# Patient Record
Sex: MALE | Employment: FULL TIME | URBAN - METROPOLITAN AREA
[De-identification: names, ages, dates, MRNs, and addresses within clinical notes are randomized per-mention and may not be internally consistent; named-entity substitution may affect disease eponyms.]

---

## 2017-01-01 ENCOUNTER — OFFICE VISIT (OUTPATIENT)
Dept: INTERPRETER SERVICES | Facility: CLINIC | Age: 47
End: 2017-01-01

## 2017-01-02 ENCOUNTER — RECORDS - HEALTHEAST (OUTPATIENT)
Dept: ADMINISTRATIVE | Facility: OTHER | Age: 47
End: 2017-01-02

## 2017-01-02 ENCOUNTER — OFFICE VISIT (OUTPATIENT)
Dept: INTERPRETER SERVICES | Facility: CLINIC | Age: 47
End: 2017-01-02

## 2017-01-02 ENCOUNTER — INTERPRETER (OUTPATIENT)
Dept: INTERPRETER SERVICES | Facility: CLINIC | Age: 47
End: 2017-01-02

## 2017-01-03 ENCOUNTER — RESULTS ONLY (OUTPATIENT)
Dept: OTHER | Facility: CLINIC | Age: 47
End: 2017-01-03

## 2017-01-03 ENCOUNTER — APPOINTMENT (OUTPATIENT)
Dept: CARDIOLOGY | Facility: CLINIC | Age: 47
DRG: 001 | End: 2017-01-03
Attending: INTERNAL MEDICINE
Payer: COMMERCIAL

## 2017-01-03 ENCOUNTER — APPOINTMENT (OUTPATIENT)
Dept: CARDIOLOGY | Facility: CLINIC | Age: 47
End: 2017-01-03
Attending: INTERNAL MEDICINE
Payer: COMMERCIAL

## 2017-01-03 ENCOUNTER — OFFICE VISIT (OUTPATIENT)
Dept: INTERPRETER SERVICES | Facility: CLINIC | Age: 47
End: 2017-01-03

## 2017-01-03 ENCOUNTER — APPOINTMENT (OUTPATIENT)
Dept: MEDSURG UNIT | Facility: CLINIC | Age: 47
End: 2017-01-03
Payer: COMMERCIAL

## 2017-01-04 ENCOUNTER — APPOINTMENT (OUTPATIENT)
Dept: OCCUPATIONAL THERAPY | Facility: CLINIC | Age: 47
DRG: 001 | End: 2017-01-04
Attending: THORACIC SURGERY (CARDIOTHORACIC VASCULAR SURGERY)
Payer: COMMERCIAL

## 2017-01-04 ENCOUNTER — OFFICE VISIT (OUTPATIENT)
Dept: INTERPRETER SERVICES | Facility: CLINIC | Age: 47
End: 2017-01-04

## 2017-01-04 PROBLEM — I47.29 VENTRICULAR TACHYCARDIA, NON-SUSTAINED (H): Status: ACTIVE | Noted: 2017-01-04

## 2017-01-05 ENCOUNTER — APPOINTMENT (OUTPATIENT)
Dept: OCCUPATIONAL THERAPY | Facility: CLINIC | Age: 47
DRG: 001 | End: 2017-01-05
Attending: THORACIC SURGERY (CARDIOTHORACIC VASCULAR SURGERY)
Payer: COMMERCIAL

## 2017-01-05 ENCOUNTER — APPOINTMENT (OUTPATIENT)
Dept: MRI IMAGING | Facility: CLINIC | Age: 47
DRG: 001 | End: 2017-01-05
Attending: NURSE PRACTITIONER
Payer: COMMERCIAL

## 2017-01-05 PROBLEM — T86.21: Status: ACTIVE | Noted: 2017-01-05

## 2017-01-05 LAB
DONOR IDENTIFICATION: NORMAL
DSA COMMENTS: NORMAL
DSA PRESENT: NO
DSA TEST METHOD: NORMAL
ORGAN: NORMAL
SA1 CELL: NORMAL
SA1 COMMENTS: NORMAL
SA1 HI RISK ABY: NORMAL
SA1 MOD RISK ABY: NORMAL
SA1 TEST METHOD: NORMAL
SA2 CELL: NORMAL
SA2 COMMENTS: NORMAL
SA2 HI RISK ABY UA: NORMAL
SA2 MOD RISK ABY: NORMAL
SA2 TEST METHOD: NORMAL

## 2017-01-05 PROCEDURE — 75561 CARDIAC MRI FOR MORPH W/DYE: CPT

## 2017-01-06 ENCOUNTER — OFFICE VISIT (OUTPATIENT)
Dept: INTERPRETER SERVICES | Facility: CLINIC | Age: 47
End: 2017-01-06

## 2017-01-07 ENCOUNTER — APPOINTMENT (OUTPATIENT)
Dept: OCCUPATIONAL THERAPY | Facility: CLINIC | Age: 47
DRG: 001 | End: 2017-01-07
Attending: THORACIC SURGERY (CARDIOTHORACIC VASCULAR SURGERY)
Payer: COMMERCIAL

## 2017-01-07 ENCOUNTER — OFFICE VISIT (OUTPATIENT)
Dept: INTERPRETER SERVICES | Facility: CLINIC | Age: 47
End: 2017-01-07

## 2017-01-08 ENCOUNTER — APPOINTMENT (OUTPATIENT)
Dept: OCCUPATIONAL THERAPY | Facility: CLINIC | Age: 47
DRG: 001 | End: 2017-01-08
Attending: THORACIC SURGERY (CARDIOTHORACIC VASCULAR SURGERY)
Payer: COMMERCIAL

## 2017-01-09 ENCOUNTER — OFFICE VISIT (OUTPATIENT)
Dept: INTERPRETER SERVICES | Facility: CLINIC | Age: 47
End: 2017-01-09

## 2017-01-09 ENCOUNTER — RECORDS - HEALTHEAST (OUTPATIENT)
Dept: ADMINISTRATIVE | Facility: OTHER | Age: 47
End: 2017-01-09

## 2017-01-09 ENCOUNTER — APPOINTMENT (OUTPATIENT)
Dept: OCCUPATIONAL THERAPY | Facility: CLINIC | Age: 47
DRG: 001 | End: 2017-01-09
Attending: THORACIC SURGERY (CARDIOTHORACIC VASCULAR SURGERY)
Payer: COMMERCIAL

## 2017-01-10 ENCOUNTER — APPOINTMENT (OUTPATIENT)
Dept: CARDIOLOGY | Facility: CLINIC | Age: 47
DRG: 001 | End: 2017-01-10
Attending: THORACIC SURGERY (CARDIOTHORACIC VASCULAR SURGERY)
Payer: COMMERCIAL

## 2017-01-11 ENCOUNTER — OFFICE VISIT (OUTPATIENT)
Dept: INTERPRETER SERVICES | Facility: CLINIC | Age: 47
End: 2017-01-11

## 2017-01-11 ENCOUNTER — PRE VISIT (OUTPATIENT)
Dept: TRANSPLANT | Facility: CLINIC | Age: 47
End: 2017-01-11

## 2017-01-11 ENCOUNTER — CARE COORDINATION (OUTPATIENT)
Dept: CARE COORDINATION | Facility: CLINIC | Age: 47
End: 2017-01-11

## 2017-01-11 ENCOUNTER — APPOINTMENT (OUTPATIENT)
Dept: OCCUPATIONAL THERAPY | Facility: CLINIC | Age: 47
DRG: 001 | End: 2017-01-11
Attending: THORACIC SURGERY (CARDIOTHORACIC VASCULAR SURGERY)
Payer: COMMERCIAL

## 2017-01-11 DIAGNOSIS — E78.5 HYPERLIPEMIA: ICD-10-CM

## 2017-01-11 DIAGNOSIS — Z94.1 HEART REPLACED BY TRANSPLANT (H): Primary | ICD-10-CM

## 2017-01-11 RX ORDER — TACROLIMUS 1 MG/1
CAPSULE ORAL
Qty: 210 CAPSULE | Refills: 11 | Status: SHIPPED | OUTPATIENT
Start: 2017-01-11 | End: 2017-01-18

## 2017-01-11 NOTE — PROGRESS NOTES
Patient is a transplant patient and will be followed by the transplant team for follow up      DBD Heart (Transplant 1)       Phase: Transplanted (12/12/2016)   Status: Active Follow-up    Next review:     POD: 30 days             Care team: Manoj Hannah RN (Heart Coord) Geena Mcclellan MD (Transplant Physician)   Ivan Burrell (Family Medicine Physician) Broderick Gao MD, MD (Referring Physician)        Transplant Center: [facility] ([place], [place])       Transplant admission: Admission Summary     Transplant surgery: Surgery (12/12/2016)                     Native organs dx:               Phase history: Effective Phase Status Reason Report   12/12/2016 Transplanted Active Follow-up  Visit Summary   9/30/2016 Waitlist Active     6/17/2016 Evaluation Active     5/25/2016 Referral Active  Visit Summary                 Transplant Note  Ny Gilmore RN 12/5/2016  2:27 PM     Services:  [phone]  - speaks [place].   Make arrangements for an  to be with pt in his pre-op phase of tx surgery.    Chaga's Disease:  Will need close follow-up for re-activation of disease after transplant, including frequent PCR testing of both blood and biopsy tissue.  See Dr Ivan Schuler's Infectious Disease consult note from 8-31-16 which outlines the screening protocol and treatment of rejection, the presentation of which is unable to be  from a reactivation of Chaga's.      XM:  None needed    # sternotomies:  None    On continuous infusion milrinone    Anticoagulation:    None    Transportation:  Local drive, 30 - 45 min.    ICD:  Medtronic                                         Events      Referred: 5/25/2016 Evaluated: 6/17/2016 Committee:  UNOS activated: 9/30/2016     Center waitlisted: 9/30/2016 Admitted: 12/11/2016 Transplanted: 12/12/2016

## 2017-01-11 NOTE — TELEPHONE ENCOUNTER
Called to change his upcoming appointments: confirmed that the patient is now scheduled to have labs at 730, xray at 745 and echo at 800 on Friday morning (1/13/17) before 0900 clinic with ROBERTO PORRAS.  Due to elevated FK level, instructed patient to reduce his FK to 4 mg/3 mg (from 5/4mg) and confirmed the need for a 12 hour FK level to be drawn Friday AM.  Confirmed current MMF dose (1500 mg BID).  Instructed patient to bring all of his medications and medication box to clinic visit.  Patient verbalizes understanding plan of care and agrees to follow.

## 2017-01-13 ENCOUNTER — HOSPITAL ENCOUNTER (OUTPATIENT)
Facility: CLINIC | Age: 47
Setting detail: SPECIMEN
Discharge: HOME | End: 2017-01-13
Admitting: INTERNAL MEDICINE
Payer: COMMERCIAL

## 2017-01-13 ENCOUNTER — RESULTS ONLY (OUTPATIENT)
Dept: OTHER | Facility: CLINIC | Age: 47
End: 2017-01-13

## 2017-01-13 ENCOUNTER — RADIANT APPOINTMENT (OUTPATIENT)
Dept: CARDIOLOGY | Facility: CLINIC | Age: 47
End: 2017-01-13
Attending: INTERNAL MEDICINE

## 2017-01-13 ENCOUNTER — OFFICE VISIT (OUTPATIENT)
Dept: CARDIOLOGY | Facility: CLINIC | Age: 47
End: 2017-01-13
Attending: INTERNAL MEDICINE
Payer: COMMERCIAL

## 2017-01-13 ENCOUNTER — RECORDS - HEALTHEAST (OUTPATIENT)
Dept: ADMINISTRATIVE | Facility: OTHER | Age: 47
End: 2017-01-13

## 2017-01-13 VITALS
WEIGHT: 149.1 LBS | OXYGEN SATURATION: 99 % | HEIGHT: 67 IN | BODY MASS INDEX: 23.4 KG/M2 | HEART RATE: 72 BPM | DIASTOLIC BLOOD PRESSURE: 82 MMHG | SYSTOLIC BLOOD PRESSURE: 123 MMHG

## 2017-01-13 DIAGNOSIS — Z94.1 HEART REPLACED BY TRANSPLANT (H): Primary | ICD-10-CM

## 2017-01-13 DIAGNOSIS — Z94.1 HEART REPLACED BY TRANSPLANT (H): ICD-10-CM

## 2017-01-13 DIAGNOSIS — E78.5 HYPERLIPEMIA: ICD-10-CM

## 2017-01-13 LAB
ALBUMIN SERPL-MCNC: 3.4 G/DL (ref 3.4–5)
ALP SERPL-CCNC: 163 U/L (ref 40–150)
ALT SERPL W P-5'-P-CCNC: 68 U/L (ref 0–70)
ANION GAP SERPL CALCULATED.3IONS-SCNC: 8 MMOL/L (ref 3–14)
AST SERPL W P-5'-P-CCNC: 23 U/L (ref 0–45)
BILIRUB SERPL-MCNC: 0.7 MG/DL (ref 0.2–1.3)
BUN SERPL-MCNC: 26 MG/DL (ref 7–30)
CALCIUM SERPL-MCNC: 8.9 MG/DL (ref 8.5–10.1)
CHLORIDE SERPL-SCNC: 103 MMOL/L (ref 94–109)
CHOLEST SERPL-MCNC: 198 MG/DL
CK SERPL-CCNC: 29 U/L (ref 30–300)
CO2 SERPL-SCNC: 26 MMOL/L (ref 20–32)
CREAT SERPL-MCNC: 0.72 MG/DL (ref 0.66–1.25)
ERYTHROCYTE [DISTWIDTH] IN BLOOD BY AUTOMATED COUNT: 18.6 % (ref 10–15)
GFR SERPL CREATININE-BSD FRML MDRD: ABNORMAL ML/MIN/1.7M2
GLUCOSE SERPL-MCNC: 155 MG/DL (ref 70–99)
HBV CORE AB SERPL QL IA: NONREACTIVE
HBV SURFACE AG SERPL QL IA: NONREACTIVE
HCT VFR BLD AUTO: 35.7 % (ref 40–53)
HCV AB SERPL QL IA: NORMAL
HDLC SERPL-MCNC: 88 MG/DL
HGB BLD-MCNC: 11.8 G/DL (ref 13.3–17.7)
HIV 1+2 AB+HIV1 P24 AG SERPL QL IA: NORMAL
LDLC SERPL CALC-MCNC: 93 MG/DL
MAGNESIUM SERPL-MCNC: 1.3 MG/DL (ref 1.6–2.3)
MCH RBC QN AUTO: 32.4 PG (ref 26.5–33)
MCHC RBC AUTO-ENTMCNC: 33.1 G/DL (ref 31.5–36.5)
MCV RBC AUTO: 98 FL (ref 78–100)
NONHDLC SERPL-MCNC: 110 MG/DL
PHOSPHATE SERPL-MCNC: 2.4 MG/DL (ref 2.5–4.5)
PLATELET # BLD AUTO: 209 10E9/L (ref 150–450)
POTASSIUM SERPL-SCNC: 4.5 MMOL/L (ref 3.4–5.3)
PRA DONOR SPECIFIC ABY: NORMAL
PROT SERPL-MCNC: 6.5 G/DL (ref 6.8–8.8)
RBC # BLD AUTO: 3.64 10E12/L (ref 4.4–5.9)
SODIUM SERPL-SCNC: 137 MMOL/L (ref 133–144)
TACROLIMUS BLD-MCNC: 16.2 UG/L (ref 5–15)
TME LAST DOSE: ABNORMAL H
TRIGL SERPL-MCNC: 82 MG/DL
WBC # BLD AUTO: 13 10E9/L (ref 4–11)

## 2017-01-13 PROCEDURE — 86704 HEP B CORE ANTIBODY TOTAL: CPT | Performed by: INTERNAL MEDICINE

## 2017-01-13 PROCEDURE — 82550 ASSAY OF CK (CPK): CPT | Performed by: INTERNAL MEDICINE

## 2017-01-13 PROCEDURE — 85027 COMPLETE CBC AUTOMATED: CPT | Performed by: INTERNAL MEDICINE

## 2017-01-13 PROCEDURE — 80197 ASSAY OF TACROLIMUS: CPT | Performed by: INTERNAL MEDICINE

## 2017-01-13 PROCEDURE — 86832 HLA CLASS I HIGH DEFIN QUAL: CPT | Performed by: INTERNAL MEDICINE

## 2017-01-13 PROCEDURE — 0298T ZZC EXT ECG > 48HR TO 21 DAY REVIEW AND INTERPRETATN: CPT | Performed by: INTERNAL MEDICINE

## 2017-01-13 PROCEDURE — 93010 ELECTROCARDIOGRAM REPORT: CPT | Mod: ZP | Performed by: INTERNAL MEDICINE

## 2017-01-13 PROCEDURE — 87799 DETECT AGENT NOS DNA QUANT: CPT | Performed by: INTERNAL MEDICINE

## 2017-01-13 PROCEDURE — 0296T ZIO PATCH HOLTER: CPT | Mod: ZF | Performed by: INTERNAL MEDICINE

## 2017-01-13 PROCEDURE — 86803 HEPATITIS C AB TEST: CPT | Performed by: INTERNAL MEDICINE

## 2017-01-13 PROCEDURE — 87340 HEPATITIS B SURFACE AG IA: CPT | Performed by: INTERNAL MEDICINE

## 2017-01-13 PROCEDURE — 80061 LIPID PANEL: CPT | Performed by: INTERNAL MEDICINE

## 2017-01-13 PROCEDURE — 36415 COLL VENOUS BLD VENIPUNCTURE: CPT | Performed by: INTERNAL MEDICINE

## 2017-01-13 PROCEDURE — 99215 OFFICE O/P EST HI 40 MIN: CPT | Performed by: INTERNAL MEDICINE

## 2017-01-13 PROCEDURE — 80053 COMPREHEN METABOLIC PANEL: CPT | Performed by: INTERNAL MEDICINE

## 2017-01-13 PROCEDURE — 83735 ASSAY OF MAGNESIUM: CPT | Performed by: INTERNAL MEDICINE

## 2017-01-13 PROCEDURE — 93005 ELECTROCARDIOGRAM TRACING: CPT | Mod: ZF

## 2017-01-13 PROCEDURE — 99212 OFFICE O/P EST SF 10 MIN: CPT

## 2017-01-13 PROCEDURE — 84100 ASSAY OF PHOSPHORUS: CPT | Performed by: INTERNAL MEDICINE

## 2017-01-13 PROCEDURE — 86833 HLA CLASS II HIGH DEFIN QUAL: CPT | Performed by: INTERNAL MEDICINE

## 2017-01-13 PROCEDURE — 87389 HIV-1 AG W/HIV-1&-2 AB AG IA: CPT | Performed by: INTERNAL MEDICINE

## 2017-01-13 RX ADMIN — Medication 3 ML: at 08:45

## 2017-01-13 ASSESSMENT — PAIN SCALES - GENERAL: PAINLEVEL: NO PAIN (0)

## 2017-01-13 NOTE — NURSING NOTE
Patient seen in clinic for routine 4 week post heart transplant evaluation.  Reviewed available results with patient, which appear to indicate good heart function and normal lab values.  Patient appears to be doing very well, wounds are healing and he denies SOB, chest pain, swelling or palpitations.  EKG completed in the clinic room. 14 day Zio patch placed in clinic. Reviewed medication list and when to call coordinator (eg. with fever, SOB, pain, weight gain/swelling, etc). Patient given a letter from Dr. Mcclellan to confirm the need for the patient's wife to remain at home to care for the patient.    No medication changes were made during this clinic visit.    Follow up:  Coordinator will update patient with pending test results and if any medication changes are needed.  Confirmed next scheduled appointments and when to call the coordinator.    Plan for patient to have weekly Chagas levels and to start outpatient cardiac rehabilitation in approximately 2 weeks  AVS given to patient.

## 2017-01-13 NOTE — NURSING NOTE
Chief Complaint   Patient presents with     Follow Up For     Return Heart Transplant- Patient in the hospital for 4 weeks   RETURN VISIT- TX

## 2017-01-13 NOTE — PROGRESS NOTES
ADULT HEART TRANSPLANT CLINIC      January 13, 2017    Dear Colleagues:      I had the pleasure of seeing Javi Shetty in the [facility]iac [facility] on 01/13/2017.  This is his first visit for cardiac transplantation following discharge.   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  His hospitalization was complicated by 2R rejection as well as ventricular tachycardia requiring treatment with steroids and eventually thromboglobulin.  He has had some evidence of Chagas reactivation and is now on benznidazole 150 mg PO BID which is provided by the investigational pharmacy. He will be on this for months.   He initially had high filling pressures postoperatively, but these normalized with further diuresis and treatment of rejection.      He has been home for 4 days now and overall is reporting that he is feeling quite well.  He reports no shortness of breath, PND or orthopnea.  No palpitations or syncope.  He denies any lower extremity edema.      He denies fevers, chills or cough.  Sternal wound is healing well.  He has no diarrhea or other symptoms of intolerance of transplant medications.     Transplant details:     Date: 12/12/2016   History of rejection: 2 R  12/27/2016  History of opportunistic infection: none   Transplant medication intolerances: none, although had rejection on azathioprine   CMV R+/D+, EBV R+/D+, HIV negative, HSV 1/2 R+/-, D unknown, donor negative for Heb B and  C         Patient Active Problem List    Diagnosis Date Noted     Ventricular tachycardia, non-sustained (H) 01/04/2017     Priority: Medium     Elevated liver enzymes 12/20/2016     Priority: Medium     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 12/19/2016     Priority: Medium     Need for prophylactic antibiotic 12/19/2016     Priority: Medium     Heart failure (H)  12/12/2016     Priority: Medium     Heart replaced by transplant (H) 12/12/2016     Priority: Medium     Heart transplant candidate 12/11/2016     Priority: Medium     Chest pain 07/11/2016     Priority: Medium     Chronic systolic heart failure (H)      Priority: Medium     CHF (congestive heart failure) (H) 06/17/2016     Priority: Medium     Cardiac defibrillator in situ-Medtronic, dual chamber - Not Dependent 06/16/2016     Priority: Medium     Chagas cardiomyopathy 05/26/2016     Priority: Medium     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 500 mg)  14 DPT: 2R, neg/neg treated w/IV pred x 3 (1000mg) + thymo x 3  21 DPT: 1R, neg/neg       ACP (advance care planning) 10/10/2016     Advance Care Planning 10/10/2016: Receipt of ACP document:  Received: invalid HCD document dated 8-4-16.  Document not previously scanned. Validation form completed indicating invalid document. Copy sent to client with information and facilitation resources. Validation form sent to be scanned as notation of invalid document received. Confirmed/documented designated decision maker(s).  Added by Namrata Neely RN Advance Care Planning Liaison with [employer]               PAST MEDICAL HISTORY:  Past Medical History   Diagnosis Date     Presbyopia      Essential hypertension      Gastroesophageal reflux disease      Dual ICD (implantable cardioverter-defibrillator) in place 10/20/2015     Premature ventricular contractions      Chagas cardiomyopathy      Pterygium      ?? suspected , but unclear dx     Hypertension      H/O gastric ulcer      Chronic systolic heart failure (H)    s/p OHT 12/12/2016      CURRENT MEDICATIONS:   benznidazole 150 mg PO BID     Prescription Medications as of 1/13/2017             Insulin Aspart (NOVOLOG SC)     Insulin Glargine (LANTUS SC)     oxyCODONE (ROXICODONE) 5 MG IR tablet Take 1 tablet (5 mg) by mouth every 6 hours as needed  for moderate to severe pain    aspirin EC 81 MG EC tablet Take 1 tablet (81 mg) by mouth daily    pravastatin (PRAVACHOL) 20 MG tablet Take 1 tablet (20 mg) by mouth every evening    valGANciclovir (VALCYTE) 450 MG tablet Take 2 tablets (900 mg) by mouth daily    mycophenolate (CELLCEPT - GENERIC EQUIVALENT) 250 MG capsule Take 6 capsules (1,500 mg) by mouth 2 times daily    senna-docusate (SENOKOT-S;PERICOLACE) 8.6-50 MG per tablet Take 2 tablets by mouth nightly as needed for constipation (If no stools during the day)    calcium carb 1250 mg, 500 mg Lumbee,/vitamin D 200 units (OSCAL WITH D) 500-200 MG-UNIT per tablet Take 1 tablet by mouth 2 times daily (with meals)    predniSONE (DELTASONE) 5 MG tablet Take 3 tablets (15 mg) by mouth every evening    sulfamethoxazole-trimethoprim (BACTRIM DS/SEPTRA DS) 800-160 MG per tablet Take 1 tablet by mouth twice a week On mondays and thursdays    nystatin (MYCOSTATIN) 272434 UNIT/ML suspension Take 10 mLs (1,000,000 Units) by mouth 4 times daily    multivitamin, therapeutic with minerals (THERA-VIT-M) TABS tablet Take 1 tablet by mouth daily    pantoprazole (PROTONIX) 40 MG EC tablet Take 1 tablet (40 mg) by mouth every morning    tacrolimus (PROGRAF - GENERIC EQUIVALENT) 1 MG capsule Take 4 mg in the morning; take 3 mg in the evening    ketoconazole (NIZORAL) 2 % shampoo Apply topically three times a week Alternate with Head and Shoulders.      Facility Administered Medications as of 1/13/2017             perflutren diluted 1mL to 2mL with saline (OPTISON) diluted injection 5 mL Inject 5 mLs into the vein once          ROS:   Constitutional: No fever, chills, or sweats. No weight gain/loss.   ENT: No visual disturbance, ear ache, epistaxis, sore throat.   Allergies/Immunologic: Negative.   Respiratory: No cough, hemoptysis.   Cardiovascular: As per HPI.   GI: No nausea, vomiting, hematemesis, melena, or hematochezia.   : No urinary frequency, dysuria, or hematuria.  "  Integument: Negative.   Psychiatric: Negative.   Neuro: Negative.   Endocrinology: Negative.   Musculoskeletal: Negative    Exam:  /82 mmHg  Pulse 72  Ht 1.702 m (5' 7\")  Wt 67.631 kg (149 lb 1.6 oz)  BMI 23.35 kg/m2  SpO2 99%  In general, the patient is a pleasant male in no apparent distress.    HEENT: NC/AT. PERRLA. EOMI.  Sclerae white, not injected.    Neck:  No adenopathy, No thyromegaly.    COR: No jugular venous distention.  RRR.  Normal S1 S2 splits physiologically.  No murmur, rub click, or gallop.    Lungs:  CTA. No rhonchi.    Abdomen: soft, nontender, nondistended.  No organomegaly.  Extremities:  No clubbing, cyanosis, or edema.    Neuro: Alert & Oriented x 3, grossly non focal.    Labs:  CBC RESULTS:   Lab Results   Component Value Date    WBC 13.0* 01/13/2017    RBC 3.64* 01/13/2017    HGB 11.8* 01/13/2017    HCT 35.7* 01/13/2017    MCV 98 01/13/2017    MCH 32.4 01/13/2017    MCHC 33.1 01/13/2017    RDW 18.6* 01/13/2017     01/13/2017       BMP RESULTS:  Lab Results   Component Value Date     01/13/2017    POTASSIUM 4.5 01/13/2017    CHLORIDE 103 01/13/2017    CO2 26 01/13/2017    ANIONGAP 8 01/13/2017    * 01/13/2017    BUN 26 01/13/2017    CR 0.72 01/13/2017    GFRESTIMATED >90  Non  GFR Calc   01/13/2017    GFRESTBLACK >90   GFR Calc   01/13/2017    DAISY 8.9 01/13/2017      LIPID RESULTS:  Lab Results   Component Value Date    CHOL 198 01/13/2017    HDL 88 01/13/2017    LDL 93 01/13/2017    TRIG 82 01/13/2017    NHDL 110 01/13/2017       IMMUNOSUPPRESSANT LEVELS  Lab Results   Component Value Date    TACROL 19.2* 01/11/2017    DOSTAC Not Provided 01/11/2017       No components found for: CK  Lab Results   Component Value Date    MAG 1.3* 01/13/2017     Lab Results   Component Value Date    A1C 5.4 12/11/2016     Lab Results   Component Value Date    PHOS 2.4* 01/13/2017     Lab Results   Component Value Date    NTBNPI 55321* " 12/20/2016     Lab Results   Component Value Date    SAITESTMET SA HI 01/03/2017    SAICELL Class I 01/03/2017    [id number] B:8 01/03/2017    [id number] A:29 B:54 01/03/2017    SAIREPCOM  01/03/2017      Test performed by modified procedure. Serum heat inactivated. High-risk,   mfi >3,000. Mod-risk, mfi 500-3,000.       Lab Results   Component Value Date    SAIITESTME SA HI 01/03/2017    SAIICELL Class II 01/03/2017    [id number] None 01/03/2017    [id number] None 01/03/2017    SAIIREPCOM  01/03/2017      Test performed by modified procedure. Serum heat inactivated. High-risk,   mfi >3,000. Mod-risk, mfi 500-3,000.       No results found for: CSPEC, CMQNT, CMLOG    Last echo and baseline coronary angiogram reviewed       12/27     Heart, allograft, right ventricle, endomyocardial biopsy:   - Acute cellular rejection: Moderate rejection, Grade 2R/3A (ISHLT 2004)        - Antibody-mediated rejection: No evidence of antibody-mediated   rejection        - C3d and C4d are negative (see comment)        - Additional findings:             - Ischemic/reperfusion injury         Echo 1/13/2017     Procedure  Echocardiogram with two-dimensional, color and spectral Doppler performed.  Contrast Optison. Optison (NDC #2571-6484-30) given intravenously. Patient  was given 3 ml mixture of 3 ml Optison and 6 ml saline. 6 ml wasted. IV start  location R Upper arm .  ______________________________________________________________________________     Interpretation Summary  Global and regional left ventricular function is normal with an EF of 60-65%.  Right ventricular function, chamber size, wall motion, and thickness are  normal.  Pulmonary artery systolic pressure is normal.  The inferior vena cava was normal in size with preserved respiratory  variability. Estimated mean right atrial pressure is <3 mmHg.  No pericardial effusion is present.  This study was compared with the study from 12/29/2016. There has been  no  change.  ______________________________________________________________________________      1/10/2016   A:     Heart, allograft, right ventricle; endomyocardial biopsy:   - ISHLT cellular grade:     1R        - Mild focal acute cellular rejection   - ISHLT antibody-mediated grade:     pAMR 0        - No histological features diagnostic for antibody-mediated   rejection        - No detectable capillary staining for C4d or C3d   - Extensive myocyte necrosis consistent with recent prior ischemic  injury     Assessment and Plan:   In summary, this is a very pleasant 46-year-old man who is now s/p cardiac transplant on 12/12/2016 for chagas cardiomyopathy.       Assessment and recommendations:   In summary this is a  very pleasant 46-year-old man with longstanding history of Chagas cardiomyopathy who underwent cardiac transplantation on 12/12/2016.     His postoperative course was complicated by 2R rejection, elevated filling pressures as well as reactivation of his Chagas disease.  He required Solu-Medrol as well as Thymoglobulin.  Overall, despite this he looks excellent today in clinic, he is tolerating all immunosuppression well as he is on benznidazole 150 mg PO BID for suppression of the Chagas cardiomyopathy, and we are sending weekly PCRs with the help  of Transplant ID to the [facility].  Presently he has no symptoms from this.  We are also holding off on resumption of INH given abnormal LFTs in the hospital.  Those continue to improve.      For his cardiac transplantation, he is euvolemic on exam today.  Last biopsy showed resolution of his rejection.  He is due for a biopsy in about 2 weeks.  His tacrolimus goal right now at present is 10-15.  I am going to keep his steroids at their present dose.  We can taper down at his next negative biopsy.  I am also keeping him on MMF 1500 p.o. b.i.d., and he is tolerating this also. Given that he did have some NSVT in house even after his rejection was treated I am  ordering a cardiac monitor to ensure this has gone away. His resting HR is slow at present and this may be due to the fact that he received amiodarone in house.     For infection ppx:   PCP prophylaxis: TMP-SMX.  - Viral serostatus & prophylaxis: CMV R+/D+, EBV R+/D+, HIV negative, HSV 1/2 R+/-, D unknown, donor negative for Heb B and C; on valcyte  - Immunization status:  VZV seropositive, hepatitis B immune.    With regard to his Chagas cardiomyopathy, again, he is on benznidazole 150 mg PO BID  for suppression and will see ID in several weeks.  He is tolerating this well and will be on this for months.      Latent TB.  We will have an eye towards resuming INH as soon as LFTs completely normalize.      For other transplant medications, he is on aspirin and statin as well as appropriate prophylaxis as listed below.       Change in immunosuppression: NO  Reason for Change: NA  Other Changes: will add back INH when LFTs completely normalize   Follow-Up: 2 weeks       Next Biopsy: 2 weeks   Next Angiogram: testing is just done   Next Angiogram with IVUS: yes, at one year   Next Stress Test: per protocol        Sincerely,      Geena Mcclellan MD   Cardiology Staff     CC  Patient Care Team:  Ivan Burrell MD as PCP - General (Student in organized health care education/training program)  Broderick Gao MD, MD as MD (Cardiology)  Shauna Cuellar MD as MD (Infectious Diseases)  Marino Woo MD as MD (Dermatology)         [place] [address]

## 2017-01-13 NOTE — MR AVS SNAPSHOT
After Visit Summary   1/13/2017    Javi Bansal    MRN: [id number]           Patient Information     Date Of Birth          1970        Visit Information        Provider Department      1/13/2017 8:45 AM Justyn Dolan Rebecca Jane, MD [facility]        Today's Diagnoses     Heart replaced by transplant (H)    -  1       Care Instructions    You were seen in the cardiology clinic for a routine 4 week post heart transplant evaluation.  The available test results indicate good heart function and relatively normal lab values. Please call your coordinator with any questions/conerns and with the following symptoms:    Blood pressure greater than 150/90 for 2 or more readings.    Temperature greater than 100.5 F     Weight changes: 2.5 lbs/day or 5 lbs/week    Pain, swelling, redness at wound sites    Any shortness of breath, dizziness, extra heart beats, increased swelling in your legs or hands or fatigue     Productive cough      No medication changes were made during this clinic visit.    Follow up:  Your coordinator, Manoj Hannah,  will update you with pending test results and if any medication changes are needed.  Please continue to monitor and record your blood pressure, weight and heart rate every day.  Coordinator will contact the pharmacy regarding needed insulin supplies; they should contact you very soon to confirm you have what you need.  You will need to have weekly blood tests to monitor your Chagas infection; your coordinator will confirm when/where these tests will be done.  Your next scheduled transplant appointments are in 2 weeks on Friday, January 27, 2017.    Please call me with any questions/concerns: [phone]  (after hours: [phone], pager [phone])            Follow-ups after your visit        Your next 10 appointments already scheduled     Jan 18, 2017  9:30 AM   (Arrive by 9:15 AM)   Return Visit with Ivan Schuler MD   [facility] ([facility])    [address] [place] [address]  [address]rd [facility] [address]   [phone]            Jan 27, 2017  9:00 AM   Lab with  LAB   [facility] ([facility])    [address] [place] [address]  1st [facility] [address]   [phone]            Jan 27, 2017  9:30 AM   (Arrive by 9:15 AM)   RETURN HEART TRANSPLANT with Geena Mcclellan MD   [facility] ([facility])    [address] [place] [address]  3rd [facility] [address]   [phone]            Jan 27, 2017 11:00 AM   Procedure - 2.5 hour with U2A ROOM 17   [address] [facility] ([facility])    [address] [facility] [address]               Jan 27, 2017 12:00 PM   Heart Cath Heart Biopsy with UUHCVR5   [facility] ([facility])    [address] [facility] [address]   [phone]            Feb 09, 2017  9:30 AM   LAB with  LAB   [facility] ([facility])    [address] [place] [address]   [phone]           Patient must bring picture ID.  Patient should be prepared to give a urine specimen  Please do not eat 10-12 hours before your appointment if you are coming in fasting for labs on lipids, cholesterol, or glucose (sugar).  Pregnant women should follow their Care Team instructions. Water with medications is okay. Do not drink coffee or other fluids.   If you have concerns about taking  your medications, please ask at office or if scheduling via [employer], send a message by clicking on Secure Messaging, Message Your Care Team.            Feb 09, 2017 10:15 AM   (Arrive by 10:00 AM)   Return Visit with Ivan Burrell MD   [facility] ([facility])    [address] [place] [address]  [address]   [phone]            Feb 10, 2017  8:30 AM   Procedure - 2.5 hour with U2A ROOM 17   [address] [facility] ([facility])    [address] [facility] [address]               Feb 10, 2017  9:30 AM   Heart Cath Heart Biopsy with UUHCVR3   [facility]  Heart Cath Lab ([facility])    [address] [facility] [address] [phone]            Feb 10, 2017 12:00 PM   (Arrive by 11:45 AM)   RETURN HEART TRANSPLANT with SAMM Ramos [facility] ([facility])    [address] [facility] [address]  3rd Floor  [facility] [id number] [phone]              Future tests that were ordered for you today     Open Standing Orders        Priority Remaining Interval Expires Ordered    Chagas PCR, to be sent to MD then [facility], purple top tube: Laboratory Miscellaneous Order Routine 4/4 weekly 1/13/2018 1/13/2017            Who to contact     If you have questions or need follow up information about today's clinic visit or your schedule please contact [facility] directly at [phone].  Normal or non-critical lab and imaging results will be communicated to you by MyChart, letter or phone within 4 business days after the clinic has received the results. If you do not hear from us within 7 days, please contact the clinic through MyChart or phone. If you have a critical or abnormal lab result, we will notify you by phone as soon as possible.  Submit refill requests through [employer] or call your pharmacy and they will forward the refill request to us. Please allow 3 business days for your refill to be completed.          Additional Information About Your Visit        Care EveryWhere ID     This is your Care EveryWhere ID. This could be used by other organizations to access your [facility] medical records  [id number]        Your Vitals Were     Pulse Height BMI (Body Mass Index)  "Pulse Oximetry          72 1.702 m (5' 7\") 23.35 kg/m2 99%         Blood Pressure from Last 3 Encounters:   01/13/17 123/82   01/11/17 115/81   11/30/16 112/74    Weight from Last 3 Encounters:   01/13/17 67.631 kg (149 lb 1.6 oz)   01/11/17 66.996 kg (147 lb 11.2 oz)   11/30/16 77.111 kg (170 lb)              We Performed the Following     EKG 12-lead complete w/read - Clinics     EKG 12-lead, tracing only        Primary Care Provider Office Phone # Fax #    Ivan Burrell [phone]       [address] [address]        Thank you!     Thank you for choosing [facility]  for your care. Our goal is always to provide you with excellent care. Hearing back from our patients is one way we can continue to improve our services. Please take a few minutes to complete the written survey that you may receive in the mail after your visit with us. Thank you!             Your Updated Medication List - Protect others around you: Learn how to safely use, store and throw away your medicines at www.disposemymeds.org.          This list is accurate as of: 1/13/17 10:20 AM.  Always use your most recent med list.                   Brand Name Dispense Instructions for use    aspirin 81 MG EC tablet     90 tablet    Take 1 tablet (81 mg) by mouth daily       calcium carb 1250 mg (500 mg [place])/vitamin D 200 units 500-200 MG-UNIT per tablet    OSCAL with D    60 tablet    Take 1 tablet by mouth 2 times daily (with meals)       ketoconazole 2 % shampoo    NIZORAL    120 mL    Apply topically three times a week Alternate with Head and Shoulders.       LANTUS SC          multivitamin, therapeutic with minerals Tabs tablet     30 each    Take 1 tablet by mouth daily       mycophenolate 250 MG capsule    CELLCEPT - GENERIC EQUIVALENT    360 capsule    Take 6 capsules (1,500 mg) by mouth 2 times daily       NOVOLOG SC          nystatin 101659 UNIT/ML suspension    MYCOSTATIN    280 mL "    Take 10 mLs (1,000,000 Units) by mouth 4 times daily       oxyCODONE 5 MG IR tablet    ROXICODONE    40 tablet    Take 1 tablet (5 mg) by mouth every 6 hours as needed for moderate to severe pain       pantoprazole 40 MG EC tablet    PROTONIX    30 tablet    Take 1 tablet (40 mg) by mouth every morning       pravastatin 20 MG tablet    PRAVACHOL    30 tablet    Take 1 tablet (20 mg) by mouth every evening       predniSONE 5 MG tablet    DELTASONE    30 tablet    Take 3 tablets (15 mg) by mouth every evening       senna-docusate 8.6-50 MG per tablet    SENOKOT-S;PERICOLACE    20 tablet    Take 2 tablets by mouth nightly as needed for constipation (If no stools during the day)       sulfamethoxazole-trimethoprim 800-160 MG per tablet    BACTRIM DS/SEPTRA DS    8 tablet    Take 1 tablet by mouth twice a week On mondays and thursdays       tacrolimus 1 MG capsule    PROGRAF - GENERIC EQUIVALENT    210 capsule    Take 4 mg in the morning; take 3 mg in the evening       valGANciclovir 450 MG tablet    VALCYTE    60 tablet    Take 2 tablets (900 mg) by mouth daily

## 2017-01-13 NOTE — Clinical Note
1/13/2017      RE: Javi Bansal  [address] [place] [address]       Dear Colleague,    Thank you for the opportunity to participate in the care of your patient, Javi Bansal, at the [facility] HEART [facility]. Please see a copy of my visit note below.    ADULT HEART TRANSPLANT CLINIC      January 13, 2017    Dear Colleagues:      I had the pleasure of seeing Javi Shetty in the [facility] Cardiac Transplant Clinic on 01/13/2017.  This is his first visit for cardiac transplantation following discharge.   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  His hospitalization was complicated by 2R rejection as well as ventricular tachycardia requiring treatment with steroids and eventually thromboglobulin.  He has had some evidence of Chagas reactivation and is now on benznidazole 150 mg PO BID which is provided by the investigational pharmacy. He will be on this for months.   He initially had high filling pressures postoperatively, but these normalized with further diuresis and treatment of rejection.      He has been home for 4 days now and overall is reporting that he is feeling quite well.  He reports no shortness of breath, PND or orthopnea.  No palpitations or syncope.  He denies any lower extremity edema.      He denies fevers, chills or cough.  Sternal wound is healing well.  He has no diarrhea or other symptoms of intolerance of transplant medications.     Transplant details:     Date: 12/12/2016   History of rejection: 2 R  12/27/2016  History of opportunistic infection: none   Transplant medication intolerances: none, although had rejection on azathioprine   CMV R+/D+, EBV R+/D+, HIV negative, HSV 1/2 R+/-, D unknown, donor negative for Heb B and  C         Patient Active Problem List    Diagnosis  Date Noted     Ventricular tachycardia, non-sustained (H) 01/04/2017     Priority: Medium     Elevated liver enzymes 12/20/2016     Priority: Medium     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 12/19/2016     Priority: Medium     Need for prophylactic antibiotic 12/19/2016     Priority: Medium     Heart failure (H) 12/12/2016     Priority: Medium     Heart replaced by transplant (H) 12/12/2016     Priority: Medium     Heart transplant candidate 12/11/2016     Priority: Medium     Chest pain 07/11/2016     Priority: Medium     Chronic systolic heart failure (H)      Priority: Medium     CHF (congestive heart failure) (H) 06/17/2016     Priority: Medium     Cardiac defibrillator in situ-[employer]tr[employer], dual chamber - Not Dependent 06/16/2016     Priority: Medium     Chagas cardiomyopathy 05/26/2016     Priority: Medium     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 500 mg)  14 DPT: 2R, neg/neg treated w/IV pred x 3 (1000mg) + thymo x 3  21 DPT: 1R, neg/neg       ACP (advance care planning) 10/10/2016     Advance Care Planning 10/10/2016: Receipt of ACP document:  Received: invalid HCD document dated 8-4-16.  Document not previously scanned. Validation form completed indicating invalid document. Copy sent to client with information and facilitation resources. Validation form sent to be scanned as notation of invalid document received. Confirmed/documented designated decision maker(s).  Added by Namrata Neely RN Advance Care Planning Liaison with [employer]               PAST MEDICAL HISTORY:  Past Medical History   Diagnosis Date     Presbyopia      Essential hypertension      Gastroesophageal reflux disease      Dual ICD (implantable cardioverter-defibrillator) in place 10/20/2015     Premature ventricular contractions      Chagas cardiomyopathy      Pterygium      ?? suspected , but unclear dx     Hypertension      H/O  gastric ulcer      Chronic systolic heart failure (H)    s/p OHT 12/12/2016      CURRENT MEDICATIONS:   benznidazole 150 mg PO BID     Prescription Medications as of 1/13/2017             Insulin Aspart (NOVOLOG SC)     Insulin Glargine (LANTUS SC)     oxyCODONE (ROXICODONE) 5 MG IR tablet Take 1 tablet (5 mg) by mouth every 6 hours as needed for moderate to severe pain    aspirin EC 81 MG EC tablet Take 1 tablet (81 mg) by mouth daily    pravastatin (PRAVACHOL) 20 MG tablet Take 1 tablet (20 mg) by mouth every evening    valGANciclovir (VALCYTE) 450 MG tablet Take 2 tablets (900 mg) by mouth daily    mycophenolate (CELLCEPT - GENERIC EQUIVALENT) 250 MG capsule Take 6 capsules (1,500 mg) by mouth 2 times daily    senna-docusate (SENOKOT-S;PERICOLACE) 8.6-50 MG per tablet Take 2 tablets by mouth nightly as needed for constipation (If no stools during the day)    calcium carb 1250 mg, 500 mg [place],/vitamin D 200 units (OSCAL WITH D) 500-200 MG-UNIT per tablet Take 1 tablet by mouth 2 times daily (with meals)    predniSONE (DELTASONE) 5 MG tablet Take 3 tablets (15 mg) by mouth every evening    sulfamethoxazole-trimethoprim (BACTRIM DS/SEPTRA DS) 800-160 MG per tablet Take 1 tablet by mouth twice a week On mondays and thursdays    nystatin (MYCOSTATIN) 629560 UNIT/ML suspension Take 10 mLs (1,000,000 Units) by mouth 4 times daily    multivitamin, therapeutic with minerals (THERA-VIT-M) TABS tablet Take 1 tablet by mouth daily    pantoprazole (PROTONIX) 40 MG EC tablet Take 1 tablet (40 mg) by mouth every morning    tacrolimus (PROGRAF - GENERIC EQUIVALENT) 1 MG capsule Take 4 mg in the morning; take 3 mg in the evening    ketoconazole (NIZORAL) 2 % shampoo Apply topically three times a week Alternate with Head and Shoulders.      Facility Administered Medications as of 1/13/2017             perflutren diluted 1mL to 2mL with saline (OPTISON) diluted injection 5 mL Inject 5 mLs into the vein once          ROS:  "  Constitutional: No fever, chills, or sweats. No weight gain/loss.   ENT: No visual disturbance, ear ache, epistaxis, sore throat.   Allergies/Immunologic: Negative.   Respiratory: No cough, hemoptysis.   Cardiovascular: As per HPI.   GI: No nausea, vomiting, hematemesis, melena, or hematochezia.   : No urinary frequency, dysuria, or hematuria.   Integument: Negative.   Psychiatric: Negative.   Neuro: Negative.   Endocrinology: Negative.   Musculoskeletal: Negative    Exam:  /82 mmHg  Pulse 72  Ht 1.702 m (5' 7\")  Wt 67.631 kg (149 lb 1.6 oz)  BMI 23.35 kg/m2  SpO2 99%  In general, the patient is a pleasant male in no apparent distress.    HEENT: NC/AT. PERRLA. EOMI.  Sclerae white, not injected.    Neck:  No adenopathy, No thyromegaly.    COR: No jugular venous distention.  RRR.  Normal S1 S2 splits physiologically.  No murmur, rub click, or gallop.    Lungs:  CTA. No rhonchi.    Abdomen: soft, nontender, nondistended.  No organomegaly.  Extremities:  No clubbing, cyanosis, or edema.    Neuro: Alert & Oriented x 3, grossly non focal.    Labs:  CBC RESULTS:   Lab Results   Component Value Date    WBC 13.0* 01/13/2017    RBC 3.64* 01/13/2017    HGB 11.8* 01/13/2017    HCT 35.7* 01/13/2017    MCV 98 01/13/2017    MCH 32.4 01/13/2017    MCHC 33.1 01/13/2017    RDW 18.6* 01/13/2017     01/13/2017       BMP RESULTS:  Lab Results   Component Value Date     01/13/2017    POTASSIUM 4.5 01/13/2017    CHLORIDE 103 01/13/2017    CO2 26 01/13/2017    ANIONGAP 8 01/13/2017    * 01/13/2017    BUN 26 01/13/2017    CR 0.72 01/13/2017    GFRESTIMATED >90  Non  GFR Calc   01/13/2017    GFRESTBLACK >90   GFR Calc   01/13/2017    DAISY 8.9 01/13/2017      LIPID RESULTS:  Lab Results   Component Value Date    CHOL 198 01/13/2017    HDL 88 01/13/2017    LDL 93 01/13/2017    TRIG 82 01/13/2017    NHDL 110 01/13/2017       IMMUNOSUPPRESSANT LEVELS  Lab Results   Component " Value Date    TACROL 19.2* 01/11/2017    DOSTAC Not Provided 01/11/2017       No components found for: CK  Lab Results   Component Value Date    MAG 1.3* 01/13/2017     Lab Results   Component Value Date    A1C 5.4 12/11/2016     Lab Results   Component Value Date    PHOS 2.4* 01/13/2017     Lab Results   Component Value Date    NTBNPI 37058* 12/20/2016     Lab Results   Component Value Date    SAITESTMET [employer] 01/03/2017    SAICELL Class I 01/03/2017    [id number] B:8 01/03/2017    [id number] A:29 B:54 01/03/2017    SAIREPCOM  01/03/2017      Test performed by modified procedure. Serum heat inactivated. High-risk,   mfi >3,000. Mod-risk, mfi 500-3,000.       Lab Results   Component Value Date    SAIITESTME [employer] 01/03/2017    SAIICELL Class II 01/03/2017    [id number] None 01/03/2017    [id number] None 01/03/2017    SAIIREPCOM  01/03/2017      Test performed by modified procedure. Serum heat inactivated. High-risk,   mfi >3,000. Mod-risk, mfi 500-3,000.       No results found for: CSPEC, CMQNT, CMLOG    Last echo and baseline coronary angiogram reviewed       12/27     Heart, allograft, right ventricle, endomyocardial biopsy:   - Acute cellular rejection: Moderate rejection, Grade 2R/3A (ISHLT 2004)        - Antibody-mediated rejection: No evidence of antibody-mediated   rejection        - C3d and C4d are negative (see comment)        - Additional findings:             - Ischemic/reperfusion injury         Echo 1/13/2017     Procedure  Echocardiogram with two-dimensional, color and spectral Doppler performed.  Contrast Optison. Optison (NDC #9573-9980-90) given intravenously. Patient  was given 3 ml mixture of 3 ml Optison and 6 ml saline. 6 ml wasted. IV start  location R Upper arm .  ______________________________________________________________________________     Interpretation Summary  Global and regional left ventricular function is normal with an EF of 60-65%.  Right ventricular function, chamber size, wall  motion, and thickness are  normal.  Pulmonary artery systolic pressure is normal.  The inferior vena cava was normal in size with preserved respiratory  variability. Estimated mean right atrial pressure is <3 mmHg.  No pericardial effusion is present.  This study was compared with the study from 12/29/2016. There has been no  change.  ______________________________________________________________________________      1/10/2016   A:     Heart, allograft, right ventricle; endomyocardial biopsy:   - ISHLT cellular grade:     1R        - Mild focal acute cellular rejection   - ISHLT antibody-mediated grade:     pAMR 0        - No histological features diagnostic for antibody-mediated   rejection        - No detectable capillary staining for C4d or C3d   - Extensive myocyte necrosis consistent with recent prior ischemic  injury     Assessment and Plan:   In summary, this is a very pleasant 46-year-old man who is now s/p cardiac transplant on 12/12/2016 for chagas cardiomyopathy.       Assessment and recommendations:   In summary this is a  very pleasant 46-year-old man with longstanding history of Chagas cardiomyopathy who underwent cardiac transplantation on 12/12/2016.     His postoperative course was complicated by 2R rejection, elevated filling pressures as well as reactivation of his Chagas disease.  He required Solu-Medrol as well as Thymoglobulin.  Overall, despite this he looks excellent today in clinic, he is tolerating all immunosuppression well as he is on benznidazole 150 mg PO BID for suppression of the Chagas cardiomyopathy, and we are sending weekly PCRs with the help  of Transplant ID to the CDC.  Presently he has no symptoms from this.  We are also holding off on resumption of INH given abnormal LFTs in the hospital.  Those continue to improve.      For his cardiac transplantation, he is euvolemic on exam today.  Last biopsy showed resolution of his rejection.  He is due for a biopsy in about 2 weeks.   His tacrolimus goal right now at present is 10-15.  I am going to keep his steroids at their present dose.  We can taper down at his next negative biopsy.  I am also keeping him on MMF 1500 p.o. b.i.d., and he is tolerating this also. Given that he did have some NSVT in house even after his rejection was treated I am ordering a cardiac monitor to ensure this has gone away. His resting HR is slow at present and this may be due to the fact that he received amiodarone in house.     For infection ppx:   PCP prophylaxis: TMP-SMX.  - Viral serostatus & prophylaxis: CMV R+/D+, EBV R+/D+, HIV negative, HSV 1/2 R+/-, D unknown, donor negative for Heb B and C; on valcyte  - Immunization status:  VZV seropositive, hepatitis B immune.    With regard to his Chagas cardiomyopathy, again, he is on benznidazole 150 mg PO BID  for suppression and will see ID in several weeks.  He is tolerating this well and will be on this for months.      Latent TB.  We will have an eye towards resuming INH as soon as LFTs completely normalize.      For other transplant medications, he is on aspirin and statin as well as appropriate prophylaxis as listed below.       Change in immunosuppression: NO  Reason for Change: NA  Other Changes: will add back INH when LFTs completely normalize   Follow-Up: 2 weeks       Next Biopsy: 2 weeks   Next Angiogram: testing is just done   Next Angiogram with IVUS: yes, at one year   Next Stress Test: per protocol        Sincerely,      Geena Mcclellan MD   Cardiology Staff     CC  Patient Care Team:  Ivan Burrell MD as PCP - General (Student in organized health care education/training program)  Broderick Gao MD, MD as MD (Cardiology)  Shauna Cuellar MD as MD (Infectious Diseases)  Marino Woo MD as MD (Dermatology)

## 2017-01-13 NOTE — NURSING NOTE
Chief Complaint   Patient presents with     Follow Up For     Return Heart Transplant- Patient in the hospital for 4 weeks

## 2017-01-13 NOTE — PATIENT INSTRUCTIONS
You were seen in the cardiology clinic for a routine 4 week post heart transplant evaluation.  The available test results indicate good heart function and relatively normal lab values. Please call your coordinator with any questions/conerns and with the following symptoms:    Blood pressure greater than 150/90 for 2 or more readings.    Temperature greater than 100.5 F     Weight changes: 2.5 lbs/day or 5 lbs/week    Pain, swelling, redness at wound sites    Any shortness of breath, dizziness, extra heart beats, increased swelling in your legs or hands or fatigue     Productive cough      No medication changes were made during this clinic visit.    Follow up:  Your coordinator, Manoj Hannah,  will update you with pending test results and if any medication changes are needed.  Please continue to monitor and record your blood pressure, weight and heart rate every day.  Coordinator will contact the pharmacy regarding needed insulin supplies; they should contact you very soon to confirm you have what you need.  You will need to have weekly blood tests to monitor your Chagas infection; your coordinator will confirm when/where these tests will be done.  Your next scheduled transplant appointments are in 2 weeks on Friday, January 27, 2017.    Please call me with any questions/concerns: [phone]  (after hours: [phone], pager [phone])

## 2017-01-13 NOTE — NURSING NOTE
[facility] HEART [address] [address]  [phone]  2017      Patient:  Javi Bansal  Chart: [id number]  :  1970  Age:  46 year old  Sex:  male       Procedure:  ZioPatch Monitor. 14 day zio placed. Patient to be mailed Vietnamese materials to his home.         Technician performing hook-up:  MADHU RAMIREZ

## 2017-01-14 ENCOUNTER — RECORDS - HEALTHEAST (OUTPATIENT)
Dept: ADMINISTRATIVE | Facility: OTHER | Age: 47
End: 2017-01-14

## 2017-01-14 LAB
CMV DNA SPEC NAA+PROBE-ACNC: NORMAL [IU]/ML
CMV DNA SPEC NAA+PROBE-LOG#: NORMAL {LOG_IU}/ML
SPECIMEN SOURCE: NORMAL

## 2017-01-17 LAB
EBV DNA # SPEC NAA+PROBE: NORMAL {COPIES}/ML
EBV DNA SPEC NAA+PROBE-LOG#: NORMAL {LOG_COPIES}/ML
INTERPRETATION ECG - MUSE: NORMAL

## 2017-01-18 ENCOUNTER — PRE VISIT (OUTPATIENT)
Dept: TRANSPLANT | Facility: CLINIC | Age: 47
End: 2017-01-18

## 2017-01-18 ENCOUNTER — OFFICE VISIT (OUTPATIENT)
Dept: INFECTIOUS DISEASES | Facility: CLINIC | Age: 47
End: 2017-01-18
Attending: INTERNAL MEDICINE
Payer: COMMERCIAL

## 2017-01-18 VITALS
HEART RATE: 96 BPM | HEIGHT: 63 IN | WEIGHT: 152.5 LBS | DIASTOLIC BLOOD PRESSURE: 74 MMHG | BODY MASS INDEX: 27.02 KG/M2 | SYSTOLIC BLOOD PRESSURE: 110 MMHG | TEMPERATURE: 97.7 F

## 2017-01-18 DIAGNOSIS — Z94.1 HEART REPLACED BY TRANSPLANT (H): Primary | ICD-10-CM

## 2017-01-18 DIAGNOSIS — B57.2 CHAGAS CARDIOMYOPATHY: Primary | ICD-10-CM

## 2017-01-18 DIAGNOSIS — D84.9 IMMUNOSUPPRESSED STATUS (H): ICD-10-CM

## 2017-01-18 DIAGNOSIS — B57.2 CHAGAS CARDIOMYOPATHY: ICD-10-CM

## 2017-01-18 DIAGNOSIS — Z94.1 HEART TRANSPLANT STATUS (H): ICD-10-CM

## 2017-01-18 DIAGNOSIS — Z22.7 LTBI (LATENT TUBERCULOSIS INFECTION): ICD-10-CM

## 2017-01-18 LAB — MISCELLANEOUS TEST: NORMAL

## 2017-01-18 PROCEDURE — 99212 OFFICE O/P EST SF 10 MIN: CPT | Mod: ZF

## 2017-01-18 PROCEDURE — 36415 COLL VENOUS BLD VENIPUNCTURE: CPT | Performed by: INTERNAL MEDICINE

## 2017-01-18 RX ORDER — LIDOCAINE 40 MG/G
CREAM TOPICAL
Status: CANCELLED | OUTPATIENT
Start: 2017-01-18

## 2017-01-18 RX ORDER — TACROLIMUS 1 MG/1
3 CAPSULE ORAL 2 TIMES DAILY
Qty: 180 CAPSULE | Refills: 11 | Status: SHIPPED | OUTPATIENT
Start: 2017-01-18 | End: 2017-01-31

## 2017-01-18 ASSESSMENT — PAIN SCALES - GENERAL: PAINLEVEL: NO PAIN (0)

## 2017-01-18 NOTE — Clinical Note
1/18/2017       RE: Javi Bansal  [address] [place] [address]     Dear Colleague,    Thank you for referring your patient, Javi Bansal, to the [facility] AND INFECTIOUS DISEASES at [facility]. Please see a copy of my visit note below.      [facility]  Transplant Infectious Disease Progress Note     Patient:  Javi Bansal, Date of birth 1970, Medical record number [id number]  Date of Visit:  01/18/2017  Consult requested by Dr. Mcclellan  for evaluation of Chaga's and LTBI         Assessment and Recommendations:   Recommendations:  - weekly monitoring of Chagas PCR, purple top tube send to MD then [facility], until Chagas PCR is negative.  Once negative will discontinue weekly monitoring, resuming once therapy is completed.   - ordered misc lab test: purple top for chagas PCR to be sent to [facility] then [facility].  Sent currently weekly.   - will consider restarting INH next visit    F/u in 2 months.     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      ID issues:  - Chronic Chagas heart disease now s/p OHT as above:  In coordination w/ the CDC we were monitoring Chagas PCR in a pre-emptive approach after transplant.  Likely in the setting of immunosuppression and treatment of rejection, not unexpectedly the Chagas PCR did elevate.  At this time we do not believe that it has yet re-infected the heart.  So while many subjects will be Chagas PCR positive after transplant, the approach of pre-emptive is to treat the Chagas before re-infection of the heart occurs.  With rising PCRs from 12/26, and very low level parasitemia from thick smear on 1/5/17, in the setting of treatment of rejection, Benznidazole was initiated  on 1/6/17, dosing at 150 mg BID, which is just below 5 mg/kg.  The [facility] contacted me with the following results:    Chagas PCRs:  12/19/16 negative    12/26/16 1st positive: 32    1/2/17 +   30    1/9/17    +   28  Thus, currently tolerating the Benznidazole well w/ trend down in the Chagas PCR.  Plans to treat for 2-3 months. Once the PCR turns negative on treatment will stop the weekly monitoring, and resume once treatment is completed.  The [facility] runs the IND, which I have signed the 1572.  PT was consented in Bulgarian.  I have notified our local IRB per the [facility] of  IRBs instructions as to the initiation of treatment.       Other Notable information:  Once infected pts are infected for life w/ about 30% developing end organ disease.  The adrenal glands have been suggested as a reservoir for infection, but this is not fully delineated.  Heart transplantation is the therapy of choice for Chagas heart disease in [place] and the 3rd leading cause for heart transplantation.  In extensive review of the literature, here's the issues in heart transplantation of Chagas disease.     1. Reactivation is common occurring in 30-50% and mimics rejection, and can present as a febrile illness.    2. In both [place] and [place] guidelines, screening rather than prophylaxis for infection is recommended.  Thus a screening protocol as outlined above will be needed.    3. Published data from [place] report that the incidence of rejection and survival in Chagas related heart transplant is as good and sometimes better than OHT for other     reasons.     4. Differences in immunosuppression needs to be considered and I will discuss this with Dr. Mcclellan.     5.  [facility] contact:  Division of Parasitic Diseases and Malaria.  [phone].  E-mail:  [email or web address].  Emergency  [phone].     References:  AJT 2011; 11. 672.    Curr Opin Infect Dis 2012 25, 4 pg 450.     J Heart Lung Transplant 2010; 29; 286        Journal of cardiac failure  2009; 15; 249  *Consented Javi for the [facility] testing of Chagas and use of therapy if indicated after transplant.  A  was present and very helpful with the Wallisian forms of the consent.  Javi is not able to read Wallisian or english, but we went through all the forms, he understands all the risks and benefits, and his wife was also present during the consent process (she is able to read Wallisian).    -Future Chagas testing:  [facility].  Will be send to [facility] then [facility].      -  LTBI:  Treatment is recommended given the 20x's increased risk of reactivation in the transplant population compared to the general population.  If we have time prior to transplant then rifampin might be the option, I'll have to check drug-drug interactions with milrinone. Or INH for 9 months will be the other option.  Either way, we can proceed with transplant.  Rifampin is not recommended if the transplant is to occur soon and we do not use after transplant due to drug-drug interactions. Started INH/ B6 on 9/17/16.  Tolerating medications well. No active peripheral neuropathy currently. Treatment w/ INH held until LFTs normalize.    Other ID issues:  - prophylaxis: none currently  - serostatus: EBV, CMV, VZV seropositive, Hep C negative  - immunizations:  Up to date.    - isolation:  Good hand hygiene    Attestation:  I have reviewed today's vital signs, medications, labs and imaging.      Ivan Schuler,   Pager [phone]           History of Infectious Disease Illness:   This is a very pleasant 46 year old male from [facility], well known to me, w/ Chagas cardiomyopathy, now s/p OHT 12/12/16.      Pre-Transplant, Javi was diagnosed with Chagas in 2011 in [place] when he developed CHF, at which time he received 3 months of treatment with nifurtimox ending 1/2012 (care everywhere).  Javi states he felt well after treatment for about 1.5 years, then developed progressive  cardiomyopathy..  Incidentally Diogenes's brother  from Chagas dz at the age of 20.    Pre-transplant testing was also + for quantiferon.  Diogenes does not recall having had a positive test in the past and has not had tz for active or latent TB.  [place] And has lived in [place], [place], [place] and [place].      Javi was hospitalized from 16-17 for the OHT, received on 16.  Course complicated by right pleural effusions (), BiV Dysfunction on  w/ cellular grade 2R rejection treated w/ increase in steroids.  By 16 SVT / A-tach w/ Bx on  notable for cellular grade 2R/3A rejection, treated w/ burst of steroids w/ taper, Thymo and change of immunosuppression to Tacro and MMF.  NSVT led to cardiac MRI w/ temp pacer wires, ? Of possible myocardial scar.  During this time we have been monitoring weekly Chagas PCRs via CDC.  By  the Chagas PCR was moderately elevated w/ the PCR from  pending (since returned at the same level).  On 17 given rise of Chagas PCR, results of rare parasites identified locally on thick smear from 17 and treatment of rejection, the decision was made to re-treat the Chagas, this time w/ Benznidazole, 5 mg/kg BID started on 17, 150 BID current dose is just slightly below 5 mg/kg BID.     Since hospital discharge Javi is recovering quite well.  Surgical sites are healing.  He denies any pain, n/v/d, cough/ sob.  No vision changes or headaches.  No new skin rashes.  He is taking his medications.  Good energy, eating well.  He does report the back of head pain, but this has been ongoing for some time.  He states it hurts more when lying down.           Transplants:  Pre heart transplant       Immunization History   Administered Date(s) Administered     Hepatitis B 2016, 09/15/2016     Influenza (IIV3) 10/05/2016     Influenza Vaccine IM 3yrs+ 4 Valent IIV4 2011, 2015     Pneumococcal (PCV 13) 2016      "Pneumococcal 23 valent 03/21/2015     TDAP (BOOSTRIX AGES 10-64) 08/04/2016       Current Outpatient Prescriptions   Medication     Insulin Aspart (NOVOLOG SC)     Insulin Glargine (LANTUS SC)     oxyCODONE (ROXICODONE) 5 MG IR tablet     pravastatin (PRAVACHOL) 20 MG tablet     valGANciclovir (VALCYTE) 450 MG tablet     mycophenolate (CELLCEPT - GENERIC EQUIVALENT) 250 MG capsule     senna-docusate (SENOKOT-S;PERICOLACE) 8.6-50 MG per tablet     calcium carb 1250 mg, 500 mg [place],/vitamin D 200 units (OSCAL WITH D) 500-200 MG-UNIT per tablet     predniSONE (DELTASONE) 5 MG tablet     sulfamethoxazole-trimethoprim (BACTRIM DS/SEPTRA DS) 800-160 MG per tablet     nystatin (MYCOSTATIN) 658315 UNIT/ML suspension     multivitamin, therapeutic with minerals (THERA-VIT-M) TABS tablet     pantoprazole (PROTONIX) 40 MG EC tablet     tacrolimus (PROGRAF - GENERIC EQUIVALENT) 1 MG capsule     ketoconazole (NIZORAL) 2 % shampoo     aspirin EC 81 MG EC tablet     No current facility-administered medications for this visit.            Allergies:   No Known Allergies       Physical Exam:   Vitals were reviewed.    /74 mmHg  Pulse 96  Temp(Src) 97.7  F (36.5  C) (Oral)  Ht 1.6 m (5' 2.99\")  Wt 69.174 kg (152 lb 8 oz)  BMI 27.02 kg/m2    Physical Examination:  GENERAL:  well-developed, well-nourished, ambulating w/out assistance, comfortable on room air.  HEENT:  Head is normocephalic, atraumatic   EYES:  Eyes have anicteric sclerae without conjunctival injection or stigmata of endocarditis.    ENT:  Oropharynx is moist without exudates or ulcers. Tongue is midline  NECK:  Supple. No cervical lymphadenopathy.  No JVD noted.   LUNGS:  Clear to auscultation bilateral.   CARDIOVASCULAR:  Regular rate and rhythm with no gallops or rubs.  ABDOMEN:  Normal bowel sounds, soft, nontender. No appreciable hepatosplenomegaly.  SKIN:  No acute rashes.  No stigmata of endocarditis.  NEUROLOGIC:  Grossly nonfocal. Active x4 " extremities  No CVA or spinal tenderness  Chest wall incision healing well.  External cardiac monitor in place.           Laboratory Data:     CD4 counts    ABSOLUTE CD4   Date Value Ref Range Status   01/03/2017 8* 441 - 2156 cells/uL Final   01/02/2017 5* 441 - 2156 cells/uL Final   01/01/2017  441 - 2156 cells/uL Final    Test not available at time of request  RESCHEDULED FOR 1/2/17 CE MALIK ON U6C 01/01/17 0916 NYD     12/31/2016 6* 441 - 2156 cells/uL Final       Inflammatory Markers    Recent Labs   Lab Test  06/18/16   0720   CRP  7.0       Immune Globulin Studies  No lab results found.  Metabolic Studies       Recent Labs   Lab Test  01/13/17   0803  01/11/17   0717  01/10/17   0542  01/09/17   0755  01/08/17   0550  01/07/17   0744  01/06/17   0638   NA  137  139  138  136  137  138  135   POTASSIUM  4.5  4.1  4.2  4.2  4.1  4.2  4.3   CHLORIDE  103  106  105  103  105  105  103   CO2  26  24  26  24  23  24  24   ANIONGAP  8  9  7  9  9  10  9   BUN  26  25  24  26  28  30  26   CR  0.72  0.71  0.75  0.72  0.83  0.71  0.71   GFRESTIMATED  >90  Non  GFR Calc    >90  Non  GFR Calc    >90  Non  GFR Calc    >90  Non  GFR Calc    >90  Non  GFR Calc    >90  Non  GFR Calc    >90  Non  GFR Calc     GLC  155*  134*  156*  163*  181*  187*  142*   DAISY  8.9  8.7  8.8  9.0  8.6  8.4*  8.7   PHOS  2.4*   --    --    --    --    --   3.0   MAG  1.3*   --    --    --    --    --   1.9       Hepatic Studies    Recent Labs   Lab Test  01/13/17   0803  01/11/17   0717  01/10/17   0542  01/09/17   0755  01/08/17   0550  01/07/17   0744   BILITOTAL  0.7  0.5  0.6  0.8  1.0  0.7   ALKPHOS  163*  151*  164*  179*  173*  179*   ALBUMIN  3.4  2.9*  2.9*  3.3*  2.9*  3.0*   AST  23  23  25  32  29  32   ALT  68  76*  93*  101*  97*  105*       Pancreatitis testing    Recent Labs   Lab Test  01/13/17   0803   12/11/16   2352  06/18/16   0720   AMYLASE   --   82   --    TRIG  82   --   73       Hematology Studies      Recent Labs   Lab Test  01/13/17   0803  01/11/17   0717  01/10/17   0542  01/09/17   0755  01/08/17   0550  01/07/17   0744  01/06/17   0638   WBC  13.0*  12.3*  12.0*  12.9*  13.0*  11.5*  14.3*   ANEU   --   10.6*  11.2*  11.5*  10.9*  10.0*  11.4*   ALYM   --   0.4*  0.3*  0.6*  0.9  0.4*  1.4   GAGE   --   0.8  0.2  0.1  0.8  0.6  0.7   AEOS   --   0.0  0.0  0.0  0.0  0.1  0.0   HGB  11.8*  10.5*  10.5*  11.6*  11.3*  10.9*  11.2*   HCT  35.7*  32.3*  32.5*  35.6*  34.2*  33.5*  34.8*   MCV  98  99  97  98  99  97  99   PLT  209  220  232  266  253  276  285       Clotting Studies    Recent Labs   Lab Test  12/19/16   0408  12/17/16   1430  12/14/16   0826  12/14/16   0400  12/14/16   0017  12/13/16   2034  12/13/16   1601   INR  1.52*  1.67*   --   1.48*   --    --   1.67*   PTT   --    --   29  31  31  31  33       Urine Studies    Recent Labs   Lab Test  12/20/16   1043  12/18/16   1451  12/11/16   2238  06/18/16   0405   URINEPH  5.0  5.5  7.0  6.0   NITRITE  Negative  Negative  Negative  Negative   LEUKEST  Negative  Negative  Negative  Negative   WBCU  1  0  0  1       Microbiology:  quantiferon + 6/18/16  Parasite stain 1/5/17 w/ one identified 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     Virology:  Hepatitis B Testing     Recent Labs   Lab Test  01/13/17   0803  06/18/16   0720   HBCAB  Nonreactive  Nonreactive   HEPBANG  Nonreactive  Nonreactive       Hepatitis C Testing     HEPATITIS C ANTIBODY   Date Value Ref Range Status   01/13/2017  NR Final    Nonreactive   Assay performance characteristics have not been established for newborns,   infants, and children     08/04/2016  NR Final    Nonreactive   Assay performance characteristics have not been established for newborns,   infants, and children         Imaging:  Cardiac MRI: 1/5/17:   1.  Normal left ventricular size and systolic function with a  visually estimated ejection fraction of  65 %.  2.  Normal right ventricular size and systolic function.    3.  On late gadolinium enhancement imaging, there is a focal area of transmural hyperenhancement in the mid inferolateral segment suggestive of myocardial scar. These findings are likely related to his previous episodes of cardiac rejection.    CTA chest: 7/16/16:   1. No pulmonary embolism.  2. Cardiomegaly with central bronchial wall thickening, perhaps representing mild axial interstitial pulmonary edema.  3. A few perivascular nodules in the lateral aspect of the right upper lobe and superior aspect of the right lower lobe. These may represent sequela of infection/inflammation. In a low-risk patient no followup is necessary. In a high-risk patient followup chest CT in 12 months is recommended.  4. Scattered peripheral atelectasis versus fibrotic changes.    CT a/p: 6/20/16:   1. Cardiomegaly with mild interstitial edema and small right pleural effusion.  2. Small volume free fluid in the pelvis with dependent hyperdensity, indicating hemoperitoneum. No etiology for hemorrhage in the abdomen and pelvis identified.     CT head 6/20/16: 2.7 cm area of slightly expansile sclerosis involving the outer table of the left parietal bone. This is likely benign, most likely an osteoma. Otherwise normal head CT.      Again, thank you for allowing me to participate in the care of your patient.      Sincerely,    Ivan Schuler MD

## 2017-01-18 NOTE — PROGRESS NOTES
[facility]  Transplant Infectious Disease Progress Note     Patient:  Javi Bansal, Date of birth 1970, Medical record number [id number]  Date of Visit:  01/18/2017  Consult requested by Dr. Mcclellan  for evaluation of Chaga's and LTBI         Assessment and Recommendations:   Recommendations:  - weekly monitoring of Chagas PCR, purple top tube send to [facility] then [facility], until Chagas PCR is negative.  Once negative will discontinue weekly monitoring, resuming once therapy is completed.   - ordered misc lab test: purple top for chagas PCR to be sent to [facility] then [facility].  Sent currently weekly.   - will consider restarting INH next visit    F/u in 2 months.     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      ID issues:  - Chronic Chagas heart disease now s/p OHT as above:  In coordination w/ the CDC we were monitoring Chagas PCR in a pre-emptive approach after transplant.  Likely in the setting of immunosuppression and treatment of rejection, not unexpectedly the Chagas PCR did elevate.  At this time we do not believe that it has yet re-infected the heart.  So while many subjects will be Chagas PCR positive after transplant, the approach of pre-emptive is to treat the Chagas before re-infection of the heart occurs.  With rising PCRs from 12/26, and very low level parasitemia from thick smear on 1/5/17, in the setting of treatment of rejection, Benznidazole was initiated on 1/6/17, dosing at 150 mg BID, which is just below 5 mg/kg.  The [facility] contacted me with the following results:    Chagas PCRs:  12/19/16 negative    12/26/16 1st positive: 32    1/2/17 +   30    1/9/17    +   28  Thus, currently tolerating the Benznidazole well w/ trend down in the Chagas PCR.  Plans to treat for 2-3 months. Once the PCR  turns negative on treatment will stop the weekly monitoring, and resume once treatment is completed.  The [facility] runs the IND, which I have signed the 1572.  PT was consented in Greek.  I have notified our local IRB per the U of  IRBs instructions as to the initiation of treatment.       Other Notable information:  Once infected pts are infected for life w/ about 30% developing end organ disease.  The adrenal glands have been suggested as a reservoir for infection, but this is not fully delineated.  Heart transplantation is the therapy of choice for Chagas heart disease in [place] and the 3rd leading cause for heart transplantation.  In extensive review of the literature, here's the issues in heart transplantation of Chagas disease.     1. Reactivation is common occurring in 30-50% and mimics rejection, and can present as a febrile illness.    2. In both [place] and [place] guidelines, screening rather than prophylaxis for infection is recommended.  Thus a screening protocol as outlined above will be needed.    3. Published data from [place] report that the incidence of rejection and survival in Chagas related heart transplant is as good and sometimes better than OHT for other     reasons.     4. Differences in immunosuppression needs to be considered and I will discuss this with Dr. Mcclellan.     5.  [facility] contact:  Division of Parasitic Diseases and Malaria.  [phone].  E-mail:  Parasites@cdc.gov.  Emergency  [phone].     References:  AJT 2011; 11. 672.    Curr Opin Infect Dis 2012 25, 4 pg 450.     J Heart Lung Transplant 2010; 29; 286        Journal of cardiac failure 2009; 15; 249  *Consented Javi for the [facility] testing of Chagas and use of therapy if indicated after transplant.  A  was present and very helpful with the [place] forms of the consent.  Javi is not able to read [place] or english, but we went through all the forms, he understands all the risks and benefits, and his wife was  also present during the consent process (she is able to read Kiswahili).    -Future Chagas testing:  [facility].  Will be [facility].      -  LTBI:  Treatment is recommended given the 20x's increased risk of reactivation in the transplant population compared to the general population.  If we have time prior to transplant then rifampin might be the option, I'll have to check drug-drug interactions with milrinone. Or INH for 9 months will be the other option.  Either way, we can proceed with transplant.  Rifampin is not recommended if the transplant is to occur soon and we do not use after transplant due to drug-drug interactions. Started INH/ B6 on 16.  Tolerating medications well. No active peripheral neuropathy currently. Treatment w/ INH held until LFTs normalize.    Other ID issues:  - prophylaxis: none currently  - serostatus: EBV, CMV, VZV seropositive, Hep C negative  - immunizations:  Up to date.    - isolation:  Good hand hygiene    Attestation:  I have reviewed today's vital signs, medications, labs and imaging.      Ivan Schuler,   Pager [phone]           History of Infectious Disease Illness:   This is a very pleasant 46 year old male from [facility], well known to me, w/ Chagas cardiomyopathy, now s/p OHT 16.      Pre-Transplant, Javi was diagnosed with Chagas in  in [place] when he developed CHF, at which time he received 3 months of treatment with nifurtimox ending 2012 (care everywhere).  Javi states he felt well after treatment for about 1.5 years, then developed progressive cardiomyopathy..  Incidentally Diogenes's brother  from Chagas dz at the age of 20.    Pre-transplant testing was also + for quantiferon.  Diogenes does not recall having had a positive test in the past and has not had tz for active or latent TB.  He [place] And has lived in [place], [place], [place] and [place].      Javi was  hospitalized from 12/11/16-1/11/17 for the OHT, received on 12/12/16.  Course complicated by right pleural effusions (12/17), BiV Dysfunction on 12/19 w/ cellular grade 2R rejection treated w/ increase in steroids.  By 12/28/16 SVT / A-tach w/ Bx on 12/27 notable for cellular grade 2R/3A rejection, treated w/ burst of steroids w/ taper, Thymo and change of immunosuppression to Tacro and MMF.  NSVT led to cardiac MRI w/ temp pacer wires, ? Of possible myocardial scar.  During this time we have been monitoring weekly Chagas PCRs via [facility].  By 12/26 the Chagas PCR was moderately elevated w/ the PCR from 1/2 pending (since returned at the same level).  On 1/5/17 given rise of Chagas PCR, results of rare parasites identified locally on thick smear from 1/5/17 and treatment of rejection, the decision was made to re-treat the Chagas, this time w/ Benznidazole, 5 mg/kg BID started on 1/6/17, 150 BID current dose is just slightly below 5 mg/kg BID.     Since hospital discharge Javi is recovering quite well.  Surgical sites are healing.  He denies any pain, n/v/d, cough/ sob.  No vision changes or headaches.  No new skin rashes.  He is taking his medications.  Good energy, eating well.  He does report the back of head pain, but this has been ongoing for some time.  He states it hurts more when lying down.           Transplants:  Pre heart transplant       Immunization History   Administered Date(s) Administered     Hepatitis B 08/04/2016, 09/15/2016     Influenza (IIV3) 10/05/2016     Influenza Vaccine IM 3yrs+ 4 Valent IIV4 11/01/2011, 03/21/2015     Pneumococcal (PCV 13) 08/04/2016     Pneumococcal 23 valent 03/21/2015     TDAP (BOOSTRIX AGES 10-64) 08/04/2016       Current Outpatient Prescriptions   Medication     Insulin Aspart (NOVOLOG SC)     Insulin Glargine (LANTUS SC)     oxyCODONE (ROXICODONE) 5 MG IR tablet     pravastatin (PRAVACHOL) 20 MG tablet     valGANciclovir (VALCYTE) 450 MG tablet     mycophenolate  "(CELLCEPT - GENERIC EQUIVALENT) 250 MG capsule     senna-docusate (SENOKOT-S;PERICOLACE) 8.6-50 MG per tablet     calcium carb 1250 mg, 500 mg [place],/vitamin D 200 units (OSCAL WITH D) 500-200 MG-UNIT per tablet     predniSONE (DELTASONE) 5 MG tablet     sulfamethoxazole-trimethoprim (BACTRIM DS/SEPTRA DS) 800-160 MG per tablet     nystatin (MYCOSTATIN) 755901 UNIT/ML suspension     multivitamin, therapeutic with minerals (THERA-VIT-M) TABS tablet     pantoprazole (PROTONIX) 40 MG EC tablet     tacrolimus (PROGRAF - GENERIC EQUIVALENT) 1 MG capsule     ketoconazole (NIZORAL) 2 % shampoo     aspirin EC 81 MG EC tablet     No current facility-administered medications for this visit.            Allergies:   No Known Allergies       Physical Exam:   Vitals were reviewed.    /74 mmHg  Pulse 96  Temp(Src) 97.7  F (36.5  C) (Oral)  Ht 1.6 m (5' 2.99\")  Wt 69.174 kg (152 lb 8 oz)  BMI 27.02 kg/m2    Physical Examination:  GENERAL:  well-developed, well-nourished, ambulating w/out assistance, comfortable on room air.  HEENT:  Head is normocephalic, atraumatic   EYES:  Eyes have anicteric sclerae without conjunctival injection or stigmata of endocarditis.    ENT:  Oropharynx is moist without exudates or ulcers. Tongue is midline  NECK:  Supple. No cervical lymphadenopathy.  No JVD noted.   LUNGS:  Clear to auscultation bilateral.   CARDIOVASCULAR:  Regular rate and rhythm with no gallops or rubs.  ABDOMEN:  Normal bowel sounds, soft, nontender. No appreciable hepatosplenomegaly.  SKIN:  No acute rashes.  No stigmata of endocarditis.  NEUROLOGIC:  Grossly nonfocal. Active x4 extremities  No CVA or spinal tenderness  Chest wall incision healing well.  External cardiac monitor in place.           Laboratory Data:     CD4 counts    ABSOLUTE CD4   Date Value Ref Range Status   01/03/2017 8* 441 - 2156 cells/uL Final   01/02/2017 5* 441 - 2156 cells/uL Final   01/01/2017  441 - 2156 cells/uL Final    Test not available at " time of request  RESCHEDULED FOR 1/2/17 CE [place] U6C 01/01/17 0916 NYD     12/31/2016 6* 441 - 2156 cells/uL Final       Inflammatory Markers    Recent Labs   Lab Test  06/18/16   0720   CRP  7.0       Immune Globulin Studies  No lab results found.  Metabolic Studies       Recent Labs   Lab Test  01/13/17   0803  01/11/17   0717  01/10/17   0542  01/09/17   0755  01/08/17   0550  01/07/17   0744  01/06/17   0638   NA  137  139  138  136  137  138  135   POTASSIUM  4.5  4.1  4.2  4.2  4.1  4.2  4.3   CHLORIDE  103  106  105  103  105  105  103   CO2  26  24  26  24  23  24  24   ANIONGAP  8  9  7  9  9  10  9   BUN  26  25  24  26  28  30  26   CR  0.72  0.71  0.75  0.72  0.83  0.71  0.71   GFRESTIMATED  >90  Non  GFR Calc    >90  Non  GFR Calc    >90  Non  GFR Calc    >90  Non  GFR Calc    >90  Non  GFR Calc    >90  Non  GFR Calc    >90  Non  GFR Calc     GLC  155*  134*  156*  163*  181*  187*  142*   DAISY  8.9  8.7  8.8  9.0  8.6  8.4*  8.7   PHOS  2.4*   --    --    --    --    --   3.0   MAG  1.3*   --    --    --    --    --   1.9       Hepatic Studies    Recent Labs   Lab Test  01/13/17   0803  01/11/17   0717  01/10/17   0542  01/09/17   0755  01/08/17   0550  01/07/17   0744   BILITOTAL  0.7  0.5  0.6  0.8  1.0  0.7   ALKPHOS  163*  151*  164*  179*  173*  179*   ALBUMIN  3.4  2.9*  2.9*  3.3*  2.9*  3.0*   AST  23  23  25  32  29  32   ALT  68  76*  93*  101*  97*  105*       Pancreatitis testing    Recent Labs   Lab Test  01/13/17   0803  12/11/16   2352  06/18/16   0720   AMYLASE   --   82   --    TRIG  82   --   73       Hematology Studies      Recent Labs   Lab Test  01/13/17   0803  01/11/17   0717  01/10/17   0542  01/09/17   0755  01/08/17   0550  01/07/17   0744  01/06/17   0638   WBC  13.0*  12.3*  12.0*  12.9*  13.0*  11.5*  14.3*   ANEU   --   10.6*  11.2*  11.5*  10.9*  10.0*   11.4*   ALYM   --   0.4*  0.3*  0.6*  0.9  0.4*  1.4   GAGE   --   0.8  0.2  0.1  0.8  0.6  0.7   AEOS   --   0.0  0.0  0.0  0.0  0.1  0.0   HGB  11.8*  10.5*  10.5*  11.6*  11.3*  10.9*  11.2*   HCT  35.7*  32.3*  32.5*  35.6*  34.2*  33.5*  34.8*   MCV  98  99  97  98  99  97  99   PLT  209  220  232  266  253  276  285       Clotting Studies    Recent Labs   Lab Test  12/19/16   0408  12/17/16   1430  12/14/16   0826  12/14/16   0400  12/14/16   0017  12/13/16   2034  12/13/16   1601   INR  1.52*  1.67*   --   1.48*   --    --   1.67*   PTT   --    --   29  31  31  31  33       Urine Studies    Recent Labs   Lab Test  12/20/16   1043  12/18/16   1451  12/11/16   2238  06/18/16   0405   URINEPH  5.0  5.5  7.0  6.0   NITRITE  Negative  Negative  Negative  Negative   LEUKEST  Negative  Negative  Negative  Negative   WBCU  1  0  0  1       Microbiology:  quantiferon + 6/18/16  Parasite stain 1/5/17 w/ one identified 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     Virology:  Hepatitis B Testing     Recent Labs   Lab Test  01/13/17   0803  06/18/16   0720   HBCAB  Nonreactive  Nonreactive   HEPBANG  Nonreactive  Nonreactive       Hepatitis C Testing     HEPATITIS C ANTIBODY   Date Value Ref Range Status   01/13/2017  NR Final    Nonreactive   Assay performance characteristics have not been established for newborns,   infants, and children     08/04/2016  NR Final    Nonreactive   Assay performance characteristics have not been established for newborns,   infants, and children         Imaging:  Cardiac MRI: 1/5/17:   1.  Normal left ventricular size and systolic function with a visually estimated ejection fraction of  65 %.  2.  Normal right ventricular size and systolic function.    3.  On late gadolinium enhancement imaging, there is a focal area of transmural hyperenhancement in the mid inferolateral segment suggestive of myocardial scar. These findings are likely related to his previous episodes of cardiac rejection.    CTA  chest: 7/16/16:   1. No pulmonary embolism.  2. Cardiomegaly with central bronchial wall thickening, perhaps representing mild axial interstitial pulmonary edema.  3. A few perivascular nodules in the lateral aspect of the right upper lobe and superior aspect of the right lower lobe. These may represent sequela of infection/inflammation. In a low-risk patient no followup is necessary. In a high-risk patient followup chest CT in 12 months is recommended.  4. Scattered peripheral atelectasis versus fibrotic changes.    CT a/p: 6/20/16:   1. Cardiomegaly with mild interstitial edema and small right pleural effusion.  2. Small volume free fluid in the pelvis with dependent hyperdensity, indicating hemoperitoneum. No etiology for hemorrhage in the abdomen and pelvis identified.     CT head 6/20/16: 2.7 cm area of slightly expansile sclerosis involving the outer table of the left parietal bone. This is likely benign, most likely an osteoma. Otherwise normal head CT.

## 2017-01-18 NOTE — TELEPHONE ENCOUNTER
Called patient to confirm recent results: FK appears somewhat above goal level. Instructed patient to reduce FK to 3 mg BID.  Plan to recheck 12 hour FK at next appointment in the [facility]: labs at 0900,  0930 clinic visit with ROBERTO Mcclellan followed by 1100 check in to the Cath lab.  Patient verbalizes understanding plan of care and agrees to follow.

## 2017-01-18 NOTE — MR AVS SNAPSHOT
After Visit Summary   1/18/2017    Javi Bansal    MRN: [id number]           Patient Information     Date Of Birth          1970        Visit Information        Provider Department      1/18/2017 9:15 AM Ivan Schuler MD; [place] [employer] [facility] and Infectious Diseases        Today's Diagnoses     Chagas cardiomyopathy    -  1       Care Instructions    You are doing well.  I will touch base with the transplant teams.          Follow-ups after your visit        Follow-up notes from your care team     Return in about 2 months (around 3/18/2017).      Your next 10 appointments already scheduled     Jan 18, 2017 10:45 AM   LAB with  [facility]   [facility] ([facility])    [address] [place] [address] [id number] [phone]           Patient must bring picture ID.  Patient should be prepared to give a urine specimen  Please do not eat 10-12 hours before your appointment if you are coming in fasting for labs on lipids, cholesterol, or glucose (sugar).  Pregnant women should follow their Care Team instructions. Water with medications is okay. Do not drink coffee or other fluids.   If you have concerns about taking  your medications, please ask at office or if scheduling via Module[employer], send a message by clicking on Secure Messaging, Message Your Care Team.            Jan 27, 2017  9:00 AM   Lab with  [facility]   [facility] ([facility])    [address] [place] [id number] [phone]            Jan 27, 2017  9:30 AM   (Arrive by 9:15 AM)   RETURN HEART TRANSPLANT with Geena Mcclellan MD   [facility] ([facility])    [address] [place] [id number] [phone]            Jan 27, 2017 11:00 AM   Procedure - 2.5 hour with U2A ROOM 17   [address] [facility] ([facility])    [address] [facility] [id number]               Jan 27, 2017 12:00 PM   Heart Cath Heart Biopsy with UUHCVR5   [facility] ([facility])    [address] [facility] [address]   [phone]            Feb 09, 2017  9:30 AM   LAB with  LAB    [facility] ([facility])    [address] [place] [address]  [address] [facility] [id number] [phone]           Patient must bring picture ID.  Patient should be prepared to give a urine specimen  Please do not eat 10-12 hours before your appointment if you are coming in fasting for labs on lipids, cholesterol, or glucose (sugar).  Pregnant women should follow their Care Team instructions. Water with medications is okay. Do not drink coffee or other fluids.   If you have concerns about taking  your medications, please ask at office or if scheduling via [employer], send a message by clicking on Secure Messaging, Message Your Care Team.            Feb 09, 2017 10:15 AM   (Arrive by 10:00 AM)   Return Visit with Ivan Burrell MD   [facility] ([facility])    [address] [place] [address]  [address] [facility] [id number] [phone]            Feb 10, 2017  8:30 AM   Procedure - 2.5 hour with U2A ROOM 17   [address] [facility] ([facility])    [address] [facility] [address]               Feb 10, 2017  9:30 AM   Heart Cath Heart Biopsy with UUHCVR3   [facility] ([facility])    [address] [facility] [address]   [phone]            Feb 10, 2017 12:00 PM   (Arrive by 11:45 AM)   RETURN HEART TRANSPLANT with SAMM Ramos [facility] ([facility])    [address] [place] [address]  [address] [facility] [id number] [phone]              Future tests  "that were ordered for you today     Open Standing Orders        Priority Remaining Interval Expires Ordered    Chagas PCR, to be sent to [facility] then [facility], purple top tube: Laboratory Miscellaneous Order Routine 4/4 weekly 1/13/2018 1/13/2017            Who to contact     If you have questions or need follow up information about today's clinic visit or your schedule please contact [facility]FECTI[facility] DISEAS[facility] directly at [phone].  Normal or non-critical lab and imaging results will be communicated to you by MyChart, letter or phone within 4 business days after the clinic has received the results. If you do not hear from us within 7 days, please contact the clinic through MyChart or phone. If you have a critical or abnormal lab result, we will notify you by phone as soon as possible.  Submit refill requests through wildcraft or call your pharmacy and they will forward the refill request to us. Please allow 3 business days for your refill to be completed.          Additional Information About Your Visit        Care EveryWhere ID     This is your Care EveryWhere ID. This could be used by other organizations to access your [place] medical records  [id number]        Your Vitals Were     Pulse Temperature Height BMI (Body Mass Index)          96 97.7  F (36.5  C) (Oral) 1.6 m (5' 2.99\") 27.02 kg/m2         Blood Pressure from Last 3 Encounters:   01/18/17 110/74   01/13/17 123/82   01/11/17 115/81    Weight from Last 3 Encounters:   01/18/17 69.174 kg (152 lb 8 oz)   01/13/17 67.631 kg (149 lb 1.6 oz)   01/11/17 66.996 kg (147 lb 11.2 oz)               Primary Care Provider Office Phone # Fax #    Ivan Burrell [phone]       [address] [address]        Thank you!     Thank you for choosing [facility]  for your care. Our goal is always to provide you with excellent care. Hearing back from our patients is " one way we can continue to improve our services. Please take a few minutes to complete the written survey that you may receive in the mail after your visit with us. Thank you!             Your Updated Medication List - Protect others around you: Learn how to safely use, store and throw away your medicines at www.disposemymeds.org.          This list is accurate as of: 1/18/17 10:07 AM.  Always use your most recent med list.                   Brand Name Dispense Instructions for use    aspirin 81 MG EC tablet     90 tablet    Take 1 tablet (81 mg) by mouth daily       calcium carb 1250 mg (500 mg [place])/vitamin D 200 units 500-200 MG-UNIT per tablet    OSCAL with D    60 tablet    Take 1 tablet by mouth 2 times daily (with meals)       ketoconazole 2 % shampoo    NIZORAL    120 mL    Apply topically three times a week Alternate with Head and Shoulders.       LANTUS SC          multivitamin, therapeutic with minerals Tabs tablet     30 each    Take 1 tablet by mouth daily       mycophenolate 250 MG capsule    CELLCEPT - GENERIC EQUIVALENT    360 capsule    Take 6 capsules (1,500 mg) by mouth 2 times daily       NOVOLOG SC          nystatin 498008 UNIT/ML suspension    MYCOSTATIN    280 mL    Take 10 mLs (1,000,000 Units) by mouth 4 times daily       oxyCODONE 5 MG IR tablet    ROXICODONE    40 tablet    Take 1 tablet (5 mg) by mouth every 6 hours as needed for moderate to severe pain       pantoprazole 40 MG EC tablet    PROTONIX    30 tablet    Take 1 tablet (40 mg) by mouth every morning       pravastatin 20 MG tablet    PRAVACHOL    30 tablet    Take 1 tablet (20 mg) by mouth every evening       predniSONE 5 MG tablet    DELTASONE    30 tablet    Take 3 tablets (15 mg) by mouth every evening       senna-docusate 8.6-50 MG per tablet    SENOKOT-S;PERICOLACE    20 tablet    Take 2 tablets by mouth nightly as needed for constipation (If no stools during the day)       sulfamethoxazole-trimethoprim 800-160 MG per tablet     BACTRIM DS/SEPTRA DS    8 tablet    Take 1 tablet by mouth twice a week On mondays and thursdays       tacrolimus 1 MG capsule    PROGRAF - GENERIC EQUIVALENT    210 capsule    Take 4 mg in the morning; take 3 mg in the evening       valGANciclovir 450 MG tablet    VALCYTE    60 tablet    Take 2 tablets (900 mg) by mouth daily

## 2017-01-18 NOTE — NURSING NOTE
"Chief Complaint   Patient presents with     RECHECK     6 week F/U Chaga's disease and positive quantiferon       Initial /74 mmHg  Pulse 96  Temp(Src) 97.7  F (36.5  C) (Oral)  Ht 1.6 m (5' 2.99\")  Wt 69.174 kg (152 lb 8 oz)  BMI 27.02 kg/m2 Estimated body mass index is 27.02 kg/(m^2) as calculated from the following:    Height as of this encounter: 1.6 m (5' 2.99\").    Weight as of this encounter: 69.174 kg (152 lb 8 oz).  BP completed using cuff size: regular  "

## 2017-01-26 LAB — LAB SCANNED RESULT: NORMAL

## 2017-01-27 ENCOUNTER — RESULTS ONLY (OUTPATIENT)
Dept: OTHER | Facility: CLINIC | Age: 47
End: 2017-01-27

## 2017-01-27 ENCOUNTER — APPOINTMENT (OUTPATIENT)
Dept: CARDIOLOGY | Facility: CLINIC | Age: 47
End: 2017-01-27
Attending: INTERNAL MEDICINE
Payer: COMMERCIAL

## 2017-01-27 ENCOUNTER — APPOINTMENT (OUTPATIENT)
Dept: MEDSURG UNIT | Facility: CLINIC | Age: 47
End: 2017-01-27
Attending: INTERNAL MEDICINE
Payer: COMMERCIAL

## 2017-01-27 VITALS
HEART RATE: 82 BPM | WEIGHT: 159 LBS | DIASTOLIC BLOOD PRESSURE: 89 MMHG | SYSTOLIC BLOOD PRESSURE: 129 MMHG | HEIGHT: 67 IN | BODY MASS INDEX: 24.96 KG/M2 | OXYGEN SATURATION: 98 %

## 2017-01-27 DIAGNOSIS — Z94.1 HEART REPLACED BY TRANSPLANT (H): Primary | ICD-10-CM

## 2017-01-27 DIAGNOSIS — Z94.1 HEART REPLACED BY TRANSPLANT (H): ICD-10-CM

## 2017-01-27 DIAGNOSIS — B57.2 CHAGAS CARDIOMYOPATHY: ICD-10-CM

## 2017-01-27 DIAGNOSIS — Z22.7 LATENT TUBERCULOSIS INFECTION: ICD-10-CM

## 2017-01-27 DIAGNOSIS — Z22.7 LATENT TUBERCULOSIS: ICD-10-CM

## 2017-01-27 LAB
ALBUMIN SERPL-MCNC: 3.8 G/DL (ref 3.4–5)
ALP SERPL-CCNC: 137 U/L (ref 40–150)
ALT SERPL W P-5'-P-CCNC: 50 U/L (ref 0–70)
ANION GAP SERPL CALCULATED.3IONS-SCNC: 10 MMOL/L (ref 3–14)
AST SERPL W P-5'-P-CCNC: 16 U/L (ref 0–45)
BILIRUB DIRECT SERPL-MCNC: 0.2 MG/DL (ref 0–0.2)
BILIRUB SERPL-MCNC: 0.4 MG/DL (ref 0.2–1.3)
BUN SERPL-MCNC: 25 MG/DL (ref 7–30)
CALCIUM SERPL-MCNC: 9.2 MG/DL (ref 8.5–10.1)
CHLORIDE SERPL-SCNC: 106 MMOL/L (ref 94–109)
CO2 SERPL-SCNC: 24 MMOL/L (ref 20–32)
CREAT SERPL-MCNC: 0.94 MG/DL (ref 0.66–1.25)
ERYTHROCYTE [DISTWIDTH] IN BLOOD BY AUTOMATED COUNT: 17.4 % (ref 10–15)
GFR SERPL CREATININE-BSD FRML MDRD: 86 ML/MIN/1.7M2
GLUCOSE SERPL-MCNC: 128 MG/DL (ref 70–99)
HCT VFR BLD AUTO: 39 % (ref 40–53)
HGB BLD-MCNC: 13.2 G/DL (ref 13.3–17.7)
MCH RBC QN AUTO: 33.4 PG (ref 26.5–33)
MCHC RBC AUTO-ENTMCNC: 33.8 G/DL (ref 31.5–36.5)
MCV RBC AUTO: 99 FL (ref 78–100)
MISCELLANEOUS TEST: NORMAL
PLATELET # BLD AUTO: 209 10E9/L (ref 150–450)
POTASSIUM SERPL-SCNC: 3.8 MMOL/L (ref 3.4–5.3)
PROT SERPL-MCNC: 6.9 G/DL (ref 6.8–8.8)
RBC # BLD AUTO: 3.95 10E12/L (ref 4.4–5.9)
SODIUM SERPL-SCNC: 140 MMOL/L (ref 133–144)
TACROLIMUS BLD-MCNC: 24.2 UG/L (ref 5–15)
TME LAST DOSE: ABNORMAL H
WBC # BLD AUTO: 13.6 10E9/L (ref 4–11)

## 2017-01-27 PROCEDURE — 93505 ENDOMYOCARDIAL BIOPSY: CPT | Mod: 26 | Performed by: INTERNAL MEDICINE

## 2017-01-27 PROCEDURE — 80197 ASSAY OF TACROLIMUS: CPT | Performed by: INTERNAL MEDICINE

## 2017-01-27 PROCEDURE — 36415 COLL VENOUS BLD VENIPUNCTURE: CPT | Performed by: INTERNAL MEDICINE

## 2017-01-27 PROCEDURE — 82248 BILIRUBIN DIRECT: CPT | Performed by: INTERNAL MEDICINE

## 2017-01-27 PROCEDURE — T1013 SIGN LANG/ORAL INTERPRETER: HCPCS | Mod: U3

## 2017-01-27 PROCEDURE — 99215 OFFICE O/P EST HI 40 MIN: CPT | Mod: 25 | Performed by: INTERNAL MEDICINE

## 2017-01-27 PROCEDURE — 86833 HLA CLASS II HIGH DEFIN QUAL: CPT | Performed by: INTERNAL MEDICINE

## 2017-01-27 PROCEDURE — 86832 HLA CLASS I HIGH DEFIN QUAL: CPT | Performed by: INTERNAL MEDICINE

## 2017-01-27 PROCEDURE — 85027 COMPLETE CBC AUTOMATED: CPT | Performed by: INTERNAL MEDICINE

## 2017-01-27 RX ORDER — NYSTATIN 100000/ML
1000000 SUSPENSION, ORAL (FINAL DOSE FORM) ORAL 4 TIMES DAILY
Qty: 280 ML | Refills: 3 | Status: SHIPPED | OUTPATIENT
Start: 2017-01-27 | End: 2017-03-17

## 2017-01-27 RX ORDER — PREDNISONE 5 MG/1
15 TABLET ORAL 2 TIMES DAILY
Qty: 180 TABLET | Refills: 11 | Status: SHIPPED | OUTPATIENT
Start: 2017-01-27 | End: 2017-01-31

## 2017-01-27 RX ORDER — PRAVASTATIN SODIUM 20 MG
20 TABLET ORAL EVERY EVENING
Qty: 30 TABLET | Refills: 11 | Status: SHIPPED | OUTPATIENT
Start: 2017-01-27 | End: 2018-06-13

## 2017-01-27 RX ORDER — MYCOPHENOLATE MOFETIL 250 MG/1
1500 CAPSULE ORAL 2 TIMES DAILY
Qty: 360 CAPSULE | Refills: 11 | Status: SHIPPED | OUTPATIENT
Start: 2017-01-27 | End: 2018-06-13

## 2017-01-27 RX ORDER — SULFAMETHOXAZOLE/TRIMETHOPRIM 800-160 MG
1 TABLET ORAL
Qty: 8 TABLET | Refills: 11 | Status: SHIPPED | OUTPATIENT
Start: 2017-01-30 | End: 2017-04-24

## 2017-01-27 RX ORDER — ISONIAZID 300 MG/1
300 TABLET ORAL DAILY
Qty: 90 TABLET | Refills: 3 | Status: SHIPPED | OUTPATIENT
Start: 2017-01-27 | End: 2018-06-13

## 2017-01-27 ASSESSMENT — PAIN SCALES - GENERAL: PAINLEVEL: MILD PAIN (3)

## 2017-01-27 NOTE — NURSING NOTE
Pt seen in clinic with Dr Mcclellan for 6 weeks post heart transplant follow up. MD reviewed meds and labs. MD contacted Dr Schuler, requested pt to restart INH therapy. Pt reports feeling well, little incision pain. Dr Mcclellan had Ziopatch removed and mailed; based on results, may start cardiac rehab. CT sites scabbed over. Five refills sent to [facility] for pt to  after his heart biopsy. AVS reviewed and provided to pt.   ~Please call your coordinator at [phone] with any questions or concerns.    ~Coordinator will call with biopsy results, and any medication changes  ~Labs stable today.   ~Restart Isoniazid daily (prevent TB).  ~Put lotion on your legs.   ~ refills at [facility] 3rd floor in hospital.     ~ assisted throughout visit.

## 2017-01-27 NOTE — PATIENT INSTRUCTIONS
Please call your coordinator at [phone] with any questions or concerns.      Coordinator will call with biopsy results, and any medication changes    Labs stable today.     Restart Isoniazid daily (prevent TB).    Put lotion on your legs.      refills at [facility] 3rd floor in hospital.

## 2017-01-27 NOTE — Clinical Note
1/27/2017      RE: Javi Bansal  [address] [place] [address]       Dear Colleague,    Thank you for the opportunity to participate in the care of your patient, Javi Bansal, at the [facility] HEART [facility]. Please see a copy of my visit note below.    ADULT HEART TRANSPLANT CLINIC    January 27, 2017    Dear Colleagues:      I had the pleasure of seeing Javi Shetty in the [facility] Cardiac Transplant Clinic on January 27, 2017.     This is his 6 week follow up visit post cardiac transplantation.    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  His hospitalization was complicated by 2R rejection as well as ventricular tachycardia requiring treatment with steroids and eventually thromboglobulin. He has had some evidence of Chagas reactivation and is now on benznidazole 150 mg PO BID which is provided by the investigational pharmacy. He will be on this for months.   He initially had high filling pressures postoperatively, but these normalized with further diuresis and treatment of rejection.      He has been home for several weeks now and overall is reporting that he is feeling quite well.  He reports no shortness of breath, PND or orthopnea.  No palpitations or syncope.  He denies any lower extremity edema. He is wearing his ziopatch and it turning it in today.       He denies fevers, chills or cough.  Sternal wound is healing well.  He has no diarrhea or other symptoms of intolerance of transplant medications.     Transplant details:   Date: 12/12/2016   History of rejection: 2 R  12/27/2016  History of opportunistic infection: none   Transplant medication intolerances: none, although had rejection on azathioprine   CMV R+/D+, EBV R+/D+, HIV negative, HSV 1/2 R+/-, D unknown, donor negative for Heb  B and  C       Patient Active Problem List    Diagnosis Date Noted     Ventricular tachycardia, non-sustained (H) 01/04/2017     Priority: Medium     Elevated liver enzymes 12/20/2016     Priority: Medium     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 12/19/2016     Priority: Medium     Need for prophylactic antibiotic 12/19/2016     Priority: Medium     Heart failure (H) 12/12/2016     Priority: Medium     Heart replaced by transplant (H) 12/12/2016     Priority: Medium     Heart transplant candidate 12/11/2016     Priority: Medium     Chest pain 07/11/2016     Priority: Medium     Chronic systolic heart failure (H)      Priority: Medium     CHF (congestive heart failure) (H) 06/17/2016     Priority: Medium     Cardiac defibrillator in situ-Medtronic, dual chamber - Not Dependent 06/16/2016     Priority: Medium     Chagas cardiomyopathy 05/26/2016     Priority: Medium     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 500 mg)  14 DPT: 2R, neg/neg treated w/IV pred x 3 (1000mg) + thymo x 3  21 DPT: 1R, neg/neg       ACP (advance care planning) 10/10/2016     Advance Care Planning 10/10/2016: Receipt of ACP document:  Received: invalid HCD document dated 8-4-16.  Document not previously scanned. Validation form completed indicating invalid document. Copy sent to client with information and facilitation resources. Validation form sent to be scanned as notation of invalid document received. Confirmed/documented designated decision maker(s).  Added by Namrata Neely RN Advance Care Planning Liaison with [employer]               PAST MEDICAL HISTORY:  Past Medical History   Diagnosis Date     Presbyopia      Essential hypertension      Gastroesophageal reflux disease      Dual ICD (implantable cardioverter-defibrillator) in place 10/20/2015     Premature ventricular contractions      Chagas cardiomyopathy      Pterygium      ??  suspected , but unclear dx     Hypertension      H/O gastric ulcer      Chronic systolic heart failure (H)    s/p OHT 12/12/2016      CURRENT MEDICATIONS:   benznidazole 150 mg PO BID     Prescription Medications as of 1/27/2017             tacrolimus (PROGRAF - GENERIC EQUIVALENT) 1 MG capsule Take 3 capsules (3 mg) by mouth 2 times daily    Insulin Aspart (NOVOLOG SC)     Insulin Glargine (LANTUS SC)     oxyCODONE (ROXICODONE) 5 MG IR tablet Take 1 tablet (5 mg) by mouth every 6 hours as needed for moderate to severe pain    aspirin EC 81 MG EC tablet Take 1 tablet (81 mg) by mouth daily    pravastatin (PRAVACHOL) 20 MG tablet Take 1 tablet (20 mg) by mouth every evening    valGANciclovir (VALCYTE) 450 MG tablet Take 2 tablets (900 mg) by mouth daily    mycophenolate (CELLCEPT - GENERIC EQUIVALENT) 250 MG capsule Take 6 capsules (1,500 mg) by mouth 2 times daily    senna-docusate (SENOKOT-S;PERICOLACE) 8.6-50 MG per tablet Take 2 tablets by mouth nightly as needed for constipation (If no stools during the day)    calcium carb 1250 mg, 500 mg [place],/vitamin D 200 units (OSCAL WITH D) 500-200 MG-UNIT per tablet Take 1 tablet by mouth 2 times daily (with meals)    predniSONE (DELTASONE) 5 MG tablet Take 3 tablets (15 mg) by mouth every evening    sulfamethoxazole-trimethoprim (BACTRIM DS/SEPTRA DS) 800-160 MG per tablet Take 1 tablet by mouth twice a week On mondays and thursdays    nystatin (MYCOSTATIN) 634291 UNIT/ML suspension Take 10 mLs (1,000,000 Units) by mouth 4 times daily    multivitamin, therapeutic with minerals (THERA-VIT-M) TABS tablet Take 1 tablet by mouth daily    pantoprazole (PROTONIX) 40 MG EC tablet Take 1 tablet (40 mg) by mouth every morning    ketoconazole (NIZORAL) 2 % shampoo Apply topically three times a week Alternate with Head and Shoulders.          ROS:   Constitutional: No fever, chills, or sweats. Weight is going down   ENT: No visual disturbance, ear ache, epistaxis, sore throat.  "  Allergies/Immunologic: Negative.   Respiratory: No cough, hemoptysis.   Cardiovascular: As per HPI.   GI: No nausea, vomiting, hematemesis, melena, or hematochezia.   : No urinary frequency, dysuria, or hematuria.   Integument: Negative.   Psychiatric: Negative.   Neuro: Negative.   Endocrinology: Negative.   Musculoskeletal: Negative    Exam:  /89 mmHg  Pulse 82  Ht 1.689 m (5' 6.5\")  Wt 72.122 kg (159 lb)  BMI 25.28 kg/m2  SpO2 98%  In general, the patient is a pleasant male in no apparent distress.    HEENT: NC/AT. PERRLA. EOMI.  Sclerae white, not injected.    Neck:  No adenopathy, No thyromegaly.    COR: No jugular venous distention.  RRR.  Normal S1 S2 splits physiologically.  No murmur, rub click, or gallop.    Lungs:  CTA. No rhonchi.    Abdomen: soft, nontender, nondistended.  No organomegaly.  Extremities:  No clubbing, cyanosis, or edema.    Neuro: Alert & Oriented x 3, grossly non focal.  Sternal wound is well healed     Labs:  CBC RESULTS:   Lab Results   Component Value Date    WBC 13.6* 01/27/2017    RBC 3.95* 01/27/2017    HGB 13.2* 01/27/2017    HCT 39.0* 01/27/2017    MCV 99 01/27/2017    MCH 33.4* 01/27/2017    MCHC 33.8 01/27/2017    RDW 17.4* 01/27/2017     01/27/2017       BMP RESULTS:  Lab Results   Component Value Date     01/13/2017    POTASSIUM 4.5 01/13/2017    CHLORIDE 103 01/13/2017    CO2 26 01/13/2017    ANIONGAP 8 01/13/2017    * 01/13/2017    BUN 26 01/13/2017    CR 0.72 01/13/2017    GFRESTIMATED >90  Non  GFR Calc   01/13/2017    GFRESTBLACK >90   GFR Calc   01/13/2017    DAISY 8.9 01/13/2017      LIPID RESULTS:  Lab Results   Component Value Date    CHOL 198 01/13/2017    HDL 88 01/13/2017    LDL 93 01/13/2017    TRIG 82 01/13/2017    NHDL 110 01/13/2017       IMMUNOSUPPRESSANT LEVELS  Lab Results   Component Value Date    TACROL 16.2* 01/13/2017    DOSTAC 1930 1/12/17 01/13/2017       No components found for: " CK  Lab Results   Component Value Date    MAG 1.3* 01/13/2017     Lab Results   Component Value Date    A1C 5.4 12/11/2016     Lab Results   Component Value Date    PHOS 2.4* 01/13/2017     Lab Results   Component Value Date    NTBNPI 83940* 12/20/2016     Lab Results   Component Value Date    SAITESTMET [facility] 01/13/2017    SAICELL Class I 01/13/2017    [id number] None 01/13/2017    JM9SBQPJLZ A:29 B:8 01/13/2017    SAIREPCOM  01/13/2017      Test performed by modified procedure. Serum heat inactivated. High-risk,   mfi >3,000. Mod-risk, mfi 500-3,000.       Lab Results   Component Value Date    SAIITESTME [facility] 01/13/2017    SAIICELL Class II 01/13/2017    [id number] None 01/13/2017    [id number] None 01/13/2017    SAIIREPCOM  01/13/2017      Test performed by modified procedure. Serum heat inactivated. High-risk,   mfi >3,000. Mod-risk, mfi 500-3,000.       Lab Results   Component Value Date    CSPEC Plasma 01/13/2017     Last echo and baseline coronary angiogram reviewed   Last bx reviewed     Echo 1/13/2017     Procedure  Echocardiogram with two-dimensional, color and spectral Doppler performed.  Contrast Optison. Optison (NDC #6363-2035-43) given intravenously. Patient  was given 3 ml mixture of 3 ml Optison and 6 ml saline. 6 ml wasted. IV start  location R Upper arm .  ______________________________________________________________________________     Interpretation Summary  Global and regional left ventricular function is normal with an EF of 60-65%.  Right ventricular function, chamber size, wall motion, and thickness are  normal.  Pulmonary artery systolic pressure is normal.  The inferior vena cava was normal in size with preserved respiratory  variability. Estimated mean right atrial pressure is <3 mmHg.  No pericardial effusion is present.  This study was compared with the study from 12/29/2016. There has been no  change.      Biopsy scheduled for later today       Assessment and Plan:   In summary this is  a  very pleasant 46-year-old man with longstanding history of Chagas cardiomyopathy who underwent cardiac transplantation on 12/12/2016.     His postoperative course was complicated by 2R rejection, elevated filling pressures as well as reactivation of his Chagas disease.  He required Solu-Medrol as well as Thymoglobulin.  Overall, despite this he looks excellent today in clinic, he is tolerating all immunosuppression well as he is on benznidazole 150 mg PO BID for suppression of the Chagas cardiomyopathy, and we are sending weekly PCRs with the help  of Transplant ID to the [facility].  Presently he has no symptoms from this.  I am starting  INH again today given that his LFTs are now normal.       For his cardiac transplantation, he is euvolemic on exam today.  Last biopsy showed resolution of his rejection.  He is due for a biopsy today.  His tacrolimus goal right now at present is 10-15.   We can taper down his steroids if this biopsy is negative per protocol.  I am also keeping him on MMF 1500 p.o. b.i.d., and he is tolerating this also. Given that he did have some NSVT in house even after his rejection was treated and his cardiac monitor is being mailed in today.     For infection ppx:   PCP prophylaxis: TMP-SMX.  - Viral serostatus & prophylaxis: CMV R+/D+, EBV R+/D+, HIV negative, HSV 1/2 R+/-, D unknown, donor negative for Heb B and C; on valcyte  - Immunization status:  VZV seropositive, hepatitis B immune.    With regard to his Chagas cardiomyopathy, again, he is on benznidazole 150 mg PO BID  for suppression and will see ID in several weeks.  He is tolerating this well and will be on this for months.      Latent TB.  resuming INH today     For other transplant medications, he is on aspirin and statin as well as appropriate prophylaxis as listed below.       Change in immunosuppression: NO  Reason for Change: NA  Other Changes: none   Follow-Up: 2 weeks       Next Biopsy: 2 weeks   Next Angiogram: testing is  just done   Next Angiogram with IVUS: yes, at one year   Next Stress Test: per protocol        Sincerely,      Geena Mcclellan MD   Cardiology Staff     CC  Patient Care Team:  Ivan Burrell MD as PCP - General (Student in organized health care education/training program)  Broderick Gao MD MD as MD (Cardiology)  Shauna Cuellar MD as MD (Infectious Diseases)  Marino Woo MD as MD (Dermatology)      Broderick Gao MD   [address] [address]

## 2017-01-27 NOTE — PROGRESS NOTES
ADULT HEART TRANSPLANT CLINIC    January 27, 2017    Dear Colleagues:      I had the pleasure of seeing Javi Shetty in the [facility]iac [facility]plant [facility] on January 27, 2017.     This is his 6 week follow up visit post cardiac transplantation.    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  His hospitalization was complicated by 2R rejection as well as ventricular tachycardia requiring treatment with steroids and eventually thromboglobulin. He has had some evidence of Chagas reactivation and is now on benznidazole 150 mg PO BID which is provided by the investigational pharmacy. He will be on this for months.   He initially had high filling pressures postoperatively, but these normalized with further diuresis and treatment of rejection.      He has been home for several weeks now and overall is reporting that he is feeling quite well.  He reports no shortness of breath, PND or orthopnea.  No palpitations or syncope.  He denies any lower extremity edema. He is wearing his ziopatch and it turning it in today.       He denies fevers, chills or cough.  Sternal wound is healing well.  He has no diarrhea or other symptoms of intolerance of transplant medications.     Transplant details:   Date: 12/12/2016   History of rejection: 2 R  12/27/2016  History of opportunistic infection: none   Transplant medication intolerances: none, although had rejection on azathioprine   CMV R+/D+, EBV R+/D+, HIV negative, HSV 1/2 R+/-, D unknown, donor negative for Heb B and  C       Patient Active Problem List    Diagnosis Date Noted     Ventricular tachycardia, non-sustained (H) 01/04/2017     Priority: Medium     Elevated liver enzymes 12/20/2016     Priority: Medium     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 12/19/2016     Priority: Medium     Need for prophylactic antibiotic  12/19/2016     Priority: Medium     Heart failure (H) 12/12/2016     Priority: Medium     Heart replaced by transplant (H) 12/12/2016     Priority: Medium     Heart transplant candidate 12/11/2016     Priority: Medium     Chest pain 07/11/2016     Priority: Medium     Chronic systolic heart failure (H)      Priority: Medium     CHF (congestive heart failure) (H) 06/17/2016     Priority: Medium     Cardiac defibrillator in situ-Medtronic, dual chamber - Not Dependent 06/16/2016     Priority: Medium     Chagas cardiomyopathy 05/26/2016     Priority: Medium     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 500 mg)  14 DPT: 2R, neg/neg treated w/IV pred x 3 (1000mg) + thymo x 3  21 DPT: 1R, neg/neg       ACP (advance care planning) 10/10/2016     Advance Care Planning 10/10/2016: Receipt of ACP document:  Received: invalid HCD document dated 8-4-16.  Document not previously scanned. Validation form completed indicating invalid document. Copy sent to client with information and facilitation resources. Validation form sent to be scanned as notation of invalid document received. Confirmed/documented designated decision maker(s).  Added by Namrata Neely RN Advance Care Planning Liaison with [employer]               PAST MEDICAL HISTORY:  Past Medical History   Diagnosis Date     Presbyopia      Essential hypertension      Gastroesophageal reflux disease      Dual ICD (implantable cardioverter-defibrillator) in place 10/20/2015     Premature ventricular contractions      Chagas cardiomyopathy      Pterygium      ?? suspected , but unclear dx     Hypertension      H/O gastric ulcer      Chronic systolic heart failure (H)    s/p OHT 12/12/2016      CURRENT MEDICATIONS:   benznidazole 150 mg PO BID     Prescription Medications as of 1/27/2017             tacrolimus (PROGRAF - GENERIC EQUIVALENT) 1 MG capsule Take 3 capsules (3 mg) by mouth 2 times daily     Insulin Aspart (NOVOLOG SC)     Insulin Glargine (LANTUS SC)     oxyCODONE (ROXICODONE) 5 MG IR tablet Take 1 tablet (5 mg) by mouth every 6 hours as needed for moderate to severe pain    aspirin EC 81 MG EC tablet Take 1 tablet (81 mg) by mouth daily    pravastatin (PRAVACHOL) 20 MG tablet Take 1 tablet (20 mg) by mouth every evening    valGANciclovir (VALCYTE) 450 MG tablet Take 2 tablets (900 mg) by mouth daily    mycophenolate (CELLCEPT - GENERIC EQUIVALENT) 250 MG capsule Take 6 capsules (1,500 mg) by mouth 2 times daily    senna-docusate (SENOKOT-S;PERICOLACE) 8.6-50 MG per tablet Take 2 tablets by mouth nightly as needed for constipation (If no stools during the day)    calcium carb 1250 mg, 500 mg Ramona,/vitamin D 200 units (OSCAL WITH D) 500-200 MG-UNIT per tablet Take 1 tablet by mouth 2 times daily (with meals)    predniSONE (DELTASONE) 5 MG tablet Take 3 tablets (15 mg) by mouth every evening    sulfamethoxazole-trimethoprim (BACTRIM DS/SEPTRA DS) 800-160 MG per tablet Take 1 tablet by mouth twice a week On mondays and thursdays    nystatin (MYCOSTATIN) 036122 UNIT/ML suspension Take 10 mLs (1,000,000 Units) by mouth 4 times daily    multivitamin, therapeutic with minerals (THERA-VIT-M) TABS tablet Take 1 tablet by mouth daily    pantoprazole (PROTONIX) 40 MG EC tablet Take 1 tablet (40 mg) by mouth every morning    ketoconazole (NIZORAL) 2 % shampoo Apply topically three times a week Alternate with Head and Shoulders.          ROS:   Constitutional: No fever, chills, or sweats. Weight is going down   ENT: No visual disturbance, ear ache, epistaxis, sore throat.   Allergies/Immunologic: Negative.   Respiratory: No cough, hemoptysis.   Cardiovascular: As per HPI.   GI: No nausea, vomiting, hematemesis, melena, or hematochezia.   : No urinary frequency, dysuria, or hematuria.   Integument: Negative.   Psychiatric: Negative.   Neuro: Negative.   Endocrinology: Negative.   Musculoskeletal:  "Negative    Exam:  /89 mmHg  Pulse 82  Ht 1.689 m (5' 6.5\")  Wt 72.122 kg (159 lb)  BMI 25.28 kg/m2  SpO2 98%  In general, the patient is a pleasant male in no apparent distress.    HEENT: NC/AT. PERRLA. EOMI.  Sclerae white, not injected.    Neck:  No adenopathy, No thyromegaly.    COR: No jugular venous distention.  RRR.  Normal S1 S2 splits physiologically.  No murmur, rub click, or gallop.    Lungs:  CTA. No rhonchi.    Abdomen: soft, nontender, nondistended.  No organomegaly.  Extremities:  No clubbing, cyanosis, or edema.    Neuro: Alert & Oriented x 3, grossly non focal.  Sternal wound is well healed     Labs:  CBC RESULTS:   Lab Results   Component Value Date    WBC 13.6* 01/27/2017    RBC 3.95* 01/27/2017    HGB 13.2* 01/27/2017    HCT 39.0* 01/27/2017    MCV 99 01/27/2017    MCH 33.4* 01/27/2017    MCHC 33.8 01/27/2017    RDW 17.4* 01/27/2017     01/27/2017       BMP RESULTS:  Lab Results   Component Value Date     01/13/2017    POTASSIUM 4.5 01/13/2017    CHLORIDE 103 01/13/2017    CO2 26 01/13/2017    ANIONGAP 8 01/13/2017    * 01/13/2017    BUN 26 01/13/2017    CR 0.72 01/13/2017    GFRESTIMATED >90  Non  GFR Calc   01/13/2017    GFRESTBLACK >90   GFR Calc   01/13/2017    DAISY 8.9 01/13/2017      LIPID RESULTS:  Lab Results   Component Value Date    CHOL 198 01/13/2017    HDL 88 01/13/2017    LDL 93 01/13/2017    TRIG 82 01/13/2017    NHDL 110 01/13/2017       IMMUNOSUPPRESSANT LEVELS  Lab Results   Component Value Date    TACROL 16.2* 01/13/2017    DOSTAC 1930 1/12/17 01/13/2017       No components found for: CK  Lab Results   Component Value Date    MAG 1.3* 01/13/2017     Lab Results   Component Value Date    A1C 5.4 12/11/2016     Lab Results   Component Value Date    PHOS 2.4* 01/13/2017     Lab Results   Component Value Date    NTBNPI 20713* 12/20/2016     Lab Results   Component Value Date    LUCA SPEARS 01/13/2017    MEKA" Class I 01/13/2017    LM5RFGJQM None 01/13/2017    NN5VXXFRUZ A:29 B:8 01/13/2017    SAIREPCOM  01/13/2017      Test performed by modified procedure. Serum heat inactivated. High-risk,   mfi >3,000. Mod-risk, mfi 500-3,000.       Lab Results   Component Value Date    SAIITESTME SA HI 01/13/2017    SAIICELL Class II 01/13/2017    [id number] None 01/13/2017    SB3SPCCDUS None 01/13/2017    SAIIREPCOM  01/13/2017      Test performed by modified procedure. Serum heat inactivated. High-risk,   mfi >3,000. Mod-risk, mfi 500-3,000.       Lab Results   Component Value Date    CSPEC Plasma 01/13/2017     Last echo and baseline coronary angiogram reviewed   Last bx reviewed     Echo 1/13/2017     Procedure  Echocardiogram with two-dimensional, color and spectral Doppler performed.  Contrast Optison. Optison (NDC #2240-6066-25) given intravenously. Patient  was given 3 ml mixture of 3 ml Optison and 6 ml saline. 6 ml wasted. IV start  location R Upper arm .  ______________________________________________________________________________     Interpretation Summary  Global and regional left ventricular function is normal with an EF of 60-65%.  Right ventricular function, chamber size, wall motion, and thickness are  normal.  Pulmonary artery systolic pressure is normal.  The inferior vena cava was normal in size with preserved respiratory  variability. Estimated mean right atrial pressure is <3 mmHg.  No pericardial effusion is present.  This study was compared with the study from 12/29/2016. There has been no  change.      Biopsy scheduled for later today       Assessment and Plan:   In summary this is a  very pleasant 46-year-old man with longstanding history of Chagas cardiomyopathy who underwent cardiac transplantation on 12/12/2016.     His postoperative course was complicated by 2R rejection, elevated filling pressures as well as reactivation of his Chagas disease.  He required Solu-Medrol as well as Thymoglobulin.  Overall,  despite this he looks excellent today in clinic, he is tolerating all immunosuppression well as he is on benznidazole 150 mg PO BID for suppression of the Chagas cardiomyopathy, and we are sending weekly PCRs with the help  of Transplant ID to the [facility].  Presently he has no symptoms from this.  I am starting  INH again today given that his LFTs are now normal.       For his cardiac transplantation, he is euvolemic on exam today.  Last biopsy showed resolution of his rejection.  He is due for a biopsy today.  His tacrolimus goal right now at present is 10-15.   We can taper down his steroids if this biopsy is negative per protocol.  I am also keeping him on MMF 1500 p.o. b.i.d., and he is tolerating this also. Given that he did have some NSVT in house even after his rejection was treated and his cardiac monitor is being mailed in today.     For infection ppx:   PCP prophylaxis: TMP-SMX.  - Viral serostatus & prophylaxis: CMV R+/D+, EBV R+/D+, HIV negative, HSV 1/2 R+/-, D unknown, donor negative for Heb B and C; on valcyte  - Immunization status:  VZV seropositive, hepatitis B immune.    With regard to his Chagas cardiomyopathy, again, he is on benznidazole 150 mg PO BID  for suppression and will see ID in several weeks.  He is tolerating this well and will be on this for months.      Latent TB.  resuming INH today     For other transplant medications, he is on aspirin and statin as well as appropriate prophylaxis as listed below.       Change in immunosuppression: NO  Reason for Change: NA  Other Changes: none   Follow-Up: 2 weeks       Next Biopsy: 2 weeks   Next Angiogram: testing is just done   Next Angiogram with IVUS: yes, at one year   Next Stress Test: per protocol        Sincerely,      Geena Mcclellan MD   Cardiology Staff     CC  Patient Care Team:  Ivan Burrell MD as PCP - General (Student in organized health care education/training program)  Broderick Gao MD, MD as MD (Cardiology)  Baldemar  Shauna Ribeiro MD as MD (Infectious Diseases)  Marino Woo MD as MD (Dermatology)      Broderick Gao MD   [address] [address]

## 2017-01-27 NOTE — MR AVS SNAPSHOT
After Visit Summary   1/27/2017    Javi Bansal    MRN: [id number]           Patient Information     Date Of Birth          1970        Visit Information        Provider Department      1/27/2017 9:30 AM Loreta Red; Geena Mcclellan MD [facility]        Today's Diagnoses     Heart replaced by transplant (H)    -  1       Care Instructions    Please call your coordinator at [phone] with any questions or concerns.      Coordinator will call with biopsy results, and any medication changes    Labs stable today.     Restart Isoniazid daily (prevent TB).    Put lotion on your legs.      refills [facility] 3rd floor in hospital.           Follow-ups after your visit        Your next 10 appointments already scheduled     Jan 27, 2017 11:00 AM   Procedure - 2.5 hour with SHAHANA Johnson ROOM 17   [address] [facility] ([facility])    [address] [address]               Jan 27, 2017 12:00 PM   Heart Cath Heart Biopsy with [id number]   [facility]    [address] [address]   [phone]            Feb 09, 2017  9:30 AM   LAB with  LAB    Health Lab ([facility])    [address]  [address] [facility] [id number] [phone]           Patient must bring picture ID.  Patient should be prepared to give a urine specimen  Please do not eat 10-12 hours before your appointment if you are coming in fasting for labs on lipids, cholesterol, or glucose (sugar).  Pregnant women should follow their Care Team instructions. Water with medications is okay. Do not drink coffee or other fluids.   If you have concerns about taking  your medications, please ask at office or if scheduling via [employer], send a message by clicking on Secure Messaging, Message Your Care Team.            Feb  09, 2017 10:15 AM   (Arrive by 10:00 AM)   Return Visit with Ivan Burrell MD   [facility] ([facility])    [address]  4th Floor  [place] [address] [phone]            Feb 10, 2017  8:00 AM   LAB with ACUTE CARE LAB [facility] [place] ([facility])    [address] [address]              Patient must bring picture ID.  Patient should be prepared to give a urine specimen  Please do not eat 10-12 hours before your appointment if you are coming in fasting for labs on lipids, cholesterol, or glucose (sugar).  Pregnant women should follow their Care Team instructions. Water with medications is okay. Do not drink coffee or other fluids.   If you have concerns about taking  your medications, please ask at office or if scheduling via [employer], send a message by clicking on Secure Messaging, Message Your Care Team.            Feb 10, 2017  8:30 AM   Procedure - 2.5 hour with U2A ROOM 17   [address] [facility] ([facility])    [address] [facility] [address]               Feb 10, 2017  9:30 AM   Heart Cath Heart Biopsy with UARUNA   [facility]    [address] [facility] [address]   [phone]            Feb 10, 2017 12:00 PM   (Arrive by 11:45 AM)   RETURN HEART TRANSPLANT with SAMM Ramos CNP   [facility] ([facility])    [address]  3rd Floor  [place] [address]   [phone]            Feb 21, 2017 12:30 PM   [facility]   [facility]    [address] [facility] [address]   [phone]              Who to contact     If you have questions or need follow up information about today's  "clinic visit or your schedule please contact [facility] directly at [phone].  Normal or non-critical lab and imaging results will be communicated to you by MyChart, letter or phone within 4 business days after the clinic has received the results. If you do not hear from us within 7 days, please contact the clinic through MyChart or phone. If you have a critical or abnormal lab result, we will notify you by phone as soon as possible.  Submit refill requests through [employer] or call your pharmacy and they will forward the refill request to us. Please allow 3 business days for your refill to be completed.          Additional Information About Your Visit        Care EveryWhere ID     This is your Care EveryWhere ID. This could be used by other organizations to access your [facility] medical records  [id number]        Your Vitals Were     Pulse Height BMI (Body Mass Index) Pulse Oximetry          82 1.689 m (5' 6.5\") 25.28 kg/m2 98%         Blood Pressure from Last 3 Encounters:   01/27/17 129/89   01/18/17 110/74   01/13/17 123/82    Weight from Last 3 Encounters:   01/27/17 72.122 kg (159 lb)   01/18/17 69.174 kg (152 lb 8 oz)   01/13/17 67.631 kg (149 lb 1.6 oz)              Today, you had the following     No orders found for display         Today's Medication Changes          These changes are accurate as of: 1/27/17 10:25 AM.  If you have any questions, ask your nurse or doctor.               Start taking these medicines.        Dose/Directions    isoniazid 300 MG tablet   Commonly known as:  NYDRAZID   Used for:  Heart replaced by transplant (H)   Started by:  Geena Mcclellan MD        Dose:  300 mg   Take 1 tablet (300 mg) by mouth daily   Quantity:  90 tablet   Refills:  3            Where to get your medicines      These medications were sent to [facility] [place], [place] - [address]  [address] [address]     Phone:  [phone]    - " isoniazid 300 MG tablet             Primary Care Provider Office Phone # Fax #    Ivan Burrell [phone]       [address] [address]        Thank you!     Thank you for choosing [facility]  for your care. Our goal is always to provide you with excellent care. Hearing back from our patients is one way we can continue to improve our services. Please take a few minutes to complete the written survey that you may receive in the mail after your visit with us. Thank you!             Your Updated Medication List - Protect others around you: Learn how to safely use, store and throw away your medicines at www.disposemymeds.org.          This list is accurate as of: 1/27/17 10:25 AM.  Always use your most recent med list.                   Brand Name Dispense Instructions for use    aspirin 81 MG EC tablet     90 tablet    Take 1 tablet (81 mg) by mouth daily       calcium carb 1250 mg (500 mg [place])/vitamin D 200 units 500-200 MG-UNIT per tablet    OSCAL with D    60 tablet    Take 1 tablet by mouth 2 times daily (with meals)       isoniazid 300 MG tablet    NYDRAZID    90 tablet    Take 1 tablet (300 mg) by mouth daily       ketoconazole 2 % shampoo    NIZORAL    120 mL    Apply topically three times a week Alternate with Head and Shoulders.       LANTUS SC          multivitamin, therapeutic with minerals Tabs tablet     30 each    Take 1 tablet by mouth daily       mycophenolate 250 MG capsule    CELLCEPT - GENERIC EQUIVALENT    360 capsule    Take 6 capsules (1,500 mg) by mouth 2 times daily       NOVOLOG SC          nystatin 298531 UNIT/ML suspension    MYCOSTATIN    280 mL    Take 10 mLs (1,000,000 Units) by mouth 4 times daily       oxyCODONE 5 MG IR tablet    ROXICODONE    40 tablet    Take 1 tablet (5 mg) by mouth every 6 hours as needed for moderate to severe pain       pantoprazole 40 MG EC tablet    PROTONIX    30 tablet    Take 1 tablet (40 mg)  by mouth every morning       pravastatin 20 MG tablet    PRAVACHOL    30 tablet    Take 1 tablet (20 mg) by mouth every evening       predniSONE 5 MG tablet    DELTASONE    30 tablet    Take 3 tablets (15 mg) by mouth every evening       senna-docusate 8.6-50 MG per tablet    SENOKOT-S;PERICOLACE    20 tablet    Take 2 tablets by mouth nightly as needed for constipation (If no stools during the day)       sulfamethoxazole-trimethoprim 800-160 MG per tablet    BACTRIM DS/SEPTRA DS    8 tablet    Take 1 tablet by mouth twice a week On mondays and thursdays       tacrolimus 1 MG capsule    PROGRAF - GENERIC EQUIVALENT    180 capsule    Take 3 capsules (3 mg) by mouth 2 times daily       valGANciclovir 450 MG tablet    VALCYTE    60 tablet    Take 2 tablets (900 mg) by mouth daily

## 2017-01-28 ENCOUNTER — TELEPHONE (OUTPATIENT)
Dept: TRANSPLANT | Facility: CLINIC | Age: 47
End: 2017-01-28

## 2017-01-28 DIAGNOSIS — Z94.1 HEART REPLACED BY TRANSPLANT (H): Primary | ICD-10-CM

## 2017-01-28 NOTE — TELEPHONE ENCOUNTER
Called patient at request of Dr Gibbons. Elevated prograf level. Verified patient did not take his transplant medications prior to lab draw. Patient reported he held his AM dose, last dose taken was around 8PM. Patient reported the only medication changes was the addition of INH but this occurred after lab draw. Dr Gibbons notified. Verbal order given to have patient hold PM dose of tacrolimus and then reduce daily dosage to 2mg in AM and 2mg PM. Patient will hold AM dose on Monday 1/30 and have labs redrawn at 0900.  had patient verbalized plan back to transplant coordinator to verify understanding of plan. Patient requesting a call back from Manoj REID transplant coordinator on Tuesday to answer further questions.

## 2017-01-28 NOTE — Clinical Note
Patient had elevated prograf. Dr Gibbons requesting decrease in dose from 3/3 to 2/2. Redraw on 1/30/2017. Patient had additional questions please call back Tuesday.

## 2017-01-30 DIAGNOSIS — Z94.1 HEART REPLACED BY TRANSPLANT (H): ICD-10-CM

## 2017-01-30 LAB
COPATH REPORT: NORMAL
PRA SINGLE ANTIGEN IGG ANTIBODY: NORMAL
TACROLIMUS BLD-MCNC: 14.8 UG/L (ref 5–15)
TME LAST DOSE: 2000 H

## 2017-01-31 ENCOUNTER — TELEPHONE (OUTPATIENT)
Dept: TRANSPLANT | Facility: CLINIC | Age: 47
End: 2017-01-31

## 2017-01-31 DIAGNOSIS — Z94.1 HEART REPLACED BY TRANSPLANT (H): Primary | ICD-10-CM

## 2017-01-31 RX ORDER — PREDNISONE 5 MG/1
TABLET ORAL
Qty: 150 TABLET | Refills: 11 | Status: SHIPPED | OUTPATIENT
Start: 2017-01-31 | End: 2017-02-15 | Stop reason: DRUGHIGH

## 2017-01-31 RX ORDER — TACROLIMUS 1 MG/1
CAPSULE ORAL
Qty: 90 CAPSULE | Refills: 11 | Status: SHIPPED | OUTPATIENT
Start: 2017-01-31 | End: 2017-02-14

## 2017-01-31 RX ORDER — TACROLIMUS 0.5 MG/1
CAPSULE ORAL
Qty: 30 CAPSULE | Refills: 11 | Status: SHIPPED | OUTPATIENT
Start: 2017-01-31 | End: 2017-02-14

## 2017-01-31 RX ORDER — TACROLIMUS 1 MG/1
2 CAPSULE ORAL 2 TIMES DAILY
Qty: 120 CAPSULE | Refills: 11 | COMMUNITY
Start: 2017-01-28 | End: 2017-01-31

## 2017-02-01 ENCOUNTER — TELEPHONE (OUTPATIENT)
Dept: TRANSPLANT | Facility: CLINIC | Age: 47
End: 2017-02-01

## 2017-02-01 NOTE — TELEPHONE ENCOUNTER
Called patient (w/ assistance) to review recent results.  Bx was 1R, weak focal + C4d/neg C3d and FK continues to appear slightly elevated (14.8 at 14 hours).  INstructed patient to decrease prednisone to 15 mg/10 mg and FK to 2mg/1.5 mg.  Confirmed with patient that he should expect to receive 0.5 mg FK capsules from pharmacy for this new dose. No change to mycophenolate dose.  Patient repeats instructions and confirms NO MMF dose change.  Patient reports he is feeling well, expect for some continued mild sternal surgical pain. Patient denies fever, swelling, SOB or other pain.  Patient also denies having any home care visits which may be needed.  Patient also asks if he may begin having sexual intercourse post tx; coordinator will consult with RC and update the patient.  Patient confirms next scheduled bx and clinic visit on 2/10/17. Patient verbalizes understanding plan of care and agrees to follow.

## 2017-02-01 NOTE — TELEPHONE ENCOUNTER
After consulting RC, called patient to inform him that his heart is healthy enough to have sexual intercourse.  Patient thanks coordinator for this information and states he continues to feel very well.

## 2017-02-02 ENCOUNTER — TELEPHONE (OUTPATIENT)
Dept: PHARMACY | Facility: CLINIC | Age: 47
End: 2017-02-02

## 2017-02-06 LAB — LAB SCANNED RESULT: NORMAL

## 2017-02-07 DIAGNOSIS — Z94.1 HEART REPLACED BY TRANSPLANT (H): ICD-10-CM

## 2017-02-07 PROCEDURE — 0298T ZZC EXT ECG > 48HR TO 21 DAY REVIEW AND INTERPRETATN: CPT | Performed by: INTERNAL MEDICINE

## 2017-02-09 ENCOUNTER — OFFICE VISIT (OUTPATIENT)
Dept: INTERNAL MEDICINE | Facility: CLINIC | Age: 47
End: 2017-02-09

## 2017-02-09 VITALS
WEIGHT: 156.7 LBS | HEART RATE: 97 BPM | DIASTOLIC BLOOD PRESSURE: 81 MMHG | BODY MASS INDEX: 24.92 KG/M2 | SYSTOLIC BLOOD PRESSURE: 117 MMHG

## 2017-02-09 DIAGNOSIS — Z94.1 HEART REPLACED BY TRANSPLANT (H): Primary | ICD-10-CM

## 2017-02-09 DIAGNOSIS — Z78.9: Primary | ICD-10-CM

## 2017-02-09 DIAGNOSIS — B57.2 CHAGAS CARDIOMYOPATHY: ICD-10-CM

## 2017-02-09 DIAGNOSIS — G56.21 LESION OF RIGHT ULNAR NERVE: ICD-10-CM

## 2017-02-09 LAB — MISCELLANEOUS TEST: NORMAL

## 2017-02-09 RX ORDER — LIDOCAINE 40 MG/G
CREAM TOPICAL
Status: CANCELLED | OUTPATIENT
Start: 2017-02-09

## 2017-02-09 ASSESSMENT — PAIN SCALES - GENERAL: PAINLEVEL: NO PAIN (0)

## 2017-02-09 NOTE — PROGRESS NOTES
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 He is overall doing well. He has been having numbness in the ulnar distribution of his R arm since surgery originating at the elbow and occuring at night. This has been relatively stable since surgery. No weakness in that hand. No masses or swelling. He has been taking all of his medications as prescribed, and needs refills on some of them. He otherwise has no complaints. He has been following closely with cardiology and has an appointment with them tomorrow after biopsy. No dyspnea, palpitations, chest pain, LE edema. He needs to  refills on some of his medications. His surgical scars have been healing up well.     Physical Exam  O: /81 mmHg  Pulse 97  Wt 71.079 kg (156 lb 11.2 oz)  Gen: Alert, oriented, pleasant, NAD  HEENT: moist mucous membranes, oropharynx w/o erythema, exudate, or lesions.  Skin: No visible rashes  CV: RRR, no m/r/g  Lungs: CTAB, good air movement bilaterally  Abdomen: Soft, NTND. BS present  Extremities: No edema  Neuro: CN II-XII grossly intact, moves all extremities. 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     Hepatitis vaccination  -     HEPATITIS B VACCINE,ADULT,IM - 3/3 will be completed after this vaccine given     Lesion of right ulnar nerve - suspect ulnar neuropathy at elbow, likely related to compression potentially with recent surgery with worsened symptoms at night. Advised starting with conservative therapy with wearing splint at night.   -     order for DME; Equipment being ordered: Splint -  Cubital tunnel splint for ulnar neuropathy  -     SPORTS MEDICINE REFERRAL - referred in event that he needs a more specialized splint or therapy    H/o heart transplant for Chagas - has biopsy scheduled for tomorrow. I called [facility] to ensure they can have refills of medications ready tomorrow that he can  when he is there for his procedure.     H/o latent TB - back on INH now with normal LFTs at last check    F/u in 3 months    I have reviewed and discussed the plan with my attending physician, Dr. Mckeon.     Ivan Burrell MD  P: [phone]    Pt was seen and examined with Dr. Burrell; I confirmed normal cardiac exam;  I agree with the A/P as documented above.    Liliana Moreira MD

## 2017-02-09 NOTE — PATIENT INSTRUCTIONS
Primary Care Center Phone Number [phone]  Primary Care Center Medication Refill Request Information:  * Please contact your pharmacy regarding ANY request for medication refills.  ** [facility] Prescription Fax = [phone]  * Please allow 3 business days for routine medication refills.  * Please allow 5 business days for controlled substance medication refills.     Primary Care Center Test Result notification information:  *You will be notified with in 7-10 days of your appointment day regarding the results of your test.  If you are on MyChart you will be notified as soon as the provider has reviewed the results and signed off on them.

## 2017-02-09 NOTE — NURSING NOTE
Chief Complaint   Patient presents with     Recheck Medication     pt is here for a medication follow up        Fatimah Doss CMA at 10:25 AM on 2/9/2017

## 2017-02-09 NOTE — MR AVS SNAPSHOT
After Visit Summary   2/9/2017    Javi Bansal    MRN: [id number]           Patient Information     Date Of Birth          1970        Visit Information        Provider Department      2/9/2017 10:00 AM Saima Elmore; Ivan Burrell MD [facility]        Today's Diagnoses     Hepatitis vaccination administered within 1 year    -  1     Lesion of right ulnar nerve           Care Instructions    Primary Care Center Phone Number [phone]  Primary Care Center Medication Refill Request Information:  * Please contact your pharmacy regarding ANY request for medication refills.  ** [facility] Prescription Fax = [phone]  * Please allow 3 business days for routine medication refills.  * Please allow 5 business days for controlled substance medication refills.     Primary Care Center Test Result notification information:  *You will be notified with in 7-10 days of your appointment day regarding the results of your test.  If you are on MyChart you will be notified as soon as the provider has reviewed the results and signed off on them.                Follow-ups after your visit        Additional Services     SPORTS MEDICINE REFERRAL       Your provider has referred you to:  [facility]: Sports Medicine Clinic [facility] [phone]   [email or web address]    Please be aware that coverage of these services is subject to the terms and limitations of your health insurance plan.  Call member services at your health plan with any benefit or coverage questions.      Please bring the following to your appointment:    >>   Any x-rays, CTs or MRIs which have been performed.  Contact the facility where they were done to arrange for  prior to your scheduled appointment.    >>   List of current medications   >>   This referral request   >>   Any documents/labs given to you for this referral                  Follow-up notes  from your care team     Return in about 3 months (around 5/9/2017).      Your next 10 appointments already scheduled     Feb 10, 2017  8:00 AM   LAB [facility] ([facility])    [address] [facility] [address]              Patient must bring picture ID.  Patient should be prepared to give a urine specimen  Please do not eat 10-12 hours before your appointment if you are coming in fasting for labs on lipids, cholesterol, or glucose (sugar).  Pregnant women should follow their Care Team instructions. Water with medications is okay. Do not drink coffee or other fluids.   If you have concerns about taking  your medications, please ask at office or if scheduling via VHX, send a message by clicking on Secure Messaging, Message Your Care Team.            Feb 10, 2017  8:30 AM   Procedure - 2.5 hour with Emely Desai, U2SAI ROOM 17   [address] [facility] [place] ([facility])    [address] [facility] [address]               Feb 10, 2017  9:30 AM   Heart Cath Heart Biopsy with UUHCVR3   [facility] ([facility])    [address] [facility] [address]   [phone]            Feb 10, 2017 11:45 AM   RETURN HEART TRANSPLANT with Fatimah Herr, SAMM BLOCK, Emely eDsai   [facility] Heart [facility] ([facility])    [address]  3rd Floor  [facility] [address] [phone]            Feb 21, 2017 11:00 AM   LAB with [facility] ([facility])    [address] [facility] [address]              Patient must bring picture ID.  Patient should be prepared to give a urine specimen  Please do not eat 10-12 hours before your appointment if you are coming in fasting for labs on lipids,  cholesterol, or glucose (sugar).  Pregnant women should follow their Care Team instructions. Water with medications is okay. Do not drink coffee or other fluids.   If you have concerns about taking  your medications, please ask at office or if scheduling via bluebottlebiz, send a message by clicking on Secure Messaging, Message Your Care Team.            Feb 21, 2017 11:30 AM   Procedure - 2.5 hour with U2A ROOM 7   [address] [facility] ([facility])    [address] [facility] [address]               Feb 21, 2017 12:30 PM   Heart Cath Heart Biopsy with UUHCVR3   [facility]  [facility] ([facility])    [address] [facility] [id number] [phone]              Who to contact     Please call your clinic at [phone] to:    Ask questions about your health    Make or cancel appointments    Discuss your medicines    Learn about your test results    Speak to your doctor   If you have compliments or concerns about an experience at your clinic, or if you wish to file a complaint, please contact [facility] Patient Relations at [phone] or email us at [email or web address]         Additional Information About Your Visit        Care EveryWhere ID     This is your Care EveryWhere ID. This could be used by other organizations to access your [place] medical records  [id number]        Your Vitals Were     Pulse                   97            Blood Pressure from Last 3 Encounters:   02/09/17 117/81   01/27/17 136/85   01/27/17 129/89    Weight from Last 3 Encounters:   02/09/17 71.079 kg (156 lb 11.2 oz)   01/27/17 72.122 kg (159 lb)   01/18/17 69.174 kg (152 lb 8 oz)              We Performed the Following     HEPATITIS B VACCINE,ADULT,IM     SPORTS MEDICINE REFERRAL          Today's Medication Changes          These changes are accurate as of: 2/9/17 11:18 AM.  If you have any  questions, ask your nurse or doctor.               Start taking these medicines.        Dose/Directions    order for DME   Used for:  Lesion of right ulnar nerve   Started by:  Ivan Burrell MD        Equipment being ordered: Splint - Cubital tunnel splint for ulnar neuropathy   Quantity:  1 Device   Refills:  0            Where to get your medicines      Some of these will need a paper prescription and others can be bought over the counter.  Ask your nurse if you have questions.     Bring a paper prescription for each of these medications    - order for DME             Primary Care Provider Office Phone # Fax #    Ivan Burrell [phone]       [address] [address]        Thank you!     Thank you for choosing [facility]  for your care. Our goal is always to provide you with excellent care. Hearing back from our patients is one way we can continue to improve our services. Please take a few minutes to complete the written survey that you may receive in the mail after your visit with us. Thank you!             Your Updated Medication List - Protect others around you: Learn how to safely use, store and throw away your medicines at www.disposemymeds.org.          This list is accurate as of: 2/9/17 11:18 AM.  Always use your most recent med list.                   Brand Name Dispense Instructions for use    aspirin 81 MG EC tablet     90 tablet    Take 1 tablet (81 mg) by mouth daily       calcium carb 1250 mg (500 mg [place])/vitamin D 200 units 500-200 MG-UNIT per tablet    OSCAL with D    60 tablet    Take 1 tablet by mouth 2 times daily (with meals)       isoniazid 300 MG tablet    NYDRAZID    90 tablet    Take 1 tablet (300 mg) by mouth daily       ketoconazole 2 % shampoo    NIZORAL    120 mL    Apply topically three times a week Alternate with Head and Shoulders.       LANTUS SC          multivitamin, therapeutic with minerals Tabs  tablet     30 each    Take 1 tablet by mouth daily       mycophenolate 250 MG capsule    CELLCEPT - GENERIC EQUIVALENT    360 capsule    Take 6 capsules (1,500 mg) by mouth 2 times daily       NOVOLOG SC          nystatin 419801 UNIT/ML suspension    MYCOSTATIN    280 mL    Take 10 mLs (1,000,000 Units) by mouth 4 times daily       order for DME     1 Device    Equipment being ordered: Splint - Cubital tunnel splint for ulnar neuropathy       oxyCODONE 5 MG IR tablet    ROXICODONE    40 tablet    Take 1 tablet (5 mg) by mouth every 6 hours as needed for moderate to severe pain       pantoprazole 40 MG EC tablet    PROTONIX    30 tablet    Take 1 tablet (40 mg) by mouth every morning       pravastatin 20 MG tablet    PRAVACHOL    30 tablet    Take 1 tablet (20 mg) by mouth every evening       predniSONE 5 MG tablet    DELTASONE    150 tablet    Take 15 mg every morning; take 10 mg every evening.       senna-docusate 8.6-50 MG per tablet    SENOKOT-S;PERICOLACE    20 tablet    Take 2 tablets by mouth nightly as needed for constipation (If no stools during the day)       sulfamethoxazole-trimethoprim 800-160 MG per tablet    BACTRIM DS/SEPTRA DS    8 tablet    Take 1 tablet by mouth twice a week On mondays and thursdays       * tacrolimus 1 MG capsule    PROGRAF - GENERIC EQUIVALENT    90 capsule    Take 2 mg in the morning; take 1.5 mg in the evening       * tacrolimus 0.5 MG capsule    PROGRAF - GENERIC EQUIVALENT    30 capsule    Take 2 mg in the morning; take 1.5 mg in the evening.       valGANciclovir 450 MG tablet    VALCYTE    60 tablet    Take 2 tablets (900 mg) by mouth daily       * Notice:  This list has 2 medication(s) that are the same as other medications prescribed for you. Read the directions carefully, and ask your doctor or other care provider to review them with you.

## 2017-02-10 ENCOUNTER — APPOINTMENT (OUTPATIENT)
Dept: CARDIOLOGY | Facility: CLINIC | Age: 47
End: 2017-02-10
Attending: INTERNAL MEDICINE
Payer: COMMERCIAL

## 2017-02-10 ENCOUNTER — APPOINTMENT (OUTPATIENT)
Dept: MEDSURG UNIT | Facility: CLINIC | Age: 47
End: 2017-02-10
Attending: INTERNAL MEDICINE
Payer: COMMERCIAL

## 2017-02-10 VITALS
SYSTOLIC BLOOD PRESSURE: 123 MMHG | HEART RATE: 96 BPM | DIASTOLIC BLOOD PRESSURE: 86 MMHG | WEIGHT: 156.8 LBS | HEIGHT: 67 IN | BODY MASS INDEX: 24.61 KG/M2 | OXYGEN SATURATION: 98 %

## 2017-02-10 DIAGNOSIS — Z94.1 HEART REPLACED BY TRANSPLANT (H): Primary | ICD-10-CM

## 2017-02-10 PROCEDURE — 93505 ENDOMYOCARDIAL BIOPSY: CPT | Mod: 26 | Performed by: INTERNAL MEDICINE

## 2017-02-10 PROCEDURE — 99213 OFFICE O/P EST LOW 20 MIN: CPT | Mod: ZP | Performed by: NURSE PRACTITIONER

## 2017-02-10 ASSESSMENT — PAIN SCALES - GENERAL
PAINLEVEL: NO PAIN (0)

## 2017-02-10 NOTE — MR AVS SNAPSHOT
After Visit Summary   2/10/2017    Javi Bansal    MRN: [id number]           Patient Information     Date Of Birth          1970        Visit Information        Provider Department      2/10/2017 11:45 AM Emely Willingham; Fatimah Herr, SAMM [facility]        Today's Diagnoses     Heart replaced by transplant (H)    -  1       Care Instructions    Manoj will call you with your results when they are available.    Manoj will follow up on arranging cardiac rehab closer to [place] for you.    **Ultrasound for your right arm on Monday, February 13th at 7am at the [facility].  **Return in 2 weeks as schedule for your 10 week post transplant visit.    Call Manoj at [phone] with any questions or concerns.         Follow-ups after your visit        Your next 10 appointments already scheduled     Feb 13, 2017  7:00 AM   US VENOUS with UCUSV1   [facility] US ([facility] [place])    [address] [address] [phone]           Please bring a list of your medicines (including vitamins, minerals and over-the-counter drugs). Also, tell your doctor about any allergies you may have. Wear comfortable clothes and leave your valuables at home.  You do not need to do anything special to prepare for your exam.  Please call the Imaging Department at your exam site with any questions.            Feb 21, 2017 11:00 AM   LAB with ACUTE CARE LAB [facility] [place], Lab ([facility] [place])    [address] [address]              Patient must bring picture ID.  Patient should be prepared to give a urine specimen  Please do not eat 10-12 hours before your appointment if you are coming in fasting for labs on lipids, cholesterol, or glucose (sugar).  Pregnant women should follow their Care Team instructions. Water with medications is  okay. Do not drink coffee or other fluids.   If you have concerns about taking  your medications, please ask at office or if scheduling via Quack, send a message by clicking on Secure Messaging, Message Your Care Team.            Feb 21, 2017 11:30 AM   Procedure - 2.5 hour with U2A ROOM 7   [address] [facility] ([facility])    [address] [facility] [address]               Feb 21, 2017 12:30 PM   Heart Cath Heart Biopsy with UUHCVR3   [facility]  [facility] ([facility])    [address] [address]   [phone]            Feb 21, 2017  4:00 PM   (Arrive by 3:45 PM)   RETURN HEART TRANSPLANT with Mary Huffman NP   [facility] ([facility] [place])    [address] [facility]  [address] [facility] [address]   [phone]            Mar 10, 2017  7:15 AM   LAB [facility] ([facility])    [address] [facility] [address]              Patient must bring picture ID.  Patient should be prepared to give a urine specimen  Please do not eat 10-12 hours before your appointment if you are coming in fasting for labs on lipids, cholesterol, or glucose (sugar).  Pregnant women should follow their Care Team instructions. Water with medications is okay. Do not drink coffee or other fluids.   If you have concerns about taking  your medications, please ask at office or if scheduling via Quack, send a message by clicking on Secure Messaging, Message Your Care Team.            Mar 10, 2017  8:30 AM   Ech Complete with UUECHR2   [facility]  Echocardiography ([facility])    [address] [facility] [address]   [phone]           1.  Please bring or wear a comfortable two-piece outfit. 2.  You may eat, drink and take your normal medicines. 3.   For any questions that cannot be answered, please contact the ordering physician            Mar 10, 2017  9:30 AM   Procedure - 2.5 hour with U2A ROOM 16   [address] [facility] ([facility])    [address] [facility] [address]               Mar 10, 2017 10:30 AM   Cath 90 Minute with UUHCVR3   [facility] [place],  Heart Cath Lab ([facility])    [address] [facility] [id number] [phone]            Mar 10, 2017  1:30 PM   (Arrive by 1:15 PM)   RETURN HEART TRANSPLANT with Mary Huffman NP   [facility] ([facility] [place])    [address] [facility]  3rd Floor  [facility] [address] [phone]              Future tests that were ordered for you today     Open Future Orders        Priority Expected Expires Ordered    US Upper Extremity Venous Duplex Right Routine  2/10/2018 2/10/2017            Who to contact     If you have questions or need follow up information about today's clinic visit or your schedule please contact [facility] directly at [phone].  Normal or non-critical lab and imaging results will be communicated to you by MyChart, letter or phone within 4 business days after the clinic has received the results. If you do not hear from us within 7 days, please contact the clinic through MyChart or phone. If you have a critical or abnormal lab result, we will notify you by phone as soon as possible.  Submit refill requests through [employer] or call your pharmacy and they will forward the refill request to us. Please allow 3 business days for your refill to be completed.          Additional Information About Your Visit        Care EveryWhere ID     This is your Care EveryWhere ID. This could be used by other organizations to access your [place] medical records  [id number]        Your Vitals Were     Pulse Height BMI (Body Mass Index) Pulse Oximetry          96 1.689  "dru (5' 6.5\") 24.93 kg/m2 98%         Blood Pressure from Last 3 Encounters:   02/10/17 123/86   02/10/17 133/85   02/09/17 117/81    Weight from Last 3 Encounters:   02/10/17 71.124 kg (156 lb 12.8 oz)   02/09/17 71.079 kg (156 lb 11.2 oz)   01/27/17 72.122 kg (159 lb)               Primary Care Provider Office Phone # Fax #    Ivan Burrell [phone]       [address] [address]        Thank you!     Thank you for choosing [facility]  for your care. Our goal is always to provide you with excellent care. Hearing back from our patients is one way we can continue to improve our services. Please take a few minutes to complete the written survey that you may receive in the mail after your visit with us. Thank you!             Your Updated Medication List - Protect others around you: Learn how to safely use, store and throw away your medicines at www.disposemymeds.org.          This list is accurate as of: 2/10/17 12:25 PM.  Always use your most recent med list.                   Brand Name Dispense Instructions for use    aspirin 81 MG EC tablet     90 tablet    Take 1 tablet (81 mg) by mouth daily       calcium carb 1250 mg (500 mg [place])/vitamin D 200 units 500-200 MG-UNIT per tablet    OSCAL with D    60 tablet    Take 1 tablet by mouth 2 times daily (with meals)       isoniazid 300 MG tablet    NYDRAZID    90 tablet    Take 1 tablet (300 mg) by mouth daily       ketoconazole 2 % shampoo    NIZORAL    120 mL    Apply topically three times a week Alternate with Head and Shoulders.       LANTUS SC          multivitamin, therapeutic with minerals Tabs tablet     30 each    Take 1 tablet by mouth daily       mycophenolate 250 MG capsule    CELLCEPT - GENERIC EQUIVALENT    360 capsule    Take 6 capsules (1,500 mg) by mouth 2 times daily       NOVOLOG SC          nystatin 936332 UNIT/ML suspension    MYCOSTATIN    280 mL    Take 10 mLs (1,000,000 Units) by " mouth 4 times daily       order for DME     1 Device    Equipment being ordered: Splint - Cubital tunnel splint for ulnar neuropathy       oxyCODONE 5 MG IR tablet    ROXICODONE    40 tablet    Take 1 tablet (5 mg) by mouth every 6 hours as needed for moderate to severe pain       pantoprazole 40 MG EC tablet    PROTONIX    30 tablet    Take 1 tablet (40 mg) by mouth every morning       pravastatin 20 MG tablet    PRAVACHOL    30 tablet    Take 1 tablet (20 mg) by mouth every evening       predniSONE 5 MG tablet    DELTASONE    150 tablet    Take 15 mg every morning; take 10 mg every evening.       senna-docusate 8.6-50 MG per tablet    SENOKOT-S;PERICOLACE    20 tablet    Take 2 tablets by mouth nightly as needed for constipation (If no stools during the day)       sulfamethoxazole-trimethoprim 800-160 MG per tablet    BACTRIM DS/SEPTRA DS    8 tablet    Take 1 tablet by mouth twice a week On mondays and thursdays       * tacrolimus 1 MG capsule    PROGRAF - GENERIC EQUIVALENT    90 capsule    Take 2 mg in the morning; take 1.5 mg in the evening       * tacrolimus 0.5 MG capsule    PROGRAF - GENERIC EQUIVALENT    30 capsule    Take 2 mg in the morning; take 1.5 mg in the evening.       valGANciclovir 450 MG tablet    VALCYTE    60 tablet    Take 2 tablets (900 mg) by mouth daily       * Notice:  This list has 2 medication(s) that are the same as other medications prescribed for you. Read the directions carefully, and ask your doctor or other care provider to review them with you.

## 2017-02-10 NOTE — PATIENT INSTRUCTIONS
Manoj will call you with your results when they are available.    Manoj will follow up on arranging cardiac rehab closer to [place] for you.    **Ultrasound for your right arm on Monday, February 13th at 7am at [facility].  **Return in 2 weeks as schedule for your 10 week post transplant visit.    Call Manoj at [phone] with any questions or concerns.

## 2017-02-10 NOTE — LETTER
2/10/2017      RE: Javi Bansal  [address] [place] [address]       Dear Colleague,    Thank you for the opportunity to participate in the care of your patient, Javi Bansal, at the [facility] at [facility]. Please see a copy of my visit note below.    ADULT HEART TRANSPLANT CLINIC    HPI:   Javi Shetty is a 46 year-old male with a history of Chagas cardiomyopathy (diagnosed in 2010) and biventricular systolic heart failure (LVEF 15-20% since 2015, on home milrinone since 8/2016) who subsequently underwent orthotopic heart transplantation on 12/12/16. His postoperative course was complicated by rejection, ventricular tachycardia, and reactivation of his Chagas.    His first surveillance biopsy on 12/19 showed grade 2R ACR, which was treated with IV steroids. Repeat biopsy showed persistent rejection, which was treated with IV steroids and thymoglobulin. Biopsies have been negative for rejection since then. Graft function has remained normal with an EF of 60-65% on 1/13/17.  Baseline angiogram showed no evidence of CAV. Patient presents today as part of his routine 2 month post-transplant surveillance.    Javi reports that he is feeling well overall, and is tolerating more and activity every day. Has not yet started cardiac rehab as his coordinator is looking into programs that his insurance will cover. Javi's only complaint is that of ongoing numbness and tingling in his right pinky finger up into his arm. His right arm and hand feels weak, and he is worried that the skin on his right arm is red compared to his left. Weights have been stable and he has a good appetite. Stable blood pressures, and he denies palpitations, edema, chest pain, SOB, or dizziness.    TRANSPLANT MEDICATIONS:  Tacrolimus 2mg/1.5mg daily  MMF 1.5gm BID  Prednisone 15mg/10mg daily  Valcyte 900mg daily  Bactrim  Nystatin    LAST BIOPSY:  1/27/17  LAST ANGIOGRAM: 1/10/17  CMV: D+/R+  EBV: D+/R+  Intolerance to medications: none      PAST MEDICAL HISTORY:  Past Medical History   Diagnosis Date     Presbyopia      Essential hypertension      Gastroesophageal reflux disease      Dual ICD (implantable cardioverter-defibrillator) in place 10/20/2015     Premature ventricular contractions      Chagas cardiomyopathy      Pterygium      ?? suspected , but unclear dx     Hypertension      H/O gastric ulcer      Chronic systolic heart failure (H)        CURRENT MEDICATIONS:  Prescription Medications as of 2/10/2017             order for DME Equipment being ordered: Splint - Cubital tunnel splint for ulnar neuropathy    predniSONE (DELTASONE) 5 MG tablet Take 15 mg every morning; take 10 mg every evening.    tacrolimus (PROGRAF - GENERIC EQUIVALENT) 1 MG capsule Take 2 mg in the morning; take 1.5 mg in the evening    tacrolimus (PROGRAF - GENERIC EQUIVALENT) 0.5 MG capsule Take 2 mg in the morning; take 1.5 mg in the evening.    isoniazid (NYDRAZID) 300 MG tablet Take 1 tablet (300 mg) by mouth daily    nystatin (MYCOSTATIN) 045054 UNIT/ML suspension Take 10 mLs (1,000,000 Units) by mouth 4 times daily    pravastatin (PRAVACHOL) 20 MG tablet Take 1 tablet (20 mg) by mouth every evening    sulfamethoxazole-trimethoprim (BACTRIM DS/SEPTRA DS) 800-160 MG per tablet Take 1 tablet by mouth twice a week On mondays and thursdays    mycophenolate (CELLCEPT - GENERIC EQUIVALENT) 250 MG capsule Take 6 capsules (1,500 mg) by mouth 2 times daily    Insulin Aspart (NOVOLOG SC)     Insulin Glargine (LANTUS SC)     oxyCODONE (ROXICODONE) 5 MG IR tablet Take 1 tablet (5 mg) by mouth every 6 hours as needed for moderate to severe pain    aspirin EC 81 MG EC tablet Take 1 tablet (81 mg) by mouth daily    valGANciclovir (VALCYTE) 450 MG tablet Take 2 tablets (900 mg) by mouth daily    senna-docusate (SENOKOT-S;PERICOLACE) 8.6-50 MG per tablet Take 2 tablets by mouth nightly as  "needed for constipation (If no stools during the day)    calcium carb 1250 mg, 500 mg [place],/vitamin D 200 units (OSCAL WITH D) 500-200 MG-UNIT per tablet Take 1 tablet by mouth 2 times daily (with meals)    multivitamin, therapeutic with minerals (THERA-VIT-M) TABS tablet Take 1 tablet by mouth daily    pantoprazole (PROTONIX) 40 MG EC tablet Take 1 tablet (40 mg) by mouth every morning    ketoconazole (NIZORAL) 2 % shampoo Apply topically three times a week Alternate with Head and Shoulders.      Facility Administered Medications as of 2/10/2017             lidocaine (LMX4) kit Apply topically once as needed for mild pain          ROS:   Constitutional: No fever, chills, or sweats. No weight gain/loss.   ENT: No visual disturbance, ear ache, epistaxis, sore throat.   Allergies/Immunologic: Negative.   Respiratory: No cough, hemoptysis.   Cardiovascular: As per HPI.   GI: No nausea, vomiting, hematemesis, melena, or hematochezia.   : No urinary frequency, dysuria, or hematuria.   Integument: Negative.   Psychiatric: Negative.   Neuro: Negative.   Endocrinology: Negative.   Musculoskeletal: Negative    Exam:  /86  Pulse 96  Ht 1.689 m (5' 6.5\")  Wt 71.1 kg (156 lb 12.8 oz)  SpO2 98%  BMI 24.93 kg/m2  In general, the patient is a pleasant male in no apparent distress.    HEENT: NC/AT. PERRLA. EOMI.  Sclerae white, not injected.    Neck:  No adenopathy, No thyromegaly.    COR: No JVP.  RRR.  Normal S1 S2.  No S3/S4. No murmur, rub click, or gallop.    Lungs:  CTA. No crackles/wheeze.    Abdomen: soft, nontender, nondistended.  No organomegaly.  Extremities:  No clubbing, cyanosis, or edema. Area of swelling and firmness around right elbow, warm and well-perfused.  Neuro: Alert & Oriented x 3, grossly non focal.    Labs:  CBC RESULTS:   Lab Results   Component Value Date    WBC 7.7 02/10/2017    RBC 4.08* 02/10/2017    HGB 13.3 02/10/2017    HCT 40.3 02/10/2017    MCV 99 02/10/2017    MCH 32.6 02/10/2017    MCHC " 33.0 02/10/2017    RDW 16.0* 02/10/2017     02/10/2017       BMP RESULTS:  Lab Results   Component Value Date     02/10/2017    POTASSIUM 3.7 02/10/2017    CHLORIDE 106 02/10/2017    CO2 25 02/10/2017    ANIONGAP 11 02/10/2017    * 02/10/2017    BUN 17 02/10/2017    CR 0.98 02/10/2017    GFRESTIMATED 82 02/10/2017    GFRESTBLACK >90   GFR Calc   02/10/2017    DAISY 9.5 02/10/2017      LIPID RESULTS:  Lab Results   Component Value Date    CHOL 198 01/13/2017    HDL 88 01/13/2017    LDL 93 01/13/2017    TRIG 82 01/13/2017       IMMUNOSUPPRESSANT LEVELS  Lab Results   Component Value Date    TACROL 14.8 01/30/2017    DOSTAC 2000 01/30/2017       No components found for: CK  Lab Results   Component Value Date    MAG 1.3* 01/13/2017     Lab Results   Component Value Date    A1C 5.4 12/11/2016     Lab Results   Component Value Date    PHOS 2.4* 01/13/2017     Lab Results   Component Value Date    NTBNP 1944* 06/29/2016     Lab Results   Component Value Date    SAITESTMET [facility] 01/27/2017    SAICELL Class I 01/27/2017    [id number] B:8 01/27/2017    [id number] A:29 B:54 01/27/2017    SAIREPCOM  01/27/2017      Test performed by modified procedure. Serum heat inactivated. High-risk,   mfi >3,000. Mod-risk, mfi 500-3,000.       Lab Results   Component Value Date    SAIITESTME [facility] 01/27/2017    SAIICELL Class II 01/27/2017    [id number] None 01/27/2017    [id number] None 01/27/2017    SAIIREPCOM  01/27/2017      Test performed by modified procedure. Serum heat inactivated. High-risk,   mfi >3,000. Mod-risk, mfi 500-3,000.       Lab Results   Component Value Date    CSPEC Plasma 01/13/2017       Echocardiogram 1/13/17:  Interpretation Summary  Global and regional left ventricular function is normal with an EF of 60-65%.  Right ventricular function, chamber size, wall motion, and thickness are normal.  Pulmonary artery systolic pressure is normal.  The inferior vena cava was normal in size with  preserved respiratory variability. Estimated mean right atrial pressure is <3 mmHg.  No pericardial effusion is present.  This study was compared with the study from 12/29/2016. There has been no change.    Assessment and Plan:  Javi Shetty is a 46 year-old male with a history of Chagas cardiomyopathy (diagnosed in 2010) and biventricular systolic heart failure (LVEF 15-20% since 2015, on home milrinone since 8/2016) who subsequently underwent orthotopic heart transplantation on 12/12/16. He is doing well other than ongoing right arm numbness. Will order a right arm US to evaluate for DVT. Blood pressures and weights are stable, and renal function remains normal. He is euvolemic on exam. Tacrolimus level and biopsy results are pending. Cardiac rehab referral sent today to help facilitate enrollment in an appropriate program. No other changes today.    Change in immunosuppression: no  Reason for Change: Tacrolimus level pending (goal range 10-12)  Other Changes: none  Follow-Up: 2 weeks    Next Biopsy: 2/21  Next Angiogram: first annual  Next Angiogram with IVUS: yes  Next Stress Test: per protocol       SAMM Hutchinson [facility]  Heart Transplant  Pager [phone]

## 2017-02-10 NOTE — PROGRESS NOTES
ADULT HEART TRANSPLANT CLINIC    HPI:   Javi Shetty is a 46 year-old male with a history of Chagas cardiomyopathy (diagnosed in 2010) and biventricular systolic heart failure (LVEF 15-20% since 2015, on home milrinone since 8/2016) who subsequently underwent orthotopic heart transplantation on 12/12/16. His postoperative course was complicated by rejection, ventricular tachycardia, and reactivation of his Chagas.    His first surveillance biopsy on 12/19 showed grade 2R ACR, which was treated with IV steroids. Repeat biopsy showed persistent rejection, which was treated with IV steroids and thymoglobulin. Biopsies have been negative for rejection since then. Graft function has remained normal with an EF of 60-65% on 1/13/17.  Baseline angiogram showed no evidence of CAV. Patient presents today as part of his routine 2 month post-transplant surveillance.    Javi reports that he is feeling well overall, and is tolerating more and activity every day. Has not yet started cardiac rehab as his coordinator is looking into programs that his insurance will cover. Javi's only complaint is that of ongoing numbness and tingling in his right pinky finger up into his arm. His right arm and hand feels weak, and he is worried that the skin on his right arm is red compared to his left. Weights have been stable and he has a good appetite. Stable blood pressures, and he denies palpitations, edema, chest pain, SOB, or dizziness.    TRANSPLANT MEDICATIONS:  Tacrolimus 2mg/1.5mg daily  MMF 1.5gm BID  Prednisone 15mg/10mg daily  Valcyte 900mg daily  Bactrim  Nystatin    LAST BIOPSY: 1/27/17  LAST ANGIOGRAM: 1/10/17  CMV: D+/R+  EBV: D+/R+  Intolerance to medications: none      PAST MEDICAL HISTORY:  Past Medical History   Diagnosis Date     Presbyopia      Essential hypertension      Gastroesophageal reflux disease      Dual ICD (implantable cardioverter-defibrillator) in place 10/20/2015     Premature ventricular contractions       Chagas cardiomyopathy      Pterygium      ?? suspected , but unclear dx     Hypertension      H/O gastric ulcer      Chronic systolic heart failure (H)        CURRENT MEDICATIONS:  Prescription Medications as of 2/10/2017             order for DME Equipment being ordered: Splint - Cubital tunnel splint for ulnar neuropathy    predniSONE (DELTASONE) 5 MG tablet Take 15 mg every morning; take 10 mg every evening.    tacrolimus (PROGRAF - GENERIC EQUIVALENT) 1 MG capsule Take 2 mg in the morning; take 1.5 mg in the evening    tacrolimus (PROGRAF - GENERIC EQUIVALENT) 0.5 MG capsule Take 2 mg in the morning; take 1.5 mg in the evening.    isoniazid (NYDRAZID) 300 MG tablet Take 1 tablet (300 mg) by mouth daily    nystatin (MYCOSTATIN) 691902 UNIT/ML suspension Take 10 mLs (1,000,000 Units) by mouth 4 times daily    pravastatin (PRAVACHOL) 20 MG tablet Take 1 tablet (20 mg) by mouth every evening    sulfamethoxazole-trimethoprim (BACTRIM DS/SEPTRA DS) 800-160 MG per tablet Take 1 tablet by mouth twice a week On mondays and thursdays    mycophenolate (CELLCEPT - GENERIC EQUIVALENT) 250 MG capsule Take 6 capsules (1,500 mg) by mouth 2 times daily    Insulin Aspart (NOVOLOG SC)     Insulin Glargine (LANTUS SC)     oxyCODONE (ROXICODONE) 5 MG IR tablet Take 1 tablet (5 mg) by mouth every 6 hours as needed for moderate to severe pain    aspirin EC 81 MG EC tablet Take 1 tablet (81 mg) by mouth daily    valGANciclovir (VALCYTE) 450 MG tablet Take 2 tablets (900 mg) by mouth daily    senna-docusate (SENOKOT-S;PERICOLACE) 8.6-50 MG per tablet Take 2 tablets by mouth nightly as needed for constipation (If no stools during the day)    calcium carb 1250 mg, 500 mg [place],/vitamin D 200 units (OSCAL WITH D) 500-200 MG-UNIT per tablet Take 1 tablet by mouth 2 times daily (with meals)    multivitamin, therapeutic with minerals (THERA-VIT-M) TABS tablet Take 1 tablet by mouth daily    pantoprazole (PROTONIX) 40 MG EC tablet Take 1  "tablet (40 mg) by mouth every morning    ketoconazole (NIZORAL) 2 % shampoo Apply topically three times a week Alternate with Head and Shoulders.      Facility Administered Medications as of 2/10/2017             lidocaine (LMX4) kit Apply topically once as needed for mild pain          ROS:   Constitutional: No fever, chills, or sweats. No weight gain/loss.   ENT: No visual disturbance, ear ache, epistaxis, sore throat.   Allergies/Immunologic: Negative.   Respiratory: No cough, hemoptysis.   Cardiovascular: As per HPI.   GI: No nausea, vomiting, hematemesis, melena, or hematochezia.   : No urinary frequency, dysuria, or hematuria.   Integument: Negative.   Psychiatric: Negative.   Neuro: Negative.   Endocrinology: Negative.   Musculoskeletal: Negative    Exam:  /86  Pulse 96  Ht 1.689 m (5' 6.5\")  Wt 71.1 kg (156 lb 12.8 oz)  SpO2 98%  BMI 24.93 kg/m2  In general, the patient is a pleasant male in no apparent distress.    HEENT: NC/AT. PERRLA. EOMI.  Sclerae white, not injected.    Neck:  No adenopathy, No thyromegaly.    COR: No JVP.  RRR.  Normal S1 S2.  No S3/S4. No murmur, rub click, or gallop.    Lungs:  CTA. No crackles/wheeze.    Abdomen: soft, nontender, nondistended.  No organomegaly.  Extremities:  No clubbing, cyanosis, or edema. Area of swelling and firmness around right elbow, warm and well-perfused.  Neuro: Alert & Oriented x 3, grossly non focal.    Labs:  CBC RESULTS:   Lab Results   Component Value Date    WBC 7.7 02/10/2017    RBC 4.08* 02/10/2017    HGB 13.3 02/10/2017    HCT 40.3 02/10/2017    MCV 99 02/10/2017    MCH 32.6 02/10/2017    MCHC 33.0 02/10/2017    RDW 16.0* 02/10/2017     02/10/2017       BMP RESULTS:  Lab Results   Component Value Date     02/10/2017    POTASSIUM 3.7 02/10/2017    CHLORIDE 106 02/10/2017    CO2 25 02/10/2017    ANIONGAP 11 02/10/2017    * 02/10/2017    BUN 17 02/10/2017    CR 0.98 02/10/2017    GFRESTIMATED 82 02/10/2017    GFRESTBLACK " >90   GFR Calc   02/10/2017    DAISY 9.5 02/10/2017      LIPID RESULTS:  Lab Results   Component Value Date    CHOL 198 01/13/2017    HDL 88 01/13/2017    LDL 93 01/13/2017    TRIG 82 01/13/2017       IMMUNOSUPPRESSANT LEVELS  Lab Results   Component Value Date    TACROL 14.8 01/30/2017    DOSTAC 2000 01/30/2017       No components found for: CK  Lab Results   Component Value Date    MAG 1.3* 01/13/2017     Lab Results   Component Value Date    A1C 5.4 12/11/2016     Lab Results   Component Value Date    PHOS 2.4* 01/13/2017     Lab Results   Component Value Date    NTBNP 1944* 06/29/2016     Lab Results   Component Value Date    SAITESTMET [facility] 01/27/2017    SAICELL Class I 01/27/2017    [id number] B:8 01/27/2017    [id number] A:29 B:54 01/27/2017    SAIREPCOM  01/27/2017      Test performed by modified procedure. Serum heat inactivated. High-risk,   mfi >3,000. Mod-risk, mfi 500-3,000.       Lab Results   Component Value Date    SAIITESTME SA HI 01/27/2017    SAIICELL Class II 01/27/2017    [id number] None 01/27/2017    [id number] None 01/27/2017    SAIIREPCOM  01/27/2017      Test performed by modified procedure. Serum heat inactivated. High-risk,   mfi >3,000. Mod-risk, mfi 500-3,000.       Lab Results   Component Value Date    CSPEC Plasma 01/13/2017       Echocardiogram 1/13/17:  Interpretation Summary  Global and regional left ventricular function is normal with an EF of 60-65%.  Right ventricular function, chamber size, wall motion, and thickness are normal.  Pulmonary artery systolic pressure is normal.  The inferior vena cava was normal in size with preserved respiratory variability. Estimated mean right atrial pressure is <3 mmHg.  No pericardial effusion is present.  This study was compared with the study from 12/29/2016. There has been no change.    Assessment and Plan:  Javi Shetty is a 46 year-old male with a history of Chagas cardiomyopathy (diagnosed in 2010) and biventricular  systolic heart failure (LVEF 15-20% since 2015, on home milrinone since 8/2016) who subsequently underwent orthotopic heart transplantation on 12/12/16. He is doing well other than ongoing right arm numbness. Will order a right arm US to evaluate for DVT. Blood pressures and weights are stable, and renal function remains normal. He is euvolemic on exam. Tacrolimus level and biopsy results are pending. Cardiac rehab referral sent today to help facilitate enrollment in an appropriate program. No other changes today.    Change in immunosuppression: no  Reason for Change: Tacrolimus level pending (goal range 10-12)  Other Changes: none  Follow-Up: 2 weeks    Next Biopsy: 2/21  Next Angiogram: first annual  Next Angiogram with IVUS: yes  Next Stress Test: per protocol       SAMM Hutchinson [facility]  Heart Transplant  Pager [phone]

## 2017-02-13 ENCOUNTER — TELEPHONE (OUTPATIENT)
Dept: TRANSPLANT | Facility: CLINIC | Age: 47
End: 2017-02-13

## 2017-02-13 DIAGNOSIS — Z94.1 HEART REPLACED BY TRANSPLANT (H): Primary | ICD-10-CM

## 2017-02-14 ENCOUNTER — HOSPITAL ENCOUNTER (OUTPATIENT)
Dept: CARDIOLOGY | Facility: CLINIC | Age: 47
Discharge: HOME | End: 2017-02-14
Attending: INTERNAL MEDICINE | Admitting: INTERNAL MEDICINE
Payer: COMMERCIAL

## 2017-02-14 DIAGNOSIS — Z94.1 HEART REPLACED BY TRANSPLANT (H): ICD-10-CM

## 2017-02-14 PROCEDURE — 93308 TTE F-UP OR LMTD: CPT | Mod: 26 | Performed by: INTERNAL MEDICINE

## 2017-02-14 PROCEDURE — 25500064 ZZH RX 255 OP 636: Performed by: INTERNAL MEDICINE

## 2017-02-14 PROCEDURE — T1013 SIGN LANG/ORAL INTERPRETER: HCPCS | Mod: U3

## 2017-02-14 PROCEDURE — 93321 DOPPLER ECHO F-UP/LMTD STD: CPT | Mod: 26 | Performed by: INTERNAL MEDICINE

## 2017-02-14 PROCEDURE — 40000264 ECHO LIMITED WITH OPTISON

## 2017-02-14 PROCEDURE — 93325 DOPPLER ECHO COLOR FLOW MAPG: CPT | Mod: 26 | Performed by: INTERNAL MEDICINE

## 2017-02-14 PROCEDURE — 93308 TTE F-UP OR LMTD: CPT

## 2017-02-14 RX ORDER — PREDNISONE 50 MG/1
100 TABLET ORAL ONCE
Qty: 2 TABLET | Refills: 0
Start: 2017-02-14 | End: 2017-02-15 | Stop reason: DRUGHIGH

## 2017-02-14 RX ORDER — TACROLIMUS 0.5 MG/1
CAPSULE ORAL
Qty: 60 CAPSULE | Refills: 11 | Status: SHIPPED | OUTPATIENT
Start: 2017-02-14 | End: 2017-03-07

## 2017-02-14 RX ORDER — TACROLIMUS 1 MG/1
CAPSULE ORAL
Qty: 60 CAPSULE | Refills: 11 | Status: SHIPPED | OUTPATIENT
Start: 2017-02-14 | End: 2017-03-07

## 2017-02-14 RX ADMIN — HUMAN ALBUMIN MICROSPHERES AND PERFLUTREN 4 ML: 10; .22 INJECTION, SOLUTION INTRAVENOUS at 11:00

## 2017-02-14 NOTE — TELEPHONE ENCOUNTER
Patient contacted (with  services) with results and plan of care. Bx was 2R (IF pending). Instructed patient to take 100 mg prednisone tonight, decrease FK to 1.5 mg  BID and have echo tomorrow, 2/14 at 1030 in the [facility] of [facility]. Instructed patient to wait in the [facility] Room after  The echo until he has been contacted by coordinator.  Patient reports he is feeling very well and denies any swelling, palpitations or SOB. Patient verbalizes understanding plan of care and agrees to follow.    Contacted [facility] to request  services assistance for scheduled echo tomorrow AM.

## 2017-02-15 ENCOUNTER — TELEPHONE (OUTPATIENT)
Dept: TRANSPLANT | Facility: CLINIC | Age: 47
End: 2017-02-15

## 2017-02-15 ENCOUNTER — HOSPITAL (OUTPATIENT)
Dept: TRANSPLANT | Facility: CLINIC | Age: 47
End: 2017-02-15

## 2017-02-15 DIAGNOSIS — Z94.1 HEART REPLACED BY TRANSPLANT (H): Primary | ICD-10-CM

## 2017-02-15 RX ORDER — LIDOCAINE 40 MG/G
CREAM TOPICAL
Status: CANCELLED | OUTPATIENT
Start: 2017-02-15

## 2017-02-15 RX ORDER — PREDNISONE 20 MG/1
TABLET ORAL
Qty: 28 TABLET | Refills: 3 | Status: SHIPPED | OUTPATIENT
Start: 2017-02-15 | End: 2017-03-03 | Stop reason: DRUGHIGH

## 2017-02-15 NOTE — NURSING NOTE
Patient seen on Tuesday, 2/14, 2017 in the  [facility] Waiting Room after echo to review results and discuss plan of care.  Services used during this discussion.  Confirmed that the patient's recent echo indicates good heart function, but due to recent 2R bx, will make the following changes to his plan of care.  Instructed patient with plan to continue taking 100 mg prednisone daily for two additional days, then begin taper: 60 mg/day for 3 days, 40 mg/day for 3 days, then 20 mg/day x 3 days, then remain on 10 mg/day. Recommended the patient take daily prednisone in the morning so as not to disturb his sleep.  Also confirmed FK dose reduction to 1.5 mg BID and upcoming labs, clinic and RHC/bx on 2/21/17.  Coordinator reviewed these medication changes on a printed copy (in English) with the patient w/ assist.  Patient verbalizes understanding plan of care and agrees to follow.

## 2017-02-15 NOTE — TELEPHONE ENCOUNTER
When contacting the patient to confirm plan of care, discovered that the patient had NOT been taking the correct prednisone pulse dose: 50 mg/day x 2 rather than 100 mg/day.  The patient had initially said he had 10 mg prednisone tablets, but instead has only 5 mg tablets.  Consulted with  who recommends the patient restart prednisone 100 mg daily, then to begin prednisone taper: 60 mg/day x 3 days, 40 mg/day x 3 days, 20 mg/day x 3 days.    Consulted  [facility] regarding this medication error and requested the rx (20 mg tablets)  be sent out today with Latvian instructions if possible.      Coordinator contacted the patient back to confirm instructions: today, the patient should take an additional 50 mg prednisone (5 mg x 10 tablets) now to equal 100 mg total today.   Specialty Pharmacy will ship out 20 mg tablets with instructions to take 100 mg tomorrow and Friday, then decrease to 60 mg daily (3 x 20 mg) x 3 days, 40 mg daily x 3 days then 20 mg daily x 3 days.  Plan to reevaluate taper during upcoming clinic visit on 2/21/17

## 2017-02-16 NOTE — NURSING NOTE
Pt seen in clinic for his routine 2 month post heart transplant visit. Pt reports doing well but does report some numbness medially on his right arm and swelling by his elbow. U/s scheduled for patient to follow up. Pt reports that he has not started cardiac rehab as he'd like to get this arranged closer to home ([place]). Labs are WNL, FK level, biopsy, and chagas PCR still pending. No other changes at this time. Instructions reviewed with patient through  and he verbalized understanding. AVS printed.    Manoj will call you with your results when they are available.    Manoj will follow up on arranging cardiac rehab closer to [place] for you.    **Ultrasound for your right arm on Monday, February [facility]th [facility]  **Return in 2 weeks as schedule for your 10 week post transplant visit.    Call Manoj at [phone] with any questions or concerns.

## 2017-02-17 LAB — LAB SCANNED RESULT: NORMAL

## 2017-02-20 ENCOUNTER — TELEPHONE (OUTPATIENT)
Dept: TRANSPLANT | Facility: CLINIC | Age: 47
End: 2017-02-20

## 2017-02-20 NOTE — TELEPHONE ENCOUNTER
Called patient with the help of an  to remind him to bring his medication list, medication pill box, and all medication bottles. Pt verbalized understanding.

## 2017-02-21 ENCOUNTER — APPOINTMENT (OUTPATIENT)
Dept: CARDIOLOGY | Facility: CLINIC | Age: 47
End: 2017-02-21
Attending: INTERNAL MEDICINE
Payer: COMMERCIAL

## 2017-02-21 ENCOUNTER — APPOINTMENT (OUTPATIENT)
Dept: MEDSURG UNIT | Facility: CLINIC | Age: 47
End: 2017-02-21
Payer: COMMERCIAL

## 2017-02-21 VITALS
HEART RATE: 92 BPM | HEIGHT: 67 IN | WEIGHT: 151 LBS | OXYGEN SATURATION: 98 % | BODY MASS INDEX: 23.7 KG/M2 | SYSTOLIC BLOOD PRESSURE: 124 MMHG | RESPIRATION RATE: 20 BRPM | DIASTOLIC BLOOD PRESSURE: 90 MMHG

## 2017-02-21 DIAGNOSIS — Z94.1 HEART REPLACED BY TRANSPLANT (H): ICD-10-CM

## 2017-02-21 DIAGNOSIS — Z94.1 HEART REPLACED BY TRANSPLANT (H): Primary | ICD-10-CM

## 2017-02-21 LAB
ANION GAP SERPL CALCULATED.3IONS-SCNC: 10 MMOL/L (ref 3–14)
BASOPHILS # BLD AUTO: 0 10E9/L (ref 0–0.2)
BASOPHILS NFR BLD AUTO: 0.1 %
BUN SERPL-MCNC: 28 MG/DL (ref 7–30)
CALCIUM SERPL-MCNC: 9 MG/DL (ref 8.5–10.1)
CHLORIDE SERPL-SCNC: 105 MMOL/L (ref 94–109)
CO2 SERPL-SCNC: 25 MMOL/L (ref 20–32)
CREAT SERPL-MCNC: 0.84 MG/DL (ref 0.66–1.25)
CRP SERPL-MCNC: <2.9 MG/L (ref 0–8)
DIFFERENTIAL METHOD BLD: ABNORMAL
EOSINOPHIL # BLD AUTO: 0 10E9/L (ref 0–0.7)
EOSINOPHIL NFR BLD AUTO: 0 %
ERYTHROCYTE [DISTWIDTH] IN BLOOD BY AUTOMATED COUNT: 15 % (ref 10–15)
GFR SERPL CREATININE-BSD FRML MDRD: ABNORMAL ML/MIN/1.7M2
GLUCOSE SERPL-MCNC: 149 MG/DL (ref 70–99)
HCT VFR BLD AUTO: 39.8 % (ref 40–53)
HGB BLD-MCNC: 13.5 G/DL (ref 13.3–17.7)
IMM GRANULOCYTES # BLD: 0.7 10E9/L (ref 0–0.4)
IMM GRANULOCYTES NFR BLD: 4.4 %
LYMPHOCYTES # BLD AUTO: 1.4 10E9/L (ref 0.8–5.3)
LYMPHOCYTES NFR BLD AUTO: 9.2 %
MAGNESIUM SERPL-MCNC: 1.9 MG/DL (ref 1.6–2.3)
MCH RBC QN AUTO: 32.4 PG (ref 26.5–33)
MCHC RBC AUTO-ENTMCNC: 33.9 G/DL (ref 31.5–36.5)
MCV RBC AUTO: 95 FL (ref 78–100)
MONOCYTES # BLD AUTO: 1.3 10E9/L (ref 0–1.3)
MONOCYTES NFR BLD AUTO: 8.8 %
NEUTROPHILS # BLD AUTO: 11.5 10E9/L (ref 1.6–8.3)
NEUTROPHILS NFR BLD AUTO: 77.5 %
PLATELET # BLD AUTO: 239 10E9/L (ref 150–450)
POTASSIUM SERPL-SCNC: 3.5 MMOL/L (ref 3.4–5.3)
RBC # BLD AUTO: 4.17 10E12/L (ref 4.4–5.9)
SODIUM SERPL-SCNC: 140 MMOL/L (ref 133–144)
TACROLIMUS BLD-MCNC: 10.3 UG/L (ref 5–15)
TME LAST DOSE: NORMAL H
WBC # BLD AUTO: 15.1 10E9/L (ref 4–11)

## 2017-02-21 PROCEDURE — 86140 C-REACTIVE PROTEIN: CPT | Performed by: INTERNAL MEDICINE

## 2017-02-21 PROCEDURE — 85004 AUTOMATED DIFF WBC COUNT: CPT | Performed by: INTERNAL MEDICINE

## 2017-02-21 PROCEDURE — 80048 BASIC METABOLIC PNL TOTAL CA: CPT | Performed by: INTERNAL MEDICINE

## 2017-02-21 PROCEDURE — 36415 COLL VENOUS BLD VENIPUNCTURE: CPT | Performed by: INTERNAL MEDICINE

## 2017-02-21 PROCEDURE — 85027 COMPLETE CBC AUTOMATED: CPT | Performed by: INTERNAL MEDICINE

## 2017-02-21 PROCEDURE — 80197 ASSAY OF TACROLIMUS: CPT | Performed by: INTERNAL MEDICINE

## 2017-02-21 PROCEDURE — 93505 ENDOMYOCARDIAL BIOPSY: CPT | Mod: 26 | Performed by: INTERNAL MEDICINE

## 2017-02-21 PROCEDURE — 99214 OFFICE O/P EST MOD 30 MIN: CPT | Performed by: NURSE PRACTITIONER

## 2017-02-21 PROCEDURE — 83735 ASSAY OF MAGNESIUM: CPT | Performed by: INTERNAL MEDICINE

## 2017-02-21 NOTE — NURSING NOTE
Patient seen in clinic after repeat heart biopsy performed earlier this morning. Interpretive services present during this visit.  Patient reports he is feeling/doing very well; he denies sob, pain or swelling.  Reviewed available results with patient, most of which appear wnl.  WBC is elevated which may be d/t prednisone taper.  Plan to review pending results (bx, FK ) and evaluate WBC.      Reviewed patient's medications.  Patient currently taking FK 2 mg/1 mg rather than 1.5 mg BID d/t lack of 0.5 mg capsules and trying to reduce risk of medication errors.  It appears the patient is taking 450 mg Valcyte BID; coordinator will review patient's chart to determine if this dose was reduced recently.  SMZ also being taken on Thursday and Friday; coordinator will review this medication with ROCIO ADAME RN  Confirmed current prednisone taper: currently day #6 on 20 mg BID  No medication changes made during this clinic visit.    Follow up:  Magnesium CRP and differential added to blood draw.  Coordinator will update patient with pending test results.  Confirmed next routine clinic visit/bx on 3/10/17  AVS given to and reviewed with patient.

## 2017-02-21 NOTE — LETTER
2/21/2017      RE: Javi Bansal  [address] [place] [address]       Dear Colleague,    Thank you for the opportunity to participate in the care of your patient, Javi Bansal, at the [facility] HEART [facility]. Please see a copy of my visit note below.    ADULT HEART TRANSPLANT CLINIC  February 21, 2016    HPI:   Mr. Javi Bansal is a 46yr old male with a history of Chagas cardiomyopathy (diagnosed in 2010, treated with nifurtimox for 3 months) and biventricular systolic heart failure (LVEF 15-20% since 2015, on home milrinone since 8/2016), now s/p OHT 12/12/16, who presents to clinic for routine surveillance.    His postoperative course was complicated by respiratory failure and JOSE LUIS, which resolved with diuresis; surgical blood loss anemia, s/p 1u PRBCs 12/17/16; right pleural effusion, requiring pigtail placement 12/17/16; Chagas reactivation 12/26, started on benznidasole.    He was started on azathioprine postoperatively due to reports of better outcomes.  Initial RHC 12/19 showed elevated biventricular filling pressures, with biopsy showing moderately severe 2R ACR.  He was treated with IV steroids (Methylprednisolone 1g 12/20, 500mg 12/21, and 500mg 12/22), and switched to cellcept.  Second RHC 12/27 continued to show elevated biventricular filling pressures, with biopsy showing 2R/3A ACR.  He developed frequent episodes of atrial tachyarrhythmias and VT.  He was treated with IV steroids (Methylprednisolone 1g IV 12/27, 12/28, and 12/29) and thymoglobulin.  His biopsies thereafter showed mild 1R ACR, until biopsy 2/10 again showed 2R ACR.  He was started on an oral prednisone burst and taper.    Graft function has remained stable, with LVEF 55-60% per echo 2/14/17.  Last RHC 2/10 showed normal biventricular filling pressures with Donnell CI 2.9.  Baseline coronary angio 1/10 showed normal coronary  arteries with no significant atherosclerotic disease per IVUS.    Since his last visit, he reports that he continues to feel well.  He has not yet started cardiac rehab, but reports that he continues to exercise and walk without concerns.  He reports that his weight and blood pressures have remained stable.  He continues to tolerate his diet, with good appetite and no nausea, vomiting, or diarrhea.  He continues to note numbness/tingling in his right arm.  He otherwise denies chest pain, palpitations, shortness of breath, dizziness, headaches, fluid retention, fevers, and chills.  He has brought his medications with him today for review.    Serostatus:              - CMV D+/R+              - EBV D+/R+    Immunosuppression:   - Cellcept    - Tacrolimus   - Prednisone taper   - Valcyte   - Bactrim   - Nystatin    Patient Active Problem List    Diagnosis Date Noted     Ventricular tachycardia, non-sustained (H) 01/04/2017     Priority: Medium     Elevated liver enzymes 12/20/2016     Priority: Medium     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 12/19/2016     Priority: Medium     Need for prophylactic antibiotic 12/19/2016     Priority: Medium     Heart failure (H) 12/12/2016     Priority: Medium     Heart replaced by transplant (H) 12/12/2016     Priority: Medium     Heart transplant candidate 12/11/2016     Priority: Medium     Chest pain 07/11/2016     Priority: Medium     Chronic systolic heart failure (H)      Priority: Medium     CHF (congestive heart failure) (H) 06/17/2016     Priority: Medium     Cardiac defibrillator in situ-Medtronic, dual chamber - Not Dependent 06/16/2016     Priority: Medium     Chagas cardiomyopathy 05/26/2016     Priority: Medium     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 500 mg)  14 DPT: 2R, neg/neg treated w/IV pred x 3 (1000mg) + thymo x 3  21 DPT: 1R, neg/neg       ACP (advance care  planning) 10/10/2016     Advance Care Planning 10/10/2016: Receipt of ACP document:  Received: invalid HCD document dated 8-4-16.  Document not previously scanned. Validation form completed indicating invalid document. Copy sent to client with information and facilitation resources. Validation form sent to be scanned as notation of invalid document received. Confirmed/documented designated decision maker(s).  Added by Namrata Neely RN Advance Care Planning Liaison with [employer]               PAST MEDICAL HISTORY:  Past Medical History   Diagnosis Date     Chagas cardiomyopathy      Chronic systolic heart failure (H)      Dual ICD (implantable cardioverter-defibrillator) in place 10/20/2015     Essential hypertension      Gastroesophageal reflux disease      H/O gastric ulcer      Hypertension      Premature ventricular contractions      Presbyopia      Pterygium      ?? suspected , but unclear dx       CURRENT MEDICATIONS:  Prescription Medications as of 2/24/2017             predniSONE (DELTASONE) 20 MG tablet Take 100 mg daily x 2 days, then taper: 60 mg daily x 3 days, 40 mg daily x 3 days and 20 mg daily x 3 days.    tacrolimus (PROGRAF - GENERIC EQUIVALENT) 0.5 MG capsule Take 1.5 mg TWICE daily    tacrolimus (PROGRAF - GENERIC EQUIVALENT) 1 MG capsule Take 1.5 mg TWICE daily    order for DME Equipment being ordered: Splint - Cubital tunnel splint for ulnar neuropathy    isoniazid (NYDRAZID) 300 MG tablet Take 1 tablet (300 mg) by mouth daily    nystatin (MYCOSTATIN) 319351 UNIT/ML suspension Take 10 mLs (1,000,000 Units) by mouth 4 times daily    pravastatin (PRAVACHOL) 20 MG tablet Take 1 tablet (20 mg) by mouth every evening    sulfamethoxazole-trimethoprim (BACTRIM DS/SEPTRA DS) 800-160 MG per tablet Take 1 tablet by mouth twice a week On mondays and thursdays    mycophenolate (CELLCEPT - GENERIC EQUIVALENT) 250 MG capsule Take 6 capsules (1,500 mg) by mouth 2 times daily    aspirin EC 81 MG EC  "tablet Take 1 tablet (81 mg) by mouth daily    valGANciclovir (VALCYTE) 450 MG tablet Take 2 tablets (900 mg) by mouth daily    senna-docusate (SENOKOT-S;PERICOLACE) 8.6-50 MG per tablet Take 2 tablets by mouth nightly as needed for constipation (If no stools during the day)    calcium carb 1250 mg, 500 mg Council,/vitamin D 200 units (OSCAL WITH D) 500-200 MG-UNIT per tablet Take 1 tablet by mouth 2 times daily (with meals)    multivitamin, therapeutic with minerals (THERA-VIT-M) TABS tablet Take 1 tablet by mouth daily    pantoprazole (PROTONIX) 40 MG EC tablet Take 1 tablet (40 mg) by mouth every morning    ketoconazole (NIZORAL) 2 % shampoo Apply topically three times a week Alternate with Head and Shoulders.          ROS:   Constitutional: No fever, chills, or sweats. No weight gain/loss.   ENT: No visual disturbance, ear ache, epistaxis, sore throat.   Allergies/Immunologic: Negative.   Respiratory: No cough, hemoptysis.   Cardiovascular: As per HPI.   GI: No nausea, vomiting, hematemesis, melena, or hematochezia.   : No urinary frequency, dysuria, or hematuria.   Integument: Negative.   Psychiatric: Negative.   Neuro: Negative.   Endocrinology: Negative.   Musculoskeletal: Negative    Exam:  /90  Pulse 92  Resp 20  Ht 1.702 m (5' 7\")  Wt 68.5 kg (151 lb)  SpO2 98%  BMI 23.65 kg/m2  In general, the patient is a pleasant male in no apparent distress.    HEENT: NC/AT. PERRLA. EOMI.  Sclerae white, not injected.    Neck:  No adenopathy, No thyromegaly.    COR: No jugular venous distention.  RRR.  Normal S1 S2 splits physiologically.  No murmur, rub click, or gallop.    Lungs:  CTA. No rhonchi.    Abdomen: soft, nontender, nondistended.  No organomegaly.  Extremities:  No clubbing, cyanosis, or edema.    Neuro: Alert & Oriented x 3, grossly non focal.    Labs:  CBC RESULTS:   Lab Results   Component Value Date    WBC 15.1 (H) 02/21/2017    RBC 4.17 (L) 02/21/2017    HGB 13.5 02/21/2017    HCT 39.8 (L) " 02/21/2017    MCV 95 02/21/2017    MCH 32.4 02/21/2017    MCHC 33.9 02/21/2017    RDW 15.0 02/21/2017     02/21/2017       BMP RESULTS:  Lab Results   Component Value Date     02/21/2017    POTASSIUM 3.5 02/21/2017    CHLORIDE 105 02/21/2017    CO2 25 02/21/2017    ANIONGAP 10 02/21/2017     (H) 02/21/2017    BUN 28 02/21/2017    CR 0.84 02/21/2017    GFRESTIMATED >90  Non  GFR Calc   02/21/2017    GFRESTBLACK >90   GFR Calc   02/21/2017    DAISY 9.0 02/21/2017      LIPID RESULTS:  Lab Results   Component Value Date    CHOL 198 01/13/2017    HDL 88 01/13/2017    LDL 93 01/13/2017    TRIG 82 01/13/2017    NHDL 110 01/13/2017       IMMUNOSUPPRESSANT LEVELS  Lab Results   Component Value Date    TACROL 10.3 02/21/2017    DOSTAC Not Provided 02/21/2017       No components found for: CK  Lab Results   Component Value Date    MAG 1.9 02/21/2017     Lab Results   Component Value Date    A1C 5.4 12/11/2016     Lab Results   Component Value Date    PHOS 2.4 (L) 01/13/2017     Lab Results   Component Value Date    NTBNPI 08395 (H) 12/20/2016     Lab Results   Component Value Date    SAITESTMET [facility] 01/27/2017    SAICELL Class I 01/27/2017    [id number] B:8 01/27/2017    [id number] A:29 B:54 01/27/2017    SAIREPCOM  01/27/2017      Test performed by modified procedure. Serum heat inactivated. High-risk,   mfi >3,000. Mod-risk, mfi 500-3,000.       Lab Results   Component Value Date    SAIITESTME [facility] 01/27/2017    SAIICELL Class II 01/27/2017    [id number] None 01/27/2017    [id number] None 01/27/2017    SAIIREPCOM  01/27/2017      Test performed by modified procedure. Serum heat inactivated. High-risk,   mfi >3,000. Mod-risk, mfi 500-3,000.       Lab Results   Component Value Date    CSPEC Plasma 01/13/2017       Diagnostic Studies:  Recent Results (from the past 4320 hour(s))   ECHO LIMITED WITH OPTISON    Narrative     [id number]  [facility]  [id number]  [id number]^JOB^LOTTIE^NAMAN           [facility]  Echocardiography Laboratory  [address] [place] [address]     Name: AFUA BROOKS  MRN: [id number]  : 1970  Study Date: 2017 10:34 AM  Age: 46 yrs  Gender: Male  Patient Location: [facility]  Reason For Study: , Heart transplant status  Ordering Physician: LOTTIE KAISER  Referring Physician: LOTTIE KAISER  Performed By: Vikas Lebron RDCS     BSA: 1.8 m2  Height: 67 in  Weight: 149 lb  HR: 106  BP: 123/86 mmHg  _____________________________________________________________________________  __        Procedure  Limited Echocardiogram with portions of two-dimensional, color and spectral  Doppler performed. Contrast Optison. Optison (NDC #0618-4311-79) given  intravenously. Patient was given 4 ml mixture of 3 ml Optison and 6 ml saline.  5 ml wasted. IV start location LAC . IV start time 10:50 .  _____________________________________________________________________________  __        Interpretation Summary  Global and regional left ventricular function is normal with an EF of 55-60%.  Global right ventricular function is normal.  The inferior vena cava was normal in size with preserved respiratory  variability.  No pericardial effusion is present.  _____________________________________________________________________________  __        Left Ventricle  Left ventricular size is normal. Global and regional left ventricular function  is normal with an EF of 55-60%.     Right Ventricle  The right ventricle is normal size. Global right ventricular function is  normal.     Tricuspid Valve  The tricuspid valve is normal. The peak velocity of the tricuspid regurgitant  jet is not obtainable.     Vessels  The inferior vena cava was normal in size with preserved respiratory  variability.        Pericardium  No pericardial effusion is present.     Compared to Previous  Study  This study was compared with the study from 17, no significant changes  noted .  _____________________________________________________________________________  __     MMode/2D Measurements & Calculations  IVSd: 0.92 cm  LVIDd: 3.9 cm  LVIDs: 2.3 cm  LVPWd: 0.88 cm  FS: 42.3 %  EDV(Teich): 66.7 ml  ESV(Teich): 17.4 ml  LV mass(C)d: 106.6 grams                 _____________________________________________________________________________  __           Report approved by: Wander Acharya 2017 11:46 AM      ECHO COMPLETE WITH OPTISON    Narrative    Interpretation Summary                    [facility]  [facility] Floor  [address] [place] [address]     Name: AFUA BROOKS  MRN: [id number]  : 1970  Study Date: 2017 08:10 AM  Age: 46 yrs  Gender: Male  Patient Location: [facility]  Reason For Study: , Heart transplant status  Ordering Physician: LOTTIE KAISER  Referring Physician: LOTTIE KAISER  Performed By: Keila Parr RDCS     BSA: 1.8 m2  Height: 67 in  Weight: 147 lb  BP: 115/81 mmHg  ______________________________________________________________________________        Procedure  Echocardiogram with two-dimensional, color and spectral Doppler performed.  Contrast Optison. Optison (NDC #3215-4000-32) given intravenously. Patient  was given 3 ml mixture of 3 ml Optison and 6 ml saline. 6 ml wasted. IV start  location R Upper arm .  ______________________________________________________________________________     Interpretation Summary  Global and regional left ventricular function is normal with an EF of 60-65%.  Right ventricular function, chamber size, wall motion, and thickness are  normal.  Pulmonary artery systolic pressure is normal.  The inferior vena cava was normal in size with preserved respiratory  variability. Estimated mean right atrial pressure is <3 mmHg.  No  pericardial effusion is present.  This study was compared with the study from 2016. There has been no  change.  ______________________________________________________________________________           Left Ventricle  Global and regional left ventricular function is normal with an EF of 60-65%.  Left ventricular wall thickness is normal. Left ventricular size is normal.  Diastolic function not assessed due to heart transplant. No regional wall  motion abnormalities are seen.     Right Ventricle  Right ventricular function, chamber size, wall motion, and thickness are  normal.  Atria  The atria cannot be assessed.     Mitral Valve  The mitral valve is normal. Trace mitral insufficiency is present.     Aortic Valve  Aortic valve is normal in structure and function. The aortic valve is  tricuspid.     Tricuspid Valve  The tricuspid valve is normal. Trace tricuspid insufficiency is present.  Right ventricular systolic pressure is 22mmHg above the right atrial  pressure. Pulmonary artery systolic pressure is normal.     Pulmonic Valve  The pulmonic valve is normal.     Vessels  The inferior vena cava was normal in size with preserved respiratory  variability. The aorta root is normal. The thoracic aorta is normal. The  pulmonary artery is normal. Estimated mean right atrial pressure is <3 mmHg.  Pericardium  No pericardial effusion is present.     Compared to Previous Study  This study was compared with the study from 2016 . There has been no  change.     ______________________________________________________________________________  MMode/2D Measurements & Calculations  RVDd: 4.1 cm           Doppler Measurements & Calculations  MV E max slava: 79.7 cm/sec  MV A max slava: 20.6 cm/sec  MV E/A: 3.9  MV dec time: 0.15 sec  TR max slava: 234.6 cm/sec  TR max P.0 mmHg  Lateral E/e': 12.8  Medial E/e': 17.7           ______________________________________________________________________________        Report  approved by: Wander Jones 2017 09:06 AM      ECHO LIMITED WITH OPTISON    Narrative    Interpretation Summary                       [facility],[place]  Echocardiography Laboratory  [address] [place] [address]     Name: AFUA BROOKS  MRN: [id number]  : 1970  Study Date: 2016 12:56 PM  Age: 46 yrs  Gender: Male  Patient Location: [facility]  Reason For Study: Heart Transplant- eval FX  History: Heart Transplant- eval FX  Ordering Physician: ASHLEY BUCK  Referring Physician: JEAN AGUILLON  Performed By: Nieves Aguilar RDCS     BSA: 1.8 m2  Height: 66 in  Weight: 162 lb  BP: 105/74 mmHg  ______________________________________________________________________________        Procedure  Limited Portable Echo Adult. Contrast Optison. Optison (NDC #4644-2679-64)  given intravenously. Patient was given 6 ml mixture of 3 ml Optison and 6 ml  saline. 3 ml wasted.  ______________________________________________________________________________     Interpretation Summary  Limited study. tachycardic.  Global and regional left ventricular function is hyperkinetic with an EF  >70%.  Global right ventricular function is normal.  No pericardial effusion is present.     No change from prior.  ______________________________________________________________________________           Left Ventricle  Global and regional left ventricular function is hyperkinetic with an EF  >70%.     Right Ventricle  Global right ventricular function is normal.  Pericardium  No pericardial effusion is present.     ______________________________________________________________________________                    ______________________________________________________________________________        Report approved by: Arik Meyers 2016 01:59 PM      Echo limited (Adults Only)    Narrative    Interpretation Summary                       [facility]  [facility]  Echocardiography Laboratory  [address] [place] [address]     Name: AFUA BROOKS  MRN: [id number]  : 1970  Study Date: 2016 04:17 PM  Age: 46 yrs  Gender: Male  Patient Location: [facility]  Reason For Study: Cardiovascular Incident  Ordering Physician: ASHLEY BUCK  Referring Physician: JEAN AGUILLON  Performed By: Vandana Adkins RDCS     BSA: 1.9 m2  Height: 67 in  Weight: 169 lb  BP: 98/59 mmHg  ______________________________________________________________________________        Procedure  Limited Echocardiogram with portions of two-dimensional, color and spectral  Doppler performed.  ______________________________________________________________________________     Interpretation Summary  Global and regional left ventricular function is hyperkinetic with an EF  >70%.  ______________________________________________________________________________           Left Ventricle  Global and regional left ventricular function is hyperkinetic with an EF  >70%.     Right Ventricle  The RV is poorly seen.  Vessels  The inferior vena cava is normal.     Pericardium  No pericardial effusion is present.     ______________________________________________________________________________                    ______________________________________________________________________________        Report approved by: Wander Espino 2016 05:42 PM      Echocardiogram Limited    Narrative    Interpretation Summary                       [facility],[place]  Echocardiography Laboratory  [address] [place] [address]     Name: AFUA BROOKS  MRN: [id number]  : 1970  Study Date: 2016 08:46 AM  Age: 46 yrs  Gender: Male  Patient Location: [facility]  Reason For Study: Heart Transplant  Ordering Physician: ASHLEY BUCK  Referring Physician: JEAN AGUILLON  Performed By: Familia Kowalski RDCS      BSA: 1.9 m2  Height: 66 in  Weight: 175 lb  BP: 116/66 mmHg  ______________________________________________________________________________        Procedure  Limited Portable Echo Adult.  ______________________________________________________________________________     Interpretation Summary  Global and regional left ventricular function is hyperkinetic with an EF of  65-70%.  Right ventricular function, chamber size, wall motion, and thickness are  normal.  No pericardial effusion is present.  ______________________________________________________________________________           Left Ventricle  Global and regional left ventricular function is hyperkinetic with an EF of  65-70%. Left ventricular size is normal. Mild concentric wall thickening  consistent with left ventricular hypertrophy is present. Diastolic function  not assessed due to heart transplant. No regional wall motion abnormalities  are seen.     Right Ventricle  Right ventricular function, chamber size, wall motion, and thickness are  normal.  Pericardium  No pericardial effusion is present.     ______________________________________________________________________________           Doppler Measurements & Calculations  TR max slava: 222.6 cm/sec  TR max P.8 mmHg              ______________________________________________________________________________        Report approved by: Wander Jones 2016 09:55 AM      ECHO LIMITED WITH OPTISON    Narrative    Interpretation Summary                       [facility]  Echocardiography Laboratory  [address] [place] [address]     Name: ISAIAH LEGERAFUA  MRN: [id number]  : 1970  Study Date: 2016 11:01 AM  Age: 45 yrs  Gender: Male  Patient Location: [facility]  Reason For Study: Transplant - Heart  Ordering Physician: RASHEEDA SINGH  Referring Physician: JEAN AGUILLON  Performed By: ANNIE Gregg     BSA: 1.9  m2  Height: 66 in  Weight: 179 lb  BP: 114/79 mmHg  ______________________________________________________________________________        Procedure  Limited Portable Echo Adult. Contrast Optison. Technically difficult study.  Optison (NDC #9223-4759-23) given intravenously. Patient was given 6 ml  mixture of 3 ml Optison and 6 ml saline. 3 ml wasted. IV start location L  Forearm .  ______________________________________________________________________________     Interpretation Summary  Global and regional left ventricular function is normal with an EF of 55-60%.  Global right ventricular function is moderately reduced.  ______________________________________________________________________________           Left Ventricle  Global and regional left ventricular function is normal with an EF of 55-60%.  Left ventricular wall thickness is normal. Left ventricular size is normal.  Left ventricular diastolic function is indeterminate. Flattened septum is  consistent with right ventricular pressure and volume overload.     Right Ventricle  Mild right ventricular dilation is present. Global right ventricular function  is moderately reduced.  Atria  The left atrium is enlarged due to cardiac transplantation. The right atrium  is not well visualized.     Mitral Valve  The mitral valve is normal. Trace mitral insufficiency is present.     Aortic Valve  Aortic valve is normal in structure and function. The aortic valve is  tricuspid.     Tricuspid Valve  The tricuspid valve is normal. Trace tricuspid insufficiency is present.  Pulmonary artery systolic pressure cannot be assessed.     Pulmonic Valve  The pulmonic valve is normal.     Vessels  The aorta root is normal. The inferior vena cava cannot be assessed.  Pericardium  No pericardial effusion is present.     Compared to Previous Study  This study was compared with the study from 12/16/2016 . There has been no  change.      ______________________________________________________________________________  MMode/2D Measurements & Calculations  IVSd: 0.86 cm  LVIDd: 4.6 cm  LVIDs: 3.2 cm  LVPWd: 0.83 cm  FS: 31.6 %  EDV(Teich): 99.1 ml  ESV(Teich): 40.0 ml  LV mass(C)d: 127.7 grams     EF(MOD-bp): 56.4 %           Doppler Measurements & Calculations  PA acc time: 0.07 sec  TR max slava: 151.4 cm/sec  TR max P.2 mmHg           ______________________________________________________________________________        Report approved by: Wander Edmondson 2016 12:22 PM      Echo complete with definity    Narrative    Interpretation Summary                       [facility]  Echocardiography Laboratory  [address] [place] [address]     Name: AFUA BROOKS  MRN: [id number]  : 1970  Study Date: 2016 07:53 AM  Age: 45 yrs  Gender: Male  Patient Location: [facility]  Reason For Study: Transplant - Heart  History: Transplant - Heart  Ordering Physician: TRISH ORNELAS  Referring Physician: JEAN AGUILLON  Performed By: Nieves Aguilar RDCS     BSA: 1.9 m2  Height: 66 in  Weight: 181 lb  BP: 90/61 mmHg  ______________________________________________________________________________        Procedure  Complete Portable Echo Adult. Contrast Definity. Definity (NDC #88444-508-46)  given intravenously. Patient was given 8ml mixture of 1.5ml Definity and  8.5ml saline. 2 ml wasted.  ______________________________________________________________________________     Interpretation Summary  S/p heart transplantation  Left ventricular function is normal.The EF is 55-60%. Flattened septum is  consistent with right ventricular volume overload.  Global right ventricular function is mildly reduced.  RV/RA gradient is 15mmHg.  The inferior vena cava cannot be assessed.  No pericardial effusion is  present.  ______________________________________________________________________________           Left Ventricle  Left ventricular size is normal. Left ventricular function is normal.The EF  is 55-60%. Diastolic function not assessed due to heart transplant. Flattened  septum is consistent with right ventricular pressure and volume overload.     Right Ventricle  Mild right ventricular dilation is present. Global right ventricular function  is mildly reduced.  Atria  Atria are poorly visualized.     Mitral Valve  The mitral valve is normal.     Aortic Valve  Aortic valve is normal in structure and function.     Tricuspid Valve  Mild tricuspid insufficiency is present. There is a right ventricular right  atrial pressure difference of 15mmHg.     Pulmonic Valve  The pulmonic valve cannot be assessed.     Vessels  The aorta root is normal. The inferior vena cava cannot be assessed.  Pericardium  No pericardial effusion is present.     Compared to Previous Study  This study was compared with the study from 6.17.16, patient now has a  transplanted heart .     ______________________________________________________________________________  MMode/2D Measurements & Calculations  IVSd: 0.93 cm  LVIDd: 4.1 cm  LVIDs: 3.9 cm  LVPWd: 0.96 cm  FS: 4.7 %  EDV(Teich): 73.4 ml  ESV(Teich): 65.5 ml  LV mass(C)d: 121.2 grams  Ao root diam: 2.8 cm  LA dimension: 5.1 cm  asc Aorta Diam: 2.6 cm  LA/Ao: 1.8  LVOT diam: 2.1 cm  LVOT area: 3.5 cm2           Doppler Measurements & Calculations  MV E max slava: 65.4 cm/sec  TR max slava: 196.0 cm/sec  TR max PG: 15.4 mmHg           ______________________________________________________________________________        Report approved by: Wander Acharya 12/16/2016 11:14 AM          Assessment and Plan:  Mr. Javi Burr Jesse Shetty is a 46yr old male with a history of Chagas cardiomyopathy (diagnosed in 2010, treated with nifurtimox for 3 months) and biventricular systolic heart failure  (LVEF 15-20% since 2015, on home milrinone since 8/2016), now s/p OHT 12/12/16, who presents to clinic for routine surveillance.    His postoperative course was complicated by respiratory failure and JOSE LUIS, which resolved with diuresis; surgical blood loss anemia, s/p 1u PRBCs 12/17/16; right pleural effusion, requiring pigtail placement 12/17/16; Chagas reactivation 12/26, started on benznidasole.    He was started on azathioprine postoperatively due to reports of better outcomes.  Initial RHC 12/19 showed elevated biventricular filling pressures, with biopsy showing moderately severe 2R ACR.  He was treated with IV steroids (Methylprednisolone 1g 12/20, 500mg 12/21, and 500mg 12/22), and switched to cellcept.  Second RHC 12/27 continued to show elevated biventricular filling pressures, with biopsy showing 2R/3A ACR.  He developed frequent episodes of atrial tachyarrhythmias and VT.  He was treated with IV steroids (Methylprednisolone 1g IV 12/27, 12/28, and 12/29) and thymoglobulin.  His biopsies thereafter showed mild 1R ACR, until biopsy 2/10 again showed 2R ACR.  He was started on an oral prednisone burst and taper.    Graft function has remained stable, with LVEF 55-60% per echo 2/14/17.  Last RHC 2/10 showed normal biventricular filling pressures with Donnell CI 2.9.  Baseline coronary angio 1/10 showed normal coronary arteries with no significant atherosclerotic disease per IVUS.    Mr. Iglesia Shetty continues to do well following his transplant.  His blood pressure and weight have remained stable.  Zio patch 1/2017 showed some NSVT, which has decreased.    Labs from today show stable electrolytes and kidney function.  Last magnesium was 1.3, so will add a repeat magnesium to today's labs.  WBC elevated, which could be due to recent steroid burst.  Will add differential to today's labs, as well as CRP to evaluate for possible infection.  Tacro level and biopsy results pending, so will call him with results once  available.    He was encouraged to continue current medications, with no changes at this time.  Also encouraged him to enroll in cardiac rehab, for which he is agreeable.      Change in immunosuppression: yes  Reason for Change: adjust tacro dosing to keep level within range  Other Changes:    - recommend cardiac rehab   - transplant coordinator to review medications and pill box to ensure accuracy   - add magnesium, CBC differential, and CRP to labs from today   - sports medicine referral for ongoing right arm numbness/tingling  Follow-Up: per protocol, 3/10/17    Next Biopsy: 3/10/17  Next Angiogram: at annual  Next Angiogram with IVUS: yes  Next Stress Test: per protocol     Other:  He remains on benznidazole 150mg BID for suppression of his Chagas, and was restarted on INH for latent TB.  He will follow up with ID.    The above was reviewed with Mr. Iglesia Shetty via a , and he verbalized understanding and states that he will call with further questions/concerns.    Mary Huffman, ESTEFANÍA, APRN, FNP-BC  Advanced Heart Failure Nurse Practitioner  [facility]  [phone]        Patient Care Team:  Dylon Valles MD as PCP - General (Student in organized health care education/training program)  Broderick Gao MD, MD as MD (Cardiology)  Shauna Cuellar MD as MD (Infectious Diseases)  Marino Woo MD as MD (Dermatology)  Bryant Bartholomew MD as MD (Family Medicine - Sports Medicine)  DYLON VALLES    Chief Complaint   Patient presents with     Follow Up For     Return Heart Tx- 10 weeks       Please do not hesitate to contact me if you have any questions/concerns.     Sincerely,     Mary Huffman NP

## 2017-02-21 NOTE — MR AVS SNAPSHOT
After Visit Summary   2/21/2017    Javi Bansal    MRN: [id number]           Patient Information     Date Of Birth          1970        Visit Information        Provider Department      2/21/2017 2:00 PM Kami Pickens; Mary Huffman NP [facility]        Today's Diagnoses     Heart replaced by transplant (H)    -  1      Care Instructions    You were seen in clinic for routine follow up after your recent graft rejection.  Available results appear normal, except for your white blood cells which appears elevated; this may be due to your prednisone taper.    No medication changes made during this visit.    Follow up:  Coordinator will update you with pending test results: biopsy, Prograf and Chagas.  Your next biopsy and clinic visit is scheduled for March 10  Please contact coordinator with any questions or concerns.  [phone]  Coordinator will contact local cardiac rehabilitation center and request they contact you to schedule evaluation.        Follow-ups after your visit        Your next 10 appointments already scheduled     Mar 10, 2017  7:15 AM CST   [facility]    [address] [place] [address]              Patient must bring picture ID.  Patient should be prepared to give a urine specimen  Please do not eat 10-12 hours before your appointment if you are coming in fasting for labs on lipids, cholesterol, or glucose (sugar).  Pregnant women should follow their Care Team instructions. Water with medications is okay. Do not drink coffee or other fluids.   If you have concerns about taking  your medications, please ask at office or if scheduling via Neurolinkhart, send a message by clicking on Secure Messaging, Message Your Care Team.            Mar 10, 2017  8:00 AM CST   XR CHEST 2 VIEWS with UUXR1   [facility] ([facility]    [address] [address]   [phone]           Please bring a list of your current medicines to your exam. (Include vitamins, minerals and over-thecounter medicines.) Leave your valuables at home.  Tell your doctor if there is a chance you may be pregnant.  You do not need to do anything special for this exam.            Mar 10, 2017  8:30 AM CST   Ech Complete with UUECHR2   [facility]  [facility] ([facility])    [address] [address]   [phone]           1.  Please bring or wear a comfortable two-piece outfit. 2.  You may eat, drink and take your normal medicines. 3.  For any questions that cannot be answered, please contact the ordering physician            Mar 10, 2017  9:30 AM CST   Procedure - 2.5 hour with U2A ROOM 16   [address] [facility] ([facility])    [address] [address]               Mar 10, 2017 10:30 AM CST   Cath 90 Minute with UUHCVR3   [facility]  [facility] ([facility])    [address] [address]   [phone]            Mar 10, 2017  1:30 PM CST   (Arrive by 1:15 PM)   RETURN HEART TRANSPLANT with Mary Huffman NP   [facility] ([facility])    [address] [place] [address]   [phone]            Apr 03, 2017 10:00 AM CDT   (Arrive by 9:45 AM)   Return Visit with Ivan Schuler MD   [facility] and Infectious Diseases ([facility])    [address] [place] [address]   [phone]            Apr 04, 2017  2:00 PM CDT   (Arrive by 1:45 PM)   RETURN HEART TRANSPLANT with SAMM Ramos CNP   [facility] ([facility])    [address] [place] [address]  "[place] [address]   [phone]            May 05, 2017  1:00 PM CDT   (Arrive by 12:45 PM)   RETURN HEART TRANSPLANT with SAMM Ramos [facility] ([facility])    [address]  3rd Floor  [facility] [facility] [address] [phone]            2017  9:35 AM CDT   (Arrive by 9:20 AM)   Return Visit with Ivan Burrell MD   [facility] ([facility])    [address]  4th Floor  [facility] [address] [phone]              Who to contact     If you have questions or need follow up information about today's clinic visit or your schedule please contact [facility] directly at [phone].  Normal or non-critical lab and imaging results will be communicated to you by Becualhart, letter or phone within 4 business days after the clinic has received the results. If you do not hear from us within 7 days, please contact the clinic through Becualhart or phone. If you have a critical or abnormal lab result, we will notify you by phone as soon as possible.  Submit refill requests through LOOKSIMA or call your pharmacy and they will forward the refill request to us. Please allow 3 business days for your refill to be completed.          Additional Information About Your Visit        Becualhart Information     LOOKSIMA lets you send messages to your doctor, view your test results, renew your prescriptions, schedule appointments and more. To sign up, go to [email or web address] . Click on \"Log in\" on the left side of the screen, which will take you to the Welcome page. Then click on \"Sign up Now\" on the right side of the page.     You will be asked to enter the access code listed below, as well as some personal information. Please follow the directions to create your username and password.     Your access code is: [email or web address]  Expires: 3/29/2017  6:30 AM     Your access code will  in 90 days. If you need " "help or a new code, please call your [place] clinic or [phone].        Care EveryWhere ID     This is your Care EveryWhere ID. This could be used by other organizations to access your [place] medical records  [id number]        Your Vitals Were     Pulse Respirations Height Pulse Oximetry BMI (Body Mass Index)       92 20 1.702 m (5' 7\") 98% 23.65 kg/m2        Blood Pressure from Last 3 Encounters:   02/21/17 124/90   02/21/17 124/90   02/10/17 123/86    Weight from Last 3 Encounters:   02/21/17 68.5 kg (151 lb)   02/21/17 68.5 kg (151 lb)   02/10/17 71.1 kg (156 lb 12.8 oz)              We Performed the Following     Magnesium        Primary Care Provider Office Phone # Fax #    Ivan Burrell [phone]       [address] [address]        Thank you!     Thank you for choosing [facility]  for your care. Our goal is always to provide you with excellent care. Hearing back from our patients is one way we can continue to improve our services. Please take a few minutes to complete the written survey that you may receive in the mail after your visit with us. Thank you!             Your Updated Medication List - Protect others around you: Learn how to safely use, store and throw away your medicines at www.disposemymeds.org.          This list is accurate as of: 2/21/17  4:59 PM.  Always use your most recent med list.                   Brand Name Dispense Instructions for use    aspirin 81 MG EC tablet     90 tablet    Take 1 tablet (81 mg) by mouth daily       calcium carb 1250 mg (500 mg [place])/vitamin D 200 units 500-200 MG-UNIT per tablet    OSCAL with D    60 tablet    Take 1 tablet by mouth 2 times daily (with meals)       isoniazid 300 MG tablet    NYDRAZID    90 tablet    Take 1 tablet (300 mg) by mouth daily       ketoconazole 2 % shampoo    NIZORAL    120 mL    Apply topically three times a week Alternate with Head and Shoulders.       " multivitamin, therapeutic with minerals Tabs tablet     30 each    Take 1 tablet by mouth daily       mycophenolate 250 MG capsule    CELLCEPT - GENERIC EQUIVALENT    360 capsule    Take 6 capsules (1,500 mg) by mouth 2 times daily       nystatin 875131 UNIT/ML suspension    MYCOSTATIN    280 mL    Take 10 mLs (1,000,000 Units) by mouth 4 times daily       order for DME     1 Device    Equipment being ordered: Splint - Cubital tunnel splint for ulnar neuropathy       pantoprazole 40 MG EC tablet    PROTONIX    30 tablet    Take 1 tablet (40 mg) by mouth every morning       pravastatin 20 MG tablet    PRAVACHOL    30 tablet    Take 1 tablet (20 mg) by mouth every evening       predniSONE 20 MG tablet    DELTASONE    28 tablet    Take 100 mg daily x 2 days, then taper: 60 mg daily x 3 days, 40 mg daily x 3 days and 20 mg daily x 3 days.       senna-docusate 8.6-50 MG per tablet    SENOKOT-S;PERICOLACE    20 tablet    Take 2 tablets by mouth nightly as needed for constipation (If no stools during the day)       sulfamethoxazole-trimethoprim 800-160 MG per tablet    BACTRIM DS/SEPTRA DS    8 tablet    Take 1 tablet by mouth twice a week On mondays and thursdays       * tacrolimus 0.5 MG capsule    PROGRAF - GENERIC EQUIVALENT    60 capsule    Take 1.5 mg TWICE daily       * tacrolimus 1 MG capsule    PROGRAF - GENERIC EQUIVALENT    60 capsule    Take 1.5 mg TWICE daily       valGANciclovir 450 MG tablet    VALCYTE    60 tablet    Take 2 tablets (900 mg) by mouth daily       * Notice:  This list has 2 medication(s) that are the same as other medications prescribed for you. Read the directions carefully, and ask your doctor or other care provider to review them with you.

## 2017-02-21 NOTE — PATIENT INSTRUCTIONS
You were seen in clinic for routine follow up after your recent graft rejection.  Available results appear normal, except for your white blood cells which appears elevated; this may be due to your prednisone taper.    No medication changes made during this visit.    Follow up:  Coordinator will update you with pending test results: biopsy, Prograf and Chagas.  Your next biopsy and clinic visit is scheduled for March 10  Please contact coordinator with any questions or concerns.  [phone]  Coordinator will contact local cardiac rehabilitation center and request they contact you to schedule evaluation.

## 2017-02-24 NOTE — PROGRESS NOTES
[facility]ULT [facility]  February 21, 2016    HPI:   Mr. Javi Bansal is a 46yr old male with a history of Chagas cardiomyopathy (diagnosed in 2010, treated with nifurtimox for 3 months) and biventricular systolic heart failure (LVEF 15-20% since 2015, on home milrinone since 8/2016), now s/p OHT 12/12/16, who presents to clinic for routine surveillance.    His postoperative course was complicated by respiratory failure and JOSE LUIS, which resolved with diuresis; surgical blood loss anemia, s/p 1u PRBCs 12/17/16; right pleural effusion, requiring pigtail placement 12/17/16; Chagas reactivation 12/26, started on benznidasole.    He was started on azathioprine postoperatively due to reports of better outcomes.  Initial RHC 12/19 showed elevated biventricular filling pressures, with biopsy showing moderately severe 2R ACR.  He was treated with IV steroids (Methylprednisolone 1g 12/20, 500mg 12/21, and 500mg 12/22), and switched to cellcept.  Second RHC 12/27 continued to show elevated biventricular filling pressures, with biopsy showing 2R/3A ACR.  He developed frequent episodes of atrial tachyarrhythmias and VT.  He was treated with IV steroids (Methylprednisolone 1g IV 12/27, 12/28, and 12/29) and thymoglobulin.  His biopsies thereafter showed mild 1R ACR, until biopsy 2/10 again showed 2R ACR.  He was started on an oral prednisone burst and taper.    Graft function has remained stable, with LVEF 55-60% per echo 2/14/17.  Last RHC 2/10 showed normal biventricular filling pressures with Donnell CI 2.9.  Baseline coronary angio 1/10 showed normal coronary arteries with no significant atherosclerotic disease per IVUS.    Since his last visit, he reports that he continues to feel well.  He has not yet started cardiac rehab, but reports that he continues to exercise and walk without concerns.  He reports that his weight and blood pressures have remained stable.  He continues to tolerate his diet, with  good appetite and no nausea, vomiting, or diarrhea.  He continues to note numbness/tingling in his right arm.  He otherwise denies chest pain, palpitations, shortness of breath, dizziness, headaches, fluid retention, fevers, and chills.  He has brought his medications with him today for review.    Serostatus:              - CMV D+/R+              - EBV D+/R+    Immunosuppression:   - Cellcept    - Tacrolimus   - Prednisone taper   - Valcyte   - Bactrim   - Nystatin    Patient Active Problem List    Diagnosis Date Noted     Ventricular tachycardia, non-sustained (H) 01/04/2017     Priority: Medium     Elevated liver enzymes 12/20/2016     Priority: Medium     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 12/19/2016     Priority: Medium     Need for prophylactic antibiotic 12/19/2016     Priority: Medium     Heart failure (H) 12/12/2016     Priority: Medium     Heart replaced by transplant (H) 12/12/2016     Priority: Medium     Heart transplant candidate 12/11/2016     Priority: Medium     Chest pain 07/11/2016     Priority: Medium     Chronic systolic heart failure (H)      Priority: Medium     CHF (congestive heart failure) (H) 06/17/2016     Priority: Medium     Cardiac defibrillator in situ-Medtronic, dual chamber - Not Dependent 06/16/2016     Priority: Medium     Chagas cardiomyopathy 05/26/2016     Priority: Medium     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 500 mg)  14 DPT: 2R, neg/neg treated w/IV pred x 3 (1000mg) + thymo x 3  21 DPT: 1R, neg/neg       ACP (advance care planning) 10/10/2016     Advance Care Planning 10/10/2016: Receipt of ACP document:  Received: invalid HCD document dated 8-4-16.  Document not previously scanned. Validation form completed indicating invalid document. Copy sent to client with information and facilitation resources. Validation form sent to be scanned as notation of invalid document  received. Confirmed/documented designated decision maker(s).  Added by Namrata Neely RN Advance Care Planning Liaison with [employer]               PAST MEDICAL HISTORY:  Past Medical History   Diagnosis Date     Chagas cardiomyopathy      Chronic systolic heart failure (H)      Dual ICD (implantable cardioverter-defibrillator) in place 10/20/2015     Essential hypertension      Gastroesophageal reflux disease      H/O gastric ulcer      Hypertension      Premature ventricular contractions      Presbyopia      Pterygium      ?? suspected , but unclear dx       CURRENT MEDICATIONS:  Prescription Medications as of 2/24/2017             predniSONE (DELTASONE) 20 MG tablet Take 100 mg daily x 2 days, then taper: 60 mg daily x 3 days, 40 mg daily x 3 days and 20 mg daily x 3 days.    tacrolimus (PROGRAF - GENERIC EQUIVALENT) 0.5 MG capsule Take 1.5 mg TWICE daily    tacrolimus (PROGRAF - GENERIC EQUIVALENT) 1 MG capsule Take 1.5 mg TWICE daily    order for DME Equipment being ordered: Splint - Cubital tunnel splint for ulnar neuropathy    isoniazid (NYDRAZID) 300 MG tablet Take 1 tablet (300 mg) by mouth daily    nystatin (MYCOSTATIN) 005605 UNIT/ML suspension Take 10 mLs (1,000,000 Units) by mouth 4 times daily    pravastatin (PRAVACHOL) 20 MG tablet Take 1 tablet (20 mg) by mouth every evening    sulfamethoxazole-trimethoprim (BACTRIM DS/SEPTRA DS) 800-160 MG per tablet Take 1 tablet by mouth twice a week On mondays and thursdays    mycophenolate (CELLCEPT - GENERIC EQUIVALENT) 250 MG capsule Take 6 capsules (1,500 mg) by mouth 2 times daily    aspirin EC 81 MG EC tablet Take 1 tablet (81 mg) by mouth daily    valGANciclovir (VALCYTE) 450 MG tablet Take 2 tablets (900 mg) by mouth daily    senna-docusate (SENOKOT-S;PERICOLACE) 8.6-50 MG per tablet Take 2 tablets by mouth nightly as needed for constipation (If no stools during the day)    calcium carb 1250 mg, 500 mg [place],/vitamin D 200 units (OSCAL WITH D)  "500-200 MG-UNIT per tablet Take 1 tablet by mouth 2 times daily (with meals)    multivitamin, therapeutic with minerals (THERA-VIT-M) TABS tablet Take 1 tablet by mouth daily    pantoprazole (PROTONIX) 40 MG EC tablet Take 1 tablet (40 mg) by mouth every morning    ketoconazole (NIZORAL) 2 % shampoo Apply topically three times a week Alternate with Head and Shoulders.          ROS:   Constitutional: No fever, chills, or sweats. No weight gain/loss.   ENT: No visual disturbance, ear ache, epistaxis, sore throat.   Allergies/Immunologic: Negative.   Respiratory: No cough, hemoptysis.   Cardiovascular: As per HPI.   GI: No nausea, vomiting, hematemesis, melena, or hematochezia.   : No urinary frequency, dysuria, or hematuria.   Integument: Negative.   Psychiatric: Negative.   Neuro: Negative.   Endocrinology: Negative.   Musculoskeletal: Negative    Exam:  /90  Pulse 92  Resp 20  Ht 1.702 m (5' 7\")  Wt 68.5 kg (151 lb)  SpO2 98%  BMI 23.65 kg/m2  In general, the patient is a pleasant male in no apparent distress.    HEENT: NC/AT. PERRLA. EOMI.  Sclerae white, not injected.    Neck:  No adenopathy, No thyromegaly.    COR: No jugular venous distention.  RRR.  Normal S1 S2 splits physiologically.  No murmur, rub click, or gallop.    Lungs:  CTA. No rhonchi.    Abdomen: soft, nontender, nondistended.  No organomegaly.  Extremities:  No clubbing, cyanosis, or edema.    Neuro: Alert & Oriented x 3, grossly non focal.    Labs:  CBC RESULTS:   Lab Results   Component Value Date    WBC 15.1 (H) 02/21/2017    RBC 4.17 (L) 02/21/2017    HGB 13.5 02/21/2017    HCT 39.8 (L) 02/21/2017    MCV 95 02/21/2017    MCH 32.4 02/21/2017    MCHC 33.9 02/21/2017    RDW 15.0 02/21/2017     02/21/2017       BMP RESULTS:  Lab Results   Component Value Date     02/21/2017    POTASSIUM 3.5 02/21/2017    CHLORIDE 105 02/21/2017    CO2 25 02/21/2017    ANIONGAP 10 02/21/2017     (H) 02/21/2017    BUN 28 02/21/2017    CR " 0.84 2017    GFRESTIMATED >90  Non  GFR Calc   2017    GFRESTBLACK >90   GFR Calc   2017    DAISY 9.0 2017      LIPID RESULTS:  Lab Results   Component Value Date    CHOL 198 2017    HDL 88 2017    LDL 93 2017    TRIG 82 2017    NHDL 110 2017       IMMUNOSUPPRESSANT LEVELS  Lab Results   Component Value Date    TACROL 10.3 2017    DOSTAC Not Provided 2017       No components found for: CK  Lab Results   Component Value Date    MAG 1.9 2017     Lab Results   Component Value Date    A1C 5.4 2016     Lab Results   Component Value Date    PHOS 2.4 (L) 2017     Lab Results   Component Value Date    NTBNPI 56340 (H) 2016     Lab Results   Component Value Date    SAITESTMET [facility] 2017    SAICELL Class I 2017    XC9UXJVRT B:8 2017    ZZ9OFJOODC A:29 B:54 2017    SAIREPCOM  2017      Test performed by modified procedure. Serum heat inactivated. High-risk,   mfi >3,000. Mod-risk, mfi 500-3,000.       Lab Results   Component Value Date    SAIITESTME  HI 2017    SAIICELL Class II 2017    [id number] None 2017    SH2GRSPYON None 2017    SAIIREPCOM  2017      Test performed by modified procedure. Serum heat inactivated. High-risk,   mfi >3,000. Mod-risk, mfi 500-3,000.       Lab Results   Component Value Date    CSPEC Plasma 2017       Diagnostic Studies:  Recent Results (from the past 4320 hour(s))   ECHO LIMITED WITH OPTISON    Narrative    [id number]  [id number]  [id number]  [id number]^JOB^LOTTIE^NAMAN           [facility],[place]  Echocardiography Laboratory  [address] [place] [address]     Name: AFUA BROOKS BA  MRN: [id number]  : 1970  Study Date: 2017 10:34 AM  Age: 46 yrs  Gender: Male  Patient Location: Pending sale to [facility]  Reason For Study: , Heart transplant status  Ordering Physician:  LOTTIE KAISER  Referring Physician: LOTTIE KAISER  Performed By: Vikas Lebron RDCS     BSA: 1.8 m2  Height: 67 in  Weight: 149 lb  HR: 106  BP: 123/86 mmHg  _____________________________________________________________________________  __        Procedure  Limited Echocardiogram with portions of two-dimensional, color and spectral  Doppler performed. Contrast Optison. Optison (NDC #8617-3779-49) given  intravenously. Patient was given 4 ml mixture of 3 ml Optison and 6 ml saline.  5 ml wasted. IV start location LAC . IV start time 10:50 .  _____________________________________________________________________________  __        Interpretation Summary  Global and regional left ventricular function is normal with an EF of 55-60%.  Global right ventricular function is normal.  The inferior vena cava was normal in size with preserved respiratory  variability.  No pericardial effusion is present.  _____________________________________________________________________________  __        Left Ventricle  Left ventricular size is normal. Global and regional left ventricular function  is normal with an EF of 55-60%.     Right Ventricle  The right ventricle is normal size. Global right ventricular function is  normal.     Tricuspid Valve  The tricuspid valve is normal. The peak velocity of the tricuspid regurgitant  jet is not obtainable.     Vessels  The inferior vena cava was normal in size with preserved respiratory  variability.        Pericardium  No pericardial effusion is present.     Compared to Previous Study  This study was compared with the study from 1.13.17, no significant changes  noted .  _____________________________________________________________________________  __     MMode/2D Measurements & Calculations  IVSd: 0.92 cm  LVIDd: 3.9 cm  LVIDs: 2.3 cm  LVPWd: 0.88 cm  FS: 42.3 %  EDV(Teich): 66.7 ml  ESV(Teich): 17.4 ml  LV mass(C)d: 106.6 grams                  _____________________________________________________________________________  __           Report approved by: Wander Acharya 2017 11:46 AM      ECHO COMPLETE WITH OPTISON    Narrative    Interpretation Summary                    [facility]  [facility]  [address] [place] [address]     Name: AFUA BROOKS  MRN: [id number]  : 1970  Study Date: 2017 08:10 AM  Age: 46 yrs  Gender: Male  Patient Location: [facility]  Reason For Study: , Heart transplant status  Ordering Physician: LOTTIE KAISER  Referring Physician: LOTTIE KAISER  Performed By: Keila Parr RDCS     BSA: 1.8 m2  Height: 67 in  Weight: 147 lb  BP: 115/81 mmHg  ______________________________________________________________________________        Procedure  Echocardiogram with two-dimensional, color and spectral Doppler performed.  Contrast Optison. Optison (NDC #5778-4097-82) given intravenously. Patient  was given 3 ml mixture of 3 ml Optison and 6 ml saline. 6 ml wasted. IV start  location R Upper arm .  ______________________________________________________________________________     Interpretation Summary  Global and regional left ventricular function is normal with an EF of 60-65%.  Right ventricular function, chamber size, wall motion, and thickness are  normal.  Pulmonary artery systolic pressure is normal.  The inferior vena cava was normal in size with preserved respiratory  variability. Estimated mean right atrial pressure is <3 mmHg.  No pericardial effusion is present.  This study was compared with the study from 2016. There has been no  change.  ______________________________________________________________________________           Left Ventricle  Global and regional left ventricular function is normal with an EF of 60-65%.  Left ventricular wall thickness is normal. Left ventricular size is  normal.  Diastolic function not assessed due to heart transplant. No regional wall  motion abnormalities are seen.     Right Ventricle  Right ventricular function, chamber size, wall motion, and thickness are  normal.  Atria  The atria cannot be assessed.     Mitral Valve  The mitral valve is normal. Trace mitral insufficiency is present.     Aortic Valve  Aortic valve is normal in structure and function. The aortic valve is  tricuspid.     Tricuspid Valve  The tricuspid valve is normal. Trace tricuspid insufficiency is present.  Right ventricular systolic pressure is 22mmHg above the right atrial  pressure. Pulmonary artery systolic pressure is normal.     Pulmonic Valve  The pulmonic valve is normal.     Vessels  The inferior vena cava was normal in size with preserved respiratory  variability. The aorta root is normal. The thoracic aorta is normal. The  pulmonary artery is normal. Estimated mean right atrial pressure is <3 mmHg.  Pericardium  No pericardial effusion is present.     Compared to Previous Study  This study was compared with the study from 2016 . There has been no  change.     ______________________________________________________________________________  MMode/2D Measurements & Calculations  RVDd: 4.1 cm           Doppler Measurements & Calculations  MV E max slava: 79.7 cm/sec  MV A max slava: 20.6 cm/sec  MV E/A: 3.9  MV dec time: 0.15 sec  TR max slava: 234.6 cm/sec  TR max P.0 mmHg  Lateral E/e': 12.8  Medial E/e': 17.7           ______________________________________________________________________________        Report approved by: Wander Jones 2017 09:06 AM      ECHO LIMITED WITH OPTISON    Narrative    Interpretation Summary                       [facility]  Echocardiography Laboratory  [address] [place] [address]     Name: AFUA BROOKS  MRN: [id number]  : 1970  Study Date: 2016 12:56 PM  Age:  46 yrs  Gender: Male  Patient Location: [facility]  Reason For Study: Heart Transplant- eval FX  History: Heart Transplant- eval FX  Ordering Physician: ASHLEY BUCK  Referring Physician: JEAN AGUILLON  Performed By: Nieves Aguilar RDCS     BSA: 1.8 m2  Height: 66 in  Weight: 162 lb  BP: 105/74 mmHg  ______________________________________________________________________________        Procedure  Limited Portable Echo Adult. Contrast Optison. Optison (NDC #4488-4157-07)  given intravenously. Patient was given 6 ml mixture of 3 ml Optison and 6 ml  saline. 3 ml wasted.  ______________________________________________________________________________     Interpretation Summary  Limited study. tachycardic.  Global and regional left ventricular function is hyperkinetic with an EF  >70%.  Global right ventricular function is normal.  No pericardial effusion is present.     No change from prior.  ______________________________________________________________________________           Left Ventricle  Global and regional left ventricular function is hyperkinetic with an EF  >70%.     Right Ventricle  Global right ventricular function is normal.  Pericardium  No pericardial effusion is present.     ______________________________________________________________________________                    ______________________________________________________________________________        Report approved by: Arik Meyers 2016 01:59 PM      Echo limited (Adults Only)    Narrative    Interpretation Summary                       [facility],[place]  Echocardiography Laboratory  [address] [place] [address]     Name: AFUA BROOKS WALT COSME  MRN: [id number]  : 1970  Study Date: 2016 04:17 PM  Age: 46 yrs  Gender: Male  Patient Location: [facility]  Reason For Study: Cardiovascular Incident  Ordering Physician: ASHLEY BUCK  Referring Physician: JEAN AGUILLON  NAMAN  Performed By: Vandana Adkins RDCS     BSA: 1.9 m2  Height: 67 in  Weight: 169 lb  BP: 98/59 mmHg  ______________________________________________________________________________        Procedure  Limited Echocardiogram with portions of two-dimensional, color and spectral  Doppler performed.  ______________________________________________________________________________     Interpretation Summary  Global and regional left ventricular function is hyperkinetic with an EF  >70%.  ______________________________________________________________________________           Left Ventricle  Global and regional left ventricular function is hyperkinetic with an EF  >70%.     Right Ventricle  The RV is poorly seen.  Vessels  The inferior vena cava is normal.     Pericardium  No pericardial effusion is present.     ______________________________________________________________________________                    ______________________________________________________________________________        Report approved by: Wander Espino 2016 05:42 PM      Echocardiogram Limited    Narrative    Interpretation Summary                       [facility],[place]  Echocardiography Laboratory  [address] [place] [address]     Name: AFUA BROOKS  MRN: [id number]  : 1970  Study Date: 2016 08:46 AM  Age: 46 yrs  Gender: Male  [place] [facility]  Reason For Study: Heart Transplant  Ordering Physician: ASHLEY BUCK  Referring Physician: JEAN AGUILLON  Performed By: Familia Kowalski RDCS     BSA: 1.9 m2  Height: 66 in  Weight: 175 lb  BP: 116/66 mmHg  ______________________________________________________________________________        Procedure  Limited Portable Echo Adult.  ______________________________________________________________________________     Interpretation Summary  Global and regional left ventricular function is hyperkinetic with an EF  of  65-70%.  Right ventricular function, chamber size, wall motion, and thickness are  normal.  No pericardial effusion is present.  ______________________________________________________________________________           Left Ventricle  Global and regional left ventricular function is hyperkinetic with an EF of  65-70%. Left ventricular size is normal. Mild concentric wall thickening  consistent with left ventricular hypertrophy is present. Diastolic function  not assessed due to heart transplant. No regional wall motion abnormalities  are seen.     Right Ventricle  Right ventricular function, chamber size, wall motion, and thickness are  normal.  Pericardium  No pericardial effusion is present.     ______________________________________________________________________________           Doppler Measurements & Calculations  TR max slava: 222.6 cm/sec  TR max P.8 mmHg              ______________________________________________________________________________        Report approved by: Wander Jones 2016 09:55 AM      ECHO LIMITED WITH OPTISON    Narrative    Interpretation Summary                       [facility]  Echocardiography Laboratory  [address] [place] [address]     Name: AFUA BROOKS BA  MRN: [id number]  : 1970  Study Date: 2016 11:01 AM  Age: 45 yrs  Gender: Male  Patient Location: [facility]  Reason For Study: Transplant - Heart  Ordering Physician: RASHEEDA SINGH  Referring Physician: JEAN AGUILLON  Performed By: JESUS Gregg     BSA: 1.9 m2  Height: 66 in  Weight: 179 lb  BP: 114/79 mmHg  ______________________________________________________________________________        Procedure  Limited Portable Echo Adult. Contrast Optison. Technically difficult study.  Optison (NDC #6575-2170-21) given intravenously. Patient was given 6 ml  mixture of 3 ml Optison and 6 ml saline. 3 ml wasted. IV start location  L  Forearm .  ______________________________________________________________________________     Interpretation Summary  Global and regional left ventricular function is normal with an EF of 55-60%.  Global right ventricular function is moderately reduced.  ______________________________________________________________________________           Left Ventricle  Global and regional left ventricular function is normal with an EF of 55-60%.  Left ventricular wall thickness is normal. Left ventricular size is normal.  Left ventricular diastolic function is indeterminate. Flattened septum is  consistent with right ventricular pressure and volume overload.     Right Ventricle  Mild right ventricular dilation is present. Global right ventricular function  is moderately reduced.  Atria  The left atrium is enlarged due to cardiac transplantation. The right atrium  is not well visualized.     Mitral Valve  The mitral valve is normal. Trace mitral insufficiency is present.     Aortic Valve  Aortic valve is normal in structure and function. The aortic valve is  tricuspid.     Tricuspid Valve  The tricuspid valve is normal. Trace tricuspid insufficiency is present.  Pulmonary artery systolic pressure cannot be assessed.     Pulmonic Valve  The pulmonic valve is normal.     Vessels  The aorta root is normal. The inferior vena cava cannot be assessed.  Pericardium  No pericardial effusion is present.     Compared to Previous Study  This study was compared with the study from 2016 . There has been no  change.     ______________________________________________________________________________  MMode/2D Measurements & Calculations  IVSd: 0.86 cm  LVIDd: 4.6 cm  LVIDs: 3.2 cm  LVPWd: 0.83 cm  FS: 31.6 %  EDV(Teich): 99.1 ml  ESV(Teich): 40.0 ml  LV mass(C)d: 127.7 grams     EF(MOD-bp): 56.4 %           Doppler Measurements & Calculations  PA acc time: 0.07 sec  TR max slava: 151.4 cm/sec  TR max P.2 mmHg            ______________________________________________________________________________        Report approved by: Wander Edmondson 2016 12:22 PM      Echo complete with definity    Narrative    Interpretation Summary                       [facility],[place]  Echocardiography Laboratory  [address] [place] [address]     Name: AFUA BROOKS  MRN: [id number]  : 1970  Study Date: 2016 07:53 AM  Age: 45 yrs  Gender: Male  Patient Location: [facility]  Reason For Study: Transplant - Heart  History: Transplant - Heart  Ordering Physician: TRISH ORNELAS  Referring Physician: JEAN AGUILLON  Performed By: Nieves Aguilar RDCS     BSA: 1.9 m2  Height: 66 in  Weight: 181 lb  BP: 90/61 mmHg  ______________________________________________________________________________        Procedure  Complete Portable Echo Adult. Contrast Definity. Definity (NDC #39869-527-84)  given intravenously. Patient was given 8ml mixture of 1.5ml Definity and  8.5ml saline. 2 ml wasted.  ______________________________________________________________________________     Interpretation Summary  S/p heart transplantation  Left ventricular function is normal.The EF is 55-60%. Flattened septum is  consistent with right ventricular volume overload.  Global right ventricular function is mildly reduced.  RV/RA gradient is 15mmHg.  The inferior vena cava cannot be assessed.  No pericardial effusion is present.  ______________________________________________________________________________           Left Ventricle  Left ventricular size is normal. Left ventricular function is normal.The EF  is 55-60%. Diastolic function not assessed due to heart transplant. Flattened  septum is consistent with right ventricular pressure and volume overload.     Right Ventricle  Mild right ventricular dilation is present. Global right ventricular function  is mildly reduced.  Atria  Atria are poorly  visualized.     Mitral Valve  The mitral valve is normal.     Aortic Valve  Aortic valve is normal in structure and function.     Tricuspid Valve  Mild tricuspid insufficiency is present. There is a right ventricular right  atrial pressure difference of 15mmHg.     Pulmonic Valve  The pulmonic valve cannot be assessed.     Vessels  The aorta root is normal. The inferior vena cava cannot be assessed.  Pericardium  No pericardial effusion is present.     Compared to Previous Study  This study was compared with the study from 6.17.16, patient now has a  transplanted heart .     ______________________________________________________________________________  MMode/2D Measurements & Calculations  IVSd: 0.93 cm  LVIDd: 4.1 cm  LVIDs: 3.9 cm  LVPWd: 0.96 cm  FS: 4.7 %  EDV(Teich): 73.4 ml  ESV(Teich): 65.5 ml  LV mass(C)d: 121.2 grams  Ao root diam: 2.8 cm  LA dimension: 5.1 cm  asc Aorta Diam: 2.6 cm  LA/Ao: 1.8  LVOT diam: 2.1 cm  LVOT area: 3.5 cm2           Doppler Measurements & Calculations  MV E max slava: 65.4 cm/sec  TR max slava: 196.0 cm/sec  TR max PG: 15.4 mmHg           ______________________________________________________________________________        Report approved by: Wander Acharya 12/16/2016 11:14 AM          Assessment and Plan:  Mr. Javi Bansal is a 46yr old male with a history of Chagas cardiomyopathy (diagnosed in 2010, treated with nifurtimox for 3 months) and biventricular systolic heart failure (LVEF 15-20% since 2015, on home milrinone since 8/2016), now s/p OHT 12/12/16, who presents to clinic for routine surveillance.    His postoperative course was complicated by respiratory failure and JOSE LUIS, which resolved with diuresis; surgical blood loss anemia, s/p 1u PRBCs 12/17/16; right pleural effusion, requiring pigtail placement 12/17/16; Chagas reactivation 12/26, started on benznidasole.    He was started on azathioprine postoperatively due to reports of better outcomes.   Initial RHC 12/19 showed elevated biventricular filling pressures, with biopsy showing moderately severe 2R ACR.  He was treated with IV steroids (Methylprednisolone 1g 12/20, 500mg 12/21, and 500mg 12/22), and switched to cellcept.  Second RHC 12/27 continued to show elevated biventricular filling pressures, with biopsy showing 2R/3A ACR.  He developed frequent episodes of atrial tachyarrhythmias and VT.  He was treated with IV steroids (Methylprednisolone 1g IV 12/27, 12/28, and 12/29) and thymoglobulin.  His biopsies thereafter showed mild 1R ACR, until biopsy 2/10 again showed 2R ACR.  He was started on an oral prednisone burst and taper.    Graft function has remained stable, with LVEF 55-60% per echo 2/14/17.  Last RHC 2/10 showed normal biventricular filling pressures with Donnell CI 2.9.  Baseline coronary angio 1/10 showed normal coronary arteries with no significant atherosclerotic disease per IVUS.    Mr. Iglesia Shetty continues to do well following his transplant.  His blood pressure and weight have remained stable.  Zio patch 1/2017 showed some NSVT, which has decreased.    Labs from today show stable electrolytes and kidney function.  Last magnesium was 1.3, so will add a repeat magnesium to today's labs.  WBC elevated, which could be due to recent steroid burst.  Will add differential to today's labs, as well as CRP to evaluate for possible infection.  Tacro level and biopsy results pending, so will call him with results once available.    He was encouraged to continue current medications, with no changes at this time.  Also encouraged him to enroll in cardiac rehab, for which he is agreeable.      Change in immunosuppression: yes  Reason for Change: adjust tacro dosing to keep level within range  Other Changes:    - recommend cardiac rehab   - transplant coordinator to review medications and pill box to ensure accuracy   - add magnesium, CBC differential, and CRP to labs from today   - sports medicine  referral for ongoing right arm numbness/tingling  Follow-Up: per protocol, 3/10/17    Next Biopsy: 3/10/17  Next Angiogram: at annual  Next Angiogram with IVUS: yes  Next Stress Test: per protocol     Other:  He remains on benznidazole 150mg BID for suppression of his Chagas, and was restarted on INH for latent TB.  He will follow up with ID.    The above was reviewed with Mr. Iglesia Shetty via a , and he verbalized understanding and states that he will call with further questions/concerns.    Mary Huffman, DNP, APRN, FNP-BC  Advanced Heart Failure Nurse Practitioner  [facility]  [phone]      CC  Patient Care Team:  Dylon Valles MD as PCP - General (Student in organized health care education/training program)  Broderick Gao MD, MD as MD (Cardiology)  Shauna Cuellar MD as MD (Infectious Diseases)  Marino Woo MD as MD (Dermatology)  Bryant Bartholomew MD as MD (Family Medicine - Sports Medicine)  DYLON VALLES

## 2017-02-27 ENCOUNTER — OFFICE VISIT (OUTPATIENT)
Dept: ORTHOPEDICS | Facility: CLINIC | Age: 47
End: 2017-02-27
Attending: INTERNAL MEDICINE

## 2017-02-27 VITALS — HEIGHT: 67 IN | WEIGHT: 151 LBS | BODY MASS INDEX: 23.7 KG/M2

## 2017-02-27 DIAGNOSIS — Z94.1 HEART REPLACED BY TRANSPLANT (H): ICD-10-CM

## 2017-02-27 DIAGNOSIS — G56.21 NEURITIS OF RIGHT ULNAR NERVE: Primary | ICD-10-CM

## 2017-02-27 NOTE — LETTER
2/27/2017      RE: Javi Bansal  [address] [place] [address]        Subjective:   Javi Bansal is a 46 year old male who complains of numbness in the right arm, from the elbow down to the 4th and 5th fingers. He states that it started after a heart transplant surgery on 12/12/16. He states that prior to his transplant his INH was stopped and then was restarted post transplant.  He takes his INH for latent tuberculosis.  He is unaware of any trauma to the right elbow or wrist but equates onset of symptoms with after awakening and becoming more clear post transplant.  He states that he was consciously sedated for 4 days post transplant.  He is a  and is right hand dominant and so is interested in being able to return to work.  He states that he feels dense paresthesias primarily on the palmar aspect of the right 5th and ulnar aspect of the 4th digit.  He states that the paresthesias and pain radiate up to the medial aspect of the right elbow.  He is unclear as to whether the pain seems to start at the right elbow and radiate to the ulnar aspect of the right hand or vice versa.  He denies any prior history of symptoms similar to this.  He denies any history of carpal tunnel syndrome in the past.  He has not had any further evaluation of this.  I have reviewed all of his medications which are current and up to date and noted below.  He is on prednisone, tacrolimus, isoniazid, Bactrim, mycophenolate and valganciclovir as well as a daily multivitamin which he states he does take.  He denies any weakness.  He is unsure if he is developing some atrophy in both the thenar and hypothenar masses.      A  was present through all of today's history, physical exam and assessment and plan.       Background:   Date of injury: None  Duration of symptoms: 2.5 months  Mechanism of Injury: Insidious Onset; Unknown   Aggravating factors: Worse at night  Relieving  "Factors: Nothing  Prior Evaluation: Prior Physician Evalutation: Dr. Mike Moreira    PAST MEDICAL, SOCIAL, SURGICAL AND FAMILY HISTORY: He  has a past medical history of Chagas cardiomyopathy; Chronic systolic heart failure (H); Dual ICD (implantable cardioverter-defibrillator) in place (10/20/2015); Essential hypertension; Gastroesophageal reflux disease; H/O gastric ulcer; Hypertension; Premature ventricular contractions; Presbyopia; and Pterygium. He also has no past medical history of Malignant melanoma (H) or Skin disease.  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  His family history includes Brain Cancer in his mother. There is no history of Melanoma or Skin Cancer.  He reports that he has never smoked. He does not have any smokeless tobacco history on file. He reports that he does not drink alcohol or use illicit drugs.    ALLERGIES: He has No Known Allergies.    CURRENT MEDICATIONS: He has a current medication list which includes the following prescription(s): prednisone, tacrolimus, tacrolimus, order for dme, isoniazid, nystatin, pravastatin, sulfamethoxazole-trimethoprim, mycophenolate, aspirin, valganciclovir, senna-docusate, calcium carb 1250 mg (500 mg [place])/vitamin d 200 units, multivitamin, therapeutic with minerals, pantoprazole, and ketoconazole.     REVIEW OF SYSTEMS: 12 point review of systems is negative except as noted above.     Exam:   Ht 5' 7\" (1.702 m)  Wt 151 lb (68.5 kg)  BMI 23.65 kg/m2      CONSTITUTIONIAL: healthy, alert and no distress  HEAD: Normocephalic. No masses, lesions, tenderness or abnormalities  SKIN: no suspicious lesions or rashes  GAIT: normal  PSYCHIATRIC: affect normal/bright and mentation appears normal.  RIGHT ELBOW:  He has full range of motion of the right elbow including flexion, extension, supination and pronation.  He has some very mild tenderness over the cubital tunnel but a negative Tinel.  " He is nontender over the medial epicondyle.  There is no elbow effusion.  He does have a proximal radial-based scar on the forearm which he states is from childhood.   RIGHT WRIST:  He has no effusion, no swelling and no deformity.  He has full flexion, extension, pronation and supination, ulnar and radial deviation.  He has no discrete tenderness along the proximal carpal row, distal radius or distal ulna.  He has a negative Tinel over both the carpal tunnel as well as Guyon canal.  He has some mild tenderness with palpation over Guyon canal but no clear exacerbation of his paresthesias.  He does have worsening of his paresthesias with a positive Phalen test.  He does have intact Pincer grasp, wrist extension and 5th digit adduction and abduction.      ASSESSMENT AND PLAN:  Javi is a 46-year-old male who is status post heart transplant, subsequently developed an ulnar neuritis.  This certainly may have been related to positioning in the surgical or postsurgical period or may be related to medications or other considerations.  At this juncture, I have recommended we proceed with formal electrodiagnostics to determine the site of entrapment, whether that is at Guyon canal which is what I suspect or in the cubital tunnel.  We are also going to check some labs and so he will have a TSH, free T4, B12 and B6 as well as folate with his next lab draw.  We will also obtain radiographs of the right wrist including a carpal tunnel view, and these are reviewed with Javi today and demonstrate no significant degenerative changes or other bony lesions in the carpal tunnel or Guyon canal.  Once the testing has been completed, Javi will return and follow up with me.  He is aware we will have a 30-minute followup at that point in time to discuss both the etiology as well as any other treatments.  No other changes have been made to his regimen today.  Rather than placing him in a wrist splint at this juncture, I would like to have a  more definitive diagnosis and he understands this.      Thank you for allowing me to participate in his care and please feel free to contact me if I can provide any further information.         Bryant Bartholomew MD

## 2017-02-27 NOTE — MR AVS SNAPSHOT
After Visit Summary   2/27/2017    Javi Bansal    MRN: [id number]           Patient Information     Date Of Birth          1970        Visit Information        Provider Department      2/27/2017 10:00 AM Bryant Bartholomew MD; [facility] SERVICES [facility] Sports Medicine        Today's Diagnoses     Neuritis of right ulnar nerve    -  1       Follow-ups after your visit        Your next 10 appointments already scheduled     Mar 10, 2017  7:15 AM CST   [facility] [place] ([facility])    [address] [facility] [place] [address]              Patient must bring picture ID.  Patient should be prepared to give a urine specimen  Please do not eat 10-12 hours before your appointment if you are coming in fasting for labs on lipids, cholesterol, or glucose (sugar).  Pregnant women should follow their Care Team instructions. Water with medications is okay. Do not drink coffee or other fluids.   If you have concerns about taking  your medications, please ask at office or if scheduling via [employer], send a message by clicking on Secure Messaging, Message Your Care Team.            Mar 10, 2017  8:00 AM CST   XR CHEST 2 VIEWS with UUXR1   [facility] [place],  Radiology ([facility])    [address] [facility] [id number] [phone]           Please bring a list of your current medicines to your exam. (Include vitamins, minerals and over-thecounter medicines.) Leave your valuables at home.  Tell your doctor if there is a chance you may be pregnant.  You do not need to do anything special for this exam.            Mar 10, 2017  8:30 AM CST   Ech Complete with UUECHR2   [facility]  [facility] ([facility])    [address] [address] [phone]           1.  Please bring  or wear a comfortable two-piece outfit. 2.  You may eat, drink and take your normal medicines. 3.  For any questions that cannot be answered, please contact the ordering physician            Mar 10, 2017  9:30 AM CST   Procedure - 2.5 hour with U2A ROOM 16   [address] [facility] [place] ([facility])    [address] [address] [facility] [address]               Mar 10, 2017 10:30 AM CST   Cath 90 Minute with UUHCVR3   [facility] [place],  Heart Cath Lab ([facility])    [address] [address] [phone]            Mar 10, 2017  1:30 PM CST   (Arrive by 1:15 PM)   RETURN HEART TRANSPLANT with Mary Huffman NP   [facility] ([facility])    [address] [place] [address]  3rd [place] [id number] [phone]            Mar 28, 2017  8:45 AM CDT   (Arrive by 8:30 AM)   EMG with Jitendra Kahn MD   [facility])    [address] [place] [id number] [phone]           Do not use lotions or creams on the area to be tested. If you are on blood thinners (Warfarin, Coumadin, Lovenox, etc), please contact your primary care physician to check if it is safe to stop them 3 days prior to testing. If you have anxiety, please check with your referring physician to obtain anti-anxiety medication for the procedure.            Apr 03, 2017  9:00 AM CDT   (Arrive by 8:45 AM)   Return Visit with Bryant Bartholomew MD   [facility] ([facility])    [address] [place] [address]  [address] [facility] [id number] [phone]            Apr 03, 2017 10:00 AM CDT   (Arrive by 9:45 AM)   Return Visit with Ivan Schuler MD   [facility] and Infectious Diseases ([facility])    [address] [place] [id number] [phone]             2017  2:00 PM CDT   (Arrive by 1:45 PM)   RETURN HEART TRANSPLANT with SAMM Ramos [facility] ([facility] [place])    [address] [place] [address] [phone]              Future tests that were ordered for you today     Open Future Orders        Priority Expected Expires Ordered    EMG ([facility]) Routine  2017    TSH Routine 2017    T4 free Routine  2018    Vitamin B6 Routine  2018    Vitamin B12 Routine  2018    Folate Routine  2018            Who to contact     Please call your clinic at [phone] to:    Ask questions about your health    Make or cancel appointments    Discuss your medicines    Learn about your test results    Speak to your doctor   If you have compliments or concerns about an experience at your clinic, or if you wish to file a complaint, please contact [facility] Patient Relations at [phone] or email us at [email or web address]         Additional Information About Your Visit        [employer] Medical Information     Sc[employer]ert is an electronic gateway that provides easy, online access to your medical records. With [employer], you can request a clinic appointment, read your test results, renew a prescription or communicate with your care team.     To sign up for [employer]ert visit the website at [email or web address]   You will be asked to enter the access code listed below, as well as some personal information. Please follow the directions to create your username and password.     Your access code is: [email or web address]  Expires: 3/29/2017  6:30 AM     Your access code will  in 90 days. If you need help or a new code, please contact your [facility] or call [phone] for assistance.        Care EveryWhere ID     This is your Care EveryWhere ID.  "This could be used by other organizations to access your [facility] medical records  [id number]        Your Vitals Were     Height BMI (Body Mass Index)                1.702 m (5' 7\") 23.65 kg/m2           Blood Pressure from Last 3 Encounters:   02/21/17 124/90   02/21/17 124/90   02/10/17 123/86    Weight from Last 3 Encounters:   02/27/17 68.5 kg (151 lb)   02/21/17 68.5 kg (151 lb)   02/21/17 68.5 kg (151 lb)               Primary Care Provider Office Phone # Fax #    Ivan Burrell [phone]       [address] [address]        Thank you!     Thank you for choosing [facility]  for your care. Our goal is always to provide you with excellent care. Hearing back from our patients is one way we can continue to improve our services. Please take a few minutes to complete the written survey that you may receive in the mail after your visit with us. Thank you!             Your Updated Medication List - Protect others around you: Learn how to safely use, store and throw away your medicines at www.disposemymeds.org.          This list is accurate as of: 2/27/17 11:21 AM.  Always use your most recent med list.                   Brand Name Dispense Instructions for use    aspirin 81 MG EC tablet     90 tablet    Take 1 tablet (81 mg) by mouth daily       calcium carb 1250 mg (500 mg [place])/vitamin D 200 units 500-200 MG-UNIT per tablet    OSCAL with D    60 tablet    Take 1 tablet by mouth 2 times daily (with meals)       isoniazid 300 MG tablet    NYDRAZID    90 tablet    Take 1 tablet (300 mg) by mouth daily       ketoconazole 2 % shampoo    NIZORAL    120 mL    Apply topically three times a week Alternate with Head and Shoulders.       multivitamin, therapeutic with minerals Tabs tablet     30 each    Take 1 tablet by mouth daily       mycophenolate 250 MG capsule    CELLCEPT - GENERIC EQUIVALENT    360 capsule    Take 6 capsules (1,500 mg) by mouth 2 " times daily       nystatin 722410 UNIT/ML suspension    MYCOSTATIN    280 mL    Take 10 mLs (1,000,000 Units) by mouth 4 times daily       order for DME     1 Device    Equipment being ordered: Splint - Cubital tunnel splint for ulnar neuropathy       pantoprazole 40 MG EC tablet    PROTONIX    30 tablet    Take 1 tablet (40 mg) by mouth every morning       pravastatin 20 MG tablet    PRAVACHOL    30 tablet    Take 1 tablet (20 mg) by mouth every evening       predniSONE 20 MG tablet    DELTASONE    28 tablet    Take 100 mg daily x 2 days, then taper: 60 mg daily x 3 days, 40 mg daily x 3 days and 20 mg daily x 3 days.       senna-docusate 8.6-50 MG per tablet    SENOKOT-S;PERICOLACE    20 tablet    Take 2 tablets by mouth nightly as needed for constipation (If no stools during the day)       sulfamethoxazole-trimethoprim 800-160 MG per tablet    BACTRIM DS/SEPTRA DS    8 tablet    Take 1 tablet by mouth twice a week On mondays and thursdays       * tacrolimus 0.5 MG capsule    PROGRAF - GENERIC EQUIVALENT    60 capsule    Take 1.5 mg TWICE daily       * tacrolimus 1 MG capsule    PROGRAF - GENERIC EQUIVALENT    60 capsule    Take 1.5 mg TWICE daily       valGANciclovir 450 MG tablet    VALCYTE    60 tablet    Take 2 tablets (900 mg) by mouth daily       * Notice:  This list has 2 medication(s) that are the same as other medications prescribed for you. Read the directions carefully, and ask your doctor or other care provider to review them with you.

## 2017-02-27 NOTE — PROGRESS NOTES
Subjective:   Javi Bansal is a 46 year old male who complains of numbness in the right arm, from the elbow down to the 4th and 5th fingers. He states that it started after a heart transplant surgery on 12/12/16. He states that prior to his transplant his INH was stopped and then was restarted post transplant.  He takes his INH for latent tuberculosis.  He is unaware of any trauma to the right elbow or wrist but equates onset of symptoms with after awakening and becoming more clear post transplant.  He states that he was consciously sedated for 4 days post transplant.  He is a  and is right hand dominant and so is interested in being able to return to work.  He states that he feels dense paresthesias primarily on the palmar aspect of the right 5th and ulnar aspect of the 4th digit.  He states that the paresthesias and pain radiate up to the medial aspect of the right elbow.  He is unclear as to whether the pain seems to start at the right elbow and radiate to the ulnar aspect of the right hand or vice versa.  He denies any prior history of symptoms similar to this.  He denies any history of carpal tunnel syndrome in the past.  He has not had any further evaluation of this.  I have reviewed all of his medications which are current and up to date and noted below.  He is on prednisone, tacrolimus, isoniazid, Bactrim, mycophenolate and valganciclovir as well as a daily multivitamin which he states he does take.  He denies any weakness.  He is unsure if he is developing some atrophy in both the thenar and hypothenar masses.      A  was present through all of today's history, physical exam and assessment and plan.       Background:   Date of injury: None  Duration of symptoms: 2.5 months  Mechanism of Injury: Insidious Onset; Unknown   Aggravating factors: Worse at night  Relieving Factors: Nothing  Prior Evaluation: Prior Physician Evalutation: Dr. Mike Moreira    PAST MEDICAL,  "SOCIAL, SURGICAL AND FAMILY HISTORY: He  has a past medical history of Chagas cardiomyopathy; Chronic systolic heart failure (H); Dual ICD (implantable cardioverter-defibrillator) in place (10/20/2015); Essential hypertension; Gastroesophageal reflux disease; H/O gastric ulcer; Hypertension; Premature ventricular contractions; Presbyopia; and Pterygium. He also has no past medical history of Malignant melanoma (H) or Skin disease.  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  His family history includes Brain Cancer in his mother. There is no history of Melanoma or Skin Cancer.  He reports that he has never smoked. He does not have any smokeless tobacco history on file. He reports that he does not drink alcohol or use illicit drugs.    ALLERGIES: He has No Known Allergies.    CURRENT MEDICATIONS: He has a current medication list which includes the following prescription(s): prednisone, tacrolimus, tacrolimus, order for dme, isoniazid, nystatin, pravastatin, sulfamethoxazole-trimethoprim, mycophenolate, aspirin, valganciclovir, senna-docusate, calcium carb 1250 mg (500 mg [place])/vitamin d 200 units, multivitamin, therapeutic with minerals, pantoprazole, and ketoconazole.     REVIEW OF SYSTEMS: 12 point review of systems is negative except as noted above.     Exam:   Ht 5' 7\" (1.702 m)  Wt 151 lb (68.5 kg)  BMI 23.65 kg/m2      CONSTITUTIONIAL: healthy, alert and no distress  HEAD: Normocephalic. No masses, lesions, tenderness or abnormalities  SKIN: no suspicious lesions or rashes  GAIT: normal  PSYCHIATRIC: affect normal/bright and mentation appears normal.  RIGHT ELBOW:  He has full range of motion of the right elbow including flexion, extension, supination and pronation.  He has some very mild tenderness over the cubital tunnel but a negative Tinel.  He is nontender over the medial epicondyle.  There is no elbow effusion.  He does have a proximal " radial-based scar on the forearm which he states is from childhood.   RIGHT WRIST:  He has no effusion, no swelling and no deformity.  He has full flexion, extension, pronation and supination, ulnar and radial deviation.  He has no discrete tenderness along the proximal carpal row, distal radius or distal ulna.  He has a negative Tinel over both the carpal tunnel as well as Guyon canal.  He has some mild tenderness with palpation over Guyon canal but no clear exacerbation of his paresthesias.  He does have worsening of his paresthesias with a positive Phalen test.  He does have intact Pincer grasp, wrist extension and 5th digit adduction and abduction.      ASSESSMENT AND PLAN:  Javi is a 46-year-old male who is status post heart transplant, subsequently developed an ulnar neuritis.  This certainly may have been related to positioning in the surgical or postsurgical period or may be related to medications or other considerations.  At this juncture, I have recommended we proceed with formal electrodiagnostics to determine the site of entrapment, whether that is at Guyon canal which is what I suspect or in the cubital tunnel.  We are also going to check some labs and so he will have a TSH, free T4, B12 and B6 as well as folate with his next lab draw.  We will also obtain radiographs of the right wrist including a carpal tunnel view, and these are reviewed with Javi today and demonstrate no significant degenerative changes or other bony lesions in the carpal tunnel or Guyon canal.  Once the testing has been completed, Javi will return and follow up with me.  He is aware we will have a 30-minute followup at that point in time to discuss both the etiology as well as any other treatments.  No other changes have been made to his regimen today.  Rather than placing him in a wrist splint at this juncture, I would like to have a more definitive diagnosis and he understands this.      Thank you for allowing me to participate  in his care and please feel free to contact me if I can provide any further information.

## 2017-03-03 ENCOUNTER — DOCUMENTATION ONLY (OUTPATIENT)
Dept: TRANSPLANT | Facility: CLINIC | Age: 47
End: 2017-03-03

## 2017-03-03 DIAGNOSIS — Z94.1 HEART REPLACED BY TRANSPLANT (H): Primary | ICD-10-CM

## 2017-03-03 LAB
ANION GAP SERPL CALCULATED.3IONS-SCNC: 9 MMOL/L (ref 3–14)
BASOPHILS # BLD AUTO: 0 10E9/L (ref 0–0.2)
BASOPHILS NFR BLD AUTO: 0.2 %
BUN SERPL-MCNC: 15 MG/DL (ref 7–30)
CALCIUM SERPL-MCNC: 8.7 MG/DL (ref 8.5–10.1)
CHLORIDE SERPL-SCNC: 107 MMOL/L (ref 94–109)
CO2 SERPL-SCNC: 26 MMOL/L (ref 20–32)
CREAT SERPL-MCNC: 0.88 MG/DL (ref 0.66–1.25)
DIFFERENTIAL METHOD BLD: ABNORMAL
EOSINOPHIL # BLD AUTO: 0 10E9/L (ref 0–0.7)
EOSINOPHIL NFR BLD AUTO: 0.7 %
ERYTHROCYTE [DISTWIDTH] IN BLOOD BY AUTOMATED COUNT: 15.2 % (ref 10–15)
GFR SERPL CREATININE-BSD FRML MDRD: ABNORMAL ML/MIN/1.7M2
GLUCOSE SERPL-MCNC: 120 MG/DL (ref 70–99)
HCT VFR BLD AUTO: 40 % (ref 40–53)
HGB BLD-MCNC: 13.1 G/DL (ref 13.3–17.7)
IMM GRANULOCYTES # BLD: 0 10E9/L (ref 0–0.4)
IMM GRANULOCYTES NFR BLD: 0.9 %
LYMPHOCYTES # BLD AUTO: 1.1 10E9/L (ref 0.8–5.3)
LYMPHOCYTES NFR BLD AUTO: 25.9 %
MAGNESIUM SERPL-MCNC: 1.6 MG/DL (ref 1.6–2.3)
MCH RBC QN AUTO: 32.6 PG (ref 26.5–33)
MCHC RBC AUTO-ENTMCNC: 32.8 G/DL (ref 31.5–36.5)
MCV RBC AUTO: 100 FL (ref 78–100)
MONOCYTES # BLD AUTO: 0.7 10E9/L (ref 0–1.3)
MONOCYTES NFR BLD AUTO: 15.7 %
NEUTROPHILS # BLD AUTO: 2.4 10E9/L (ref 1.6–8.3)
NEUTROPHILS NFR BLD AUTO: 56.6 %
NRBC # BLD AUTO: 0 10*3/UL
NRBC BLD AUTO-RTO: 1 /100
PHOSPHATE SERPL-MCNC: 2.5 MG/DL (ref 2.5–4.5)
PLATELET # BLD AUTO: 135 10E9/L (ref 150–450)
POTASSIUM SERPL-SCNC: 3.7 MMOL/L (ref 3.4–5.3)
RBC # BLD AUTO: 4.02 10E12/L (ref 4.4–5.9)
SODIUM SERPL-SCNC: 142 MMOL/L (ref 133–144)
TACROLIMUS BLD-MCNC: 18.9 UG/L (ref 5–15)
TME LAST DOSE: ABNORMAL H
WBC # BLD AUTO: 4.3 10E9/L (ref 4–11)

## 2017-03-03 PROCEDURE — 80197 ASSAY OF TACROLIMUS: CPT | Performed by: INTERNAL MEDICINE

## 2017-03-03 PROCEDURE — 83735 ASSAY OF MAGNESIUM: CPT | Performed by: INTERNAL MEDICINE

## 2017-03-03 PROCEDURE — 80048 BASIC METABOLIC PNL TOTAL CA: CPT | Performed by: INTERNAL MEDICINE

## 2017-03-03 PROCEDURE — 84100 ASSAY OF PHOSPHORUS: CPT | Performed by: INTERNAL MEDICINE

## 2017-03-03 PROCEDURE — 85025 COMPLETE CBC W/AUTO DIFF WBC: CPT | Performed by: INTERNAL MEDICINE

## 2017-03-03 RX ORDER — PREDNISONE 5 MG/1
10 TABLET ORAL DAILY
Qty: 120 TABLET | Refills: 11 | Status: SHIPPED | OUTPATIENT
Start: 2017-03-03 | End: 2017-03-03

## 2017-03-03 RX ORDER — PREDNISONE 5 MG/1
10 TABLET ORAL 2 TIMES DAILY
Qty: 120 TABLET | Refills: 11 | Status: SHIPPED | OUTPATIENT
Start: 2017-03-03 | End: 2017-03-16

## 2017-03-03 RX ORDER — LIDOCAINE 40 MG/G
CREAM TOPICAL
Status: CANCELLED | OUTPATIENT
Start: 2017-03-03

## 2017-03-03 NOTE — PROGRESS NOTES
Returned call to [facility] RN Delores ( [phone]) to confirm patient's prednisone dose.  Patient had been on 15mg/10mg prednisone and began prednisone taper after  2R bx on 2/21.  Instructed RN for patient to begin prednisone 10 mg BID and confirmed next bx on 2/10/17.  [facility] RN reports drawing his blood this AM including a 12 hour FK level.  Patient verbalizes understanding plan of care and agrees to follow.

## 2017-03-07 ENCOUNTER — PRE VISIT (OUTPATIENT)
Dept: TRANSPLANT | Facility: CLINIC | Age: 47
End: 2017-03-07

## 2017-03-07 DIAGNOSIS — Z94.1 HEART REPLACED BY TRANSPLANT (H): ICD-10-CM

## 2017-03-07 RX ORDER — TACROLIMUS 0.5 MG/1
CAPSULE ORAL
Qty: 30 CAPSULE | Refills: 11 | Status: SHIPPED | OUTPATIENT
Start: 2017-03-07 | End: 2017-03-16

## 2017-03-07 RX ORDER — TACROLIMUS 1 MG/1
CAPSULE ORAL
Qty: 60 CAPSULE | Refills: 11 | Status: SHIPPED | OUTPATIENT
Start: 2017-03-07 | End: 2017-03-16

## 2017-03-08 ENCOUNTER — TELEPHONE (OUTPATIENT)
Dept: TRANSPLANT | Facility: CLINIC | Age: 47
End: 2017-03-08

## 2017-03-08 NOTE — TELEPHONE ENCOUNTER
REcieved call from  RN Delores () who reports the patient had a fever this AM (100.5) and sore throat during her visit with him this morning.  She gave him tylenol this AM.  RN also confirms recent FK dose change to 0.5 mg/ 1 mg.   Coordinator LM on  RN cell phone requesting call back to confirm information.    With  assistance, called patient for health update.  Patient confirms taking 1000 mg tylenol this AM and feels a little better now.  He denies SOB, chills or body aches, but does c/o sore throat. He states he has no difficulty swallowing and ate breakfast this AM.  Patient confirms current (correct) FK dose: 1.5 mg in the AM and 1 mg in the PM.  Encouraged patient to remain well hydrated. Coordinator will consult tx staff to determine if any changes are needed before his upcoming clinic visit.  Patient verbalizes understanding plan of care and agrees to follow.

## 2017-03-09 ENCOUNTER — APPOINTMENT (OUTPATIENT)
Dept: GENERAL RADIOLOGY | Facility: CLINIC | Age: 47
DRG: 193 | End: 2017-03-09
Attending: EMERGENCY MEDICINE
Payer: COMMERCIAL

## 2017-03-09 ENCOUNTER — HOSPITAL ENCOUNTER (INPATIENT)
Facility: CLINIC | Age: 47
LOS: 3 days | Discharge: HOME HEALTH | DRG: 193 | End: 2017-03-12
Attending: EMERGENCY MEDICINE | Admitting: INTERNAL MEDICINE
Payer: COMMERCIAL

## 2017-03-09 ENCOUNTER — APPOINTMENT (OUTPATIENT)
Dept: CT IMAGING | Facility: CLINIC | Age: 47
DRG: 193 | End: 2017-03-09
Attending: EMERGENCY MEDICINE
Payer: COMMERCIAL

## 2017-03-09 DIAGNOSIS — J18.9 PNEUMONIA OF RIGHT LUNG DUE TO INFECTIOUS ORGANISM, UNSPECIFIED PART OF LUNG: Primary | ICD-10-CM

## 2017-03-09 DIAGNOSIS — J18.8 OTHER PNEUMONIA, UNSPECIFIED ORGANISM: ICD-10-CM

## 2017-03-09 DIAGNOSIS — J18.9 PNEUMONIA OF LEFT LOWER LOBE DUE TO INFECTIOUS ORGANISM: ICD-10-CM

## 2017-03-09 DIAGNOSIS — Z94.1 HEART REPLACED BY TRANSPLANT (H): ICD-10-CM

## 2017-03-09 LAB
ALBUMIN SERPL-MCNC: 3.3 G/DL (ref 3.4–5)
ALBUMIN UR-MCNC: 30 MG/DL
ALP SERPL-CCNC: 109 U/L (ref 40–150)
ALT SERPL W P-5'-P-CCNC: 29 U/L (ref 0–70)
ANION GAP SERPL CALCULATED.3IONS-SCNC: 10 MMOL/L (ref 3–14)
APPEARANCE UR: CLEAR
AST SERPL W P-5'-P-CCNC: 13 U/L (ref 0–45)
BASOPHILS # BLD AUTO: 0 10E9/L (ref 0–0.2)
BASOPHILS NFR BLD AUTO: 0 %
BILIRUB SERPL-MCNC: 1 MG/DL (ref 0.2–1.3)
BILIRUB UR QL STRIP: NEGATIVE
BUN SERPL-MCNC: 24 MG/DL (ref 7–30)
CA-I BLD-SCNC: 4.7 MG/DL (ref 4.4–5.2)
CALCIUM SERPL-MCNC: 9.1 MG/DL (ref 8.5–10.1)
CHLORIDE SERPL-SCNC: 104 MMOL/L (ref 94–109)
CO2 BLDCOV-SCNC: 28 MMOL/L (ref 21–28)
CO2 BLDCOV-SCNC: 28 MMOL/L (ref 21–28)
CO2 SERPL-SCNC: 26 MMOL/L (ref 20–32)
COLOR UR AUTO: YELLOW
CREAT SERPL-MCNC: 1.1 MG/DL (ref 0.66–1.25)
DEPRECATED S PYO AG THROAT QL EIA: NORMAL
DIFFERENTIAL METHOD BLD: ABNORMAL
EOSINOPHIL # BLD AUTO: 0.1 10E9/L (ref 0–0.7)
EOSINOPHIL NFR BLD AUTO: 0.9 %
ERYTHROCYTE [DISTWIDTH] IN BLOOD BY AUTOMATED COUNT: 14.1 % (ref 10–15)
FLUAV+FLUBV RNA SPEC QL NAA+PROBE: NORMAL
FLUAV+FLUBV RNA SPEC QL NAA+PROBE: NORMAL
GFR SERPL CREATININE-BSD FRML MDRD: 72 ML/MIN/1.7M2
GLUCOSE BLD-MCNC: 165 MG/DL (ref 70–99)
GLUCOSE SERPL-MCNC: 162 MG/DL (ref 70–99)
GLUCOSE UR STRIP-MCNC: NEGATIVE MG/DL
GRAM STN SPEC: NORMAL
HCT VFR BLD AUTO: 40 % (ref 40–53)
HCT VFR BLD CALC: 40 %PCV (ref 40–53)
HGB BLD CALC-MCNC: 13.6 G/DL (ref 13.3–17.7)
HGB BLD-MCNC: 13.3 G/DL (ref 13.3–17.7)
HGB UR QL STRIP: NEGATIVE
INR PPP: 1.12 (ref 0.86–1.14)
INTERPRETATION ECG - MUSE: NORMAL
KETONES UR STRIP-MCNC: NEGATIVE MG/DL
LACTATE BLD-SCNC: 1.6 MMOL/L (ref 0.7–2.1)
LACTATE BLD-SCNC: 1.7 MMOL/L (ref 0.7–2.1)
LEUKOCYTE ESTERASE UR QL STRIP: NEGATIVE
LYMPHOCYTES # BLD AUTO: 1.8 10E9/L (ref 0.8–5.3)
LYMPHOCYTES NFR BLD AUTO: 15.6 %
Lab: NORMAL
MCH RBC QN AUTO: 32.8 PG (ref 26.5–33)
MCHC RBC AUTO-ENTMCNC: 33.3 G/DL (ref 31.5–36.5)
MCV RBC AUTO: 99 FL (ref 78–100)
METAMYELOCYTES # BLD: 0.1 10E9/L
METAMYELOCYTES NFR BLD MANUAL: 0.9 %
MICRO REPORT STATUS: NORMAL
MICRO REPORT STATUS: NORMAL
MONOCYTES # BLD AUTO: 0.5 10E9/L (ref 0–1.3)
MONOCYTES NFR BLD AUTO: 4.3 %
MUCOUS THREADS #/AREA URNS LPF: PRESENT /LPF
NEUTROPHILS # BLD AUTO: 8.9 10E9/L (ref 1.6–8.3)
NEUTROPHILS NFR BLD AUTO: 77.4 %
NITRATE UR QL: NEGATIVE
NT-PROBNP SERPL-MCNC: 1316 PG/ML (ref 0–450)
PCO2 BLDV: 40 MM HG (ref 40–50)
PCO2 BLDV: 42 MM HG (ref 40–50)
PH BLDV: 7.43 PH (ref 7.32–7.43)
PH BLDV: 7.44 PH (ref 7.32–7.43)
PH UR STRIP: 6 PH (ref 5–7)
PLATELET # BLD AUTO: 172 10E9/L (ref 150–450)
PLATELET # BLD EST: ABNORMAL 10*3/UL
PO2 BLDV: 19 MM HG (ref 25–47)
PO2 BLDV: 20 MM HG (ref 25–47)
POTASSIUM BLD-SCNC: 3.4 MMOL/L (ref 3.4–5.3)
POTASSIUM SERPL-SCNC: 3.6 MMOL/L (ref 3.4–5.3)
PROCALCITONIN SERPL-MCNC: 1.02 NG/ML
PROMYELOCYTES # BLD MANUAL: 0.1 10E9/L
PROMYELOCYTES NFR BLD MANUAL: 0.9 %
PROT SERPL-MCNC: 7.1 G/DL (ref 6.8–8.8)
RBC # BLD AUTO: 4.06 10E12/L (ref 4.4–5.9)
RBC #/AREA URNS AUTO: 1 /HPF (ref 0–2)
RBC MORPH BLD: NORMAL
RSV RNA SPEC NAA+PROBE: NORMAL
SAO2 % BLDV FROM PO2: 29 %
SAO2 % BLDV FROM PO2: 35 %
SODIUM BLD-SCNC: 139 MMOL/L (ref 133–144)
SODIUM SERPL-SCNC: 140 MMOL/L (ref 133–144)
SP GR UR STRIP: 1.02 (ref 1–1.03)
SPECIMEN SOURCE: NORMAL
SQUAMOUS #/AREA URNS AUTO: <1 /HPF (ref 0–1)
TACROLIMUS BLD-MCNC: 7.4 UG/L (ref 5–15)
TME LAST DOSE: NORMAL H
TROPONIN I BLD-MCNC: 0.01 UG/L (ref 0–0.1)
TROPONIN I SERPL-MCNC: NORMAL UG/L (ref 0–0.04)
URN SPEC COLLECT METH UR: ABNORMAL
UROBILINOGEN UR STRIP-MCNC: NORMAL MG/DL (ref 0–2)
WBC # BLD AUTO: 11.5 10E9/L (ref 4–11)
WBC #/AREA URNS AUTO: <1 /HPF (ref 0–2)

## 2017-03-09 PROCEDURE — 87631 RESP VIRUS 3-5 TARGETS: CPT | Performed by: EMERGENCY MEDICINE

## 2017-03-09 PROCEDURE — 25000128 H RX IP 250 OP 636: Performed by: EMERGENCY MEDICINE

## 2017-03-09 PROCEDURE — 71010 XR CHEST PORT 1 VW: CPT

## 2017-03-09 PROCEDURE — 93308 TTE F-UP OR LMTD: CPT | Mod: 26 | Performed by: EMERGENCY MEDICINE

## 2017-03-09 PROCEDURE — 99285 EMERGENCY DEPT VISIT HI MDM: CPT | Mod: 25 | Performed by: EMERGENCY MEDICINE

## 2017-03-09 PROCEDURE — 21400006 ZZH R&B CCU INTERMEDIATE UMMC

## 2017-03-09 PROCEDURE — 82330 ASSAY OF CALCIUM: CPT

## 2017-03-09 PROCEDURE — 93005 ELECTROCARDIOGRAM TRACING: CPT | Performed by: EMERGENCY MEDICINE

## 2017-03-09 PROCEDURE — 87880 STREP A ASSAY W/OPTIC: CPT | Performed by: EMERGENCY MEDICINE

## 2017-03-09 PROCEDURE — 25000132 ZZH RX MED GY IP 250 OP 250 PS 637: Performed by: INTERNAL MEDICINE

## 2017-03-09 PROCEDURE — 25000128 H RX IP 250 OP 636: Performed by: INTERNAL MEDICINE

## 2017-03-09 PROCEDURE — 87070 CULTURE OTHR SPECIMN AEROBIC: CPT | Performed by: EMERGENCY MEDICINE

## 2017-03-09 PROCEDURE — 87081 CULTURE SCREEN ONLY: CPT | Performed by: EMERGENCY MEDICINE

## 2017-03-09 PROCEDURE — 84145 PROCALCITONIN (PCT): CPT | Performed by: EMERGENCY MEDICINE

## 2017-03-09 PROCEDURE — 25000125 ZZHC RX 250: Performed by: EMERGENCY MEDICINE

## 2017-03-09 PROCEDURE — 87633 RESP VIRUS 12-25 TARGETS: CPT | Performed by: STUDENT IN AN ORGANIZED HEALTH CARE EDUCATION/TRAINING PROGRAM

## 2017-03-09 PROCEDURE — 82803 BLOOD GASES ANY COMBINATION: CPT

## 2017-03-09 PROCEDURE — 25500064 ZZH RX 255 OP 636: Performed by: RADIOLOGY

## 2017-03-09 PROCEDURE — 80053 COMPREHEN METABOLIC PANEL: CPT | Performed by: EMERGENCY MEDICINE

## 2017-03-09 PROCEDURE — 85025 COMPLETE CBC W/AUTO DIFF WBC: CPT | Performed by: EMERGENCY MEDICINE

## 2017-03-09 PROCEDURE — 25000125 ZZHC RX 250: Performed by: INTERNAL MEDICINE

## 2017-03-09 PROCEDURE — 84484 ASSAY OF TROPONIN QUANT: CPT | Performed by: EMERGENCY MEDICINE

## 2017-03-09 PROCEDURE — 81001 URINALYSIS AUTO W/SCOPE: CPT | Performed by: EMERGENCY MEDICINE

## 2017-03-09 PROCEDURE — 83605 ASSAY OF LACTIC ACID: CPT | Performed by: EMERGENCY MEDICINE

## 2017-03-09 PROCEDURE — 85610 PROTHROMBIN TIME: CPT | Performed by: EMERGENCY MEDICINE

## 2017-03-09 PROCEDURE — 87040 BLOOD CULTURE FOR BACTERIA: CPT | Performed by: EMERGENCY MEDICINE

## 2017-03-09 PROCEDURE — 96366 THER/PROPH/DIAG IV INF ADDON: CPT | Performed by: EMERGENCY MEDICINE

## 2017-03-09 PROCEDURE — 87205 SMEAR GRAM STAIN: CPT | Performed by: EMERGENCY MEDICINE

## 2017-03-09 PROCEDURE — 96367 TX/PROPH/DG ADDL SEQ IV INF: CPT | Performed by: EMERGENCY MEDICINE

## 2017-03-09 PROCEDURE — 25000132 ZZH RX MED GY IP 250 OP 250 PS 637: Performed by: EMERGENCY MEDICINE

## 2017-03-09 PROCEDURE — 93010 ELECTROCARDIOGRAM REPORT: CPT | Mod: 59 | Performed by: EMERGENCY MEDICINE

## 2017-03-09 PROCEDURE — 40000497 ZZHCL STATISTIC SODIUM ED POCT

## 2017-03-09 PROCEDURE — 96375 TX/PRO/DX INJ NEW DRUG ADDON: CPT | Performed by: EMERGENCY MEDICINE

## 2017-03-09 PROCEDURE — 25000131 ZZH RX MED GY IP 250 OP 636 PS 637: Performed by: INTERNAL MEDICINE

## 2017-03-09 PROCEDURE — 96365 THER/PROPH/DIAG IV INF INIT: CPT | Performed by: EMERGENCY MEDICINE

## 2017-03-09 PROCEDURE — 84484 ASSAY OF TROPONIN QUANT: CPT

## 2017-03-09 PROCEDURE — 25000125 ZZHC RX 250: Performed by: RADIOLOGY

## 2017-03-09 PROCEDURE — 40000502 ZZHCL STATISTIC GLUCOSE ED POCT

## 2017-03-09 PROCEDURE — 40000498 ZZHCL STATISTIC POTASSIUM ED POCT

## 2017-03-09 PROCEDURE — 40000501 ZZHCL STATISTIC HEMATOCRIT ED POCT

## 2017-03-09 PROCEDURE — 83520 IMMUNOASSAY QUANT NOS NONAB: CPT | Performed by: EMERGENCY MEDICINE

## 2017-03-09 PROCEDURE — 71260 CT THORAX DX C+: CPT

## 2017-03-09 PROCEDURE — 83880 ASSAY OF NATRIURETIC PEPTIDE: CPT | Performed by: EMERGENCY MEDICINE

## 2017-03-09 PROCEDURE — 96361 HYDRATE IV INFUSION ADD-ON: CPT | Performed by: EMERGENCY MEDICINE

## 2017-03-09 PROCEDURE — 83605 ASSAY OF LACTIC ACID: CPT

## 2017-03-09 PROCEDURE — 25000131 ZZH RX MED GY IP 250 OP 636 PS 637: Performed by: STUDENT IN AN ORGANIZED HEALTH CARE EDUCATION/TRAINING PROGRAM

## 2017-03-09 PROCEDURE — 80197 ASSAY OF TACROLIMUS: CPT | Performed by: EMERGENCY MEDICINE

## 2017-03-09 PROCEDURE — 99223 1ST HOSP IP/OBS HIGH 75: CPT | Mod: AI | Performed by: INTERNAL MEDICINE

## 2017-03-09 PROCEDURE — 93308 TTE F-UP OR LMTD: CPT | Performed by: EMERGENCY MEDICINE

## 2017-03-09 RX ORDER — ASPIRIN 81 MG/1
81 TABLET ORAL DAILY
Status: DISCONTINUED | OUTPATIENT
Start: 2017-03-10 | End: 2017-03-12 | Stop reason: HOSPADM

## 2017-03-09 RX ORDER — SULFAMETHOXAZOLE/TRIMETHOPRIM 800-160 MG
1 TABLET ORAL
Status: DISCONTINUED | OUTPATIENT
Start: 2017-03-09 | End: 2017-03-10

## 2017-03-09 RX ORDER — LEVOFLOXACIN 5 MG/ML
750 INJECTION, SOLUTION INTRAVENOUS EVERY 24 HOURS
Status: CANCELLED | OUTPATIENT
Start: 2017-03-09

## 2017-03-09 RX ORDER — ALBUTEROL SULFATE 0.83 MG/ML
3 SOLUTION RESPIRATORY (INHALATION)
Status: DISCONTINUED | OUTPATIENT
Start: 2017-03-09 | End: 2017-03-12 | Stop reason: HOSPADM

## 2017-03-09 RX ORDER — MULTIPLE VITAMINS W/ MINERALS TAB 9MG-400MCG
1 TAB ORAL DAILY
Status: DISCONTINUED | OUTPATIENT
Start: 2017-03-10 | End: 2017-03-12 | Stop reason: HOSPADM

## 2017-03-09 RX ORDER — MYCOPHENOLATE MOFETIL 250 MG/1
1500 CAPSULE ORAL 2 TIMES DAILY
Status: DISCONTINUED | OUTPATIENT
Start: 2017-03-09 | End: 2017-03-12 | Stop reason: HOSPADM

## 2017-03-09 RX ORDER — NALOXONE HYDROCHLORIDE 0.4 MG/ML
.1-.4 INJECTION, SOLUTION INTRAMUSCULAR; INTRAVENOUS; SUBCUTANEOUS
Status: DISCONTINUED | OUTPATIENT
Start: 2017-03-09 | End: 2017-03-12 | Stop reason: HOSPADM

## 2017-03-09 RX ORDER — TACROLIMUS 1 MG/1
1 CAPSULE ORAL EVERY EVENING
Status: DISCONTINUED | OUTPATIENT
Start: 2017-03-09 | End: 2017-03-12 | Stop reason: HOSPADM

## 2017-03-09 RX ORDER — ISONIAZID 300 MG/1
300 TABLET ORAL DAILY
Status: DISCONTINUED | OUTPATIENT
Start: 2017-03-10 | End: 2017-03-12 | Stop reason: HOSPADM

## 2017-03-09 RX ORDER — PIPERACILLIN SODIUM, TAZOBACTAM SODIUM 4; .5 G/20ML; G/20ML
4.5 INJECTION, POWDER, LYOPHILIZED, FOR SOLUTION INTRAVENOUS EVERY 6 HOURS
Status: DISCONTINUED | OUTPATIENT
Start: 2017-03-09 | End: 2017-03-12 | Stop reason: HOSPADM

## 2017-03-09 RX ORDER — ALBUTEROL SULFATE 0.83 MG/ML
2.5 SOLUTION RESPIRATORY (INHALATION)
Status: DISCONTINUED | OUTPATIENT
Start: 2017-03-09 | End: 2017-03-12 | Stop reason: HOSPADM

## 2017-03-09 RX ORDER — PIPERACILLIN SODIUM, TAZOBACTAM SODIUM 3; .375 G/15ML; G/15ML
3.38 INJECTION, POWDER, LYOPHILIZED, FOR SOLUTION INTRAVENOUS ONCE
Status: COMPLETED | OUTPATIENT
Start: 2017-03-09 | End: 2017-03-09

## 2017-03-09 RX ORDER — HYDROMORPHONE HYDROCHLORIDE 1 MG/ML
0.5 INJECTION, SOLUTION INTRAMUSCULAR; INTRAVENOUS; SUBCUTANEOUS
Status: COMPLETED | OUTPATIENT
Start: 2017-03-09 | End: 2017-03-09

## 2017-03-09 RX ORDER — PREDNISONE 10 MG/1
10 TABLET ORAL 2 TIMES DAILY
Status: DISCONTINUED | OUTPATIENT
Start: 2017-03-09 | End: 2017-03-12 | Stop reason: HOSPADM

## 2017-03-09 RX ORDER — AMOXICILLIN 250 MG
2 CAPSULE ORAL
Status: DISCONTINUED | OUTPATIENT
Start: 2017-03-09 | End: 2017-03-12 | Stop reason: HOSPADM

## 2017-03-09 RX ORDER — VALGANCICLOVIR 450 MG/1
900 TABLET, FILM COATED ORAL DAILY
Status: DISCONTINUED | OUTPATIENT
Start: 2017-03-10 | End: 2017-03-12 | Stop reason: HOSPADM

## 2017-03-09 RX ORDER — IOPAMIDOL 755 MG/ML
55 INJECTION, SOLUTION INTRAVASCULAR ONCE
Status: COMPLETED | OUTPATIENT
Start: 2017-03-09 | End: 2017-03-09

## 2017-03-09 RX ORDER — ACETAMINOPHEN 500 MG
1000 TABLET ORAL ONCE
Status: COMPLETED | OUTPATIENT
Start: 2017-03-09 | End: 2017-03-09

## 2017-03-09 RX ORDER — PANTOPRAZOLE SODIUM 40 MG/1
40 TABLET, DELAYED RELEASE ORAL EVERY MORNING
Status: DISCONTINUED | OUTPATIENT
Start: 2017-03-10 | End: 2017-03-12 | Stop reason: HOSPADM

## 2017-03-09 RX ADMIN — PIPERACILLIN SODIUM,TAZOBACTAM SODIUM 4.5 G: 4; .5 INJECTION, POWDER, FOR SOLUTION INTRAVENOUS at 21:11

## 2017-03-09 RX ADMIN — SULFAMETHOXAZOLE AND TRIMETHOPRIM 1 TABLET: 800; 160 TABLET ORAL at 21:10

## 2017-03-09 RX ADMIN — VANCOMYCIN HYDROCHLORIDE 1500 MG: 10 INJECTION, POWDER, LYOPHILIZED, FOR SOLUTION INTRAVENOUS at 12:05

## 2017-03-09 RX ADMIN — VANCOMYCIN HYDROCHLORIDE 1500 MG: 10 INJECTION, POWDER, LYOPHILIZED, FOR SOLUTION INTRAVENOUS at 23:00

## 2017-03-09 RX ADMIN — SODIUM CHLORIDE 1000 ML: 9 INJECTION, SOLUTION INTRAVENOUS at 09:48

## 2017-03-09 RX ADMIN — PREDNISONE 10 MG: 10 TABLET ORAL at 21:10

## 2017-03-09 RX ADMIN — TACROLIMUS 1 MG: 1 CAPSULE ORAL at 21:10

## 2017-03-09 RX ADMIN — OYSTER SHELL CALCIUM WITH VITAMIN D 1 TABLET: 500; 200 TABLET, FILM COATED ORAL at 21:10

## 2017-03-09 RX ADMIN — SODIUM CHLORIDE, PRESERVATIVE FREE 86 ML: 5 INJECTION INTRAVENOUS at 13:25

## 2017-03-09 RX ADMIN — MYCOPHENOLATE MOFETIL 1500 MG: 250 CAPSULE ORAL at 21:10

## 2017-03-09 RX ADMIN — PIPERACILLIN AND TAZOBACTAM 3.38 G: 3; .375 INJECTION, POWDER, LYOPHILIZED, FOR SOLUTION INTRAVENOUS; PARENTERAL at 10:58

## 2017-03-09 RX ADMIN — ACETAMINOPHEN 1000 MG: 500 TABLET, FILM COATED ORAL at 12:44

## 2017-03-09 RX ADMIN — IOPAMIDOL 55 ML: 755 INJECTION, SOLUTION INTRAVENOUS at 13:25

## 2017-03-09 RX ADMIN — HYDROMORPHONE HYDROCHLORIDE 0.5 MG: 10 INJECTION, SOLUTION INTRAMUSCULAR; INTRAVENOUS; SUBCUTANEOUS at 09:49

## 2017-03-09 ASSESSMENT — ENCOUNTER SYMPTOMS
SHORTNESS OF BREATH: 1
FREQUENCY: 0
VOICE CHANGE: 1
COUGH: 1
DIARRHEA: 0
WEAKNESS: 1
VOMITING: 0
ABDOMINAL PAIN: 0
NAUSEA: 1
HEADACHES: 1
FEVER: 1
DYSURIA: 0

## 2017-03-09 NOTE — IP AVS SNAPSHOT
MRN:[id number]                      After Visit Summary   3/9/2017    Javi Bansal    MRN: [id number]           Thank you!     Thank you for choosing [facility] for your care. Our goal is always to provide you with excellent care. Hearing back from our patients is one way we can continue to improve our services. Please take a few minutes to complete the written survey that you may receive in the mail after you visit with us. Thank you!        Patient Information     Date Of Birth          1970        About your hospital stay     You were admitted on:  March 9, 2017 You last received care in the:  [address] [facility]    You were discharged on:  March 12, 2017        Reason for your hospital stay       You were in the hospital for pneumonia needing antibiotics                  Who to Call     For medical emergencies, please call 911.  For non-urgent questions about your medical care, please call your primary care provider or clinic, [phone]          Attending Provider     Provider Specialty    Britany Mcclain MD Emergency Medicine    Ora Hale MD Emergency Medicine    Ezequiel, MD [facility]       Primary Care Provider Office Phone # Fax #    Ivan Burrell [phone]       [address] [address]        After Care Instructions     Activity       Your activity upon discharge: activity as tolerated            Diet       Follow this diet upon discharge: Advance to a regular diet as tolerated                  Follow-up Appointments     Follow Up and recommended labs and tests       Follow up with primary care provider, Ivan Burrell, within 7 days for hospital follow- up.      Your transplant coordinator will contact you to notify you regarding your date about your next heart biopsy and follow-up labs for immunosuppression                  Your next 10 appointments already scheduled     Mar  13, 2017  9:00 AM CDT   [place] [facility] with Kamidana Pickens    Services Department ([facility])    [address] [place] [id number] [phone]            Mar 14, 2017  9:00 AM CDT   [place] [facility] with Christina Willingham    Services Department ([facility])    [address] [place] [id number] [phone]            Mar 28, 2017  8:45 AM CDT   (Arrive by 8:30 AM)   EMG with Jitendra Kahn MD   [facility] EMG ([facility])    [address] [place] [address]  3rd [facility] [id number] [phone]           Do not use lotions or creams on the area to be tested. If you are on blood thinners (Warfarin, Coumadin, Lovenox, etc), please contact your primary care physician to check if it is safe to stop them 3 days prior to testing. If you have anxiety, please check with your referring physician to obtain anti-anxiety medication for the procedure.            Apr 03, 2017  9:00 AM CDT   (Arrive by 8:45 AM)   Return Visit with Bryant Bartholomew MD   [facility] Sports Medicine ([facility])    [address] [place] [address]  5th [facility] [id number] [phone]            Apr 03, 2017 10:00 AM CDT   (Arrive by 9:45 AM)   Return Visit with Ivan Schuler MD   [facility] and Infectious Diseases ([facility])    [address] [place] [address]  3rd [facility] [id number] [phone]            Apr 04, 2017  9:30 AM CDT   LAB with ACUTE CARE LAB [facility] Lab ([facility])    [address] [facility] [address]              Patient must bring picture ID.  Patient should be prepared to give a urine specimen  Please do not eat 10-12 hours before your appointment if you are coming in fasting for labs on  lipids, cholesterol, or glucose (sugar).  Pregnant women should follow their Care Team instructions. Water with medications is okay. Do not drink coffee or other fluids.   If you have concerns about taking  your medications, please ask at office or if scheduling via [employer], send a message by clicking on Secure Messaging, Message Your Care Team.            Apr 04, 2017 10:00 AM CDT   Procedure - 2.5 hour with U2A ROOM 9   [address] [facility] ([facility])    [address] [facility] [address]               Apr 04, 2017 11:00 AM CDT   Heart Cath Heart Biopsy with [id number]   [facility])    [address] [facility] [address]   [phone]            Apr 04, 2017  2:00 PM CDT   (Arrive by 1:45 PM)   RETURN HEART TRANSPLANT with SAMM Ramos CNP [facility] ([facility])    [address] [place] [id number] [phone]            May 05, 2017  1:00 PM CDT   (Arrive by 12:45 PM)   RETURN HEART TRANSPLANT with SAMM Ramos CNP [facility] ([facility])    [address] [place] [id number] [phone]              Additional Services     Home care nursing referral       Resumption of home care services:  _______________________  [facility]  Phone  [phone]  Fax  [phone]  ______________________    Resume previous home RN orders.    Your provider has ordered home care nursing services. If you have not been contacted within 2 days of your discharge please call the inpatient department phone number at [phone] .                  Pending Results     Date and Time Order Name Status Description    3/11/2017 0000 Aspergillus Galactomannan Antigen In process     3/11/2017 0000 1,3 Beta D glucan fungitell In process     3/10/2017 1810 Fungus  "Culture, non-blood In process     3/10/2017 1652 Histoplasma Capsulatum Agn Non Blood In process     3/9/2017 0911 Blood culture Preliminary     3/9/2017 0911 Blood culture Preliminary     3/9/2017 0850 Interferon Gamma Blood In process             Statement of Approval     Ordered          17 0951  I have reviewed and agree with all the recommendations and orders detailed in this document.  EFFECTIVE NOW     Approved and electronically signed by:  Tristan Nieves MD             Admission Information     Date & Time Provider Department Dept. Phone    3/9/2017 Tristan Nieves MD [address] [facility] [phone]      Your Vitals Were     Blood Pressure Pulse Temperature Respirations Height Weight    141/80 (BP Location: Left arm) 83 98  F (36.7  C) (Oral) 16 1.702 m (5' 7\") 70.1 kg (154 lb 9.6 oz)    Pulse Oximetry BMI (Body Mass Index)                96% 24.21 kg/m2          MyChart Information     [employer] lets you send messages to your doctor, view your test results, renew your prescriptions, schedule appointments and more. To sign up, go to [email or web address] . Click on \"Log in\" on the left side of the screen, which will take you to the Welcome page. Then click on \"Sign up Now\" on the right side of the page.     You will be asked to enter the access code listed below, as well as some personal information. Please follow the directions to create your username and password.     Your access code is: [email or web address]  Expires: 3/29/2017  7:30 AM     Your access code will  in 90 days. If you need help or a new code, please call your [place] clinic or [phone].        Care EveryWhere ID     This is your Care EveryWhere ID. This could be used by other organizations to access your [place] medical records  [id number]           Review of your medicines      START taking        Dose / Directions    guaiFENesin 20 mg/mL Soln solution   Commonly known as:  ROBITUSSIN   Used for:  Other pneumonia, unspecified " organism        Dose:  10 mL   Take 10 mLs by mouth every 4 hours as needed for cough   Quantity:  1200 mL   Refills:  0       levofloxacin 500 MG tablet   Commonly known as:  LEVAQUIN   Indication:  Healthcare-Associated Pneumonia        Dose:  500 mg   Take 1 tablet (500 mg) by mouth daily for 6 days   Quantity:  6 tablet   Refills:  0         CONTINUE these medicines which may have CHANGED, or have new prescriptions. If we are uncertain of the size of tablets/capsules you have at home, strength may be listed as something that might have changed.        Dose / Directions    valGANciclovir 450 MG tablet   Commonly known as:  VALCYTE   Indication:  post transplant prophylaxis   This may have changed:    - how much to take  - when to take this   Used for:  Heart replaced by transplant (H)        Dose:  900 mg   Take 2 tablets (900 mg) by mouth daily   Quantity:  60 tablet   Refills:  11         CONTINUE these medicines which have NOT CHANGED        Dose / Directions    aspirin 81 MG EC tablet   Used for:  Heart replaced by transplant (H)        Dose:  81 mg   Take 1 tablet (81 mg) by mouth daily   Quantity:  90 tablet   Refills:  3       calcium carb 1250 mg (500 mg [place])/vitamin D 200 units 500-200 MG-UNIT per tablet   Commonly known as:  OSCAL with D   Used for:  Heart replaced by transplant (H)        Dose:  1 tablet   Take 1 tablet by mouth 2 times daily (with meals)   Quantity:  60 tablet   Refills:  11       isoniazid 300 MG tablet   Commonly known as:  NYDRAZID   Used for:  Heart replaced by transplant (H)        Dose:  300 mg   Take 1 tablet (300 mg) by mouth daily   Quantity:  90 tablet   Refills:  3       multivitamin, therapeutic with minerals Tabs tablet   Used for:  Heart replaced by transplant (H)        Dose:  1 tablet   Take 1 tablet by mouth daily   Quantity:  30 each   Refills:  11       mycophenolate 250 MG capsule   Commonly known as:  CELLCEPT - GENERIC EQUIVALENT   Used for:  Heart replaced by  transplant (H)        Dose:  1500 mg   Take 6 capsules (1,500 mg) by mouth 2 times daily   Quantity:  360 capsule   Refills:  11       nystatin 183327 UNIT/ML suspension   Commonly known as:  MYCOSTATIN   Used for:  Heart replaced by transplant (H)        Dose:  [id number] Units   Take 10 mLs (1,000,000 Units) by mouth 4 times daily   Quantity:  280 mL   Refills:  3       order for DME   Used for:  Lesion of right ulnar nerve        Equipment being ordered: Splint - Cubital tunnel splint for ulnar neuropathy   Quantity:  1 Device   Refills:  0       pantoprazole 40 MG EC tablet   Commonly known as:  PROTONIX   Used for:  Heart replaced by transplant (H)        Dose:  40 mg   Take 1 tablet (40 mg) by mouth every morning   Quantity:  30 tablet   Refills:  11       pravastatin 20 MG tablet   Commonly known as:  PRAVACHOL   Used for:  Heart replaced by transplant (H)        Dose:  20 mg   Take 1 tablet (20 mg) by mouth every evening   Quantity:  30 tablet   Refills:  11       predniSONE 5 MG tablet   Commonly known as:  DELTASONE   Used for:  Heart replaced by transplant (H)        Dose:  10 mg   Take 2 tablets (10 mg) by mouth 2 times daily   Quantity:  120 tablet   Refills:  11       senna-docusate 8.6-50 MG per tablet   Commonly known as:  SENOKOT-S;PERICOLACE   Used for:  Heart replaced by transplant (H)        Dose:  2 tablet   Take 2 tablets by mouth nightly as needed for constipation (If no stools during the day)   Quantity:  20 tablet   Refills:  1       sulfamethoxazole-trimethoprim 800-160 MG per tablet   Commonly known as:  BACTRIM DS/SEPTRA DS   Indication:  PCP prophylaxis   Used for:  Heart replaced by transplant (H)        Dose:  1 tablet   Take 1 tablet by mouth twice a week On mondays and thursdays   Quantity:  8 tablet   Refills:  11       * tacrolimus 0.5 MG capsule   Commonly known as:  PROGRAF - GENERIC EQUIVALENT   Used for:  Heart replaced by transplant (H)        TAke 1.5 mg in the AM; take 1 mg in  the PM   Quantity:  30 capsule   Refills:  11       * tacrolimus 1 MG capsule   Commonly known as:  PROGRAF - GENERIC EQUIVALENT   Used for:  Heart replaced by transplant (H)        Take 1.5 mg in the AM; take 1 mg in the PM   Quantity:  60 capsule   Refills:  11       * Notice:  This list has 2 medication(s) that are the same as other medications prescribed for you. Read the directions carefully, and ask your doctor or other care provider to review them with you.      STOP taking     ketoconazole 2 % shampoo   Commonly known as:  NIZORAL                Where to get your medicines      These medications were sent to [facility] [place], [place] - [address] [address]     Phone:  [phone]     guaiFENesin 20 mg/mL Soln solution    levofloxacin 500 MG tablet                Protect others around you: Learn how to safely use, store and throw away your medicines at www.disposemymeds.org.             Medication List: This is a list of all your medications and when to take them. Check marks below indicate your daily home schedule. Keep this list as a reference.      Medications           Morning Afternoon Evening Bedtime As Needed    aspirin 81 MG EC tablet   Take 1 tablet (81 mg) by mouth daily   Last time this was given:  81 mg on 3/12/2017  8:03 AM                                   calcium carb 1250 mg (500 mg [place])/vitamin D 200 units 500-200 MG-UNIT per tablet   Commonly known as:  OSCAL with D   Take 1 tablet by mouth 2 times daily (with meals)   Last time this was given:  1 tablet on 3/12/2017  8:03 AM                                      guaiFENesin 20 mg/mL Soln solution   Commonly known as:  ROBITUSSIN   Take 10 mLs by mouth every 4 hours as needed for cough   Last time this was given:  10 mLs on 3/11/2017  8:27 PM                                   isoniazid 300 MG tablet   Commonly known as:  NYDRAZID   Take 1 tablet (300 mg) by mouth daily   Last  time this was given:  300 mg on 3/12/2017  8:03 AM                                   levofloxacin 500 MG tablet   Commonly known as:  LEVAQUIN   Take 1 tablet (500 mg) by mouth daily for 6 days   Last time this was given:  500 mg on 3/12/2017  8:03 AM                                   multivitamin, therapeutic with minerals Tabs tablet   Take 1 tablet by mouth daily   Last time this was given:  1 tablet on 3/12/2017  8:03 AM                                   mycophenolate 250 MG capsule   Commonly known as:  CELLCEPT - GENERIC EQUIVALENT   Take 6 capsules (1,500 mg) by mouth 2 times daily   Last time this was given:  1,500 mg on 3/12/2017  8:03 AM                                      nystatin [id number] UNIT/ML suspension   Commonly known as:  MYCOSTATIN   Take 10 mLs (1,000,000 Units) by mouth 4 times daily                                            order for DME   Equipment being ordered: Splint - Cubital tunnel splint for ulnar neuropathy                                pantoprazole 40 MG EC tablet   Commonly known as:  PROTONIX   Take 1 tablet (40 mg) by mouth every morning   Last time this was given:  40 mg on 3/12/2017  8:03 AM                                   pravastatin 20 MG tablet   Commonly known as:  PRAVACHOL   Take 1 tablet (20 mg) by mouth every evening                                   predniSONE 5 MG tablet   Commonly known as:  DELTASONE   Take 2 tablets (10 mg) by mouth 2 times daily   Last time this was given:  10 mg on 3/12/2017  8:03 AM                                      senna-docusate 8.6-50 MG per tablet   Commonly known as:  SENOKOT-S;PERICOLACE   Take 2 tablets by mouth nightly as needed for constipation (If no stools during the day)                                   sulfamethoxazole-trimethoprim 800-160 MG per tablet   Commonly known as:  BACTRIM DS/SEPTRA DS   Take 1 tablet by mouth twice a week On mondays and thursdays   Last time this was given:  1 tablet on 3/9/2017  9:10 PM             Monday and Thursday                       * tacrolimus 0.5 MG capsule   Commonly known as:  PROGRAF - GENERIC EQUIVALENT   TAke 1.5 mg in the AM; take 1 mg in the PM   Last time this was given:  1.5 mg on 3/12/2017  8:03 AM                                      * tacrolimus 1 MG capsule   Commonly known as:  PROGRAF - GENERIC EQUIVALENT   Take 1.5 mg in the AM; take 1 mg in the PM   Last time this was given:  1.5 mg on 3/12/2017  8:03 AM                                      valGANciclovir 450 MG tablet   Commonly known as:  VALCYTE   Take 2 tablets (900 mg) by mouth daily   Last time this was given:  900 mg on 3/12/2017  8:02 AM                                   * Notice:  This list has 2 medication(s) that are the same as other medications prescribed for you. Read the directions carefully, and ask your doctor or other care provider to review them with you.

## 2017-03-09 NOTE — PHARMACY-VANCOMYCIN DOSING SERVICE
Pharmacy Vancomycin Initial Note  Date of Service 2017  Patient's  1970  46 year old, male    Indication: [facility]-Associated Pneumonia    Current estimated CrCl = Estimated Creatinine Clearance: 80.4 mL/min (based on Cr of 1.1).  Current weight = 68.5 kg    Creatinine for last 3 days  3/9/2017:  8:50 AM Creatinine 1.10 mg/dL    Recent Vancomycin Level(s) for last 3 days  No results found for requested labs within last 72 hours.      Vancomycin IV Administrations (past 72 hours)      No vancomycin orders with administrations in past 72 hours.                Nephrotoxins and other renal medications (Future)    Start     Dose/Rate Route Frequency Ordered Stop    17 1028  piperacillin-tazobactam (ZOSYN) 3.375 g vial to attach to  mL bag     Comments:  DO NOT USE THIS FIELD FOR ADMIN INSTRUCTIONS; INFORMATION DOES NOT SHOW ON MAR. USE THE FIELD ABOVE MARKED ADMIN INSTRUCTIONS    3.375 g  100 mL/hr over 1 Hours Intravenous ONCE 17 1027            Contrast Orders - past 72 hours     None                Plan:  1.  Start vancomycin  1500 mg (21.9 kg) IV q12h.   2.  Goal Trough Level: 15-20 mg/L   3.  Pharmacy will check trough levels as appropriate in 1-3 Days.    4. Serum creatinine levels will be ordered daily for the first week of therapy and at least twice weekly for subsequent weeks.    5. [place] method utilized to dose vancomycin therapy: Method 2    Luann Haile, PharmD  PGY-1 Pharmacy Resident

## 2017-03-09 NOTE — LETTER
Transition Communication Hand-off for Care Transitions to Next Level of Care Provider    Name: Javi Bansal  MRN #: [id number]  Primary Care Provider: Ivan Burrell     Primary Clinic: [address] [address]     Reason for Hospitalization:  Heart replaced by transplant (H) [Z94.1]  Pneumonia of left lower lobe due to infectious organism [J18.9]  Admit Date/Time: 3/9/2017  8:36 AM  Discharge Date: 3/12/17  Payor Source: Payor: [employer] / Plan: [employer] TRANSPLANT / Product Type: Medicare /          Reason for Communication Hand-off Referral: Multiple providers/specialties  Other Continuity of care    Discharge Plan: home with resumption of home care       Concern for non-adherence with plan of care:   No  Discharge Needs Assessment:  Needs       Most Recent Value    Equipment Currently Used at Home none    Transportation Available family or friend will provide [sister provides transportation, pt does not have a car]          Already enrolled in Tele-monitoring program and name of program:  NA  Follow-up specialty is recommended: Yes    Follow-up plan:  Future Appointments  Date Time Provider Department Center   3/15/2017 12:15 PM U2A ROOM 8 [facility] O   3/17/2017 11:00 AM Geena Mcclellna MD [facility]   3/28/2017 8:45 AM Jitendra Kahn MD [facility]   4/3/2017 9:00 AM Bryant Bartholomew MD [facility]   4/3/2017 10:00 AM Ivan Schuler MD [facility]   4/4/2017 9:30 AM [facility]   4/4/2017 10:00 AM U2A ROOM 9 [facility] O   4/4/2017 11:00 AM UUHCVR5 [facility] O   4/4/2017 2:00 PM Fatimah Herr APRN CNP [facility]   5/5/2017 9:00 AM [facility]   5/5/2017 9:30 AM U2A ROOM 17 [facility] O   5/5/2017 10:30 AM [facility]CVR3 [facility] O   5/5/2017 1:00 PM Fatimah Herr APRN [facility]   6/1/2017 8:00 AM [facility]  [place] O   6/1/2017 10:15 AM Ivan Burrell MD [facility]   6/2/2017 9:15 AM ACUTE CARE LAB [facility] [place] [facility]   6/2/2017 10:00 AM U2A ROOM 15 [facility] O   6/2/2017 11:00 AM [facility]   6/2/2017 2:00 PM Geena Mcclellan MD [facility]   7/28/2017 9:00 AM UC LAB UCL[facility]   7/28/2017 9:30 AM Fatimah Herr APRN CNP [facility]   7/28/2017 11:00 AM U2A ROOM 17 [facility] O   7/28/2017 12:00 PM [address] [place] [address] O   9/19/2017 2:00 PM Fatimah Herr APRN CNP [facility]       Any outstanding tests or procedures:        Referrals     Future Labs/Procedures    Home care nursing referral     Comments:    Resumption of home care services:  _______________________  [facility]  Phone  [phone]  Fax  [phone]  ______________________    Resume previous home RN orders.    Your provider has ordered home care nursing services. If you have not been contacted within 2 days of your discharge please call the inpatient department phone number at [phone] .            María Prasad  RN Care Coordinator  [phone]    AVS/Discharge Summary is the source of truth; this is a helpful guide for improved communication of patient story

## 2017-03-09 NOTE — ED NOTES
Pt arrives to ED for complaint of fevers, headache, shortness of breath, dizziness, and chest pain. PT had a heart transplant 87 days ago.

## 2017-03-09 NOTE — ED PROVIDER NOTES
History     Chief Complaint   Patient presents with     Shortness of Breath     The history is limited by a language barrier. A  was used (Moldovan).     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 Patient reports a fever up to 101F yesterday at home. He also reports a nonproductive cough associated with some shortness of breath, chest discomfort when coughing and headache. Patient reports that his voice is more hoarse than usual and states he has been using lozenges for his throat. Additionally, he reports nausea but denies any vomiting. No diarrhea. No dysuria or urinary frequency. He denies any recent sick contacts. No recent travel. Patient is currently immunosuppressed on Cellcept, Prograf and prednisone. He denies any recent changes in his medications. He has not had any swelling in the lower extremities.  Denies orthopnea.      I have reviewed the Medications, Allergies, Past Medical and Surgical History, and Social History in the [facility] system.    Past Medical History   Diagnosis Date     Chagas cardiomyopathy      Chronic systolic heart failure (H)      Dual ICD (implantable cardioverter-defibrillator) in place 10/20/2015     Essential hypertension      Gastroesophageal reflux disease      H/O gastric ulcer      Hypertension      Premature ventricular contractions      Presbyopia      Pterygium      ?? suspected , but unclear dx       Past Surgical History   Procedure Laterality Date     Cardiac surgery       AICD     Transplant heart recipient N/A 12/12/2016     Procedure: TRANSPLANT HEART RECIPIENT;  Surgeon: Elo Madsen  MD Ale;  Location: [facility] OR       Family History   Problem Relation Age of Onset     Brain Cancer Mother      Melanoma No family hx of      Skin Cancer No family hx of        Social History   Substance Use Topics     Smoking status: Never Smoker     Smokeless tobacco: Not on file     Alcohol use No     No current facility-administered medications for this encounter.      Current Outpatient Prescriptions   Medication     tacrolimus (PROGRAF - GENERIC EQUIVALENT) 0.5 MG capsule     tacrolimus (PROGRAF - GENERIC EQUIVALENT) 1 MG capsule     predniSONE (DELTASONE) 5 MG tablet     order for DME     isoniazid (NYDRAZID) 300 MG tablet     nystatin (MYCOSTATIN) 089660 UNIT/ML suspension     pravastatin (PRAVACHOL) 20 MG tablet     sulfamethoxazole-trimethoprim (BACTRIM DS/SEPTRA DS) 800-160 MG per tablet     mycophenolate (CELLCEPT - GENERIC EQUIVALENT) 250 MG capsule     aspirin EC 81 MG EC tablet     valGANciclovir (VALCYTE) 450 MG tablet     senna-docusate (SENOKOT-S;PERICOLACE) 8.6-50 MG per tablet     calcium carb 1250 mg, 500 mg [place],/vitamin D 200 units (OSCAL WITH D) 500-200 MG-UNIT per tablet     multivitamin, therapeutic with minerals (THERA-VIT-M) TABS tablet     pantoprazole (PROTONIX) 40 MG EC tablet     ketoconazole (NIZORAL) 2 % shampoo      No Known Allergies    Review of Systems   Constitutional: Positive for fever.   HENT: Positive for voice change.    Respiratory: Positive for cough and shortness of breath.    Cardiovascular: Positive for chest pain.   Gastrointestinal: Positive for nausea. Negative for abdominal pain, diarrhea and vomiting.   Genitourinary: Negative for dysuria and frequency.   Neurological: Positive for weakness (generalized) and headaches.   All other systems reviewed and are negative.      Physical Exam   Heart Rate: 112  Temp: 100.5  F (38.1  C)  Resp: 25  SpO2: 91 %  Physical Exam   General: patient is alert, ill appearing, diaphoretic   Head: atraumatic and normocephalic   EENT: dry  mucus membranes without tonsillar erythema or exudates, pupils round and reactive, sclerae anicteric  Neck: supple without meningismus  Cardiovascular: Tachycardic, extremities warm and well perfused, no lower extremity edema  Pulmonary: Diminished breath sounds at bases with intermittent end expiratory wheezing  Abdomen: soft, non-tender, nondistended  Musculoskeletal: normal range of motion   Neurological: alert and oriented, moving all extremities symmetrically, gait normal   Skin: warm, dry, sternal incision site is well healing without erythema or fluctuance      ED Course     ED Course     Procedures   8:54 AM  The patient was seen and examined by Dr. Mcclain in Room ED15.              EKG Interpretation:      Interpreted by Britany Mcclain  Time reviewed: 0850  Symptoms at time of EKG: shortness of breath   Rhythm: sinus tachycardia  Rate: Tachycardia  Axis: Normal  Ectopy: none  Conduction: normal  ST Segments/ T Waves: Non-specific ST-T wave changes  Q Waves: none  Comparison to prior: sinus tachycardia    Clinical Impression: sinus tachycardia                Critical Care time:  none               Labs Ordered and Resulted from Time of ED Arrival Up to the Time of Departure from the ED - No data to display    Assessments & Plan (with Medical Decision Making)   Mr. 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  His history and exam are most consistent with infectious etiology as opposed to acute rejection or heart failure causing his pleuritic CP and SOB.  He is febrile in the emergency department to 102.  Influenza and RSV PCR were sent and are negative.   His chest x-ray suggestive of pneumonia without evidence of pulmonary edema or pleural effusion.  He does have a leukocytosis to 11.5 and an elevated pro-calcitonin to 1.02.  On physical exam he has no lower extremity edema and his BNP is 1316 which is significantly improved from previous.  Again he has no pulmonary edema on chest x-ray.  Rapid strep was obtained and negative.  Blood cultures were sent and are pending.  UA is also negative.  He has no elevation in LFTs and his abdominal exam is quite benign without suggestion of intra-abdominal infectious etiology.  He was given vancomycin and Zosyn in the ED.  His ECG does not show any acute ischemic changes and troponin is negative.  He does not have any diffuse ST elevation or MT depression to suggest pericarditis and on bedside cardiac ultrasound does not have evidence of an obvious pericardial effusion.  He was sent for CT of the chest given his ongoing respiratory distress to further evaluate for evidence of infectious etiology, evidence of substernal abscess given his recent surgery or PE. He has extensive groundglass opacities suggestive of infectious etiology.  No PE or signs of post-operative sternal wound/abscess.  He does have a history of latent TB without chest x-ray findings to suggest pathognomonic findings.  Given recent recurrence of multidrug resistant TB in the area, and interferon gamma test was sent and is pending.   Discussed with both cardiology and medicine and given that his symptoms have likely due to infectious etiology, will admit to medicine with cardiology consultation.       I have reviewed the nursing notes.    I have reviewed the findings, diagnosis, plan and need for follow up with the patient.    New Prescriptions    No medications on file       Final diagnoses:   Pneumonia of left lower lobe due to infectious organism   Heart replaced by transplant (H)   Fatimah HEADLEY, am serving as a trained medical scribe to document  services personally performed by Britany Mcclain MD, based on the provider's statements to me.      I, Britany Mcclain MD, was physically present and have reviewed and verified the accuracy of this note documented by Fatimah Rojas.       3/9/2017   [facility], [place], EMERGENCY DEPARTMENT     Britany Mcclain MD  03/09/17 0200

## 2017-03-09 NOTE — IP AVS SNAPSHOT
[address] [address]    Phone:  [phone]                                       After Visit Summary   3/9/2017    Javi Bansal    MRN: [id number]           After Visit Summary Signature Page     I have received my discharge instructions, and my questions have been answered. I have discussed any challenges I see with this plan with the nurse or doctor.    ..........................................................................................................................................  Patient/Patient Representative Signature      ..........................................................................................................................................  Patient Representative Print Name and Relationship to Patient    ..................................................               ................................................  Date                                            Time    ..........................................................................................................................................  Reviewed by Signature/Title    ...................................................              ..............................................  Date                                                            Time

## 2017-03-10 ENCOUNTER — APPOINTMENT (OUTPATIENT)
Dept: MEDSURG UNIT | Facility: CLINIC | Age: 47
End: 2017-03-10
Payer: COMMERCIAL

## 2017-03-10 ENCOUNTER — OFFICE VISIT (OUTPATIENT)
Dept: INTERPRETER SERVICES | Facility: CLINIC | Age: 47
End: 2017-03-10

## 2017-03-10 LAB
ANION GAP SERPL CALCULATED.3IONS-SCNC: 12 MMOL/L (ref 3–14)
BASOPHILS # BLD AUTO: 0 10E9/L (ref 0–0.2)
BASOPHILS NFR BLD AUTO: 0 %
BUN SERPL-MCNC: 21 MG/DL (ref 7–30)
CALCIUM SERPL-MCNC: 9.1 MG/DL (ref 8.5–10.1)
CHLORIDE SERPL-SCNC: 106 MMOL/L (ref 94–109)
CO2 SERPL-SCNC: 23 MMOL/L (ref 20–32)
CREAT SERPL-MCNC: 0.83 MG/DL (ref 0.66–1.25)
DIFFERENTIAL METHOD BLD: ABNORMAL
EOSINOPHIL # BLD AUTO: 0 10E9/L (ref 0–0.7)
EOSINOPHIL NFR BLD AUTO: 0 %
ERYTHROCYTE [DISTWIDTH] IN BLOOD BY AUTOMATED COUNT: 14 % (ref 10–15)
FLUAV H1 2009 PAND RNA SPEC QL NAA+PROBE: NEGATIVE
FLUAV H1 RNA SPEC QL NAA+PROBE: NEGATIVE
FLUAV H3 RNA SPEC QL NAA+PROBE: NEGATIVE
FLUAV RNA SPEC QL NAA+PROBE: NEGATIVE
FLUBV RNA SPEC QL NAA+PROBE: NEGATIVE
GFR SERPL CREATININE-BSD FRML MDRD: ABNORMAL ML/MIN/1.7M2
GLUCOSE SERPL-MCNC: 133 MG/DL (ref 70–99)
HADV DNA SPEC QL NAA+PROBE: NEGATIVE
HADV DNA SPEC QL NAA+PROBE: NEGATIVE
HCT VFR BLD AUTO: 35.9 % (ref 40–53)
HGB BLD-MCNC: 12.2 G/DL (ref 13.3–17.7)
HMPV RNA SPEC QL NAA+PROBE: NEGATIVE
HPIV1 RNA SPEC QL NAA+PROBE: NEGATIVE
HPIV2 RNA SPEC QL NAA+PROBE: NEGATIVE
HPIV3 RNA SPEC QL NAA+PROBE: NEGATIVE
L PNEUMO1 AG UR QL IA: NORMAL
LYMPHOCYTES # BLD AUTO: 0.8 10E9/L (ref 0.8–5.3)
LYMPHOCYTES NFR BLD AUTO: 6.1 %
Lab: NORMAL
MCH RBC QN AUTO: 33.2 PG (ref 26.5–33)
MCHC RBC AUTO-ENTMCNC: 34 G/DL (ref 31.5–36.5)
MCV RBC AUTO: 98 FL (ref 78–100)
METAMYELOCYTES # BLD: 0.2 10E9/L
METAMYELOCYTES NFR BLD MANUAL: 1.7 %
MICRO REPORT STATUS: NORMAL
MICROBIOLOGIST REVIEW: NORMAL
MONOCYTES # BLD AUTO: 0.8 10E9/L (ref 0–1.3)
MONOCYTES NFR BLD AUTO: 6.1 %
MYELOCYTES # BLD: 0.4 10E9/L
MYELOCYTES NFR BLD MANUAL: 2.6 %
NEUTROPHILS # BLD AUTO: 11.6 10E9/L (ref 1.6–8.3)
NEUTROPHILS NFR BLD AUTO: 83.5 %
PLATELET # BLD AUTO: 159 10E9/L (ref 150–450)
PLATELET # BLD EST: ABNORMAL 10*3/UL
POTASSIUM SERPL-SCNC: 3.7 MMOL/L (ref 3.4–5.3)
RBC # BLD AUTO: 3.67 10E12/L (ref 4.4–5.9)
RBC MORPH BLD: NORMAL
RHINOVIRUS RNA SPEC QL NAA+PROBE: NEGATIVE
RSV RNA SPEC QL NAA+PROBE: NEGATIVE
RSV RNA SPEC QL NAA+PROBE: NEGATIVE
S PNEUM AG SPEC QL: NORMAL
SODIUM SERPL-SCNC: 142 MMOL/L (ref 133–144)
SPECIMEN SOURCE: NORMAL
VANCOMYCIN SERPL-MCNC: 14.5 MG/L
WBC # BLD AUTO: 13.9 10E9/L (ref 4–11)
YEAST SPEC QL CULT: NORMAL

## 2017-03-10 PROCEDURE — 21400006 ZZH R&B CCU INTERMEDIATE UMMC

## 2017-03-10 PROCEDURE — 25000125 ZZHC RX 250: Performed by: EMERGENCY MEDICINE

## 2017-03-10 PROCEDURE — 85025 COMPLETE CBC W/AUTO DIFF WBC: CPT | Performed by: INTERNAL MEDICINE

## 2017-03-10 PROCEDURE — 99233 SBSQ HOSP IP/OBS HIGH 50: CPT | Mod: GC | Performed by: INTERNAL MEDICINE

## 2017-03-10 PROCEDURE — 25000128 H RX IP 250 OP 636: Performed by: EMERGENCY MEDICINE

## 2017-03-10 PROCEDURE — 25000131 ZZH RX MED GY IP 250 OP 636 PS 637: Performed by: STUDENT IN AN ORGANIZED HEALTH CARE EDUCATION/TRAINING PROGRAM

## 2017-03-10 PROCEDURE — 80048 BASIC METABOLIC PNL TOTAL CA: CPT | Performed by: INTERNAL MEDICINE

## 2017-03-10 PROCEDURE — 25000131 ZZH RX MED GY IP 250 OP 636 PS 637: Performed by: INTERNAL MEDICINE

## 2017-03-10 PROCEDURE — 87106 FUNGI IDENTIFICATION YEAST: CPT | Performed by: STUDENT IN AN ORGANIZED HEALTH CARE EDUCATION/TRAINING PROGRAM

## 2017-03-10 PROCEDURE — 25000128 H RX IP 250 OP 636: Performed by: STUDENT IN AN ORGANIZED HEALTH CARE EDUCATION/TRAINING PROGRAM

## 2017-03-10 PROCEDURE — 87899 AGENT NOS ASSAY W/OPTIC: CPT | Performed by: STUDENT IN AN ORGANIZED HEALTH CARE EDUCATION/TRAINING PROGRAM

## 2017-03-10 PROCEDURE — 87102 FUNGUS ISOLATION CULTURE: CPT | Performed by: STUDENT IN AN ORGANIZED HEALTH CARE EDUCATION/TRAINING PROGRAM

## 2017-03-10 PROCEDURE — 36415 COLL VENOUS BLD VENIPUNCTURE: CPT | Performed by: INTERNAL MEDICINE

## 2017-03-10 PROCEDURE — 25000132 ZZH RX MED GY IP 250 OP 250 PS 637: Performed by: INTERNAL MEDICINE

## 2017-03-10 PROCEDURE — 25000128 H RX IP 250 OP 636: Performed by: INTERNAL MEDICINE

## 2017-03-10 PROCEDURE — 25000132 ZZH RX MED GY IP 250 OP 250 PS 637: Performed by: STUDENT IN AN ORGANIZED HEALTH CARE EDUCATION/TRAINING PROGRAM

## 2017-03-10 PROCEDURE — 80202 ASSAY OF VANCOMYCIN: CPT | Performed by: INTERNAL MEDICINE

## 2017-03-10 PROCEDURE — 87385 HISTOPLASMA CAPSUL AG IA: CPT | Performed by: STUDENT IN AN ORGANIZED HEALTH CARE EDUCATION/TRAINING PROGRAM

## 2017-03-10 PROCEDURE — T1013 SIGN LANG/ORAL INTERPRETER: HCPCS | Mod: U3

## 2017-03-10 PROCEDURE — 25000125 ZZHC RX 250: Performed by: INTERNAL MEDICINE

## 2017-03-10 RX ORDER — SULFAMETHOXAZOLE/TRIMETHOPRIM 800-160 MG
1 TABLET ORAL
Status: DISCONTINUED | OUTPATIENT
Start: 2017-03-10 | End: 2017-03-12 | Stop reason: HOSPADM

## 2017-03-10 RX ORDER — LEVOFLOXACIN 5 MG/ML
500 INJECTION, SOLUTION INTRAVENOUS ONCE
Status: COMPLETED | OUTPATIENT
Start: 2017-03-10 | End: 2017-03-10

## 2017-03-10 RX ADMIN — OYSTER SHELL CALCIUM WITH VITAMIN D 1 TABLET: 500; 200 TABLET, FILM COATED ORAL at 17:10

## 2017-03-10 RX ADMIN — PIPERACILLIN SODIUM,TAZOBACTAM SODIUM 4.5 G: 4; .5 INJECTION, POWDER, FOR SOLUTION INTRAVENOUS at 02:25

## 2017-03-10 RX ADMIN — VANCOMYCIN HYDROCHLORIDE 1500 MG: 10 INJECTION, POWDER, LYOPHILIZED, FOR SOLUTION INTRAVENOUS at 22:29

## 2017-03-10 RX ADMIN — OYSTER SHELL CALCIUM WITH VITAMIN D 1 TABLET: 500; 200 TABLET, FILM COATED ORAL at 08:48

## 2017-03-10 RX ADMIN — PANTOPRAZOLE SODIUM 40 MG: 40 TABLET, DELAYED RELEASE ORAL at 08:49

## 2017-03-10 RX ADMIN — VALGANCICLOVIR HYDROCHLORIDE 900 MG: 450 TABLET ORAL at 08:49

## 2017-03-10 RX ADMIN — ISONIAZID 300 MG: 300 TABLET ORAL at 08:48

## 2017-03-10 RX ADMIN — TACROLIMUS 1 MG: 1 CAPSULE ORAL at 17:10

## 2017-03-10 RX ADMIN — PIPERACILLIN SODIUM,TAZOBACTAM SODIUM 4.5 G: 4; .5 INJECTION, POWDER, FOR SOLUTION INTRAVENOUS at 08:49

## 2017-03-10 RX ADMIN — VANCOMYCIN HYDROCHLORIDE 1500 MG: 10 INJECTION, POWDER, LYOPHILIZED, FOR SOLUTION INTRAVENOUS at 11:35

## 2017-03-10 RX ADMIN — PREDNISONE 10 MG: 10 TABLET ORAL at 08:48

## 2017-03-10 RX ADMIN — MULTIPLE VITAMINS W/ MINERALS TAB 1 TABLET: TAB at 08:48

## 2017-03-10 RX ADMIN — LEVOFLOXACIN 500 MG: 5 INJECTION, SOLUTION INTRAVENOUS at 13:10

## 2017-03-10 RX ADMIN — PREDNISONE 10 MG: 10 TABLET ORAL at 19:19

## 2017-03-10 RX ADMIN — ASPIRIN 81 MG: 81 TABLET, COATED ORAL at 08:48

## 2017-03-10 RX ADMIN — SODIUM CHLORIDE 500 ML: 9 INJECTION, SOLUTION INTRAVENOUS at 10:25

## 2017-03-10 RX ADMIN — PIPERACILLIN SODIUM,TAZOBACTAM SODIUM 4.5 G: 4; .5 INJECTION, POWDER, FOR SOLUTION INTRAVENOUS at 19:19

## 2017-03-10 RX ADMIN — MYCOPHENOLATE MOFETIL 1500 MG: 250 CAPSULE ORAL at 08:49

## 2017-03-10 RX ADMIN — PIPERACILLIN SODIUM,TAZOBACTAM SODIUM 4.5 G: 4; .5 INJECTION, POWDER, FOR SOLUTION INTRAVENOUS at 14:34

## 2017-03-10 RX ADMIN — TACROLIMUS 1.5 MG: 0.5 CAPSULE ORAL at 08:49

## 2017-03-10 RX ADMIN — MYCOPHENOLATE MOFETIL 1500 MG: 250 CAPSULE ORAL at 19:19

## 2017-03-10 NOTE — PROGRESS NOTES
[facility], [place]    Internal Medicine Progress Note - [facility] Service    Changes Today:  - Legionella and Strep pneumo antigen  - Tacrolimus level  - Hold bactrim while on Vanc/zosyn  - Add levaquin to cover atypicals  - 500 ml bolus x1 for BPs    Assessment & Plan   46 year old male with PMHx most significant for non-ischemic cardiomyopathy secondary to Chagas s/p heart transplant 12/12/16, latent TB on isoniazid, and HTN presenting with fever, productive cough, and sore throat for 2 days most consistent with hospital acquired pneumonia (typical or atypical pathogen). Patient is improving on antibiotics.     # Fever, productive cough, and sore throat  The most likely dx continues to be hospital acquired pneumonia. Patient is improving clinically on vanc/zosyn. Sputum shows >25 pms with mixed gram positive and gram negative bacteria present. WBC mildly elevated today.  - Continue vancomycin and zosyn until sputum grows  - Blood cultures NGTD, sputum cultures mixed gram pos/gram neg, respiratory viral panel pending, iinfluenza negative  - ID consult given immunosuppression, latent TB, and h/o Chagas  - Start levaquin  - CBC tomorrow     # Non-ischemic cardiomyopathy secondary to Chagas s/p heart transplant 12/12/16  - Telemetry reviewed  - Continue prednisone 10mg  - Continue tacrolimis 1.5mg bid  - Continue mycophenolate 1500 mg bid  - Can hold bactrim ppx while on vancomycin and zosyn  - Continue valganciclovir 90mg   - Cards aware of admission, appreciate any recs     # Latent Tuberculosis  - Continue isoniazid 300mg qday  - Per ID, does not need TB precuations     # Chronic conditions  - Hyperlipidemia: Hold pravastatin  - Constipation: Continue home senna-docusate  - Continue home calcium/vit D/multivitamin  - GERD: Continue home pantoprazole      FEN:  -- IVF: s/p 1 NS bolus  -- Labs: Strep pneumo and legionella, tacrolimus level  -- Diet: Regular diet     PPX:   -- VTE:  Mechanical   -- Droplet precautions      Code: Full     Dispo: Pending clinical improvement and transitioned to oral abx     Jennifer Medina MD  Internal Medicine/Pediatrics Resident  [facility]  Pager: [phone]  Please see sticky note for cross cover information.    Patient discussed and plan reviewed with Dr. Tristan GUADARRAMA    ______________________________________________________________________    Interval History   Patient reports feeling better today. He denies headache or chest pain. He continues to cough. His sore throat is improved. He is eating well.     -Data reviewed today: I reviewed all medications, new labs and imaging results over the last 24 hours (see below for reference). I personally reviewed telemetry and labs.   WBC 13.9  BMP wnl  Sputum shows >25 pms with mixed gram positive and gram negative bacteria present  Blood cx NGTD    Physical Exam   Vital Signs: Temp: 97.5  F (36.4  C) Temp src: Oral BP: 95/68 Pulse: 94 Heart Rate: 77 Resp: 16 SpO2: 98 % O2 Device: Nasal cannula Oxygen Delivery: 2 LPM  Weight: 0 lbs 0 oz    General Appearance: Alert, oriented, eating breakfast. No acute distress.  Respiratory: Bibasilar crackles that are worse on the right side, but are improved from yesterday. No wheezing.  Cardiovascular: Regular rate and rhythm. Normal S1 and S2. No murmur.  GI: Soft, non-tender, non-distended.   Skin: No rash. Warm, dry, and well perfused.  Neuro: CN II-XII grossly intact. No focal deficits.    Medications        vancomycin (VANCOCIN) IV  1,500 mg Intravenous Q12H     piperacillin-tazobactam  4.5 g Intravenous Q6H     predniSONE  10 mg Oral BID     pantoprazole  40 mg Oral QAM     valGANciclovir  900 mg Oral Daily     calcium carb 1250 mg (500 mg [place])/vitamin D 200 units  1 tablet Oral BID w/meals     mycophenolate  1,500 mg Oral BID     sulfamethoxazole-trimethoprim  1 tablet Oral Q Mon Thurs BID     aspirin  81 mg Oral Daily     multivitamin, therapeutic  with minerals  1 tablet Oral Daily     tacrolimus  1.5 mg Oral QAM     tacrolimus  1 mg Oral QPM     isoniazid  300 mg Oral Daily     Data     Recent Labs  Lab 03/10/17  0717 03/09/17  0855 03/09/17  0853 03/09/17  0850 03/03/17  0815   WBC 13.9*  --   --  11.5* 4.3   HGB 12.2* 13.6  --  13.3 13.1*   MCV 98  --   --  99 100     --   --  172 135*   INR  --   --   --  1.12  --    NA  --  139  --  140 142   POTASSIUM  --  3.4  --  3.6 3.7   CHLORIDE  --   --   --  104 107   CO2  --   --   --  26 26   BUN  --   --   --  24 15   CR  --   --   --  1.10 0.88   ANIONGAP  --   --   --  10 9   DAISY  --   --   --  9.1 8.7   GLC  --  165*  --  162* 120*   ALBUMIN  --   --   --  3.3*  --    PROTTOTAL  --   --   --  7.1  --    BILITOTAL  --   --   --  1.0  --    ALKPHOS  --   --   --  109  --    ALT  --   --   --  29  --    AST  --   --   --  13  --    TROPI  --   --   --  <0.015The 99th percentile for upper reference range is 0.045 ug/L.  Troponin values in the range of 0.045 - 0.120 ug/L may be associated with risks of adverse clinical events.  --    TROPONIN  --   --  0.01  --   --      Recent Results (from the past 24 hour(s))   POC US ECHO LIMITED    Impression    Limited Bedside Cardiac Ultrasound, performed and interpreted by me.   Indication: Shortness of Breath.  Parasternal long axis, parasternal short axis, apical 4 chamber and subcostal views were acquired.   Image quality was satisfactory.    Findings:    Global left ventricular function appears intact.  Chambers do not appear dilated.  There is no evidence of free fluid within the pericardium.    IMPRESSION: Grossly normal left ventricular function and chamber size.  No pericardial effusion..     XR Chest Port 1 View    Narrative    XR CHEST PORT 1 VW 3/9/2017 9:33 AM    CLINICAL HISTORY: cough, fever. Patient with a history of Chagas'  cardiomyopathy and latent tuberculosis.    COMPARISON: 12/29/2016    FINDINGS: Stable cardiomediastinal silhouette and  pulmonary  vasculature. Increased retrocardiac opacities. No pneumothorax or  pleural effusion.       Impression    IMPRESSION:   Retrocardiac pulmonary opacities could represent atelectasis or  infection. Upright PA and lateral radiographs of the chest could be  obtained if clinically indicated.    I have personally reviewed the examination and initial interpretation  and I agree with the findings.    PALLAVI PATIÑO MD   CT Chest Pulmonary Embolism w Contrast    Narrative    Examination:  CT CHEST PULMONARY EMBOLISM W CONTRAST 3/9/2017 1:54 PM     Comparison: None.    History: chest pain, shortness of breath, s/p heart transplant    TECHNIQUE: Volumetric helical acquisition of CT images of the chest  from the lung apices to the kidneys were acquired in arterial phase  after the administration of IV contrast. Contrast dose: iopamidol  (ISOVUE-370) solution 55 mL    FINDINGS:    Chest:    There is adequate opacification of the main and lobar pulmonary  arteries. No filling defect in the lobar and main segmental pulmonary  arteries to suggest pulmonary embolism.  There is no right-sided heart  strain. No reflux of contrast into the hepatic veins.    Irregular contour along the left lateral aspect of the main pulmonary  artery series 7 image 118 presumably is postsurgical. Heart is not  enlarged. Trace pericardial effusion. No mediastinal or axillary  lymphadenopathy. There is bilateral parahilar soft tissue thickening.    Central tracheobronchial tree is clear. There is bibasilar bronchial  wall thickening. There is extensive bilateral solid and groundglass  nodules most predominant in the perihilar region but also notable in  the right middle lobe in the bilateral bases. There is bibasilar  septal thickening and atelectasis. No pleural effusion or  pneumothorax.    Upper Abdomen: Limited evaluation given the particular phase of  contrast. The visualized portions of the liver, gallbladder and spleen  are within normal  limits given this limitation.    Bones and soft tissues: No aggressive osseous lesions. Soft tissues  are within normal limits. Sternotomy wires in place.      Impression    IMPRESSION:   1. Extensive bilateral nodular groundglass opacities suspicious for  atypical or typical infection.  2. No evidence for pulmonary embolism.  3. Postsurgical changes of heart transplant. No evidence for sternal  dehiscence or periincisional infection.    I have personally reviewed the examination and initial interpretation  and I agree with the findings.    PALLAVI PATIÑO MD

## 2017-03-10 NOTE — PROGRESS NOTES
[facility]  Patient is currently open to home care services with [facility].  The patient is currently receiving RN services.  [facility]  and team have been notified of patient admission.  [facility] liaison will continue to follow patient during stay.  If appropriate provide orders to resume home care at time of discharge.    Noemi Lynne RN  [facility] Liaison

## 2017-03-10 NOTE — PLAN OF CARE
Problem: Goal Outcome Summary  Goal: Goal Outcome Summary  Pt reports feeling better today since IV abxs started. Continues to have ongoing cough w/productive sputum but pt states via  that he feels good and has his appetite back; primary complaint is hoarse voice. Afebrile today. Continues on zosyn & vanco; was also given one-time levaquin IV dose. Urine sample sent for further testing. Awaiting results of various cultures. Scheduled heart biopsy for today cancelled d/t pt's hospital admission. Will re-schedule in future after DC. Per MD plan for eventual change to oral antibiotics for discharge. Reviewed pt's status and plan of care w/pt and  present this morning. Per pt request-orders for cardiac rehab placed so pt can maintain strength while here.    Addendum: Respiratory Virus Panel all NEG so droplet iso dc'd.

## 2017-03-10 NOTE — PLAN OF CARE
Problem: Infection, Risk/Actual (Adult)  Goal: Identify Related Risk Factors and Signs and Symptoms  Related risk factors and signs and symptoms are identified upon initiation of Human Response Clinical Practice Guideline (CPG)   Outcome: No Change  D: Admitted 3/9 with fever,cough,SOB + weakness for 2-3 days. 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     I: Monitored vitals and assessed pt status.      A: A0x4. VSS, on 2L NC. NSR. Afebrile.Denies any pain. Adequate UO. Patient slept between cares.      Temp:  [97.5  F (36.4  C)-102.3  F (39.1  C)] 97.5  F (36.4  C)  Pulse:  [94] 94  Heart Rate:  [] 77  Resp:  [16-41] 16  BP: ()/(54-75) 95/68  SpO2:  [91 %-98 %] 98 %       P: Continue to monitor Pt status and report changes to treatment team.

## 2017-03-10 NOTE — CONSULTS
"  [facility]  Transplant Infectious Disease Consult Note - New Patient     Patient:  Javi Bansal, Date of birth 1970, Medical record number [id number]  Date of Visit:  03/10/2017  Consult requested by Dr. Hudson for evaluation of pneumonia         Assessment and Recommendations:   Recommendations:  -Continue weekly Chagas disease PCR monitoring   *No MD pick-ups from our send-out lab on weekends, so please order Monday   *Order as \"Laboratory Miscellaneous Order\", with comments/test name as \"Purple top tube for Chagas PCR to be sent [facility]\".  -Recommend d/c levofloxacin  -Agree with continuing INH + B6. No need for isolation.  -Can d/c droplet isoaltion, as respiratory panel negative.  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 He has had one reactivation of Chagas in December 2016, and has since taken two months of benznidazole (completed first week of March). He has been monitored with blood PCR for Trypanosoma, with last on 2/9/17 being negative.    1. HAP vs CAP. CT findings noted, concerning for possible fungal etiology, though could also be typical organisms in CAP/HAP. Agree with covering for the latter with vancomycin and zosyn (no need for levofloxacin at present). Would also recommend full fungal work-up as described above. PT is symptomatically improving (not dyspneic, afebrile, no leukocytosis), though still with slightly productive cough. Will continue to monitor symptoms and labs.    2. Chagas disease in post-transplant " patient. No signs outwardly concerning for reactivation, though recommend he go back to weekly PCR screening for the time being (next opportunity will be Monday due to send-out schedule with [facility]). Finished benznidazole x 2 months last week.    Older ID Issues  Chagas PCRs   12/19/16 - negative   12/26/16 - positive, 32   1/2/17 - positive, 30   1/9/17 - positive, 28   1/18/17 - positive   1/27/17 - negative   2/9/17 - negative    Latent TB: being treated w/ course of INH/ B6    Other ID issues:  - prophylaxis: bactrim and vGCV  - serostatus: EBV, CMV, VZV seropositive, Hep C negative  - immunizations: up to date  - isolation: good hand hygiene    Thank you for consulting us in the care of your patient. Feel free to call with any questions.    Sebastien Beal, ID Fellow, [phone]    Attestation:  I have reviewed today's vital signs, medications, labs and imaging.      Floor time: 45 minutes, Face-to-face time: 15 minutes, Total time: 60 minutes  Ivan Schuler,   Pager [phone]  Javi Bansal was seen in the hospital by Ivan Schuler on March 10th with Dr. Beal.  I reviewed the history & exam. Assessment and plan were jointly made.  I agree with and have edited the fellow's note and plan of care.  Pt well known to me w/ Chagas cardiomyopathy s/p OHT w/ Chagas reactivation since re-treated for 2 months.  Now admitted w/ acute pulmonary process, few days of fevers, cough, sore throat.  Extensive patchy GGO / maybe early nodular infiltrates on CT chest.  + procalcitonin w/ mild leukocytosis do trend towards a bacterial pneumonia.  GIven the CT scan findings could also be early fungal pneumonia early after transplant.  Clinically is improving on abx's w/ sputum gram stain inflammatory.  Unfortunately the sputum grew normal jamie.  Urine strep pneumo and legionella negative.  Respiratory virus panel negative.  Clinically he is feeling better.   If does not do well clinically would consider  "Bronch/ BAL on Monday.  Recommendations above. Ivan Schuler MD.          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 He states that it began with a fever \"a few days ago\", progressing to a cough that now causes mild chest pain and produces a small amount of yellow-white sputum. He denies hemoptysis.. His throat became progressively sore over the pas two days, now described as \"itchy\". He also had some mild back pain, now resolved. He denies any sick contacts (lives with sister, who has not been sick), and does not travel outside of his home often (not currently working). He has not traveled recently, has not eaten food from outside the country, and has not been around anyone recently from other countries. No pets or children at home. He denies recent nausea/vomiting, weight gain or loss, night sweats, chills, diarrhea, constipation, abdominal pain.    He has been followed by infectious disease for some time due to his Chagas disease, and has completed a two month course of benznidazole as of last wee for a post-transplant (last week of December) reactivation of hsi Chagas. He had negative Chagas PCRs on 1/27 and 2/9, with another planned for Monday to monitor disease activity. He was previously treated with 3 months of nifurtimox in 2012.    Mr. Bibiana Shetty was also diagnosed with LTBI in the past, currently on INH + B6 which he started 9/17/16. He denies any typical active TB symptoms (weight loss, night sweats, hemoptysis).     With regard to his transplant, he has had no infectious complication apart from the Chagas reactivation mentioned above. He is maintained on tacrolimus, prednisone 10mg daily, and MMF for immunosuppression therapy. Heart biopsy on 12/29/17 showed acute cellular rejection, treated with thymoglobulin and " steroid burst and taper. A follow-up biopsy on 2/10 showed a lower grade acute cellular rejection.      Transplants:  12/12/2016 (Heart), Postoperative day:  88    Past Medical History   Diagnosis Date     Chagas cardiomyopathy      Chronic systolic heart failure (H)      Dual ICD (implantable cardioverter-defibrillator) in place 10/20/2015     Essential hypertension      Gastroesophageal reflux disease      H/O gastric ulcer      Hypertension      Premature ventricular contractions      Presbyopia      Pterygium      ?? suspected , but unclear dx     Past Surgical History   Procedure Laterality Date     Cardiac surgery       AICD     Transplant heart recipient N/A 12/12/2016     Procedure: TRANSPLANT HEART RECIPIENT;  Surgeon: Elo Madsen MD;  Location:  OR     Family History   Problem Relation Age of Onset     Brain Cancer Mother      Melanoma No family hx of      Skin Cancer No family hx of      Social History     Social History     Marital status:      Spouse name: N/A     Number of children: N/A     Years of education: N/A     Occupational History     Not on file.     Social History Main Topics     Smoking status: Never Smoker     Smokeless tobacco: Not on file     Alcohol use No     Drug use: No     Sexual activity: Not on file     Other Topics Concern     Not on file     Social History Narrative     Immunization History   Administered Date(s) Administered     Hepatitis B 08/04/2016, 09/15/2016, 02/09/2017     Influenza (IIV3) 10/05/2016     Influenza Vaccine IM 3yrs+ 4 Valent IIV4 11/01/2011, 03/21/2015     Pneumococcal (PCV 13) 08/04/2016     Pneumococcal 23 valent 03/21/2015     TDAP (BOOSTRIX AGES 10-64) 08/04/2016     Patient Active Problem List   Diagnosis     Chagas cardiomyopathy     Cardiac defibrillator in situ-[employer], dual chamber - Not Dependent     CHF (congestive heart failure) (H)     Chronic systolic heart failure (H)     Chest pain     ACP (advance care planning)     Heart  transplant candidate     Heart failure (H)     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     Heart replaced by transplant (H)     Need for prophylactic antibiotic     Elevated liver enzymes     Ventricular tachycardia, non-sustained (H)     Heart transplant graft rejection (H)     Pneumonia            Review of Systems:   Full 12-point ROS performed and described above. All pertinent points negative unless described in HPI.         Current Medications (antimicrobials listed in bold):       vancomycin (VANCOCIN) IV  1,500 mg Intravenous Q12H     piperacillin-tazobactam  4.5 g Intravenous Q6H     predniSONE  10 mg Oral BID     pantoprazole  40 mg Oral QAM     valGANciclovir  900 mg Oral Daily     calcium carb 1250 mg (500 mg [place])/vitamin D 200 units  1 tablet Oral BID w/meals     mycophenolate  1,500 mg Oral BID     aspirin  81 mg Oral Daily     multivitamin, therapeutic with minerals  1 tablet Oral Daily     tacrolimus  1.5 mg Oral QAM     tacrolimus  1 mg Oral QPM     isoniazid  300 mg Oral Daily     Infusions:          Allergies:   No Known Allergies       Physical Exam:   Vitals were reviewed.   Ranges for his vital signs:  Temp:  [97.5  F (36.4  C)-99.4  F (37.4  C)] 97.8  F (36.6  C)  Pulse:  [90-94] 90  Heart Rate:  [77-94] 92  Resp:  [16-20] 17  BP: ()/(62-81) 99/74  SpO2:  [93 %-98 %] 97 %     Physical Examination:  GENERAL:  well-developed, well-nourished, sitting on edge bed in no acute distress.  HEENT:  Head is normocephalic, atraumatic   EYES:  Eyes have anicteric sclerae without conjunctival injection or stigmata of endocarditis.    ENT:  Oropharynx is moist without exudates or ulcers.  NECK:  Supple. No cervical lymphadenopathy  LUNGS:  Very mild left basilar crackle, clear right lung. No wheeze, no increased work of breathing.   CARDIOVASCULAR:  Regular rate and rhythm with no murmurs, gallops or rubs.  ABDOMEN:  Normal bowel sounds, soft, nontender. No appreciable  hepatosplenomegaly.  SKIN:  No acute rashes.  Line(s) are in place without any surrounding erythema or exudate.   NEUROLOGIC:  Grossly nonfocal. Active x4 extremities         Laboratory Data:     Metabolic Studies     Recent Labs   Lab Test  03/10/17   0717  03/09/17   0855  03/09/17   0850  03/03/17   0815  02/21/17   1038  02/10/17   0904  01/27/17   0931  01/13/17   0803   NA  142  139  140  142  140  142  140  137   POTASSIUM  3.7  3.4  3.6  3.7  3.5  3.7  3.8  4.5   CHLORIDE  106   --   104  107  105  106  106  103   CO2  23   --   26  26  25  25  24  26   ANIONGAP  12   --   10  9  10  11  10  8   BUN  21   --   24  15  28  17  25  26   CR  0.83   --   1.10  0.88  0.84  0.98  0.94  0.72   GFRESTIMATED  >90  Non  GFR Calc     --   72  >90  Non  GFR Calc    >90  Non  GFR Calc    82  86  >90  Non  GFR Calc     GLC  133*  165*  162*  120*  149*  103*  128*  155*   DAISY  9.1   --   9.1  8.7  9.0  9.5  9.2  8.9   PHOS   --    --    --   2.5   --    --    --   2.4*   MAG   --    --    --   1.6  1.9   --    --   1.3*       Hepatic Studies  Recent Labs   Lab Test  03/09/17   0850  01/27/17   0931  01/13/17   0803  01/11/17   0717  01/10/17   0542  01/09/17   0755   BILITOTAL  1.0  0.4  0.7  0.5  0.6  0.8   ALKPHOS  109  137  163*  151*  164*  179*   ALBUMIN  3.3*  3.8  3.4  2.9*  2.9*  3.3*   AST  13  16  23  23  25  32   ALT  29  50  68  76*  93*  101*       Hematology Studies    Recent Labs   Lab Test  03/10/17   0717  03/09/17   0855  03/09/17   0850  03/03/17   0815  02/21/17   1038  02/10/17   0904  01/27/17   0931   01/11/17   0717  01/10/17   0542   WBC  13.9*   --   11.5*  4.3  15.1*  7.7  13.6*   < >  12.3*  12.0*   ANEU  11.6*   --   8.9*  2.4  11.5*   --    --    --   10.6*  11.2*   ALYM  0.8   --   1.8  1.1  1.4   --    --    --   0.4*  0.3*   GAGE  0.8   --   0.5  0.7  1.3   --    --    --   0.8  0.2   AEOS  0.0   --   0.1  0.0  0.0   --    --     --   0.0  0.0   HGB  12.2*  13.6  13.3  13.1*  13.5  13.3  13.2*   < >  10.5*  10.5*   HCT  35.9*   --   40.0  40.0  39.8*  40.3  39.0*   < >  32.3*  32.5*   MCV  98   --   99  100  95  99  99   < >  99  97   PLT  159   --   172  135*  239  207  209   < >  220  232    < > = values in this interval not displayed.       Clotting Studies    Recent Labs   Lab Test  03/09/17   0850  12/19/16   0408  12/17/16   1430  12/14/16   0826  12/14/16   0400  12/14/16   0017  12/13/16   2034   INR  1.12  1.52*  1.67*   --   1.48*   --    --    PTT   --    --    --   29  31  31  31       Urine Studies  Recent Labs   Lab Test  03/09/17   1033  12/20/16   1043  12/18/16   1451  12/11/16   2238  06/18/16   0405   URINEPH  6.0  5.0  5.5  7.0  6.0   NITRITE  Negative  Negative  Negative  Negative  Negative   LEUKEST  Negative  Negative  Negative  Negative  Negative   WBCU  <1  1  0  0  1       Microbiology:  Blood culture (3/9) - NGTD  Sputum cx/stain (3/9) - moderate growth normal jamie  Urine culture (3/10) - negative  Throat culture (3/9) - NG    Rapid strep - negative  Influenza A/B PCR - negative  Respiratory virus panel - negative  Pneumococcal antigen - negative  Legionella antigen - negative    Respiratory virus panel 3/9/17 negative    Virology:  EBV viral loads     Recent Labs   Lab Test  01/13/17   0803   EBRES  EBV DNA Not Detected     EBV DNA Copies/mL   Date Value Ref Range Status   01/13/2017 EBV DNA Not Detected EBVNEG [Copies]/mL Final       Hepatitis B Testing   Recent Labs   Lab Test  01/13/17   0803  06/18/16   0720   HBCAB  Nonreactive  Nonreactive   HEPBANG  Nonreactive  Nonreactive       Hepatitis C Testing   Hepatitis C Antibody   Date Value Ref Range Status   01/13/2017  NR Final    Nonreactive   Assay performance characteristics have not been established for newborns,   infants, and children     08/04/2016  NR Final    Nonreactive   Assay performance characteristics have not been established for newborns,    infants, and children         Imaging:  CT Chest 3/9/17  1. Extensive bilateral nodular groundglass opacities suspicious for atypical or typical infection.  2. No evidence for pulmonary embolism.  3. Postsurgical changes of heart transplant. No evidence for sternal dehiscence or periincisional infection.

## 2017-03-10 NOTE — PROGRESS NOTES
Care Coordinator Progress Note     Admission Date/Time:  3/9/2017  Attending MD:  Tristan Nieves MD     Data  Chart reviewed, discussed with interdisciplinary team.   Patient was admitted for:    Pneumonia of left lower lobe due to infectious organism  Heart replaced by transplant (H)  Other pneumonia, unspecified organism.    Concerns with insurance coverage for discharge needs: None.  Current Living Situation: Patient lives with family.  Support System: Supportive  Services Involved: Home Care  Transportation: Family or Friend will provide      Assessment:  Updated that pt may discharge over the weekend.  Pt is currently open to [facility] for RN services.  I met with pt to review plan of discharge.  We communicated via blue  phone without difficulty.  Pt stated that he lives with his sister and also has a home care RN visit him once weekly on Fridays to help with his medications, take blood draws and review his vital signs. Pt would like this to continue at his discharge.  Pt had OP cardiac rehab ordered but it has not been set up yet.  He is waiting for his transplant coordinator to help with this.  MD to order OP CR at discharge if appropriate. If pt discharges over the weekend, one of his sisters will be giving him a ride home.      Coordination of Care:  Orders placed for resumption of home care RN.         Plan  Anticipated Discharge Date:  03/11 or 03/12  Anticipated Discharge Plan:  Home with family and resumption of home care.        Jeffry Negro, RN, BSN for [facility]  Care Coordinator  Pager: [phone]  Phone:  [phone]

## 2017-03-10 NOTE — PHARMACY-ADMISSION MEDICATION HISTORY
Admission medication history interview status for the 3/9/2017 admission is complete. See Epic admission navigator for allergy information, pharmacy, prior to admission medications and immunization status.     Medication history interview sources:  Patient with the help of an . He had a medication card that he stated an RN keeps up to date and comes every Friday. Patient also had his pill bottles with him that are filled through [facility].    Changes made to PTA medication list (reason)  Added: none  Deleted: none  Changed: Valcyte 900mg po daily to 450mg po bid    Additional medication history information (including reliability of information, actions taken by pharmacist):  -Patient states that RN assists him every Friday with setting up medications.  -Senokot-S is not on medication card.  -Benznidazole was on medication card, but crossed out. He was not familiar with the medication when I asked him about it. There was also NOT a bottle of this medication with the rest of his medication bottles.      Prior to Admission medications    Medication Sig Last Dose Taking? Auth Provider   tacrolimus (PROGRAF - GENERIC EQUIVALENT) 0.5 MG capsule TAke 1.5 mg in the AM; take 1 mg in the PM 3/9/2017 at Unknown time Yes Geena Mcclellan MD   predniSONE (DELTASONE) 5 MG tablet Take 2 tablets (10 mg) by mouth 2 times daily  Yes Geena Mcclellan MD   isoniazid (NYDRAZID) 300 MG tablet Take 1 tablet (300 mg) by mouth daily  Yes Geena Mcclellan MD   nystatin (MYCOSTATIN) 614826 UNIT/ML suspension Take 10 mLs (1,000,000 Units) by mouth 4 times daily  Yes Geena Mcclellan MD   pravastatin (PRAVACHOL) 20 MG tablet Take 1 tablet (20 mg) by mouth every evening  Yes Geena Mcclellan MD   sulfamethoxazole-trimethoprim (BACTRIM DS/SEPTRA DS) 800-160 MG per tablet Take 1 tablet by mouth twice a week On mondays and thursdays  Yes Geena Mcclellan MD   mycophenolate  (CELLCEPT - GENERIC EQUIVALENT) 250 MG capsule Take 6 capsules (1,500 mg) by mouth 2 times daily  Yes Geena Mcclellan MD   aspirin EC 81 MG EC tablet Take 1 tablet (81 mg) by mouth daily  Yes Madalyn Auguste APRN CNP   calcium carb 1250 mg, 500 mg [place],/vitamin D 200 units (OSCAL WITH D) 500-200 MG-UNIT per tablet Take 1 tablet by mouth 2 times daily (with meals)  Yes Madalyn Auguste APRN CNP   multivitamin, therapeutic with minerals (THERA-VIT-M) TABS tablet Take 1 tablet by mouth daily  Yes Madalyn Auguste APRN CNP   pantoprazole (PROTONIX) 40 MG EC tablet Take 1 tablet (40 mg) by mouth every morning  Yes Madalyn Auguste APRN CNP   tacrolimus (PROGRAF - GENERIC EQUIVALENT) 1 MG capsule Take 1.5 mg in the AM; take 1 mg in the PM   Geena Mcclellan MD   order for DME Equipment being ordered: Splint - Cubital tunnel splint for ulnar neuropathy   Liliana Alcocer MD   valGANciclovir (VALCYTE) 450 MG tablet Take 2 tablets (900 mg) by mouth daily  Patient taking differently: Take 450 mg by mouth 2 times daily    Madalyn Auguste APRN CNP   senna-docusate (SENOKOT-S;PERICOLACE) 8.6-50 MG per tablet Take 2 tablets by mouth nightly as needed for constipation (If no stools during the day)   Madalyn Auguste APRN CNP   ketoconazole (NIZORAL) 2 % shampoo Apply topically three times a week Alternate with Head and Shoulders.   aMrino Woo MD       Medication history completed by: Nathaly Greene, Pharm.D., BCPS

## 2017-03-10 NOTE — PLAN OF CARE
Problem: Goal Outcome Summary  Goal: Goal Outcome Summary  Admission to 6C  From ED      3/9/2017   -----------------------------------------------------------  Diagnosis: Pneumonia  Teaching: orientation to unit, call don't fall, use of console, meal times, visiting hours, when to call for the RN (angina/sob/dizzyness, etc.), and enforced importance of safety   Access: PIV  Telemetry:Placed on pt  Ht./Wt./Vitals: complete     Oncoming RN informed of admission requirements yet to be completed. Continue with plan of care; for questions and/or concerns, contact Marva.     Jennifer Clayton, RN  Cardiology

## 2017-03-11 ENCOUNTER — APPOINTMENT (OUTPATIENT)
Dept: PHYSICAL THERAPY | Facility: CLINIC | Age: 47
DRG: 193 | End: 2017-03-11
Attending: INTERNAL MEDICINE
Payer: COMMERCIAL

## 2017-03-11 LAB
BACTERIA SPEC CULT: NORMAL
BACTERIA SPEC CULT: NORMAL
C DIFF TOX B STL QL: NORMAL
CAMPYLOBACTER GROUP BY NAT: NOT DETECTED
CRYPTOC AG SPEC QL: NORMAL
ENTERIC PATHOGEN COMMENT: NORMAL
ERYTHROCYTE [DISTWIDTH] IN BLOOD BY AUTOMATED COUNT: 14 % (ref 10–15)
HCT VFR BLD AUTO: 33.1 % (ref 40–53)
HGB BLD-MCNC: 11.2 G/DL (ref 13.3–17.7)
Lab: NORMAL
MCH RBC QN AUTO: 32.7 PG (ref 26.5–33)
MCHC RBC AUTO-ENTMCNC: 33.8 G/DL (ref 31.5–36.5)
MCV RBC AUTO: 97 FL (ref 78–100)
MICRO REPORT STATUS: NORMAL
NOROVIRUS I AND II BY NAT: NOT DETECTED
PLATELET # BLD AUTO: 184 10E9/L (ref 150–450)
RBC # BLD AUTO: 3.42 10E12/L (ref 4.4–5.9)
ROTAVIRUS A BY NAT: NOT DETECTED
SALMONELLA SPECIES BY NAT: NOT DETECTED
SHIGA TOXIN 1 GENE BY NAT: NOT DETECTED
SHIGA TOXIN 2 GENE BY NAT: NOT DETECTED
SHIGELLA SP+EIEC IPAH STL QL NAA+PROBE: NOT DETECTED
SPECIMEN SOURCE: NORMAL
TACROLIMUS BLD-MCNC: 8.9 UG/L (ref 5–15)
TACROLIMUS BLD-MCNC: NORMAL UG/L (ref 5–15)
TME LAST DOSE: NORMAL H
TME LAST DOSE: NORMAL H
VIBRIO GROUP BY NAT: NOT DETECTED
WBC # BLD AUTO: 11.2 10E9/L (ref 4–11)
YERSINIA ENTEROCOLITICA BY NAT: NOT DETECTED

## 2017-03-11 PROCEDURE — 80197 ASSAY OF TACROLIMUS: CPT | Performed by: INTERNAL MEDICINE

## 2017-03-11 PROCEDURE — 25000131 ZZH RX MED GY IP 250 OP 636 PS 637: Performed by: STUDENT IN AN ORGANIZED HEALTH CARE EDUCATION/TRAINING PROGRAM

## 2017-03-11 PROCEDURE — 25000132 ZZH RX MED GY IP 250 OP 250 PS 637: Performed by: INTERNAL MEDICINE

## 2017-03-11 PROCEDURE — 25000125 ZZHC RX 250: Performed by: EMERGENCY MEDICINE

## 2017-03-11 PROCEDURE — 85027 COMPLETE CBC AUTOMATED: CPT | Performed by: STUDENT IN AN ORGANIZED HEALTH CARE EDUCATION/TRAINING PROGRAM

## 2017-03-11 PROCEDURE — 87506 IADNA-DNA/RNA PROBE TQ 6-11: CPT | Performed by: STUDENT IN AN ORGANIZED HEALTH CARE EDUCATION/TRAINING PROGRAM

## 2017-03-11 PROCEDURE — 25000125 ZZHC RX 250: Performed by: INTERNAL MEDICINE

## 2017-03-11 PROCEDURE — 40000193 ZZH STATISTIC PT WARD VISIT

## 2017-03-11 PROCEDURE — 97110 THERAPEUTIC EXERCISES: CPT | Mod: GP

## 2017-03-11 PROCEDURE — 25000131 ZZH RX MED GY IP 250 OP 636 PS 637: Performed by: INTERNAL MEDICINE

## 2017-03-11 PROCEDURE — 87305 ASPERGILLUS AG IA: CPT | Performed by: STUDENT IN AN ORGANIZED HEALTH CARE EDUCATION/TRAINING PROGRAM

## 2017-03-11 PROCEDURE — 25000132 ZZH RX MED GY IP 250 OP 250 PS 637: Performed by: STUDENT IN AN ORGANIZED HEALTH CARE EDUCATION/TRAINING PROGRAM

## 2017-03-11 PROCEDURE — 25000128 H RX IP 250 OP 636: Performed by: INTERNAL MEDICINE

## 2017-03-11 PROCEDURE — 99233 SBSQ HOSP IP/OBS HIGH 50: CPT | Mod: GC | Performed by: INTERNAL MEDICINE

## 2017-03-11 PROCEDURE — 97161 PT EVAL LOW COMPLEX 20 MIN: CPT | Mod: GP

## 2017-03-11 PROCEDURE — 25000128 H RX IP 250 OP 636: Performed by: EMERGENCY MEDICINE

## 2017-03-11 PROCEDURE — 87449 NOS EACH ORGANISM AG IA: CPT | Performed by: STUDENT IN AN ORGANIZED HEALTH CARE EDUCATION/TRAINING PROGRAM

## 2017-03-11 PROCEDURE — 80197 ASSAY OF TACROLIMUS: CPT | Performed by: STUDENT IN AN ORGANIZED HEALTH CARE EDUCATION/TRAINING PROGRAM

## 2017-03-11 PROCEDURE — 36415 COLL VENOUS BLD VENIPUNCTURE: CPT | Performed by: STUDENT IN AN ORGANIZED HEALTH CARE EDUCATION/TRAINING PROGRAM

## 2017-03-11 PROCEDURE — 21400006 ZZH R&B CCU INTERMEDIATE UMMC

## 2017-03-11 PROCEDURE — 97116 GAIT TRAINING THERAPY: CPT | Mod: GP

## 2017-03-11 PROCEDURE — 97530 THERAPEUTIC ACTIVITIES: CPT | Mod: GP

## 2017-03-11 PROCEDURE — 87899 AGENT NOS ASSAY W/OPTIC: CPT | Performed by: STUDENT IN AN ORGANIZED HEALTH CARE EDUCATION/TRAINING PROGRAM

## 2017-03-11 PROCEDURE — 87493 C DIFF AMPLIFIED PROBE: CPT | Performed by: STUDENT IN AN ORGANIZED HEALTH CARE EDUCATION/TRAINING PROGRAM

## 2017-03-11 RX ORDER — LEVOFLOXACIN 500 MG/1
500 TABLET, FILM COATED ORAL DAILY
Status: DISCONTINUED | OUTPATIENT
Start: 2017-03-11 | End: 2017-03-12 | Stop reason: HOSPADM

## 2017-03-11 RX ADMIN — PREDNISONE 10 MG: 10 TABLET ORAL at 08:07

## 2017-03-11 RX ADMIN — PIPERACILLIN SODIUM,TAZOBACTAM SODIUM 4.5 G: 4; .5 INJECTION, POWDER, FOR SOLUTION INTRAVENOUS at 20:28

## 2017-03-11 RX ADMIN — VANCOMYCIN HYDROCHLORIDE 1500 MG: 10 INJECTION, POWDER, LYOPHILIZED, FOR SOLUTION INTRAVENOUS at 10:58

## 2017-03-11 RX ADMIN — ISONIAZID 300 MG: 300 TABLET ORAL at 08:07

## 2017-03-11 RX ADMIN — TACROLIMUS 1.5 MG: 0.5 CAPSULE ORAL at 08:07

## 2017-03-11 RX ADMIN — PIPERACILLIN SODIUM,TAZOBACTAM SODIUM 4.5 G: 4; .5 INJECTION, POWDER, FOR SOLUTION INTRAVENOUS at 08:02

## 2017-03-11 RX ADMIN — PANTOPRAZOLE SODIUM 40 MG: 40 TABLET, DELAYED RELEASE ORAL at 08:07

## 2017-03-11 RX ADMIN — TACROLIMUS 1 MG: 1 CAPSULE ORAL at 17:13

## 2017-03-11 RX ADMIN — GUAIFENESIN 10 ML: 100 SOLUTION ORAL at 20:27

## 2017-03-11 RX ADMIN — PIPERACILLIN SODIUM,TAZOBACTAM SODIUM 4.5 G: 4; .5 INJECTION, POWDER, FOR SOLUTION INTRAVENOUS at 01:01

## 2017-03-11 RX ADMIN — PREDNISONE 10 MG: 10 TABLET ORAL at 20:28

## 2017-03-11 RX ADMIN — MYCOPHENOLATE MOFETIL 1500 MG: 250 CAPSULE ORAL at 08:08

## 2017-03-11 RX ADMIN — VALGANCICLOVIR HYDROCHLORIDE 900 MG: 450 TABLET ORAL at 08:07

## 2017-03-11 RX ADMIN — OYSTER SHELL CALCIUM WITH VITAMIN D 1 TABLET: 500; 200 TABLET, FILM COATED ORAL at 17:13

## 2017-03-11 RX ADMIN — PIPERACILLIN SODIUM,TAZOBACTAM SODIUM 4.5 G: 4; .5 INJECTION, POWDER, FOR SOLUTION INTRAVENOUS at 14:06

## 2017-03-11 RX ADMIN — ASPIRIN 81 MG: 81 TABLET, COATED ORAL at 08:07

## 2017-03-11 RX ADMIN — MYCOPHENOLATE MOFETIL 1500 MG: 250 CAPSULE ORAL at 20:27

## 2017-03-11 RX ADMIN — Medication 3 MG: at 01:59

## 2017-03-11 RX ADMIN — MULTIPLE VITAMINS W/ MINERALS TAB 1 TABLET: TAB at 08:07

## 2017-03-11 RX ADMIN — LEVOFLOXACIN 500 MG: 500 TABLET, FILM COATED ORAL at 14:20

## 2017-03-11 RX ADMIN — OYSTER SHELL CALCIUM WITH VITAMIN D 1 TABLET: 500; 200 TABLET, FILM COATED ORAL at 08:07

## 2017-03-11 RX ADMIN — Medication 3 MG: at 20:27

## 2017-03-11 NOTE — PROGRESS NOTES
[facility], [place]    Internal Medicine Progress Note - [facility] Service    Changes Today:  - Legionella and Strep pneumo negative  - C. Diff and enteric panel due to 4-5 episodes of diarrhea last night  - Histo, crypto, beta-d-glucan, galactomannan, and sputum fungal cx per ID  - DC vancomycin    Assessment & Plan   46 year old male with PMHx most significant for non-ischemic cardiomyopathy secondary to Chagas s/p heart transplant 12/12/16, latent TB on isoniazid, and HTN presenting with fever, productive cough, and sore throat for 2 days most consistent with hospital acquired pneumonia (typical or atypical pathogen). Patient is improving on antibiotics.     # Hospital Acquired Pneumonia  The most likely dx continues to be hospital acquired pneumonia, given recent hospitalization. Patient is improving clinically on vanc/zosyn. Imaging concerning for atypical pathogen, levaquin was added 3/10. Sputum shows >25 pms with mixed gram positive and gram negative bacteria present. WBC mildly elevated today.  - Continue zosyn, DC vancomycin   - Blood cultures NGTD, sputum cultures mixed gram pos/gram neg, respiratory viral panel pending, iinfluenza negative  - ID consult given immunosuppression, latent TB, and h/o Chagas  - Continue levaquin  - CBC tomorrow     # Non-ischemic cardiomyopathy secondary to Chagas s/p heart transplant 12/12/16  - Telemetry reviewed  - Continue prednisone 10mg  - Continue tacrolimis 1.5mg bid  - Continue mycophenolate 1500 mg bid  - Can hold bactrim ppx while on vancomycin and zosyn  - Continue valganciclovir 90mg   - Cards aware of admission, appreciate any recs     # Latent Tuberculosis  - Continue isoniazid 300mg qday  - Per ID, does not need TB precuations     # Chronic conditions  - Hyperlipidemia: Hold pravastatin  - Constipation: Continue home senna-docusate  - Continue home calcium/vit D/multivitamin  - GERD: Continue home pantoprazole      FEN:  -- IVF: s/p 1  NS bolus  -- Diet: Regular diet     PPX:   -- VTE: Mechanical   -- Droplet precautions      Code: Full     Dispo: Pending clinical improvement and transitioned to oral abx     Jennifer Medina MD  Internal Medicine/Pediatrics Resident  Pager: [phone]  Please see sticky note for cross cover information.    Patient discussed and plan reviewed with Dr. Tristan GUADARRAMA    ______________________________________________________________________    Interval History   Overall, patient is feeling better. His cough has improved and his voice has come back. He does note 4-5 episodes of dark colored diarrhea last night. He denies any blood in stool. He denies abdominal pain. He is eating and drinking well. His wife is present in the room.    -Data reviewed today: I reviewed all medications, new labs and imaging results over the last 24 hours (see below for reference). I personally reviewed telemetry and labs.     Physical Exam   Vital Signs: Temp: 97.7  F (36.5  C) Temp src: Oral BP: 111/83 (MAP 94) Pulse: 90 Heart Rate: 87 Resp: 16 SpO2: 98 % O2 Device: None (Room air)    Weight: 154 lbs 9.6 oz    General Appearance: Alert, oriented, eating breakfast. No acute distress.  Respiratory: Clear to auscultation bilaterally. No wheezing.  Cardiovascular: Regular rate and rhythm. Normal S1 and S2. No murmur.  GI: Soft, non-tender, non-distended.   Skin: No rash. Warm, dry, and well perfused.  Neuro: CN II-XII grossly intact. No focal deficits.    Medications        vancomycin (VANCOCIN) IV  1,500 mg Intravenous Q12H     piperacillin-tazobactam  4.5 g Intravenous Q6H     predniSONE  10 mg Oral BID     pantoprazole  40 mg Oral QAM     valGANciclovir  900 mg Oral Daily     calcium carb 1250 mg (500 mg [place])/vitamin D 200 units  1 tablet Oral BID w/meals     mycophenolate  1,500 mg Oral BID     aspirin  81 mg Oral Daily     multivitamin, therapeutic with minerals  1 tablet Oral Daily     tacrolimus  1.5 mg Oral QAM     tacrolimus  1 mg  Oral QPM     isoniazid  300 mg Oral Daily     Data     Recent Labs  Lab 03/11/17  0720 03/10/17  0717 03/09/17  0855 03/09/17  0853 03/09/17  0850   WBC 11.2* 13.9*  --   --  11.5*   HGB 11.2* 12.2* 13.6  --  13.3   MCV 97 98  --   --  99    159  --   --  172   INR  --   --   --   --  1.12   NA  --  142 139  --  140   POTASSIUM  --  3.7 3.4  --  3.6   CHLORIDE  --  106  --   --  104   CO2  --  23  --   --  26   BUN  --  21  --   --  24   CR  --  0.83  --   --  1.10   ANIONGAP  --  12  --   --  10   DAISY  --  9.1  --   --  9.1   GLC  --  133* 165*  --  162*   ALBUMIN  --   --   --   --  3.3*   PROTTOTAL  --   --   --   --  7.1   BILITOTAL  --   --   --   --  1.0   ALKPHOS  --   --   --   --  109   ALT  --   --   --   --  29   AST  --   --   --   --  13   TROPI  --   --   --   --  <0.015The 99th percentile for upper reference range is 0.045 ug/L.  Troponin values in the range of 0.045 - 0.120 ug/L may be associated with risks of adverse clinical events.   TROPONIN  --   --   --  0.01  --      No results found for this or any previous visit (from the past 24 hour(s)).

## 2017-03-11 NOTE — PLAN OF CARE
Problem: Goal Outcome Summary  Goal: Goal Outcome Summary  Pt stable overnight. Tele SR. Denies pain. Speaks minimal english.  phone available prn. Has frequent cough, occasionally productive. Has few fine crackles in bases. Sats 96% RA. Requested med for sleep and Melatonin ordered and given. Pt appeared to sleep after med given. Gets up independently in room. Voiding adequate urine. Will continue to monitor pt.

## 2017-03-11 NOTE — PLAN OF CARE
D:pt produced a small yellow sputum  I:sent down stairs to culture for fungus  P:pending laboratory

## 2017-03-11 NOTE — PROGRESS NOTES
Cards2 brief note    Chart reviewed    Briefly, 46M Hx Chagas CM s/p OHT 12/12/16 (on prednisone 10 bid, MMF 1500 bid, FK 1.5/1), last Bx 2/21/17 showed mild rejection (1R), now admitted to medicine for pneumonia, being treated with levofloxacin and zosyn.  -- Tacrolimus trough (14 hrs) 8.9 today (goal 10-12)  -- Since pt on systemic abx at this time, cont tacrolimus at current dosing 1.5/1  -- If pt remains in house, please check another Tacrolimus trough mid week (Tuesday or Wednesday). Can be arranged outpatient if pt discharges before then  -- Was due for biopsy 3/10/17 but not performed due to acute illness, if no longer septic can be arranged inpatient or outpatient early next week  -- Cont MMF 1500 bid  -- Cont Prednisone 10 BID    Please do not hesitate to call with questions. Plan d/w Dr Zuñiga.    Phil Gagnon  Cardiology Fellow  [phone]

## 2017-03-11 NOTE — PROGRESS NOTES
03/11/17 0830   Quick Adds   Type of Visit Initial PT Evaluation       Present yes   Language [place]   Living Environment   Lives With spouse;sibling(s)   Living Arrangements house   Home Accessibility stairs within home;tub/shower is not walk in   Number of Stairs to Enter Home 4   Number of Stairs Within Home 7  (to basement, 1 rail)   Transportation Available family or friend will provide  (sister provides transportation, pt does not have a car)   Living Environment Comment Pt lives at home with wife and sister.    Self-Care   Dominant Hand right   Usual Activity Tolerance moderate   Current Activity Tolerance moderate   Regular Exercise no   Equipment Currently Used at Home none   Activity/Exercise/Self-Care Comment Pt states he completes HEP provided by PT/OT during last admission. Pt was not participating in cardiac rehab, pt states it was not set up for him near his home.   Functional Level Prior   Ambulation 0-->independent   Transferring 0-->independent   Toileting 0-->independent   Bathing 0-->independent   Dressing 0-->independent   Eating 0-->independent   Communication 0-->understands/communicates without difficulty   Swallowing 0-->swallows foods/liquids without difficulty   Cognition 0 - no cognition issues reported   Fall history within last six months no   Which of the above functional risks had a recent onset or change? none   Prior Functional Level Comment Pt reports independence with all functional mobility and ADLs.   General Information   Onset of Illness/Injury or Date of Surgery - Date 03/09/17   Referring Physician Sandee Meng MD   Patient/Family Goals Statement To participate in therapy while in the hospital.   Pertinent History of Current Problem (include personal factors and/or comorbidities that impact the POC) 46 year old male with PMHx most significant for non-ischemic cardiomyopathy secondary to Chagas s/p heart transplant 12/12/16, latent TB on isoniazid,  and HTN presenting with fever, productive cough, and sore throat for 2 days most consistent with hospital acquired pneumonia (typical or atypical pathogen). Patient is improving on antibiotics.   Precautions/Limitations immunosuppressed   Heart Disease Risk Factors High blood pressure;Medical history;Gender;Lack of physical activity   General Observations Pt received sitting EOB, agreeable to PT, VSS, RN ok'd session.   General Info Comments No activity orders listed, RN ok'd session, Paged MD team.   Cognitive Status Examination   Orientation orientation to person, place and time   Level of Consciousness alert   Follows Commands and Answers Questions 100% of the time   Personal Safety and Judgment intact   Memory intact   Pain Assessment   Patient Currently in Pain No   Integumentary/Edema   Integumentary/Edema no deficits were identifed   Posture    Posture Protracted shoulders   Range of Motion (ROM)   ROM Comment BLE AROM WFL   Strength   Strength Comments BLE observed to be at least 3+/5 with mobility.   Bed Mobility   Bed Mobility Comments Supine<>sit IND   Transfer Skills   Transfer Comments Sit<>stand IND   Gait   Gait Comments Ambulates with SBA, decreased step length and foot clearance, steady throughout   Balance   Balance Comments Sitting and standing balance: good. Dynamic balance not formally assessed.   Sensory Examination   Sensory Perception Comments Denies parasthesias/dysthesias.    Coordination   Coordination no deficits were identified   Muscle Tone   Muscle Tone no deficits were identified   General Therapy Interventions   Planned Therapy Interventions risk factor education;home program guidelines;progressive activity/exercise;gait training   Clinical Impression   Criteria for Skilled Therapeutic Intervention yes, treatment indicated   PT Diagnosis physical deconditioning   Influenced by the following impairments decreased activity tolerance and increased fatigue with activity   Functional  "limitations due to impairments functional endurance and strength   Clinical Presentation Stable/Uncomplicated   Clinical Presentation Rationale Pt with acute illness in presence of significant PMH and recent heart transplant impacting return to PLOF   Clinical Decision Making (Complexity) Low complexity   Therapy Frequency` 3 times/week   Predicted Duration of Therapy Intervention (days/wks) 2-3 days   Anticipated Discharge Disposition Home with Outpatient Therapy   Risk & Benefits of therapy have been explained Yes   Patient, Family & other staff in agreement with plan of care Yes   [facility] TM \"6 Clicks\"   2016, Trustees of [facility], under license to [employer].  All rights reserved.   6 Clicks Short Forms Basic Mobility Inpatient Short Form   [facility] AM-PAC  \"6 Clicks\" V.2 Basic Mobility Inpatient Short Form   1. Turning from your back to your side while in a flat bed without using bedrails? 4 - None   2. Moving from lying on your back to sitting on the side of a flat bed without using bedrails? 4 - None   3. Moving to and from a bed to a chair (including a wheelchair)? 4 - None   4. Standing up from a chair using your arms (e.g., wheelchair, or bedside chair)? 4 - None   5. To walk in hospital room? 4 - None   6. Climbing 3-5 steps with a railing? 4 - None   Basic Mobility Raw Score (Score out of 24.Lower scores equate to lower levels of function) 24   Total Evaluation Time   Total Evaluation Time (Minutes) 5     "

## 2017-03-11 NOTE — DISCHARGE SUMMARY
Medicine Discharge Summary  Javi Bansal MRN: [id number]  1970  Primary care provider: Ivan Burrell  ___________________________________          Date of Admission:  3/9/2017  Date of Discharge:  3/12/2017   Admitting Physician:  Kim Haas MD  Discharge Physician:  Tristan Nieves MD   Discharging Service:  Internal Medicine, [place] [id number]     Primary Provider: Ivan Burrell         Reason for Admission:   Sob, fever, sore throat          Discharge Diagnosis:   Healthcare Acquired Pneumonia  Chagas Disease s/p heart transplant in 2016  Latent TB  Hyperlipidemia  Gerd         Procedures & Significant Findings:   POC US Echo 3/9/17  IMPRESSION: Grossly normal left ventricular function and chamber size.  No pericardial effusion..    CXR 3/9/17  IMPRESSION:   Retrocardiac pulmonary opacities could represent atelectasis or  infection. Upright PA and lateral radiographs of the chest could be obtained if clinically indicated    CT Chest 3/9/17  IMPRESSION:   1. Extensive bilateral nodular groundglass opacities suspicious for atypical or typical infection.  2. No evidence for pulmonary embolism.  3. Postsurgical changes of heart transplant. No evidence for sternal dehiscence or periincisional infection.    Sputum Cx 3/9/17:  <10 Squamous epithelial cells/low power field   >25 PMNs/low power field   Mixed gram positive and gram negative bacteria present.         Consultations:   Transplant Infectious Disease  Transplant Cardiology      Brief HPI:  Adapted from H&P on 3/9/2017.  Please refer to it for further details.  Javi Bansal is a 46 year old male with PMHx of NICM 2/2 Chagas disease s/p cardiac transplantation on 12/12/2016, latent TB on isoniazid, and HTN  presenting with fever, cough, sore throat, headache and shortness of breath for 2 days. The symptoms came on suddenly at 2 am. He has been  coughing up yellow-clear sputum. He denies hemoptysis. He also complains of post-tussive emesis, but denies nausea. He also denies diarrhea, constipation, and difficulties with bowel/bladder function. He endorses myalgias that are worse in his legs. Javi reports that he has been well since his hospital admission for his transplant. This is his first illness since then.          Hospital Course by Problem:      # Acute Hypoxic Respiratory Failure  # Hospital Acquired Pneumonia: Patient initially presented with hypoxic respiratory failure requiring nasal canula oxygen for 24 hours but he was quickly transitioned to room air within 24 hours. Hospital acquired pneumonia most likely given recent hospitalization. Patient initially started on vanc/zosyn on admission and Levaquin added based on imaging concerning for atypical pathogen on 3/10. Blood Cx negative, sputum Cx showed normal oral jamie. Imaging concerning for atypical pathogen, levaquin was added 3/10. Vanc/zosyn was discontinued on day of discharge.   - Continue Levaquin for total of 10 days treatment for HCAP  - F/U with ID on 4/3 for Chaga's disease and latent TB.       # Non-ischemic cardiomyopathy secondary to Chagas s/p heart transplant 12/12/16: No evidence of rejection during this hospitalization. No abnormal activity on telemetry during admission.  - Continue prednisone 10mg BID  - Continue tacrolimis 1.5mg qAM and 1mg qPM  - Continue mycophenolate 1500 mg bid  - Continue bactrim ppx   - Continue valganciclovir 900mg qd  - Per patient, cardiology transplant coordinator will be contacting him this week for date of next cardiac biopsy.       # Latent Tuberculosis: no active issues this admission.   - Continue isoniazid 300mg qday  - f/u with ID 4/3/17    # Chronic conditions  - Hyperlipidemia: Continue pravastatin  - Constipation: Continue home senna-docusate  - Continue home calcium/vit D/multivitamin  - GERD: Continue home pantoprazole     Physical Exam  "on day of Discharge:  Blood pressure 141/80, pulse 83, temperature 98  F (36.7  C), temperature source Oral, resp. rate 16, height 1.702 m (5' 7\"), weight 70.1 kg (154 lb 9.6 oz), SpO2 96 %.  General Appearance: Alert, oriented, eating breakfast. No acute distress.  Respiratory: Clear to auscultation bilaterally. No wheezing.  Cardiovascular: Regular rate and rhythm. Normal S1 and S2. No murmur.  GI: Soft, non-tender, non-distended.   Skin: No rash. Warm, dry, and well perfused.  Neuro: CN II-XII grossly intact. No focal deficits.             Pending Results:   None         Discharge Medications:     Current Discharge Medication List      START taking these medications    Details   guaiFENesin (ROBITUSSIN) 20 mg/mL SOLN solution Take 10 mLs by mouth every 4 hours as needed for cough  Qty: 1200 mL, Refills: 0    Associated Diagnoses: Other pneumonia, unspecified organism      levofloxacin (LEVAQUIN) 500 MG tablet Take 1 tablet (500 mg) by mouth daily for 6 days  Qty: 6 tablet, Refills: 0    Associated Diagnoses: Pneumonia of right lung due to infectious organism, unspecified part of lung         CONTINUE these medications which have NOT CHANGED    Details   tacrolimus (PROGRAF - GENERIC EQUIVALENT) 0.5 MG capsule TAke 1.5 mg in the AM; take 1 mg in the PM  Qty: 30 capsule, Refills: 11    Comments: Dose reduction (PM dose only): 1.5mg/ 1 mg  Associated Diagnoses: Heart replaced by transplant (H)      predniSONE (DELTASONE) 5 MG tablet Take 2 tablets (10 mg) by mouth 2 times daily  Qty: 120 tablet, Refills: 11    Associated Diagnoses: Heart replaced by transplant (H)      isoniazid (NYDRAZID) 300 MG tablet Take 1 tablet (300 mg) by mouth daily  Qty: 90 tablet, Refills: 3    Associated Diagnoses: Heart replaced by transplant (H)      nystatin (MYCOSTATIN) 730409 UNIT/ML suspension Take 10 mLs (1,000,000 Units) by mouth 4 times daily  Qty: 280 mL, Refills: 3    Associated Diagnoses: Heart replaced by transplant (H)    "   pravastatin (PRAVACHOL) 20 MG tablet Take 1 tablet (20 mg) by mouth every evening  Qty: 30 tablet, Refills: 11    Associated Diagnoses: Heart replaced by transplant (H)      sulfamethoxazole-trimethoprim (BACTRIM DS/SEPTRA DS) 800-160 MG per tablet Take 1 tablet by mouth twice a week On mondays and thursdays  Qty: 8 tablet, Refills: 11    Associated Diagnoses: Heart replaced by transplant (H)      mycophenolate (CELLCEPT - GENERIC EQUIVALENT) 250 MG capsule Take 6 capsules (1,500 mg) by mouth 2 times daily  Qty: 360 capsule, Refills: 11    Associated Diagnoses: Heart replaced by transplant (H)      aspirin EC 81 MG EC tablet Take 1 tablet (81 mg) by mouth daily  Qty: 90 tablet, Refills: 3    Associated Diagnoses: Heart replaced by transplant (H)      calcium carb 1250 mg, 500 mg [place],/vitamin D 200 units (OSCAL WITH D) 500-200 MG-UNIT per tablet Take 1 tablet by mouth 2 times daily (with meals)  Qty: 60 tablet, Refills: 11    Associated Diagnoses: Heart replaced by transplant (H)      multivitamin, therapeutic with minerals (THERA-VIT-M) TABS tablet Take 1 tablet by mouth daily  Qty: 30 each, Refills: 11    Associated Diagnoses: Heart replaced by transplant (H)      pantoprazole (PROTONIX) 40 MG EC tablet Take 1 tablet (40 mg) by mouth every morning  Qty: 30 tablet, Refills: 11    Associated Diagnoses: Heart replaced by transplant (H)      tacrolimus (PROGRAF - GENERIC EQUIVALENT) 1 MG capsule Take 1.5 mg in the AM; take 1 mg in the PM  Qty: 60 capsule, Refills: 11    Comments: Dose reduction (PM dose only): 1.5 mg  / 1 mg  Associated Diagnoses: Heart replaced by transplant (H)      order for DME Equipment being ordered: Splint - Cubital tunnel splint for ulnar neuropathy  Qty: 1 Device, Refills: 0    Associated Diagnoses: Lesion of right ulnar nerve      valGANciclovir (VALCYTE) 450 MG tablet Take 2 tablets (900 mg) by mouth daily  Qty: 60 tablet, Refills: 11    Associated Diagnoses: Heart replaced by transplant  (H)      senna-docusate (SENOKOT-S;PERICOLACE) 8.6-50 MG per tablet Take 2 tablets by mouth nightly as needed for constipation (If no stools during the day)  Qty: 20 tablet, Refills: 1    Associated Diagnoses: Heart replaced by transplant (H)         STOP taking these medications       ketoconazole (NIZORAL) 2 % shampoo Comments:   Reason for Stopping:                    Discharge Instructions and Follow-Up:     Discharge Procedure Orders  Home care nursing referral   Referral Type: Home Health Therapies & Aides     Reason for your hospital stay   Order Comments: You were in the hospital for pneumonia needing antibiotics     Follow Up and recommended labs and tests   Order Comments: Follow up with primary care provider, Ivan Burrell, within 7 days for hospital follow- up.      Your transplant coordinator will contact you to notify you regarding your date about your next heart biopsy and follow-up labs for immunosuppression     Activity   Order Comments: Your activity upon discharge: activity as tolerated   Order Specific Question Answer Comments   Is discharge order? Yes      Full Code     Diet   Order Comments: Follow this diet upon discharge: Advance to a regular diet as tolerated   Order Specific Question Answer Comments   Is discharge order? Yes                Discharge Disposition:   Home         Condition on Discharge:   Discharge condition: Stable   Code status on discharge: Full Code        Date of service: 3/10/2017    The patient was discussed with Tristan Shaw.    Sandee Meng MD  pager # [phone]      Physician Attestation  I, Tristan GUADARRAMA, saw this patient with the resident and agree with the resident s findings and plan of care as documented in the resident s note.      I personally reviewed vital signs, medications, labs and imaging.    I personally spent 35 minutes on discharge activities.      Tristan GUADARARMA MD  Date of Service (when I saw the patient): 3/12/2017

## 2017-03-11 NOTE — PLAN OF CARE
Problem: Goal Outcome Summary  Goal: Goal Outcome Summary  D/I/A: Pt here w/ HAP. Pt complained of diarrhea overnight. C-diff and other stool samples sent; enteric precautions initiated. SR. VSS.   P: Continue to monitor.

## 2017-03-11 NOTE — PLAN OF CARE
Problem: Goal Outcome Summary  Goal: Goal Outcome Summary  PT 6C: PT Eval completed and treatment initiated. Pt demonstrates IND in bed mobility and transfers. Ambulated ~150' x2 with SBA and use of IV pole, steady throughout. Completes 10 min on NuStep for aerobic endurance, averaging 2.5 METS and 61 steps/min, OMNI up to 5 with activity. HR 83-89 bpm, SpO2 98% on RA, BP stable. PT will continue to follow 3x/week for aerobic endurance.      Discharge to home with OP CR rehab when medically appropriate. Prior to admission pt was not attending OP cardiac rehab, pt reports it was not set up.

## 2017-03-12 VITALS
HEART RATE: 85 BPM | TEMPERATURE: 98 F | SYSTOLIC BLOOD PRESSURE: 110 MMHG | BODY MASS INDEX: 24.27 KG/M2 | RESPIRATION RATE: 16 BRPM | DIASTOLIC BLOOD PRESSURE: 81 MMHG | OXYGEN SATURATION: 98 % | WEIGHT: 154.6 LBS | HEIGHT: 67 IN

## 2017-03-12 LAB
1,3 BETA GLUCAN SER-MCNC: NORMAL NG/ML
B-D GLUCAN INTERPRETATION (1,3): NORMAL
ERYTHROCYTE [DISTWIDTH] IN BLOOD BY AUTOMATED COUNT: 14.1 % (ref 10–15)
GALACTOMANNAN AG SERPL QL IA: NORMAL
GALACTOMANNAN AG SERPL-ACNC: 0.06
HCT VFR BLD AUTO: 34.1 % (ref 40–53)
HGB BLD-MCNC: 11.3 G/DL (ref 13.3–17.7)
MCH RBC QN AUTO: 32.5 PG (ref 26.5–33)
MCHC RBC AUTO-ENTMCNC: 33.1 G/DL (ref 31.5–36.5)
MCV RBC AUTO: 98 FL (ref 78–100)
PLATELET # BLD AUTO: 212 10E9/L (ref 150–450)
RBC # BLD AUTO: 3.48 10E12/L (ref 4.4–5.9)
WBC # BLD AUTO: 8.9 10E9/L (ref 4–11)

## 2017-03-12 PROCEDURE — 25000125 ZZHC RX 250: Performed by: INTERNAL MEDICINE

## 2017-03-12 PROCEDURE — 25000131 ZZH RX MED GY IP 250 OP 636 PS 637: Performed by: INTERNAL MEDICINE

## 2017-03-12 PROCEDURE — 36415 COLL VENOUS BLD VENIPUNCTURE: CPT | Performed by: STUDENT IN AN ORGANIZED HEALTH CARE EDUCATION/TRAINING PROGRAM

## 2017-03-12 PROCEDURE — 85027 COMPLETE CBC AUTOMATED: CPT | Performed by: STUDENT IN AN ORGANIZED HEALTH CARE EDUCATION/TRAINING PROGRAM

## 2017-03-12 PROCEDURE — 99239 HOSP IP/OBS DSCHRG MGMT >30: CPT | Mod: GC | Performed by: INTERNAL MEDICINE

## 2017-03-12 PROCEDURE — 25000132 ZZH RX MED GY IP 250 OP 250 PS 637: Performed by: INTERNAL MEDICINE

## 2017-03-12 PROCEDURE — 25000131 ZZH RX MED GY IP 250 OP 636 PS 637: Performed by: STUDENT IN AN ORGANIZED HEALTH CARE EDUCATION/TRAINING PROGRAM

## 2017-03-12 PROCEDURE — 25000128 H RX IP 250 OP 636: Performed by: INTERNAL MEDICINE

## 2017-03-12 PROCEDURE — 25000132 ZZH RX MED GY IP 250 OP 250 PS 637: Performed by: STUDENT IN AN ORGANIZED HEALTH CARE EDUCATION/TRAINING PROGRAM

## 2017-03-12 RX ORDER — LEVOFLOXACIN 500 MG/1
500 TABLET, FILM COATED ORAL DAILY
Qty: 6 TABLET | Refills: 0 | Status: SHIPPED
Start: 2017-03-12 | End: 2017-03-18

## 2017-03-12 RX ADMIN — VALGANCICLOVIR HYDROCHLORIDE 900 MG: 450 TABLET ORAL at 08:02

## 2017-03-12 RX ADMIN — ASPIRIN 81 MG: 81 TABLET, COATED ORAL at 08:03

## 2017-03-12 RX ADMIN — MULTIPLE VITAMINS W/ MINERALS TAB 1 TABLET: TAB at 08:03

## 2017-03-12 RX ADMIN — TACROLIMUS 1.5 MG: 0.5 CAPSULE ORAL at 08:03

## 2017-03-12 RX ADMIN — PANTOPRAZOLE SODIUM 40 MG: 40 TABLET, DELAYED RELEASE ORAL at 08:03

## 2017-03-12 RX ADMIN — OYSTER SHELL CALCIUM WITH VITAMIN D 1 TABLET: 500; 200 TABLET, FILM COATED ORAL at 08:03

## 2017-03-12 RX ADMIN — PREDNISONE 10 MG: 10 TABLET ORAL at 08:03

## 2017-03-12 RX ADMIN — MYCOPHENOLATE MOFETIL 1500 MG: 250 CAPSULE ORAL at 08:03

## 2017-03-12 RX ADMIN — PIPERACILLIN SODIUM,TAZOBACTAM SODIUM 4.5 G: 4; .5 INJECTION, POWDER, FOR SOLUTION INTRAVENOUS at 04:31

## 2017-03-12 RX ADMIN — LEVOFLOXACIN 500 MG: 500 TABLET, FILM COATED ORAL at 08:03

## 2017-03-12 RX ADMIN — ISONIAZID 300 MG: 300 TABLET ORAL at 08:03

## 2017-03-12 NOTE — PROGRESS NOTES
DISCHARGE   Discharged to: Home  Via: Automobile  Accompanied by: Family  Discharge Instructions: diet, activity, medications, follow up appointments, when to call the MD, and what to watchout for (i.e. s/s of infection, increasing SOB, palpitations, chest pain,) reviewed w/  w/ patient.  Prescriptions: To be filled by d/c pharmacy per pt's request; medication list reviewed & sent with pt  Follow Up Appointments: arranged; information given  Belongings: All sent with pt  IV: out  Telemetry: off  Pt exhibits understanding of above discharge instructions; all questions answered.  Discharge Paperwork: faxed

## 2017-03-12 NOTE — PLAN OF CARE
Problem: Goal Outcome Summary  Goal: Goal Outcome Summary  Pt stable overnight. Tele SR. Sats 97% RA. LS diminished in bases. Appeared to sleep soundly between cares. Denies pain. Voiding adequate amounts. Makes needs known. Will continue to monitor pt.

## 2017-03-12 NOTE — PLAN OF CARE
D:c-diff cultures came back negative  I:MD notified  A:Isolation chart discontinued  P:per Primary

## 2017-03-13 ENCOUNTER — CARE COORDINATION (OUTPATIENT)
Dept: CARDIOLOGY | Facility: CLINIC | Age: 47
End: 2017-03-13

## 2017-03-13 ENCOUNTER — TELEPHONE (OUTPATIENT)
Dept: PHARMACY | Facility: CLINIC | Age: 47
End: 2017-03-13

## 2017-03-13 DIAGNOSIS — Z94.1 HEART REPLACED BY TRANSPLANT (H): Primary | ICD-10-CM

## 2017-03-13 NOTE — PLAN OF CARE
Problem: Goal Outcome Summary  Goal: Goal Outcome Summary  Physical Therapy Discharge Summary     Reason for therapy discharge:    Discharged to home with outpatient therapy.     Progress towards therapy goal(s). See goals on Care Plan in [facility] electronic health record for goal details.  Goals partially met.  Barriers to achieving goals:   discharge from facility.     Therapy recommendation(s):    Continued therapy is recommended.  Rationale/Recommendations:  OP Phase II Cardiac Rehab for increased aerobic endurance.

## 2017-03-13 NOTE — PROGRESS NOTES
Patient is a transplant patient and will be followed by the transplant team for follow up        Follow Up and recommended labs and tests   Order Comments: Your transplant coordinator will contact you to notify you regarding your date about your next heart biopsy and follow-up labs for immunosuppression       Consultations:   Transplant Infectious Disease  Transplant Cardiology

## 2017-03-14 ENCOUNTER — TELEPHONE (OUTPATIENT)
Dept: TRANSPLANT | Facility: CLINIC | Age: 47
End: 2017-03-14

## 2017-03-14 DIAGNOSIS — Z94.1 HEART REPLACED BY TRANSPLANT (H): Primary | ICD-10-CM

## 2017-03-14 LAB — IGNF SERPL-MCNC: NORMAL PG/ML

## 2017-03-14 NOTE — TELEPHONE ENCOUNTER
"Called patient to confirm rescheduling appointments that were missed d/t hospital admission.  Patient states he feels great, \" better then ever\" since being discharged from the hospital. Patient denies any pain, sob or cough.  Confirmed upcoming appointments tomorrow (labs, echo and RHC/bx) and clinic visit with RC on FRiday at 1100.  Patient confirms current FK and prednisone dose and instructions to allow for a 12 hour FK level to be drawn tomorrow at 1000.  Also informed the patient that I will be out of town on Friday; one of my colleagues would be seeing the patient along with Dr. Mcclellan.  Patient verbalizes understanding plan of care and agrees to follow.    "

## 2017-03-15 ENCOUNTER — HOSPITAL ENCOUNTER (OUTPATIENT)
Dept: CARDIOLOGY | Facility: CLINIC | Age: 47
End: 2017-03-15
Attending: INTERNAL MEDICINE
Payer: COMMERCIAL

## 2017-03-15 ENCOUNTER — RESULTS ONLY (OUTPATIENT)
Dept: OTHER | Facility: CLINIC | Age: 47
End: 2017-03-15

## 2017-03-15 ENCOUNTER — HOSPITAL ENCOUNTER (OUTPATIENT)
Facility: CLINIC | Age: 47
Discharge: HOME | End: 2017-03-15
Attending: INTERNAL MEDICINE | Admitting: INTERNAL MEDICINE
Payer: COMMERCIAL

## 2017-03-15 ENCOUNTER — APPOINTMENT (OUTPATIENT)
Dept: MEDSURG UNIT | Facility: CLINIC | Age: 47
End: 2017-03-15
Payer: COMMERCIAL

## 2017-03-15 VITALS
TEMPERATURE: 98.1 F | OXYGEN SATURATION: 97 % | SYSTOLIC BLOOD PRESSURE: 126 MMHG | HEART RATE: 97 BPM | RESPIRATION RATE: 18 BRPM | DIASTOLIC BLOOD PRESSURE: 86 MMHG

## 2017-03-15 DIAGNOSIS — Z22.7 LATENT TUBERCULOSIS INFECTION: ICD-10-CM

## 2017-03-15 DIAGNOSIS — Z94.1 HEART REPLACED BY TRANSPLANT (H): ICD-10-CM

## 2017-03-15 DIAGNOSIS — B57.2 CHAGAS CARDIOMYOPATHY: ICD-10-CM

## 2017-03-15 DIAGNOSIS — G56.21 NEURITIS OF RIGHT ULNAR NERVE: ICD-10-CM

## 2017-03-15 LAB
ALBUMIN SERPL-MCNC: 3.6 G/DL (ref 3.4–5)
ALP SERPL-CCNC: 92 U/L (ref 40–150)
ALT SERPL W P-5'-P-CCNC: 28 U/L (ref 0–70)
ANION GAP SERPL CALCULATED.3IONS-SCNC: 12 MMOL/L (ref 3–14)
AST SERPL W P-5'-P-CCNC: 12 U/L (ref 0–45)
BACTERIA SPEC CULT: NO GROWTH
BACTERIA SPEC CULT: NO GROWTH
BILIRUB DIRECT SERPL-MCNC: <0.1 MG/DL (ref 0–0.2)
BILIRUB SERPL-MCNC: 0.3 MG/DL (ref 0.2–1.3)
BUN SERPL-MCNC: 26 MG/DL (ref 7–30)
CALCIUM SERPL-MCNC: 9.8 MG/DL (ref 8.5–10.1)
CHLORIDE SERPL-SCNC: 104 MMOL/L (ref 94–109)
CO2 SERPL-SCNC: 24 MMOL/L (ref 20–32)
CREAT SERPL-MCNC: 0.84 MG/DL (ref 0.66–1.25)
ERYTHROCYTE [DISTWIDTH] IN BLOOD BY AUTOMATED COUNT: 13.9 % (ref 10–15)
FOLATE SERPL-MCNC: 17.5 NG/ML
GFR SERPL CREATININE-BSD FRML MDRD: ABNORMAL ML/MIN/1.7M2
GLUCOSE SERPL-MCNC: 232 MG/DL (ref 70–99)
HBV CORE AB SERPL QL IA: NONREACTIVE
HBV SURFACE AG SERPL QL IA: NONREACTIVE
HCT VFR BLD AUTO: 38.8 % (ref 40–53)
HCV AB SERPL QL IA: NORMAL
HGB BLD-MCNC: 13 G/DL (ref 13.3–17.7)
HIV 1+2 AB+HIV1 P24 AG SERPL QL IA: NORMAL
LAB SCANNED RESULT: NORMAL
MCH RBC QN AUTO: 32.7 PG (ref 26.5–33)
MCHC RBC AUTO-ENTMCNC: 33.5 G/DL (ref 31.5–36.5)
MCV RBC AUTO: 98 FL (ref 78–100)
MICRO REPORT STATUS: NORMAL
MICRO REPORT STATUS: NORMAL
MISCELLANEOUS TEST: NORMAL
PLATELET # BLD AUTO: 345 10E9/L (ref 150–450)
POTASSIUM SERPL-SCNC: 3.2 MMOL/L (ref 3.4–5.3)
PROT SERPL-MCNC: 7.3 G/DL (ref 6.8–8.8)
RBC # BLD AUTO: 3.97 10E12/L (ref 4.4–5.9)
SODIUM SERPL-SCNC: 140 MMOL/L (ref 133–144)
SPECIMEN SOURCE: NORMAL
SPECIMEN SOURCE: NORMAL
T4 FREE SERPL-MCNC: 1.13 NG/DL (ref 0.76–1.46)
TACROLIMUS BLD-MCNC: 5.7 UG/L (ref 5–15)
TME LAST DOSE: NORMAL H
TSH SERPL DL<=0.05 MIU/L-ACNC: 0.43 MU/L (ref 0.4–4)
VIT B12 SERPL-MCNC: 3604 PG/ML (ref 193–986)
WBC # BLD AUTO: 17.1 10E9/L (ref 4–11)

## 2017-03-15 PROCEDURE — 80048 BASIC METABOLIC PNL TOTAL CA: CPT | Performed by: INTERNAL MEDICINE

## 2017-03-15 PROCEDURE — 86832 HLA CLASS I HIGH DEFIN QUAL: CPT | Performed by: INTERNAL MEDICINE

## 2017-03-15 PROCEDURE — 86803 HEPATITIS C AB TEST: CPT | Performed by: INTERNAL MEDICINE

## 2017-03-15 PROCEDURE — 27210787 ZZH MANIFOLD CR2

## 2017-03-15 PROCEDURE — 88307 TISSUE EXAM BY PATHOLOGIST: CPT | Performed by: INTERNAL MEDICINE

## 2017-03-15 PROCEDURE — 86352 CELL FUNCTION ASSAY W/STIM: CPT | Performed by: INTERNAL MEDICINE

## 2017-03-15 PROCEDURE — T1013 SIGN LANG/ORAL INTERPRETER: HCPCS | Mod: U3

## 2017-03-15 PROCEDURE — 84207 ASSAY OF VITAMIN B-6: CPT | Performed by: INTERNAL MEDICINE

## 2017-03-15 PROCEDURE — 85027 COMPLETE CBC AUTOMATED: CPT | Performed by: INTERNAL MEDICINE

## 2017-03-15 PROCEDURE — 87340 HEPATITIS B SURFACE AG IA: CPT | Performed by: INTERNAL MEDICINE

## 2017-03-15 PROCEDURE — 88350 IMFLUOR EA ADDL 1ANTB STN PX: CPT | Performed by: INTERNAL MEDICINE

## 2017-03-15 PROCEDURE — 93308 TTE F-UP OR LMTD: CPT | Mod: 26 | Performed by: INTERNAL MEDICINE

## 2017-03-15 PROCEDURE — 82746 ASSAY OF FOLIC ACID SERUM: CPT | Performed by: FAMILY MEDICINE

## 2017-03-15 PROCEDURE — 27210982 ZZH KIT RT HC TOTES DISP CR7

## 2017-03-15 PROCEDURE — 93321 DOPPLER ECHO F-UP/LMTD STD: CPT | Mod: 26 | Performed by: INTERNAL MEDICINE

## 2017-03-15 PROCEDURE — 93308 TTE F-UP OR LMTD: CPT

## 2017-03-15 PROCEDURE — 27211047 ZZH FORCEP BIOPSY CR10

## 2017-03-15 PROCEDURE — C1893 INTRO/SHEATH, FIXED,NON-PEEL: HCPCS

## 2017-03-15 PROCEDURE — 87799 DETECT AGENT NOS DNA QUANT: CPT | Performed by: INTERNAL MEDICINE

## 2017-03-15 PROCEDURE — 93505 ENDOMYOCARDIAL BIOPSY: CPT

## 2017-03-15 PROCEDURE — 80076 HEPATIC FUNCTION PANEL: CPT | Performed by: INTERNAL MEDICINE

## 2017-03-15 PROCEDURE — 80197 ASSAY OF TACROLIMUS: CPT | Performed by: INTERNAL MEDICINE

## 2017-03-15 PROCEDURE — 40000166 ZZH STATISTIC PP CARE STAGE 1

## 2017-03-15 PROCEDURE — 86833 HLA CLASS II HIGH DEFIN QUAL: CPT | Performed by: INTERNAL MEDICINE

## 2017-03-15 PROCEDURE — 93325 DOPPLER ECHO COLOR FLOW MAPG: CPT | Mod: 26 | Performed by: INTERNAL MEDICINE

## 2017-03-15 PROCEDURE — 82607 VITAMIN B-12: CPT | Performed by: INTERNAL MEDICINE

## 2017-03-15 PROCEDURE — 93505 ENDOMYOCARDIAL BIOPSY: CPT | Mod: 26 | Performed by: INTERNAL MEDICINE

## 2017-03-15 PROCEDURE — 27211181 ZZH BALLOON TIP PRESSURE CR5

## 2017-03-15 PROCEDURE — 84439 ASSAY OF FREE THYROXINE: CPT | Performed by: INTERNAL MEDICINE

## 2017-03-15 PROCEDURE — 02BK3ZX EXCISION OF RIGHT VENTRICLE, PERCUTANEOUS APPROACH, DIAGNOSTIC: ICD-10-PCS | Performed by: INTERNAL MEDICINE

## 2017-03-15 PROCEDURE — 36415 COLL VENOUS BLD VENIPUNCTURE: CPT | Performed by: INTERNAL MEDICINE

## 2017-03-15 PROCEDURE — 88346 IMFLUOR 1ST 1ANTB STAIN PX: CPT | Performed by: INTERNAL MEDICINE

## 2017-03-15 PROCEDURE — 93451 RIGHT HEART CATH: CPT

## 2017-03-15 PROCEDURE — 87389 HIV-1 AG W/HIV-1&-2 AB AG IA: CPT | Performed by: INTERNAL MEDICINE

## 2017-03-15 PROCEDURE — 84443 ASSAY THYROID STIM HORMONE: CPT | Performed by: INTERNAL MEDICINE

## 2017-03-15 PROCEDURE — 86704 HEP B CORE ANTIBODY TOTAL: CPT | Performed by: INTERNAL MEDICINE

## 2017-03-15 RX ORDER — LIDOCAINE 40 MG/G
CREAM TOPICAL
Status: COMPLETED | OUTPATIENT
Start: 2017-03-15 | End: 2017-03-15

## 2017-03-15 RX ORDER — IOPAMIDOL 755 MG/ML
150 INJECTION, SOLUTION INTRAVASCULAR ONCE
Status: COMPLETED | OUTPATIENT
Start: 2017-03-15 | End: 2017-03-15

## 2017-03-15 RX ADMIN — IOPAMIDOL 5 ML: 755 INJECTION, SOLUTION INTRAVASCULAR at 15:45

## 2017-03-15 RX ADMIN — LIDOCAINE: 40 CREAM TOPICAL at 12:55

## 2017-03-15 NOTE — PROGRESS NOTES
1540 Pt arrived on 2a post RHC ang biopsy. VSS Ra. Dressing c/d/i. No pain. Dc instructions reviewed with pt, copy given to pt -  present. 1550 Pt dc'd home acc by sister.

## 2017-03-15 NOTE — DISCHARGE INSTRUCTIONS
[facility]                        Interventional Cardiology  Discharge Instructions   Post Right Heart Cath and Biopsy      AFTER YOU GO HOME:    DO drink plenty of fluids    DO resume your regular diet and medications unless otherwise instructed by your Primary Physician    Do Not scrub the procedure site vigorously    No lotion or powder to the puncture site for 3 days    CALL YOUR PRIMARY PHYSICIAN IF: You may resume all normal activity.  Monitor neck site for bleeding, swelling, or voice changes. If you notice bleeding or swelling immediately apply pressure to the site and call number below to speak with Cardiology Fellow.  If you experience any changes in your breathing you should call your doctor immediately or come to the closest Emergency Department.  Do not drive yourself.    ADDITIONAL INSTRUCTIONS: Medications: You are to resume all home medications including anticoagulation therapy unless otherwise advised by your primary cardiologist or nurse coordinator.    Follow Up: Per your primary cardiology team    If you have any questions or concerns regarding your procedure site please call [phone] at anytime and ask for Cardiology Fellow on call.  They are available 24 hours a day.  You may also contact the Cardiology Clinic after hours number at [phone].                                                       Telephone Numbers [phone] Monday-Friday 8:00 am to 4:30 pm    [phone] After 4:30 pm Monday-Friday, Weekends & Holidays  Ask for Interventional Cardiologist on call. Someone is on call 24 hours/day   [facility] toll free number [phone] Monday-Friday 8:00 am to 4:30 pm   [facility] Emergency Dept [phone]

## 2017-03-15 NOTE — PROCEDURES
PRELIMINARY CARDIAC CATH REPORT:     PROCEDURES PERFORMED:   1. Right heart catheterization.  2. Right ventricular endomyocardial biopsy.    PHYSICIANS:  1. Attending Interventional Cardiology Staff: Justina Greco MD  2. Interventional Cardiology Fellow: Chris Viera DO        INDICATION:  Javi Bansal is a 46 year old male with with history of OHT on 12/12/16, here for routine surveillance.     DESCRIPTION:  1. Consent obtained with discussion of risks.  All questions were answered.  2. Sterile prep and procedure.  3. Venous Location: right internal jugular vein  4. Access: Local anesthetic with lidocaine.  A standard (18 g) needle with ultrasound guidance was used to establish access using a modified Seldinger technique.  5. Venous Sheath: 7Fr standard sheath, 7Fr angled Daig sheath  6. Catheters: 7Fr PA catheter, 5.5Fr Argon Jaws bioptome  7. Fluoroscopy time of 2.4 min.  8. Estimated blood loss of <5 mL.  9. See below for procedure details.    MEDICATIONS:  1. None    RIGHT HEART CATHETERIZATION:  BSA 1.82  1. HR 86 bpm  2. /88/103 mmHg  3. RA 4/4/2   4. RV 22/4  5. PA 20/6/11   6. PCW 8/10/6   7. PA sat 68.7%   8. PCW Oximeter sat, not obtained.   9. Hgb 12.5 g/dL   10. Donnell CO 4.6   11. Donnell CI 2.6   12. PVR 1       ENDOMYOCARDIAL BIOPSY:  1. Successful collection of 4 right ventricular endomyocardial biopsy samples.    COMPLICATIONS:  1. None    SUMMARY:   1. Heart transplantation on 12/12/16.  2. Normal right-sided and normal left-sided filling pressures.  3. Normal pulmonary artery pressures with a mean PAP of 11 mmHg.  4. Normal cardiac output of 4.6 L/min and cardiac index of 2.6 based on Donnell estimation.  5. Successful collection of 4 endomyocardial biopsy samples.    PLAN:   1. Bedrest per protocol.  2. Primary cardiology team to follow up endomyocardial biopsy results.  3. Discharge today per protocol.    The attending cardiologist was present for the entire  procedure.      See CVIS report for final draft.    Chris Viera DO, formerly [facility]  Interventional Cardiology Fellow  [phone]

## 2017-03-15 NOTE — PROGRESS NOTES
1300 Pt on 2a prepped and ready for RHC and biopsy. Lidocaine applied to right neck. H&P current. Pt's sister will pick pt up after procedure.  at BS. Pt ready for consent.

## 2017-03-15 NOTE — IP AVS SNAPSHOT
MRN:[id number]                      After Visit Summary   3/15/2017    Javi Bansal    MRN: [id number]           Visit Information        Department      3/15/2017 12:07 PM [address] [facility] [place]          Review of your medicines      UNREVIEWED medicines. 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)        Dose:  81 mg   Take 1 tablet (81 mg) by mouth daily   Quantity:  90 tablet   Refills:  3       calcium carb 1250 mg (500 mg [place])/vitamin D 200 units 500-200 MG-UNIT per tablet   Commonly known as:  OSCAL with D   Used for:  Heart replaced by transplant (H)        Dose:  1 tablet   Take 1 tablet by mouth 2 times daily (with meals)   Quantity:  60 tablet   Refills:  11       guaiFENesin 20 mg/mL Soln solution   Commonly known as:  ROBITUSSIN   Used for:  Other pneumonia, unspecified organism        Dose:  10 mL   Take 10 mLs by mouth every 4 hours as needed for cough   Quantity:  1200 mL   Refills:  0       isoniazid 300 MG tablet   Commonly known as:  NYDRAZID   Used for:  Heart replaced by transplant (H)        Dose:  300 mg   Take 1 tablet (300 mg) by mouth daily   Quantity:  90 tablet   Refills:  3       levofloxacin 500 MG tablet   Commonly known as:  LEVAQUIN   Indication:  Healthcare-Associated Pneumonia   Used for:  Pneumonia of right lung due to infectious organism, unspecified part of lung        Dose:  500 mg   Take 1 tablet (500 mg) by mouth daily for 6 days   Quantity:  6 tablet   Refills:  0       multivitamin, therapeutic with minerals Tabs tablet   Used for:  Heart replaced by transplant (H)        Dose:  1 tablet   Take 1 tablet by mouth daily   Quantity:  30 each   Refills:  11       mycophenolate 250 MG capsule   Commonly known as:  CELLCEPT - GENERIC EQUIVALENT   Used for:  Heart replaced by transplant (H)        Dose:  1500 mg   Take 6 capsules (1,500 mg) by mouth 2  times daily   Quantity:  360 capsule   Refills:  11       nystatin 751808 UNIT/ML suspension   Commonly known as:  MYCOSTATIN   Used for:  Heart replaced by transplant (H)        Dose:  9278394 Units   Take 10 mLs (1,000,000 Units) by mouth 4 times daily   Quantity:  280 mL   Refills:  3       pantoprazole 40 MG EC tablet   Commonly known as:  PROTONIX   Used for:  Heart replaced by transplant (H)        Dose:  40 mg   Take 1 tablet (40 mg) by mouth every morning   Quantity:  30 tablet   Refills:  11       pravastatin 20 MG tablet   Commonly known as:  PRAVACHOL   Used for:  Heart replaced by transplant (H)        Dose:  20 mg   Take 1 tablet (20 mg) by mouth every evening   Quantity:  30 tablet   Refills:  11       predniSONE 5 MG tablet   Commonly known as:  DELTASONE   Used for:  Heart replaced by transplant (H)        Dose:  10 mg   Take 2 tablets (10 mg) by mouth 2 times daily   Quantity:  120 tablet   Refills:  11       senna-docusate 8.6-50 MG per tablet   Commonly known as:  SENOKOT-S;PERICOLACE   Used for:  Heart replaced by transplant (H)        Dose:  2 tablet   Take 2 tablets by mouth nightly as needed for constipation (If no stools during the day)   Quantity:  20 tablet   Refills:  1       sulfamethoxazole-trimethoprim 800-160 MG per tablet   Commonly known as:  BACTRIM DS/SEPTRA DS   Indication:  PCP prophylaxis   Used for:  Heart replaced by transplant (H)        Dose:  1 tablet   Take 1 tablet by mouth twice a week On mondays and thursdays   Quantity:  8 tablet   Refills:  11       * tacrolimus 0.5 MG capsule   Commonly known as:  PROGRAF - GENERIC EQUIVALENT   Used for:  Heart replaced by transplant (H)        TAke 1.5 mg in the AM; take 1 mg in the PM   Quantity:  30 capsule   Refills:  11       * tacrolimus 1 MG capsule   Commonly known as:  PROGRAF - GENERIC EQUIVALENT   Used for:  Heart replaced by transplant (H)        Take 1.5 mg in the AM; take 1 mg in the PM   Quantity:  60 capsule    Refills:  11       valGANciclovir 450 MG tablet   Commonly known as:  VALCYTE   Indication:  post transplant prophylaxis   Used for:  Heart replaced by transplant (H)        Dose:  900 mg   Take 2 tablets (900 mg) by mouth daily   Quantity:  60 tablet   Refills:  11       * Notice:  This list has 2 medication(s) that are the same as other medications prescribed for you. Read the directions carefully, and ask your doctor or other care provider to review them with you.      CONTINUE these medicines which have NOT CHANGED        Dose / Directions    order for DME   Used for:  Lesion of right ulnar nerve        Equipment being ordered: Splint - Cubital tunnel splint for ulnar neuropathy   Quantity:  1 Device   Refills:  0                Protect others around you: Learn how to safely use, store and throw away your medicines at www.disposemymeds.org.         Follow-ups after your visit        Your next 10 appointments already scheduled     Mar 15, 2017  1:30 PM CDT   Cath 90 Minute with UUHCVR5   [facility],  Heart Cath Lab ([facility], [facility])    [address] [facility] [address]   [phone]            Mar 17, 2017 10:45 AM CDT   RETURN HEART TRANSPLANT with Geena Mcclellan MD, Fat[address]ah [address]   [facility] Heart [facility] ([facility])    [address] [place] [id number] [phone]            Mar 28, 2017  8:45 AM CDT   (Arrive by 8:30 AM)   EMG with Jitendra Kahn MD   [facility] ([facility])    [address] [place] [address] [phone]           Do not use lotions or creams on the area to be tested. If you are on blood thinners (Warfarin, Coumadin, Lovenox, etc), please contact your primary care physician to check if it is safe to stop them 3 days prior to testing. If you have anxiety, please check with your referring physician to obtain anti-anxiety  medication for the procedure.            Apr 03, 2017  9:00 AM CDT   (Arrive by 8:45 AM)   Return Visit with Bryant Bartholomew MD   [facility] ([facility])    [address] [place] [address]  [address] [facility] [id number] [phone]            Apr 03, 2017 10:00 AM CDT   (Arrive by 9:45 AM)   Return Visit with Ivan Schuler MD   [facility] ([facility])    [address] [place] [address]  [address]rd [facility] [id number] [phone]            Apr 04, 2017  9:30 AM CDT   LAB [facility] ([facility])    [address] [facility] [address]              Patient must bring picture ID.  Patient should be prepared to give a urine specimen  Please do not eat 10-12 hours before your appointment if you are coming in fasting for labs on lipids, cholesterol, or glucose (sugar).  Pregnant women should follow their Care Team instructions. Water with medications is okay. Do not drink coffee or other fluids.   If you have concerns about taking  your medications, please ask at office or if scheduling via [employer], send a message by clicking on Secure Messaging, Message Your Care Team.            Apr 04, 2017 10:00 AM CDT   Procedure - 2.5 hour with U2A ROOM 9   [address] [facility] ([facility])    [address] [facility] [address]               Apr 04, 2017 11:00 AM CDT   Heart Cath Heart Biopsy with UUHCVR5   [facility] ([facility])    [address] [facility] [address]   [phone]            Apr 04, 2017  2:00 PM CDT   (Arrive by 1:45 PM)   RETURN HEART TRANSPLANT with SAMM Ramos [facility] ([facility])    [address] [place] [address]  [address] [facility] [address]    [phone]               Care Instructions        Further instructions from your care team       [facility]                        Interventional Cardiology  Discharge Instructions   Post Right Heart Cath and Biopsy      AFTER YOU GO HOME:    DO drink plenty of fluids    DO resume your regular diet and medications unless otherwise instructed by your Primary Physician    Do Not scrub the procedure site vigorously    No lotion or powder to the puncture site for 3 days    CALL YOUR PRIMARY PHYSICIAN IF: You may resume all normal activity.  Monitor neck site for bleeding, swelling, or voice changes. If you notice bleeding or swelling immediately apply pressure to the site and call number below to speak with Cardiology Fellow.  If you experience any changes in your breathing you should call your doctor immediately or come to the closest Emergency Department.  Do not drive yourself.    ADDITIONAL INSTRUCTIONS: Medications: You are to resume all home medications including anticoagulation therapy unless otherwise advised by your primary cardiologist or nurse coordinator.    Follow Up: Per your primary cardiology team    If you have any questions or concerns regarding your procedure site please call [phone] at anytime and ask for Cardiology Fellow on call.  They are available 24 hours a day.  You may also contact the Cardiology Clinic after hours number at [phone].                                                       Telephone Numbers [phone] Monday-Friday 8:00 am to 4:30 pm    [phone] After 4:30 pm Monday-Friday, Weekends & Holidays  Ask for Interventional Cardiologist on call. Someone is on call 24 hours/day   [facility] toll free number [phone] Monday-Friday 8:00 am to 4:30 pm   [facility] Emergency Dept [phone]                    Additional Information About Your Visit        [employer] [employer] Information     [employer] [employer] lets you send messages to your doctor, view your test  "results, renew your prescriptions, schedule appointments and more. To sign up, go to [email or web address] . Click on \"Log in\" on the left side of the screen, which will take you to the Welcome page. Then click on \"Sign up Now\" on the right side of the page.     You will be asked to enter the access code listed below, as well as some personal information. Please follow the directions to create your username and password.     Your access code is: [email or web address]  Expires: 3/29/2017  7:30 AM     Your access code will  in 90 days. If you need help or a new code, please call your [facility] clinic or [phone].        Care EveryWhere ID     This is your Care EveryWhere ID. This could be used by other organizations to access your [facility] medical records  [id number]        Your Vitals Were     Blood Pressure Pulse Temperature Respirations Pulse Oximetry       123/88 88 98.1  F (36.7  C) (Oral) 16 98%        Primary Care Provider Office Phone # Fax #    Ivan Burrell [phone]      Thank you!     Thank you for choosing [facility] for your care. Our goal is always to provide you with excellent care. Hearing back from our patients is one way we can continue to improve our services. Please take a few minutes to complete the written survey that you may receive in the mail after you visit with us. Thank you!             Medication List: This is a list of all your medications and when to take them. Check marks below indicate your daily home schedule. Keep this list as a reference.      Medications           Morning Afternoon Evening Bedtime As Needed    aspirin 81 MG EC tablet   Take 1 tablet (81 mg) by mouth daily                                calcium carb 1250 mg (500 mg [place])/vitamin D 200 units 500-200 MG-UNIT per tablet   Commonly known as:  OSCAL with D   Take 1 tablet by mouth 2 times daily (with meals)                                guaiFENesin 20 mg/mL Soln solution   Commonly known as: "  ROBITUSSIN   Take 10 mLs by mouth every 4 hours as needed for cough                                isoniazid 300 MG tablet   Commonly known as:  NYDRAZID   Take 1 tablet (300 mg) by mouth daily                                levofloxacin 500 MG tablet   Commonly known as:  LEVAQUIN   Take 1 tablet (500 mg) by mouth daily for 6 days                                multivitamin, therapeutic with minerals Tabs tablet   Take 1 tablet by mouth daily                                mycophenolate 250 MG capsule   Commonly known as:  CELLCEPT - GENERIC EQUIVALENT   Take 6 capsules (1,500 mg) by mouth 2 times daily                                nystatin 328826 UNIT/ML suspension   Commonly known as:  MYCOSTATIN   Take 10 mLs (1,000,000 Units) by mouth 4 times daily                                order for DME   Equipment being ordered: Splint - Cubital tunnel splint for ulnar neuropathy                                pantoprazole 40 MG EC tablet   Commonly known as:  PROTONIX   Take 1 tablet (40 mg) by mouth every morning                                pravastatin 20 MG tablet   Commonly known as:  PRAVACHOL   Take 1 tablet (20 mg) by mouth every evening                                predniSONE 5 MG tablet   Commonly known as:  DELTASONE   Take 2 tablets (10 mg) by mouth 2 times daily                                senna-docusate 8.6-50 MG per tablet   Commonly known as:  SENOKOT-S;PERICOLACE   Take 2 tablets by mouth nightly as needed for constipation (If no stools during the day)                                sulfamethoxazole-trimethoprim 800-160 MG per tablet   Commonly known as:  BACTRIM DS/SEPTRA DS   Take 1 tablet by mouth twice a week On mondays and thursdays                                * tacrolimus 0.5 MG capsule   Commonly known as:  PROGRAF - GENERIC EQUIVALENT   TAke 1.5 mg in the AM; take 1 mg in the PM                                * tacrolimus 1 MG capsule   Commonly known as:  PROGRAF - GENERIC  EQUIVALENT   Take 1.5 mg in the AM; take 1 mg in the PM                                valGANciclovir 450 MG tablet   Commonly known as:  VALCYTE   Take 2 tablets (900 mg) by mouth daily                                * Notice:  This list has 2 medication(s) that are the same as other medications prescribed for you. Read the directions carefully, and ask your doctor or other care provider to review them with you.

## 2017-03-15 NOTE — IP AVS SNAPSHOT
[address] [address]                                       After Visit Summary   3/15/2017    Javi Bansal    MRN: [id number]           After Visit Summary Signature Page     I have received my discharge instructions, and my questions have been answered. I have discussed any challenges I see with this plan with the nurse or doctor.    ..........................................................................................................................................  Patient/Patient Representative Signature      ..........................................................................................................................................  Patient Representative Print Name and Relationship to Patient    ..................................................               ................................................  Date                                            Time    ..........................................................................................................................................  Reviewed by Signature/Title    ...................................................              ..............................................  Date                                                            Time

## 2017-03-16 ENCOUNTER — TELEPHONE (OUTPATIENT)
Dept: TRANSPLANT | Facility: CLINIC | Age: 47
End: 2017-03-16

## 2017-03-16 DIAGNOSIS — Z94.1 HEART REPLACED BY TRANSPLANT (H): Primary | ICD-10-CM

## 2017-03-16 LAB
CMV DNA SPEC NAA+PROBE-ACNC: NORMAL [IU]/ML
CMV DNA SPEC NAA+PROBE-LOG#: NORMAL {LOG_IU}/ML
COPATH REPORT: NORMAL
PRA DONOR SPECIFIC ABY: NORMAL
SPECIMEN SOURCE: NORMAL

## 2017-03-16 RX ORDER — TACROLIMUS 0.5 MG/1
CAPSULE ORAL
Qty: 30 CAPSULE | Refills: 11 | Status: SHIPPED | OUTPATIENT
Start: 2017-03-16 | End: 2017-03-22

## 2017-03-16 RX ORDER — TACROLIMUS 1 MG/1
CAPSULE ORAL
Qty: 90 CAPSULE | Refills: 11 | Status: SHIPPED | OUTPATIENT
Start: 2017-03-16 | End: 2017-03-22

## 2017-03-16 RX ORDER — POTASSIUM CHLORIDE 1500 MG/1
40 TABLET, EXTENDED RELEASE ORAL ONCE
Qty: 2 TABLET | Refills: 0 | Status: SHIPPED | OUTPATIENT
Start: 2017-03-16 | End: 2017-03-16

## 2017-03-16 RX ORDER — PREDNISONE 5 MG/1
TABLET ORAL
Qty: 90 TABLET | Refills: 11 | Status: SHIPPED | OUTPATIENT
Start: 2017-03-16 | End: 2017-04-06

## 2017-03-16 NOTE — TELEPHONE ENCOUNTER
Called patient to confirm recent results.  Patient reports he is feeling well. He states he has a cough, but no sob, swelling or dizziness. He does request additional 4 days of antibiotic; requested he discuss the need for additional abx (and cough syrup?) tomorrow during clinic visit.   Bx was 1R, neg/neg and FK was 5.7.  Instructed patient to reduce  Prednisone to 10 mg /5 mg and to increase FK to 1.5 mg /2 mg.  Patient's K was also low (3.2); he denies any palpitations or changes in tremors.  One time order for 40 mEq potassium sent to [facility] pharmacy for  after clinic visit.

## 2017-03-17 ENCOUNTER — OFFICE VISIT (OUTPATIENT)
Dept: CARDIOLOGY | Facility: CLINIC | Age: 47
End: 2017-03-17
Attending: INTERNAL MEDICINE
Payer: COMMERCIAL

## 2017-03-17 ENCOUNTER — TELEPHONE (OUTPATIENT)
Dept: TRANSPLANT | Facility: CLINIC | Age: 47
End: 2017-03-17

## 2017-03-17 ENCOUNTER — RECORDS - HEALTHEAST (OUTPATIENT)
Dept: ADMINISTRATIVE | Facility: OTHER | Age: 47
End: 2017-03-17

## 2017-03-17 VITALS
BODY MASS INDEX: 24.5 KG/M2 | DIASTOLIC BLOOD PRESSURE: 82 MMHG | HEART RATE: 94 BPM | WEIGHT: 156.1 LBS | OXYGEN SATURATION: 96 % | HEIGHT: 67 IN | SYSTOLIC BLOOD PRESSURE: 123 MMHG

## 2017-03-17 DIAGNOSIS — Z94.1 HEART REPLACED BY TRANSPLANT (H): Primary | ICD-10-CM

## 2017-03-17 DIAGNOSIS — Z94.1 HEART REPLACED BY TRANSPLANT (H): ICD-10-CM

## 2017-03-17 LAB
ANION GAP SERPL CALCULATED.3IONS-SCNC: 9 MMOL/L (ref 3–14)
BASOPHILS # BLD AUTO: 0.2 10E9/L (ref 0–0.2)
BASOPHILS NFR BLD AUTO: 0.9 %
BUN SERPL-MCNC: 29 MG/DL (ref 7–30)
CALCIUM SERPL-MCNC: 9.4 MG/DL (ref 8.5–10.1)
CHLORIDE SERPL-SCNC: 103 MMOL/L (ref 94–109)
CO2 SERPL-SCNC: 29 MMOL/L (ref 20–32)
CREAT SERPL-MCNC: 1.1 MG/DL (ref 0.66–1.25)
DIFFERENTIAL METHOD BLD: ABNORMAL
EBV DNA # SPEC NAA+PROBE: ABNORMAL {COPIES}/ML
EBV DNA SPEC NAA+PROBE-LOG#: ABNORMAL {LOG_COPIES}/ML
EOSINOPHIL # BLD AUTO: 0 10E9/L (ref 0–0.7)
EOSINOPHIL NFR BLD AUTO: 0 %
ERYTHROCYTE [DISTWIDTH] IN BLOOD BY AUTOMATED COUNT: 13.6 % (ref 10–15)
GFR SERPL CREATININE-BSD FRML MDRD: 72 ML/MIN/1.7M2
GLUCOSE SERPL-MCNC: 184 MG/DL (ref 70–99)
HCT VFR BLD AUTO: 38.9 % (ref 40–53)
HGB BLD-MCNC: 13.1 G/DL (ref 13.3–17.7)
LAB SCANNED RESULT: NORMAL
LYMPHOCYTES # BLD AUTO: 0.5 10E9/L (ref 0.8–5.3)
LYMPHOCYTES NFR BLD AUTO: 2.6 %
MAGNESIUM SERPL-MCNC: 2 MG/DL (ref 1.6–2.3)
MCH RBC QN AUTO: 33 PG (ref 26.5–33)
MCHC RBC AUTO-ENTMCNC: 33.7 G/DL (ref 31.5–36.5)
MCV RBC AUTO: 98 FL (ref 78–100)
METAMYELOCYTES # BLD: 0.5 10E9/L
METAMYELOCYTES NFR BLD MANUAL: 2.6 %
MONOCYTES # BLD AUTO: 0.3 10E9/L (ref 0–1.3)
MONOCYTES NFR BLD AUTO: 1.7 %
MYELOCYTES # BLD: 0.5 10E9/L
MYELOCYTES NFR BLD MANUAL: 2.6 %
NEUTROPHILS # BLD AUTO: 15.7 10E9/L (ref 1.6–8.3)
NEUTROPHILS NFR BLD AUTO: 88.7 %
NRBC # BLD AUTO: 0.2 10*3/UL
NRBC BLD AUTO-RTO: 1 /100
PHOSPHATE SERPL-MCNC: 3.1 MG/DL (ref 2.5–4.5)
PLATELET # BLD AUTO: 372 10E9/L (ref 150–450)
POTASSIUM SERPL-SCNC: 3.6 MMOL/L (ref 3.4–5.3)
PROMYELOCYTES # BLD MANUAL: 0.2 10E9/L
PROMYELOCYTES NFR BLD MANUAL: 0.9 %
RBC # BLD AUTO: 3.97 10E12/L (ref 4.4–5.9)
RBC MORPH BLD: NORMAL
SODIUM SERPL-SCNC: 141 MMOL/L (ref 133–144)
WBC # BLD AUTO: 17.7 10E9/L (ref 4–11)

## 2017-03-17 PROCEDURE — 84100 ASSAY OF PHOSPHORUS: CPT | Performed by: INTERNAL MEDICINE

## 2017-03-17 PROCEDURE — 99214 OFFICE O/P EST MOD 30 MIN: CPT | Mod: ZP | Performed by: INTERNAL MEDICINE

## 2017-03-17 PROCEDURE — 85025 COMPLETE CBC W/AUTO DIFF WBC: CPT | Performed by: INTERNAL MEDICINE

## 2017-03-17 PROCEDURE — T1013 SIGN LANG/ORAL INTERPRETER: HCPCS | Mod: U3,ZF

## 2017-03-17 PROCEDURE — 80048 BASIC METABOLIC PNL TOTAL CA: CPT | Performed by: INTERNAL MEDICINE

## 2017-03-17 PROCEDURE — 99212 OFFICE O/P EST SF 10 MIN: CPT

## 2017-03-17 PROCEDURE — 83735 ASSAY OF MAGNESIUM: CPT | Performed by: INTERNAL MEDICINE

## 2017-03-17 PROCEDURE — 36415 COLL VENOUS BLD VENIPUNCTURE: CPT | Performed by: INTERNAL MEDICINE

## 2017-03-17 RX ORDER — NYSTATIN 100000/ML
500000 SUSPENSION, ORAL (FINAL DOSE FORM) ORAL 4 TIMES DAILY
Qty: 473 ML | Refills: 3 | Status: SHIPPED | OUTPATIENT
Start: 2017-03-17 | End: 2017-06-06

## 2017-03-17 ASSESSMENT — PAIN SCALES - GENERAL: PAINLEVEL: NO PAIN (0)

## 2017-03-17 NOTE — MR AVS SNAPSHOT
After Visit Summary   3/17/2017    Javi Bansal    MRN: [id number]           Patient Information     Date Of Birth          1970        Visit Information        Provider Department      3/17/2017 10:45 AM Carlos Daniels Rebecca Jane, MD [facility]        Today's Diagnoses     Heart replaced by transplant (H)    -  1      Care Instructions    Stop taking valcyte.    Prograf (tacrolimus) dose is 1.5mg in the morning and 2mg in the evening.    Prednisone dose is 10mg (2 pills) in the morning and 5mg (1 pill) in the evening.     Continue taking nystatin 5ml 4x's a day to help the infection in your mouth.    Get labs at 12:15pm. Manoj will call you with the results.    **Return in 1 month for your 4 month transplant visit. Manoj will schedule.    Call Manoj at [phone] with any questions or concerns.            Follow-ups after your visit        Your next 10 appointments already scheduled     Mar 17, 2017 12:15 PM CDT   Lab with  LAB   [facility] Lab ([facility])    [address] [place] [address] [phone]            Mar 28, 2017  8:45 AM CDT   (Arrive by 8:30 AM)   EMG with Jitendra Kahn MD   [facility] EMG ([facility])    [address] [place] [address] [phone]           Do not use lotions or creams on the area to be tested. If you are on blood thinners (Warfarin, Coumadin, Lovenox, etc), please contact your primary care physician to check if it is safe to stop them 3 days prior to testing. If you have anxiety, please check with your referring physician to obtain anti-anxiety medication for the procedure.            Apr 03, 2017  9:00 AM CDT   (Arrive by 8:45 AM)   Return Visit with Bryant Bartholomew MD   [facility] Sports Medicine ([facility])    [address] [place] [address]  [address]   [phone]            Apr 03, 2017 10:00 AM CDT   (Arrive by 9:45 AM)   Return Visit with Ivan Schuler MD   [facility] ([facility]    [address] [facility]  [address] [facility] [address]   [phone]            Apr 04, 2017  9:30 AM CDT   [facility]    [address] [facility] [address]              Patient must bring picture ID.  Patient should be prepared to give a urine specimen  Please do not eat 10-12 hours before your appointment if you are coming in fasting for labs on lipids, cholesterol, or glucose (sugar).  Pregnant women should follow their Care Team instructions. Water with medications is okay. Do not drink coffee or other fluids.   If you have concerns about taking  your medications, please ask at office or if scheduling via [employer], send a message by clicking on Secure Messaging, Message Your Care Team.            Apr 04, 2017 10:00 AM CDT   Procedure - 2.5 hour with U2A ROOM 9   Unit [address]A [facility] ([facility])    [address] [facility] [address]               Apr 04, 2017 11:00 AM CDT   Heart Cath Heart Biopsy with UUHCVR5   [facility] ([facility])    [address] [facility] [address]   [phone]            Apr 04, 2017  2:00 PM CDT   (Arrive by 1:45 PM)   RETURN HEART TRANSPLANT with [facility] ([facility]    [address] [facility] [address]  [address] [facility] [address]   [phone]            May 05, 2017 10:30 AM CDT   Heart Cath Heart Biopsy with UUHCVR3   [facility] ([facility])    [address] [facility] [address]   [phone]            May 05,  "  1:00 PM CDT   (Arrive by 12:45 PM)   RETURN HEART TRANSPLANT with SAMM Ramos CNP   [facility] ([facility])    [address] [address] [phone]              Future tests that were ordered for you today     Open Future Orders        Priority Expected Expires Ordered    CBC with platelets Routine 3/17/2017 2017 3/17/2017            Who to contact     If you have questions or need follow up information about today's clinic visit or your schedule please contact [facility] directly at [phone].  Normal or non-critical lab and imaging results will be communicated to you by Phone.comhart, letter or phone within 4 business days after the clinic has received the results. If you do not hear from us within 7 days, please contact the clinic through Phone.comhart or phone. If you have a critical or abnormal lab result, we will notify you by phone as soon as possible.  Submit refill requests through [employer] or call your pharmacy and they will forward the refill request to us. Please allow 3 business days for your refill to be completed.          Additional Information About Your Visit        [employer] Information     [employer] lets you send messages to your doctor, view your test results, renew your prescriptions, schedule appointments and more. To sign up, go to [email or web address] . Click on \"Log in\" on the left side of the screen, which will take you to the Welcome page. Then click on \"Sign up Now\" on the right side of the page.     You will be asked to enter the access code listed below, as well as some personal information. Please follow the directions to create your username and password.     Your access code is: [email or web address]  Expires: 3/29/2017  7:30 AM     Your access code will  in 90 days. If you need help or a new code, please call your [facility] clinic or [phone].        Care EveryWhere ID     This is your Care " "EveryWhere ID. This could be used by other organizations to access your [facility] medical records  [id number]        Your Vitals Were     Pulse Height Pulse Oximetry BMI (Body Mass Index)          94 1.69 m (5' 6.53\") 96% 24.79 kg/m2         Blood Pressure from Last 3 Encounters:   03/17/17 123/82   03/15/17 126/86   03/12/17 110/81    Weight from Last 3 Encounters:   03/17/17 70.8 kg (156 lb 1.6 oz)   03/11/17 70.1 kg (154 lb 9.6 oz)   02/27/17 68.5 kg (151 lb)                 Today's Medication Changes          These changes are accurate as of: 3/17/17 11:33 AM.  If you have any questions, ask your nurse or doctor.               These medicines have changed or have updated prescriptions.        Dose/Directions    nystatin 285598 UNIT/ML suspension   Commonly known as:  MYCOSTATIN   This may have changed:  how much to take   Used for:  Heart replaced by transplant (H)   Changed by:  Geena Mcclellan MD        Dose:  393302 Units   Take 5 mLs (500,000 Units) by mouth 4 times daily   Quantity:  473 mL   Refills:  3            Where to get your medicines      These medications were sent to [facility] [place] [id number] [facility] [address] [address]    Hours:  TRANSPLANT PHONE NUMBER [phone] Phone:  [phone]     nystatin 298856 UNIT/ML suspension                Primary Care Provider Office Phone # Fax #    Ivan Burrell [phone]       [address] [address]        Thank you!     Thank you for choosing [facility]  for your care. Our goal is always to provide you with excellent care. Hearing back from our patients is one way we can continue to improve our services. Please take a few minutes to complete the written survey that you may receive in the mail after your visit with us. Thank you!             Your Updated Medication List - Protect others around you: Learn " how to safely use, store and throw away your medicines at www.disposemymeds.org.          This list is accurate as of: 3/17/17 11:33 AM.  Always use your most recent med list.                   Brand Name Dispense Instructions for use    aspirin 81 MG EC tablet     90 tablet    Take 1 tablet (81 mg) by mouth daily       calcium carb 1250 mg (500 mg [place])/vitamin D 200 units 500-200 MG-UNIT per tablet    OSCAL with D    60 tablet    Take 1 tablet by mouth 2 times daily (with meals)       guaiFENesin 20 mg/mL Soln solution    ROBITUSSIN    1200 mL    Take 10 mLs by mouth every 4 hours as needed for cough       isoniazid 300 MG tablet    NYDRAZID    90 tablet    Take 1 tablet (300 mg) by mouth daily       levofloxacin 500 MG tablet    LEVAQUIN    6 tablet    Take 1 tablet (500 mg) by mouth daily for 6 days       multivitamin, therapeutic with minerals Tabs tablet     30 each    Take 1 tablet by mouth daily       mycophenolate 250 MG capsule    CELLCEPT - GENERIC EQUIVALENT    360 capsule    Take 6 capsules (1,500 mg) by mouth 2 times daily       nystatin 733351 UNIT/ML suspension    MYCOSTATIN    473 mL    Take 5 mLs (500,000 Units) by mouth 4 times daily       order for DME     1 Device    Equipment being ordered: Splint - Cubital tunnel splint for ulnar neuropathy       pantoprazole 40 MG EC tablet    PROTONIX    30 tablet    Take 1 tablet (40 mg) by mouth every morning       pravastatin 20 MG tablet    PRAVACHOL    30 tablet    Take 1 tablet (20 mg) by mouth every evening       predniSONE 5 MG tablet    DELTASONE    90 tablet    Take 10 mg in the morning (2 tablets); take 5 mg in the evening (1 tablet)       senna-docusate 8.6-50 MG per tablet    SENOKOT-S;PERICOLACE    20 tablet    Take 2 tablets by mouth nightly as needed for constipation (If no stools during the day)       sulfamethoxazole-trimethoprim 800-160 MG per tablet    BACTRIM DS/SEPTRA DS    8 tablet    Take 1 tablet by mouth twice a week On mondays and  thursdays       * tacrolimus 1 MG capsule    PROGRAF - GENERIC EQUIVALENT    90 capsule    Take 1.5 mg in the AM; take 2 mg in the PM       * tacrolimus 0.5 MG capsule    PROGRAF - GENERIC EQUIVALENT    30 capsule    TAke 1.5 mg in the AM; take 2 mg in the PM       * Notice:  This list has 2 medication(s) that are the same as other medications prescribed for you. Read the directions carefully, and ask your doctor or other care provider to review them with you.

## 2017-03-17 NOTE — TELEPHONE ENCOUNTER
Vicky Schuler and Vy would like additional labs pt should come in on 3/20 to do. Called patient with help of provider. Pt agreeable to come in on Monday at 9:30am for labs. FK level will be drawn also, instructed to time his level. Pt verbalized understanding.

## 2017-03-17 NOTE — PATIENT INSTRUCTIONS
Stop taking valcyte.    Prograf (tacrolimus) dose is 1.5mg in the morning and 2mg in the evening.    Prednisone dose is 10mg (2 pills) in the morning and 5mg (1 pill) in the evening.     Continue taking nystatin 5ml 4x's a day to help the infection in your mouth.    Get labs at 12:15pm. Manoj will call you with the results.    **Return in 1 month for your 4 month transplant visit. Manoj will schedule.    Call Manoj at [phone] with any questions or concerns.

## 2017-03-17 NOTE — LETTER
3/17/2017      RE: Javi Bansal  [address] [place] [address]       Dear Colleague,    Thank you for the opportunity to participate in the care of your patient, Javi Bansal, at the [facility] at [facility]. Please see a copy of my visit note below.    ADULT HEART TRANSPLANT CLINIC        Dear Colleagues:      I had the pleasure of seeing Javi Shetty in the [facility] today.      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  His hospitalization was complicated by 2R rejection as well as ventricular tachycardia requiring treatment with steroids and eventually thromboglobulin. He has had some evidence of Chagas reactivation and completed a 60 day course of benznidazole 150 mg PO BID.     He initially had high filling pressures postoperatively, but these normalized with further diuresis and treatment of rejection.     Since the time of his initial discharge, he has been able to resume INH therapy for latent TB, without any side effects.  He also had 1 episode of 2R rejection again which was in the setting of relatively high immunosuppression doses and received steroid treatment for this with resolution.  His graft function remained normal throughout that.      More recently, he was admitted with what was thought to be a community-acquired pneumonia.  He had CT imaging of this and ID consulted.  Many fungal serologies were sent, which were all negative and it did respond to antibacterial treatment.  He presents today for routine followup of his heart transplant and also followup of this hospitalization.  He says overall that he is feeling much better since the time of discharge and after antibiotics were started.  His shortness of breath has resolved.  His cough is nearly  complete.  He denies any fevers or chills.  Of note, he has a white blood cell count of 17,000 today and denies any diarrhea, again, night sweats or chills.  He denies any dysuria, and again his cough has improved.  He denies headache.      He denies any PND or orthopnea.  He denies palpitations or syncope.  He denies lower extremity edema.     Transplant details:   Date: 12/12/2016   History of rejection: 2 R  12/27/2016 treated with thymo, again 2/10/17 treated with steroids   History of opportunistic infection: none   Transplant medication intolerances: none, although had rejection on azathioprine   CMV R+/D+, EBV R+/D+, HIV negative, HSV 1/2 R+/-, D unknown, donor negative for Heb B and  C       Patient Active Problem List    Diagnosis Date Noted     Pneumonia 03/09/2017     Priority: Medium     Ventricular tachycardia, non-sustained (H) 01/04/2017     Priority: Medium     Elevated liver enzymes 12/20/2016     Priority: Medium     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 12/19/2016     Priority: Medium     Need for prophylactic antibiotic 12/19/2016     Priority: Medium     Heart failure (H) 12/12/2016     Priority: Medium     Heart replaced by transplant (H) 12/12/2016     Priority: Medium     Heart transplant candidate 12/11/2016     Priority: Medium     Chest pain 07/11/2016     Priority: Medium     Chronic systolic heart failure (H)      Priority: Medium     CHF (congestive heart failure) (H) 06/17/2016     Priority: Medium     Cardiac defibrillator in situ-[employer]tronic, dual chamber - Not Dependent 06/16/2016     Priority: Medium     Chagas cardiomyopathy 05/26/2016     Priority: Medium     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 500 mg)  14 DPT: 2R, neg/neg treated w/IV pred x 3 (1000mg) + thymo x 3  21 DPT: 1R, neg/neg       ACP (advance care planning) 10/10/2016     Advance Care Planning 10/10/2016: Receipt of  ACP document:  Received: invalid HCD document dated 8-4-16.  Document not previously scanned. Validation form completed indicating invalid document. Copy sent to client with information and facilitation resources. Validation form sent to be scanned as notation of invalid document received. Confirmed/documented designated decision maker(s).  Added by Namrata Neely RN Advance Care Planning Liaison with [employer]               PAST MEDICAL HISTORY:  Past Medical History   Diagnosis Date     Chagas cardiomyopathy      Chronic systolic heart failure (H)      Dual ICD (implantable cardioverter-defibrillator) in place 10/20/2015     Essential hypertension      Gastroesophageal reflux disease      H/O gastric ulcer      Hypertension      Premature ventricular contractions      Presbyopia      Pterygium      ?? suspected , but unclear dx   s/p OHT 12/12/2016      CURRENT MEDICATIONS:    Prescription Medications as of 3/17/2017             predniSONE (DELTASONE) 5 MG tablet Take 10 mg in the morning (2 tablets); take 5 mg in the evening (1 tablet)    tacrolimus (PROGRAF - GENERIC EQUIVALENT) 1 MG capsule Take 1.5 mg in the AM; take 2 mg in the PM    tacrolimus (PROGRAF - GENERIC EQUIVALENT) 0.5 MG capsule TAke 1.5 mg in the AM; take 2 mg in the PM    guaiFENesin (ROBITUSSIN) 20 mg/mL SOLN solution Take 10 mLs by mouth every 4 hours as needed for cough    levofloxacin (LEVAQUIN) 500 MG tablet Take 1 tablet (500 mg) by mouth daily for 6 days    order for DME Equipment being ordered: Splint - Cubital tunnel splint for ulnar neuropathy    isoniazid (NYDRAZID) 300 MG tablet Take 1 tablet (300 mg) by mouth daily    nystatin (MYCOSTATIN) 646058 UNIT/ML suspension Take 10 mLs (1,000,000 Units) by mouth 4 times daily    pravastatin (PRAVACHOL) 20 MG tablet Take 1 tablet (20 mg) by mouth every evening    sulfamethoxazole-trimethoprim (BACTRIM DS/SEPTRA DS) 800-160 MG per tablet Take 1 tablet by mouth twice a week On mondays and  "thursdays    mycophenolate (CELLCEPT - GENERIC EQUIVALENT) 250 MG capsule Take 6 capsules (1,500 mg) by mouth 2 times daily    aspirin EC 81 MG EC tablet Take 1 tablet (81 mg) by mouth daily    valGANciclovir (VALCYTE) 450 MG tablet Take 2 tablets (900 mg) by mouth daily    senna-docusate (SENOKOT-S;PERICOLACE) 8.6-50 MG per tablet Take 2 tablets by mouth nightly as needed for constipation (If no stools during the day)    calcium carb 1250 mg, 500 mg [place],/vitamin D 200 units (OSCAL WITH D) 500-200 MG-UNIT per tablet Take 1 tablet by mouth 2 times daily (with meals)    multivitamin, therapeutic with minerals (THERA-VIT-M) TABS tablet Take 1 tablet by mouth daily    pantoprazole (PROTONIX) 40 MG EC tablet Take 1 tablet (40 mg) by mouth every morning    Potassium Chloride ER 20 MEQ TBCR (Ended) Take 2 tablets (40 mEq) by mouth once for 1 dose          ROS:   Constitutional: No fever, chills, or sweats. Weight has stabilized    ENT: No visual disturbance, ear ache, epistaxis, sore throat.   Allergies/Immunologic: Negative.   Respiratory:  Cough improved, hemoptysis.   Cardiovascular: As per HPI.   GI: No nausea, vomiting, hematemesis, melena, or hematochezia.   : No urinary frequency, dysuria, or hematuria.   Integument: Negative.   Psychiatric: Negative.   Neuro: Negative.   Endocrinology: Negative.   Musculoskeletal: Negative    Exam:  /82 (BP Location: Left arm, Patient Position: Chair, Cuff Size: Adult Regular)  Pulse 94  Ht 1.69 m (5' 6.53\")  Wt 70.8 kg (156 lb 1.6 oz)  SpO2 96%  BMI 24.79 kg/m2  In general, the patient is a pleasant male in no apparent distress.    HEENT: NC/AT. PERRLA. EOMI.  Sclerae white, not injected.    Neck:  No adenopathy, No thyromegaly.    COR: No jugular venous distention.  RRR.  Normal S1 S2 splits physiologically.  No murmur, rub click, or gallop.    Lungs:  CTA. No rhonchi.    Abdomen: soft, nontender, nondistended.  No organomegaly.  Extremities:  No clubbing, cyanosis, or " edema.    Neuro: Alert & Oriented x 3, grossly non focal.  Sternal wound is well healed     Labs:  CBC RESULTS:   Lab Results   Component Value Date    WBC 17.1 (H) 03/15/2017    RBC 3.97 (L) 03/15/2017    HGB 13.0 (L) 03/15/2017    HCT 38.8 (L) 03/15/2017    MCV 98 03/15/2017    MCH 32.7 03/15/2017    MCHC 33.5 03/15/2017    RDW 13.9 03/15/2017     03/15/2017       BMP RESULTS:  Lab Results   Component Value Date     03/15/2017    POTASSIUM 3.2 (L) 03/15/2017    CHLORIDE 104 03/15/2017    CO2 24 03/15/2017    ANIONGAP 12 03/15/2017     (H) 03/15/2017    BUN 26 03/15/2017    CR 0.84 03/15/2017    GFRESTIMATED >90  Non  GFR Calc   03/15/2017    GFRESTBLACK >90   GFR Calc   03/15/2017    DAISY 9.8 03/15/2017      LIPID RESULTS:  Lab Results   Component Value Date    CHOL 198 01/13/2017    HDL 88 01/13/2017    LDL 93 01/13/2017    TRIG 82 01/13/2017    NHDL 110 01/13/2017       IMMUNOSUPPRESSANT LEVELS  Lab Results   Component Value Date    TACROL 5.7 03/15/2017    DOSTAC Not Provided 03/15/2017       No components found for: CK  Lab Results   Component Value Date    MAG 1.6 03/03/2017     Lab Results   Component Value Date    A1C 5.4 12/11/2016     Lab Results   Component Value Date    PHOS 2.5 03/03/2017     Lab Results   Component Value Date    NTBNPI 1316 (H) 03/09/2017     Lab Results   Component Value Date    SAITESTMET [facility] 01/27/2017    SAICELL Class I 01/27/2017    [id number] B:8 01/27/2017    [id number] A:29 B:54 01/27/2017    SAIREPCOM  01/27/2017      Test performed by modified procedure. Serum heat inactivated. High-risk,   mfi >3,000. Mod-risk, mfi 500-3,000.       Lab Results   Component Value Date    SAIITESTME SA HI 01/27/2017    SAIICELL Class II 01/27/2017    [id number] None 01/27/2017    [id number] None 01/27/2017    SAIIREPCOM  01/27/2017      Test performed by modified procedure. Serum heat inactivated. High-risk,   mfi >3,000. Mod-risk, mfi  500-3,000.       Lab Results   Component Value Date    CSPEC Plasma, EDTA anticoagulant 03/15/2017     Last echo and baseline coronary angiogram reviewed   Last bx reviewed     Echo 1/13/2017     Procedure  Echocardiogram with two-dimensional, color and spectral Doppler performed.  Contrast Optison. Optison (NDC #8051-2980-74) given intravenously. Patient  was given 3 ml mixture of 3 ml Optison and 6 ml saline. 6 ml wasted. IV start  location R Upper arm.      Interpretation Summary  Global and regional left ventricular function is normal with an EF of 60-65%.  Right ventricular function, chamber size, wall motion, and thickness are  normal.  Pulmonary artery systolic pressure is normal.  The inferior vena cava was normal in size with preserved respiratory  variability. Estimated mean right atrial pressure is <3 mmHg.  No pericardial effusion is present.  This study was compared with the study from 12/29/2016. There has been no  change.    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     His course has been complicated by 2R rejection, which has now had twice, and reactivation of his Chagas disease which has been treated     He recently had what was thought to be community-acquired pneumonia, Transplant ID did consult in-house and this responded to antibacterial treatment.  He has no crackles on exam today, his oxygen saturation is normal and his cough has nearly resolved.  The 1 concerning thing today is his ongoing elevated white count, which is brand new and did not exist in the hospital.  He also has a left shift as well as some early lymphoid cells which are detectable in the peripheral blood, which suggests that his marrow is working hard or there is a process pushing these cells out.  We are going to obtain blood  cultures on Monday as well as repeat liver panel. We are also adding a parasite smear to Monday's labs.  We have sent a repeat Chagas test to the [facility] and are awaiting this result.  Per my discussion with Transplant ID today, this is not thought to be reactivation of his Chagas.  He absolutely has no fevers and no other symptoms, although it does raise the possibility of untreated infection.      With regard to his Chagas, this has been treated for 60 days, and therefore, we have stopped.  Again, we have weekly PCR sent and we are awaiting this result.      With regard to his latent TB, he is on INH therapy and tolerating this.     With regard to his cardiac transplantation, he has normal graft function.  He has had 2 episodes of 2R rejection, which have not caused too much problem.  He is euvolemic on exam today.  Filling pressures were reassuring as was his last echo.  We are continuing his tac goal at 10-15 and MF 1500 b.i.d., and we have been tapering down prednisone.       For infection ppx:   PCP prophylaxis: TMP-SMX.  - Viral serostatus & prophylaxis: CMV R+/D+, EBV R+/D+, HIV negative, HSV 1/2 R+/-, D unknown, donor negative for Heb B and C; stopping valcyte today per protocol   - Immunization status:  VZV seropositive, hepatitis B immune.    Latent TB.  On INH again and tolerating this      For other transplant medications, he is on aspirin and statin as well as appropriate prophylaxis as listed below.     Change in immunosuppression: No   Reason for Change: NA  Other Changes: see above    Follow-Up: 1 months, will need repeat labs next week      Next Biopsy: 2 weeks   Next Angiogram: testing is just done   Next Angiogram with IVUS: yes, at one year   Next Stress Test: per protocol        Sincerely,      Geena Mcclellan MD   Cardiology Staff     CC  Patient Care Team:  Ivan Burrell MD as PCP - General (Student in organized health care education/training program)  Broderick Gao MD, MD as MD  (Cardiology)  Shauna Cuellar MD as MD (Infectious Diseases)  Marino Woo MD as MD (Dermatology)  Bryant Bartholomew MD as MD (Family Medicine - Sports Medicine)      Broderick Gao MD   [address] [address]

## 2017-03-17 NOTE — PROGRESS NOTES
ADULT HEART TRANSPLANT CLINIC        Dear Colleagues:      I had the pleasure of seeing Javi Shetty in the [facility] Cardiac [facility]plant [facility] today.      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  His hospitalization was complicated by 2R rejection as well as ventricular tachycardia requiring treatment with steroids and eventually thromboglobulin. He has had some evidence of Chagas reactivation and completed a 60 day course of benznidazole 150 mg PO BID.     He initially had high filling pressures postoperatively, but these normalized with further diuresis and treatment of rejection.     Since the time of his initial discharge, he has been able to resume INH therapy for latent TB, without any side effects.  He also had 1 episode of 2R rejection again which was in the setting of relatively high immunosuppression doses and received steroid treatment for this with resolution.  His graft function remained normal throughout that.      More recently, he was admitted with what was thought to be a community-acquired pneumonia.  He had CT imaging of this and ID consulted.  Many fungal serologies were sent, which were all negative and it did respond to antibacterial treatment.  He presents today for routine followup of his heart transplant and also followup of this hospitalization.  He says overall that he is feeling much better since the time of discharge and after antibiotics were started.  His shortness of breath has resolved.  His cough is nearly complete.  He denies any fevers or chills.  Of note, he has a white blood cell count of 17,000 today and denies any diarrhea, again, night sweats or chills.  He denies any dysuria, and again his cough has improved.  He denies headache.      He denies any PND or orthopnea.  He denies palpitations or syncope.  He denies lower extremity edema.      Transplant details:   Date: 12/12/2016   History of rejection: 2 R  12/27/2016 treated with thymo, again 2/10/17 treated with steroids   History of opportunistic infection: none   Transplant medication intolerances: none, although had rejection on azathioprine   CMV R+/D+, EBV R+/D+, HIV negative, HSV 1/2 R+/-, D unknown, donor negative for Heb B and  C       Patient Active Problem List    Diagnosis Date Noted     Pneumonia 03/09/2017     Priority: Medium     Ventricular tachycardia, non-sustained (H) 01/04/2017     Priority: Medium     Elevated liver enzymes 12/20/2016     Priority: Medium     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 12/19/2016     Priority: Medium     Need for prophylactic antibiotic 12/19/2016     Priority: Medium     Heart failure (H) 12/12/2016     Priority: Medium     Heart replaced by transplant (H) 12/12/2016     Priority: Medium     Heart transplant candidate 12/11/2016     Priority: Medium     Chest pain 07/11/2016     Priority: Medium     Chronic systolic heart failure (H)      Priority: Medium     CHF (congestive heart failure) (H) 06/17/2016     Priority: Medium     Cardiac defibrillator in situ-[employer]tronic, dual chamber - Not Dependent 06/16/2016     Priority: Medium     Chagas cardiomyopathy 05/26/2016     Priority: Medium     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 500 mg)  14 DPT: 2R, neg/neg treated w/IV pred x 3 (1000mg) + thymo x 3  21 DPT: 1R, neg/neg       ACP (advance care planning) 10/10/2016     Advance Care Planning 10/10/2016: Receipt of ACP document:  Received: invalid HCD document dated 8-4-16.  Document not previously scanned. Validation form completed indicating invalid document. Copy sent to client with information and facilitation resources. Validation form sent to be scanned as notation of invalid document received. Confirmed/documented designated decision maker(s).   Added by Namrata Neely RN Advance Care Planning Liaison with [employer]               PAST MEDICAL HISTORY:  Past Medical History   Diagnosis Date     Chagas cardiomyopathy      Chronic systolic heart failure (H)      Dual ICD (implantable cardioverter-defibrillator) in place 10/20/2015     Essential hypertension      Gastroesophageal reflux disease      H/O gastric ulcer      Hypertension      Premature ventricular contractions      Presbyopia      Pterygium      ?? suspected , but unclear dx   s/p OHT 12/12/2016      CURRENT MEDICATIONS:    Prescription Medications as of 3/17/2017             predniSONE (DELTASONE) 5 MG tablet Take 10 mg in the morning (2 tablets); take 5 mg in the evening (1 tablet)    tacrolimus (PROGRAF - GENERIC EQUIVALENT) 1 MG capsule Take 1.5 mg in the AM; take 2 mg in the PM    tacrolimus (PROGRAF - GENERIC EQUIVALENT) 0.5 MG capsule TAke 1.5 mg in the AM; take 2 mg in the PM    guaiFENesin (ROBITUSSIN) 20 mg/mL SOLN solution Take 10 mLs by mouth every 4 hours as needed for cough    levofloxacin (LEVAQUIN) 500 MG tablet Take 1 tablet (500 mg) by mouth daily for 6 days    order for DME Equipment being ordered: Splint - Cubital tunnel splint for ulnar neuropathy    isoniazid (NYDRAZID) 300 MG tablet Take 1 tablet (300 mg) by mouth daily    nystatin (MYCOSTATIN) 432208 UNIT/ML suspension Take 10 mLs (1,000,000 Units) by mouth 4 times daily    pravastatin (PRAVACHOL) 20 MG tablet Take 1 tablet (20 mg) by mouth every evening    sulfamethoxazole-trimethoprim (BACTRIM DS/SEPTRA DS) 800-160 MG per tablet Take 1 tablet by mouth twice a week On mondays and thursdays    mycophenolate (CELLCEPT - GENERIC EQUIVALENT) 250 MG capsule Take 6 capsules (1,500 mg) by mouth 2 times daily    aspirin EC 81 MG EC tablet Take 1 tablet (81 mg) by mouth daily    valGANciclovir (VALCYTE) 450 MG tablet Take 2 tablets (900 mg) by mouth daily    senna-docusate (SENOKOT-S;PERICOLACE) 8.6-50 MG per tablet Take 2  "tablets by mouth nightly as needed for constipation (If no stools during the day)    calcium carb 1250 mg, 500 mg [place],/vitamin D 200 units (OSCAL WITH D) 500-200 MG-UNIT per tablet Take 1 tablet by mouth 2 times daily (with meals)    multivitamin, therapeutic with minerals (THERA-VIT-M) TABS tablet Take 1 tablet by mouth daily    pantoprazole (PROTONIX) 40 MG EC tablet Take 1 tablet (40 mg) by mouth every morning    Potassium Chloride ER 20 MEQ TBCR (Ended) Take 2 tablets (40 mEq) by mouth once for 1 dose          ROS:   Constitutional: No fever, chills, or sweats. Weight has stabilized    ENT: No visual disturbance, ear ache, epistaxis, sore throat.   Allergies/Immunologic: Negative.   Respiratory:  Cough improved, hemoptysis.   Cardiovascular: As per HPI.   GI: No nausea, vomiting, hematemesis, melena, or hematochezia.   : No urinary frequency, dysuria, or hematuria.   Integument: Negative.   Psychiatric: Negative.   Neuro: Negative.   Endocrinology: Negative.   Musculoskeletal: Negative    Exam:  /82 (BP Location: Left arm, Patient Position: Chair, Cuff Size: Adult Regular)  Pulse 94  Ht 1.69 m (5' 6.53\")  Wt 70.8 kg (156 lb 1.6 oz)  SpO2 96%  BMI 24.79 kg/m2  In general, the patient is a pleasant male in no apparent distress.    HEENT: NC/AT. PERRLA. EOMI.  Sclerae white, not injected.    Neck:  No adenopathy, No thyromegaly.    COR: No jugular venous distention.  RRR.  Normal S1 S2 splits physiologically.  No murmur, rub click, or gallop.    Lungs:  CTA. No rhonchi.    Abdomen: soft, nontender, nondistended.  No organomegaly.  Extremities:  No clubbing, cyanosis, or edema.    Neuro: Alert & Oriented x 3, grossly non focal.  Sternal wound is well healed     Labs:  CBC RESULTS:   Lab Results   Component Value Date    WBC 17.1 (H) 03/15/2017    RBC 3.97 (L) 03/15/2017    HGB 13.0 (L) 03/15/2017    HCT 38.8 (L) 03/15/2017    MCV 98 03/15/2017    MCH 32.7 03/15/2017    MCHC 33.5 03/15/2017    RDW 13.9 " 03/15/2017     03/15/2017       BMP RESULTS:  Lab Results   Component Value Date     03/15/2017    POTASSIUM 3.2 (L) 03/15/2017    CHLORIDE 104 03/15/2017    CO2 24 03/15/2017    ANIONGAP 12 03/15/2017     (H) 03/15/2017    BUN 26 03/15/2017    CR 0.84 03/15/2017    GFRESTIMATED >90  Non  GFR Calc   03/15/2017    GFRESTBLACK >90   GFR Calc   03/15/2017    DAISY 9.8 03/15/2017      LIPID RESULTS:  Lab Results   Component Value Date    CHOL 198 01/13/2017    HDL 88 01/13/2017    LDL 93 01/13/2017    TRIG 82 01/13/2017    NHDL 110 01/13/2017       IMMUNOSUPPRESSANT LEVELS  Lab Results   Component Value Date    TACROL 5.7 03/15/2017    DOSTAC Not Provided 03/15/2017       No components found for: CK  Lab Results   Component Value Date    MAG 1.6 03/03/2017     Lab Results   Component Value Date    A1C 5.4 12/11/2016     Lab Results   Component Value Date    PHOS 2.5 03/03/2017     Lab Results   Component Value Date    NTBNPI 1316 (H) 03/09/2017     Lab Results   Component Value Date    SAITESTMET [facility] 01/27/2017    SAICELL Class I 01/27/2017    ZO4WVIXXM B:8 01/27/2017    CK8PIFBCDZ A:29 B:54 01/27/2017    SAIREPCOM  01/27/2017      Test performed by modified procedure. Serum heat inactivated. High-risk,   mfi >3,000. Mod-risk, mfi 500-3,000.       Lab Results   Component Value Date    SAIITESTME [facility] 01/27/2017    SAIICELL Class II 01/27/2017    [id number] None 01/27/2017    [id number] None 01/27/2017    SAIIREPCOM  01/27/2017      Test performed by modified procedure. Serum heat inactivated. High-risk,   mfi >3,000. Mod-risk, mfi 500-3,000.       Lab Results   Component Value Date    CSPEC Plasma, EDTA anticoagulant 03/15/2017     Last echo and baseline coronary angiogram reviewed   Last bx reviewed     Echo 1/13/2017     Procedure  Echocardiogram with two-dimensional, color and spectral Doppler performed.  Contrast Optison. Optison (NDC #3666-4693-07) given  intravenously. Patient  was given 3 ml mixture of 3 ml Optison and 6 ml saline. 6 ml wasted. IV start  location R Upper arm.      Interpretation Summary  Global and regional left ventricular function is normal with an EF of 60-65%.  Right ventricular function, chamber size, wall motion, and thickness are  normal.  Pulmonary artery systolic pressure is normal.  The inferior vena cava was normal in size with preserved respiratory  variability. Estimated mean right atrial pressure is <3 mmHg.  No pericardial effusion is present.  This study was compared with the study from 12/29/2016. There has been no  change.    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     His course has been complicated by 2R rejection, which has now had twice, and reactivation of his Chagas disease which has been treated     He recently had what was thought to be community-acquired pneumonia, Transplant ID did consult in-house and this responded to antibacterial treatment.  He has no crackles on exam today, his oxygen saturation is normal and his cough has nearly resolved.  The 1 concerning thing today is his ongoing elevated white count, which is brand new and did not exist in the hospital.  He also has a left shift as well as some early lymphoid cells which are detectable in the peripheral blood, which suggests that his marrow is working hard or there is a process pushing these cells out.  We are going to obtain blood cultures on Monday as well as repeat liver panel. We are also adding a parasite smear to Monday's labs.  We have sent a repeat Chagas test to the [facility] and are awaiting this result.  Per my discussion with Transplant ID today, this is not thought to be reactivation of his Chagas.  He absolutely has no fevers and no other symptoms, although it  does raise the possibility of untreated infection.      With regard to his Chagas, this has been treated for 60 days, and therefore, we have stopped.  Again, we have weekly PCR sent and we are awaiting this result.      With regard to his latent TB, he is on INH therapy and tolerating this.     With regard to his cardiac transplantation, he has normal graft function.  He has had 2 episodes of 2R rejection, which have not caused too much problem.  He is euvolemic on exam today.  Filling pressures were reassuring as was his last echo.  We are continuing his tac goal at 10-15 and MF 1500 b.i.d., and we have been tapering down prednisone.       For infection ppx:   PCP prophylaxis: TMP-SMX.  - Viral serostatus & prophylaxis: CMV R+/D+, EBV R+/D+, HIV negative, HSV 1/2 R+/-, D unknown, donor negative for Heb B and C; stopping valcyte today per protocol   - Immunization status:  VZV seropositive, hepatitis B immune.    Latent TB.  On INH again and tolerating this      For other transplant medications, he is on aspirin and statin as well as appropriate prophylaxis as listed below.     Change in immunosuppression: No   Reason for Change: NA  Other Changes: see above    Follow-Up: 1 months, will need repeat labs next week      Next Biopsy: 2 weeks   Next Angiogram: testing is just done   Next Angiogram with IVUS: yes, at one year   Next Stress Test: per protocol        Sincerely,      Geena Mcclellan MD   Cardiology Staff     CC  Patient Care Team:  Ivan Burrell MD as PCP - General (Student in organized health care education/training program)  Broderick Gao MD, MD as MD (Cardiology)  Shauna Cuellar MD as MD (Infectious Diseases)  Marino Woo MD as MD (Dermatology)  Bryant Bartholomew MD as MD (Family Medicine - Sports Medicine)      [place] [address]

## 2017-03-17 NOTE — NURSING NOTE
Pt seen in clinic today for his routine 3 month post transplant visit, accompanied by an . Pt recently hospitalized for pneumonia and prescribed levaquin x10 days; pt confirms that last dose is tomorrow. Labs are WNL except for WBC of 17.1; will repeat CBC today to assure that this is trending down; pt may need a CXR if this persists. Pt reports that his voice is still hoarse and that he is still coughing up phlegm. Immuknow, DSAs and chaga's lab still pending. Pt's CMV status is +/+, CMV check negative so okay to dc valcyte today. RHC/bx and FK level already reviewed by primary coordinator with pt yesterday; confirmed dose changes to FK and prednisone and need for 40mEq of potassium today for potassium of 3.2 on 3/15. Pt does have a little thrush but ran out of nystatin yesterday; refill sent to clinic pharmacy.  Instructions below reviewed with pt via  and pt verbalized understanding. AVS printed for patient. Pt given his follow up schedule.      Stop taking valcyte.    Prograf (tacrolimus) dose is 1.5mg in the morning and 2mg in the evening.    Prednisone dose is 10mg (2 pills) in the morning and 5mg (1 pill) in the evening.     Continue taking nystatin 5ml 4x's a day to help the infection in your mouth.    Get labs at 12:15pm. Manoj will call you with the results.    **Return in 1 month for your 4 month transplant visit. Manoj will schedule.    Call Manoj at [phone] with any questions or concerns.

## 2017-03-18 RX ORDER — METHYLPREDNISOLONE 4 MG
TABLET, DOSE PACK ORAL
Qty: 21 TABLET | Refills: 0 | Status: ON HOLD | OUTPATIENT
Start: 2017-03-18 | End: 2017-04-04

## 2017-03-20 DIAGNOSIS — Z94.1 HEART REPLACED BY TRANSPLANT (H): ICD-10-CM

## 2017-03-20 DIAGNOSIS — B57.2 CHAGAS CARDIOMYOPATHY: ICD-10-CM

## 2017-03-20 LAB
ALBUMIN SERPL-MCNC: 3.9 G/DL (ref 3.4–5)
ALP SERPL-CCNC: 84 U/L (ref 40–150)
ALT SERPL W P-5'-P-CCNC: 44 U/L (ref 0–70)
ANION GAP SERPL CALCULATED.3IONS-SCNC: 10 MMOL/L (ref 3–14)
AST SERPL W P-5'-P-CCNC: 24 U/L (ref 0–45)
BASOPHILS # BLD AUTO: 0.1 10E9/L (ref 0–0.2)
BASOPHILS NFR BLD AUTO: 0.6 %
BILIRUB SERPL-MCNC: 0.5 MG/DL (ref 0.2–1.3)
BUN SERPL-MCNC: 29 MG/DL (ref 7–30)
CALCIUM SERPL-MCNC: 9.4 MG/DL (ref 8.5–10.1)
CHLORIDE SERPL-SCNC: 105 MMOL/L (ref 94–109)
CO2 SERPL-SCNC: 28 MMOL/L (ref 20–32)
COPATH REPORT: NORMAL
CREAT SERPL-MCNC: 0.69 MG/DL (ref 0.66–1.25)
DIFFERENTIAL METHOD BLD: ABNORMAL
EOSINOPHIL # BLD AUTO: 0.1 10E9/L (ref 0–0.7)
EOSINOPHIL NFR BLD AUTO: 0.5 %
ERYTHROCYTE [DISTWIDTH] IN BLOOD BY AUTOMATED COUNT: 13.7 % (ref 10–15)
GFR SERPL CREATININE-BSD FRML MDRD: ABNORMAL ML/MIN/1.7M2
GLUCOSE SERPL-MCNC: 138 MG/DL (ref 70–99)
HCT VFR BLD AUTO: 38.5 % (ref 40–53)
HGB BLD-MCNC: 13.2 G/DL (ref 13.3–17.7)
IMM GRANULOCYTES # BLD: 0.5 10E9/L (ref 0–0.4)
IMM GRANULOCYTES NFR BLD: 4.1 %
LYMPHOCYTES # BLD AUTO: 2 10E9/L (ref 0.8–5.3)
LYMPHOCYTES NFR BLD AUTO: 15.6 %
MCH RBC QN AUTO: 33.1 PG (ref 26.5–33)
MCHC RBC AUTO-ENTMCNC: 34.3 G/DL (ref 31.5–36.5)
MCV RBC AUTO: 97 FL (ref 78–100)
MONOCYTES # BLD AUTO: 1 10E9/L (ref 0–1.3)
MONOCYTES NFR BLD AUTO: 7.6 %
NEUTROPHILS # BLD AUTO: 9.1 10E9/L (ref 1.6–8.3)
NEUTROPHILS NFR BLD AUTO: 71.6 %
NRBC # BLD AUTO: 0 10*3/UL
NRBC BLD AUTO-RTO: 0 /100
PLATELET # BLD AUTO: 318 10E9/L (ref 150–450)
POTASSIUM SERPL-SCNC: 3.5 MMOL/L (ref 3.4–5.3)
PROT SERPL-MCNC: 7 G/DL (ref 6.8–8.8)
RBC # BLD AUTO: 3.99 10E12/L (ref 4.4–5.9)
RETICS # AUTO: 146 10E9/L (ref 25–95)
RETICS/RBC NFR AUTO: 3.6 % (ref 0.5–2)
SODIUM SERPL-SCNC: 143 MMOL/L (ref 133–144)
TACROLIMUS BLD-MCNC: 6.3 UG/L (ref 5–15)
TME LAST DOSE: NORMAL H
VIT B6 SERPL-MCNC: 74.4 NG/ML
WBC # BLD AUTO: 12.6 10E9/L (ref 4–11)

## 2017-03-21 ENCOUNTER — TELEPHONE (OUTPATIENT)
Dept: TRANSPLANT | Facility: CLINIC | Age: 47
End: 2017-03-21

## 2017-03-21 LAB
MICRO REPORT STATUS: NORMAL
PARASITE SPEC INSPECT: NORMAL
SPECIMEN SOURCE: NORMAL

## 2017-03-21 NOTE — TELEPHONE ENCOUNTER
Unable to contact patient to review recent results.  LM on patient's cell and home phone requesting call back; emergency contact number for patient's sister appears incorrect.  Plan to increase FK to 2 mg/3 mg after recent sub therapeutic level.  Coordinator will attempt to contact patient again tomorrow.  LM on  RN, Delores, with dose change and request call back.

## 2017-03-22 ENCOUNTER — TELEPHONE (OUTPATIENT)
Dept: TRANSPLANT | Facility: CLINIC | Age: 47
End: 2017-03-22

## 2017-03-22 DIAGNOSIS — L21.9 SEBORRHEA: Primary | ICD-10-CM

## 2017-03-22 DIAGNOSIS — Z94.1 HEART REPLACED BY TRANSPLANT (H): ICD-10-CM

## 2017-03-22 RX ORDER — TACROLIMUS 1 MG/1
CAPSULE ORAL
Qty: 120 CAPSULE | Refills: 11 | Status: SHIPPED | OUTPATIENT
Start: 2017-03-22 | End: 2017-03-30

## 2017-03-22 RX ORDER — KETOCONAZOLE 20 MG/ML
SHAMPOO TOPICAL
Qty: 120 ML | Refills: 3 | Status: SHIPPED | OUTPATIENT
Start: 2017-03-22 | End: 2020-04-10

## 2017-03-22 RX ORDER — TACROLIMUS 0.5 MG/1
CAPSULE ORAL
Qty: 30 CAPSULE | Refills: 11 | Status: SHIPPED | OUTPATIENT
Start: 2017-03-22 | End: 2017-03-30 | Stop reason: DRUGHIGH

## 2017-03-22 NOTE — TELEPHONE ENCOUNTER
Called patient to review recent results.  FK continues to appear below goal level. Instructed patient to increase his FK dose to 2 mg in the AM and 2.5 mg in the PM (from 1.5mg/2 mg).  It is unclear if the patient had been taking 1.5 mg or 1 mg in the AM, but he confirms this current dose and the need for a 12 hour FK level to be drawn next Tuesday, 3/28/17 at 0800 by [facility] RN.  Coordinator will confirm this dose change and need for labs with [facility] Estrella ().  Patient confirms picking up additional cough syrup and request rx for shampoo (ketokonazole) to be sent to [facility].  Patient verbalizes understanding plan of care and agrees to follow.

## 2017-03-26 LAB
BACTERIA SPEC CULT: NO GROWTH
BACTERIA SPEC CULT: NO GROWTH
MICRO REPORT STATUS: NORMAL
MICRO REPORT STATUS: NORMAL
SPECIMEN SOURCE: NORMAL
SPECIMEN SOURCE: NORMAL

## 2017-03-28 ENCOUNTER — OFFICE VISIT (OUTPATIENT)
Dept: NEUROLOGY | Facility: CLINIC | Age: 47
End: 2017-03-28
Attending: FAMILY MEDICINE

## 2017-03-28 DIAGNOSIS — G56.21 NEURITIS OF RIGHT ULNAR NERVE: ICD-10-CM

## 2017-03-28 DIAGNOSIS — G54.0 BRACHIAL PLEXOPATHY: Primary | ICD-10-CM

## 2017-03-28 LAB
ANION GAP SERPL CALCULATED.3IONS-SCNC: 8 MMOL/L (ref 3–14)
BASOPHILS # BLD AUTO: 0 10E9/L (ref 0–0.2)
BASOPHILS NFR BLD AUTO: 0 %
BUN SERPL-MCNC: 24 MG/DL (ref 7–30)
CALCIUM SERPL-MCNC: 9.6 MG/DL (ref 8.5–10.1)
CHLORIDE SERPL-SCNC: 106 MMOL/L (ref 94–109)
CO2 SERPL-SCNC: 27 MMOL/L (ref 20–32)
CREAT SERPL-MCNC: 0.92 MG/DL (ref 0.66–1.25)
DIFFERENTIAL METHOD BLD: ABNORMAL
EOSINOPHIL # BLD AUTO: 0 10E9/L (ref 0–0.7)
EOSINOPHIL NFR BLD AUTO: 0 %
ERYTHROCYTE [DISTWIDTH] IN BLOOD BY AUTOMATED COUNT: 14.2 % (ref 10–15)
GFR SERPL CREATININE-BSD FRML MDRD: 88 ML/MIN/1.7M2
GLUCOSE SERPL-MCNC: 161 MG/DL (ref 70–99)
HCT VFR BLD AUTO: 41 % (ref 40–53)
HGB BLD-MCNC: 13.5 G/DL (ref 13.3–17.7)
LYMPHOCYTES # BLD AUTO: 1.8 10E9/L (ref 0.8–5.3)
LYMPHOCYTES NFR BLD AUTO: 17 %
MACROCYTES BLD QL SMEAR: PRESENT
MAGNESIUM SERPL-MCNC: 1.5 MG/DL (ref 1.6–2.3)
MCH RBC QN AUTO: 32.9 PG (ref 26.5–33)
MCHC RBC AUTO-ENTMCNC: 32.9 G/DL (ref 31.5–36.5)
MCV RBC AUTO: 100 FL (ref 78–100)
MONOCYTES # BLD AUTO: 1.3 10E9/L (ref 0–1.3)
MONOCYTES NFR BLD AUTO: 12.5 %
MYELOCYTES # BLD: 0.2 10E9/L
MYELOCYTES NFR BLD MANUAL: 1.8 %
NEUTROPHILS # BLD AUTO: 7.1 10E9/L (ref 1.6–8.3)
NEUTROPHILS NFR BLD AUTO: 68.7 %
NEUTS HYPERSEG BLD QL SMEAR: PRESENT
PHOSPHATE SERPL-MCNC: 3 MG/DL (ref 2.5–4.5)
PLATELET # BLD AUTO: 199 10E9/L (ref 150–450)
PLATELET # BLD EST: ABNORMAL 10*3/UL
POTASSIUM SERPL-SCNC: 3.6 MMOL/L (ref 3.4–5.3)
RBC # BLD AUTO: 4.1 10E12/L (ref 4.4–5.9)
SODIUM SERPL-SCNC: 141 MMOL/L (ref 133–144)
TACROLIMUS BLD-MCNC: 15.1 UG/L (ref 5–15)
TME LAST DOSE: ABNORMAL H
WBC # BLD AUTO: 10.3 10E9/L (ref 4–11)

## 2017-03-28 PROCEDURE — 84100 ASSAY OF PHOSPHORUS: CPT | Performed by: INTERNAL MEDICINE

## 2017-03-28 PROCEDURE — 85025 COMPLETE CBC W/AUTO DIFF WBC: CPT | Performed by: INTERNAL MEDICINE

## 2017-03-28 PROCEDURE — 83735 ASSAY OF MAGNESIUM: CPT | Performed by: INTERNAL MEDICINE

## 2017-03-28 PROCEDURE — 80197 ASSAY OF TACROLIMUS: CPT | Performed by: INTERNAL MEDICINE

## 2017-03-28 PROCEDURE — 80048 BASIC METABOLIC PNL TOTAL CA: CPT | Performed by: INTERNAL MEDICINE

## 2017-03-28 NOTE — MR AVS SNAPSHOT
After Visit Summary   3/28/2017    Javi Bansal    MRN: [id number]           Patient Information     Date Of Birth          1970        Visit Information        Provider Department      3/28/2017 8:30 AM Jitendra Kahn MD; [employer] [facility] EMG        Today's Diagnoses     Brachial plexopathy    -  1    Neuritis of right ulnar nerve           Follow-ups after your visit        Your next 10 appointments already scheduled     Apr 03, 2017  9:00 AM CDT   (Arrive by 8:45 AM)   Return Visit with Bryant Bartholomew MD   [facility] ([facility])    [address] [place] [address]  5th Floor  [facility] [address]   [phone]            Apr 03, 2017 10:00 AM CDT   (Arrive by 9:45 AM)   Return Visit with Ivan Schuler MD   [facility] and Infectious Diseases ([facility])    [address] [place] [address]  3rd Floor  [facility] [address]   [phone]            Apr 04, 2017  9:30 AM CDT   LAB with ACUTE CARE LAB [facility], Lab ([facility])    [address] [facility] [address]              Patient must bring picture ID.  Patient should be prepared to give a urine specimen  Please do not eat 10-12 hours before your appointment if you are coming in fasting for labs on lipids, cholesterol, or glucose (sugar).  Pregnant women should follow their Care Team instructions. Water with medications is okay. Do not drink coffee or other fluids.   If you have concerns about taking  your medications, please ask at office or if scheduling via [facility], send a message by clicking on Secure Messaging, Message Your Care Team.            Apr 04, 2017 10:00 AM CDT   Procedure - 2.5 hour with U2A ROOM 9   [address] [facility] ([facility], [facility])    [address] [facility] [address]                Apr 04, 2017 11:00 AM CDT   Heart Cath Heart Biopsy with UUHCVR5   [facility] ([facility])    [address] [facility] [address]   [phone]            Apr 04, 2017  2:00 PM CDT   (Arrive by 1:45 PM)   RETURN HEART TRANSPLANT with SAMM Ramos CNP [facility] ([facility])    [address] [place] [address]  3rd [facility] [address] [phone]            May 05, 2017  9:00 AM CDT   [facility]    [address] [facility] [address]              Patient must bring picture ID.  Patient should be prepared to give a urine specimen  Please do not eat 10-12 hours before your appointment if you are coming in fasting for labs on lipids, cholesterol, or glucose (sugar).  Pregnant women should follow their Care Team instructions. Water with medications is okay. Do not drink coffee or other fluids.   If you have concerns about taking  your medications, please ask at office or if scheduling via [employer], send a message by clicking on Secure Messaging, Message Your Care Team.            May 05, 2017  9:30 AM CDT   Procedure - 2.5 hour with U2A ROOM 17   [address] [facility] ([facility])    [address] [facility] [address]               May 05, 2017 10:30 AM CDT   Heart Cath Heart Biopsy with UUHCVR3   [facility] ([facility])    [address] [facility] [address]   [phone]            May 05, 2017  1:00 PM CDT   (Arrive by 12:45 PM)   RETURN HEART TRANSPLANT with SAMM Ramos CNP [facility] ([facility])    [address] [place] [address] [phone]              Who to contact     Please call your clinic at  [phone] to:    Ask questions about your health    Make or cancel appointments    Discuss your medicines    Learn about your test results    Speak to your doctor   If you have compliments or concerns about an experience at your clinic, or if you wish to file a complaint, please contact [facility] Patient Relations at [phone] or email us at [email or web address]         Additional Information About Your Visit        [employer] Information     Diveboard is an electronic gateway that provides easy, online access to your medical records. With Diveboard, you can request a clinic appointment, read your test results, renew a prescription or communicate with your care team.     To sign up for Diveboard visit the website at [email or web address]   You will be asked to enter the access code listed below, as well as some personal information. Please follow the directions to create your username and password.     Your access code is: [email or web address]  Expires: 3/29/2017  7:30 AM     Your access code will  in 90 days. If you need help or a new code, please contact your [facility] Clinic or call [phone] for assistance.        Care EveryWhere ID     This is your Care EveryWhere ID. This could be used by other organizations to access your [place] medical records  [id number]         Blood Pressure from Last 3 Encounters:   17 123/82   03/15/17 126/86   17 110/81    Weight from Last 3 Encounters:   17 70.8 kg (156 lb 1.6 oz)   17 70.1 kg (154 lb 9.6 oz)   17 68.5 kg (151 lb)              We Performed the Following     EMG (PMR-[facility])     HC NCS MOTOR W OR W/O F-WAVE, 5 OR 6     Needle EMG Each Extremity w/Paraspinal Area Complete ([address])        Primary Care Provider Office Phone # Fax #    Ivan Burrell [phone]       [address] [address]        Thank you!      Thank you for choosing [facility]  for your care. Our goal is always to provide you with excellent care. Hearing back from our patients is one way we can continue to improve our services. Please take a few minutes to complete the written survey that you may receive in the mail after your visit with us. Thank you!             Your Updated Medication List - Protect others around you: Learn how to safely use, store and throw away your medicines at www.disposemymeds.org.          This list is accurate as of: 3/28/17  9:36 AM.  Always use your most recent med list.                   Brand Name Dispense Instructions for use    aspirin 81 MG EC tablet     90 tablet    Take 1 tablet (81 mg) by mouth daily       calcium carb 1250 mg (500 mg [place])/vitamin D 200 units 500-200 MG-UNIT per tablet    OSCAL with D    60 tablet    Take 1 tablet by mouth 2 times daily (with meals)       isoniazid 300 MG tablet    NYDRAZID    90 tablet    Take 1 tablet (300 mg) by mouth daily       ketoconazole 2 % shampoo    NIZORAL    120 mL    Apply topically three times a week Alternate with Head and Shoulders.       methylPREDNISolone 4 MG tablet    MEDROL DOSEPAK    21 tablet    Follow package instructions       multivitamin, therapeutic with minerals Tabs tablet     30 each    Take 1 tablet by mouth daily       mycophenolate 250 MG capsule    CELLCEPT - GENERIC EQUIVALENT    360 capsule    Take 6 capsules (1,500 mg) by mouth 2 times daily       nystatin 583130 UNIT/ML suspension    MYCOSTATIN    473 mL    Take 5 mLs (500,000 Units) by mouth 4 times daily       order for DME     1 Device    Equipment being ordered: Splint - Cubital tunnel splint for ulnar neuropathy       pantoprazole 40 MG EC tablet    PROTONIX    30 tablet    Take 1 tablet (40 mg) by mouth every morning       pravastatin 20 MG tablet    PRAVACHOL    30 tablet    Take 1 tablet (20 mg) by mouth every evening       predniSONE 5 MG tablet    DELTASONE    90 tablet    Take 10 mg  in the morning (2 tablets); take 5 mg in the evening (1 tablet)       senna-docusate 8.6-50 MG per tablet    SENOKOT-S;PERICOLACE    20 tablet    Take 2 tablets by mouth nightly as needed for constipation (If no stools during the day)       sulfamethoxazole-trimethoprim 800-160 MG per tablet    BACTRIM DS/SEPTRA DS    8 tablet    Take 1 tablet by mouth twice a week On mondays and thursdays       * tacrolimus 1 MG capsule    PROGRAF - GENERIC EQUIVALENT    120 capsule    Take 2 mg in the AM; take 2.5 mg in the PM       * tacrolimus 0.5 MG capsule    PROGRAF - GENERIC EQUIVALENT    30 capsule    Take 2 mg in the AM; Take 2.5 mg in the PM       * Notice:  This list has 2 medication(s) that are the same as other medications prescribed for you. Read the directions carefully, and ask your doctor or other care provider to review them with you.

## 2017-03-28 NOTE — PROGRESS NOTES
[facility]  Electrodiagnostic Laboratory    Nerve Conduction & EMG Report          Patient:       Javi Shetty  Patient ID:    [id number]  Gender:        Male  YOB: 1970  Age:           46 Years 3 Months      Referring Physician: Bryant Bartholomew MD    History & Examination:    46 year old man with numbness at the right medial forearm and digit V of the hand, and weakness of right dorsal interossei muscles, as well as right long finger extensors. Symptoms began shortly after heart transplant surgery and were realized upon awakening. Query right ulnar neuropathy vs brachial plexopathy/C8 radiculopathy.     Techniques: Motor and sensory conduction studies were done with surface recording electrodes. Temperature was monitored and recorded throughout the study. Upper extremities were maintained at a temperature of 32 degrees Centigrade or higher. EMG was done with a concentric needle electrode.     Results:    Right median, ulnar, radial, and medial antebrachial cutaneous antidromic sensory NCSs were normal. Right median and ulnar (recorded from ADM) motor NCSs were normal. Right ulnar motor NCS recorded from FDI showed very mild slowing of conduction velocity across the elbow (45.7 m/s), normal conduction velocities at the forearm and axilla, and normal distal latency and CMAP amplitudes. Right median F-wave latencies were normal.  EMG showed 2+ fibs/PSWs at the right FDI, 1+ at the right APB, and 1+ at right pronator teres. No other muscle showed abnormal spontaneous activity. There was mildly reduced recruitment of MUPs at right FDI and flexor pollicis longus (FPL), moderately reduced at right extensor indicis (EIP) and extensor digitorum communis (EDC), and normal recruitment in all other muscles sampled. MUP morphology was polyphasic at right EIP and EDC, large amplitude at right FPL, and normal in all other muscles. EMG of right biceps, triceps, and deltoid was normal  (increased insertional activity observed in biceps and triceps was not sustained, therefore not diagnostic).    Interpretation:    Abnormal study. There is electrodiagnostic evidence of either a right lower trunk brachial plexopathy affecting the C7 and C8>T1 primary rami, or cervical radiculopathies at the same levels (C7 and C8>T1). 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 Clinical correlation is advised.  There is no convincing electrodiagnostic evidence of right ulnar neuropathy at the elbow.  The minor amount of slowing of ulnar nerve conduction across the elbow, recorded from the right FDI, is of questionable significance, and certainly not sufficient to explain all the electrodiagnostic abnormalities found.    EMG Physician:     Jitendra Kahn MD      Sensory NCS      Nerve / Sites Rec. Site Onset Peak Ref. NP Amp Ref. PP Amp Dist Cristopher Ref. Temp     ms ms ms  V  V  V cm m/s m/s  C   R MEDIAN - Dig II Anti      Wrist Dig II 2.50 3.49  18.7 10.0 31.2 14 56.0 48.0 34.5   R ULNAR - Dig V Anti      Wrist Dig V 2.24 2.97  15.5 8.0 55.6 12.5 55.8 48.0 33.6   R RADIAL - Snuff      Forearm Snuff 1.93 2.50  34.5 15.0 53.9 10 51.9 48.0 34.3   R MED CUT N FORE      Elbow Forearm 1.77 2.40 3.30 12.1  10.5 10 56.5  33.3      Elbow Forearm 1.77 2.40 3.30 12.4  9.1 12 67.8  33.4       Motor NCS      Nerve / Sites Rec. Site Lat Ref. Amp Ref. Rel Amp Dist Cristopher Ref. Dur. Area Temp.     ms ms mV mV % cm m/s m/s ms %  C   R MEDIAN - APB      Wrist APB 4.11 4.40 9.3 5.0 100 8   6.61 100 32.7      Elbow APB 8.28  9.2  98.6 22 52.8 48.0 7.03 95.2 33   R ULNAR - ADM      Wrist ADM 3.28 3.50 8.3 5.0 100 8   5.47 100 32.6      B.Elbow ADM 6.93  7.8  94.5 20 54.9 48.0 5.63 154 32.5      A.Elbow ADM 8.80  7.4  88.7 9 48.0 48.0 6.04 97.8 32.4      Axilla ADM 10.21  6.9  83.5 8 56.9 48.0 6.20 123 32   R  ULNAR - FDI      Wrist FDI 4.06  10.6  100    7.86 100 32.4      B.Elbow FDI 7.60  10.5  98.7 20 56.5  8.80 99.5 32      A.Elbow FDI 9.79  10.2  95.6 10 45.7  8.70 97.4 32.2      Axilla FDI 11.30  10.1  94.4 8 53.0  9.27 90.6 32       F  Wave      Nerve Min F Lat Max F Lat Mean FLat Temp.    ms ms ms  C   R MEDIAN 28.18 30.21 29.05 34       EMG Summary Table     Normal    IA Fib Fasc H.F. Amp Dur. PPP Pattern   R. FIRST D INTEROSS Normal 2+ None None N N None Mild reduced   R. EXT INDICIS Normal None None None N N 2+ Moderate reduced   R. FLEX POLL LONG Normal None None None 2+ N None Mild reduced   R. PRON TERES Normal 1+ None None N N None Normal   R. TRICEPS Increased None None None N N None Normal   R. DELTOID Normal None None None N N None Normal   R. BICEPS Increased None None None N N None Normal   R. ABD POLL BREVIS Normal 1+ None None N N None Normal   R. EXT DIG COMM Normal None None None N N 2+ Moderate reduced

## 2017-03-28 NOTE — LETTER
3/28/2017       RE: Javi Bansal  [address] [place] [address]     Dear Colleague,    Thank you for referring your patient, Javi Bansal, to the [facility] EMG at [facility]. Please see a copy of my visit note below.        [facility]  Electrodiagnostic Laboratory    Nerve Conduction & EMG Report          Patient:       Javi Shetty  Patient ID:    [id number]  Gender:        Male  YOB: 1970  Age:           46 Years 3 Months      Referring Physician: Bryant Bartholomew MD    History & Examination:    46 year old man with numbness at the right medial forearm and digit V of the hand, and weakness of right dorsal interossei muscles, as well as right long finger extensors. Symptoms began shortly after heart transplant surgery and were realized upon awakening. Query right ulnar neuropathy vs brachial plexopathy/C8 radiculopathy.     Techniques: Motor and sensory conduction studies were done with surface recording electrodes. Temperature was monitored and recorded throughout the study. Upper extremities were maintained at a temperature of 32 degrees Centigrade or higher. EMG was done with a concentric needle electrode.     Results:    Right median, ulnar, radial, and medial antebrachial cutaneous antidromic sensory NCSs were normal. Right median and ulnar (recorded from ADM) motor NCSs were normal. Right ulnar motor NCS recorded from FDI showed very mild slowing of conduction velocity across the elbow (45.7 m/s), normal conduction velocities at the forearm and axilla, and normal distal latency and CMAP amplitudes. Right median F-wave latencies were normal.  EMG showed 2+ fibs/PSWs at the right FDI, 1+ at the right APB, and 1+ at right pronator teres. No other muscle showed abnormal spontaneous activity. There was mildly reduced recruitment of MUPs at right FDI and flexor pollicis longus (FPL),  moderately reduced at right extensor indicis (EIP) and extensor digitorum communis (EDC), and normal recruitment in all other muscles sampled. MUP morphology was polyphasic at right EIP and EDC, large amplitude at right FPL, and normal in all other muscles. EMG of right biceps, triceps, and deltoid was normal (increased insertional activity observed in biceps and triceps was not sustained, therefore not diagnostic).    Interpretation:    Abnormal study. There is electrodiagnostic evidence of either a right lower trunk brachial plexopathy affecting the C7 and C8>T1 primary rami, or cervical radiculopathies at the same levels (C7 and C8>T1). 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 Clinical correlation is advised.  There is no convincing electrodiagnostic evidence of right ulnar neuropathy at the elbow.  The minor amount of slowing of ulnar nerve conduction across the elbow, recorded from the right FDI, is of questionable significance, and certainly not sufficient to explain all the electrodiagnostic abnormalities found.    EMG Physician:     Jitendra Kahn MD      Sensory NCS      Nerve / Sites Rec. Site Onset Peak Ref. NP Amp Ref. PP Amp Dist Cristopher Ref. Temp     ms ms ms  V  V  V cm m/s m/s  C   R MEDIAN - Dig II Anti      Wrist Dig II 2.50 3.49  18.7 10.0 31.2 14 56.0 48.0 34.5   R ULNAR - Dig V Anti      Wrist Dig V 2.24 2.97  15.5 8.0 55.6 12.5 55.8 48.0 33.6   R RADIAL - Snuff      Forearm Snuff 1.93 2.50  34.5 15.0 53.9 10 51.9 48.0 34.3   R MED CUT N FORE      Elbow Forearm 1.77 2.40 3.30 12.1  10.5 10 56.5  33.3      Elbow Forearm 1.77 2.40 3.30 12.4  9.1 12 67.8  33.4       Motor NCS      Nerve / Sites Rec. Site Lat Ref. Amp Ref. Rel Amp Dist Cristopher Ref. Dur. Area Temp.     ms ms mV mV % cm m/s m/s ms %  C   R MEDIAN - APB      Wrist APB 4.11 4.40 9.3 5.0 100 8   6.61 100  32.7      Elbow APB 8.28  9.2  98.6 22 52.8 48.0 7.03 95.2 33   R ULNAR - ADM      Wrist ADM 3.28 3.50 8.3 5.0 100 8   5.47 100 32.6      B.Elbow ADM 6.93  7.8  94.5 20 54.9 48.0 5.63 154 32.5      A.Elbow ADM 8.80  7.4  88.7 9 48.0 48.0 6.04 97.8 32.4      Axilla ADM 10.21  6.9  83.5 8 56.9 48.0 6.20 123 32   R ULNAR - FDI      Wrist FDI 4.06  10.6  100    7.86 100 32.4      B.Elbow FDI 7.60  10.5  98.7 20 56.5  8.80 99.5 32      A.Elbow FDI 9.79  10.2  95.6 10 45.7  8.70 97.4 32.2      Axilla FDI 11.30  10.1  94.4 8 53.0  9.27 90.6 32       F  Wave      Nerve Min F Lat Max F Lat Mean FLat Temp.    ms ms ms  C   R MEDIAN 28.18 30.21 29.05 34       EMG Summary Table     Normal    IA Fib Fasc H.F. Amp Dur. PPP Pattern   R. FIRST D INTEROSS Normal 2+ None None N N None Mild reduced   R. EXT INDICIS Normal None None None N N 2+ Moderate reduced   R. FLEX POLL LONG Normal None None None 2+ N None Mild reduced   R. PRON TERES Normal 1+ None None N N None Normal   R. TRICEPS Increased None None None N N None Normal   R. DELTOID Normal None None None N N None Normal   R. BICEPS Increased None None None N N None Normal   R. ABD POLL BREVIS Normal 1+ None None N N None Normal   R. EXT DIG COMM Normal None None None N N 2+ Moderate reduced                            Again, thank you for allowing me to participate in the care of your patient.      Sincerely,    Jitendra Kahn MD

## 2017-03-29 LAB — LAB SCANNED RESULT: NORMAL

## 2017-03-30 ENCOUNTER — PRE VISIT (OUTPATIENT)
Dept: TRANSPLANT | Facility: CLINIC | Age: 47
End: 2017-03-30

## 2017-03-30 ENCOUNTER — TELEPHONE (OUTPATIENT)
Dept: ORTHOPEDICS | Facility: CLINIC | Age: 47
End: 2017-03-30

## 2017-03-30 DIAGNOSIS — M54.2 CERVICALGIA: Primary | ICD-10-CM

## 2017-03-30 DIAGNOSIS — Z94.1 HEART REPLACED BY TRANSPLANT (H): ICD-10-CM

## 2017-03-30 DIAGNOSIS — Z94.1 HEART REPLACED BY TRANSPLANT (H): Primary | ICD-10-CM

## 2017-03-30 RX ORDER — TACROLIMUS 1 MG/1
2 CAPSULE ORAL 2 TIMES DAILY
Qty: 120 CAPSULE | Refills: 11 | Status: SHIPPED | OUTPATIENT
Start: 2017-03-30 | End: 2017-04-06

## 2017-03-30 RX ORDER — LIDOCAINE 40 MG/G
CREAM TOPICAL
Status: CANCELLED | OUTPATIENT
Start: 2017-03-30

## 2017-03-30 NOTE — TELEPHONE ENCOUNTER
Called to review recent results and confirm upcoming appointments on Tuesday, April 4.  FK appears slightly elevated. Instructed patient to reduce FK to 2 mg BID and plan to recheck at lab draw on Tuesday at 0930. Bx check in at 1000 for the bx and clinic visit at 2:00.  Also confirmed other appointments on Monday, April 3: Ortho and ID.  Patient verbalizes understanding plan of care and agrees to follow.

## 2017-03-30 NOTE — TELEPHONE ENCOUNTER
Attempted to call patient, to schedule an MRI. We were unable to communicate due to the language barrier. The clinic coordinator team will help him schedule the MRI through  services, prior to following up with Dr. Bartholomew.

## 2017-04-03 ENCOUNTER — OFFICE VISIT (OUTPATIENT)
Dept: INFECTIOUS DISEASES | Facility: CLINIC | Age: 47
End: 2017-04-03
Attending: INTERNAL MEDICINE
Payer: COMMERCIAL

## 2017-04-03 ENCOUNTER — OFFICE VISIT (OUTPATIENT)
Dept: ORTHOPEDICS | Facility: CLINIC | Age: 47
End: 2017-04-03

## 2017-04-03 VITALS
WEIGHT: 156 LBS | HEIGHT: 66 IN | SYSTOLIC BLOOD PRESSURE: 123 MMHG | DIASTOLIC BLOOD PRESSURE: 82 MMHG | BODY MASS INDEX: 25.07 KG/M2

## 2017-04-03 VITALS
WEIGHT: 157.8 LBS | RESPIRATION RATE: 18 BRPM | BODY MASS INDEX: 25.47 KG/M2 | DIASTOLIC BLOOD PRESSURE: 82 MMHG | HEART RATE: 51 BPM | TEMPERATURE: 97.6 F | SYSTOLIC BLOOD PRESSURE: 120 MMHG

## 2017-04-03 DIAGNOSIS — M79.2 NEURITIS: Primary | ICD-10-CM

## 2017-04-03 DIAGNOSIS — Z94.1 HEART REPLACED BY TRANSPLANT (H): ICD-10-CM

## 2017-04-03 DIAGNOSIS — B59 PNEUMOCYSTOSIS (H): ICD-10-CM

## 2017-04-03 DIAGNOSIS — B57.2 CHAGAS CARDIOMYOPATHY: Primary | ICD-10-CM

## 2017-04-03 PROCEDURE — 99213 OFFICE O/P EST LOW 20 MIN: CPT | Mod: ZF

## 2017-04-03 ASSESSMENT — PAIN SCALES - GENERAL: PAINLEVEL: NO PAIN (0)

## 2017-04-03 NOTE — LETTER
4/3/2017       RE: Javi Bansal  [address] [place] [address]     Dear Colleague,    Thank you for referring your patient, Javi Bansal, to the [facility] AND INFECTIOUS DISEASES at [facility]. Please see a copy of my visit note below.      [facility]  Transplant Infectious Disease Progress Note     Patient:  Javi Bansal, Date of birth 1970, Medical record number [id number]  Date of Visit:  04/03/2017  Consult requested by Dr. Mcclellan  for evaluation of Chaga's and LTBI         Assessment and Recommendations:   Recommendations:  - weekly monitoring of Chagas PCR, purple top tube send to [facility] then [facility] through April.  In May we can to go every other week testing.  And June-January we can test monthly.  - ordered misc lab test: purple top for chagas PCR to be sent to [facility] then [facility].  Sent currently weekly.   - continues on INH for a planned 9 month course to end around our next visit.  - ordered repeat CT chest for the last week in April    F/u in 3 months.     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  Dr. Mcclellan discussed w/ me, I ordered a parasite smear and other blood work which has returned negative.  Leukocytosis trended down.  Continue to monitor.     - pneumonia:  Was hospitalized 3/9-3/12.  Clinically very acute course that resolved quickly is most likely consistent with a bacterial process, procal was positive.  However the CT chest was quite impressive with scattered ggo/ nodules.  IFD w/up negative from blood work. Clinically he has improved w/ course of levofloxacin.  I would like to f/u the CT chest for  resolution.     - Chronic Chagas heart disease now s/p OHT as above:  In coordination w/ the CDC we were monitoring Chagas PCR in a pre-emptive approach after transplant.  Likely in the setting of immunosuppression and treatment of rejection, not unexpectedly the Chagas PCR did elevate.  At this time we do not believe that it has yet re-infected the heart.  So while many subjects will be Chagas PCR positive after transplant, the approach of pre-emptive is to treat the Chagas before re-infection of the heart occurs.  With rising PCRs from 12/26, and very low level parasitemia from thick smear on 1/5/17, in the setting of treatment of rejection, Benznidazole was initiated on 1/6/17, dosing at 150 mg BID, which is just below 5 mg/kg.  The CDC contacted me with the following results:    Chagas PCRs:  12/19/16 negative    12/26/16 1st positive: 32    1/2/17 +   30    1/9/17    +   28    The 60 day course of Benznidazole has since completed, which he tolerated well.  Chagas PCR testing has since remained negative.  Plans outlined above.       Other Notable information:  Once infected pts are infected for life w/ about 30% developing end organ disease.  The adrenal glands have been suggested as a reservoir for infection, but this is not fully delineated.  Heart transplantation is the therapy of choice for Chagas heart disease in [place] and the 3rd leading cause for heart transplantation.  In extensive review of the literature, here's the issues in heart transplantation of Chagas disease.     1. Reactivation is common occurring in 30-50% and mimics rejection, and can present as a febrile illness.    2. In both [place] and [place] guidelines, screening rather than prophylaxis for infection is recommended.  Thus a screening protocol as outlined above will be needed.    3. Published data from [place] report that the incidence of rejection and survival in Chagas related heart transplant is as good and sometimes better than OHT for  other     reasons.     4. Differences in immunosuppression needs to be considered and I will discuss this with Dr. Mcclellan.     5.  [facility] contact:  Division of Parasitic Diseases and Malaria.  [phone].  E-mail:  [email or web address].  Emergency  [phone].     References:  AJT 2011; 11. 672.    Curr Opin Infect Dis 2012 25, 4 pg 450.     J Heart Lung Transplant 2010; 29; 286        Journal of cardiac failure 2009; 15; 249  *Consented Javi for the [facility] testing of Chagas and use of therapy if indicated after transplant.  A  was present and very helpful with the British forms of the consent.  Javi is not able to read British or english, but we went through all the forms, he understands all the risks and benefits, and his wife was also present during the consent process (she is able to read British).    -Future Chagas testing:  [facility]  Will be send to [facility] then [facility].      -  LTBI:  Treatment is recommended given the 20x's increased risk of reactivation in the transplant population compared to the general population.  If we have time prior to transplant then rifampin might be the option, I'll have to check drug-drug interactions with milrinone. Or INH for 9 months will be the other option.  Either way, we can proceed with transplant.  Rifampin is not recommended if the transplant is to occur soon and we do not use after transplant due to drug-drug interactions. Started INH/ B6 on 9/17/16.  Tolerating medications well. No active peripheral neuropathy currently.     Other ID issues:  - prophylaxis: none currently  - serostatus: EBV, CMV, VZV seropositive, Hep C negative  - immunizations:  Up to date.    - isolation:  Good hand hygiene    Attestation:  I have reviewed today's vital signs, medications, labs and imaging.      Ivan Schuler,   Pager [phone]           History of Infectious Disease Illness:   This is a very pleasant 46 year  old male from [facility], well known to me, w/ Chagas cardiomyopathy, now s/p OHT 16.      Pre-Transplant, Javi was diagnosed with Chagas in  in [place] when he developed CHF, at which time he received 3 months of treatment with nifurtimox ending 2012 (care everywhere).  Javi states he felt well after treatment for about 1.5 years, then developed progressive cardiomyopathy..  Incidentally Diogenes's brother  from Chagas dz at the age of 20.    Pre-transplant testing was also + for quantiferon.  Diogenes does not recall having had a positive test in the past and has not had tz for active or latent TB.  [place] And has lived in [place], [place], [place] and [place].      Javi was hospitalized from 16-17 for the OHT, received on 16.  Course complicated by right pleural effusions (), BiV Dysfunction on  w/ cellular grade 2R rejection treated w/ increase in steroids.  By 16 SVT / A-tach w/ Bx on  notable for cellular grade 2R/3A rejection, treated w/ burst of steroids w/ taper, Thymo and change of immunosuppression to Tacro and MMF.  NSVT led to cardiac MRI w/ temp pacer wires, ? Of possible myocardial scar.  During this time we have been monitoring weekly Chagas PCRs via CDC.  By  the Chagas PCR was moderately elevated w/ the PCR from  pending (since returned at the same level).  On 17 given rise of Chagas PCR, results of rare parasites identified locally on thick smear from 17 and treatment of rejection, the decision was made to re-treat the Chagas, this time w/ Benznidazole, 5 mg/kg BID started on 17, 150 BID dosed just slightly below 5 mg/kg BID.  A 60 day treatment course was completed and the Chagas PCRs blood remain negative.     Javi was hospitalized 3/9-3/12/17 w/ pneumonia.  Although his symptoms were acute and responded very rapidly to treatment, the CT chest is quite impressive for a mixed diffuse nodular pattern.  He was  treated w/ a course of levovloxacin and all blood work (beta D glucan, fungal ab's, galactomannan, etc returned negative).  Clinically he only had a mild cough w/ mostly the feeling that he needs to clear his throat.  No fevers or chills.  Good energy and exercising, looking forward to starting cardiac rehab.  He has some numbness along the incision of the chest wall and reports numbness in his left hand.  Otherwise he is doing well.  No headaches, vision changes, mouth pain, rashes, n/v/d or abdominal pain.       Transplants:  OHT       Immunization History   Administered Date(s) Administered     Hepatitis B 08/04/2016, 09/15/2016, 02/09/2017     Influenza (IIV3) 10/05/2016     Influenza Vaccine IM 3yrs+ 4 Valent IIV4 11/01/2011, 03/21/2015     Pneumococcal (PCV 13) 08/04/2016     Pneumococcal 23 valent 03/21/2015     TDAP Vaccine (Boostrix) 08/04/2016       Current Outpatient Prescriptions   Medication     tacrolimus (PROGRAF - GENERIC EQUIVALENT) 1 MG capsule     ketoconazole (NIZORAL) 2 % shampoo     methylPREDNISolone (MEDROL DOSEPAK) 4 MG tablet     nystatin (MYCOSTATIN) 953978 UNIT/ML suspension     predniSONE (DELTASONE) 5 MG tablet     order for DME     isoniazid (NYDRAZID) 300 MG tablet     pravastatin (PRAVACHOL) 20 MG tablet     sulfamethoxazole-trimethoprim (BACTRIM DS/SEPTRA DS) 800-160 MG per tablet     mycophenolate (CELLCEPT - GENERIC EQUIVALENT) 250 MG capsule     aspirin EC 81 MG EC tablet     senna-docusate (SENOKOT-S;PERICOLACE) 8.6-50 MG per tablet     calcium carb 1250 mg, 500 mg [place],/vitamin D 200 units (OSCAL WITH D) 500-200 MG-UNIT per tablet     multivitamin, therapeutic with minerals (THERA-VIT-M) TABS tablet     pantoprazole (PROTONIX) 40 MG EC tablet     No current facility-administered medications for this visit.             Allergies:   No Known Allergies       Physical Exam:   Vitals were reviewed.    /82  Pulse 51  Temp 97.6  F (36.4  C) (Oral)  Resp 18  Wt 71.6 kg (157  lb 12.8 oz)  BMI 25.47 kg/m2    Physical Examination:  GENERAL:  well-developed, well-nourished, ambulating w/out assistance, comfortable on room air.  More moon faced today.   HEENT:  Head is normocephalic, atraumatic   EYES:  Eyes have anicteric sclerae without conjunctival injection or stigmata of endocarditis.    ENT:  Oropharynx is moist without exudates or ulcers. Tongue is midline  NECK:  Supple. No cervical lymphadenopathy.  No JVD noted.   LUNGS:  Clear to auscultation bilateral.   CARDIOVASCULAR:  Regular rate and rhythm with no gallops or rubs.  ABDOMEN:  Normal bowel sounds, soft, nontender. No appreciable hepatosplenomegaly.  SKIN:  No acute rashes.  No stigmata of endocarditis.  NEUROLOGIC:  Grossly nonfocal. Active x4 extremities  No CVA or spinal tenderness  Chest wall incision well healed.           Laboratory Data:     CD4 counts    Absolute CD4   Date Value Ref Range Status   01/03/2017 8 (L) 441 - 2156 cells/uL Final   01/02/2017 5 (L) 441 - 2156 cells/uL Final   01/01/2017  441 - 2156 cells/uL Final    Test not available at time of request  RESCHEDULED FOR 1/2/17 CEYVES MALIK ON U6C 01/01/17 0916 NYD     12/31/2016 6 (L) 441 - 2156 cells/uL Final       Inflammatory Markers    Recent Labs   Lab Test  02/21/17   1038  06/18/16   0720   CRP  <2.9  7.0       Immune Globulin Studies  No lab results found.  Metabolic Studies       Recent Labs   Lab Test  03/28/17   0730  03/20/17   1006  03/17/17   1216  03/15/17   1046  03/10/17   0717  03/09/17   0855  03/09/17   0850   NA  141  143  141  140  142  139  140   POTASSIUM  3.6  3.5  3.6  3.2*  3.7  3.4  3.6   CHLORIDE  106  105  103  104  106   --   104   CO2  27  28  29  24  23   --   26   ANIONGAP  8  10  9  12  12   --   10   BUN  24  29  29  26  21   --   24   CR  0.92  0.69  1.10  0.84  0.83   --   1.10   GFRESTIMATED  88  >90  Non  GFR Calc    72  >90  Non  GFR Calc    >90  Non  GFR Calc     --    72   GLC  161*  138*  184*  232*  133*  165*  162*   DAISY  9.6  9.4  9.4  9.8  9.1   --   9.1   PHOS  3.0   --   3.1   --    --    --    --    MAG  1.5*   --   2.0   --    --    --    --        Hepatic Studies    Recent Labs   Lab Test  03/20/17   1006  03/15/17   1046  03/09/17   0850  01/27/17   0931  01/13/17   0803  01/11/17   0717   BILITOTAL  0.5  0.3  1.0  0.4  0.7  0.5   ALKPHOS  84  92  109  137  163*  151*   ALBUMIN  3.9  3.6  3.3*  3.8  3.4  2.9*   AST  24  12  13  16  23  23   ALT  44  28  29  50  68  76*       Pancreatitis testing    Recent Labs   Lab Test  01/13/17   0803  12/11/16   2352  06/18/16   0720   AMYLASE   --   82   --    TRIG  82   --   73       Hematology Studies      Recent Labs   Lab Test  03/28/17   0730  03/20/17   1006  03/17/17   1216  03/15/17   1046  03/12/17   0649  03/11/17   0720  03/10/17   0717   03/09/17   0850  03/03/17   0815   WBC  10.3  12.6*  17.7*  17.1*  8.9  11.2*  13.9*   --   11.5*  4.3   ANEU  7.1  9.1*  15.7*   --    --    --   11.6*   --   8.9*  2.4   ALYM  1.8  2.0  0.5*   --    --    --   0.8   --   1.8  1.1   GAGE  1.3  1.0  0.3   --    --    --   0.8   --   0.5  0.7   AEOS  0.0  0.1  0.0   --    --    --   0.0   --   0.1  0.0   HGB  13.5  13.2*  13.1*  13.0*  11.3*  11.2*  12.2*   < >  13.3  13.1*   HCT  41.0  38.5*  38.9*  38.8*  34.1*  33.1*  35.9*   --   40.0  40.0   MCV  100  97  98  98  98  97  98   --   99  100   PLT  199  318  372  345  212  184  159   --   172  135*    < > = values in this interval not displayed.       Clotting Studies    Recent Labs   Lab Test  03/09/17   0850  12/19/16   0408  12/17/16   1430  12/14/16   0826  12/14/16   0400  12/14/16   0017  12/13/16   2034   INR  1.12  1.52*  1.67*   --   1.48*   --    --    PTT   --    --    --   29  31  31  31       Urine Studies    Recent Labs   Lab Test  03/09/17   1033  12/20/16   1043  12/18/16   1451  12/11/16   2238  06/18/16   0405   URINEPH  6.0  5.0  5.5  7.0  6.0   NITRITE  Negative   Negative  Negative  Negative  Negative   LEUKEST  Negative  Negative  Negative  Negative  Negative   WBCU  <1  1  0  0  1       Microbiology:  quantiferon + 6/18/16  Parasite stain 1/5/17 w/ one identified 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     Virology:  Hepatitis B Testing     Recent Labs   Lab Test  03/15/17   1046  01/13/17   0803   HBCAB  Nonreactive  Nonreactive   HEPBANG  Nonreactive  Nonreactive       Hepatitis C Testing     Hepatitis C Antibody   Date Value Ref Range Status   03/15/2017  NR Final    Nonreactive   Assay performance characteristics have not been established for newborns,   infants, and children     01/13/2017  NR Final    Nonreactive   Assay performance characteristics have not been established for newborns,   infants, and children         Imaging:  Cardiac MRI: 1/5/17:   1.  Normal left ventricular size and systolic function with a visually estimated ejection fraction of  65 %.  2.  Normal right ventricular size and systolic function.    3.  On late gadolinium enhancement imaging, there is a focal area of transmural hyperenhancement in the mid inferolateral segment suggestive of myocardial scar. These findings are likely related to his previous episodes of cardiac rejection.    CTA chest: 7/16/16:   1. No pulmonary embolism.  2. Cardiomegaly with central bronchial wall thickening, perhaps representing mild axial interstitial pulmonary edema.  3. A few perivascular nodules in the lateral aspect of the right upper lobe and superior aspect of the right lower lobe. These may represent sequela of infection/inflammation. In a low-risk patient no followup is necessary. In a high-risk patient followup chest CT in 12 months is recommended.  4. Scattered peripheral atelectasis versus fibrotic changes.    CT a/p: 6/20/16:   1. Cardiomegaly with mild interstitial edema and small right pleural effusion.  2. Small volume free fluid in the pelvis with dependent hyperdensity, indicating hemoperitoneum. No  etiology for hemorrhage in the abdomen and pelvis identified.     CT head 6/20/16: 2.7 cm area of slightly expansile sclerosis involving the outer table of the left parietal bone. This is likely benign, most likely an osteoma. Otherwise normal head CT.        [facility]  Transplant Infectious Disease Progress Note     Patient:  Javi Bansal, Date of birth 1970, Medical record number [id number]  Date of Visit:  04/03/2017  Consult requested by Dr. Mcclellan  for evaluation of Chaga's and LTBI         Assessment and Recommendations:   Recommendations:    - ordered misc lab test: purple top for chagas PCR to be sent to [facility] then [facility].  Sent currently weekly.          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      ID issues:  - Chronic Chagas heart disease now s/p OHT as above:  In coordination w/ the CDC we were monitoring Chagas PCR in a pre-emptive approach after transplant.  Likely in the setting of immunosuppression and treatment of rejection, not unexpectedly the Chagas PCR did elevate.  At this time we do not believe that it has yet re-infected the heart.  So while many subjects will be Chagas PCR positive after transplant, the approach of pre-emptive is to treat the Chagas before re-infection of the heart occurs.  With rising PCRs from 12/26, and very low level parasitemia from thick smear on 1/5/17, in the setting of treatment of rejection, Benznidazole was initiated on 1/6/17, dosing at 150 mg BID, which is just below 5 mg/kg.  The CDC contacted me with the following results:    Chagas PCRs:  12/19/16 negative    12/26/16 1st positive: 32    1/2/17 +   30    1/9/17    +   28  Thus, currently tolerating the Benznidazole well w/ trend down in the Chagas PCR.  Plans to treat for 2-3 months. Once  the PCR turns negative on treatment will stop the weekly monitoring, and resume once treatment is completed.  The [facility] runs the IND, which I have signed the 1572.  PT was consented in Wallisian.  I have notified our local IRB per the [facility]  IRBs instructions as to the initiation of treatment.       Other Notable information:  Once infected pts are infected for life w/ about 30% developing end organ disease.  The adrenal glands have been suggested as a reservoir for infection, but this is not fully delineated.  Heart transplantation is the therapy of choice for Chagas heart disease in [place] and the 3rd leading cause for heart transplantation.  In extensive review of the literature, here's the issues in heart transplantation of Chagas disease.     1. Reactivation is common occurring in 30-50% and mimics rejection, and can present as a febrile illness.    2. In both [place] and [place] guidelines, screening rather than prophylaxis for infection is recommended.  Thus a screening protocol as outlined above will be needed.    3. Published data from [place] report that the incidence of rejection and survival in Chagas related heart transplant is as good and sometimes better than OHT for other     reasons.     4. Differences in immunosuppression needs to be considered and I will discuss this with Dr. Mcclellan.     5.  [facility] contact:  Division of Parasitic Diseases and Malaria.  [phone].  E-mail:  [email or web address].  Emergency  [phone].     References:  AJT 2011; 11. 672.    Curr Opin Infect Dis 2012 25, 4 pg 450.     J Heart Lung Transplant 2010; 29; 286        Journal of cardiac failure 2009; 15; 249  *Consented Javi for the [facility] testing of Chagas and use of therapy if indicated after transplant.  A  was present and very helpful with the [place] forms of the consent.  Javi is not able to read Egyptian or english, but we went through all the forms, he understands all the risks and benefits, and his  wife was also present during the consent process (she is able to read Kiswahili).    -Future Chagas testing:  [facility].  Will be send to [facility] then [facility].      -  LTBI:  Treatment is recommended given the 20x's increased risk of reactivation in the transplant population compared to the general population.  If we have time prior to transplant then rifampin might be the option, I'll have to check drug-drug interactions with milrinone. Or INH for 9 months will be the other option.  Either way, we can proceed with transplant.  Rifampin is not recommended if the transplant is to occur soon and we do not use after transplant due to drug-drug interactions. Started INH/ B6 on 16.  Tolerating medications well. No active peripheral neuropathy currently. Treatment w/ INH held until LFTs normalize.    Other ID issues:  - prophylaxis: none currently  - serostatus: EBV, CMV, VZV seropositive, Hep C negative  - immunizations:  Up to date.    - isolation:  Good hand hygiene    Attestation:  I have reviewed today's vital signs, medications, labs and imaging.      Ivan Schuler,   Pager [phone]           History of Infectious Disease Illness:   This is a very pleasant 46 year old male from [facility], well known to me, w/ Chagas cardiomyopathy, now s/p OHT 16.      Pre-Transplant, Javi was diagnosed with Chagas in  in [place] when he developed CHF, at which time he received 3 months of treatment with nifurtimox ending 2012 (care everywhere).  Javi states he felt well after treatment for about 1.5 years, then developed progressive cardiomyopathy..  Incidentally Diogenes's brother  from Chagas dz at the age of 20.    Pre-transplant testing was also + for quantiferon.  Diogenes does not recall having had a positive test in the past and has not had tz for active or latent TB.  [place] And has lived in [place], [place], [place] and [place].      Javi was  hospitalized from 12/11/16-1/11/17 for the OHT, received on 12/12/16.  Course complicated by right pleural effusions (12/17), BiV Dysfunction on 12/19 w/ cellular grade 2R rejection treated w/ increase in steroids.  By 12/28/16 SVT / A-tach w/ Bx on 12/27 notable for cellular grade 2R/3A rejection, treated w/ burst of steroids w/ taper, Thymo and change of immunosuppression to Tacro and MMF.  NSVT led to cardiac MRI w/ temp pacer wires, ? Of possible myocardial scar.  During this time we have been monitoring weekly Chagas PCRs via [facility].  By 12/26 the Chagas PCR was moderately elevated w/ the PCR from 1/2 pending (since returned at the same level).  On 1/5/17 given rise of Chagas PCR, results of rare parasites identified locally on thick smear from 1/5/17 and treatment of rejection, the decision was made to re-treat the Chagas, this time w/ Benznidazole, 5 mg/kg BID started on 1/6/17, 150 BID current dose is just slightly below 5 mg/kg BID.     Since hospital discharge Javi is recovering quite well.  Surgical sites are healing.  He denies any pain, n/v/d, cough/ sob.  No vision changes or headaches.  No new skin rashes.  He is taking his medications.  Good energy, eating well.  He does report the back of head pain, but this has been ongoing for some time.  He states it hurts more when lying down.           Transplants:  Pre heart transplant       Immunization History   Administered Date(s) Administered     Hepatitis B 08/04/2016, 09/15/2016, 02/09/2017     Influenza (IIV3) 10/05/2016     Influenza Vaccine IM 3yrs+ 4 Valent IIV4 11/01/2011, 03/21/2015     Pneumococcal (PCV 13) 08/04/2016     Pneumococcal 23 valent 03/21/2015     TDAP Vaccine (Boostrix) 08/04/2016       Current Outpatient Prescriptions   Medication     tacrolimus (PROGRAF - GENERIC EQUIVALENT) 1 MG capsule     ketoconazole (NIZORAL) 2 % shampoo     methylPREDNISolone (MEDROL DOSEPAK) 4 MG tablet     nystatin (MYCOSTATIN) 573012 UNIT/ML suspension      predniSONE (DELTASONE) 5 MG tablet     order for DME     isoniazid (NYDRAZID) 300 MG tablet     pravastatin (PRAVACHOL) 20 MG tablet     sulfamethoxazole-trimethoprim (BACTRIM DS/SEPTRA DS) 800-160 MG per tablet     mycophenolate (CELLCEPT - GENERIC EQUIVALENT) 250 MG capsule     aspirin EC 81 MG EC tablet     senna-docusate (SENOKOT-S;PERICOLACE) 8.6-50 MG per tablet     calcium carb 1250 mg, 500 mg [place],/vitamin D 200 units (OSCAL WITH D) 500-200 MG-UNIT per tablet     multivitamin, therapeutic with minerals (THERA-VIT-M) TABS tablet     pantoprazole (PROTONIX) 40 MG EC tablet     No current facility-administered medications for this visit.             Allergies:   No Known Allergies       Physical Exam:   Vitals were reviewed.    /82  Pulse 51  Temp 97.6  F (36.4  C) (Oral)  Resp 18  Wt 71.6 kg (157 lb 12.8 oz)  BMI 25.47 kg/m2    Physical Examination:  GENERAL:  well-developed, well-nourished, ambulating w/out assistance, comfortable on room air.  HEENT:  Head is normocephalic, atraumatic   EYES:  Eyes have anicteric sclerae without conjunctival injection or stigmata of endocarditis.    ENT:  Oropharynx is moist without exudates or ulcers. Tongue is midline  NECK:  Supple. No cervical lymphadenopathy.  No JVD noted.   LUNGS:  Clear to auscultation bilateral.   CARDIOVASCULAR:  Regular rate and rhythm with no gallops or rubs.  ABDOMEN:  Normal bowel sounds, soft, nontender. No appreciable hepatosplenomegaly.  SKIN:  No acute rashes.  No stigmata of endocarditis.  NEUROLOGIC:  Grossly nonfocal. Active x4 extremities  No CVA or spinal tenderness  Chest wall incision healing well.  External cardiac monitor in place.           Laboratory Data:     CD4 counts    Absolute CD4   Date Value Ref Range Status   01/03/2017 8 (L) 441 - 2156 cells/uL Final   01/02/2017 5 (L) 441 - 2156 cells/uL Final   01/01/2017  441 - 2156 cells/uL Final    Test not available at time of request  RESCHEDULED FOR 1/2/17 CE  KING WHITTINGTON [facility] [place] 01/01/17 0916 NYD     12/31/2016 6 (L) 441 - 2156 cells/uL Final       Inflammatory Markers    Recent Labs   Lab Test  02/21/17   1038  06/18/16   0720   CRP  <2.9  7.0       Immune Globulin Studies  No lab results found.  Metabolic Studies       Recent Labs   Lab Test  03/28/17   0730  03/20/17   1006  03/17/17   1216  03/15/17   1046  03/10/17   0717  03/09/17   0855  03/09/17   0850   NA  141  143  141  140  142  139  140   POTASSIUM  3.6  3.5  3.6  3.2*  3.7  3.4  3.6   CHLORIDE  106  105  103  104  106   --   104   CO2  27  28  29  24  23   --   26   ANIONGAP  8  10  9  12  12   --   10   BUN  24  29  29  26  21   --   24   CR  0.92  0.69  1.10  0.84  0.83   --   1.10   GFRESTIMATED  88  >90  Non  GFR Calc    72  >90  Non  GFR Calc    >90  Non  GFR Calc     --   72   GLC  161*  138*  184*  232*  133*  165*  162*   DAISY  9.6  9.4  9.4  9.8  9.1   --   9.1   PHOS  3.0   --   3.1   --    --    --    --    MAG  1.5*   --   2.0   --    --    --    --        Hepatic Studies    Recent Labs   Lab Test  03/20/17   1006  03/15/17   1046  03/09/17   0850  01/27/17   0931  01/13/17   0803  01/11/17   0717   BILITOTAL  0.5  0.3  1.0  0.4  0.7  0.5   ALKPHOS  84  92  109  137  163*  151*   ALBUMIN  3.9  3.6  3.3*  3.8  3.4  2.9*   AST  24  12  13  16  23  23   ALT  44  28  29  50  68  76*       Pancreatitis testing    Recent Labs   Lab Test  01/13/17   0803  12/11/16   2352  06/18/16   0720   AMYLASE   --   82   --    TRIG  82   --   73       Hematology Studies      Recent Labs   Lab Test  03/28/17   0730  03/20/17   1006  03/17/17   1216  03/15/17   1046  03/12/17   0649  03/11/17   0720  03/10/17   0717   03/09/17   0850  03/03/17   0815   WBC  10.3  12.6*  17.7*  17.1*  8.9  11.2*  13.9*   --   11.5*  4.3   ANEU  7.1  9.1*  15.7*   --    --    --   11.6*   --   8.9*  2.4   ALYM  1.8  2.0  0.5*   --    --    --   0.8   --   1.8  1.1   GAGE  1.3  1.0  0.3   --     --    --   0.8   --   0.5  0.7   AEOS  0.0  0.1  0.0   --    --    --   0.0   --   0.1  0.0   HGB  13.5  13.2*  13.1*  13.0*  11.3*  11.2*  12.2*   < >  13.3  13.1*   HCT  41.0  38.5*  38.9*  38.8*  34.1*  33.1*  35.9*   --   40.0  40.0   MCV  100  97  98  98  98  97  98   --   99  100   PLT  199  318  372  345  212  184  159   --   172  135*    < > = values in this interval not displayed.       Clotting Studies    Recent Labs   Lab Test  03/09/17   0850  12/19/16   0408  12/17/16   1430  12/14/16   0826  12/14/16   0400  12/14/16   0017  12/13/16   2034   INR  1.12  1.52*  1.67*   --   1.48*   --    --    PTT   --    --    --   29  31  31  31       Urine Studies    Recent Labs   Lab Test  03/09/17   1033  12/20/16   1043  12/18/16   1451  12/11/16   2238  06/18/16   0405   URINEPH  6.0  5.0  5.5  7.0  6.0   NITRITE  Negative  Negative  Negative  Negative  Negative   LEUKEST  Negative  Negative  Negative  Negative  Negative   WBCU  <1  1  0  0  1       Microbiology:  quantiferon + 6/18/16  Parasite stain 1/5/17 w/ one identified 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     Virology:  Hepatitis B Testing     Recent Labs   Lab Test  03/15/17   1046  01/13/17   0803   HBCAB  Nonreactive  Nonreactive   HEPBANG  Nonreactive  Nonreactive       Hepatitis C Testing     Hepatitis C Antibody   Date Value Ref Range Status   03/15/2017  NR Final    Nonreactive   Assay performance characteristics have not been established for newborns,   infants, and children     01/13/2017  NR Final    Nonreactive   Assay performance characteristics have not been established for newborns,   infants, and children         Imaging:  Cardiac MRI: 1/5/17:   1.  Normal left ventricular size and systolic function with a visually estimated ejection fraction of  65 %.  2.  Normal right ventricular size and systolic function.    3.  On late gadolinium enhancement imaging, there is a focal area of transmural hyperenhancement in the mid inferolateral segment  suggestive of myocardial scar. These findings are likely related to his previous episodes of cardiac rejection.    CTA chest: 7/16/16:   1. No pulmonary embolism.  2. Cardiomegaly with central bronchial wall thickening, perhaps representing mild axial interstitial pulmonary edema.  3. A few perivascular nodules in the lateral aspect of the right upper lobe and superior aspect of the right lower lobe. These may represent sequela of infection/inflammation. In a low-risk patient no followup is necessary. In a high-risk patient followup chest CT in 12 months is recommended.  4. Scattered peripheral atelectasis versus fibrotic changes.    CT a/p: 6/20/16:   1. Cardiomegaly with mild interstitial edema and small right pleural effusion.  2. Small volume free fluid in the pelvis with dependent hyperdensity, indicating hemoperitoneum. No etiology for hemorrhage in the abdomen and pelvis identified.     CT head 6/20/16: 2.7 cm area of slightly expansile sclerosis involving the outer table of the left parietal bone. This is likely benign, most likely an osteoma. Otherwise normal head CT.      Sincerely,    Ivan Schuler MD

## 2017-04-03 NOTE — LETTER
4/3/2017      RE: Javi Bansal  [address] [place] [address]        Subjective:   Javi Bansal is a 46 year old male who follows up with right arm numbness to review EMG results. Javi is present today along with an  throughout the duration of today's visit.  He states he is continuing to have primarily paresthesias but they seem to be just slightly more dense in the right fifth digit both palmar and dorsally.  These paresthesias run up to the medial aspect of his elbow and then run up to the axilla.  He also notes that he is also having some paresthesias on the right upper chest, generally over the distribution of the pectoralis major.  These are not particularly rigo-incisional.  In fact paresthesias do not particularly bother him within about 3 cm of his sternotomy incision to the right or left.  He has been working with light weights, dumbbells less than 5 pounds, to do some strengthening and he notices that he does feel like he has some weakness in his triceps on the right.  Otherwise, he is doing well from the standpoint of his transplant.  He is taking his medications as directed.  He is eager to try and get back to some light duty when he is cleared by Cardiology.  He also has Chagas disease and latent TB and is taking his INH.  He denies any fevers or chills.  He denies any abrupt changes in his symptoms.  He notes that while he does think he might be getting worse, he states that this is very subjective and in fact he may just be getting a little more irritated with his current symptoms.       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 He also has no past medical history of Malignant melanoma (H) or Skin disease.  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  His family history includes Brain Cancer in his mother. There is no history of Melanoma or Skin Cancer.  He reports that he has never smoked. He does not have any smokeless tobacco history on file. He reports that he does not drink alcohol or use illicit drugs.    ALLERGIES: He has No Known Allergies.    CURRENT MEDICATIONS: He has a current medication list which includes the following prescription(s): tacrolimus, ketoconazole, methylprednisolone, nystatin, prednisone, order for dme, isoniazid, pravastatin, sulfamethoxazole-trimethoprim, mycophenolate, aspirin, senna-docusate, calcium carb 1250 mg (500 mg [place])/vitamin d 200 units, multivitamin, therapeutic with minerals, and pantoprazole.     REVIEW OF SYSTEMS: 10 point review of systems is negative except as noted above.     Exam:   /82  Ht 5' 6\" (1.676 m)  Wt 156 lb (70.8 kg)  BMI 25.18 kg/m2      CONSTITUTIONIAL: healthy, alert and no distress  SKIN: no suspicious lesions or rashes  GAIT: normal  NEUROLOGIC: see below  PSYCHIATRIC: affect normal/bright and mentation appears normal.  CERVICAL SPINE:  He is nontender over the cervical spine and has negative Spurling's maneuver.   RIGHT UPPER EXTREMITY:  Evaluation demonstrates a C7 triceps is 4/5 on the right and 5/5 on the left.  C8 finger flexion is 5/5 on the right and 5/5 on the left.  C8 finger extension is 4/5 on the right and 5/5 on the left.  He has paresthesias with a subjective decrease in sensation over the right fifth digit.  Both palmarly and dorsally as well as the medial aspect to the elbow and then also has decreased sensation which is subjective on " the right anterior upper chest.  He has no true supraclavicular masses or lymphadenopathy.  He has no axillary masses.  He is able to flex, extend, abduct and adduct the shoulder without difficulty.  He does have full range of motion in the shoulder.          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  Post-transplant he did notice he started having right upper extremity symptoms primarily in terms of paresthesias and dysesthesias that seem to follow predominantly a C8 nerve root distribution.  He had an EMG which demonstrated that this could possibly be a C8 and C7 radiculopathy versus brachial plexopathy.  We completed an MRI of the cervical spine today and we have reviewed that in detail.  There is no evidence of C7 and C8 nerve root involvement.  He does have prominent/probable adipose tissue from the right posterior aspect at T2-T3, but there is no direct or cord impingement.  There are no changes noted within the cord itself.      ASSESSMENT:  Probable right brachial plexopathy.      PLAN:  We are going to proceed with an MRI with and without contrast of the right brachial plexus to ensure that there is no gross deformity or other lesion.  This is likely related to stretch post-positioning for his cardiac transplant.  We did discuss today that there is a possibility that we could proceed with gabapentin for his symptoms if they are bothersome enough; however, he will think about this and consider it and will have further discussion about that are pending his brachial plexus MRI.  He may also benefit from a session or 2 of physical therapy just to ensure that he is not pushing the strengthening too quickly with regards to his brachial plexopathy.  He will follow up pending the MRI and will have a more detailed discussion at that point in time.        Bryant Bartholomew MD

## 2017-04-03 NOTE — PROGRESS NOTES
[facility]  Transplant Infectious Disease Progress Note     Patient:  Javi Bansal, Date of birth 1970, Medical record number [id number]  Date of Visit:  04/03/2017  Consult requested by Dr. Mcclellan  for evaluation of Chaga's and LTBI         Assessment and Recommendations:   Recommendations:  - weekly monitoring of Chagas PCR, purple top tube send to [facility] then [facility] through April.  In May we can to go every other week testing.  And June-January we can test monthly.  - ordered misc lab test: purple top for chagas PCR to be sent to [facility] then [facility].  Sent currently weekly.   - continues on INH for a planned 9 month course to end around our next visit.  - ordered repeat CT chest for the last week in April    F/u in 3 months.     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  Dr. Mcclellan discussed w/ me, I ordered a parasite smear and other blood work which has returned negative.  Leukocytosis trended down.  Continue to monitor.     - pneumonia:  Was hospitalized 3/9-3/12.  Clinically very acute course that resolved quickly is most likely consistent with a bacterial process, procal was positive.  However the CT chest was quite impressive with scattered ggo/ nodules.  IFD w/up negative from blood work. Clinically he has improved w/ course of levofloxacin.  I would like to f/u the CT chest for resolution.     - Chronic Chagas heart disease now s/p OHT as above:  In coordination w/ the CDC we were monitoring Chagas PCR in a pre-emptive approach after transplant.  Likely in the setting of immunosuppression and treatment of rejection, not unexpectedly the Chagas PCR did elevate.  At this time we do not believe that it has yet re-infected  the heart.  So while many subjects will be Chagas PCR positive after transplant, the approach of pre-emptive is to treat the Chagas before re-infection of the heart occurs.  With rising PCRs from 12/26, and very low level parasitemia from thick smear on 1/5/17, in the setting of treatment of rejection, Benznidazole was initiated on 1/6/17, dosing at 150 mg BID, which is just below 5 mg/kg.  The [facility] contacted me with the following results:    Chagas PCRs:  12/19/16 negative    12/26/16 1st positive: 32    1/2/17 +   30    1/9/17    +   28    The 60 day course of Benznidazole has since completed, which he tolerated well.  Chagas PCR testing has since remained negative.  Plans outlined above.       Other Notable information:  Once infected pts are infected for life w/ about 30% developing end organ disease.  The adrenal glands have been suggested as a reservoir for infection, but this is not fully delineated.  Heart transplantation is the therapy of choice for Chagas heart disease in [place] and the 3rd leading cause for heart transplantation.  In extensive review of the literature, here's the issues in heart transplantation of Chagas disease.     1. Reactivation is common occurring in 30-50% and mimics rejection, and can present as a febrile illness.    2. In both [place] and [place] guidelines, screening rather than prophylaxis for infection is recommended.  Thus a screening protocol as outlined above will be needed.    3. Published data from [place] report that the incidence of rejection and survival in Chagas related heart transplant is as good and sometimes better than OHT for other     reasons.     4. Differences in immunosuppression needs to be considered and I will discuss this with Dr. Mcclellan.     5.  [facility] contact:  Division of Parasitic Diseases and Malaria.  [phone].  E-mail:  [email or web address]@cdc.gov.  Emergency  [phone].     References:  AJT 2011; 11. 672.    Curr Opin Infect Dis 2012 25, 4 pg 450.      J Heart Lung Transplant 2010; 29; 286        Journal of cardiac failure 2009; 15; 249  *Consented Javi for the CDC testing of Chagas and use of therapy if indicated after transplant.  A  was present and very helpful with the Occitan forms of the consent.  Javi is not able to read Occitan or english, but we went through all the forms, he understands all the risks and benefits, and his wife was also present during the consent process (she is able to read Occitan).    -Future Chagas testing:  Needs to be ordered as [facility] lab in [facility] w/ purple top tube.  Will be send to [facility] then [facility].      -  LTBI:  Treatment is recommended given the 20x's increased risk of reactivation in the transplant population compared to the general population.  If we have time prior to transplant then rifampin might be the option, I'll have to check drug-drug interactions with milrinone. Or INH for 9 months will be the other option.  Either way, we can proceed with transplant.  Rifampin is not recommended if the transplant is to occur soon and we do not use after transplant due to drug-drug interactions. Started INH/ B6 on 9/17/16.  Tolerating medications well. No active peripheral neuropathy currently.     Other ID issues:  - prophylaxis: none currently  - serostatus: EBV, CMV, VZV seropositive, Hep C negative  - immunizations:  Up to date.    - isolation:  Good hand hygiene    Attestation:  I have reviewed today's vital signs, medications, labs and imaging.      Ivan Schuler,   Pager [phone]           History of Infectious Disease Illness:   This is a very pleasant 46 year old male from [facility], well known to me, w/ Chagas cardiomyopathy, now s/p OHT 12/12/16.      Pre-Transplant, Javi was diagnosed with Chagas in 2011 in [place] when he developed CHF, at which time he received 3 months of treatment with nifurtimox ending 1/2012 (care everywhere).  Javi states he felt well after treatment for about 1.5 years, then  developed progressive cardiomyopathy..  Incidentally Diogenes's brother  from Chagas dz at the age of 20.    Pre-transplant testing was also + for quantiferon.  Diogenes does not recall having had a positive test in the past and has not had tz for active or latent TB.  He [place] And has lived in [place], [place], [place] and [place].      Javi was hospitalized from 16-17 for the OHT, received on 16.  Course complicated by right pleural effusions (), BiV Dysfunction on  w/ cellular grade 2R rejection treated w/ increase in steroids.  By 16 SVT / A-tach w/ Bx on  notable for cellular grade 2R/3A rejection, treated w/ burst of steroids w/ taper, Thymo and change of immunosuppression to Tacro and MMF.  NSVT led to cardiac MRI w/ temp pacer wires, ? Of possible myocardial scar.  During this time we have been monitoring weekly Chagas PCRs via [facility].  By  the Chagas PCR was moderately elevated w/ the PCR from  pending (since returned at the same level).  On 17 given rise of Chagas PCR, results of rare parasites identified locally on thick smear from 17 and treatment of rejection, the decision was made to re-treat the Chagas, this time w/ Benznidazole, 5 mg/kg BID started on 17, 150 BID dosed just slightly below 5 mg/kg BID.  A 60 day treatment course was completed and the Chagas PCRs blood remain negative.     Javi was hospitalized 3/9-3/12/17 w/ pneumonia.  Although his symptoms were acute and responded very rapidly to treatment, the CT chest is quite impressive for a mixed diffuse nodular pattern.  He was treated w/ a course of levovloxacin and all blood work (beta D glucan, fungal ab's, galactomannan, etc returned negative).  Clinically he only had a mild cough w/ mostly the feeling that he needs to clear his throat.  No fevers or chills.  Good energy and exercising, looking forward to starting cardiac rehab.  He has some numbness along the incision  of the chest wall and reports numbness in his left hand.  Otherwise he is doing well.  No headaches, vision changes, mouth pain, rashes, n/v/d or abdominal pain.       Transplants:  OHT       Immunization History   Administered Date(s) Administered     Hepatitis B 08/04/2016, 09/15/2016, 02/09/2017     Influenza (IIV3) 10/05/2016     Influenza Vaccine IM 3yrs+ 4 Valent IIV4 11/01/2011, 03/21/2015     Pneumococcal (PCV 13) 08/04/2016     Pneumococcal 23 valent 03/21/2015     TDAP Vaccine (Boostrix) 08/04/2016       Current Outpatient Prescriptions   Medication     tacrolimus (PROGRAF - GENERIC EQUIVALENT) 1 MG capsule     ketoconazole (NIZORAL) 2 % shampoo     methylPREDNISolone (MEDROL DOSEPAK) 4 MG tablet     nystatin (MYCOSTATIN) 846359 UNIT/ML suspension     predniSONE (DELTASONE) 5 MG tablet     order for DME     isoniazid (NYDRAZID) 300 MG tablet     pravastatin (PRAVACHOL) 20 MG tablet     sulfamethoxazole-trimethoprim (BACTRIM DS/SEPTRA DS) 800-160 MG per tablet     mycophenolate (CELLCEPT - GENERIC EQUIVALENT) 250 MG capsule     aspirin EC 81 MG EC tablet     senna-docusate (SENOKOT-S;PERICOLACE) 8.6-50 MG per tablet     calcium carb 1250 mg, 500 mg [place],/vitamin D 200 units (OSCAL WITH D) 500-200 MG-UNIT per tablet     multivitamin, therapeutic with minerals (THERA-VIT-M) TABS tablet     pantoprazole (PROTONIX) 40 MG EC tablet     No current facility-administered medications for this visit.             Allergies:   No Known Allergies       Physical Exam:   Vitals were reviewed.    /82  Pulse 51  Temp 97.6  F (36.4  C) (Oral)  Resp 18  Wt 71.6 kg (157 lb 12.8 oz)  BMI 25.47 kg/m2    Physical Examination:  GENERAL:  well-developed, well-nourished, ambulating w/out assistance, comfortable on room air.  More moon faced today.   HEENT:  Head is normocephalic, atraumatic   EYES:  Eyes have anicteric sclerae without conjunctival injection or stigmata of endocarditis.    ENT:  Oropharynx is moist without  exudates or ulcers. Tongue is midline  NECK:  Supple. No cervical lymphadenopathy.  No JVD noted.   LUNGS:  Clear to auscultation bilateral.   CARDIOVASCULAR:  Regular rate and rhythm with no gallops or rubs.  ABDOMEN:  Normal bowel sounds, soft, nontender. No appreciable hepatosplenomegaly.  SKIN:  No acute rashes.  No stigmata of endocarditis.  NEUROLOGIC:  Grossly nonfocal. Active x4 extremities  No CVA or spinal tenderness  Chest wall incision well healed.           Laboratory Data:     CD4 counts    Absolute CD4   Date Value Ref Range Status   01/03/2017 8 (L) 441 - 2156 cells/uL Final   01/02/2017 5 (L) 441 - 2156 cells/uL Final   01/01/2017  441 - 2156 cells/uL Final    Test not available at time of request  RESCHEDULED FOR 1/2/17 CEYVES MALIK ON U6C 01/01/17 0916 NYD     12/31/2016 6 (L) 441 - 2156 cells/uL Final       Inflammatory Markers    Recent Labs   Lab Test  02/21/17   1038  06/18/16   0720   CRP  <2.9  7.0       Immune Globulin Studies  No lab results found.  Metabolic Studies       Recent Labs   Lab Test  03/28/17   0730  03/20/17   1006  03/17/17   1216  03/15/17   1046  03/10/17   0717  03/09/17   0855  03/09/17   0850   NA  141  143  141  140  142  139  140   POTASSIUM  3.6  3.5  3.6  3.2*  3.7  3.4  3.6   CHLORIDE  106  105  103  104  106   --   104   CO2  27  28  29  24  23   --   26   ANIONGAP  8  10  9  12  12   --   10   BUN  24  29  29  26  21   --   24   CR  0.92  0.69  1.10  0.84  0.83   --   1.10   GFRESTIMATED  88  >90  Non  GFR Calc    72  >90  Non  GFR Calc    >90  Non  GFR Calc     --   72   GLC  161*  138*  184*  232*  133*  165*  162*   DAISY  9.6  9.4  9.4  9.8  9.1   --   9.1   PHOS  3.0   --   3.1   --    --    --    --    MAG  1.5*   --   2.0   --    --    --    --        Hepatic Studies    Recent Labs   Lab Test  03/20/17   1006  03/15/17   1046  03/09/17   0850  01/27/17   0931  01/13/17   0803  01/11/17   0717   BILITOTAL  0.5   0.3  1.0  0.4  0.7  0.5   ALKPHOS  84  92  109  137  163*  151*   ALBUMIN  3.9  3.6  3.3*  3.8  3.4  2.9*   AST  24  12  13  16  23  23   ALT  44  28  29  50  68  76*       Pancreatitis testing    Recent Labs   Lab Test  01/13/17   0803  12/11/16   2352  06/18/16   0720   AMYLASE   --   82   --    TRIG  82   --   73       Hematology Studies      Recent Labs   Lab Test  03/28/17   0730  03/20/17   1006  03/17/17   1216  03/15/17   1046  03/12/17   0649  03/11/17   0720  03/10/17   0717   03/09/17   0850  03/03/17   0815   WBC  10.3  12.6*  17.7*  17.1*  8.9  11.2*  13.9*   --   11.5*  4.3   ANEU  7.1  9.1*  15.7*   --    --    --   11.6*   --   8.9*  2.4   ALYM  1.8  2.0  0.5*   --    --    --   0.8   --   1.8  1.1   GAGE  1.3  1.0  0.3   --    --    --   0.8   --   0.5  0.7   AEOS  0.0  0.1  0.0   --    --    --   0.0   --   0.1  0.0   HGB  13.5  13.2*  13.1*  13.0*  11.3*  11.2*  12.2*   < >  13.3  13.1*   HCT  41.0  38.5*  38.9*  38.8*  34.1*  33.1*  35.9*   --   40.0  40.0   MCV  100  97  98  98  98  97  98   --   99  100   PLT  199  318  372  345  212  184  159   --   172  135*    < > = values in this interval not displayed.       Clotting Studies    Recent Labs   Lab Test  03/09/17   0850  12/19/16   0408  12/17/16   1430  12/14/16   0826  12/14/16   0400  12/14/16   0017  12/13/16   2034   INR  1.12  1.52*  1.67*   --   1.48*   --    --    PTT   --    --    --   29  31  31  31       Urine Studies    Recent Labs   Lab Test  03/09/17   1033  12/20/16   1043  12/18/16   1451  12/11/16   2238  06/18/16   0405   URINEPH  6.0  5.0  5.5  7.0  6.0   NITRITE  Negative  Negative  Negative  Negative  Negative   LEUKEST  Negative  Negative  Negative  Negative  Negative   WBCU  <1  1  0  0  1       Microbiology:  quantiferon + 6/18/16  Parasite stain 1/5/17 w/ one identified 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     Virology:  Hepatitis B Testing     Recent Labs   Lab Test  03/15/17   1046  01/13/17   0803   HBCAB  Nonreactive   Nonreactive   HEPBANG  Nonreactive  Nonreactive       Hepatitis C Testing     Hepatitis C Antibody   Date Value Ref Range Status   03/15/2017  NR Final    Nonreactive   Assay performance characteristics have not been established for newborns,   infants, and children     01/13/2017  NR Final    Nonreactive   Assay performance characteristics have not been established for newborns,   infants, and children         Imaging:  Cardiac MRI: 1/5/17:   1.  Normal left ventricular size and systolic function with a visually estimated ejection fraction of  65 %.  2.  Normal right ventricular size and systolic function.    3.  On late gadolinium enhancement imaging, there is a focal area of transmural hyperenhancement in the mid inferolateral segment suggestive of myocardial scar. These findings are likely related to his previous episodes of cardiac rejection.    CTA chest: 7/16/16:   1. No pulmonary embolism.  2. Cardiomegaly with central bronchial wall thickening, perhaps representing mild axial interstitial pulmonary edema.  3. A few perivascular nodules in the lateral aspect of the right upper lobe and superior aspect of the right lower lobe. These may represent sequela of infection/inflammation. In a low-risk patient no followup is necessary. In a high-risk patient followup chest CT in 12 months is recommended.  4. Scattered peripheral atelectasis versus fibrotic changes.    CT a/p: 6/20/16:   1. Cardiomegaly with mild interstitial edema and small right pleural effusion.  2. Small volume free fluid in the pelvis with dependent hyperdensity, indicating hemoperitoneum. No etiology for hemorrhage in the abdomen and pelvis identified.     CT head 6/20/16: 2.7 cm area of slightly expansile sclerosis involving the outer table of the left parietal bone. This is likely benign, most likely an osteoma. Otherwise normal head CT.

## 2017-04-03 NOTE — PATIENT INSTRUCTIONS
Continue your current medications.  I would like to see you back in 3 month.  Please have a CT chest done the last week of April.

## 2017-04-03 NOTE — NURSING NOTE
"Chief Complaint   Patient presents with     RECHECK     Chagas follow up       Initial /82  Pulse 51  Temp 97.6  F (36.4  C) (Oral)  Resp 18  Wt 71.6 kg (157 lb 12.8 oz)  BMI 25.47 kg/m2 Estimated body mass index is 25.47 kg/(m^2) as calculated from the following:    Height as of an earlier encounter on 4/3/17: 1.676 m (5' 6\").    Weight as of this encounter: 71.6 kg (157 lb 12.8 oz).  Medication Reconciliation: unable or not appropriate to perform   Pt forgot the list and don't remember all there medication    "

## 2017-04-03 NOTE — MR AVS SNAPSHOT
After Visit Summary   4/3/2017    Javi Bansal    MRN: [id number]           Patient Information     Date Of Birth          1970        Visit Information        Provider Department      4/3/2017 9:45 AM Geena Celeste; Ivan Schuler MD [facility] and Infectious Diseases        Today's Diagnoses     Chagas cardiomyopathy    -  1    Pneumocystosis (H)          Care Instructions    Continue your current medications.  I would like to see you back in 3 month.  Please have a CT chest done the last week of April.         Follow-ups after your visit        Follow-up notes from your care team     Return in about 3 months (around 7/3/2017).      Your next 10 appointments already scheduled     Apr 04, 2017  9:30 AM CDT   LAB with Saima Elmore, [facility] ([facility])    [address] [facility] [address]              Patient must bring picture ID.  Patient should be prepared to give a urine specimen  Please do not eat 10-12 hours before your appointment if you are coming in fasting for labs on lipids, cholesterol, or glucose (sugar).  Pregnant women should follow their Care Team instructions. Water with medications is okay. Do not drink coffee or other fluids.   If you have concerns about taking  your medications, please ask at office or if scheduling via [email or web address], send a message by clicking on Secure Messaging, Message Your Care Team.            Apr 04, 2017 10:00 AM CDT   Procedure - 2.5 hour with SHAHANA Tinsley ROOM 9   Unit 2A [facility] [place] ([facility])    [address] [place] [id number]               Apr 04, 2017 11:00 AM CDT   Heart Cath Heart Biopsy with [id number]   [facility] ([facility])    [address] [facility] [address] [phone]             Apr 04, 2017  1:45 PM CDT   RETURN HEART TRANSPLANT with SAMM Ramos CNP, Saima Elmore   [facility] Care ([facility])    [address] [facility]  3rd [facility] [id number] [phone]            Apr 10, 2017  4:45 PM CDT   (Arrive by 4:30 PM)   MR BRACHIAL PLEXUS W/O & W CONTRAST with [id number]   [facility] MRI ([facility])    [address] [id number] [phone]           Take your medicines as usual, unless your doctor tells you not to. Bring a list of your current medicines to your exam (including vitamins, minerals and over-the-counter drugs).  You will be given intravenous contrast for this exam. To prepare:   The day before your exam, drink extra fluids at least six 8-ounce glasses (unless your doctor tells you to restrict your fluids).   Have a blood test (creatinine test) within 30 days of your exam. Go to your clinic or Diagnostic Imaging Department for this test.  The MRI machine uses a strong magnet. Please wear clothes without metal (snaps, zippers). A sweatsuit works well, or we may give you a hospital gown.  Please remove any body piercings and hair extensions before you arrive. You will also remove watches, jewelry, hairpins, wallets, dentures, partial dental plates and hearing aids. You may wear contact lenses, and you may be able to wear your rings. We have a safe place to keep your personal items, but it is safer to leave them at home.   **IMPORTANT** THE INSTRUCTIONS BELOW ARE ONLY FOR THOSE PATIENTS WHO HAVE BEEN TOLD THEY WILL RECEIVE SEDATION OR GENERAL ANESTHESIA DURING THEIR MRI PROCEDURE:  IF YOU WILL RECEIVE SEDATION (take medicine to help you relax during your exam):   You must get the medicine from your doctor before you arrive. Bring the medicine to the exam. Do not take it at home.   Arrive one hour early. Bring someone who can take you home after the  test. Your medicine will make you sleepy. After the exam, you may not drive, take a bus or take a taxi by yourself.   No eating 8 hours before your exam. You may have clear liquids up until 4 hours before your exam. (Clear liquids include water, clear tea, black coffee and fruit juice without pulp.)  IF YOU WILL RECEIVE ANESTHESIA (be asleep for your exam):   Arrive 1 1/2 hours early. Bring someone who can take you home after the test. You may not drive, take a bus or take a taxi by yourself.   No eating 8 hours before your exam. You may have clear liquids up until 4 hours before your exam. (Clear liquids include water, clear tea, black coffee and fruit juice without pulp.)  Please call the Imaging Department at your exam site with any questions.            Apr 24, 2017  9:20 AM CDT   (Arrive by 9:05 AM)   CT CHEST W/O CONTRAST with UCCT1   [facility] CT ([facility])    [address] [address] [phone]           Please bring any scans or X-rays taken at other hospitals, if similar tests were done. Also bring a list of your medicines, including vitamins, minerals and over-the-counter drugs. It is safest to leave personal items at home.  Be sure to tell your doctor:   If you have any allergies.   If there s any chance you are pregnant.   If you are breastfeeding.   If you have any special needs.  You do not need to do anything special to prepare.  Please wear loose clothing, such as a sweat suit or jogging clothes. Avoid snaps, zippers and other metal. We may ask you to undress and put on a hospital gown.            May 05, 2017  9:00 AM CDT   LAB with ACUTE CARE LAB [facility], [facility] ([facility])    [address] [facility] [address]              Patient must bring picture ID.  Patient should be prepared to give a urine specimen  Please do not eat 10-12 hours before your  "appointment if you are coming in fasting for labs on lipids, cholesterol, or glucose (sugar).  Pregnant women should follow their Care Team instructions. Water with medications is okay. Do not drink coffee or other fluids.   If you have concerns about taking  your medications, please ask at office or if scheduling via mywaves, send a message by clicking on Secure Messaging, Message Your Care Team.              Future tests that were ordered for you today     Open Future Orders        Priority Expected Expires Ordered    CT Chest w/o contrast Routine 4/24/2017 4/3/2018 4/3/2017    MR Brachial Plexus w/o & w Contrast Routine  4/3/2018 4/3/2017            Who to contact     If you have questions or need follow up information about today's clinic visit or your schedule please contact [facility] AND INFECTIOUS DISEASES directly at [phone].  Normal or non-critical lab and imaging results will be communicated to you by Frenzoohart, letter or phone within 4 business days after the clinic has received the results. If you do not hear from us within 7 days, please contact the clinic through Frenzoohart or phone. If you have a critical or abnormal lab result, we will notify you by phone as soon as possible.  Submit refill requests through mywaves or call your pharmacy and they will forward the refill request to us. Please allow 3 business days for your refill to be completed.          Additional Information About Your Visit        mywaves Information     mywaves lets you send messages to your doctor, view your test results, renew your prescriptions, schedule appointments and more. To sign up, go to [email or web address] . Click on \"Log in\" on the left side of the screen, which will take you to the Welcome page. Then click on \"Sign up Now\" on the right side of the page.     You will be asked to enter the access code listed below, as well as some personal information. Please follow the directions to create your " username and password.     Your access code is: [email or web address]  Expires: 2017  6:30 AM     Your access code will  in 90 days. If you need help or a new code, please call your [facility] clinic or [phone].        Care EveryWhere ID     This is your Care EveryWhere ID. This could be used by other organizations to access your [facility] medical records  [id number]        Your Vitals Were     Pulse Temperature Respirations BMI (Body Mass Index)          51 97.6  F (36.4  C) (Oral) 18 25.47 kg/m2         Blood Pressure from Last 3 Encounters:   17 120/82   17 123/82   17 123/82    Weight from Last 3 Encounters:   17 71.6 kg (157 lb 12.8 oz)   17 70.8 kg (156 lb)   17 70.8 kg (156 lb 1.6 oz)               Primary Care Provider Office Phone # Fax #    Ivan Burrell [phone]       [address] [address]        Thank you!     Thank you for choosing [facility] AND INFECTIOUS DISEASES  for your care. Our goal is always to provide you with excellent care. Hearing back from our patients is one way we can continue to improve our services. Please take a few minutes to complete the written survey that you may receive in the mail after your visit with us. Thank you!             Your Updated Medication List - Protect others around you: Learn how to safely use, store and throw away your medicines at www.disposemymeds.org.          This list is accurate as of: 4/3/17 10:25 AM.  Always use your most recent med list.                   Brand Name Dispense Instructions for use    aspirin 81 MG EC tablet     90 tablet    Take 1 tablet (81 mg) by mouth daily       calcium carb 1250 mg (500 mg Cheesh-Na)/vitamin D 200 units 500-200 MG-UNIT per tablet    OSCAL with D    60 tablet    Take 1 tablet by mouth 2 times daily (with meals)       isoniazid 300 MG tablet    NYDRAZID    90 tablet    Take 1 tablet (300 mg) by mouth daily        ketoconazole 2 % shampoo    NIZORAL    120 mL    Apply topically three times a week Alternate with Head and Shoulders.       methylPREDNISolone 4 MG tablet    MEDROL DOSEPAK    21 tablet    Follow package instructions       multivitamin, therapeutic with minerals Tabs tablet     30 each    Take 1 tablet by mouth daily       mycophenolate 250 MG capsule    CELLCEPT - GENERIC EQUIVALENT    360 capsule    Take 6 capsules (1,500 mg) by mouth 2 times daily       nystatin 430135 UNIT/ML suspension    MYCOSTATIN    473 mL    Take 5 mLs (500,000 Units) by mouth 4 times daily       order for DME     1 Device    Equipment being ordered: Splint - Cubital tunnel splint for ulnar neuropathy       pantoprazole 40 MG EC tablet    PROTONIX    30 tablet    Take 1 tablet (40 mg) by mouth every morning       pravastatin 20 MG tablet    PRAVACHOL    30 tablet    Take 1 tablet (20 mg) by mouth every evening       predniSONE 5 MG tablet    DELTASONE    90 tablet    Take 10 mg in the morning (2 tablets); take 5 mg in the evening (1 tablet)       senna-docusate 8.6-50 MG per tablet    SENOKOT-S;PERICOLACE    20 tablet    Take 2 tablets by mouth nightly as needed for constipation (If no stools during the day)       sulfamethoxazole-trimethoprim 800-160 MG per tablet    BACTRIM DS/SEPTRA DS    8 tablet    Take 1 tablet by mouth twice a week On mondays and thursdays       tacrolimus 1 MG capsule    PROGRAF - GENERIC EQUIVALENT    120 capsule    Take 2 capsules (2 mg) by mouth 2 times daily

## 2017-04-03 NOTE — PROGRESS NOTES
[facility]  Transplant Infectious Disease Progress Note     Patient:  Javi Bansal, Date of birth 1970, Medical record number [id number]  Date of Visit:  04/03/2017  Consult requested by Dr. Mcclellan  for evaluation of Chaga's and LTBI         Assessment and Recommendations:   Recommendations:    - ordered misc lab test: purple top for chagas PCR to be sent to [facility] then [facility].  Sent currently weekly.          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      ID issues:  - Chronic Chagas heart disease now s/p OHT as above:  In coordination w/ the CDC we were monitoring Chagas PCR in a pre-emptive approach after transplant.  Likely in the setting of immunosuppression and treatment of rejection, not unexpectedly the Chagas PCR did elevate.  At this time we do not believe that it has yet re-infected the heart.  So while many subjects will be Chagas PCR positive after transplant, the approach of pre-emptive is to treat the Chagas before re-infection of the heart occurs.  With rising PCRs from 12/26, and very low level parasitemia from thick smear on 1/5/17, in the setting of treatment of rejection, Benznidazole was initiated on 1/6/17, dosing at 150 mg BID, which is just below 5 mg/kg.  The [facility] contacted me with the following results:    Chagas PCRs:  12/19/16 negative    12/26/16 1st positive: 32    1/2/17 +   30    1/9/17    +   28  Thus, currently tolerating the Benznidazole well w/ trend down in the Chagas PCR.  Plans to treat for 2-3 months. Once the PCR turns negative on treatment will stop the weekly monitoring, and resume once treatment is completed.  The [facility] runs the IND, which I have signed the 1572.  PT was consented in Pashto.  I have notified our local IRB per the [facility] IRBs instructions  as to the initiation of treatment.       Other Notable information:  Once infected pts are infected for life w/ about 30% developing end organ disease.  The adrenal glands have been suggested as a reservoir for infection, but this is not fully delineated.  Heart transplantation is the therapy of choice for Chagas heart disease in [place] and the 3rd leading cause for heart transplantation.  In extensive review of the literature, here's the issues in heart transplantation of Chagas disease.     1. Reactivation is common occurring in 30-50% and mimics rejection, and can present as a febrile illness.    2. In both [place] and [place] guidelines, screening rather than prophylaxis for infection is recommended.  Thus a screening protocol as outlined above will be needed.    3. Published data from [place] report that the incidence of rejection and survival in Chagas related heart transplant is as good and sometimes better than OHT for other     reasons.     4. Differences in immunosuppression needs to be considered and I will discuss this with Dr. Mcclellan.     5.  [facility] [place] contact:  Division of Parasitic Diseases and Malaria.  [phone].  E-mail:  Parasites@cdc.gov.  Emergency  [phone].     References:  AJT 2011; 11. 672.    Curr Opin Infect Dis 2012 25, 4 pg 450.     J Heart Lung Transplant 2010; 29; 286        Journal of cardiac failure 2009; 15; 249  *Consented Javi for the [facility] [place] testing of Chagas and use of therapy if indicated after transplant.  A  was present and very helpful with the Citizen of [place] forms of the consent.  Javi is not able to read Citizen [place] or english, but we went through all the forms, he understands all the risks and benefits, and his wife was also present during the consent process (she is able to read Citizen of [place]).    -Future Chagas testing:  Needs to be ordered as misc lab in EPIC w/ purple top tube.  Will be send to [facility] then [facility].      -  LTBI:  Treatment is recommended given the 20x's  increased risk of reactivation in the transplant population compared to the general population.  If we have time prior to transplant then rifampin might be the option, I'll have to check drug-drug interactions with milrinone. Or INH for 9 months will be the other option.  Either way, we can proceed with transplant.  Rifampin is not recommended if the transplant is to occur soon and we do not use after transplant due to drug-drug interactions. Started INH/ B6 on 16.  Tolerating medications well. No active peripheral neuropathy currently. Treatment w/ INH held until LFTs normalize.    Other ID issues:  - prophylaxis: none currently  - serostatus: EBV, CMV, VZV seropositive, Hep C negative  - immunizations:  Up to date.    - isolation:  Good hand hygiene    Attestation:  I have reviewed today's vital signs, medications, labs and imaging.      Ivan Schuler,   Pager [phone]           History of Infectious Disease Illness:   This is a very pleasant 46 year old male from [facility], well known to me, w/ Chagas cardiomyopathy, now s/p OHT 16.      Pre-Transplant, Javi was diagnosed with Chagas in  in [place] when he developed CHF, at which time he received 3 months of treatment with nifurtimox ending 2012 (care everywhere).  Javi states he felt well after treatment for about 1.5 years, then developed progressive cardiomyopathy..  Incidentally Diogenes's brother  from Chagas dz at the age of 20.    Pre-transplant testing was also + for quantiferon.  Diogenes does not recall having had a positive test in the past and has not had tz for active or latent TB.  He [place] to the [place] And has lived in [place], [place], [place] and [place].      Javi was hospitalized from 16-17 for the OHT, received on 16.  Course complicated by right pleural effusions (), BiV Dysfunction on  w/ cellular grade 2R rejection treated w/ increase in steroids.  By 16 SVT / A-tach w/ Bx on   notable for cellular grade 2R/3A rejection, treated w/ burst of steroids w/ taper, Thymo and change of immunosuppression to Tacro and MMF.  NSVT led to cardiac MRI w/ temp pacer wires, ? Of possible myocardial scar.  During this time we have been monitoring weekly Chagas PCRs via [facility].  By 12/26 the Chagas PCR was moderately elevated w/ the PCR from 1/2 pending (since returned at the same level).  On 1/5/17 given rise of Chagas PCR, results of rare parasites identified locally on thick smear from 1/5/17 and treatment of rejection, the decision was made to re-treat the Chagas, this time w/ Benznidazole, 5 mg/kg BID started on 1/6/17, 150 BID current dose is just slightly below 5 mg/kg BID.     Since hospital discharge Javi is recovering quite well.  Surgical sites are healing.  He denies any pain, n/v/d, cough/ sob.  No vision changes or headaches.  No new skin rashes.  He is taking his medications.  Good energy, eating well.  He does report the back of head pain, but this has been ongoing for some time.  He states it hurts more when lying down.           Transplants:  Pre heart transplant       Immunization History   Administered Date(s) Administered     Hepatitis B 08/04/2016, 09/15/2016, 02/09/2017     Influenza (IIV3) 10/05/2016     Influenza Vaccine IM 3yrs+ 4 Valent IIV4 11/01/2011, 03/21/2015     Pneumococcal (PCV 13) 08/04/2016     Pneumococcal 23 valent 03/21/2015     TDAP Vaccine (Boostrix) 08/04/2016       Current Outpatient Prescriptions   Medication     tacrolimus (PROGRAF - GENERIC EQUIVALENT) 1 MG capsule     ketoconazole (NIZORAL) 2 % shampoo     methylPREDNISolone (MEDROL DOSEPAK) 4 MG tablet     nystatin (MYCOSTATIN) 370313 UNIT/ML suspension     predniSONE (DELTASONE) 5 MG tablet     order for DME     isoniazid (NYDRAZID) 300 MG tablet     pravastatin (PRAVACHOL) 20 MG tablet     sulfamethoxazole-trimethoprim (BACTRIM DS/SEPTRA DS) 800-160 MG per tablet     mycophenolate (CELLCEPT - GENERIC  EQUIVALENT) 250 MG capsule     aspirin EC 81 MG EC tablet     senna-docusate (SENOKOT-S;PERICOLACE) 8.6-50 MG per tablet     calcium carb 1250 mg, 500 mg [place],/vitamin D 200 units (OSCAL WITH D) 500-200 MG-UNIT per tablet     multivitamin, therapeutic with minerals (THERA-VIT-M) TABS tablet     pantoprazole (PROTONIX) 40 MG EC tablet     No current facility-administered medications for this visit.             Allergies:   No Known Allergies       Physical Exam:   Vitals were reviewed.    /82  Pulse 51  Temp 97.6  F (36.4  C) (Oral)  Resp 18  Wt 71.6 kg (157 lb 12.8 oz)  BMI 25.47 kg/m2    Physical Examination:  GENERAL:  well-developed, well-nourished, ambulating w/out assistance, comfortable on room air.  HEENT:  Head is normocephalic, atraumatic   EYES:  Eyes have anicteric sclerae without conjunctival injection or stigmata of endocarditis.    ENT:  Oropharynx is moist without exudates or ulcers. Tongue is midline  NECK:  Supple. No cervical lymphadenopathy.  No JVD noted.   LUNGS:  Clear to auscultation bilateral.   CARDIOVASCULAR:  Regular rate and rhythm with no gallops or rubs.  ABDOMEN:  Normal bowel sounds, soft, nontender. No appreciable hepatosplenomegaly.  SKIN:  No acute rashes.  No stigmata of endocarditis.  NEUROLOGIC:  Grossly nonfocal. Active x4 extremities  No CVA or spinal tenderness  Chest wall incision healing well.  External cardiac monitor in place.           Laboratory Data:     CD4 counts    Absolute CD4   Date Value Ref Range Status   01/03/2017 8 (L) 441 - 2156 cells/uL Final   01/02/2017 5 (L) 441 - 2156 cells/uL Final   01/01/2017  441 - 2156 cells/uL Final    Test not available at time of request  RESCHEDULED FOR 1/2/17 CEYVES MALIK ON C 01/01/17 0916 NYD     12/31/2016 6 (L) 441 - 2156 cells/uL Final       Inflammatory Markers    Recent Labs   Lab Test  02/21/17   1038  06/18/16   0720   CRP  <2.9  7.0       Immune Globulin Studies  No lab results found.  Metabolic Studies        Recent Labs   Lab Test  03/28/17   0730  03/20/17   1006  03/17/17   1216  03/15/17   1046  03/10/17   0717  03/09/17   0855  03/09/17   0850   NA  141  143  141  140  142  139  140   POTASSIUM  3.6  3.5  3.6  3.2*  3.7  3.4  3.6   CHLORIDE  106  105  103  104  106   --   104   CO2  27  28  29  24  23   --   26   ANIONGAP  8  10  9  12  12   --   10   BUN  24  29  29  26  21   --   24   CR  0.92  0.69  1.10  0.84  0.83   --   1.10   GFRESTIMATED  88  >90  Non  GFR Calc    72  >90  Non  GFR Calc    >90  Non  GFR Calc     --   72   GLC  161*  138*  184*  232*  133*  165*  162*   DAISY  9.6  9.4  9.4  9.8  9.1   --   9.1   PHOS  3.0   --   3.1   --    --    --    --    MAG  1.5*   --   2.0   --    --    --    --        Hepatic Studies    Recent Labs   Lab Test  03/20/17   1006  03/15/17   1046  03/09/17   0850  01/27/17   0931  01/13/17   0803  01/11/17   0717   BILITOTAL  0.5  0.3  1.0  0.4  0.7  0.5   ALKPHOS  84  92  109  137  163*  151*   ALBUMIN  3.9  3.6  3.3*  3.8  3.4  2.9*   AST  24  12  13  16  23  23   ALT  44  28  29  50  68  76*       Pancreatitis testing    Recent Labs   Lab Test  01/13/17   0803  12/11/16   2352  06/18/16   0720   AMYLASE   --   82   --    TRIG  82   --   73       Hematology Studies      Recent Labs   Lab Test  03/28/17   0730  03/20/17   1006  03/17/17   1216  03/15/17   1046  03/12/17   0649  03/11/17   0720  03/10/17   0717   03/09/17   0850  03/03/17   0815   WBC  10.3  12.6*  17.7*  17.1*  8.9  11.2*  13.9*   --   11.5*  4.3   ANEU  7.1  9.1*  15.7*   --    --    --   11.6*   --   8.9*  2.4   ALYM  1.8  2.0  0.5*   --    --    --   0.8   --   1.8  1.1   GAGE  1.3  1.0  0.3   --    --    --   0.8   --   0.5  0.7   AEOS  0.0  0.1  0.0   --    --    --   0.0   --   0.1  0.0   HGB  13.5  13.2*  13.1*  13.0*  11.3*  11.2*  12.2*   < >  13.3  13.1*   HCT  41.0  38.5*  38.9*  38.8*  34.1*  33.1*  35.9*   --   40.0  40.0   MCV  100  97  98   98  98  97  98   --   99  100   PLT  199  318  372  345  212  184  159   --   172  135*    < > = values in this interval not displayed.       Clotting Studies    Recent Labs   Lab Test  03/09/17   0850  12/19/16   0408  12/17/16   1430  12/14/16   0826  12/14/16   0400  12/14/16   0017  12/13/16   2034   INR  1.12  1.52*  1.67*   --   1.48*   --    --    PTT   --    --    --   29  31  31  31       Urine Studies    Recent Labs   Lab Test  03/09/17   1033  12/20/16   1043  12/18/16   1451  12/11/16   2238  06/18/16   0405   URINEPH  6.0  5.0  5.5  7.0  6.0   NITRITE  Negative  Negative  Negative  Negative  Negative   LEUKEST  Negative  Negative  Negative  Negative  Negative   WBCU  <1  1  0  0  1       Microbiology:  quantiferon + 6/18/16  Parasite stain 1/5/17 w/ one identified 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     Virology:  Hepatitis B Testing     Recent Labs   Lab Test  03/15/17   1046  01/13/17   0803   HBCAB  Nonreactive  Nonreactive   HEPBANG  Nonreactive  Nonreactive       Hepatitis C Testing     Hepatitis C Antibody   Date Value Ref Range Status   03/15/2017  NR Final    Nonreactive   Assay performance characteristics have not been established for newborns,   infants, and children     01/13/2017  NR Final    Nonreactive   Assay performance characteristics have not been established for newborns,   infants, and children         Imaging:  Cardiac MRI: 1/5/17:   1.  Normal left ventricular size and systolic function with a visually estimated ejection fraction of  65 %.  2.  Normal right ventricular size and systolic function.    3.  On late gadolinium enhancement imaging, there is a focal area of transmural hyperenhancement in the mid inferolateral segment suggestive of myocardial scar. These findings are likely related to his previous episodes of cardiac rejection.    CTA chest: 7/16/16:   1. No pulmonary embolism.  2. Cardiomegaly with central bronchial wall thickening, perhaps representing mild axial  interstitial pulmonary edema.  3. A few perivascular nodules in the lateral aspect of the right upper lobe and superior aspect of the right lower lobe. These may represent sequela of infection/inflammation. In a low-risk patient no followup is necessary. In a high-risk patient followup chest CT in 12 months is recommended.  4. Scattered peripheral atelectasis versus fibrotic changes.    CT a/p: 6/20/16:   1. Cardiomegaly with mild interstitial edema and small right pleural effusion.  2. Small volume free fluid in the pelvis with dependent hyperdensity, indicating hemoperitoneum. No etiology for hemorrhage in the abdomen and pelvis identified.     CT head 6/20/16: 2.7 cm area of slightly expansile sclerosis involving the outer table of the left parietal bone. This is likely benign, most likely an osteoma. Otherwise normal head CT.

## 2017-04-03 NOTE — PROGRESS NOTES
Subjective:   Javi Bansal is a 46 year old male who follows up with right arm numbness to review EMG results. Javi is present today along with an  throughout the duration of today's visit.  He states he is continuing to have primarily paresthesias but they seem to be just slightly more dense in the right fifth digit both palmar and dorsally.  These paresthesias run up to the medial aspect of his elbow and then run up to the axilla.  He also notes that he is also having some paresthesias on the right upper chest, generally over the distribution of the pectoralis major.  These are not particularly rigo-incisional.  In fact paresthesias do not particularly bother him within about 3 cm of his sternotomy incision to the right or left.  He has been working with light weights, dumbbells less than 5 pounds, to do some strengthening and he notices that he does feel like he has some weakness in his triceps on the right.  Otherwise, he is doing well from the standpoint of his transplant.  He is taking his medications as directed.  He is eager to try and get back to some light duty when he is cleared by Cardiology.  He also has Chagas disease and latent TB and is taking his INH.  He denies any fevers or chills.  He denies any abrupt changes in his symptoms.  He notes that while he does think he might be getting worse, he states that this is very subjective and in fact he may just be getting a little more irritated with his current symptoms.       Date of injury: None  Date last seen: 3/30/2017  Following Therapeutic Plan: Yes, EMG & MRI  Pain: Worsening  Function: Worsening    PAST MEDICAL, SOCIAL, SURGICAL AND FAMILY HISTORY: He  has a past medical history of Chagas cardiomyopathy; Chronic systolic heart failure (H); Dual ICD (implantable cardioverter-defibrillator) in place (10/20/2015); Essential hypertension; Gastroesophageal reflux disease; H/O gastric ulcer; Hypertension; Premature  "ventricular contractions; Presbyopia; and Pterygium. He also has no past medical history of Malignant melanoma (H) or Skin disease.  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  His family history includes Brain Cancer in his mother. There is no history of Melanoma or Skin Cancer.  He reports that he has never smoked. He does not have any smokeless tobacco history on file. He reports that he does not drink alcohol or use illicit drugs.    ALLERGIES: He has No Known Allergies.    CURRENT MEDICATIONS: He has a current medication list which includes the following prescription(s): tacrolimus, ketoconazole, methylprednisolone, nystatin, prednisone, order for dme, isoniazid, pravastatin, sulfamethoxazole-trimethoprim, mycophenolate, aspirin, senna-docusate, calcium carb 1250 mg (500 mg [place])/vitamin d 200 units, multivitamin, therapeutic with minerals, and pantoprazole.     REVIEW OF SYSTEMS: 10 point review of systems is negative except as noted above.     Exam:   /82  Ht 5' 6\" (1.676 m)  Wt 156 lb (70.8 kg)  BMI 25.18 kg/m2      CONSTITUTIONIAL: healthy, alert and no distress  SKIN: no suspicious lesions or rashes  GAIT: normal  NEUROLOGIC: see below  PSYCHIATRIC: affect normal/bright and mentation appears normal.  CERVICAL SPINE:  He is nontender over the cervical spine and has negative Spurling's maneuver.   RIGHT UPPER EXTREMITY:  Evaluation demonstrates a C7 triceps is 4/5 on the right and 5/5 on the left.  C8 finger flexion is 5/5 on the right and 5/5 on the left.  C8 finger extension is 4/5 on the right and 5/5 on the left.  He has paresthesias with a subjective decrease in sensation over the right fifth digit.  Both palmarly and dorsally as well as the medial aspect to the elbow and then also has decreased sensation which is subjective on the right anterior upper chest.  He has no true supraclavicular masses or lymphadenopathy.  He has " no axillary masses.  He is able to flex, extend, abduct and adduct the shoulder without difficulty.  He does have full range of motion in the shoulder.          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  Post-transplant he did notice he started having right upper extremity symptoms primarily in terms of paresthesias and dysesthesias that seem to follow predominantly a C8 nerve root distribution.  He had an EMG which demonstrated that this could possibly be a C8 and C7 radiculopathy versus brachial plexopathy.  We completed an MRI of the cervical spine today and we have reviewed that in detail.  There is no evidence of C7 and C8 nerve root involvement.  He does have prominent/probable adipose tissue from the right posterior aspect at T2-T3, but there is no direct or cord impingement.  There are no changes noted within the cord itself.      ASSESSMENT:  Probable right brachial plexopathy.      PLAN:  We are going to proceed with an MRI with and without contrast of the right brachial plexus to ensure that there is no gross deformity or other lesion.  This is likely related to stretch post-positioning for his cardiac transplant.  We did discuss today that there is a possibility that we could proceed with gabapentin for his symptoms if they are bothersome enough; however, he will think about this and consider it and will have further discussion about that are pending his brachial plexus MRI.  He may also benefit from a session or 2 of physical therapy just to ensure that he is not pushing the strengthening too quickly with regards to his brachial plexopathy.  He will follow up pending the MRI and will have a more detailed discussion at that point in time.

## 2017-04-04 ENCOUNTER — APPOINTMENT (OUTPATIENT)
Dept: MEDSURG UNIT | Facility: CLINIC | Age: 47
End: 2017-04-04
Payer: COMMERCIAL

## 2017-04-04 ENCOUNTER — OFFICE VISIT (OUTPATIENT)
Dept: CARDIOLOGY | Facility: CLINIC | Age: 47
End: 2017-04-04
Attending: NURSE PRACTITIONER
Payer: COMMERCIAL

## 2017-04-04 ENCOUNTER — HOSPITAL ENCOUNTER (OUTPATIENT)
Facility: CLINIC | Age: 47
Discharge: HOME | End: 2017-04-04
Attending: INTERNAL MEDICINE | Admitting: INTERNAL MEDICINE
Payer: COMMERCIAL

## 2017-04-04 ENCOUNTER — APPOINTMENT (OUTPATIENT)
Dept: CARDIOLOGY | Facility: CLINIC | Age: 47
End: 2017-04-04
Attending: INTERNAL MEDICINE
Payer: COMMERCIAL

## 2017-04-04 VITALS
BODY MASS INDEX: 23.86 KG/M2 | RESPIRATION RATE: 18 BRPM | HEART RATE: 98 BPM | OXYGEN SATURATION: 98 % | TEMPERATURE: 97.7 F | SYSTOLIC BLOOD PRESSURE: 120 MMHG | WEIGHT: 152 LBS | DIASTOLIC BLOOD PRESSURE: 84 MMHG | HEIGHT: 67 IN

## 2017-04-04 VITALS
HEART RATE: 100 BPM | WEIGHT: 159.4 LBS | DIASTOLIC BLOOD PRESSURE: 82 MMHG | HEIGHT: 67 IN | BODY MASS INDEX: 25.02 KG/M2 | OXYGEN SATURATION: 99 % | SYSTOLIC BLOOD PRESSURE: 115 MMHG

## 2017-04-04 DIAGNOSIS — Z94.1 HEART REPLACED BY TRANSPLANT (H): ICD-10-CM

## 2017-04-04 DIAGNOSIS — Z94.1 HEART REPLACED BY TRANSPLANT (H): Primary | ICD-10-CM

## 2017-04-04 PROCEDURE — 27210982 ZZH KIT RT HC TOTES DISP CR7

## 2017-04-04 PROCEDURE — 88350 IMFLUOR EA ADDL 1ANTB STN PX: CPT | Performed by: INTERNAL MEDICINE

## 2017-04-04 PROCEDURE — 93505 ENDOMYOCARDIAL BIOPSY: CPT

## 2017-04-04 PROCEDURE — 4A023N6 MEASUREMENT OF CARDIAC SAMPLING AND PRESSURE, RIGHT HEART, PERCUTANEOUS APPROACH: ICD-10-PCS | Performed by: INTERNAL MEDICINE

## 2017-04-04 PROCEDURE — 27211047 ZZH FORCEP BIOPSY CR10

## 2017-04-04 PROCEDURE — 40000166 ZZH STATISTIC PP CARE STAGE 1

## 2017-04-04 PROCEDURE — 93505 ENDOMYOCARDIAL BIOPSY: CPT | Mod: 26 | Performed by: INTERNAL MEDICINE

## 2017-04-04 PROCEDURE — 99214 OFFICE O/P EST MOD 30 MIN: CPT | Mod: 25 | Performed by: NURSE PRACTITIONER

## 2017-04-04 PROCEDURE — 88307 TISSUE EXAM BY PATHOLOGIST: CPT | Performed by: INTERNAL MEDICINE

## 2017-04-04 PROCEDURE — 88346 IMFLUOR 1ST 1ANTB STAIN PX: CPT | Performed by: INTERNAL MEDICINE

## 2017-04-04 RX ORDER — LIDOCAINE 40 MG/G
CREAM TOPICAL
Status: COMPLETED | OUTPATIENT
Start: 2017-04-04 | End: 2017-04-04

## 2017-04-04 RX ADMIN — LIDOCAINE: 40 CREAM TOPICAL at 10:17

## 2017-04-04 ASSESSMENT — PAIN SCALES - GENERAL: PAINLEVEL: NO PAIN (0)

## 2017-04-04 NOTE — PATIENT INSTRUCTIONS
Dawn will call you with results when they are available.    Dawn will arrange cardiac rehab for you closer to home.    Dawn will follow up with financial worker so that you can discuss your financial concerns with them.     **Return on May 5th as scheduled for your 5 month transplant visit.     Call Dawn/Manoj at [phone] with any questions or concerns.

## 2017-04-04 NOTE — DISCHARGE INSTRUCTIONS
[facility]                        Interventional Cardiology  Discharge Instructions   Post Right Heart Biopsy      AFTER YOU GO HOME:    DO drink plenty of fluids    DO resume your regular diet and medications unless otherwise instructed by your Primary Physician    Do Not scrub the procedure site vigorously    No lotion or powder to the puncture site for 3 days    CALL YOUR PRIMARY PHYSICIAN IF: You may resume all normal activity.  Monitor neck site for bleeding, swelling, or voice changes. If you notice bleeding or swelling immediately apply pressure to the site and call number below to speak with Cardiology Fellow.  If you experience any changes in your breathing you should call your doctor immediately or come to the closest Emergency Department.  Do not drive yourself.    ADDITIONAL INSTRUCTIONS: Medications: You are to resume all home medications including anticoagulation therapy unless otherwise advised by your primary cardiologist or nurse coordinator.    Follow Up: Per your primary cardiology team    If you have any questions or concerns regarding your procedure site please call [phone] at anytime and ask for Cardiology Fellow on call.  They are available 24 hours a day.  You may also contact the Cardiology Clinic after hours number at [phone].                                                       Telephone Numbers [phone] Monday-Friday 8:00 am to 4:30 pm    [phone] After 4:30 pm Monday-Friday, Weekends & Holidays  Ask for Interventional Cardiologist on call. Someone is on call 24 hours/day   [facility] toll free number [phone] Monday-Friday 8:00 am to 4:30 pm   [facility] Emergency Dept [phone]

## 2017-04-04 NOTE — LETTER
4/4/2017      RE: Javi Bansal  [address] [place] [address]       Dear Colleague,    Thank you for the opportunity to participate in the care of your patient, Javi Bansal, at the [facility]. Please see a copy of my visit note below.    ADULT HEART TRANSPLANT CLINIC    HPI:   Javi Shetty is a 46 year-old male with a history of Chagas cardiomyopathy (diagnosed in 2010) and biventricular systolic heart failure (LVEF 15-20% since 2015, on home milrinone since 8/2016) who subsequently underwent orthotopic heart transplantation on 12/12/16. His postoperative course was complicated by rejection, ventricular tachycardia, and reactivation of his Chagas.     His first surveillance biopsy on 12/19 showed grade 2R ACR, which was treated with IV steroids. Repeat biopsy showed persistent rejection, which was treated with IV steroids and thymoglobulin. Biopsies have been negative for rejection since then. Graft function has remained normal with an EF of 60-65% on 1/13/17.  Baseline angiogram showed no evidence of CAV. He was hospitalized in March for treatment of pneumonia. Patient presents to clinic today as part of his routine 4-month post-transplant surveillance.    Javi says that he has been feeling 'great' and has no new concerns. His breathing is completely back to normal after having pneumonia and he is no longer coughing. Has been doing exercises at home since he has not been able to start cardiac rehab. Is walking a great deal and lifting 5-pound weights at home. He is very eager to return to work and sanjeev hopeful that he can start in 2 months (6 months post-transplant). His biggest concern is regarding his finances, and he is currently struggling to pay for his numerous medical bills. Blood pressures and weights have been quite good at home; he has gained a few pounds recently and thinks it is because  his appetite has been much better recently. Denies any dizziness, SOB, edema, chest pain, or palpitations. Has been compliant with his medications with no concerns.    TRANSPLANT MEDICATIONS:  Tacrolimus 2mg BID  MMF 1.5gm BID  Prednisone 10mg/5mg daily  Bactrim  Nystatin    LAST BIOPSY: 3/15/17  LAST ANGIOGRAM: 1/10/17  CMV: D+/R+  EBV: D+/R+  Intolerance to medications: none      PAST MEDICAL HISTORY:  Past Medical History:   Diagnosis Date     Chagas cardiomyopathy      Chronic systolic heart failure (H)      Dual ICD (implantable cardioverter-defibrillator) in place 10/20/2015     Essential hypertension      Gastroesophageal reflux disease      H/O gastric ulcer      Hypertension      Premature ventricular contractions      Presbyopia      Pterygium     ?? suspected , but unclear dx       CURRENT MEDICATIONS:  Prescription Medications as of 4/4/2017             tacrolimus (PROGRAF - GENERIC EQUIVALENT) 1 MG capsule Take 2 capsules (2 mg) by mouth 2 times daily    ketoconazole (NIZORAL) 2 % shampoo Apply topically three times a week Alternate with Head and Shoulders.    nystatin (MYCOSTATIN) 614277 UNIT/ML suspension Take 5 mLs (500,000 Units) by mouth 4 times daily    predniSONE (DELTASONE) 5 MG tablet Take 10 mg in the morning (2 tablets); take 5 mg in the evening (1 tablet)    order for DME Equipment being ordered: Splint - Cubital tunnel splint for ulnar neuropathy    isoniazid (NYDRAZID) 300 MG tablet Take 1 tablet (300 mg) by mouth daily    pravastatin (PRAVACHOL) 20 MG tablet Take 1 tablet (20 mg) by mouth every evening    sulfamethoxazole-trimethoprim (BACTRIM DS/SEPTRA DS) 800-160 MG per tablet Take 1 tablet by mouth twice a week On mondays and thursdays    mycophenolate (CELLCEPT - GENERIC EQUIVALENT) 250 MG capsule Take 6 capsules (1,500 mg) by mouth 2 times daily    aspirin EC 81 MG EC tablet Take 1 tablet (81 mg) by mouth daily    senna-docusate (SENOKOT-S;PERICOLACE) 8.6-50 MG per tablet Take 2  "tablets by mouth nightly as needed for constipation (If no stools during the day)    calcium carb 1250 mg, 500 mg Telida,/vitamin D 200 units (OSCAL WITH D) 500-200 MG-UNIT per tablet Take 1 tablet by mouth 2 times daily (with meals)    multivitamin, therapeutic with minerals (THERA-VIT-M) TABS tablet Take 1 tablet by mouth daily    pantoprazole (PROTONIX) 40 MG EC tablet Take 1 tablet (40 mg) by mouth every morning      Facility Administered Medications as of 4/4/2017             lidocaine (LMX4) kit Apply topically once as needed for mild pain          ROS:   Constitutional: No fever, chills, or sweats. No weight gain/loss.   ENT: No visual disturbance, ear ache, epistaxis, sore throat.   Allergies/Immunologic: Negative.   Respiratory: No cough, hemoptysis.   Cardiovascular: As per HPI.   GI: No nausea, vomiting, hematemesis, melena, or hematochezia.   : No urinary frequency, dysuria, or hematuria.   Integument: Negative.   Psychiatric: Negative.   Neuro: Negative.   Endocrinology: Negative.   Musculoskeletal: Negative    Exam:  /82  Pulse 100  Ht 1.69 m (5' 6.53\")  Wt 72.3 kg (159 lb 6.4 oz)  SpO2 99%  BMI 25.32 kg/m2  In general, the patient is a pleasant male in no apparent distress.    HEENT: NC/AT. PERRLA. EOMI.  Sclerae white, not injected.    Neck:  No adenopathy, No thyromegaly.    COR: No JVP.  RRR.  Normal S1 S2.  No S3/S4. No murmur, rub click, or gallop.    Lungs:  CTA. No crackles/wheeze.    Abdomen: soft, nontender, nondistended.  No organomegaly.  Extremities:  No clubbing, cyanosis, or edema.    Neuro: Alert & Oriented x 3, grossly non focal.    Labs:  CBC RESULTS:   Lab Results   Component Value Date    WBC 10.3 03/28/2017    RBC 4.10 (L) 03/28/2017    HGB 13.5 03/28/2017    HCT 41.0 03/28/2017     03/28/2017    MCH 32.9 03/28/2017    MCHC 32.9 03/28/2017    RDW 14.2 03/28/2017     03/28/2017       BMP RESULTS:  Lab Results   Component Value Date     03/28/2017    " POTASSIUM 3.6 03/28/2017    CHLORIDE 106 03/28/2017    CO2 27 03/28/2017    ANIONGAP 8 03/28/2017     (H) 03/28/2017    BUN 24 03/28/2017    CR 0.92 03/28/2017    GFRESTIMATED 88 03/28/2017    GFRESTBLACK >90   GFR Calc   03/28/2017    DAISY 9.6 03/28/2017      LIPID RESULTS:  Lab Results   Component Value Date    CHOL 198 01/13/2017    HDL 88 01/13/2017    LDL 93 01/13/2017    TRIG 82 01/13/2017       IMMUNOSUPPRESSANT LEVELS  Lab Results   Component Value Date    TACROL 15.1 (H) 03/28/2017    DOSTAC 1930 03/27/2017 03/28/2017       No components found for: CK  Lab Results   Component Value Date    MAG 1.5 (L) 03/28/2017     Lab Results   Component Value Date    A1C 5.4 12/11/2016     Lab Results   Component Value Date    PHOS 3.0 03/28/2017     Lab Results   Component Value Date    NTBNP 1944 (H) 06/29/2016     Lab Results   Component Value Date    SAITESTMET [facility] 03/15/2017    SAICELL Class I 03/15/2017    [id number] None 03/15/2017    [id number] B:8 03/15/2017    SAIREPCOM  03/15/2017      Test performed by modified procedure. Serum heat inactivated. High-risk,   mfi >3,000. Mod-risk, mfi 500-3,000.       Lab Results   Component Value Date    SAIITESTME [facility] 03/15/2017    SAIICELL Class II 03/15/2017    [id number] None 03/15/2017    [id number] None 03/15/2017    SAIIREPCOM  03/15/2017      Test performed by modified procedure. Serum heat inactivated. High-risk,   mfi >3,000. Mod-risk, mfi 500-3,000.       Lab Results   Component Value Date    CSPEC Plasma, EDTA anticoagulant 03/15/2017       Echocardiogram 3/15/17:  Interpretation Summary  Global and regional left ventricular function is normal with an EF of 60-65%.  Global right ventricular function is normal.  The inferior vena cava is normal. Estimated mean right atrial pressure is <3mmHg.  No pericardial effusion is present.  This study was compared with the study from 2/14/2017. There has been no change.    Assessment and Plan:  Javi  Iglesia Shetty is a 46 year-old male with a history of Chagas cardiomyopathy (diagnosed in 2010) and biventricular systolic heart failure (LVEF 15-20% since 2015, on home milrinone since 8/2016) who subsequently underwent orthotopic heart transplantation on 12/12/16.  He continues to progress well, and has fully recovered from his pneumonia last month. He has no signs of infection with a decreasing WBC trend and remains afebrile. His renal function remains normal and he is euvolemic on exam. Tacrolimus and biopsy results are pending.    Coordinator continues to struggle to find a cardiac rehab program that the patient's insurance will cover. Also working with financial counselors to get new health insurance set up as Javi's will be ending on 4/30. Should be reasonable for the patient to return to work after 6 months if he continues to do well.    Change in immunosuppression: no  Reason for Change: Tacrolimus level pending.  Other Changes: none  Follow-Up: 1 month for 5-month testing    Next Biopsy: 5/5/17  Next Angiogram: first annual  Next Angiogram with IVUS: yes  Next Stress Test: per protocol     2. Right arm numbness: undergoing neurology evaluation; may start gabapentin after MRI is completed.    3. Chagas: follows with ID, holding INH at this time secondary to elevated LFTs.      [facility]  Heart Transplant  Pager [phone]

## 2017-04-04 NOTE — PROGRESS NOTES
Pt has returned to 2a. Right neck site CDI, no bleeding. VSS. Pt denies pain.   Pts , Tala, notified as pt requesting to see financial counselor while he is here.   Pt now going to clinic for next appt-Tala updated.   Pt ambulated off unit with steady gait.

## 2017-04-04 NOTE — IP AVS SNAPSHOT
MRN:[id number]                      After Visit Summary   4/4/2017    Javi Bansal    MRN: [id number]           Visit Information        Department      4/4/2017  9:50 AM [address] [facility] [place] [facility]          Review of your medicines      UNREVIEWED medicines. 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)        Dose:  81 mg   Take 1 tablet (81 mg) by mouth daily   Quantity:  90 tablet   Refills:  3       calcium carb 1250 mg (500 mg [place])/vitamin D 200 units 500-200 MG-UNIT per tablet   Commonly known as:  OSCAL with D   Used for:  Heart replaced by transplant (H)        Dose:  1 tablet   Take 1 tablet by mouth 2 times daily (with meals)   Quantity:  60 tablet   Refills:  11       isoniazid 300 MG tablet   Commonly known as:  NYDRAZID   Used for:  Heart replaced by transplant (H)        Dose:  300 mg   Take 1 tablet (300 mg) by mouth daily   Quantity:  90 tablet   Refills:  3       ketoconazole 2 % shampoo   Commonly known as:  NIZORAL   Used for:  Seborrhea        Apply topically three times a week Alternate with Head and Shoulders.   Quantity:  120 mL   Refills:  3       methylPREDNISolone 4 MG tablet   Commonly known as:  MEDROL DOSEPAK   Used for:  Heart replaced by transplant (H)        Follow package instructions   Quantity:  21 tablet   Refills:  0       multivitamin, therapeutic with minerals Tabs tablet   Used for:  Heart replaced by transplant (H)        Dose:  1 tablet   Take 1 tablet by mouth daily   Quantity:  30 each   Refills:  11       mycophenolate 250 MG capsule   Commonly known as:  CELLCEPT - GENERIC EQUIVALENT   Used for:  Heart replaced by transplant (H)        Dose:  1500 mg   Take 6 capsules (1,500 mg) by mouth 2 times daily   Quantity:  360 capsule   Refills:  11       nystatin 956914 UNIT/ML suspension   Commonly known as:  MYCOSTATIN   Used for:  Heart replaced by  transplant (H)        Dose:  386147 Units   Take 5 mLs (500,000 Units) by mouth 4 times daily   Quantity:  473 mL   Refills:  3       pantoprazole 40 MG EC tablet   Commonly known as:  PROTONIX   Used for:  Heart replaced by transplant (H)        Dose:  40 mg   Take 1 tablet (40 mg) by mouth every morning   Quantity:  30 tablet   Refills:  11       pravastatin 20 MG tablet   Commonly known as:  PRAVACHOL   Used for:  Heart replaced by transplant (H)        Dose:  20 mg   Take 1 tablet (20 mg) by mouth every evening   Quantity:  30 tablet   Refills:  11       predniSONE 5 MG tablet   Commonly known as:  DELTASONE   Used for:  Heart replaced by transplant (H)        Take 10 mg in the morning (2 tablets); take 5 mg in the evening (1 tablet)   Quantity:  90 tablet   Refills:  11       senna-docusate 8.6-50 MG per tablet   Commonly known as:  SENOKOT-S;PERICOLACE   Used for:  Heart replaced by transplant (H)        Dose:  2 tablet   Take 2 tablets by mouth nightly as needed for constipation (If no stools during the day)   Quantity:  20 tablet   Refills:  1       sulfamethoxazole-trimethoprim 800-160 MG per tablet   Commonly known as:  BACTRIM DS/SEPTRA DS   Indication:  PCP prophylaxis   Used for:  Heart replaced by transplant (H)        Dose:  1 tablet   Take 1 tablet by mouth twice a week On mondays and thursdays   Quantity:  8 tablet   Refills:  11       tacrolimus 1 MG capsule   Commonly known as:  PROGRAF - GENERIC EQUIVALENT   Used for:  Heart replaced by transplant (H)        Dose:  2 mg   Take 2 capsules (2 mg) by mouth 2 times daily   Quantity:  120 capsule   Refills:  11         CONTINUE these medicines which have NOT CHANGED        Dose / Directions    order for DME   Used for:  Lesion of right ulnar nerve        Equipment being ordered: Splint - Cubital tunnel splint for ulnar neuropathy   Quantity:  1 Device   Refills:  0                Protect others around you: Learn how to safely use, store and throw away  your medicines at www.disposemymeds.org.         Follow-ups after your visit        Your next 10 appointments already scheduled     Apr 04, 2017 11:00 AM CDT   Heart Cath Heart Biopsy with [id number]   [facility] ([facility])    [address]  [facility] [address]   [phone]            Apr 04, 2017  1:45 PM CDT   RETURN HEART TRANSPLANT with Fatimah Herr, SAMM BLOCK, Loreta Red   [facility] ([facility])    [address] [facility]  3rd [facility] [address] [phone]            Apr 10, 2017  4:45 PM CDT   (Arrive by 4:30 PM)   MR BRACHIAL PLEXUS W/O & W CONTRAST with [id number]   [facility] MRI ([facility])    [address] [place] [id number] [phone]           Take your medicines as usual, unless your doctor tells you not to. Bring a list of your current medicines to your exam (including vitamins, minerals and over-the-counter drugs).  You will be given intravenous contrast for this exam. To prepare:   The day before your exam, drink extra fluids at least six 8-ounce glasses (unless your doctor tells you to restrict your fluids).   Have a blood test (creatinine test) within 30 days of your exam. Go to your clinic or Diagnostic Imaging Department for this test.  The MRI machine uses a strong magnet. Please wear clothes without metal (snaps, zippers). A sweatsuit works well, or we may give you a hospital gown.  Please remove any body piercings and hair extensions before you arrive. You will also remove watches, jewelry, hairpins, wallets, dentures, partial dental plates and hearing aids. You may wear contact lenses, and you may be able to wear your rings. We have a safe place to keep your personal items, but it is safer to leave them at home.   **IMPORTANT** THE INSTRUCTIONS BELOW ARE ONLY FOR THOSE PATIENTS WHO HAVE BEEN TOLD THEY WILL  RECEIVE SEDATION OR GENERAL ANESTHESIA DURING THEIR MRI PROCEDURE:  IF YOU WILL RECEIVE SEDATION (take medicine to help you relax during your exam):   You must get the medicine from your doctor before you arrive. Bring the medicine to the exam. Do not take it at home.   Arrive one hour early. Bring someone who can take you home after the test. Your medicine will make you sleepy. After the exam, you may not drive, take a bus or take a taxi by yourself.   No eating 8 hours before your exam. You may have clear liquids up until 4 hours before your exam. (Clear liquids include water, clear tea, black coffee and fruit juice without pulp.)  IF YOU WILL RECEIVE ANESTHESIA (be asleep for your exam):   Arrive 1 1/2 hours early. Bring someone who can take you home after the test. You may not drive, take a bus or take a taxi by yourself.   No eating 8 hours before your exam. You may have clear liquids up until 4 hours before your exam. (Clear liquids include water, clear tea, black coffee and fruit juice without pulp.)  Please call the Imaging Department at your exam site with any questions.            Apr 24, 2017  9:20 AM CDT   (Arrive by 9:05 AM)   CT CHEST W/O CONTRAST with UCCT1   [facility] [place] [facility] ([facility])    [address] [address] [phone]           Please bring any scans or X-rays taken at other hospitals, if similar tests were done. Also bring a list of your medicines, including vitamins, minerals and over-the-counter drugs. It is safest to leave personal items at home.  Be sure to tell your doctor:   If you have any allergies.   If there s any chance you are pregnant.   If you are breastfeeding.   If you have any special needs.  You do not need to do anything special to prepare.  Please wear loose clothing, such as a sweat suit or jogging clothes. Avoid snaps, zippers and other metal. We may ask you to undress and put on a hospital gown.             May 05, 2017  9:00 AM CDT   LAB with ACUTE CARE LAB UU   [facility] [place], Lab ([facility])    [address] [facility] [address]              Patient must bring picture ID.  Patient should be prepared to give a urine specimen  Please do not eat 10-12 hours before your appointment if you are coming in fasting for labs on lipids, cholesterol, or glucose (sugar).  Pregnant women should follow their Care Team instructions. Water with medications is okay. Do not drink coffee or other fluids.   If you have concerns about taking  your medications, please ask at office or if scheduling via [employer], send a message by clicking on Secure Messaging, Message Your Care Team.            May 05, 2017  9:30 AM CDT   Procedure - 2.5 hour with U2A ROOM 17   [address] [facility] [place] ([facility])    [address] [place] [id number]               May 05, 2017 10:30 AM CDT   Heart Cath Heart Biopsy with UUHCVR3   [facility]  [facility] ([facility])    [address] [facility] [address] [phone]            May 05, 2017  1:00 PM CDT   (Arrive by 12:45 PM)   RETURN HEART TRANSPLANT with SAMM Ramos [facility] ([facility])    [address] [address] [phone]            Jun 01, 2017  8:00 AM CDT   MR CARDIAC W CONTRAST W FLOW QUANT with UUMR4   [facility])    [address] [facility] [address] [phone]           Take your medicines as usual, unless your doctor tells you not to.   If you take Viagra, Levitra or Cialis, stop taking it 48 hours before your test.   If you take Aggrenox or dipyridamole (Persantine, Permole), stop taking it 48 hours before your test.   If you take Theophylline or  Aminophylline, stop taking it 12 hours before your test.   If you are diabetic and take oral hypoglycemics, do not take them on the day of your test.  The day before your exam, drink extra fluids at least six 8-ounce glasses (unless your doctor wants you to limit your fluids).  Stop all caffeine 12 hours before the test. This includes coffee, tea, soda, chocolate and certain medicines (such as Anacin, Excedrin and NoDoz). Also avoid decaf coffee and tea, as these contain small amounts of caffeine.  Do not eat or drink for 3 hours before your exam. You may drink water and take your morning medicines.  You may need a blood test (creatinine test) within 30 days of your exam. Follow your doctor s orders if this is arranged before your exam.  If you are very claustrophobic, please let you doctor know. You may get a sedative medicine from your doctor before you arrive. Bring the medicine to the exam. Do not take it at home. Arrive one hour early. Bring someone who can take you home after the test. Your medicine will make you sleepy. After the exam, you may not drive, take a bus or take a taxi by yourself.  The MRI machine uses a strong magnet. Please wear clothes without metal (snaps, zippers). A sweatsuit works well, or we may give you a hospital gown.  Please remove any body piercings and hair extensions before you arrive. You will remove watches, jewelry, hairpins, wallets, dentures, partial dental plates and hearing aids. You may wear contact lenses, and you may be able to wear your rings. We have a safe place to keep your personal items, but it is safer to leave them at home.   **IMPORTANT** THE INSTRUCTIONS BELOW ARE ONLY FOR THOSE PATIENTS WHO HAVE BEEN TOLD THEY WILL RECEIVE SEDATION OR GENERAL ANESTHESIA DURING THEIR MRI PROCEDURE:  IF YOU WILL RECEIVE SEDATION (take medicine to help you relax during your exam):   You must get the medicine from your doctor before you arrive. Bring the medicine to the exam. Do not  take it at home.   Arrive one hour early. Bring someone who can take you home after the test. Your medicine will make you sleepy. After the exam, you may not drive, take a bus or take a taxi by yourself.   No eating 8 hours before your exam. You may have clear liquids up until 4 hours before your exam. (Clear liquids include water, clear tea, black coffee and fruit juice without pulp.)  IF YOU WILL RECEIVE ANESTHESIA (be asleep for your exam):   Arrive 1 1/2 hours early. Bring someone who can take you home after the test. You may not drive, take a bus or take a taxi by yourself.   No eating 8 hours before your exam. You may have clear liquids up until 4 hours before your exam. (Clear liquids include water, clear tea, black coffee and fruit juice without pulp.)  If you have any questions, please contact your Imaging Department exam site.               Care Instructions        Further instructions from your care team       [facility]                        Interventional Cardiology  Discharge Instructions   Post Right Heart Biopsy      AFTER YOU GO HOME:    DO drink plenty of fluids    DO resume your regular diet and medications unless otherwise instructed by your Primary Physician    Do Not scrub the procedure site vigorously    No lotion or powder to the puncture site for 3 days    CALL YOUR PRIMARY PHYSICIAN IF: You may resume all normal activity.  Monitor neck site for bleeding, swelling, or voice changes. If you notice bleeding or swelling immediately apply pressure to the site and call number below to speak with Cardiology Fellow.  If you experience any changes in your breathing you should call your doctor immediately or come to the closest Emergency Department.  Do not drive yourself.    ADDITIONAL INSTRUCTIONS: Medications: You are to resume all home medications including anticoagulation therapy unless otherwise advised by your primary cardiologist or nurse coordinator.    Follow Up: Per  "your primary cardiology team    If you have any questions or concerns regarding your procedure site please call [phone] at anytime and ask for Cardiology Fellow on call.  They are available 24 hours a day.  You may also contact the Cardiology Clinic after hours number at [phone].                                                       Telephone Numbers [phone] Monday-Friday 8:00 am to 4:30 pm    [phone] After 4:30 pm Monday-Friday, Weekends & Holidays  Ask for Interventional Cardiologist on call. Someone is on call 24 hours/day   [facility] toll free number [phone] Monday-Friday 8:00 am to 4:30 pm   [facility] Emergency Dept [phone]                    Additional Information About Your Visit        MyChart Information     [facility] lets you send messages to your doctor, view your test results, renew your prescriptions, schedule appointments and more. To sign up, go to [email or web address] . Click on \"Log in\" on the left side of the screen, which will take you to the Welcome page. Then click on \"Sign up Now\" on the right side of the page.     You will be asked to enter the access code listed below, as well as some personal information. Please follow the directions to create your username and password.     Your access code is: [email or web address]  Expires: 2017  6:30 AM     Your access code will  in 90 days. If you need help or a new code, please call your [facility] clinic or [phone].        Care EveryWhere ID     This is your Care EveryWhere ID. This could be used by other organizations to access your [facility] medical records  [id number]        Your Vitals Were     Blood Pressure Pulse Temperature Respirations Pulse Oximetry       113/84 (BP Location: Left arm) 98 97.7  F (36.5  C) (Oral) 18 98%        Primary Care Provider Office Phone # Fax #    Ivan Burrell [phone]      Thank you!     Thank you for choosing [facility] for your care. Our goal is " always to provide you with excellent care. Hearing back from our patients is one way we can continue to improve our services. Please take a few minutes to complete the written survey that you may receive in the mail after you visit with us. Thank you!             Medication List: This is a list of all your medications and when to take them. Check marks below indicate your daily home schedule. Keep this list as a reference.      Medications           Morning Afternoon Evening Bedtime As Needed    aspirin 81 MG EC tablet   Take 1 tablet (81 mg) by mouth daily                                calcium carb 1250 mg (500 mg [place])/vitamin D 200 units 500-200 MG-UNIT per tablet   Commonly known as:  OSCAL with D   Take 1 tablet by mouth 2 times daily (with meals)                                isoniazid 300 MG tablet   Commonly known as:  NYDRAZID   Take 1 tablet (300 mg) by mouth daily                                ketoconazole 2 % shampoo   Commonly known as:  NIZORAL   Apply topically three times a week Alternate with Head and Shoulders.                                methylPREDNISolone 4 MG tablet   Commonly known as:  MEDROL DOSEPAK   Follow package instructions                                multivitamin, therapeutic with minerals Tabs tablet   Take 1 tablet by mouth daily                                mycophenolate 250 MG capsule   Commonly known as:  CELLCEPT - GENERIC EQUIVALENT   Take 6 capsules (1,500 mg) by mouth 2 times daily                                nystatin 301384 UNIT/ML suspension   Commonly known as:  MYCOSTATIN   Take 5 mLs (500,000 Units) by mouth 4 times daily                                order for DME   Equipment being ordered: Splint - Cubital tunnel splint for ulnar neuropathy                                pantoprazole 40 MG EC tablet   Commonly known as:  PROTONIX   Take 1 tablet (40 mg) by mouth every morning                                pravastatin 20 MG tablet   Commonly known as:   PRAVACHOL   Take 1 tablet (20 mg) by mouth every evening                                predniSONE 5 MG tablet   Commonly known as:  DELTASONE   Take 10 mg in the morning (2 tablets); take 5 mg in the evening (1 tablet)                                senna-docusate 8.6-50 MG per tablet   Commonly known as:  SENOKOT-S;PERICOLACE   Take 2 tablets by mouth nightly as needed for constipation (If no stools during the day)                                sulfamethoxazole-trimethoprim 800-160 MG per tablet   Commonly known as:  BACTRIM DS/SEPTRA DS   Take 1 tablet by mouth twice a week On mondays and thursdays                                tacrolimus 1 MG capsule   Commonly known as:  PROGRAF - GENERIC EQUIVALENT   Take 2 capsules (2 mg) by mouth 2 times daily

## 2017-04-04 NOTE — PROCEDURES
PROCEDURES PERFORMED:   Endomyocardial biopsy     PHYSICIANS:  Attending Interventional Cardiology Staff: Dr. Carlos MD  Cardiology Fellow: Victor Hugo Gaytan MD      INDICATION:  47 y/o M who underwent a heart transplantation on 12/12/16 presents for an endomyocardial biopsy.     DESCRIPTION:  1. Consent obtained with discussion of risks. All questions were answered.  2. Sterile prep and procedure.  3. Venous Location: right IJ vein  4. Access: Local anesthetic with lidocaine. A standard (18 g) needle with ultrasound guidance was used to establish access using a modified Seldinger technique.  5. Venous Sheath: 7Fr standard sheath  6. Catheters: 5.5Fr Cortis bioptome  7. Estimated blood loss of <5 mL.     MEDICATIONS:  1. No Contrast     ENDOMYOCARDIAL BIOPSY:  1. Successful collection of 4 right ventricular endomyocardial biopsy samples.     COMPLICATIONS:  1. None     SUMMARY:   1. S/p OHT on 12/12/16  2. Successful collection of 4 endomyocardial biopsy samples.     PLAN:   Discharge today per protocol. Primary cardiologist to follow up on biopsy results     The attending cardiologist performed entire procedure.  See CVIS report for final draft.

## 2017-04-04 NOTE — IP AVS SNAPSHOT
[address] [address]                                       After Visit Summary   4/4/2017    Javi Bansal    MRN: [id number]           After Visit Summary Signature Page     I have received my discharge instructions, and my questions have been answered. I have discussed any challenges I see with this plan with the nurse or doctor.    ..........................................................................................................................................  Patient/Patient Representative Signature      ..........................................................................................................................................  Patient Representative Print Name and Relationship to Patient    ..................................................               ................................................  Date                                            Time    ..........................................................................................................................................  Reviewed by Signature/Title    ...................................................              ..............................................  Date                                                            Time

## 2017-04-04 NOTE — NURSING NOTE
Pt seen in clinic today, accompanied by , for his routine 4 month post transplant visit. Pt reports doing well, eating and starting to gain healthy weight. Pt's goal is to go back to work full time at 6 months post transplant. Pt states that he is ready to work since he has so many medical bills he needs to pay. Pt met with a financial worker yesterday and thought he was instructed to meet with her again today but financial worker did not show. Pt unsure what next steps to take; coordinator will follow up. Pt is exercising at home but has still not been set up for cardiac rehab. Pt prefers to go to rehab in [place] which is closer to his home and wants to make sure his insurance covers; coordinator to arrange. Labs from 3/28 WNL. FK level still pending. Instructions below reviewed with patient who verbalized understanding. AVS printed for patient.      Dawn will call you with results when they are available.    Dawn will arrange cardiac rehab for you closer to home.    Dawn will follow up with financial worker so that you can discuss your financial concerns with them.     **Return on May 5th as scheduled for your 5 month transplant visit.     Call Dawn/Manoj at [phone] with any questions or concerns.

## 2017-04-04 NOTE — MR AVS SNAPSHOT
After Visit Summary   4/4/2017    Javi Bansal    MRN: [id number]           Patient Information     Date Of Birth          1970        Visit Information        Provider Department      4/4/2017 1:45 PM Loreta Red; Fatimah Herr, [facility]        Care Instructions    Dawn will call you with results when they are available.    Dawn will arrange cardiac rehab for you closer to home.    Dawn will follow up with financial worker so that you can discuss your financial concerns with them.     **Return on May 5th as scheduled for your 5 month transplant visit.     Call Dawn/Manoj at [phone] with any questions or concerns.         Follow-ups after your visit        Your next 10 appointments already scheduled     Apr 10, 2017  4:45 PM CDT   (Arrive by 4:30 PM)   MR BRACHIAL PLEXUS W/O & W CONTRAST with [id number]   [facility] MRI ([facility])    [address] [address] [phone]           Take your medicines as usual, unless your doctor tells you not to. Bring a list of your current medicines to your exam (including vitamins, minerals and over-the-counter drugs).  You will be given intravenous contrast for this exam. To prepare:   The day before your exam, drink extra fluids at least six 8-ounce glasses (unless your doctor tells you to restrict your fluids).   Have a blood test (creatinine test) within 30 days of your exam. Go to your clinic or Diagnostic Imaging Department for this test.  The MRI machine uses a strong magnet. Please wear clothes without metal (snaps, zippers). A sweatsuit works well, or we may give you a hospital gown.  Please remove any body piercings and hair extensions before you arrive. You will also remove watches, jewelry, hairpins, wallets, dentures, partial dental plates and hearing aids. You may wear contact lenses, and you may be able to wear your  rings. We have a safe place to keep your personal items, but it is safer to leave them at home.   **IMPORTANT** THE INSTRUCTIONS BELOW ARE ONLY FOR THOSE PATIENTS WHO HAVE BEEN TOLD THEY WILL RECEIVE SEDATION OR GENERAL ANESTHESIA DURING THEIR MRI PROCEDURE:  IF YOU WILL RECEIVE SEDATION (take medicine to help you relax during your exam):   You must get the medicine from your doctor before you arrive. Bring the medicine to the exam. Do not take it at home.   Arrive one hour early. Bring someone who can take you home after the test. Your medicine will make you sleepy. After the exam, you may not drive, take a bus or take a taxi by yourself.   No eating 8 hours before your exam. You may have clear liquids up until 4 hours before your exam. (Clear liquids include water, clear tea, black coffee and fruit juice without pulp.)  IF YOU WILL RECEIVE ANESTHESIA (be asleep for your exam):   Arrive 1 1/2 hours early. Bring someone who can take you home after the test. You may not drive, take a bus or take a taxi by yourself.   No eating 8 hours before your exam. You may have clear liquids up until 4 hours before your exam. (Clear liquids include water, clear tea, black coffee and fruit juice without pulp.)  Please call the Imaging Department at your exam site with any questions.            Apr 24, 2017  9:20 AM CDT   (Arrive by 9:05 AM)   CT CHEST W/O CONTRAST with UCCT1   [facility] CT ([facility])    [address] [address] [phone]           Please bring any scans or X-rays taken at other hospitals, if similar tests were done. Also bring a list of your medicines, including vitamins, minerals and over-the-counter drugs. It is safest to leave personal items at home.  Be sure to tell your doctor:   If you have any allergies.   If there s any chance you are pregnant.   If you are breastfeeding.   If you have any special needs.  You do not need to do  anything special to prepare.  Please wear loose clothing, such as a sweat suit or jogging clothes. Avoid snaps, zippers and other metal. We may ask you to undress and put on a hospital gown.            May 05, 2017  9:00 AM CDT   LAB with ACUTE CARE LAB UU   [facility]    [address] [facility] [place] [address]              Patient must bring picture ID.  Patient should be prepared to give a urine specimen  Please do not eat 10-12 hours before your appointment if you are coming in fasting for labs on lipids, cholesterol, or glucose (sugar).  Pregnant women should follow their Care Team instructions. Water with medications is okay. Do not drink coffee or other fluids.   If you have concerns about taking  your medications, please ask at office or if scheduling via [employer], send a message by clicking on Secure Messaging, Message Your Care Team.            May 05, 2017  9:30 AM CDT   Procedure - 2.5 hour with U2A ROOM 17   Unit 2A [facility] ([facility])    [address] [facility] [address]               May 05, 2017 10:30 AM CDT   Heart Cath Heart Biopsy with UUHCVR3   [facility] ([facility])    [address] [facility] [address] [phone]            May 05, 2017  1:00 PM CDT   (Arrive by 12:45 PM)   RETURN HEART TRANSPLANT with [facility] ([facility])    [address] [facility]  [address] [facility] [id number] [phone]            Jun 01, 2017  8:00 AM CDT   MR CARDIAC W CONTRAST W FLOW QUANT with UJAYLIN SELBY CV MR NURSE   [facility] [place], MRI ([facility])    [address] [facility] [address] [phone]           Take your medicines as usual, unless your doctor tells you not to.   If you take  Viagra, Levitra or Cialis, stop taking it 48 hours before your test.   If you take Aggrenox or dipyridamole (Persantine, Permole), stop taking it 48 hours before your test.   If you take Theophylline or Aminophylline, stop taking it 12 hours before your test.   If you are diabetic and take oral hypoglycemics, do not take them on the day of your test.  The day before your exam, drink extra fluids at least six 8-ounce glasses (unless your doctor wants you to limit your fluids).  Stop all caffeine 12 hours before the test. This includes coffee, tea, soda, chocolate and certain medicines (such as Anacin, Excedrin and NoDoz). Also avoid decaf coffee and tea, as these contain small amounts of caffeine.  Do not eat or drink for 3 hours before your exam. You may drink water and take your morning medicines.  You may need a blood test (creatinine test) within 30 days of your exam. Follow your doctor s orders if this is arranged before your exam.  If you are very claustrophobic, please let you doctor know. You may get a sedative medicine from your doctor before you arrive. Bring the medicine to the exam. Do not take it at home. Arrive one hour early. Bring someone who can take you home after the test. Your medicine will make you sleepy. After the exam, you may not drive, take a bus or take a taxi by yourself.  The MRI machine uses a strong magnet. Please wear clothes without metal (snaps, zippers). A sweatsuit works well, or we may give you a hospital gown.  Please remove any body piercings and hair extensions before you arrive. You will remove watches, jewelry, hairpins, wallets, dentures, partial dental plates and hearing aids. You may wear contact lenses, and you may be able to wear your rings. We have a safe place to keep your personal items, but it is safer to leave them at home.   **IMPORTANT** THE INSTRUCTIONS BELOW ARE ONLY FOR THOSE PATIENTS WHO HAVE BEEN TOLD THEY WILL RECEIVE SEDATION OR GENERAL ANESTHESIA DURING  THEIR MRI PROCEDURE:  IF YOU WILL RECEIVE SEDATION (take medicine to help you relax during your exam):   You must get the medicine from your doctor before you arrive. Bring the medicine to the exam. Do not take it at home.   Arrive one hour early. Bring someone who can take you home after the test. Your medicine will make you sleepy. After the exam, you may not drive, take a bus or take a taxi by yourself.   No eating 8 hours before your exam. You may have clear liquids up until 4 hours before your exam. (Clear liquids include water, clear tea, black coffee and fruit juice without pulp.)  IF YOU WILL RECEIVE ANESTHESIA (be asleep for your exam):   Arrive 1 1/2 hours early. Bring someone who can take you home after the test. You may not drive, take a bus or take a taxi by yourself.   No eating 8 hours before your exam. You may have clear liquids up until 4 hours before your exam. (Clear liquids include water, clear tea, black coffee and fruit juice without pulp.)  If you have any questions, please contact your Imaging Department exam site.            Jun 01, 2017 10:15 AM CDT   (Arrive by 10:00 AM)   Return Visit with Ivan Burrell MD   [facility] ([facility])    [address] [facility]  [address] [facility] [address] [phone]            Jun 02, 2017 11:00 AM CDT   Cath 90 Minute with UUHCVR5   [facility]  [facility] ([facility])    [address] [facility] [address] [phone]              Future tests that were ordered for you today     Open Future Orders        Priority Expected Expires Ordered    CT Chest w/o contrast Routine 4/24/2017 4/3/2018 4/3/2017    MR Brachial Plexus w/o & w Contrast Routine  4/3/2018 4/3/2017            Who to contact     If you have questions or need follow up information about today's clinic visit or your schedule please contact [facility] directly at  "[phone].  Normal or non-critical lab and imaging results will be communicated to you by [employer], letter or phone within 4 business days after the clinic has received the results. If you do not hear from us within 7 days, please contact the clinic through [employer] or phone. If you have a critical or abnormal lab result, we will notify you by phone as soon as possible.  Submit refill requests through [employer] or call your pharmacy and they will forward the refill request to us. Please allow 3 business days for your refill to be completed.          Additional Information About Your Visit        [employer] Information     [employer] lets you send messages to your doctor, view your test results, renew your prescriptions, schedule appointments and more. To sign up, go to [email or web address] . Click on \"Log in\" on the left side of the screen, which will take you to the Welcome page. Then click on \"Sign up Now\" on the right side of the page.     You will be asked to enter the access code listed below, as well as some personal information. Please follow the directions to create your username and password.     Your access code is: [email or web address]  Expires: 2017  6:30 AM     Your access code will  in 90 days. If you need help or a new code, please call your [place] clinic or [phone].        Care EveryWhere ID     This is your Care EveryWhere ID. This could be used by other organizations to access your [place] medical records  [id number]        Your Vitals Were     Pulse Height Pulse Oximetry BMI (Body Mass Index)          100 1.69 m (5' 6.53\") 99% 25.32 kg/m2         Blood Pressure from Last 3 Encounters:   17 115/82   17 120/84   17 120/82    Weight from Last 3 Encounters:   17 72.3 kg (159 lb 6.4 oz)   17 68.9 kg (152 lb)   17 71.6 kg (157 lb 12.8 oz)              Today, you had the following     No orders found for display       Primary Care Provider Office Phone # Fax # "    Ivan Burrell [phone]       [address] [address]        Thank you!     Thank you for choosing [facility]  for your care. Our goal is always to provide you with excellent care. Hearing back from our patients is one way we can continue to improve our services. Please take a few minutes to complete the written survey that you may receive in the mail after your visit with us. Thank you!             Your Updated Medication List - Protect others around you: Learn how to safely use, store and throw away your medicines at www.disposemymeds.org.          This list is accurate as of: 4/4/17  2:16 PM.  Always use your most recent med list.                   Brand Name Dispense Instructions for use    aspirin 81 MG EC tablet     90 tablet    Take 1 tablet (81 mg) by mouth daily       calcium carb 1250 mg (500 mg Rampart)/vitamin D 200 units 500-200 MG-UNIT per tablet    OSCAL with D    60 tablet    Take 1 tablet by mouth 2 times daily (with meals)       isoniazid 300 MG tablet    NYDRAZID    90 tablet    Take 1 tablet (300 mg) by mouth daily       ketoconazole 2 % shampoo    NIZORAL    120 mL    Apply topically three times a week Alternate with Head and Shoulders.       multivitamin, therapeutic with minerals Tabs tablet     30 each    Take 1 tablet by mouth daily       mycophenolate 250 MG capsule    CELLCEPT - GENERIC EQUIVALENT    360 capsule    Take 6 capsules (1,500 mg) by mouth 2 times daily       nystatin 845358 UNIT/ML suspension    MYCOSTATIN    473 mL    Take 5 mLs (500,000 Units) by mouth 4 times daily       order for DME     1 Device    Equipment being ordered: Splint - Cubital tunnel splint for ulnar neuropathy       pantoprazole 40 MG EC tablet    PROTONIX    30 tablet    Take 1 tablet (40 mg) by mouth every morning       pravastatin 20 MG tablet    PRAVACHOL    30 tablet    Take 1 tablet (20 mg) by mouth every evening       predniSONE 5 MG  tablet    DELTASONE    90 tablet    Take 10 mg in the morning (2 tablets); take 5 mg in the evening (1 tablet)       senna-docusate 8.6-50 MG per tablet    SENOKOT-S;PERICOLACE    20 tablet    Take 2 tablets by mouth nightly as needed for constipation (If no stools during the day)       sulfamethoxazole-trimethoprim 800-160 MG per tablet    BACTRIM DS/SEPTRA DS    8 tablet    Take 1 tablet by mouth twice a week On mondays and thursdays       tacrolimus 1 MG capsule    PROGRAF - GENERIC EQUIVALENT    120 capsule    Take 2 capsules (2 mg) by mouth 2 times daily

## 2017-04-04 NOTE — PROGRESS NOTES
ADULT HEART TRANSPLANT CLINIC    HPI:   Javi Shetty is a 46 year-old male with a history of Chagas cardiomyopathy (diagnosed in 2010) and biventricular systolic heart failure (LVEF 15-20% since 2015, on home milrinone since 8/2016) who subsequently underwent orthotopic heart transplantation on 12/12/16. His postoperative course was complicated by rejection, ventricular tachycardia, and reactivation of his Chagas.     His first surveillance biopsy on 12/19 showed grade 2R ACR, which was treated with IV steroids. Repeat biopsy showed persistent rejection, which was treated with IV steroids and thymoglobulin. Biopsies have been negative for rejection since then. Graft function has remained normal with an EF of 60-65% on 1/13/17.  Baseline angiogram showed no evidence of CAV. He was hospitalized in March for treatment of pneumonia. Patient presents to clinic today as part of his routine 4-month post-transplant surveillance.    Javi says that he has been feeling 'great' and has no new concerns. His breathing is completely back to normal after having pneumonia and he is no longer coughing. Has been doing exercises at home since he has not been able to start cardiac rehab. Is walking a great deal and lifting 5-pound weights at home. He is very eager to return to work and sanjeev hopeful that he can start in 2 months (6 months post-transplant). His biggest concern is regarding his finances, and he is currently struggling to pay for his numerous medical bills. Blood pressures and weights have been quite good at home; he has gained a few pounds recently and thinks it is because his appetite has been much better recently. Denies any dizziness, SOB, edema, chest pain, or palpitations. Has been compliant with his medications with no concerns.    TRANSPLANT MEDICATIONS:  Tacrolimus 2mg BID  MMF 1.5gm BID  Prednisone 10mg/5mg daily  Bactrim  Nystatin    LAST BIOPSY: 3/15/17  LAST ANGIOGRAM: 1/10/17  CMV: D+/R+  EBV:  D+/R+  Intolerance to medications: none      PAST MEDICAL HISTORY:  Past Medical History:   Diagnosis Date     Chagas cardiomyopathy      Chronic systolic heart failure (H)      Dual ICD (implantable cardioverter-defibrillator) in place 10/20/2015     Essential hypertension      Gastroesophageal reflux disease      H/O gastric ulcer      Hypertension      Premature ventricular contractions      Presbyopia      Pterygium     ?? suspected , but unclear dx       CURRENT MEDICATIONS:  Prescription Medications as of 4/4/2017             tacrolimus (PROGRAF - GENERIC EQUIVALENT) 1 MG capsule Take 2 capsules (2 mg) by mouth 2 times daily    ketoconazole (NIZORAL) 2 % shampoo Apply topically three times a week Alternate with Head and Shoulders.    nystatin (MYCOSTATIN) 114437 UNIT/ML suspension Take 5 mLs (500,000 Units) by mouth 4 times daily    predniSONE (DELTASONE) 5 MG tablet Take 10 mg in the morning (2 tablets); take 5 mg in the evening (1 tablet)    order for DME Equipment being ordered: Splint - Cubital tunnel splint for ulnar neuropathy    isoniazid (NYDRAZID) 300 MG tablet Take 1 tablet (300 mg) by mouth daily    pravastatin (PRAVACHOL) 20 MG tablet Take 1 tablet (20 mg) by mouth every evening    sulfamethoxazole-trimethoprim (BACTRIM DS/SEPTRA DS) 800-160 MG per tablet Take 1 tablet by mouth twice a week On mondays and thursdays    mycophenolate (CELLCEPT - GENERIC EQUIVALENT) 250 MG capsule Take 6 capsules (1,500 mg) by mouth 2 times daily    aspirin EC 81 MG EC tablet Take 1 tablet (81 mg) by mouth daily    senna-docusate (SENOKOT-S;PERICOLACE) 8.6-50 MG per tablet Take 2 tablets by mouth nightly as needed for constipation (If no stools during the day)    calcium carb 1250 mg, 500 mg Cheesh-Na,/vitamin D 200 units (OSCAL WITH D) 500-200 MG-UNIT per tablet Take 1 tablet by mouth 2 times daily (with meals)    multivitamin, therapeutic with minerals (THERA-VIT-M) TABS tablet Take 1 tablet by mouth daily     "pantoprazole (PROTONIX) 40 MG EC tablet Take 1 tablet (40 mg) by mouth every morning      Facility Administered Medications as of 4/4/2017             lidocaine (LMX4) kit Apply topically once as needed for mild pain          ROS:   Constitutional: No fever, chills, or sweats. No weight gain/loss.   ENT: No visual disturbance, ear ache, epistaxis, sore throat.   Allergies/Immunologic: Negative.   Respiratory: No cough, hemoptysis.   Cardiovascular: As per HPI.   GI: No nausea, vomiting, hematemesis, melena, or hematochezia.   : No urinary frequency, dysuria, or hematuria.   Integument: Negative.   Psychiatric: Negative.   Neuro: Negative.   Endocrinology: Negative.   Musculoskeletal: Negative    Exam:  /82  Pulse 100  Ht 1.69 m (5' 6.53\")  Wt 72.3 kg (159 lb 6.4 oz)  SpO2 99%  BMI 25.32 kg/m2  In general, the patient is a pleasant male in no apparent distress.    HEENT: NC/AT. PERRLA. EOMI.  Sclerae white, not injected.    Neck:  No adenopathy, No thyromegaly.    COR: No JVP.  RRR.  Normal S1 S2.  No S3/S4. No murmur, rub click, or gallop.    Lungs:  CTA. No crackles/wheeze.    Abdomen: soft, nontender, nondistended.  No organomegaly.  Extremities:  No clubbing, cyanosis, or edema.    Neuro: Alert & Oriented x 3, grossly non focal.    Labs:  CBC RESULTS:   Lab Results   Component Value Date    WBC 10.3 03/28/2017    RBC 4.10 (L) 03/28/2017    HGB 13.5 03/28/2017    HCT 41.0 03/28/2017     03/28/2017    MCH 32.9 03/28/2017    MCHC 32.9 03/28/2017    RDW 14.2 03/28/2017     03/28/2017       BMP RESULTS:  Lab Results   Component Value Date     03/28/2017    POTASSIUM 3.6 03/28/2017    CHLORIDE 106 03/28/2017    CO2 27 03/28/2017    ANIONGAP 8 03/28/2017     (H) 03/28/2017    BUN 24 03/28/2017    CR 0.92 03/28/2017    GFRESTIMATED 88 03/28/2017    GFRESTBLACK >90   GFR Calc   03/28/2017    DAISY 9.6 03/28/2017      LIPID RESULTS:  Lab Results   Component Value Date "    CHOL 198 01/13/2017    HDL 88 01/13/2017    LDL 93 01/13/2017    TRIG 82 01/13/2017       IMMUNOSUPPRESSANT LEVELS  Lab Results   Component Value Date    TACROL 15.1 (H) 03/28/2017    DOSTAC 1930 03/27/2017 03/28/2017       No components found for: CK  Lab Results   Component Value Date    MAG 1.5 (L) 03/28/2017     Lab Results   Component Value Date    A1C 5.4 12/11/2016     Lab Results   Component Value Date    PHOS 3.0 03/28/2017     Lab Results   Component Value Date    NTBNP 1944 (H) 06/29/2016     Lab Results   Component Value Date    SAITESTMET SA HI 03/15/2017    SAICELL Class I 03/15/2017    [id number] None 03/15/2017    [id number] B:8 03/15/2017    SAIREPCOM  03/15/2017      Test performed by modified procedure. Serum heat inactivated. High-risk,   mfi >3,000. Mod-risk, mfi 500-3,000.       Lab Results   Component Value Date    SAIITESTME SA HI 03/15/2017    SAIICELL Class II 03/15/2017    [id number] None 03/15/2017    [id number] None 03/15/2017    SAIIREPCOM  03/15/2017      Test performed by modified procedure. Serum heat inactivated. High-risk,   mfi >3,000. Mod-risk, mfi 500-3,000.       Lab Results   Component Value Date    CSPEC Plasma, EDTA anticoagulant 03/15/2017       Echocardiogram 3/15/17:  Interpretation Summary  Global and regional left ventricular function is normal with an EF of 60-65%.  Global right ventricular function is normal.  The inferior vena cava is normal. Estimated mean right atrial pressure is <3mmHg.  No pericardial effusion is present.  This study was compared with the study from 2/14/2017. There has been no change.    Assessment and Plan:  Javi Shetty is a 46 year-old male with a history of Chagas cardiomyopathy (diagnosed in 2010) and biventricular systolic heart failure (LVEF 15-20% since 2015, on home milrinone since 8/2016) who subsequently underwent orthotopic heart transplantation on 12/12/16. He continues to progress well, and has fully recovered from his  pneumonia last month. He has no signs of infection with a decreasing WBC trend and remains afebrile. His renal function remains normal and he is euvolemic on exam. Tacrolimus and biopsy results are pending.    Coordinator continues to struggle to find a cardiac rehab program that the patient's insurance will cover. Also working with financial counselors to get new health insurance set up as Javi's will be ending on 4/30. Should be reasonable for the patient to return to work after 6 months if he continues to do well.    Change in immunosuppression: no  Reason for Change: Tacrolimus level pending.  Other Changes: none  Follow-Up: 1 month for 5-month testing    Next Biopsy: 5/5/17  Next Angiogram: first annual  Next Angiogram with IVUS: yes  Next Stress Test: per protocol     2. Right arm numbness: undergoing neurology evaluation; may start gabapentin after MRI is completed.    3. Chagas: follows with ID, holding INH at this time secondary to elevated LFTs.      [facility]  Heart Transplant  Pager [phone]

## 2017-04-04 NOTE — LETTER
2017      Patient Name:  Javi Bansal  :  1970  MRN:  [id number]   ICD10: z94.1, z79.899    Subject:  Cardiac Rehabilitation    Javi Bansal is a heart transplant patient currently being followed by the [facility].  We would like to request your assistance in contacting Mr. Iglesia Shetty to arrange Phase 2 Cardiac Rehabilitation with your facility.     Thank you  for your continued support and the opportunity to collaborate in the care of this patient.  If you have any questions, please call the Thoracic Transplant Team at [phone] or [phone].        Geena Mcclellan M.D.  Division of Cardiology   of Medicine

## 2017-04-04 NOTE — PROGRESS NOTES
Pt arrives to 2a, alone, for heart biopsy. H&P is up to date. Consent is signed. Pt prepped and ready.  D/C instructions reviewed with pt pre-procedure, pt verbalizes understanding and doesn't have any questions.   at bedside.

## 2017-04-05 ENCOUNTER — TELEPHONE (OUTPATIENT)
Dept: CARDIOLOGY | Facility: CLINIC | Age: 47
End: 2017-04-05

## 2017-04-05 LAB — COPATH REPORT: NORMAL

## 2017-04-05 NOTE — TELEPHONE ENCOUNTER
[facility] Prior Authorization Team   Phone: [phone]  Fax: [phone]    PA Initiation    Medication: pantoprazole 40 MG EC tablet   Insurance Company: MEDICA - Phone [phone] Fax [phone]  Pharmacy Filling the Rx: [facility] - [place], [place] - [address]  Filling Pharmacy Phone: [phone]  Filling Pharmacy Fax: [phone]  Start Date: 4/5/2017

## 2017-04-06 ENCOUNTER — TELEPHONE (OUTPATIENT)
Dept: TRANSPLANT | Facility: CLINIC | Age: 47
End: 2017-04-06

## 2017-04-06 DIAGNOSIS — Z94.1 HEART REPLACED BY TRANSPLANT (H): Primary | ICD-10-CM

## 2017-04-06 RX ORDER — PREDNISONE 5 MG/1
TABLET ORAL
Qty: 60 TABLET | Refills: 1 | Status: SHIPPED | OUTPATIENT
Start: 2017-04-06 | End: 2017-05-09

## 2017-04-06 RX ORDER — TACROLIMUS 1 MG/1
CAPSULE ORAL
Qty: 90 CAPSULE | Refills: 11 | Status: SHIPPED | OUTPATIENT
Start: 2017-04-06 | End: 2018-06-13

## 2017-04-06 NOTE — TELEPHONE ENCOUNTER
Called patient through  with results, financial assistance, insurance and cardiac rehab follow up:    1) Heart biopsy is negative (1R, negative IF). Decrease prednisone to 5mg (1 tablet) twice a day.  2) Prograf level is 15.2. Pt confirms this is a 12 hour trough and confirms current dose of 2mg bid. Instructed pt to decrease dose to 2/1mg and repeat 12 hour level at 9am on 4/24 at the [facility] since pt has a CT scheduled at 9:20am that same day.  3) Reminded pt of MRI for his arm on Monday, 4/10 at 4:30pm.  4) Confirmed that financial worker Ale contacted patient to let him know that his insurance is squared away:     After a long hold time, I connected with Redd at [facility] who will select [employer] MA for this patient effective 5-1-17.  The [facility] letter he received has a selection on the back where  recipients to select their choices and rank then from 1-3.  Example: 1-BX 2 HP 3 [employer].    He ensured me he will put him on the BX MA product.  Please confirm the coverage on 5-1-17 because I explained this is a  transplant patient  and we need to confirm  his new coverage has been selected.     [employer] BROOKS 1970 Acct:  [id number] Admit:  06/01/2017 Disch:    0.00     [facility] MRI MRN:  [id number] IP Admit Date:   Class:  Outpatient [facility] BROOKS OPEN       5) Informed him that BERTHA Edgar, will contact patient to see if she can help patient pay for his medical bills with funds from [employer]. Asked that he contact primary coordinator Manoj next week if he does not hear from Tala by then.   6) Informed him that referral was sent to [facility]) cardiac rehab on 4/4/17 and they should be contacting pt soon to schedule an appointment.     Pt repeated dose changes, verbalized understanding of all other information.

## 2017-04-07 LAB
FUNGUS SPEC CULT: ABNORMAL
Lab: ABNORMAL
MICRO REPORT STATUS: ABNORMAL
SPECIMEN SOURCE: ABNORMAL

## 2017-04-07 NOTE — TELEPHONE ENCOUNTER
[facility] Prior Authorization Team   Phone: [phone]  Fax: [phone]    Prior Authorization Approval    Authorization Effective Date: 4/7/2017  Authorization Expiration Date: 4/7/2018  Medication: pantoprazole 40 MG EC tablet   Approved Dose/Quantity: Take 1 tablet (40 mg) by mouth every morning / #30  Reference #: CMM KEY#: [id number] / PA#: [id number]   Insurance Company: MEDICA - Phone [phone] Fax [phone]  Expected CoPay: $3.42     CoPay Card Available: No   Foundation Assistance Needed: N/A  Which Pharmacy is filling the prescription (Not needed for infusion/clinic administered): [facility] - [place], [place] - [facility] [address]  Pharmacy Notified: Yes  Patient Notified: Yes

## 2017-04-12 NOTE — TELEPHONE ENCOUNTER
Returned call to patient's HH RN, Delores () to confirm current med doses: FK 2 mg/1 mg, prednisone 5 mg BID and to discuss patient request for Nystatin and Ketoconazole shampoo.  Refills available for Nystatin ( Pharmacy) and shampoo ([employer]).    Also called patient (w/ assistance) to confirm meds and scheduled lab (12 hour FK level) and CT on 4/24/17.  Patient states the wounds on his chest/abdomen are CDI; instructed patient that he does not need to use the antimicrobial scrub.  Patient confirms he was contacted by cardiac rehab but was unable to attend initial evaluation d/t inability to obtain transportation. Coordinator will contact [facility] rehab to request they contact the patient again to reschedule and begin cardiac rehab.  Patient verbalizes understanding plan of care and agrees to follow.    Contacted  [facility] to request they ship out Nystatin to patient's home.  Contacted [facility] cardiac rehab; staff states the patient will be contacted this afternoon to reschedule.

## 2017-04-13 ENCOUNTER — AMBULATORY - HEALTHEAST (OUTPATIENT)
Dept: CARDIAC REHAB | Facility: HOSPITAL | Age: 47
End: 2017-04-13

## 2017-04-13 DIAGNOSIS — Z94.1 HEART TRANSPLANTED (H): ICD-10-CM

## 2017-04-14 ENCOUNTER — TELEPHONE (OUTPATIENT)
Dept: PHARMACY | Facility: CLINIC | Age: 47
End: 2017-04-14

## 2017-04-18 ENCOUNTER — MEDICAL CORRESPONDENCE (OUTPATIENT)
Dept: HEALTH INFORMATION MANAGEMENT | Facility: CLINIC | Age: 47
End: 2017-04-18

## 2017-04-21 ENCOUNTER — AMBULATORY - HEALTHEAST (OUTPATIENT)
Dept: CARDIAC REHAB | Facility: HOSPITAL | Age: 47
End: 2017-04-21

## 2017-04-21 DIAGNOSIS — Z94.1 STATUS POST HEART TRANSPLANT (H): ICD-10-CM

## 2017-04-24 ENCOUNTER — HOSPITAL ENCOUNTER (OUTPATIENT)
Facility: CLINIC | Age: 47
Setting detail: OBSERVATION
Discharge: HOME | End: 2017-04-26
Attending: EMERGENCY MEDICINE | Admitting: INTERNAL MEDICINE
Payer: COMMERCIAL

## 2017-04-24 ENCOUNTER — TELEPHONE (OUTPATIENT)
Dept: TRANSPLANT | Facility: CLINIC | Age: 47
End: 2017-04-24

## 2017-04-24 DIAGNOSIS — B57.2 CHAGAS CARDIOMYOPATHY: ICD-10-CM

## 2017-04-24 DIAGNOSIS — R73.9 HYPERGLYCEMIA: ICD-10-CM

## 2017-04-24 DIAGNOSIS — Z22.7 LATENT TUBERCULOSIS INFECTION: ICD-10-CM

## 2017-04-24 DIAGNOSIS — I50.22 CHRONIC SYSTOLIC HEART FAILURE (H): ICD-10-CM

## 2017-04-24 DIAGNOSIS — Z94.1 HEART REPLACED BY TRANSPLANT (H): ICD-10-CM

## 2017-04-24 PROBLEM — R73.09 ABNORMAL BLOOD SUGAR: Status: ACTIVE | Noted: 2017-04-24

## 2017-04-24 LAB
ALBUMIN SERPL-MCNC: 4.1 G/DL (ref 3.4–5)
ALBUMIN SERPL-MCNC: 4.2 G/DL (ref 3.4–5)
ALBUMIN UR-MCNC: NEGATIVE MG/DL
ALP SERPL-CCNC: 100 U/L (ref 40–150)
ALP SERPL-CCNC: 92 U/L (ref 40–150)
ALT SERPL W P-5'-P-CCNC: 35 U/L (ref 0–70)
ALT SERPL W P-5'-P-CCNC: 38 U/L (ref 0–70)
ANION GAP SERPL CALCULATED.3IONS-SCNC: 10 MMOL/L (ref 3–14)
ANION GAP SERPL CALCULATED.3IONS-SCNC: 10 MMOL/L (ref 3–14)
ANION GAP SERPL CALCULATED.3IONS-SCNC: 11 MMOL/L (ref 3–14)
ANION GAP SERPL CALCULATED.3IONS-SCNC: 9 MMOL/L (ref 3–14)
APPEARANCE UR: CLEAR
AST SERPL W P-5'-P-CCNC: 20 U/L (ref 0–45)
AST SERPL W P-5'-P-CCNC: 21 U/L (ref 0–45)
BILIRUB DIRECT SERPL-MCNC: 0.1 MG/DL (ref 0–0.2)
BILIRUB DIRECT SERPL-MCNC: 0.1 MG/DL (ref 0–0.2)
BILIRUB SERPL-MCNC: 0.6 MG/DL (ref 0.2–1.3)
BILIRUB SERPL-MCNC: 1 MG/DL (ref 0.2–1.3)
BILIRUB UR QL STRIP: NEGATIVE
BUN SERPL-MCNC: 23 MG/DL (ref 7–30)
BUN SERPL-MCNC: 25 MG/DL (ref 7–30)
CALCIUM SERPL-MCNC: 8.9 MG/DL (ref 8.5–10.1)
CALCIUM SERPL-MCNC: 9 MG/DL (ref 8.5–10.1)
CALCIUM SERPL-MCNC: 9.5 MG/DL (ref 8.5–10.1)
CALCIUM SERPL-MCNC: 9.9 MG/DL (ref 8.5–10.1)
CHLORIDE SERPL-SCNC: 102 MMOL/L (ref 94–109)
CHLORIDE SERPL-SCNC: 103 MMOL/L (ref 94–109)
CHLORIDE SERPL-SCNC: 94 MMOL/L (ref 94–109)
CHLORIDE SERPL-SCNC: 98 MMOL/L (ref 94–109)
CO2 BLDCOV-SCNC: 25 MMOL/L (ref 21–28)
CO2 SERPL-SCNC: 20 MMOL/L (ref 20–32)
CO2 SERPL-SCNC: 22 MMOL/L (ref 20–32)
CO2 SERPL-SCNC: 24 MMOL/L (ref 20–32)
CO2 SERPL-SCNC: 26 MMOL/L (ref 20–32)
COLOR UR AUTO: ABNORMAL
CREAT SERPL-MCNC: 0.85 MG/DL (ref 0.66–1.25)
CREAT SERPL-MCNC: 0.85 MG/DL (ref 0.66–1.25)
CREAT SERPL-MCNC: 0.96 MG/DL (ref 0.66–1.25)
CREAT SERPL-MCNC: 1.04 MG/DL (ref 0.66–1.25)
GFR SERPL CREATININE-BSD FRML MDRD: 76 ML/MIN/1.7M2
GFR SERPL CREATININE-BSD FRML MDRD: 84 ML/MIN/1.7M2
GFR SERPL CREATININE-BSD FRML MDRD: ABNORMAL ML/MIN/1.7M2
GFR SERPL CREATININE-BSD FRML MDRD: ABNORMAL ML/MIN/1.7M2
GLUCOSE BLDC GLUCOMTR-MCNC: 335 MG/DL (ref 70–99)
GLUCOSE BLDC GLUCOMTR-MCNC: 419 MG/DL (ref 70–99)
GLUCOSE BLDC GLUCOMTR-MCNC: 444 MG/DL (ref 70–99)
GLUCOSE BLDC GLUCOMTR-MCNC: 461 MG/DL (ref 70–99)
GLUCOSE BLDC GLUCOMTR-MCNC: 464 MG/DL (ref 70–99)
GLUCOSE BLDC GLUCOMTR-MCNC: 545 MG/DL (ref 70–99)
GLUCOSE BLDC GLUCOMTR-MCNC: ABNORMAL MG/DL (ref 70–99)
GLUCOSE SERPL-MCNC: 451 MG/DL (ref 70–99)
GLUCOSE SERPL-MCNC: 482 MG/DL (ref 70–99)
GLUCOSE SERPL-MCNC: 494 MG/DL (ref 70–99)
GLUCOSE SERPL-MCNC: 708 MG/DL (ref 70–99)
GLUCOSE UR STRIP-MCNC: >1000 MG/DL
HGB UR QL STRIP: NEGATIVE
KETONES UR STRIP-MCNC: 10 MG/DL
LACTATE BLD-SCNC: 0.9 MMOL/L (ref 0.7–2.1)
LEUKOCYTE ESTERASE UR QL STRIP: NEGATIVE
NITRATE UR QL: NEGATIVE
OSMOLALITY SERPL: 318 MMOL/KG (ref 275–295)
PCO2 BLDV: 38 MM HG (ref 40–50)
PH BLDV: 7.43 PH (ref 7.32–7.43)
PH UR STRIP: 5.5 PH (ref 5–7)
PO2 BLDV: 47 MM HG (ref 25–47)
POTASSIUM SERPL-SCNC: 3.8 MMOL/L (ref 3.4–5.3)
POTASSIUM SERPL-SCNC: 3.9 MMOL/L (ref 3.4–5.3)
POTASSIUM SERPL-SCNC: 4.4 MMOL/L (ref 3.4–5.3)
POTASSIUM SERPL-SCNC: 4.6 MMOL/L (ref 3.4–5.3)
PROT SERPL-MCNC: 6.8 G/DL (ref 6.8–8.8)
PROT SERPL-MCNC: 7.5 G/DL (ref 6.8–8.8)
RBC #/AREA URNS AUTO: 0 /HPF (ref 0–2)
SAO2 % BLDV FROM PO2: 83 %
SODIUM SERPL-SCNC: 129 MMOL/L (ref 133–144)
SODIUM SERPL-SCNC: 133 MMOL/L (ref 133–144)
SODIUM SERPL-SCNC: 134 MMOL/L (ref 133–144)
SODIUM SERPL-SCNC: 135 MMOL/L (ref 133–144)
SP GR UR STRIP: 1.02 (ref 1–1.03)
TACROLIMUS BLD-MCNC: 8.1 UG/L (ref 5–15)
TME LAST DOSE: NORMAL H
URN SPEC COLLECT METH UR: ABNORMAL
UROBILINOGEN UR STRIP-MCNC: NORMAL MG/DL (ref 0–2)
WBC #/AREA URNS AUTO: <1 /HPF (ref 0–2)

## 2017-04-24 PROCEDURE — 99220 ZZC INITIAL OBSERVATION CARE,LEVL III: CPT | Performed by: INTERNAL MEDICINE

## 2017-04-24 PROCEDURE — 83930 ASSAY OF BLOOD OSMOLALITY: CPT | Performed by: EMERGENCY MEDICINE

## 2017-04-24 PROCEDURE — 80076 HEPATIC FUNCTION PANEL: CPT | Performed by: EMERGENCY MEDICINE

## 2017-04-24 PROCEDURE — 83605 ASSAY OF LACTIC ACID: CPT

## 2017-04-24 PROCEDURE — 96360 HYDRATION IV INFUSION INIT: CPT | Performed by: EMERGENCY MEDICINE

## 2017-04-24 PROCEDURE — 96361 HYDRATE IV INFUSION ADD-ON: CPT | Performed by: EMERGENCY MEDICINE

## 2017-04-24 PROCEDURE — 81001 URINALYSIS AUTO W/SCOPE: CPT | Performed by: INTERNAL MEDICINE

## 2017-04-24 PROCEDURE — 25000128 H RX IP 250 OP 636: Performed by: EMERGENCY MEDICINE

## 2017-04-24 PROCEDURE — 99285 EMERGENCY DEPT VISIT HI MDM: CPT | Mod: 25 | Performed by: EMERGENCY MEDICINE

## 2017-04-24 PROCEDURE — 99285 EMERGENCY DEPT VISIT HI MDM: CPT | Mod: Z6 | Performed by: EMERGENCY MEDICINE

## 2017-04-24 PROCEDURE — 36415 COLL VENOUS BLD VENIPUNCTURE: CPT | Performed by: INTERNAL MEDICINE

## 2017-04-24 PROCEDURE — 00000146 ZZHCL STATISTIC GLUCOSE BY METER IP

## 2017-04-24 PROCEDURE — 25000131 ZZH RX MED GY IP 250 OP 636 PS 637: Performed by: STUDENT IN AN ORGANIZED HEALTH CARE EDUCATION/TRAINING PROGRAM

## 2017-04-24 PROCEDURE — 25000131 ZZH RX MED GY IP 250 OP 636 PS 637

## 2017-04-24 PROCEDURE — 87040 BLOOD CULTURE FOR BACTERIA: CPT | Performed by: INTERNAL MEDICINE

## 2017-04-24 PROCEDURE — 25000125 ZZHC RX 250: Performed by: STUDENT IN AN ORGANIZED HEALTH CARE EDUCATION/TRAINING PROGRAM

## 2017-04-24 PROCEDURE — 82803 BLOOD GASES ANY COMBINATION: CPT

## 2017-04-24 PROCEDURE — 80048 BASIC METABOLIC PNL TOTAL CA: CPT | Performed by: EMERGENCY MEDICINE

## 2017-04-24 PROCEDURE — 25000132 ZZH RX MED GY IP 250 OP 250 PS 637: Performed by: STUDENT IN AN ORGANIZED HEALTH CARE EDUCATION/TRAINING PROGRAM

## 2017-04-24 PROCEDURE — 96366 THER/PROPH/DIAG IV INF ADDON: CPT

## 2017-04-24 PROCEDURE — G0378 HOSPITAL OBSERVATION PER HR: HCPCS

## 2017-04-24 PROCEDURE — 96365 THER/PROPH/DIAG IV INF INIT: CPT

## 2017-04-24 PROCEDURE — 80048 BASIC METABOLIC PNL TOTAL CA: CPT | Performed by: INTERNAL MEDICINE

## 2017-04-24 RX ORDER — ISONIAZID 300 MG/1
300 TABLET ORAL DAILY
Status: DISCONTINUED | OUTPATIENT
Start: 2017-04-25 | End: 2017-04-26 | Stop reason: HOSPADM

## 2017-04-24 RX ORDER — ACETAMINOPHEN 325 MG/1
650 TABLET ORAL EVERY 4 HOURS PRN
Status: DISCONTINUED | OUTPATIENT
Start: 2017-04-24 | End: 2017-04-26 | Stop reason: HOSPADM

## 2017-04-24 RX ORDER — LIDOCAINE 40 MG/G
CREAM TOPICAL
Status: DISCONTINUED | OUTPATIENT
Start: 2017-04-24 | End: 2017-04-26 | Stop reason: HOSPADM

## 2017-04-24 RX ORDER — ACETAMINOPHEN 650 MG/1
650 SUPPOSITORY RECTAL EVERY 4 HOURS PRN
Status: DISCONTINUED | OUTPATIENT
Start: 2017-04-24 | End: 2017-04-26 | Stop reason: HOSPADM

## 2017-04-24 RX ORDER — NICOTINE POLACRILEX 4 MG
15-30 LOZENGE BUCCAL
Status: DISCONTINUED | OUTPATIENT
Start: 2017-04-24 | End: 2017-04-26 | Stop reason: HOSPADM

## 2017-04-24 RX ORDER — TACROLIMUS 1 MG/1
2 CAPSULE ORAL EVERY MORNING
Status: DISCONTINUED | OUTPATIENT
Start: 2017-04-25 | End: 2017-04-26 | Stop reason: HOSPADM

## 2017-04-24 RX ORDER — MYCOPHENOLATE MOFETIL 250 MG/1
1500 CAPSULE ORAL 2 TIMES DAILY
Status: DISCONTINUED | OUTPATIENT
Start: 2017-04-24 | End: 2017-04-26 | Stop reason: HOSPADM

## 2017-04-24 RX ORDER — PRAVASTATIN SODIUM 20 MG
20 TABLET ORAL EVERY EVENING
Status: DISCONTINUED | OUTPATIENT
Start: 2017-04-24 | End: 2017-04-26 | Stop reason: HOSPADM

## 2017-04-24 RX ORDER — ASPIRIN 81 MG/1
81 TABLET ORAL DAILY
Status: DISCONTINUED | OUTPATIENT
Start: 2017-04-25 | End: 2017-04-26 | Stop reason: HOSPADM

## 2017-04-24 RX ORDER — SODIUM CHLORIDE 9 MG/ML
1000 INJECTION, SOLUTION INTRAVENOUS CONTINUOUS
Status: DISCONTINUED | OUTPATIENT
Start: 2017-04-24 | End: 2017-04-24

## 2017-04-24 RX ORDER — DEXTROSE MONOHYDRATE 25 G/50ML
25-50 INJECTION, SOLUTION INTRAVENOUS
Status: DISCONTINUED | OUTPATIENT
Start: 2017-04-24 | End: 2017-04-26 | Stop reason: HOSPADM

## 2017-04-24 RX ORDER — NYSTATIN 100000/ML
500000 SUSPENSION, ORAL (FINAL DOSE FORM) ORAL 4 TIMES DAILY
Status: DISCONTINUED | OUTPATIENT
Start: 2017-04-24 | End: 2017-04-26 | Stop reason: HOSPADM

## 2017-04-24 RX ORDER — PREDNISONE 5 MG/1
5 TABLET ORAL 2 TIMES DAILY
Status: DISCONTINUED | OUTPATIENT
Start: 2017-04-24 | End: 2017-04-26 | Stop reason: HOSPADM

## 2017-04-24 RX ORDER — TACROLIMUS 1 MG/1
1 CAPSULE ORAL
Status: DISCONTINUED | OUTPATIENT
Start: 2017-04-24 | End: 2017-04-25

## 2017-04-24 RX ORDER — PANTOPRAZOLE SODIUM 40 MG/1
40 TABLET, DELAYED RELEASE ORAL EVERY MORNING
Status: DISCONTINUED | OUTPATIENT
Start: 2017-04-25 | End: 2017-04-26 | Stop reason: HOSPADM

## 2017-04-24 RX ORDER — NALOXONE HYDROCHLORIDE 0.4 MG/ML
.1-.4 INJECTION, SOLUTION INTRAMUSCULAR; INTRAVENOUS; SUBCUTANEOUS
Status: DISCONTINUED | OUTPATIENT
Start: 2017-04-24 | End: 2017-04-26 | Stop reason: HOSPADM

## 2017-04-24 RX ORDER — PRAVASTATIN SODIUM 40 MG
20 TABLET ORAL EVERY EVENING
Status: DISCONTINUED | OUTPATIENT
Start: 2017-04-24 | End: 2017-04-24

## 2017-04-24 RX ADMIN — NYSTATIN 500000 UNITS: 500000 SUSPENSION ORAL at 20:25

## 2017-04-24 RX ADMIN — PRAVASTATIN SODIUM 20 MG: 20 TABLET ORAL at 21:48

## 2017-04-24 RX ADMIN — HUMAN INSULIN 5.5 UNITS/HR: 100 INJECTION, SOLUTION SUBCUTANEOUS at 20:06

## 2017-04-24 RX ADMIN — ACETAMINOPHEN 650 MG: 325 TABLET, FILM COATED ORAL at 23:24

## 2017-04-24 RX ADMIN — MYCOPHENOLATE MOFETIL 1500 MG: 250 CAPSULE ORAL at 20:28

## 2017-04-24 RX ADMIN — PREDNISONE 5 MG: 5 TABLET ORAL at 20:24

## 2017-04-24 RX ADMIN — SODIUM CHLORIDE 1000 ML: 900 INJECTION, SOLUTION INTRAVENOUS at 17:20

## 2017-04-24 RX ADMIN — TACROLIMUS 1 MG: 1 CAPSULE ORAL at 20:24

## 2017-04-24 RX ADMIN — HUMAN INSULIN 10 UNITS/HR: 100 INJECTION, SOLUTION SUBCUTANEOUS at 23:24

## 2017-04-24 RX ADMIN — SODIUM CHLORIDE 1000 ML: 9 INJECTION, SOLUTION INTRAVENOUS at 14:56

## 2017-04-24 ASSESSMENT — ENCOUNTER SYMPTOMS
LIGHT-HEADEDNESS: 0
POLYDIPSIA: 0
BRUISES/BLEEDS EASILY: 0
SHORTNESS OF BREATH: 0
ADENOPATHY: 0
ABDOMINAL PAIN: 0
FEVER: 0
AGITATION: 0
BACK PAIN: 0
CHILLS: 0
NECK PAIN: 0
NAUSEA: 0
VOMITING: 0
DIFFICULTY URINATING: 0
NECK STIFFNESS: 0
COLOR CHANGE: 0

## 2017-04-24 NOTE — TELEPHONE ENCOUNTER
Ater confirming elevated blood sugar with [facility] lab (high level confirmed with repeat test) and consulting tx NP and Endocrine Triage, called patient with instructions to come to [facility] ED now for further evaluation.  Patient denies any SOB, dizziness or increased urine output.  Patient agrees to come to ED. Report called to ED, ETA ~ 1330.

## 2017-04-24 NOTE — ED PROVIDER NOTES
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 Hospitalized (3/9/17-3/12/17) due to who HCAP treated with Vanc/Zosyn and Levaquin. Discharged on a 10 day course of Levaquin. He presents to the Emergency Department for evaluation of high blood sugar. The patient presented to clinic this morning for laboratories. He received a call from his transplant coordinator this afternoon who instructed him to come to the ED due to a blood sugar reading of 482. Here, the patient reports intermittent dizziness and denies shortness of breath or any other concerns or complaints at this time. No fevers or pain. The patient is complaint with his medications.    Past Medical History:   Diagnosis Date     Chagas cardiomyopathy      Chronic systolic heart failure (H)      Dual ICD (implantable cardioverter-defibrillator) in place 10/20/2015     Essential hypertension      Gastroesophageal reflux disease      H/O gastric ulcer      Hypertension      Premature ventricular contractions      Presbyopia      Pterygium     ?? suspected , but unclear dx       Past Surgical History:   Procedure Laterality Date     CARDIAC SURGERY      AICD     TRANSPLANT HEART RECIPIENT N/A 12/12/2016    Procedure: TRANSPLANT HEART RECIPIENT;  Surgeon: Elo Madsen MD;  Location:  OR       Family History   Problem Relation Age of Onset     Brain Cancer Mother      Melanoma No family hx of      Skin Cancer No family hx of        Social History   Substance Use Topics     Smoking status: Never Smoker     Smokeless tobacco: Not on file     Alcohol use No       Current Facility-Administered Medications   Medication     aspirin EC EC tablet 81 mg     isoniazid (NYDRAZID) tablet 300 mg     mycophenolate (CELLCEPT - GENERIC EQUIVALENT) capsule 1,500 mg      "nystatin (MYCOSTATIN) suspension 500,000 Units     [START ON 4/25/2017] pantoprazole (PROTONIX) EC tablet 40 mg     pravastatin (PRAVACHOL) tablet 20 mg     predniSONE (DELTASONE) tablet 5 mg     [START ON 4/25/2017] tacrolimus (PROGRAF - GENERIC EQUIVALENT) capsule 2 mg     lidocaine 1 % 1 mL     lidocaine (LMX4) kit     sodium chloride (PF) 0.9% PF flush 3 mL     sodium chloride (PF) 0.9% PF flush 3 mL     acetaminophen (TYLENOL) tablet 650 mg     acetaminophen (TYLENOL) Suppository 650 mg     naloxone (NARCAN) injection 0.1-0.4 mg     dextrose 10 % 1,000 mL infusion     insulin 1 unit/mL in saline (NovoLIN, HumuLIN Regular) drip - ADULT IV Infusion     glucose 40 % gel 15-30 g    Or     dextrose 50 % injection 25-50 mL    Or     glucagon injection 1 mg      No Known Allergies     I have reviewed the Medications, Allergies, Past Medical and Surgical History, and Social History in the Epic system.    Review of Systems   Constitutional: Negative for chills and fever.   HENT: Negative for congestion.    Eyes: Negative for visual disturbance.   Respiratory: Negative for shortness of breath.    Cardiovascular: Negative for chest pain.   Gastrointestinal: Negative for abdominal pain, nausea and vomiting.   Endocrine: Negative for polydipsia and polyuria.   Genitourinary: Negative for difficulty urinating.   Musculoskeletal: Negative for back pain, neck pain and neck stiffness.   Skin: Negative for color change.   Allergic/Immunologic: Positive for immunocompromised state.   Neurological: Negative for light-headedness.   Hematological: Negative for adenopathy. Does not bruise/bleed easily.   Psychiatric/Behavioral: Negative for agitation and behavioral problems.       Physical Exam   BP: 122/76  Heart Rate: 95  Temp: 98.3  F (36.8  C)  Resp: 16  Height: 170 cm (5' 6.93\")  Weight: 70.6 kg (155 lb 10.3 oz)  SpO2: 97 %  Physical Exam   Constitutional: He is oriented to person, place, and time. He appears well-developed and " well-nourished. No distress.   HENT:   Head: Normocephalic and atraumatic.   Mouth/Throat: Oropharynx is clear and moist. No oropharyngeal exudate.   Eyes: Conjunctivae and EOM are normal. No scleral icterus.   Neck: Normal range of motion.   Cardiovascular: Normal rate, normal heart sounds and intact distal pulses.    Pulmonary/Chest: Effort normal and breath sounds normal. No respiratory distress. He has no wheezes.   Abdominal: Soft. Bowel sounds are normal. There is no tenderness.   Musculoskeletal: Normal range of motion. He exhibits no edema or tenderness.   Neurological: He is alert and oriented to person, place, and time. No cranial nerve deficit. He exhibits normal muscle tone. Coordination normal.   Skin: Skin is warm. No rash noted. He is not diaphoretic.   Psychiatric: He has a normal mood and affect. His behavior is normal. Judgment and thought content normal.   Nursing note and vitals reviewed.      ED Course     2:24 PM  The patient was seen and examined by Dr. Hale in Room 12.     ED Course     Procedures             Critical Care time:  none               Labs Ordered and Resulted from Time of ED Arrival Up to the Time of Departure from the ED   GLUCOSE BY METER - Abnormal; Notable for the following:        Result Value    Glucose   (*)     Value: >600  Dr/RN Notified      All other components within normal limits   BASIC METABOLIC PANEL - Abnormal; Notable for the following:     Sodium 129 (*)     Glucose 708 (*)     All other components within normal limits   GLUCOSE BY METER - Abnormal; Notable for the following:     Glucose 545 (*)     All other components within normal limits   ISTAT  GASES LACTATE ELIN POCT - Abnormal; Notable for the following:     PCO2 Venous 38 (*)     All other components within normal limits   GLUCOSE MONITOR NURSING POCT   GLUCOSE MONITOR NURSING POCT   ISTAT CG4 GASES LACTATE ELIN NURSING POCT   PERIPHERAL IV CATHETER       Consults  Cardiology: Responded    Assessments  & Plan (with Medical Decision Making)   This is a 46-year-old man with a history of recent heart transplant presenting from the clinic for hyperglycemia. Differential diagnosis: new onset diabetes, medication side effect, DKA.    After thorough history and physical exam, patient appears to be in no acute distress. Fingerstick glucose in the emergency department is >600. I will obtain laboratory studies and hydrate the patient with a bolus of IV saline. Patient agrees with the plan.      This part of the medical record was transcribed by Michelle Ugarte Scribe, from a dictation done by Ora Hale MD.      Patient s laboratory studies returned with hyperglycemia of 708. Anion gap is normal at  10. Sodium is somewhat low at 129, however, this is due to hyperglycemia. PH is normal at 7.43. Do not believe the patient is acidemic and in diabetic ketoacidosis. As a matter of fact he does not have an outstanding diagnosis of diabetes. Blood sugar has been decreasing with intravenous fluids and the repeat value is 545. This was discussed with Dr. Tarango, heart failure service as this patient is a recent cardiac transplant and he recommended to admit him to cardiology for further evaluation and endocrinology consultation. Patient agrees with the plan.    Official Bahamian throughout patient's entire ED stay.     I have reviewed the nursing notes.  I have reviewed the findings, diagnosis, plan and need for follow up with the patient.  This part of the document was transcribed by Michelle Bedoya Scribnew.  Current Discharge Medication List          Final diagnoses:   Hyperglycemia     Chantale HEADLEY, am serving as a trained medical scribe to document services personally performed by Ora Hale MD, based on the provider's statements to me.      Ora HEADLEY MD, was physically present and have reviewed and verified the accuracy of this note documented by Chantale Drake.     4/24/2017   [facility],  [place], EMERGENCY DEPARTMENT     Ora Hale MD  04/24/17 6711

## 2017-04-24 NOTE — ED NOTES
"ED to Floor Handoff      S:  Javi Bansal is a 46 year old male who speaks Sinhala and lives with family members,  in a home  They arrived in the ED by car from clinic with a complaint of Hyperglycemia    Initial vitals were:   BP: 122/76  Heart Rate: 95  Temp: 98.3  F (36.8  C)  Resp: 16  Height: 170 cm (5' 6.93\")  Weight: 70.6 kg (155 lb 10.3 oz)  SpO2: 97 %  Allergies: No Known Allergies.  The meds given in the ED and their home medications are:   Current Facility-Administered Medications   Medication     0.9% sodium chloride BOLUS     Current Outpatient Prescriptions   Medication     predniSONE (DELTASONE) 5 MG tablet     tacrolimus (PROGRAF - GENERIC EQUIVALENT) 1 MG capsule     isoniazid (NYDRAZID) 300 MG tablet     pravastatin (PRAVACHOL) 20 MG tablet     mycophenolate (CELLCEPT - GENERIC EQUIVALENT) 250 MG capsule     aspirin EC 81 MG EC tablet     calcium carb 1250 mg, 500 mg [place],/vitamin D 200 units (OSCAL WITH D) 500-200 MG-UNIT per tablet     multivitamin, therapeutic with minerals (THERA-VIT-M) TABS tablet     pantoprazole (PROTONIX) 40 MG EC tablet     ketoconazole (NIZORAL) 2 % shampoo     nystatin (MYCOSTATIN) 368410 UNIT/ML suspension     order for DME     Social demographics are   Social History     Social History     Marital status:      Spouse name: N/A     Number of children: N/A     Years of education: N/A     Social History Main Topics     Smoking status: Never Smoker     Smokeless tobacco: None     Alcohol use No     Drug use: No     Sexual activity: Not Asked     Other Topics Concern     None     Social History Narrative       B:   The patient has been ill for 2 and 3 day(s) and during this time the symptoms have decreased.  In the ED was diagnosed with   Final diagnoses:   Hyperglycemia    Infection/sepsis suspected:No Isolation type; No active isolations   A:   In the ED these meds were given:   Medications   0.9% sodium chloride BOLUS (1,000 mLs Intravenous New " Bag 4/24/17 1720)   0.9% sodium chloride BOLUS (1,000 mLs Intravenous New Bag 4/24/17 1456)     Drips running?  Yes  Labs results   Labs Ordered and Resulted from Time of ED Arrival Up to the Time of Departure from the ED   GLUCOSE BY METER - Abnormal; Notable for the following:        Result Value    Glucose   (*)     Value: >600  Dr/RN Notified      All other components within normal limits   BASIC METABOLIC PANEL - Abnormal; Notable for the following:     Sodium 129 (*)     Glucose 708 (*)     All other components within normal limits   GLUCOSE BY METER - Abnormal; Notable for the following:     Glucose 545 (*)     All other components within normal limits   ISTAT  GASES LACTATE ELIN POCT - Abnormal; Notable for the following:     PCO2 Venous 38 (*)     All other components within normal limits   GLUCOSE MONITOR NURSING POCT   GLUCOSE MONITOR NURSING POCT   ISTAT CG4 GASES LACTATE ELIN NURSING POCT   PERIPHERAL IV CATHETER     Imaging Studies:   Recent Results (from the past 24 hour(s))   CT Chest w/o contrast    Narrative    EXAM:  CT CHEST W/O CONTRAST . 4/24/2017 9:47 AM     TECHNIQUE:  Helical CT images from the thoracic inlet through the  upper abdomen were obtained without intravenous contrast.  Images are  displayed at 1 and 5 mm intervals. Images reviewed in lung, soft  tissue, and bone windows.    COMPARISON: CT 3/9/2017 and 6/20/2016    HISTORY:   Pneumocystosis     DLP: 140 mGy*cm    Findings:    No lymphadenopathy/effusions. Cortical scarring bilateral renal upper  poles, otherwise unremarkable visualized upper abdomen.    Postsurgical changes from heart transplant. Stable small right  pericardial fluid. Few small calcified pulmonary nodules.    Interval resolution of bilateral pulmonary consolidation/centrilobular  nodules with few residual areas of groundglass opacities in the apical  segment of right upper lobe and superior segment of right lower lobe.    Nondisplaced sternotomy wires. No suspicious  "bone lesion.      Impression    Impression: Status post cardiac transplant, near complete resolution  of bilateral pulmonary areas of consolidation and centrilobular  nodules.    I have personally reviewed the examination and initial interpretation  and I agree with the findings.    PALLAVI PATIÑO MD     Recent vital signs /68  Temp 98.3  F (36.8  C) (Oral)  Resp 16  Ht 1.7 m (5' 6.93\")  Wt 70.6 kg (155 lb 10.3 oz)  SpO2 98%  BMI 24.43 kg/m2  Cardiac Rhythm: ,      Abnormal labs/tests/findings requiring intervention:---  Pain control: good  Nausea control: good  R:   Transfer assistance needed: Independent  Family present during ED course? No   Family currently present? No  Pt needs tele? No  Code Status: Full Code  Tasks needing to be completed:---    Kole mendoza-- [phone]  [phone] [place] ED  3-2700 [facility] ED      "

## 2017-04-24 NOTE — IP AVS SNAPSHOT
MRN:[id number]                      After Visit Summary   4/24/2017    Javi Bansal    MRN: [id number]           Thank you!     Thank you for choosing [facility] for your care. Our goal is always to provide you with excellent care. Hearing back from our patients is one way we can continue to improve our services. Please take a few minutes to complete the written survey that you may receive in the mail after you visit with us. Thank you!        Patient Information     Date Of Birth          1970        Designated Caregiver       Most Recent Value    Caregiver    Will someone help with your care after discharge? yes    Name of designated caregiver Griselda Aguilar    Phone number of caregiver [phone]    Caregiver address [address] [place] [address]      About your hospital stay     You were admitted on:  April 24, 2017 You last received care in the:  [address] [facility]    You were discharged on:  April 26, 2017        Reason for your hospital stay       You were admitted for elevated blood sugars and required a continuous insulin infusion. You will be discharged with insulin and have a diabetes follow-up.                  Who to Call     For medical emergencies, please call 911.  For non-urgent questions about your medical care, please call your primary care provider or clinic, [phone]          Attending Provider     Provider Specialty    Ora Hale MD Emergency Medicine    [facility]       Primary Care Provider Office Phone # Fax #    Ivan Burrell [phone]       [address] [address]         When to contact your care team       Call your primary doctor if you have any of the following:  increased shortness of breath, increased swelling or increased thirst or increased urination.                  After Care Instructions     Activity       Your activity upon discharge: activity as  tolerated            Diet       Follow this diet upon discharge: Orders Placed This Encounter      Combination Diet Low Saturated Fat Na <2400mg Diet, No Caffeine Diet, diabetic            Discharge Instructions       You will be discharged on insulin and will follow-up in Endocrine clinic within 1 week. You will also keep your cardiology appointment on 5/5/17.                  Follow-up Appointments     Adult [facility] Follow-up and recommended labs and tests       Follow-up with Endocrine clinic within 1 week.    Keep Cardiology appointment on 5/5/17.    Appointments on [place] and/or [facility] (with [facility] or [facility] provider or service). Call [phone] if you haven't heard regarding these appointments within 7 days of discharge.                  Your next 10 appointments already scheduled     May 05, 2017  9:00 AM CDT   LAB with ACUTE CARE LAB UJAMES   [facility]    [address] [facility] [address]              Patient must bring picture ID.  Patient should be prepared to give a urine specimen  Please do not eat 10-12 hours before your appointment if you are coming in fasting for labs on lipids, cholesterol, or glucose (sugar).  Pregnant women should follow their Care Team instructions. Water with medications is okay. Do not drink coffee or other fluids.   If you have concerns about taking  your medications, please ask at office or if scheduling via [facility], send a message by clicking on Secure Messaging, Message Your Care Team.            May 05, 2017  9:30 AM CDT   Procedure - 2.5 hour with U2A ROOM 17   Unit [address] [facility] [place] ([facility])    [address] [facility] [address]               May 05, 2017 10:30 AM CDT   Heart Cath Heart Biopsy with UUHCVR3   [facility] ([facility])    [address] [facility] [address]    [phone]            May 05, 2017  1:00 PM CDT   (Arrive by 12:45 PM)   RETURN HEART TRANSPLANT with SAMM Ramos [facility] ([facility])    [address] [facility]  [address] [facility] [address]   [phone]            Jun 01, 2017  8:00 AM CDT   MR CARDIAC W CONTRAST W FLOW QUANT with UUMR4, UU CV MR NURSE   [facility] [place] ([facility], [facility])    [address] [facility] [address] [phone]           Take your medicines as usual, unless your doctor tells you not to.   If you take Viagra, Levitra or Cialis, stop taking it 48 hours before your test.   If you take Aggrenox or dipyridamole (Persantine, Permole), stop taking it 48 hours before your test.   If you take Theophylline or Aminophylline, stop taking it 12 hours before your test.   If you are diabetic and take oral hypoglycemics, do not take them on the day of your test.  The day before your exam, drink extra fluids at least six 8-ounce glasses (unless your doctor wants you to limit your fluids).  Stop all caffeine 12 hours before the test. This includes coffee, tea, soda, chocolate and certain medicines (such as Anacin, Excedrin and NoDoz). Also avoid decaf coffee and tea, as these contain small amounts of caffeine.  Do not eat or drink for 3 hours before your exam. You may drink water and take your morning medicines.  You may need a blood test (creatinine test) within 30 days of your exam. Follow your doctor s orders if this is arranged before your exam.  If you are very claustrophobic, please let you doctor know. You may get a sedative medicine from your doctor before you arrive. Bring the medicine to the exam. Do not take it at home. Arrive one hour early. Bring someone who can take you home after the test. Your medicine will make you sleepy. After the exam, you may not drive, take a bus or take a taxi by yourself.  The MRI  machine uses a strong magnet. Please wear clothes without metal (snaps, zippers). A sweatsuit works well, or we may give you a hospital gown.  Please remove any body piercings and hair extensions before you arrive. You will remove watches, jewelry, hairpins, wallets, dentures, partial dental plates and hearing aids. You may wear contact lenses, and you may be able to wear your rings. We have a safe place to keep your personal items, but it is safer to leave them at home.   **IMPORTANT** THE INSTRUCTIONS BELOW ARE ONLY FOR THOSE PATIENTS WHO HAVE BEEN TOLD THEY WILL RECEIVE SEDATION OR GENERAL ANESTHESIA DURING THEIR MRI PROCEDURE:  IF YOU WILL RECEIVE SEDATION (take medicine to help you relax during your exam):   You must get the medicine from your doctor before you arrive. Bring the medicine to the exam. Do not take it at home.   Arrive one hour early. Bring someone who can take you home after the test. Your medicine will make you sleepy. After the exam, you may not drive, take a bus or take a taxi by yourself.   No eating 8 hours before your exam. You may have clear liquids up until 4 hours before your exam. (Clear liquids include water, clear tea, black coffee and fruit juice without pulp.)  IF YOU WILL RECEIVE ANESTHESIA (be asleep for your exam):   Arrive 1 1/2 hours early. Bring someone who can take you home after the test. You may not drive, take a bus or take a taxi by yourself.   No eating 8 hours before your exam. You may have clear liquids up until 4 hours before your exam. (Clear liquids include water, clear tea, black coffee and fruit juice without pulp.)  If you have any questions, please contact your Imaging Department exam site.            Jun 01, 2017 10:15 AM CDT   (Arrive by 10:00 AM)   Return Visit with Ivan Burrell MD   [facility] ([facility])    [address] [address] [phone]            Jun 02, 2017   9:15 AM CDT   LAB with ACUTE CARE LAB UU   [facility] [place], [facility] ([facility])    [address] [facility] [address]              Patient must bring picture ID.  Patient should be prepared to give a urine specimen  Please do not eat 10-12 hours before your appointment if you are coming in fasting for labs on lipids, cholesterol, or glucose (sugar).  Pregnant women should follow their Care Team instructions. Water with medications is okay. Do not drink coffee or other fluids.   If you have concerns about taking  your medications, please ask at office or if scheduling via [facility] [place], send a message by clicking on Secure Messaging, Message Your Care Team.            Jun 02, 2017 10:00 AM CDT   Procedure - 2.5 hour with U2A ROOM 15   Unit 2A [facility] ([facility])    [address] [facility] [address]               Jun 02, 2017 11:00 AM CDT   Cath 90 Minute with UUHCVR5   [facility]    [address] [facility] [id number] [phone]              Additional Services     Home care nursing referral       [facility]   Phone: [phone]   Fax: [phone]    For RN eval post hospitalization.   Resume previous home care orders.   Assess: vital signs, respiratory and cardiac status, activity tolerance, hydration, nutritional status.   Lab draws as previously ordered.   Med set up and management.   Diabetic education reinforcement.     Your provider has ordered home care nursing services. If you have not been contacted within 2 days of your discharge please call the inpatient department phone number at [phone] .            Medication Therapy Management Referral       Reason for referral:  on more than 5 medications and managing chronic disease    This service is designed to help you get the most from your medications.  A specially  "trained pharmacist will work closely with you and your doctors  to solve any problems related to your medications and to help you get the   best results from taking them.      The Medication Therapy Management staff will call you to schedule an appointment.            NUTRITION REFERRAL       Your provider has referred you to: [facility]: [facility] (on call location)  - [place] [phone]   [email or web address] - Needs Kazakh-speaking .    Please be aware that coverage of these services is subject to the terms and limitations of your health insurance plan.  Call member services at your health plan with any benefit or coverage questions.      Please bring the following with you to your appointment:    (1) This referral request  (2) Any documents given to you regarding this referral  (3) Any specific questions you have about diet and/or food choices                  Pending Results     Date and Time Order Name Status Description    4/25/2017 0230 Glutamic acid decarboxylase antibody In process     4/24/2017 1929 Blood culture Preliminary     4/24/2017 1929 Blood culture Preliminary     4/24/2017 0901 Send outs misc test In process             Statement of Approval     Ordered          04/26/17 141  I have reviewed and agree with all the recommendations and orders detailed in this document.  EFFECTIVE NOW     Approved and electronically signed by:  Giovanni Tarango MD             Admission Information     Date & Time Department Dept. Phone    4/24/2017 [address] [facility] [phone]      Your Vitals Were     Blood Pressure Temperature Respirations Height Weight Pulse Oximetry    118/81 96.6  F (35.9  C) (Axillary) 18 1.7 m (5' 6.93\") 69 kg (152 lb 1.6 oz) 99%    BMI (Body Mass Index)                   23.87 kg/m2           MyChart Information     [email or web address] lets you send messages to your doctor, view your test results, renew your prescriptions, schedule " "appointments and more. To sign up, go to [email or web address] . Click on \"Log in\" on the left side of the screen, which will take you to the Welcome page. Then click on \"Sign up Now\" on the right side of the page.     You will be asked to enter the access code listed below, as well as some personal information. Please follow the directions to create your username and password.     Your access code is: [email or web address]  Expires: 2017  6:30 AM     Your access code will  in 90 days. If you need help or a new code, please call your [facility] clinic or [phone].        Care EveryWhere ID     This is your Care EveryWhere ID. This could be used by other organizations to access your [facility] medical records  [id number]           Review of your medicines      START taking        Dose / Directions    alcohol swab prep pads   Used for:  Hyperglycemia        Use to swab area of injection/raymond as directed.   Quantity:  100 each   Refills:  1       B-D SHARPS DISPOSAL BY MAIL Misc   Used for:  Hyperglycemia        1 container   Quantity:  1 each   Refills:  1       blood glucose monitoring lancets   Used for:  Hyperglycemia        Use to test blood sugar 4 times daily or as directed.   Quantity:  1 Box   Refills:  1       blood glucose monitoring test strip   Commonly known as:  no brand specified   Used for:  Hyperglycemia        Use to test blood sugar 4 times daily or as directed.   Quantity:  1 Box   Refills:  1       insulin aspart 100 UNIT/ML injection   Commonly known as:  NovoLOG PEN   Used for:  Hyperglycemia        Dose:  10 Units   Inject 10 Units Subcutaneous 3 times daily (with meals)   Quantity:  9 mL   Refills:  1       insulin glargine 100 UNIT/ML injection   Commonly known as:  LANTUS   Used for:  Hyperglycemia        Dose:  30 Units   Inject 30 Units Subcutaneous every 24 hours   Quantity:  9 mL   Refills:  1       insulin pen needle 32G X 4 MM   Used for:  Hyperglycemia        Use pen needles " daily or as directed.   Quantity:  100 each   Refills:  1         CONTINUE these medicines which have NOT CHANGED        Dose / Directions    aspirin 81 MG EC tablet   Used for:  Heart replaced by transplant (H)        Dose:  81 mg   Take 1 tablet (81 mg) by mouth daily   Quantity:  90 tablet   Refills:  3       calcium carb 1250 mg (500 mg [place])/vitamin D 200 units 500-200 MG-UNIT per tablet   Commonly known as:  OSCAL with D   Used for:  Heart replaced by transplant (H)        Dose:  1 tablet   Take 1 tablet by mouth 2 times daily (with meals)   Quantity:  60 tablet   Refills:  11       isoniazid 300 MG tablet   Commonly known as:  NYDRAZID   Used for:  Heart replaced by transplant (H)        Dose:  300 mg   Take 1 tablet (300 mg) by mouth daily   Quantity:  90 tablet   Refills:  3       ketoconazole 2 % shampoo   Commonly known as:  NIZORAL   Used for:  Seborrhea        Apply topically three times a week Alternate with Head and Shoulders.   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 (H)        Dose:  1 tablet   Take 1 tablet by mouth daily   Quantity:  30 each   Refills:  11       mycophenolate 250 MG capsule   Commonly known as:  CELLCEPT - GENERIC EQUIVALENT   Used for:  Heart replaced by transplant (H)        Dose:  1500 mg   Take 6 capsules (1,500 mg) by mouth 2 times daily   Quantity:  360 capsule   Refills:  11       nystatin 314195 UNIT/ML suspension   Commonly known as:  MYCOSTATIN   Used for:  Heart replaced by transplant (H)        Dose:  387593 Units   Take 5 mLs (500,000 Units) by mouth 4 times daily   Quantity:  473 mL   Refills:  3       order for DME   Used for:  Lesion of right ulnar nerve        Equipment being ordered: Splint - Cubital tunnel splint for ulnar neuropathy   Quantity:  1 Device   Refills:  0       pantoprazole 40 MG EC tablet   Commonly known as:  PROTONIX   Used for:  Heart replaced by  transplant (H)        Dose:  40 mg   Take 1 tablet (40 mg) by mouth every morning   Quantity:  30 tablet   Refills:  11       pravastatin 20 MG tablet   Commonly known as:  PRAVACHOL   Used for:  Heart replaced by transplant (H)        Dose:  20 mg   Take 1 tablet (20 mg) by mouth every evening   Quantity:  30 tablet   Refills:  11       predniSONE 5 MG tablet   Commonly known as:  DELTASONE   Used for:  Heart replaced by transplant (H)        Take 5mg(1 tablet) in the morning and in the evening.   Quantity:  60 tablet   Refills:  1       tacrolimus 1 MG capsule   Commonly known as:  PROGRAF - GENERIC EQUIVALENT   Used for:  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   Quantity:  90 capsule   Refills:  11            Where to get your medicines      These medications were sent to [facility] [place], [place] - [address] [facility]  [address] [facility] [address]     Phone:  [phone]     alcohol swab prep pads    B-D SHARPS DISPOSAL BY MAIL Misc    blood glucose monitoring lancets    blood glucose monitoring test strip    insulin aspart 100 UNIT/ML injection    insulin glargine 100 UNIT/ML injection    insulin pen needle 32G X 4 MM                Protect others around you: Learn how to safely use, store and throw away your medicines at www.disposemymeds.org.             Medication List: This is a list of all your medications and when to take them. Check marks below indicate your daily home schedule. Keep this list as a reference.      Medications           Morning Afternoon Evening Bedtime As Needed    alcohol swab prep pads   Use to swab area of injection/raymond as directed.                                aspirin 81 MG EC tablet   Take 1 tablet (81 mg) by mouth daily   Last time this was given:  81 mg on 4/26/2017 10:21 AM                                B-D SHARPS DISPOSAL BY MAIL [facility]   1 container                                 blood glucose monitoring lancets   Use to test blood sugar 4 times daily or as directed.                                blood glucose monitoring test strip   Commonly known as:  no brand specified   Use to test blood sugar 4 times daily or as directed.                                calcium carb 1250 mg (500 mg [place])/vitamin D 200 units 500-200 MG-UNIT per tablet   Commonly known as:  OSCAL with D   Take 1 tablet by mouth 2 times daily (with meals)                                insulin aspart 100 UNIT/ML injection   Commonly known as:  NovoLOG PEN   Inject 10 Units Subcutaneous 3 times daily (with meals)   Last time this was given:  9 Units on 4/26/2017 12:53 PM                                insulin glargine 100 UNIT/ML injection   Commonly known as:  LANTUS   Inject 30 Units Subcutaneous every 24 hours   Last time this was given:  30 Units on 4/26/2017 11:23 AM                                insulin pen needle 32G X 4 MM   Use pen needles daily or as directed.                                isoniazid 300 MG tablet   Commonly known as:  NYDRAZID   Take 1 tablet (300 mg) by mouth daily   Last time this was given:  300 mg on 4/26/2017 10:20 AM                                ketoconazole 2 % shampoo   Commonly known as:  NIZORAL   Apply topically three times a week Alternate with Head and Shoulders.                                multivitamin, therapeutic with minerals Tabs tablet   Take 1 tablet by mouth daily                                mycophenolate 250 MG capsule   Commonly known as:  CELLCEPT - GENERIC EQUIVALENT   Take 6 capsules (1,500 mg) by mouth 2 times daily   Last time this was given:  1,500 mg on 4/26/2017 10:19 AM                                nystatin 202058 UNIT/ML suspension   Commonly known as:  MYCOSTATIN   Take 5 mLs (500,000 Units) by mouth 4 times daily   Last time this was given:  500,000 Units on 4/26/2017 12:59 PM                                order for DME   Equipment  being ordered: Splint - Cubital tunnel splint for ulnar neuropathy                                pantoprazole 40 MG EC tablet   Commonly known as:  PROTONIX   Take 1 tablet (40 mg) by mouth every morning   Last time this was given:  40 mg on 4/26/2017 10:20 AM                                pravastatin 20 MG tablet   Commonly known as:  PRAVACHOL   Take 1 tablet (20 mg) by mouth every evening   Last time this was given:  20 mg on 4/25/2017  7:37 PM                                predniSONE 5 MG tablet   Commonly known as:  DELTASONE   Take 5mg(1 tablet) in the morning and in the evening.   Last time this was given:  5 mg on 4/26/2017 10:21 AM                                tacrolimus 1 MG capsule   Commonly known as:  PROGRAF - GENERIC EQUIVALENT   Take 2mg (2 capsules) in the morning and 1mg (1 capsule) in the evening.   Last time this was given:  2 mg on 4/26/2017 10:20 AM

## 2017-04-24 NOTE — ED NOTES
Patient presents with c/o hyperglycemia. Patient has hx of heart transplant, was seen in clinic today and sent in for hyperglycemia. Denies hx of diabetes, but is on prednisone.

## 2017-04-24 NOTE — ED NOTES
Bed: IN02  Expected date: 4/24/17  Expected time: 1:27 PM  Means of arrival:   Comments:  Javi Santos  [id number]  Heart tx 12/16  Doing well, labs done this am, . No hx of diabetes  On a prednisone taper  Denies SOB, dizzyness  Needs

## 2017-04-24 NOTE — TELEPHONE ENCOUNTER
After reviewing recent very elevated blood sugar (482) this morning, contacted patient for update.  Patient states he feels well, denies any dizziness, confusion, fever or SOB. Patient states he did not check his BP this AM.  Patient states he had two sips of coffee early this AM, but no other food prior to having his blood drawn this AM.  Last FK dose was ~2100 last night.  Coordinator will contact the lab and medical staff to determine if any changes are needed.  Patient requests assistance with 3 hospital bills he received: $85 on 4/6, $235 on 3/8 and $270 on 4/17.  Coordinator will contact patient financial counselor and SW and will update the patient.

## 2017-04-24 NOTE — H&P
"Giovanni Tarango M.D.  Cardiovascular Medicine    I personally saw and examined this patient, discussed care with housestaff and other consultants, reviewed current laboratories and imaging studies, and conveyed impression and diagnostic/therapeutic plan to patient.  The patient is admitted for OBSERVATION    Problem List  1. Hyperglycemia  2. Orthotopic cardiac transplantation  3, Chagas  4. Latent tuberculosis  5. Chronic immunosuppression with steroid dependence  6. Pseudohyponatremia      History    Patient presented to ER with hyperglycemia, thirst, polyuria but without symptoms of fever, chills, dysuria, cough.  CT shows pneumonia resolution    Objective  /89 (BP Location: Right arm)  Temp 97.7  F (36.5  C) (Oral)  Resp 16  Ht 1.7 m (5' 6.93\")  Wt 70.6 kg (155 lb 10.3 oz)  SpO2 98%  BMI 24.43 kg/m2   Constitutional: alert, oriented, normal gait and station, normal mentation.  Oral: moist mucous membrans  Lymph: without pathologic adenopathy  Chest: clear to ausculation and percussion  Cor: No evidence of left or right ventricular activity.  Rhythm is regular.  S1 normal, S2 split physiologically. Murmurs are not present  Abdomen: without tenderness, rebound, guarding, masses, ascites  Extremities: Edema not present  Neuro: no focal defects, normal mentation  Skin: without open lesions  Psych: oriented, verbal, mental status in tact    Wt Readings from Last 5 Encounters:   04/24/17 70.6 kg (155 lb 10.3 oz)   04/04/17 72.3 kg (159 lb 6.4 oz)   04/04/17 68.9 kg (152 lb)   04/03/17 71.6 kg (157 lb 12.8 oz)   04/03/17 70.8 kg (156 lb)       Meds    aspirin  81 mg Oral Daily     isoniazid  300 mg Oral Daily     mycophenolate  1,500 mg Oral BID     nystatin  500,000 Units Oral 4x Daily     [START ON 4/25/2017] pantoprazole  40 mg Oral QAM     pravastatin  20 mg Oral QPM     predniSONE  5 mg Oral BID     [START ON 4/25/2017] tacrolimus  2 mg Oral QAM     sodium chloride (PF)  3 mL Intracatheter Q8H "       Labs  CMP  Recent Labs  Lab 04/24/17  1451 04/24/17  0920   * 133   POTASSIUM 4.6 3.8   CHLORIDE 94 98   CO2 24 26   ANIONGAP 10 9   * 482*   BUN 25 25   CR 0.96 1.04   GFRESTIMATED 84 76   GFRESTBLACK >90African American GFR Calc >90African American GFR Calc   DAISY 9.5 9.9   PROTTOTAL  --  7.5   ALBUMIN  --  4.2   BILITOTAL  --  0.6   ALKPHOS  --  100   AST  --  20   ALT  --  38     TECHNIQUE: Helical CT images from the thoracic inlet through the  upper abdomen were obtained without intravenous contrast. Images are  displayed at 1 and 5 mm intervals. Images reviewed in lung, soft  tissue, and bone windows.     COMPARISON: CT 3/9/2017 and 6/20/2016     HISTORY: Pneumocystosis      DLP: 140 mGy*cm     Findings:     No lymphadenopathy/effusions. Cortical scarring bilateral renal upper  poles, otherwise unremarkable visualized upper abdomen.     Postsurgical changes from heart transplant. Stable small right  pericardial fluid. Few small calcified pulmonary nodules.     Interval resolution of bilateral pulmonary consolidation/centrilobular  nodules with few residual areas of groundglass opacities in the apical  segment of right upper lobe and superior segment of right lower lobe.     Nondisplaced sternotomy wires. No suspicious bone lesion.         Impression: Status post cardiac transplant, near complete resolution  of bilateral pulmonary areas of consolidation and centrilobular  nodules.     I have personally reviewed the examination and initial interpretation  and I agree with the findings.     PALLAVI PATIÑO MD  Assessment/Plan   1. Blood cultures  2. Volume correction  3. Dietary review and instruction

## 2017-04-24 NOTE — IP AVS SNAPSHOT
[address] [address]    Phone:  [phone]                                       After Visit Summary   4/24/2017    Javi Bansal    MRN: [id number]           After Visit Summary Signature Page     I have received my discharge instructions, and my questions have been answered. I have discussed any challenges I see with this plan with the nurse or doctor.    ..........................................................................................................................................  Patient/Patient Representative Signature      ..........................................................................................................................................  Patient Representative Print Name and Relationship to Patient    ..................................................               ................................................  Date                                            Time    ..........................................................................................................................................  Reviewed by Signature/Title    ...................................................              ..............................................  Date                                                            Time

## 2017-04-24 NOTE — H&P
[facility], [place]    Cards 2 History and Physical     Date of Admission:  4/24/2017  Date of Service (when I saw the patient): 04/24/17    Assessment & Plan   Javi Bansal is a 46 year old man with longstanding history of Chagas cardiomyopathy who underwent cardiac transplantation on 12/12/2016.     #Hyperglycemia  #Pseudohyponatremia  Pt with no history of diabetes previous to this episode of hyperglycemia. He does not meet criteria at this time for DKA as pH is normal and no anion gap.  - Endocrine consult placed  - C-peptide and NIDA ordered for AM  - Insulin gtt and protocol  - BMP q8h to evaluate electroltyes    #Chagas CM s/p OHT 12/2016  - Immunosuppression: continue tacrolimus, MMF, and prednisone  - Tacrolimus level tomorrow, goal 10-15  - Continue aspirin and statin    #Latent TB  - Continue INH    FEN: Cardiac diet  PPx: Short stay, ambulate  Code: Full Code  Dispo: Observation.    Patient seen and discussed with Dr. Tarango, who agrees with the above assessment and plan.    Elo Sibley  PGY2   [phone]    Primary Care Physician   Ivan Burrell    Chief Complaint   Hyperglycemia    History of Present Illness   History obtained from chart review and discussed with patient.    Javi Bansal is a 46 year old man with longstanding history of Chagas cardiomyopathy who underwent cardiac transplantation on 12/12/2016.   He presents after having elevated blood sugars. Patient has no history of diabetes or issues with blood sugars. He notes that he has been more thirsty recently and urinating more. He also notes general tiredness. He feels his weight has been stable. He denies fevers or chills. No SOB, lightheadedness, dizziness.    Past Medical History    I have reviewed this patient's medical history and updated it with pertinent information if needed.   Past Medical History:   Diagnosis Date     Chagas cardiomyopathy      Chronic  systolic heart failure (H)      Dual ICD (implantable cardioverter-defibrillator) in place 10/20/2015     Essential hypertension      Gastroesophageal reflux disease      H/O gastric ulcer      Hypertension      Premature ventricular contractions      Presbyopia      Pterygium     ?? suspected , but unclear dx       Past Surgical History   I have reviewed this patient's surgical history and updated it with pertinent information if needed.  Past Surgical History:   Procedure Laterality Date     CARDIAC SURGERY      AICD     TRANSPLANT HEART RECIPIENT N/A 12/12/2016    Procedure: TRANSPLANT HEART RECIPIENT;  Surgeon: Elo Madsen MD;  Location:  OR       Prior to Admission Medications   Prior to Admission Medications   Prescriptions Last Dose Informant Patient Reported? Taking?   aspirin EC 81 MG EC tablet  Self No Yes   Sig: Take 1 tablet (81 mg) by mouth daily   calcium carb 1250 mg, 500 mg [place],/vitamin D 200 units (OSCAL WITH D) 500-200 MG-UNIT per tablet  Self No Yes   Sig: Take 1 tablet by mouth 2 times daily (with meals)   isoniazid (NYDRAZID) 300 MG tablet  Self No Yes   Sig: Take 1 tablet (300 mg) by mouth daily   ketoconazole (NIZORAL) 2 % shampoo  Self No No   Sig: Apply topically three times a week Alternate with Head and Shoulders.   Patient not taking: Reported on 4/4/2017   multivitamin, therapeutic with minerals (THERA-VIT-M) TABS tablet  Self No Yes   Sig: Take 1 tablet by mouth daily   mycophenolate (CELLCEPT - GENERIC EQUIVALENT) 250 MG capsule  Self No Yes   Sig: Take 6 capsules (1,500 mg) by mouth 2 times daily   nystatin (MYCOSTATIN) 255297 UNIT/ML suspension  Self No No   Sig: Take 5 mLs (500,000 Units) by mouth 4 times daily   order for DME  Self No No   Sig: Equipment being ordered: Splint - Cubital tunnel splint for ulnar neuropathy   pantoprazole (PROTONIX) 40 MG EC tablet  Self No Yes   Sig: Take 1 tablet (40 mg) by mouth every morning   pravastatin (PRAVACHOL) 20 MG tablet  Self  "No Yes   Sig: Take 1 tablet (20 mg) by mouth every evening   predniSONE (DELTASONE) 5 MG tablet   No Yes   Sig: Take 5mg(1 tablet) in the morning and in the evening.   tacrolimus (PROGRAF - GENERIC EQUIVALENT) 1 MG capsule   No Yes   Sig: Take 2mg (2 capsules) in the morning and 1mg (1 capsule) in the evening.      Facility-Administered Medications: None     Allergies   No Known Allergies    Social History   I have reviewed this patient's social history and updated it with pertinent information if needed. Javi Burr Jesse Iglesia Shetty  reports that he has never smoked. He does not have any smokeless tobacco history on file. He reports that he does not drink alcohol or use illicit drugs.    Family History   I have reviewed this patient's family history and updated it with pertinent information if needed.   Family History   Problem Relation Age of Onset     Brain Cancer Mother      Melanoma No family hx of      Skin Cancer No family hx of        Review of Systems   The 10 point Review of Systems is negative other than noted in the HPI or here.     Physical Exam   /89 (BP Location: Right arm)  Temp 97.7  F (36.5  C) (Oral)  Resp 16  Ht 1.7 m (5' 6.93\")  Wt 70.6 kg (155 lb 10.3 oz)  SpO2 98%  BMI 24.43 kg/m2  Vitals:    04/24/17 1405   Weight: 70.6 kg (155 lb 10.3 oz)       GENERAL: NAD. Resting in bed comfortably.  HEENT: NCAT. Normal conjunctiva. No scleral icterus.  LUNG: LCTAB. No wheezing or crackles.  CV: Regular rate and rhythm. No murmur or rub.   GI: Normoactive bowel sounds. Abdomen soft, non-distended, and non-tender. No palpable masses or organomegaly.  SKIN: Warm and dry. No concerning lesions or rash on exposed surfaces.  MSK: Extremities grossly normal, non-tender, no edema. Strong peripheral pulses. Good strength and ROM in bed.   NEURO: A&O x 3. No focal deficits.    Data   CBCNo lab results found in last 7 days.  CMP  Recent Labs  Lab 04/24/17  1451 04/24/17  0920   * 133 "   POTASSIUM 4.6 3.8   CHLORIDE 94 98   CO2 24 26   ANIONGAP 10 9   * 482*   BUN 25 25   CR 0.96 1.04   GFRESTIMATED 84 76   GFRESTBLACK >90African American GFR Calc >90African American GFR Calc   DAISY 9.5 9.9   PROTTOTAL  --  7.5   ALBUMIN  --  4.2   BILITOTAL  --  0.6   ALKPHOS  --  100   AST  --  20   ALT  --  38     INRNo lab results found in last 7 days.    Results for orders placed or performed in visit on 04/24/17   CT Chest w/o contrast    Narrative    EXAM:  CT CHEST W/O CONTRAST . 4/24/2017 9:47 AM     TECHNIQUE:  Helical CT images from the thoracic inlet through the  upper abdomen were obtained without intravenous contrast.  Images are  displayed at 1 and 5 mm intervals. Images reviewed in lung, soft  tissue, and bone windows.    COMPARISON: CT 3/9/2017 and 6/20/2016    HISTORY:   Pneumocystosis     DLP: 140 mGy*cm    Findings:    No lymphadenopathy/effusions. Cortical scarring bilateral renal upper  poles, otherwise unremarkable visualized upper abdomen.    Postsurgical changes from heart transplant. Stable small right  pericardial fluid. Few small calcified pulmonary nodules.    Interval resolution of bilateral pulmonary consolidation/centrilobular  nodules with few residual areas of groundglass opacities in the apical  segment of right upper lobe and superior segment of right lower lobe.    Nondisplaced sternotomy wires. No suspicious bone lesion.      Impression    Impression: Status post cardiac transplant, near complete resolution  of bilateral pulmonary areas of consolidation and centrilobular  nodules.    I have personally reviewed the examination and initial interpretation  and I agree with the findings.    PALLAVI PATIÑO MD   I personally provided care for this patient, reviewed chart, discussed course with patient, housestaff and consulting physicians.  I answered all questions.    Giovanni Tarango M.D.  Division of Cardiology  Department of Medicine

## 2017-04-25 ENCOUNTER — OFFICE VISIT (OUTPATIENT)
Dept: INTERPRETER SERVICES | Facility: CLINIC | Age: 47
End: 2017-04-25

## 2017-04-25 LAB
ANION GAP SERPL CALCULATED.3IONS-SCNC: 8 MMOL/L (ref 3–14)
BUN SERPL-MCNC: 26 MG/DL (ref 7–30)
C PEPTIDE SERPL-MCNC: 0.6 NG/ML (ref 0.9–6.9)
CALCIUM SERPL-MCNC: 8.8 MG/DL (ref 8.5–10.1)
CHLORIDE SERPL-SCNC: 108 MMOL/L (ref 94–109)
CO2 SERPL-SCNC: 24 MMOL/L (ref 20–32)
CREAT SERPL-MCNC: 0.84 MG/DL (ref 0.66–1.25)
ERYTHROCYTE [DISTWIDTH] IN BLOOD BY AUTOMATED COUNT: 12.6 % (ref 10–15)
GFR SERPL CREATININE-BSD FRML MDRD: ABNORMAL ML/MIN/1.7M2
GLUCOSE BLDC GLUCOMTR-MCNC: 102 MG/DL (ref 70–99)
GLUCOSE BLDC GLUCOMTR-MCNC: 111 MG/DL (ref 70–99)
GLUCOSE BLDC GLUCOMTR-MCNC: 117 MG/DL (ref 70–99)
GLUCOSE BLDC GLUCOMTR-MCNC: 119 MG/DL (ref 70–99)
GLUCOSE BLDC GLUCOMTR-MCNC: 133 MG/DL (ref 70–99)
GLUCOSE BLDC GLUCOMTR-MCNC: 140 MG/DL (ref 70–99)
GLUCOSE BLDC GLUCOMTR-MCNC: 141 MG/DL (ref 70–99)
GLUCOSE BLDC GLUCOMTR-MCNC: 141 MG/DL (ref 70–99)
GLUCOSE BLDC GLUCOMTR-MCNC: 142 MG/DL (ref 70–99)
GLUCOSE BLDC GLUCOMTR-MCNC: 184 MG/DL (ref 70–99)
GLUCOSE BLDC GLUCOMTR-MCNC: 204 MG/DL (ref 70–99)
GLUCOSE BLDC GLUCOMTR-MCNC: 217 MG/DL (ref 70–99)
GLUCOSE BLDC GLUCOMTR-MCNC: 232 MG/DL (ref 70–99)
GLUCOSE BLDC GLUCOMTR-MCNC: 253 MG/DL (ref 70–99)
GLUCOSE BLDC GLUCOMTR-MCNC: 256 MG/DL (ref 70–99)
GLUCOSE BLDC GLUCOMTR-MCNC: 272 MG/DL (ref 70–99)
GLUCOSE BLDC GLUCOMTR-MCNC: 99 MG/DL (ref 70–99)
GLUCOSE SERPL-MCNC: 120 MG/DL (ref 70–99)
HBA1C MFR BLD: 11.6 % (ref 4.3–6)
HCT VFR BLD AUTO: 34.9 % (ref 40–53)
HGB BLD-MCNC: 12.2 G/DL (ref 13.3–17.7)
MAGNESIUM SERPL-MCNC: 2 MG/DL (ref 1.6–2.3)
MCH RBC QN AUTO: 32.4 PG (ref 26.5–33)
MCHC RBC AUTO-ENTMCNC: 35 G/DL (ref 31.5–36.5)
MCV RBC AUTO: 93 FL (ref 78–100)
MISCELLANEOUS TEST: NORMAL
PLATELET # BLD AUTO: 200 10E9/L (ref 150–450)
POTASSIUM SERPL-SCNC: 3.6 MMOL/L (ref 3.4–5.3)
RBC # BLD AUTO: 3.76 10E12/L (ref 4.4–5.9)
SODIUM SERPL-SCNC: 140 MMOL/L (ref 133–144)
TACROLIMUS BLD-MCNC: 9 UG/L (ref 5–15)
TME LAST DOSE: NORMAL H
WBC # BLD AUTO: 12.6 10E9/L (ref 4–11)

## 2017-04-25 PROCEDURE — 00000146 ZZHCL STATISTIC GLUCOSE BY METER IP

## 2017-04-25 PROCEDURE — 99224 ZZC SUBSEQUENT OBSERVATION CARE,LEVEL I: CPT | Performed by: INTERNAL MEDICINE

## 2017-04-25 PROCEDURE — 36415 COLL VENOUS BLD VENIPUNCTURE: CPT | Performed by: INTERNAL MEDICINE

## 2017-04-25 PROCEDURE — 96372 THER/PROPH/DIAG INJ SC/IM: CPT

## 2017-04-25 PROCEDURE — T1013 SIGN LANG/ORAL INTERPRETER: HCPCS | Mod: U3

## 2017-04-25 PROCEDURE — 83036 HEMOGLOBIN GLYCOSYLATED A1C: CPT | Performed by: INTERNAL MEDICINE

## 2017-04-25 PROCEDURE — 80197 ASSAY OF TACROLIMUS: CPT | Performed by: INTERNAL MEDICINE

## 2017-04-25 PROCEDURE — 25000131 ZZH RX MED GY IP 250 OP 636 PS 637: Performed by: NURSE PRACTITIONER

## 2017-04-25 PROCEDURE — 96366 THER/PROPH/DIAG IV INF ADDON: CPT

## 2017-04-25 PROCEDURE — 25000131 ZZH RX MED GY IP 250 OP 636 PS 637: Performed by: INTERNAL MEDICINE

## 2017-04-25 PROCEDURE — 25000125 ZZHC RX 250: Performed by: STUDENT IN AN ORGANIZED HEALTH CARE EDUCATION/TRAINING PROGRAM

## 2017-04-25 PROCEDURE — 84681 ASSAY OF C-PEPTIDE: CPT | Performed by: INTERNAL MEDICINE

## 2017-04-25 PROCEDURE — 25000132 ZZH RX MED GY IP 250 OP 250 PS 637: Performed by: STUDENT IN AN ORGANIZED HEALTH CARE EDUCATION/TRAINING PROGRAM

## 2017-04-25 PROCEDURE — 83516 IMMUNOASSAY NONANTIBODY: CPT | Performed by: INTERNAL MEDICINE

## 2017-04-25 PROCEDURE — 80048 BASIC METABOLIC PNL TOTAL CA: CPT | Performed by: INTERNAL MEDICINE

## 2017-04-25 PROCEDURE — G0378 HOSPITAL OBSERVATION PER HR: HCPCS

## 2017-04-25 PROCEDURE — 85027 COMPLETE CBC AUTOMATED: CPT | Performed by: INTERNAL MEDICINE

## 2017-04-25 PROCEDURE — 25000125 ZZHC RX 250: Performed by: NURSE PRACTITIONER

## 2017-04-25 PROCEDURE — 25000131 ZZH RX MED GY IP 250 OP 636 PS 637: Performed by: STUDENT IN AN ORGANIZED HEALTH CARE EDUCATION/TRAINING PROGRAM

## 2017-04-25 PROCEDURE — 83735 ASSAY OF MAGNESIUM: CPT | Performed by: INTERNAL MEDICINE

## 2017-04-25 RX ORDER — DEXTROSE MONOHYDRATE 25 G/50ML
25-50 INJECTION, SOLUTION INTRAVENOUS
Status: DISCONTINUED | OUTPATIENT
Start: 2017-04-25 | End: 2017-04-25

## 2017-04-25 RX ORDER — NICOTINE POLACRILEX 4 MG
15-30 LOZENGE BUCCAL
Status: DISCONTINUED | OUTPATIENT
Start: 2017-04-25 | End: 2017-04-25

## 2017-04-25 RX ADMIN — NYSTATIN 500000 UNITS: 500000 SUSPENSION ORAL at 08:08

## 2017-04-25 RX ADMIN — MYCOPHENOLATE MOFETIL 1500 MG: 250 CAPSULE ORAL at 19:37

## 2017-04-25 RX ADMIN — HUMAN INSULIN 1 UNITS/HR: 100 INJECTION, SOLUTION SUBCUTANEOUS at 06:55

## 2017-04-25 RX ADMIN — PREDNISONE 5 MG: 5 TABLET ORAL at 08:08

## 2017-04-25 RX ADMIN — NYSTATIN 500000 UNITS: 500000 SUSPENSION ORAL at 18:04

## 2017-04-25 RX ADMIN — HUMAN INSULIN 6 UNITS/HR: 100 INJECTION, SOLUTION SUBCUTANEOUS at 00:33

## 2017-04-25 RX ADMIN — INSULIN GLARGINE 30 UNITS: 100 INJECTION, SOLUTION SUBCUTANEOUS at 12:38

## 2017-04-25 RX ADMIN — HUMAN INSULIN 7 UNITS/HR: 100 INJECTION, SOLUTION SUBCUTANEOUS at 10:38

## 2017-04-25 RX ADMIN — Medication: at 14:52

## 2017-04-25 RX ADMIN — PRAVASTATIN SODIUM 20 MG: 20 TABLET ORAL at 19:37

## 2017-04-25 RX ADMIN — PREDNISONE 5 MG: 5 TABLET ORAL at 19:37

## 2017-04-25 RX ADMIN — HUMAN INSULIN 7 UNITS/HR: 100 INJECTION, SOLUTION SUBCUTANEOUS at 13:40

## 2017-04-25 RX ADMIN — HUMAN INSULIN 2 UNITS/HR: 100 INJECTION, SOLUTION SUBCUTANEOUS at 01:36

## 2017-04-25 RX ADMIN — ASPIRIN 81 MG: 81 TABLET, COATED ORAL at 08:08

## 2017-04-25 RX ADMIN — ACETAMINOPHEN 650 MG: 325 TABLET, FILM COATED ORAL at 10:43

## 2017-04-25 RX ADMIN — ISONIAZID 300 MG: 300 TABLET ORAL at 08:08

## 2017-04-25 RX ADMIN — MYCOPHENOLATE MOFETIL 1500 MG: 250 CAPSULE ORAL at 08:08

## 2017-04-25 RX ADMIN — TACROLIMUS 2 MG: 1 CAPSULE ORAL at 08:08

## 2017-04-25 RX ADMIN — HUMAN INSULIN 7 UNITS/HR: 100 INJECTION, SOLUTION SUBCUTANEOUS at 14:35

## 2017-04-25 RX ADMIN — PANTOPRAZOLE SODIUM 40 MG: 40 TABLET, DELAYED RELEASE ORAL at 08:08

## 2017-04-25 RX ADMIN — NYSTATIN 500000 UNITS: 500000 SUSPENSION ORAL at 19:37

## 2017-04-25 RX ADMIN — TACROLIMUS 1.5 MG: 1 CAPSULE ORAL at 18:04

## 2017-04-25 RX ADMIN — NYSTATIN 500000 UNITS: 500000 SUSPENSION ORAL at 12:42

## 2017-04-25 NOTE — PLAN OF CARE
Problem: Discharge Planning  Goal: Discharge Planning (Adult, OB, Behavioral, Peds)  Observation Goals  1. Normalization of Blood sugars for atleast 12 hours, off insulin drip   2. Remains pain free   Nurse to notify provider when observation goals have been met and patient is ready for discharge.   Outcome: Improving  Observation Goals  1. Normalization of blood sugars for at least 12 hours, off insulin drip No  2. Remains pain free Yes     Insulin drip decreased to 2 units in algorithm 2, blood glucose steadily decreasing over the past hours. Last .

## 2017-04-25 NOTE — PLAN OF CARE
Problem: Goal Outcome Summary  Goal: Goal Outcome Summary  Outcome: No Change  Observation Goals  1. Normalization of Blood sugars for atleast 12 hours, off insulin drip   2. Remains pain free   Nurse to notify provider when observation goals have been met and patient is ready for discharge.   Outcome: Improving  Observation Goal  1. Normalization of Blood sugars for atleast 12 hours, off insulin drip No  2. Remains pain free Yes     Insulin drip discontinued at 1452. -184 last hours. No c/o pain or HA. Sliding scale and carb coverage initiated. Continue to monitor and follow POC.

## 2017-04-25 NOTE — PROGRESS NOTES
[facility]  Patient is currently open to home care services with [facility].  The patient is currently receiving RN  services.  [facility]  and team have been notified that patient is under OBSERVATION STATUS. [facility] liaison will continue to follow patient during stay.  If patient is admitted to inpatient status please provide orders to resume home care at time of discharge if appropriate.    Yaneli Aguilera RN, BSN  [facility] Liaison  [phone]

## 2017-04-25 NOTE — PLAN OF CARE
"Problem: Goal Outcome Summary  Goal: Goal Outcome Summary  Outcome: No Change  Blood pressure 105/76, temperature 98.1  F (36.7  C), temperature source Oral, resp. rate 18, height 1.7 m (5' 6.93\"), weight 69 kg (152 lb 1.6 oz), SpO2 99 %.    VSS on RA. A/O x4. Romanian speaking. Insulin drip d/c'd @ 1450. BG range on drip 272-99. Off drip and after Lantus BG's 184 & 217. Carb coverage and sliding scale given per orders. Continues to need insulin education and reminders to call before snacks and meals. Low fat, < 2400mg Na, no caffeine diet, good appetite. Mild HA this AM w/ relief after tylenol, otherwise no c/o pain. Voiding spont, adequate amts in urinal at bedside. Up independently. L PIV SL. Continue to monitor and follow POC.     Problem: Diabetes, Type 2 (Adult)  Goal: Signs and Symptoms of Listed Potential Problems Will be Absent or Manageable (Diabetes, Type 2)  Signs and symptoms of listed potential problems will be absent or manageable by discharge/transition of care (reference Diabetes, Type 2 (Adult) CPG).   Outcome: No Change    04/25/17 3368   Diabetes, Type 2   Problems Assessed (Type 2 Diabetes) all   Problems Present (Type 2 Diabetes) hyperglycemia           "

## 2017-04-25 NOTE — PROGRESS NOTES
"Cards 2 Progress Note    Date of Admission: 4/24/2017  Hospital Day #: 0   Date of Service (when I saw the patient): 04/25/2017     Assessment & Plan   Javi Bansal is a 46 year old man with longstanding history of Chagas cardiomyopathy who underwent cardiac transplantation on 12/12/2016.      #Hyperglycemia, improving  Pt with no history of diabetes previous to this episode of hyperglycemia. He does not meet criteria at this time for DKA as pH is normal and no anion gap.  - Endocrine consult placed  - C-peptide and NIDA pending  - Insulin gtt and protocol, transition per endocrine recommendations     #Chagas CM s/p OHT 12/2016  - Immunosuppression: continue tacrolimus, MMF, and prednisone  - Tacrolimus level 9.0, goal 10-15  - Continue aspirin and statin     #Latent TB  - Continue INH    FEN  - Cardiac diet  - PRN lyte replacement    Prophy/Misc  - VTE: Short stay, ambulate.  - GI/PUD: PPI  - Bowels: PRN     Lines: PIV    Consults: Endocrine    Code status: Full  Disposition: Observation    Patient seen and discussed with Dr. Tarango, who agrees with the above assessment and plan.    Elo Sibley  PGY2   [phone]  _____________________________________________________________________________  Interval History   Feels improved from yesterday. Would like coordinators to know he is here and that his appointments need to be cancelled.    Physical Exam   /76 (BP Location: Right arm)  Temp 98.1  F (36.7  C) (Oral)  Resp 18  Ht 1.7 m (5' 6.93\")  Wt 69 kg (152 lb 1.6 oz)  SpO2 99%  BMI 23.87 kg/m2  I/O last 3 completed shifts:  In: 853.77 [P.O.:800; I.V.:53.77]  Out: 1130 [Urine:1130]  Hemodynamics: N/A    GENERAL: NAD. Resting in bed comfortably.  HEENT: NCAT. Normal conjunctiva. No scleral icterus.  LUNG: LCTAB. No wheezing or crackles.  CV: Regular rate and rhythm. No murmur or rub.   GI: Normoactive bowel sounds. Abdomen soft, non-distended, and non-tender. No palpable masses or " organomegaly.  SKIN: Warm and dry. No concerning lesions or rash on exposed surfaces.  MSK: Extremities grossly normal, non-tender, no edema. Strong peripheral pulses. Good strength and ROM in bed.   NEURO: A&O x 3. No focal deficits.      Data   CBC  Recent Labs  Lab 04/25/17  0536   WBC 12.6*   RBC 3.76*   HGB 12.2*   HCT 34.9*   MCV 93   MCH 32.4   MCHC 35.0   RDW 12.6        CMP  Recent Labs  Lab 04/25/17  0536 04/24/17  2138 04/24/17 2005 04/24/17  1451 04/24/17  0920    135 134 129* 133   POTASSIUM 3.6 3.9 4.4 4.6 3.8   CHLORIDE 108 103 102 94 98   CO2 24 22 20 24 26   ANIONGAP 8 10 11 10 9   * 451* 494* 708* 482*   BUN 26 25 23 25 25   CR 0.84 0.85 0.85 0.96 1.04   GFRESTIMATED >90Non  GFR Calc >90Non  GFR Calc >90Non  GFR Calc 84 76   GFRESTBLACK >90African American GFR Calc >90African American GFR Calc >90African American GFR Calc >90African American GFR Calc >90African American GFR Calc   DAISY 8.8 9.0 8.9 9.5 9.9   MAG 2.0  --   --   --   --    PROTTOTAL  --   --   --  6.8 7.5   ALBUMIN  --   --   --  4.1 4.2   BILITOTAL  --   --   --  1.0 0.6   ALKPHOS  --   --   --  92 100   AST  --   --   --  21 20   ALT  --   --   --  35 38     INRNo lab results found in last 7 days.      Admitted for blood glucose monitoring and assessment.  Correcting high sugars, coordinating care with endocrine.

## 2017-04-25 NOTE — PLAN OF CARE
Problem: Discharge Planning  Goal: Discharge Planning (Adult, OB, Behavioral, Peds)  Observation Goals  1. Normalization of Blood sugars for atleast 12 hours, off insulin drip   2. Remains pain free   Nurse to notify provider when observation goals have been met and patient is ready for discharge.   Outcome: Improving  Observation Goal  1. Normalization of Blood sugars for atleast 12 hours, off insulin drip No  2. Remains pain free Yes     Blood glucose keeps increasing and decreasing based on insulin drip changes. Last blood glucose was 119. Insulin drip decreased again based on algorithm 2.

## 2017-04-25 NOTE — PHARMACY-ADMISSION MEDICATION HISTORY
Admission medication history interview status for the 4/24/2017 admission is complete. See Epic admission navigator for allergy information, pharmacy, prior to admission medications and immunization status.     Medication history interview sources:  [facility] fills, pt chart    Changes made to PTA medication list (reason)  Added: none  Deleted: none  Changed: none    Additional medication history information (including reliability of information, actions taken by pharmacist):  1) Latent TB - INH monotherapy appropriate   2) Requires .    3) Updates per pharmacy and last doses per nursing staff.   4) Tacrolimus dosing per 4/6/17 chart notes to be 2mg in the morning and 1mg in the evening .       Prior to Admission medications    Medication Sig Last Dose Taking? Auth Provider   predniSONE (DELTASONE) 5 MG tablet Take 5mg(1 tablet) in the morning and in the evening. 4/24/2017 at 0940 Yes Geena Mcclellan MD   tacrolimus (PROGRAF - GENERIC EQUIVALENT) 1 MG capsule Take 2mg (2 capsules) in the morning and 1mg (1 capsule) in the evening. 4/24/2017 at 0940 Yes Geena Mcclellan MD   ketoconazole (NIZORAL) 2 % shampoo Apply topically three times a week Alternate with Head and Shoulders. 4/24/2017 at 0940 Yes Geena Mcclellan MD   nystatin (MYCOSTATIN) 065693 UNIT/ML suspension Take 5 mLs (500,000 Units) by mouth 4 times daily 4/24/2017 at 0940 Yes Geena Mcclellan MD   order for DME Equipment being ordered: Splint - Cubital tunnel splint for ulnar neuropathy 4/24/2017 at 0940 Yes Lliiana Alcocer MD   isoniazid (NYDRAZID) 300 MG tablet Take 1 tablet (300 mg) by mouth daily 4/24/2017 at 0940 Yes Geena Mcclellan MD   pravastatin (PRAVACHOL) 20 MG tablet Take 1 tablet (20 mg) by mouth every evening 4/24/2017 at 0940 Yes Geena Mcclellan MD   mycophenolate (CELLCEPT - GENERIC EQUIVALENT) 250 MG capsule Take 6 capsules (1,500 mg) by mouth 2 times daily 4/24/2017 at  0940 Yes Geena Mcclellan MD   aspirin EC 81 MG EC tablet Take 1 tablet (81 mg) by mouth daily 4/24/2017 at 0940 Yes Madalyn Auguste APRN CNP   calcium carb 1250 mg, 500 mg [place],/vitamin D 200 units (OSCAL WITH D) 500-200 MG-UNIT per tablet Take 1 tablet by mouth 2 times daily (with meals) 4/24/2017 at 0940 Yes Madalyn Auguste APRN CNP   multivitamin, therapeutic with minerals (THERA-VIT-M) TABS tablet Take 1 tablet by mouth daily 4/24/2017 at 0940 Yes Madalyn Auguste APRN CNP   pantoprazole (PROTONIX) 40 MG EC tablet Take 1 tablet (40 mg) by mouth every morning 4/24/2017 at 0940 Yes Madalyn Auguste APRN CNP       Medication history completed by: Esra Nusseibeh, PharmD Candidate    I attest that the information provided in this note by the pharmacy student is complete and accurate    Martin Gonzalez, PharmD  PGY-1 Resident Pharmacist

## 2017-04-25 NOTE — PLAN OF CARE
Problem: Goal Outcome Summary  Goal: Goal Outcome Summary  Outcome: No Change  Observation Goals  1. Normalization of Blood sugars for atleast 12 hours, off insulin drip   2. Remains pain free   Nurse to notify provider when observation goals have been met and patient is ready for discharge.   Outcome: Improving  Observation Goal  1. Normalization of Blood sugars for atleast 12 hours, off insulin drip No  2. Remains pain free Yes     Lantus started, given at 1240. Plan to d/c insulin drip 2 hours after Lantus administration. BG range 256-99 from 8176-4154. Mild HA- tylenol given effective. Continue to monitor and follow POC

## 2017-04-25 NOTE — CONSULTS
Diabetes/Hyperglycemia Management Consult    Chief Complaint:  elevated blood sugars  Consult requested by: Dr. 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     Was initially seen for a clinic appointment and was noted to have glucose of 482. Was called and advised to seek evaluation in the emergency room.  In the ED glucose 708, laboratory values not in DKA, possible hyperosmolar hyperglycemia with glucose > 600, but serum osmolality was 318 ( not > 320). No lactic acidosis. Started on insulin drip    Information was obtained with use of .    Patient has no history of diabetes.  Review of random blood sugars prior to transplant were within normal limits (10/2016 - 92, 11/2016 - 84, 12/11/2016 - 98).  Hemoglobin A1c prior to transplant were also within normal limits ( 5.8% and 5.4%).  Patient was admitted from 12/11/2016 to 1/11/2017 for cardiac transplant.  Transplant course was complicated to two episodes of 2R rejection that was managed with high dose steroids and thromboglobulin.  Blood sugars were elevated during that admission due to prednisone therapy and were managed with lantus and sliding scale. He was seen by Britany Land RN Diabetic Nurse Educator and was taught to give himself insulin injections and the use of a glucometer. At discharge insulin was discontinued presumably due to his prednisone being tapered.       Patient does have a family history of diabetes in his mother.   On further review of medical records Patient has not been on any antihyperglycemics since discharge and continued to have mildly elevated blood sugars prior to recent presentation.  Patient has mild leukocytosis, but is without any other evidence of infection (afebrile, negative CT chest, UA, bcx negative to date) or  recent dietary changes to explain the acute worsening of his glycemic control.   Is currently on prednisone 5 mg BID and Tacrolimus, both medications which can contribute to hyperglycemia     Patient notes increased thirst, increased urinary frequency and fatigue for the past week.  He denies any vision changes, fever/chills, CP, SOB, cough, abdominal pain, dysuria.  He denies any recent dietary changes    Recent Labs  Lab 04/25/17  1037 04/25/17  0930 04/25/17  0800 04/25/17  0650 04/25/17  0550 04/25/17  0536 04/25/17  0436  04/24/17  2138  04/24/17  2005  04/24/17  1451  04/24/17  0920   GLC  --   --   --   --   --  120*  --   --  451*  --  494*  --  708*  --  482*   * 256* 133* 119* 141*  --  117*  < >  --   < >  --   < >  --   < >  --    < > = values in this interval not displayed.    Lab Results   Component Value Date    A1C 5.4 12/11/2016    A1C 5.8 06/18/2016   A1C (4/25/2017) - 11.6    Review of Systems  10 point ROS completed with pertinent positives and negatives noted in the HPI.      Past medical, family and social histories are reviewed and updated.    Past Medical History, reviewed and updated as indicated  Past Medical History:   Diagnosis Date     Chagas cardiomyopathy      Chronic systolic heart failure (H)      Dual ICD (implantable cardioverter-defibrillator) in place 10/20/2015     Essential hypertension      Gastroesophageal reflux disease      H/O gastric ulcer      Hypertension      Premature ventricular contractions      Presbyopia      Pterygium     ?? suspected , but unclear dx       Family History  Family history specific for diabetes, mother  Family History   Problem Relation Age of Onset     Brain Cancer Mother      Melanoma No family hx of      Skin Cancer No family hx of    Diabetes - Mother    Social History  Social History     Social History     Marital status:      Spouse name: N/A     Number of children: N/A     Years of education: N/A     Social History Main Topics  "    Smoking status: Never Smoker     Smokeless tobacco: None     Alcohol use No     Drug use: No     Sexual activity: Not Asked     Other Topics Concern     None     Social History Narrative       Physical Exam  /74 (BP Location: Left arm)  Temp 97.9  F (36.6  C) (Oral)  Resp 17  Ht 1.7 m (5' 6.93\")  Wt 69 kg (152 lb 1.6 oz)  SpO2 100%  BMI 23.87 kg/m2     General:  pleasant, resting in bed, in no distress  HEENT:  NC/AT, anicteric, non-injected, oral mucous membranes moist  CV: RRR,   Chest: well healed scar on anterior chest midline  Pulm: unlabored  Abdomen: soft, non-tender, non-distended  Extremity: no edema, moving all extremities  Skin: no visible rashes or lesions, no jaundice    Laboratory  Recent Labs   Lab Test  04/25/17   0536  04/24/17   2138   NA  140  135   POTASSIUM  3.6  3.9   CHLORIDE  108  103   CO2  24  22   ANIONGAP  8  10   GLC  120*  451*   BUN  26  25   CR  0.84  0.85   DAISY  8.8  9.0     CBC RESULTS:   Recent Labs   Lab Test  04/25/17   0536   WBC  12.6*   RBC  3.76*   HGB  12.2*   HCT  34.9*   MCV  93   MCH  32.4   MCHC  35.0   RDW  12.6   PLT  200       Liver Function Studies -   Recent Labs   Lab Test  04/24/17   1451   PROTTOTAL  6.8   ALBUMIN  4.1   BILITOTAL  1.0   ALKPHOS  92   AST  21   ALT  35       Active Medications  Current Facility-Administered Medications   Medication     insulin aspart (NovoLOG) inj (RAPID ACTING)     insulin aspart (NovoLOG) inj (RAPID ACTING)     insulin 1 unit/mL in saline (NovoLIN, HumuLIN Regular) drip - ADULT IV Infusion     insulin glargine (LANTUS) injection 30 Units     insulin aspart (NovoLOG) inj (RAPID ACTING)     insulin aspart (NovoLOG) inj (RAPID ACTING)     Med Instruction - Transition from IV Insulin Infusion to Sub-Q Insulin     aspirin EC EC tablet 81 mg     isoniazid (NYDRAZID) tablet 300 mg     mycophenolate (CELLCEPT - GENERIC EQUIVALENT) capsule 1,500 mg     nystatin (MYCOSTATIN) suspension 500,000 Units     pantoprazole " (PROTONIX) EC tablet 40 mg     predniSONE (DELTASONE) tablet 5 mg     tacrolimus (PROGRAF - GENERIC EQUIVALENT) capsule 2 mg     lidocaine 1 % 1 mL     lidocaine (LMX4) kit     sodium chloride (PF) 0.9% PF flush 3 mL     sodium chloride (PF) 0.9% PF flush 3 mL     acetaminophen (TYLENOL) tablet 650 mg     acetaminophen (TYLENOL) Suppository 650 mg     naloxone (NARCAN) injection 0.1-0.4 mg     dextrose 10 % 1,000 mL infusion     glucose 40 % gel 15-30 g    Or     dextrose 50 % injection 25-50 mL    Or     glucagon injection 1 mg     tacrolimus (PROGRAF - GENERIC EQUIVALENT) capsule 1 mg     pravastatin (PRAVACHOL) tablet 20 mg     No current outpatient prescriptions on file.       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  A1c on admission 11.6.   Regarding cause for the hyperglycemia:  Diabetes: suspect steroid induced/post transplant  No specific precipitant for acute hyperglycemia identified  No s/s of infection, dietary changes  Familiar: mother with hx of diabetes  Medication:   Prednisone which causes hyperglycemia by resistance  Tacrolimus which decreases insulin secretion, increases insulin resistance or has direct toxic effect on the beta cells    C-Peptide: 0.6, low difficult to interpret 2/2 severe hyperglycemia    Plan  - agree with checking NIDA to r/o late onset type I diabetes  - transition off insulin ggt this morning (2 hours after lantus given)  - lantus 30 units daily  - aspart 1 unit per 8 grams carbs w/ meals and snacks ( increased to 1 unit per 5 grams of CHO starting with dinner)  - aspart sliding correction scale TID and QHS  - diabetic education prior to discharge     He will need close  follow up with Endocrinology at discharge as adjustments will likely be needed to his regimen as the dose of prednisone changes plus follow-up on  NIDA, pending    Will continue to follow and make final recommendations prior to discharge.          Scribe Disclosure:   I, Dr. Fatimah Harmon , am serving as a scribe; to document services personally performed by Yelitza Houston CNPbased on data collection and the provider's statements to me.     Provider Disclosure:  I agree with above History, Review of Systems, Physical exam and Plan.  I have reviewed the content of the documentation and have edited it as needed. I have personally performed the services documented here and the documentation accurately represents those services and the decisions I have made.      Yelitza Houston APRN -[id number]  Diabetes Management Team job code: [id number]

## 2017-04-25 NOTE — PLAN OF CARE
Problem: Goal Outcome Summary  Goal: Goal Outcome Summary  Outcome: Improving  Observation Goals  1. Normalization of Blood sugars for atleast 12 hours, off insulin drip   2. Remains pain free   Nurse to notify provider when observation goals have been met and patient is ready for discharge.   Outcome: Improving  Observation Goal  1. Normalization of Blood sugars for atleast 12 hours, off insulin drip No  2. Remains pain free Yes     Order to d/c insulin drip once insulin pens on unit. Currently on insulin drip at alg 2, 2 U/hr. No c/o pain. A/O x4. VSS

## 2017-04-25 NOTE — PLAN OF CARE
D: Admitted on 4/24/2017 for hyperglycemia with BG levels >600. Hx of heart transplant 12/12/2016. Slovenian speaking.  I/A: VS stable. Continuous insulin drip at 1 unit using algorithm 2. Last blood glucose 119.Throughout the night blood glucose was steadily dropping but over the past couple hours it has been dropping and increasing after adjusting the insulin drip based on algorithm. No previous history of blood glucose problems. Administered tylenol for headache. Collected urine specimen.  P: Continue to monitor blood glucose hourly and follow algorithm. Monitor status and notify team of any changes.

## 2017-04-25 NOTE — PLAN OF CARE
Problem: Discharge Planning  Goal: Discharge Planning (Adult, OB, Behavioral, Peds)  Observation Status   1. Serial troponins and stress test complete.   2. Seen and cleared by consultant if applicable   3. Adequate pain control on oral analgesia   4. Vital signs normal or at patient baseline   5. Safe disposition plan has been identified   6. Nurse to notify provider when observation goals have been met and patient is ready for discharge.  Outcome: Improving  1. Serial troponins and stress test complete. No  2. Seen and cleared by consultant if applicable No  3. Adequate pain control on oral analgesia Yes  4. Vital signs normal or at patient baseline Yes  5. Safe disposition plan has been identified No     Started insulin drip, increased to algorithms 2. Blood glucose remain in four hundreds.

## 2017-04-25 NOTE — PROGRESS NOTES
Care Coordinator Progress Note     Admission Date/Time:  4/24/2017  Attending MD:  Giovanni Tarango   Data  Chart reviewed, discussed with interdisciplinary team.   Patient was admitted for: Hyperglycemia.    Concerns with insurance coverage for discharge needs: None.  Current Living Situation: Patient lives with family.  Support System: Supportive and Involved family.   Services Involved: [facility].   Transportation: Family or Friend will provide  Coordination of Care and Referrals: Provided patient/family with options for resumption of home care.    Assessment  This writer spoke to pt through a  regarding discharge planning. Pt said that he wants to resume his home care services with [facility].   Intervention:   Arrangements made with [facility] (Ph: [phone] Fax: [phone]) for RN sakshi post hospitalization, resume previous home care orders, assess vital signs, respiratory and cardiac status, activity tolerance, hydration, nutritional status, lab draws as previously ordered, med set up and management, diabetic education reinforcement.    Plan  Anticipated Discharge Date:  4/26/17  Anticipated Discharge Plan:  Discharge to home with home care.     SAMI LOVELACE RN BSN  Care Coordinator  [phone]

## 2017-04-25 NOTE — PROGRESS NOTES
Admission          4/24/2017  2:17 PM  -----------------------------------------------------------  Diagnosis:    Admitted from: [facility]  Via: stretcher  Accompanied by: family  Belongings: Placed in closet; valuables sent home with family  Admission Profile: incomplete  Teaching: orientation to unit, call don't fall, use of console, meal times, visiting hours,  when to call for the RN (angina/sob/dizzyness, etc.), and enforced importance of safety   Access: PIV  Telemetry:Placed on pt  Ht./Wt.: complete    Temp:  [97.7  F (36.5  C)-98.3  F (36.8  C)] 97.7  F (36.5  C)  Heart Rate:  [95-97.7] 97.7  Resp:  [16] 16  BP: (102-122)/(68-89) 113/89  SpO2:  [96 %-99 %] 98 %

## 2017-04-26 ENCOUNTER — OFFICE VISIT (OUTPATIENT)
Dept: INTERPRETER SERVICES | Facility: CLINIC | Age: 47
End: 2017-04-26

## 2017-04-26 VITALS
BODY MASS INDEX: 23.87 KG/M2 | OXYGEN SATURATION: 99 % | HEIGHT: 67 IN | SYSTOLIC BLOOD PRESSURE: 118 MMHG | TEMPERATURE: 96.6 F | RESPIRATION RATE: 18 BRPM | DIASTOLIC BLOOD PRESSURE: 81 MMHG | WEIGHT: 152.1 LBS

## 2017-04-26 LAB
ANION GAP SERPL CALCULATED.3IONS-SCNC: 10 MMOL/L (ref 3–14)
BUN SERPL-MCNC: 19 MG/DL (ref 7–30)
CALCIUM SERPL-MCNC: 9 MG/DL (ref 8.5–10.1)
CHLORIDE SERPL-SCNC: 107 MMOL/L (ref 94–109)
CO2 SERPL-SCNC: 21 MMOL/L (ref 20–32)
CREAT SERPL-MCNC: 0.74 MG/DL (ref 0.66–1.25)
GFR SERPL CREATININE-BSD FRML MDRD: ABNORMAL ML/MIN/1.7M2
GLUCOSE BLDC GLUCOMTR-MCNC: 136 MG/DL (ref 70–99)
GLUCOSE BLDC GLUCOMTR-MCNC: 197 MG/DL (ref 70–99)
GLUCOSE BLDC GLUCOMTR-MCNC: 95 MG/DL (ref 70–99)
GLUCOSE SERPL-MCNC: 242 MG/DL (ref 70–99)
POTASSIUM SERPL-SCNC: 3.6 MMOL/L (ref 3.4–5.3)
SODIUM SERPL-SCNC: 138 MMOL/L (ref 133–144)

## 2017-04-26 PROCEDURE — 80048 BASIC METABOLIC PNL TOTAL CA: CPT | Performed by: INTERNAL MEDICINE

## 2017-04-26 PROCEDURE — 96372 THER/PROPH/DIAG INJ SC/IM: CPT

## 2017-04-26 PROCEDURE — 36415 COLL VENOUS BLD VENIPUNCTURE: CPT | Performed by: INTERNAL MEDICINE

## 2017-04-26 PROCEDURE — 25000131 ZZH RX MED GY IP 250 OP 636 PS 637: Performed by: STUDENT IN AN ORGANIZED HEALTH CARE EDUCATION/TRAINING PROGRAM

## 2017-04-26 PROCEDURE — 25000125 ZZHC RX 250: Performed by: STUDENT IN AN ORGANIZED HEALTH CARE EDUCATION/TRAINING PROGRAM

## 2017-04-26 PROCEDURE — G0378 HOSPITAL OBSERVATION PER HR: HCPCS

## 2017-04-26 PROCEDURE — T1013 SIGN LANG/ORAL INTERPRETER: HCPCS | Mod: U3

## 2017-04-26 PROCEDURE — 99217 ZZC OBSERVATION CARE DISCHARGE: CPT | Mod: GC | Performed by: INTERNAL MEDICINE

## 2017-04-26 PROCEDURE — 00000146 ZZHCL STATISTIC GLUCOSE BY METER IP

## 2017-04-26 PROCEDURE — 25000132 ZZH RX MED GY IP 250 OP 250 PS 637: Performed by: STUDENT IN AN ORGANIZED HEALTH CARE EDUCATION/TRAINING PROGRAM

## 2017-04-26 RX ORDER — NICOTINE POLACRILEX 4 MG
15-30 LOZENGE BUCCAL
Qty: 15 G | Refills: 1 | Status: CANCELLED | OUTPATIENT
Start: 2017-04-26 | End: 2017-05-26

## 2017-04-26 RX ORDER — LANCETS
EACH MISCELLANEOUS
Qty: 1 BOX | Refills: 1 | Status: SHIPPED | OUTPATIENT
Start: 2017-04-26 | End: 2017-05-31

## 2017-04-26 RX ORDER — CONTAINER,EMPTY
EACH MISCELLANEOUS
Qty: 1 EACH | Refills: 1 | Status: SHIPPED | OUTPATIENT
Start: 2017-04-26 | End: 2018-06-13

## 2017-04-26 RX ORDER — GLUCOSAMINE HCL/CHONDROITIN SU 500-400 MG
CAPSULE ORAL
Qty: 100 EACH | Refills: 1 | Status: SHIPPED | OUTPATIENT
Start: 2017-04-26 | End: 2018-06-13

## 2017-04-26 RX ADMIN — NYSTATIN 500000 UNITS: 500000 SUSPENSION ORAL at 10:21

## 2017-04-26 RX ADMIN — PANTOPRAZOLE SODIUM 40 MG: 40 TABLET, DELAYED RELEASE ORAL at 10:20

## 2017-04-26 RX ADMIN — PREDNISONE 5 MG: 5 TABLET ORAL at 10:21

## 2017-04-26 RX ADMIN — INSULIN GLARGINE 30 UNITS: 100 INJECTION, SOLUTION SUBCUTANEOUS at 11:23

## 2017-04-26 RX ADMIN — ASPIRIN 81 MG: 81 TABLET, COATED ORAL at 10:21

## 2017-04-26 RX ADMIN — TACROLIMUS 2 MG: 1 CAPSULE ORAL at 10:20

## 2017-04-26 RX ADMIN — NYSTATIN 500000 UNITS: 500000 SUSPENSION ORAL at 12:59

## 2017-04-26 RX ADMIN — ISONIAZID 300 MG: 300 TABLET ORAL at 10:20

## 2017-04-26 RX ADMIN — MYCOPHENOLATE MOFETIL 1500 MG: 250 CAPSULE ORAL at 10:19

## 2017-04-26 NOTE — PROGRESS NOTES
"CLINICAL NUTRITION SERVICES    Reason for Assessment:  Nutrition education for consult \"please discuss foods containing carbohydrates, portion size before d/c today, thank you.\"     Diet History:  South Korean-speaking  present for nutrition visit. Pt reports no nutrition education regarding consistent carbohydrate diet and carb counting education in the past. States he is not sure which foods contain carbohydrate and which foods do not contain carbohydrate.     At home, pt states he liked to eat tortillas (corn especially, and some flour tortillas), beans, rice, milk, fruit (oranges, pears, and bananas), as well as regular soda. He does enjoy soup which he likes to make lower in sodium with non-starchy vegetables and possibly beans and rice. Pt reports he likes fish, chicken, and beef. Likes to eat non-starchy vegetables such as lettuce, cucumbers, carrots, tomatoes, and onions.     He is agreeable to receiving additional nutrition education on consistent carbohydrate intake.     Nutrition Diagnosis:  Food- and nutrition-related knowledge deficit r/t knowledge limitations of consistent carbohydrate diet AEB no formal nutrition education previously on consistent carbohydrate diet.      Interventions:  Nutrition Education (provided to pt via South Korean-speaking ):  1. Provided verbal and written nutrition on diabetes meal planning and importance of consistent carbohydrate intake to optimize glycemic control. Reviewed foods that contain carbohydrate and those that contain very little/no carbohydrate. Briefly gave examples of portion sizes of food choices that have the same amount of carbohydrate. Based on the nutrition education today, pt plans to reduce the amount of carbohydrate he will be eating at meals. States he will have one corn tortilla at breakfast and two corn tortillas at lunch and supper. He will also plan to have half a cup of milk at breakfast and then fruit for a snack in the afternoon at " about 14:00.  He may have a small amount of rice at meals. Also, he will continue to eat the protein choices he likes, soup, and non-starchy vegetables. In terms of soda, pt states he plans to have one diet pop daily. Pt with a fair understanding of the nutrition education and anticipate fair to good compliance.   2. Handouts provided: Carb counting handout and carb counting booklet. Also, provided handout in Ecuadorean from the [employer] on Ready, Set, Start Counting! Pt states that while he is unable to read Ecuadorean well but his family reads Ecuadorean.     Collaboration with other providers: Discussed pt with team. Team ok with outpatient nutrition visit regarding diabetes. Ordered outpatient nutrition visit. Also, notified social work as pt has insurance questions.      Goals:   1.  Patient will verbalize two food choices that contain very little/no carbohydrate  2.  Patient will verbalize the importance of consistent carbohydrate meal planning.       Follow-up:    Patient to ask any further nutrition-related questions before discharge.  In addition, pt may request outpatient RD appointment.    Kimmy Kunz, MS, RD, LD, [facility]   6C Pgr:  [phone]

## 2017-04-26 NOTE — PLAN OF CARE
Problem: Discharge Planning  Goal: Discharge Planning (Adult, OB, Behavioral, Peds)  Observation Goals  1. Normalization of Blood sugars for atleast 12 hours, off insulin drip   2. Remains pain free   Nurse to notify provider when observation goals have been met and patient is ready for discharge.   Outcome: Completed Date Met:  04/26/17  Observation Goals  1. Normalization of Blood sugars for atleast 12 hours, off insulin drip Yes  2. Remains pain free Yes     No pain or headache. Off insulin drip for more than 12 hours with normal blood sugars. Last blood glucose 95.

## 2017-04-26 NOTE — PLAN OF CARE
Problem: Discharge Planning  Goal: Discharge Planning (Adult, OB, Behavioral, Peds)  Observation Goals  1. Normalization of Blood sugars for atleast 12 hours, off insulin drip   2. Remains pain free   Nurse to notify provider when observation goals have been met and patient is ready for discharge.   Outcome: Improving  Observation Goals  1. Normalization of Blood sugars for atleast 12 hours, off insulin drip No  2. Remains pain free Yes     No complaints of pain or headaches. Insulin drip discontinued 10 hours ago. Last blood glucose 95.

## 2017-04-26 NOTE — PLAN OF CARE
Problem: Goal Outcome Summary  Goal: Goal Outcome Summary  Outcome: Completed Date Met:  04/26/17  Observation Goals  1. Normalization of Blood sugars for atleast 12 hours, off insulin drip Yes  2. Remains pain free Yes     No pain or headache, VSS. Off insulin drip for more than 12 hours with normal blood sugars. Last blood glucose 197.  Plan for discharge today.   Diabetes educator in room with  completing diabetes education.  Patient successfully self administered 11:00 dose of Lantus insulin with RN instruction, will do the same for lunchtime Novolog.

## 2017-04-26 NOTE — PLAN OF CARE
Problem: Discharge Planning  Goal: Discharge Planning (Adult, OB, Behavioral, Peds)  Observation Goals  1. Normalization of Blood sugars for atleast 12 hours, off insulin drip   2. Remains pain free   Nurse to notify provider when observation goals have been met and patient is ready for discharge.   Outcome: Improving  Observation Goals  1. Normalization of Blood sugars for atleast 12 hours, off insulin drip No  2. Remains pain free Yes     Reports no pain or headache. Off insulin drip for 6 hours. Bedtime . No insulin administered.

## 2017-04-26 NOTE — PLAN OF CARE
Problem: Goal Outcome Summary  Goal: Goal Outcome Summary  Outcome: Improving     Outcome: Completed Date Met:  04/26/17  Observation Goals  1. Normalization of Blood sugars for atleast 12 hours, off insulin drip Yes  2. Remains pain free Yes     No pain or headache. Off insulin drip for more than 12 hours with normal blood sugars. Last blood glucose 136.

## 2017-04-26 NOTE — PLAN OF CARE
Problem: Diabetes, Type 2 (Adult)  Intervention: Optimize Glycemic Control  Teaching done with Diabetes educator for new glucometer. Pt educated on when to take lantus (gray pen) and short acting insulin (blue pen). Demonstrated understanding of how to use glucometer and better control blood sugars.      DISCHARGE   Discharged to: Home  Via: Automobile  Accompanied by: no one, pt drove self  Discharge Instructions: diet, activity, medications, follow up appointments, when to call the MD, and what to watchout for (i.e. s/s of infection, increasing SOB, palpitations, chest pain,)  Prescriptions: To be filled by discharge pharmacy per pt's request; medication list reviewed & sent with pt  Follow Up Appointments: arranged; information given  Belongings: All sent with pt  IV: out  Telemetry: off  Pt exhibits understanding of above discharge instructions; all questions answered.

## 2017-04-26 NOTE — CONSULTS
Diabetes Education  Received consult request to see this 46 year old English-speaking male for diabetes education.  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   Was initially seen for a clinic appointment and was noted to have glucose of 482. Was called and advised to seek evaluation in the emergency room.  Following his transplant in December, was seen by this educator for instruction on blood glucose monitoring and insulin administration.  Apparently was discharged without insulin.  States he does have the Accu-chek Marivel Connect monitor kit given to him in December, but he did not have a prescription for the lancets.  Met with patient and .  Patient states he does remember meeting with me in December.  States he feels comfortable with how to use his Accu-chek Marivel monitor, just needs supplies.  Reviewed his discharge insulin plan:  30 units glargine daily, 10 units aspart three times daily with meals.  Patient states he self-administered his glargine dose today.  He did a return demonstration of insulin pen technique during our session and did appropriately.  He is concerned about needing to take insulin when he goes back to work, although he knows two co-workers who take insulin and they do take it at work.    Patient would benefit from further outpatient diabetes education.    When discharged, will need prescriptions for:  1.  Insulin pen needles (4mm, 32 gauge)  2.  Accu-chek Marivel Plus test strips  3.  Accu-chek FastClix lancet drums  4.  Sharps disposal container  5.  Alcohol swabs    Patient gets his transplant drugs through [facility] (mail order) and is wondering if he can have these prescriptions also come from there.    Britany Land MS RN CDE [facility]  [phone]      2:50 pm:  Received text page from discharge pharmacy that patient's insurance no longer covers Accu-chek supplies, but  covers One Touch supplies.  Met with patient and .  Provided with a One Touch Verio monitor kit and instructed on use.  He did a return demonstration without difficulty.

## 2017-04-26 NOTE — PLAN OF CARE
D: Admitted on 4/24/2017 for hyperglycemia with BG levels >600. Hx of heart transplant 12/12/2016. Korean speaking.  I/A: VS stable. Last blood glucose 95. No insulin administered at bedtime. No headache or pain. Adequate voiding. Up independently.   P: Continue to monitor status and notify team of any changes.

## 2017-04-26 NOTE — PLAN OF CARE
Problem: Goal Outcome Summary  Goal: Goal Outcome Summary     Outcome: Completed Date Met:  04/26/17  Observation Goals  1. Normalization of Blood sugars for atleast 12 hours, off insulin drip Yes  2. Remains pain free Yes     No pain or headache. Off insulin drip for more than 12 hours with normal blood sugars. Last blood glucose 136.  Endocrine NP in room providing diabetes and insulin edu with .

## 2017-04-26 NOTE — DISCHARGE SUMMARY
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     Hospital Course   Javi Bansal was admitted on 4/24/2017.  The following problems were addressed during his hospitalization:    #New onset diabetes mellitus  Patient presented with glucose in the 700s with pseudohyponatremia, normal pH, and normal anion gap. Endocrinology was consulted. They noted that patient had issues with hyperglycemia post-transplant and required insulin gtt at that time. This is thought to be secondary to steroids vs tacrolimus causing some insulin resistance. No specific precipitant for acute hyperglycemia was identified this admission. Blood cultures, CXR, and UA were unremarkable. C-peptide was low but difficult to interpret due to severe hyperglycemia. NIDA is pending. Patient was discharged with 10 units aspart TID with meal and lantus 30 units. He will follow-up with Endocrinology within 1 week.       #Chagas CM s/p OHT 12/2016  Patient has follow-up in cardiology clinic on 5/5/17. He will maintain this current follow-up. He will continue the below medications.  - Immunosuppression: continue tacrolimus, MMF, and prednisone  - Tacrolimus level 9.0, goal 10-15  - Continue aspirin and statin      #Latent TB  - Continue INH    Patient seen and discussed with Dr. Tarango who agrees with the above.    Elo Sibley  PGY1 Internal Medicine  [phone]    Significant  Results and Procedures   Results for orders placed or performed in visit on 04/24/17   CT Chest w/o contrast    Narrative    EXAM:  CT CHEST W/O CONTRAST . 4/24/2017 9:47 AM     TECHNIQUE:  Helical CT images from the thoracic inlet through the  upper abdomen were obtained without intravenous contrast.  Images are  displayed at 1 and 5 mm intervals. Images reviewed in lung, soft  tissue, and bone windows.    COMPARISON: CT 3/9/2017 and 6/20/2016    HISTORY:   Pneumocystosis     DLP: 140 mGy*cm    Findings:    No lymphadenopathy/effusions. Cortical scarring bilateral renal upper  poles, otherwise unremarkable visualized upper abdomen.    Postsurgical changes from heart transplant. Stable small right  pericardial fluid. Few small calcified pulmonary nodules.    Interval resolution of bilateral pulmonary consolidation/centrilobular  nodules with few residual areas of groundglass opacities in the apical  segment of right upper lobe and superior segment of right lower lobe.    Nondisplaced sternotomy wires. No suspicious bone lesion.      Impression    Impression: Status post cardiac transplant, near complete resolution  of bilateral pulmonary areas of consolidation and centrilobular  nodules.    I have personally reviewed the examination and initial interpretation  and I agree with the findings.    PALLAVI PATIÑO MD       Pending Results   These results will be followed up by Endocrine clinic, PCP.    Unresulted Labs Ordered in the Past 30 Days of this Admission     Date and Time Order Name Status Description    4/25/2017 0230 Glutamic acid decarboxylase antibody In process     4/24/2017 1929 Blood culture Preliminary     4/24/2017 1929 Blood culture Preliminary     4/24/2017 0901 Send outs misc test In process           Code Status   Full Code    Physical Exam   Temp: 96.6  F (35.9  C) Temp src: Axillary BP: 118/81   Heart Rate: 86 Resp: 18 SpO2: 99 % O2 Device: None (Room air)    Vitals:    04/24/17 1405 04/25/17 0500    Weight: 70.6 kg (155 lb 10.3 oz) 69 kg (152 lb 1.6 oz)     Vital Signs with Ranges  Temp:  [96.6  F (35.9  C)-98.5  F (36.9  C)] 96.6  F (35.9  C)  Heart Rate:  [81-93] 86  Resp:  [16-18] 18  BP: ()/(75-82) 118/81  SpO2:  [94 %-99 %] 99 %  I/O last 3 completed shifts:  In: 640 [P.O.:640]  Out: 1075 [Urine:1075]    GENERAL: NAD. Resting in bed comfortably.  HEENT: NCAT. Normal conjunctiva. No scleral icterus.  LUNG: LCTAB. No wheezing or crackles.  CV: Regular rate and rhythm. No murmur or rub.   GI: Normoactive bowel sounds. Abdomen soft, non-distended, and non-tender. No palpable masses or organomegaly.  SKIN: Warm and dry. No concerning lesions or rash on exposed surfaces.  MSK: Extremities grossly normal, non-tender, no edema. Strong peripheral pulses. Good strength and ROM in bed.   NEURO: A&O x 3. No focal deficits.    Discharge Disposition   Discharged to home  Condition at discharge: Stable    Follow-up Labs/Appointments:    Consultations This Hospital Stay   ENDOCRINOLOGY IP CONSULT  ENDOCRINOLOGY IP CONSULT  SOCIAL WORK IP CONSULT  DIABETES EDUCATION IP CONSULT  MEDICATION HISTORY IP PHARMACY CONSULT  NUTRITION SERVICES ADULT IP CONSULT    Discharge Orders     Medication Therapy Management Referral     Home care nursing referral     NUTRITION REFERRAL     Reason for your hospital stay   You were admitted for elevated blood sugars and required a continuous insulin infusion. You will be discharged with insulin and have a diabetes follow-up.     Adult [facility] Follow-up and recommended labs and tests   Follow-up with Endocrine clinic within 1 week.    Keep Cardiology appointment on 5/5/17.    Appointments on [place] and/or [facility] (with [facility] or [facility] provider or service). Call [phone] if you haven't heard regarding these appointments within 7 days of discharge.     Activity   Your activity upon discharge: activity as tolerated     Discharge Instructions   You will be discharged on  insulin and will follow-up in Endocrine clinic within 1 week. You will also keep your cardiology appointment on 5/5/17.     When to contact your care team   Call your primary doctor if you have any of the following:  increased shortness of breath, increased swelling or increased thirst or increased urination.     Full Code     Diet   Follow this diet upon discharge: Orders Placed This Encounter     Combination Diet Low Saturated Fat Na <2400mg Diet, No Caffeine Diet, diabetic       Discharge Medications   Discharge Medication List as of 4/26/2017  2:26 PM      START taking these medications    Details   insulin glargine (LANTUS) 100 UNIT/ML injection Inject 30 Units Subcutaneous every 24 hours, Disp-9 mL, R-1, E-Prescribe      insulin aspart (NOVOLOG PEN) 100 UNIT/ML injection Inject 10 Units Subcutaneous 3 times daily (with meals), Disp-9 mL, R-1, E-Prescribe      insulin pen needle 32G X 4 MM Use pen needles daily or as directed.Disp-100 each, [id number]      blood glucose monitoring (NO BRAND SPECIFIED) test strip Use to test blood sugar 4 times daily or as directed., Disp-1 Box, R-1, E-Prescribe      blood glucose monitoring (ACCU-CHEK FASTCLIX) lancets Use to test blood sugar 4 times daily or as directed., Disp-1 Box, R-1, E-Prescribe      Sharps Container (B-D SHARPS DISPOSAL BY MAIL) MISC 1 container, Disp-1 each, R-1, E-Prescribe      alcohol swab prep pads Use to swab area of injection/raymond as directed.Disp-100 each, [id number]         CONTINUE these medications which have NOT CHANGED    Details   predniSONE (DELTASONE) 5 MG tablet Take 5mg(1 tablet) in the morning and in the evening., Disp-60 tablet, R-1, E-PrescribeDose change only      tacrolimus (PROGRAF - GENERIC EQUIVALENT) 1 MG capsule Take 2mg (2 capsules) in the morning and 1mg (1 capsule) in the evening., Disp-90 capsule, R-11, E-PrescribeDose change only      ketoconazole (NIZORAL) 2 % shampoo Apply topically three times a week Alternate  with Head and Shoulders.Disp-120 mL, [id number]      nystatin (MYCOSTATIN) 602558 UNIT/ML suspension Take 5 mLs (500,000 Units) by mouth 4 times dailyDisp-473 mL, [id number]      order for DME Equipment being ordered: Splint - Cubital tunnel splint for ulnar neuropathyDisp-1 Device, R-0, Local Print      isoniazid (NYDRAZID) 300 MG tablet Take 1 tablet (300 mg) by mouth daily, Disp-90 tablet, R-3, E-Prescribe      pravastatin (PRAVACHOL) 20 MG tablet Take 1 tablet (20 mg) by mouth every evening, Disp-30 tablet, R-11, E-Prescribe      mycophenolate (CELLCEPT - GENERIC EQUIVALENT) 250 MG capsule Take 6 capsules (1,500 mg) by mouth 2 times daily, Disp-360 capsule, R-11, E-Prescribe      aspirin EC 81 MG EC tablet Take 1 tablet (81 mg) by mouth daily, Disp-90 tablet, R-3, E-Prescribe      calcium carb 1250 mg, 500 mg [place],/vitamin D 200 units (OSCAL WITH D) 500-200 MG-UNIT per tablet Take 1 tablet by mouth 2 times daily (with meals), Disp-60 tablet, R-11, Fax      multivitamin, therapeutic with minerals (THERA-VIT-M) TABS tablet Take 1 tablet by mouth daily, Disp-30 each, R-11, E-Prescribe      pantoprazole (PROTONIX) 40 MG EC tablet Take 1 tablet (40 mg) by mouth every morning, Disp-30 tablet, R-11, E-Prescribe           Allergies   No Known Allergies  Data   CBC  Recent Labs  Lab 04/25/17  0536   WBC 12.6*   RBC 3.76*   HGB 12.2*   HCT 34.9*   MCV 93   MCH 32.4   MCHC 35.0   RDW 12.6        CMP  Recent Labs  Lab 04/26/17  0920 04/25/17  0536 04/24/17  2138 04/24/17 2005 04/24/17  1451 04/24/17  0920    140 135 134 129* 133   POTASSIUM 3.6 3.6 3.9 4.4 4.6 3.8   CHLORIDE 107 108 103 102 94 98   CO2 21 24 22 20 24 26   ANIONGAP 10 8 10 11 10 9   * 120* 451* 494* 708* 482*   BUN 19 26 25 23 25 25   CR 0.74 0.84 0.85 0.85 0.96 1.04   GFRESTIMATED >90Non  GFR Calc >90Non  GFR Calc >90Non  GFR Calc >90Non  GFR Calc 84 76    GFRESTBLACK >90African American GFR Calc >90African American GFR Calc >90African American GFR Calc >90African American GFR Calc >90African American GFR Calc >90African American GFR Calc   DAISY 9.0 8.8 9.0 8.9 9.5 9.9   MAG  --  2.0  --   --   --   --    PROTTOTAL  --   --   --   --  6.8 7.5   ALBUMIN  --   --   --   --  4.1 4.2   BILITOTAL  --   --   --   --  1.0 0.6   ALKPHOS  --   --   --   --  92 100   AST  --   --   --   --  21 20   ALT  --   --   --   --  35 38     INRNo lab results found in last 7 days.

## 2017-04-26 NOTE — PROGRESS NOTES
Diabetes Consult Daily  Progress Note          Assessment/Plan:   Javi Bansal is a 46 year old male with a past medical history notable for Chagas cardiomyopathy s/p cardiac transplant on 12/12/2016 and latent TB on INH presenting with hyperglycemia without evidence of DKA. A1c on admission 11.6.     Plan:  -lantus 30 units bedtime  - Novolog 10 units three times per day with meals ( based on 1 unit per 5 grams of CHO)  -Test glucose before meals and HS ( four times per day) until follow-up appointment  -Supplies that will need to be ordered, please see Britany Land RN, note ( will be seen by Britany ~ 1200)    -Called and left message at Endocrinology Clinic for follow-up, will need follow-up on NIDA that is pending     I reviewed the diabetes management plan with the primary team    I reviewed the last 24 hours progress notes           Interval History:     Information was obtained with [employer] Interperter  Transitioned off insulin drip, blood sugars are moderately controlled  Glucose at 0124, 95, fasting glucose 136  Has been eating ~  grams of CHO. RD consulted. And Javi is planning on making changes to his diet. He will be changing to diet soda and decreasing the amount of CHO with meals'  Reviewed goal blood sugars , to call clinic if glucose > 300 or 70 or less. Discussed taking juice with glucose < 70, recheck in 15 minutes and if glucose not > 100 to repeat.  Appetite is great, denies nausea and or vomiting        Recent Labs  Lab 04/26/17  0920 04/26/17  0756 04/26/17  0124 04/25/17  2127 04/25/17  1725 04/25/17  1533 04/25/17  1433  04/25/17  0536  04/24/17  2138  04/24/17 2005 04/24/17  1451  04/24/17  0920   *  --   --   --   --   --   --   --  120*  --  451*  --  494*  --  708*  --  482*   BGM  --  136* 95 111* 217* 184* 253*  < >  --   < >  --   < >  --   < >  --   < >  --    < > = values in this interval not displayed.            Review of  "Systems:      please see interval history       Medications:       Active Diet Order      Combination Diet Low Saturated Fat Na <2400mg Diet, No Caffeine Diet     Physical Exam:  Gen: Alert, resting in bed, in NAD   HEENT: NC/AT, mucous membranes are moist  Resp: Unlabored  Ext: No lower extremity edema   Neuro:oriented x3, communicating clearly  /82 (BP Location: Left arm)  Temp 98.5  F (36.9  C) (Oral)  Resp 16  Ht 1.7 m (5' 6.93\")  Wt 69 kg (152 lb 1.6 oz)  SpO2 94%  BMI 23.87 kg/m2           Data:     Lab Results   Component Value Date    A1C 11.6 04/25/2017    A1C 5.4 12/11/2016    A1C 5.8 06/18/2016              CBC RESULTS:   Recent Labs   Lab Test  04/25/17   0536   WBC  12.6*   RBC  3.76*   HGB  12.2*   HCT  34.9*   MCV  93   MCH  32.4   MCHC  35.0   RDW  12.6   PLT  200     Recent Labs   Lab Test  04/26/17   0920  04/25/17   0536   NA  138  140   POTASSIUM  3.6  3.6   CHLORIDE  107  108   CO2  21  24   ANIONGAP  10  8   GLC  242*  120*   BUN  19  26   CR  0.74  0.84   DAISY  9.0  8.8     Liver Function Studies -   Recent Labs   Lab Test  04/24/17   1451   PROTTOTAL  6.8   ALBUMIN  4.1   BILITOTAL  1.0   ALKPHOS  92   AST  21   ALT  35     Lab Results   Component Value Date    INR 1.12 03/09/2017    INR 1.52 12/19/2016    INR 1.67 12/17/2016    INR 1.48 12/14/2016    INR 1.67 12/13/2016    INR 1.53 12/13/2016    INR 1.52 12/13/2016    INR 1.45 12/13/2016    INR 1.31 12/13/2016    INR 1.19 12/12/2016    INR 1.30 12/12/2016    INR 0.96 12/12/2016           Yelitza Houston, CNP pager [phone]  Diabetes Management Job Code 0243          "

## 2017-04-27 ENCOUNTER — CARE COORDINATION (OUTPATIENT)
Dept: CARE COORDINATION | Facility: CLINIC | Age: 47
End: 2017-04-27

## 2017-04-27 LAB — GAD65 AB SER IA-ACNC: NORMAL

## 2017-04-27 NOTE — PROGRESS NOTES
Patient is a transplant patient and will be followed by the transplant team for follow up            History of Present Illness      Javi Burr Jesse Iglesia Villalobosuilar is a 46 year old man with longstanding history of Chagas cardiomyopathy who underwent cardiac transplantation on 12/12/2016

## 2017-05-01 DIAGNOSIS — Z94.1 HEART REPLACED BY TRANSPLANT (H): Primary | ICD-10-CM

## 2017-05-01 RX ORDER — LIDOCAINE 40 MG/G
CREAM TOPICAL
Status: CANCELLED | OUTPATIENT
Start: 2017-05-01

## 2017-05-02 ENCOUNTER — OFFICE VISIT (OUTPATIENT)
Dept: ENDOCRINOLOGY | Facility: CLINIC | Age: 47
End: 2017-05-02

## 2017-05-02 VITALS
WEIGHT: 160.9 LBS | SYSTOLIC BLOOD PRESSURE: 109 MMHG | DIASTOLIC BLOOD PRESSURE: 80 MMHG | HEIGHT: 66 IN | BODY MASS INDEX: 25.86 KG/M2 | HEART RATE: 97 BPM

## 2017-05-02 DIAGNOSIS — T38.0X5A STEROID-INDUCED DIABETES MELLITUS (H): Primary | ICD-10-CM

## 2017-05-02 DIAGNOSIS — E09.9 STEROID-INDUCED DIABETES MELLITUS (H): Primary | ICD-10-CM

## 2017-05-02 LAB — LAB SCANNED RESULT: NORMAL

## 2017-05-02 ASSESSMENT — PAIN SCALES - GENERAL: PAINLEVEL: NO PAIN (0)

## 2017-05-02 NOTE — PATIENT INSTRUCTIONS
Que gusta conocerle!    Metas:    En ayunas:  85 - 110.    Antes de comer: 85 - 120        Si es que es menos que 70 - Llame, tenemos que cambiar watt insulin.      My best wishes,    Alla Jerez PA-C, MPAS  [facility]  Diabetes, Endocrinology, and Metabolism  [phone] Appointments/Nurse  [phone] pager  [phone] nurse line  [phone] URGENTafter hours/weekend Endocrinologist on call

## 2017-05-02 NOTE — MR AVS SNAPSHOT
After Visit Summary   5/2/2017    Javi Bansal    MRN: [id number]           Patient Information     Date Of Birth          1970        Visit Information        Provider Department      5/2/2017 8:45 AM Alla Jerez PA-C; [facility] Endocrinology        Today's Diagnoses     Steroid-induced diabetes mellitus (H)    -  1      Care Instructions    Que gusta conocerle!    Metas:    En ayunas:  85 - 110.    Antes de comer: 85 - 120        Si es que es menos que 70 - Llame, tenemos que cambiar watt insulin.      My best wishes,    Alla Jerez PA-C, [facility]  [facility]  Diabetes, Endocrinology, and Metabolism  [phone] Appointments/Nurse  [phone] pager  [phone] nurse line  [phone] URGENTafter hours/weekend Endocrinologist on call            Follow-ups after your visit        Your next 10 appointments already scheduled     May 05, 2017  9:00 AM CDT   LAB with ACUTE CARE LAB [facility] ([facility])    [address] [facility] [address]              Patient must bring picture ID.  Patient should be prepared to give a urine specimen  Please do not eat 10-12 hours before your appointment if you are coming in fasting for labs on lipids, cholesterol, or glucose (sugar).  Pregnant women should follow their Care Team instructions. Water with medications is okay. Do not drink coffee or other fluids.   If you have concerns about taking  your medications, please ask at office or if scheduling via S[employer]Pollenhart, send a message by clicking on Secure Messaging, Message Your Care Team.            May 05, 2017  9:30 AM CDT   Procedure - 2.5 hour with U2A ROOM 17   Unit 2A [facility] ([facility])    [address] [address]               May 05, 2017 10:30 AM CDT   Heart Cath Heart Biopsy with [address],  [place],  Heart Cath Lab ([facility] [place])    [address] [facility] [address] [phone]            May 05, 2017  1:00 PM CDT   (Arrive by 12:45 PM)   RETURN HEART TRANSPLANT with SAMM Ramos CNP   [facility] ([facility])    [address] [address] [phone]            May 15, 2017  9:00 AM CDT   (Arrive by 8:45 AM)   RETURN DIABETES with Alla Jerez PA-C   [facility] Endocrinology ([facility])    [address] [address] [phone]            May 31, 2017  1:30 PM CDT   (Arrive by 1:15 PM)   Office Visit with Agueda Osorio RN   [facility] Diabetes ([facility])    [address] [address] [phone]           Bring a current list of meds and any records pertaining to this visit.  For Physicals, please bring immunization records and any forms needing to be filled out.  Please arrive 10 minutes early to complete paperwork.            Jun 01, 2017  8:00 AM CDT   MR CARDIAC W CONTRAST W FLOW QUANT with JAYLIN KABA CV MR NURSE   [facility] [place], MRI ([facility])    [address] [address]   [phone]           Take your medicines as usual, unless your doctor tells you not to.   If you take Viagra, Levitra or Cialis, stop taking it 48 hours before your test.   If you take Aggrenox or dipyridamole (Persantine, Permole), stop taking it 48 hours before your test.   If you take Theophylline or Aminophylline, stop taking it 12 hours before your test.   If you are diabetic and take oral hypoglycemics, do not take them on the day of your test.  The day before your exam, drink extra fluids at least six 8-ounce glasses (unless your doctor wants you to limit your fluids).  Stop all caffeine 12  hours before the test. This includes coffee, tea, soda, chocolate and certain medicines (such as Anacin, Excedrin and NoDoz). Also avoid decaf coffee and tea, as these contain small amounts of caffeine.  Do not eat or drink for 3 hours before your exam. You may drink water and take your morning medicines.  You may need a blood test (creatinine test) within 30 days of your exam. Follow your doctor s orders if this is arranged before your exam.  If you are very claustrophobic, please let you doctor know. You may get a sedative medicine from your doctor before you arrive. Bring the medicine to the exam. Do not take it at home. Arrive one hour early. Bring someone who can take you home after the test. Your medicine will make you sleepy. After the exam, you may not drive, take a bus or take a taxi by yourself.  The MRI machine uses a strong magnet. Please wear clothes without metal (snaps, zippers). A sweatsuit works well, or we may give you a hospital gown.  Please remove any body piercings and hair extensions before you arrive. You will remove watches, jewelry, hairpins, wallets, dentures, partial dental plates and hearing aids. You may wear contact lenses, and you may be able to wear your rings. We have a safe place to keep your personal items, but it is safer to leave them at home.   **IMPORTANT** THE INSTRUCTIONS BELOW ARE ONLY FOR THOSE PATIENTS WHO HAVE BEEN TOLD THEY WILL RECEIVE SEDATION OR GENERAL ANESTHESIA DURING THEIR MRI PROCEDURE:  IF YOU WILL RECEIVE SEDATION (take medicine to help you relax during your exam):   You must get the medicine from your doctor before you arrive. Bring the medicine to the exam. Do not take it at home.   Arrive one hour early. Bring someone who can take you home after the test. Your medicine will make you sleepy. After the exam, you may not drive, take a bus or take a taxi by yourself.   No eating 8 hours before your exam. You may have clear liquids up until 4 hours before your  exam. (Clear liquids include water, clear tea, black coffee and fruit juice without pulp.)  IF YOU WILL RECEIVE ANESTHESIA (be asleep for your exam):   Arrive 1 1/2 hours early. Bring someone who can take you home after the test. You may not drive, take a bus or take a taxi by yourself.   No eating 8 hours before your exam. You may have clear liquids up until 4 hours before your exam. (Clear liquids include water, clear tea, black coffee and fruit juice without pulp.)  If you have any questions, please contact your Imaging Department exam site.            Jun 01, 2017 10:15 AM CDT   (Arrive by 10:00 AM)   Return Visit with Ivan Burrell MD   [facility] ([facility])    [address] [facility] [address] [phone]            Jun 02, 2017 11:00 AM CDT   Cath 90 Minute [id number]   [facility] [place])    [address] [facility] [id number] [phone]              Who to contact     Please call your clinic at [phone] to:    Ask questions about your health    Make or cancel appointments    Discuss your medicines    Learn about your test results    Speak to your doctor   If you have compliments or concerns about an experience at your clinic, or if you wish to file a complaint, please contact [facility] Patient Relations at [phone] or email us at [email or web address]         Additional Information About Your Visit        World Blenderhart Information     Bloggerce is an electronic gateway that provides easy, online access to your medical records. With Bloggerce, you can request a clinic appointment, read your test results, renew a prescription or communicate with your care team.     To sign up for Bloggerce visit the website at [email or web address]   You will be asked to enter the access code listed below, as well as some personal  "information. Please follow the directions to create your username and password.     Your access code is: [email or web address]  Expires: 2017  6:30 AM     Your access code will  in 90 days. If you need help or a new code, please contact your [facility] or call [phone] for assistance.        Care EveryWhere ID     This is your Care EveryWhere ID. This could be used by other organizations to access your [facility] medical records  [id number]        Your Vitals Were     Pulse Height BMI (Body Mass Index)             97 1.676 m (5' 6\") 25.97 kg/m2          Blood Pressure from Last 3 Encounters:   17 109/80   17 118/81   17 115/82    Weight from Last 3 Encounters:   17 73 kg (160 lb 14.4 oz)   17 69 kg (152 lb 1.6 oz)   17 72.3 kg (159 lb 6.4 oz)              Today, you had the following     No orders found for display       Primary Care Provider Office Phone # Fax #    Ivan Burrell [phone]       [address] [address]        Thank you!     Thank you for choosing [facility]  for your care. Our goal is always to provide you with excellent care. Hearing back from our patients is one way we can continue to improve our services. Please take a few minutes to complete the written survey that you may receive in the mail after your visit with us. Thank you!             Your Updated Medication List - Protect others around you: Learn how to safely use, store and throw away your medicines at www.disposemymeds.org.          This list is accurate as of: 17 10:03 AM.  Always use your most recent med list.                   Brand Name Dispense Instructions for use    alcohol swab prep pads     100 each    Use to swab area of injection/raymond as directed.       aspirin 81 MG EC tablet     90 tablet    Take 1 tablet (81 mg) by mouth daily       B-D SHARPS DISPOSAL BY MAIL Misc     1 each    " 1 container       blood glucose monitoring lancets     1 Box    Use to test blood sugar 4 times daily or as directed.       blood glucose monitoring test strip    no brand specified    1 Box    Use to test blood sugar 4 times daily or as directed.       calcium carb 1250 mg (500 mg [place])/vitamin D 200 units 500-200 MG-UNIT per tablet    OSCAL with D    60 tablet    Take 1 tablet by mouth 2 times daily (with meals)       insulin aspart 100 UNIT/ML injection    NovoLOG PEN    9 mL    Inject 10 Units Subcutaneous 3 times daily (with meals)       insulin glargine 100 UNIT/ML injection    LANTUS    9 mL    Inject 30 Units Subcutaneous every 24 hours       insulin pen needle 32G X 4 MM     100 each    Use pen needles daily or as directed.       isoniazid 300 MG tablet    NYDRAZID    90 tablet    Take 1 tablet (300 mg) by mouth daily       ketoconazole 2 % shampoo    NIZORAL    120 mL    Apply topically three times a week Alternate with Head and Shoulders.       multivitamin, therapeutic with minerals Tabs tablet     30 each    Take 1 tablet by mouth daily       mycophenolate 250 MG capsule    CELLCEPT - GENERIC EQUIVALENT    360 capsule    Take 6 capsules (1,500 mg) by mouth 2 times daily       nystatin 937058 UNIT/ML suspension    MYCOSTATIN    473 mL    Take 5 mLs (500,000 Units) by mouth 4 times daily       order for DME     1 Device    Equipment being ordered: Splint - Cubital tunnel splint for ulnar neuropathy       pantoprazole 40 MG EC tablet    PROTONIX    30 tablet    Take 1 tablet (40 mg) by mouth every morning       pravastatin 20 MG tablet    PRAVACHOL    30 tablet    Take 1 tablet (20 mg) by mouth every evening       predniSONE 5 MG tablet    DELTASONE    60 tablet    Take 5mg(1 tablet) in the morning and in the evening.       tacrolimus 1 MG capsule    PROGRAF - GENERIC EQUIVALENT    90 capsule    Take 2mg (2 capsules) in the morning and 1mg (1 capsule) in the evening.

## 2017-05-02 NOTE — LETTER
5/2/2017       RE: Javi Bansal  [address] [place] [address]     Dear Colleague,    Thank you for referring your patient, Javi Bansal, to the [facility] ENDOCRINOLOGY at [facility]. Please see a copy of my visit note below.    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  There was of course concern about new onset type 1 diabetes, but I see now that his NIDA jessica is back now with a normal value of <5.0. Thus, his diabetes is thought to be secondary to steroids vs tacrolimus causing some insulin resistance. He is not known to Chagas gastrointestinal disease.     He is now living at home with his family and feels he is doing well.  He is taking Lantus 30 units each morning and Aspart 10 units there times daily. He once took it 1.5 hours prior to meal, and ate cake and had next BG at 267.  He also once awoke at 2:30 am with BG 68 and heart racing, sweating.  Drank juice and resolved.  In morning BG was 84.  Generally he checks BG 3x daily.  Fasting, before lunch and before dinner.     Other BG have ranged 84 - 118, except one other premeal at 75, where he did feel weak.  He was instructed not to take pre meal insulin 10 units if BG <80, but otherwise this hasn't happened.      He was not advised to bring glucometer and didn't.          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 He also has no past medical history of  "Malignant melanoma (H) or Skin disease.  Immunization History   Administered Date(s) Administered     Hepatitis B 08/04/2016, 09/15/2016, 02/09/2017     Influenza (IIV3) 10/05/2016     Influenza Vaccine IM 3yrs+ 4 Valent IIV4 11/01/2011, 03/21/2015     Pneumococcal (PCV 13) 08/04/2016     Pneumococcal 23 valent 03/21/2015     TDAP Vaccine (Boostrix) 08/04/2016       Javi's   Social History     Social History     Marital status:      Spouse name: N/A     Number of children: N/A     Years of education: N/A     Occupational History     Not on file.     Social History Main Topics     Smoking status: Never Smoker     Smokeless tobacco: Not on file     Alcohol use No     Drug use: No     Sexual activity: Not on file     Other Topics Concern     Not on file     Social History Narrative      He reads very little in [place].  He does understand numbers.      Javi's family history includes Brain Cancer in his mother. There is no history of Melanoma or Skin Cancer.    ROS:   Patient denies any fevers, chills or sweats as well as any changes in vision, problems with floaters or field cuts in vision, pain or problems with dentition, new or different headaches.  Patient denies symptoms of hypo and hyperglycemia except as above.   Patients denies marked fatigue, cough, shortness of breath, chest pain or pressure.  There has been no pain with or other changes in urination or  itching or pain in genital areas.  Patient denies any noted swelling in feet, ankles or otherwise, loss of sensation or pain  in feet or other areas.    Patient also denies current difficulties with depressed mood, anhedonia or worrying too much.        Exam:    There were no vitals taken for this visit.    General: Pleasant, well nourished and hydrated male in NAD.   Psych:  Mood is \"good,\" affect is appropriate.  Thought form and content are fluid and coherent.    HEENT: Eyes and sclera are clear. Extraocular movements are grossly intact without " proptosis.  Nares are patent, mucous membranes moist.  Neck: No masses or JVD are noted.    Resp: Easy and unlabored breathing.   Neuro: Alert and oriented, communicating clearly.   Ext: no swelling or edema  Data:      Last Basic Metabolic Panel:  Lab Results   Component Value Date     04/26/2017      Lab Results   Component Value Date    POTASSIUM 3.6 04/26/2017     Lab Results   Component Value Date    CHLORIDE 107 04/26/2017     Lab Results   Component Value Date    DAISY 9.0 04/26/2017     Lab Results   Component Value Date    CO2 21 04/26/2017     Lab Results   Component Value Date    BUN 19 04/26/2017     Lab Results   Component Value Date    CR 0.74 04/26/2017     Lab Results   Component Value Date     04/26/2017       GFR Estimate   Date Value Ref Range Status   04/26/2017 >90  Non  GFR Calc   >60 mL/min/1.7m2 Final   04/25/2017 >90  Non  GFR Calc   >60 mL/min/1.7m2 Final   04/24/2017 >90  Non  GFR Calc   >60 mL/min/1.7m2 Final     GFR Estimate If Black   Date Value Ref Range Status   04/26/2017 >90   GFR Calc   >60 mL/min/1.7m2 Final   04/25/2017 >90   GFR Calc   >60 mL/min/1.7m2 Final   04/24/2017 >90   GFR Calc   >60 mL/min/1.7m2 Final         Lab Results   Component Value Date    A1C 11.6 (H) 04/25/2017    A1C 5.4 12/11/2016    A1C 5.8 06/18/2016     No results found for: MICROL  No results found for: MICROALBUMIN  Lab Results   Component Value Date    CPEPT 0.6 (L) 04/25/2017    GADAB  04/25/2017     <5.0  Reference range: 0.0 to 5.0  Unit: IU/mL  (Note)  INTERPRETIVE INFORMATION:  Glutamic Acid Decarboxylase  Antibody  A value greater than 5.0 IU/mL is considered positive for  Glutamic Acid Decarboxylase Antibody.  Performed by [facility],  [facility] [place] [address] [phone]  [email or web address], Calvin Hernández MD, Lab. Director       Cholesterol   Date Value Ref Range Status    2017 198 <200 mg/dL Final   2016 166 <200 mg/dL Final     HDL Cholesterol   Date Value Ref Range Status   2017 88 >39 mg/dL Final   2016 38 (L) >39 mg/dL Final     LDL Cholesterol Calculated   Date Value Ref Range Status   2017 93 <100 mg/dL Final     Comment:     Desirable:       <100 mg/dl   2016 114 (H) <100 mg/dL Final     Comment:     Above desirable:  100-129 mg/dl   Borderline High:  130-159 mg/dL   High:             160-189 mg/dL   Very high:       >189 mg/dl       Triglycerides   Date Value Ref Range Status   2017 82 <150 mg/dL Final     Comment:     Fasting specimen   2016 73 <150 mg/dL Final     No results found for: CHOLHDLRATIO    Most recent eye exam date: : Not Found               Assessment/Plan:        Javi is a 46 year old male with 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  His control is extremely tight, though I do think we can continue this as he is having limited hypoglycemia which he readily recognizes and treats.      Decrease Lantus to 27 units.  Patient is able to repeat back will need further  in dose if he has any more overnight lows.       Decrease Novolog to 5 units prior to activity ( starts cardiac rehab May 5.)   If BG is less than 80 after rehab or exercise he can forgo Novolog with meals prior.      Always carry juice or fruit snacks in case of lows.    If BG <70 call clinic.      Pt is able to state in his own words plan as well as prevention and treatment of hypoglycemia.  Drawings accompany written instructions.      I anticipate further basal decrease.  RTC with self or educator in 5-10 days, 3 weeks with other.         >50% of 30 minute visit spent in counseling, education and coordination of care related to options for better glycemic control as well as preventing, detecting, and treating  hypoglycemia.      It is my privilege to be involved in the care of the above patient.     Alla Jerez PA-C, MPAS  [facility]  Diabetes, Endocrinology, and Metabolism  [phone] Appointments/Nurse  [phone] pager  [phone] nurse line

## 2017-05-02 NOTE — PROGRESS NOTES
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  There was of course concern about new onset type 1 diabetes, but I see now that his NIDA jessica is back now with a normal value of <5.0. Thus, his diabetes is thought to be secondary to steroids vs tacrolimus causing some insulin resistance. He is not known to have Chagas gastrointestinal disease.     He is now living at home with his family and feels he is doing well.  He is taking Lantus 30 units each morning and Aspart 10 units there times daily. He once took it 1.5 hours prior to meal, and ate cake and had next BG at 267.  He also once awoke at 2:30 am with BG 68 and heart racing, sweating.  Drank juice and resolved.  In morning BG was 84.  Generally he checks BG 3x daily.  Fasting, before lunch and before dinner.     Other BG have ranged 84 - 118, except one other premeal at 75, where he did feel weak.  He was instructed not to take pre meal insulin 10 units if BG <80, but otherwise this hasn't happened.      He states that he was not advised to bring glucometer and didn't.          Javi's  has a past medical history of Chagas cardiomyopathy; Chronic systolic heart failure (H); Dual ICD (implantable cardioverter-defibrillator) in place (10/20/2015); Essential hypertension; Gastroesophageal reflux disease; H/O gastric ulcer; Hypertension; Premature ventricular contractions; Presbyopia; and Pterygium. He also has no past medical history of Malignant melanoma (H) or Skin disease.  Immunization History   Administered Date(s) Administered     Hepatitis B 08/04/2016, 09/15/2016, 02/09/2017     Influenza (IIV3) 10/05/2016     Influenza Vaccine IM 3yrs+ 4 Valent IIV4 11/01/2011, 03/21/2015     Pneumococcal (PCV 13) 08/04/2016      "Pneumococcal 23 valent 03/21/2015     TDAP Vaccine (Boostrix) 08/04/2016       Javi's   Social History     Social History     Marital status:      Spouse name: N/A     Number of children: N/A     Years of education: N/A     Occupational History     Not on file.     Social History Main Topics     Smoking status: Never Smoker     Smokeless tobacco: Not on file     Alcohol use No     Drug use: No     Sexual activity: Not on file     Other Topics Concern     Not on file     Social History Narrative      He reads very little in Serbian.  He does understand numbers.  He is from [facility].     Javi's family history includes Brain Cancer in his mother. There is no history of Melanoma or Skin Cancer.  His brother dies of Chagas cardiomyopathy.  He had an EF of only 10%.        ROS:   Patient denies any fevers, chills or sweats as well as any changes in vision, problems with floaters or field cuts in vision, pain or problems with dentition, new or different headaches.  Patient denies symptoms of hypo and hyperglycemia except as above.   Patients denies marked or new fatigue, cough, shortness of breath, chest pain or pressure.  He feels he is really improving readily and  expresses gratitude for all who have helped him.  He is excited to see what he is able to do when he starts cardiac rehab soon.    There has been no pain with or other changes in urination or  itching or pain in genital areas.  Patient denies any noted swelling in feet, ankles or otherwise, loss of sensation or pain  in feet or other areas.    Patient also denies current difficulties with depressed mood, anhedonia or worrying too much.        Exam:    There were no vitals taken for this visit.  BP was attempted, but machine unable to read.     General: Pleasant, well nourished and hydrated male in NAD.   Psych:  Mood is \" very good,\" affect is appropriate.  Thought form and content are fluid and coherent.  Pt. very introspective.   HEENT: Eyes and " sclera are clear. Extraocular movements are grossly intact without proptosis.  Nares are patent, mucous membranes moist.  Neck: No masses or JVD are noted.    Resp: Easy and unlabored breathing.   Neuro: Alert and oriented, communicating clearly.   Ext: no swelling or edema  Data:      Last Basic Metabolic Panel:  Lab Results   Component Value Date     04/26/2017      Lab Results   Component Value Date    POTASSIUM 3.6 04/26/2017     Lab Results   Component Value Date    CHLORIDE 107 04/26/2017     Lab Results   Component Value Date    DAISY 9.0 04/26/2017     Lab Results   Component Value Date    CO2 21 04/26/2017     Lab Results   Component Value Date    BUN 19 04/26/2017     Lab Results   Component Value Date    CR 0.74 04/26/2017     Lab Results   Component Value Date     04/26/2017       GFR Estimate   Date Value Ref Range Status   04/26/2017 >90  Non  GFR Calc   >60 mL/min/1.7m2 Final   04/25/2017 >90  Non  GFR Calc   >60 mL/min/1.7m2 Final   04/24/2017 >90  Non  GFR Calc   >60 mL/min/1.7m2 Final         Lab Results   Component Value Date    A1C 11.6 (H) 04/25/2017    A1C 5.4 12/11/2016    A1C 5.8 06/18/2016     No results found for: MICROL  No results found for: MICROALBUMIN  Lab Results   Component Value Date    CPEPT 0.6 (L) 04/25/2017    GADAB  04/25/2017     <5.0  Reference range: 0.0 to 5.0  Unit: IU/mL  (Note)  INTERPRETIVE INFORMATION:  Glutamic Acid Decarboxylase  Antibody  A value greater than 5.0 IU/mL is considered positive for  Glutamic Acid Decarboxylase Antibody.  Performed by [facility],  [address] [place] [id number] [phone]  [email or web address], Calvin Hernández MD, Lab. Director       Cholesterol   Date Value Ref Range Status   01/13/2017 198 <200 mg/dL Final   06/18/2016 166 <200 mg/dL Final     HDL Cholesterol   Date Value Ref Range Status   01/13/2017 88 >39 mg/dL Final   06/18/2016 38 (L) >39 mg/dL Final     LDL Cholesterol  Calculated   Date Value Ref Range Status   2017 93 <100 mg/dL Final     Comment:     Desirable:       <100 mg/dl   2016 114 (H) <100 mg/dL Final     Comment:     Above desirable:  100-129 mg/dl   Borderline High:  130-159 mg/dL   High:             160-189 mg/dL   Very high:       >189 mg/dl       Triglycerides   Date Value Ref Range Status   2017 82 <150 mg/dL Final     Comment:     Fasting specimen   2016 73 <150 mg/dL Final     No results found for: CHOLHDLRATIO    Most recent eye exam date: : Not Found               Assessment/Plan:        Javi is a 46 year old male with 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  His control is extremely tight, though I do think we can continue this as he is having limited hypoglycemia which he readily recognizes and treats.      Decrease Lantus to 27 units.  Patient is able to repeat back will need further  in dose if he has any more overnight lows.       Decrease Novolog to 5 units prior to activity ( starts cardiac rehab May 5.)   If BG is less than 80 after rehab or exercise he can forgo Novolog with meals prior.      Always carry juice or fruit snacks in case of lows.    If BG <70 call clinic.      Pt is able to state in his own words plan as well as prevention and treatment of hypoglycemia.  Drawings accompany written instructions.      I anticipate further basal decrease.  RTC with self or educator in 5-10 days, 3 weeks with other.         >50% of 30 minute visit spent in counseling, education and coordination of care related to options for better glycemic control as well as preventing, detecting, and treating hypoglycemia.      It is my privilege to be involved in the care of the above patient.     Alla Jerez PA-C, MPAS  [facility]  Diabetes, Endocrinology, and Metabolism  [phone]  Appointments/Nurse  [phone] pager  [phone] nurse line

## 2017-05-03 ENCOUNTER — AMBULATORY - HEALTHEAST (OUTPATIENT)
Dept: CARDIAC REHAB | Facility: HOSPITAL | Age: 47
End: 2017-05-03

## 2017-05-03 DIAGNOSIS — Z94.1 STATUS POST HEART TRANSPLANT (H): ICD-10-CM

## 2017-05-04 ENCOUNTER — AMBULATORY - HEALTHEAST (OUTPATIENT)
Dept: CARDIAC REHAB | Facility: HOSPITAL | Age: 47
End: 2017-05-04

## 2017-05-04 DIAGNOSIS — Z94.1 STATUS POST HEART TRANSPLANT (H): ICD-10-CM

## 2017-05-05 ENCOUNTER — RESULTS ONLY (OUTPATIENT)
Dept: OTHER | Facility: CLINIC | Age: 47
End: 2017-05-05

## 2017-05-05 ENCOUNTER — OFFICE VISIT (OUTPATIENT)
Dept: CARDIOLOGY | Facility: CLINIC | Age: 47
End: 2017-05-05
Attending: NURSE PRACTITIONER
Payer: COMMERCIAL

## 2017-05-05 ENCOUNTER — APPOINTMENT (OUTPATIENT)
Dept: CARDIOLOGY | Facility: CLINIC | Age: 47
End: 2017-05-05
Attending: INTERNAL MEDICINE
Payer: COMMERCIAL

## 2017-05-05 ENCOUNTER — HOSPITAL ENCOUNTER (OUTPATIENT)
Facility: CLINIC | Age: 47
Discharge: HOME | End: 2017-05-05
Attending: INTERNAL MEDICINE | Admitting: INTERNAL MEDICINE
Payer: COMMERCIAL

## 2017-05-05 ENCOUNTER — APPOINTMENT (OUTPATIENT)
Dept: MEDSURG UNIT | Facility: CLINIC | Age: 47
End: 2017-05-05
Payer: COMMERCIAL

## 2017-05-05 VITALS
WEIGHT: 160 LBS | OXYGEN SATURATION: 99 % | SYSTOLIC BLOOD PRESSURE: 119 MMHG | BODY MASS INDEX: 25.71 KG/M2 | HEART RATE: 97 BPM | DIASTOLIC BLOOD PRESSURE: 80 MMHG | HEIGHT: 66 IN

## 2017-05-05 VITALS
OXYGEN SATURATION: 99 % | HEART RATE: 97 BPM | TEMPERATURE: 97.6 F | RESPIRATION RATE: 18 BRPM | DIASTOLIC BLOOD PRESSURE: 80 MMHG | SYSTOLIC BLOOD PRESSURE: 119 MMHG

## 2017-05-05 DIAGNOSIS — Z94.1 HEART REPLACED BY TRANSPLANT (H): Primary | ICD-10-CM

## 2017-05-05 DIAGNOSIS — Z94.1 HEART REPLACED BY TRANSPLANT (H): ICD-10-CM

## 2017-05-05 DIAGNOSIS — Z22.7 LATENT TUBERCULOSIS INFECTION: ICD-10-CM

## 2017-05-05 DIAGNOSIS — B57.2 CHAGAS CARDIOMYOPATHY: ICD-10-CM

## 2017-05-05 LAB
ALBUMIN SERPL-MCNC: 3.9 G/DL (ref 3.4–5)
ALP SERPL-CCNC: 67 U/L (ref 40–150)
ALT SERPL W P-5'-P-CCNC: 26 U/L (ref 0–70)
AST SERPL W P-5'-P-CCNC: 28 U/L (ref 0–45)
BILIRUB DIRECT SERPL-MCNC: <0.1 MG/DL (ref 0–0.2)
BILIRUB SERPL-MCNC: 0.5 MG/DL (ref 0.2–1.3)
PROT SERPL-MCNC: 7.1 G/DL (ref 6.8–8.8)

## 2017-05-05 PROCEDURE — 80076 HEPATIC FUNCTION PANEL: CPT | Performed by: INTERNAL MEDICINE

## 2017-05-05 PROCEDURE — 36415 COLL VENOUS BLD VENIPUNCTURE: CPT | Performed by: INTERNAL MEDICINE

## 2017-05-05 PROCEDURE — 88346 IMFLUOR 1ST 1ANTB STAIN PX: CPT | Performed by: INTERNAL MEDICINE

## 2017-05-05 PROCEDURE — 86833 HLA CLASS II HIGH DEFIN QUAL: CPT | Performed by: INTERNAL MEDICINE

## 2017-05-05 PROCEDURE — 27210982 ZZH KIT RT HC TOTES DISP CR7

## 2017-05-05 PROCEDURE — T1013 SIGN LANG/ORAL INTERPRETER: HCPCS | Mod: U3

## 2017-05-05 PROCEDURE — 93505 ENDOMYOCARDIAL BIOPSY: CPT | Mod: 26 | Performed by: INTERNAL MEDICINE

## 2017-05-05 PROCEDURE — 93505 ENDOMYOCARDIAL BIOPSY: CPT

## 2017-05-05 PROCEDURE — 99214 OFFICE O/P EST MOD 30 MIN: CPT | Mod: 25 | Performed by: NURSE PRACTITIONER

## 2017-05-05 PROCEDURE — 40000166 ZZH STATISTIC PP CARE STAGE 1

## 2017-05-05 PROCEDURE — 88307 TISSUE EXAM BY PATHOLOGIST: CPT | Performed by: INTERNAL MEDICINE

## 2017-05-05 PROCEDURE — C1893 INTRO/SHEATH, FIXED,NON-PEEL: HCPCS

## 2017-05-05 PROCEDURE — 88350 IMFLUOR EA ADDL 1ANTB STN PX: CPT | Performed by: INTERNAL MEDICINE

## 2017-05-05 PROCEDURE — 27211047 ZZH FORCEP BIOPSY CR10

## 2017-05-05 PROCEDURE — 86832 HLA CLASS I HIGH DEFIN QUAL: CPT | Performed by: INTERNAL MEDICINE

## 2017-05-05 PROCEDURE — 02BK3ZX EXCISION OF RIGHT VENTRICLE, PERCUTANEOUS APPROACH, DIAGNOSTIC: ICD-10-PCS | Performed by: INTERNAL MEDICINE

## 2017-05-05 RX ORDER — LIDOCAINE 40 MG/G
CREAM TOPICAL
Status: COMPLETED | OUTPATIENT
Start: 2017-05-05 | End: 2017-05-05

## 2017-05-05 RX ADMIN — LIDOCAINE: 40 CREAM TOPICAL at 10:18

## 2017-05-05 ASSESSMENT — PAIN SCALES - GENERAL: PAINLEVEL: NO PAIN (0)

## 2017-05-05 NOTE — LETTER
5/5/2017      RE: Javi Bansal  [address] [place] [address]       Dear Colleague,    Thank you for the opportunity to participate in the care of your patient, Javi Bansal, at the [facility] HEART [facility]. Please see a copy of my visit note below.    ADULT HEART TRANSPLANT CLINIC    HPI:   Javi Shetty is a 46 year-old male with a history of Chagas cardiomyopathy (diagnosed in 2010) and biventricular systolic heart failure (LVEF 15-20% since 2015, on home milrinone since 8/2016) who subsequently underwent orthotopic heart transplantation on 12/12/16. His postoperative course was complicated by rejection, ventricular tachycardia, and reactivation of his Chagas.      His first surveillance biopsy on 12/19 showed grade 2R ACR, which was treated with IV steroids. Repeat biopsy showed persistent rejection, which was treated with IV steroids and thymoglobulin. Biopsies have been negative for rejection since then. Graft function has remained normal with an EF of 60-65% on 1/13/17.  Baseline angiogram showed no evidence of CAV. Was hospitalized last week with hyperglycemia, and was started on insulin. Patient presents to clinic today as part of his routine 5-month post-transplant surveillance.    Javi continues to feel well, but is disappointed that he needs to be on insulin now. He is feeling comfortable with checking blood sugars at home and giving himself insulin, and his blood sugars have been well controlled. Continues to have numbness and tingling mainly in his right fourth and fifth fingers, and wonders if he will be able to start medication for that. 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 He continues to struggle to pay his medical bills and is eager to contribute to his family again.    Blood pressures  and weights have been stable at home, and he denies any dizziness, SOB, edema, chest pain, or palpitations. He was able to start cardiac rehab yesterday, and he is looking forward to betting stronger. Overall he is feeling good and is happy with the progress he has made.    TRANSPLANT MEDICATIONS:  Tacrolimus 2mg/1mg daily  MMF 1.5gm BID  Prednisone 5mg BID  Nystatin    LAST BIOPSY: 4/4/17  LAST ANGIOGRAM: 1/10/17  CMV: D+/R+  EBV: D+/R+  Intolerance to medications: none      PAST MEDICAL HISTORY:  Past Medical History:   Diagnosis Date     Chagas cardiomyopathy      Chronic systolic heart failure (H)      Diabetes (H)      Dual ICD (implantable cardioverter-defibrillator) in place 10/20/2015     Essential hypertension      Gastroesophageal reflux disease      H/O gastric ulcer      Hypertension      Premature ventricular contractions      Presbyopia      Pterygium     ?? suspected , but unclear dx       CURRENT MEDICATIONS:  Prescription Medications as of 5/5/2017             insulin glargine (LANTUS) 100 UNIT/ML injection Inject 30 Units Subcutaneous every 24 hours    insulin aspart (NOVOLOG PEN) 100 UNIT/ML injection Inject 10 Units Subcutaneous 3 times daily (with meals)    insulin pen needle 32G X 4 MM Use pen needles daily or as directed.    blood glucose monitoring (NO BRAND SPECIFIED) test strip Use to test blood sugar 4 times daily or as directed.    blood glucose monitoring (ACCU-CHEK FASTCLIX) lancets Use to test blood sugar 4 times daily or as directed.    Sharps Container (B-D SHARPS DISPOSAL BY MAIL) MISC 1 container    alcohol swab prep pads Use to swab area of injection/raymond as directed.    predniSONE (DELTASONE) 5 MG tablet Take 5mg(1 tablet) in the morning and in the evening.    tacrolimus (PROGRAF - GENERIC EQUIVALENT) 1 MG capsule Take 2mg (2 capsules) in the morning and 1mg (1 capsule) in the evening.    ketoconazole (NIZORAL) 2 % shampoo Apply topically three times a week Alternate with Head  "and Shoulders.    nystatin (MYCOSTATIN) 628803 UNIT/ML suspension Take 5 mLs (500,000 Units) by mouth 4 times daily    order for DME Equipment being ordered: Splint - Cubital tunnel splint for ulnar neuropathy    isoniazid (NYDRAZID) 300 MG tablet Take 1 tablet (300 mg) by mouth daily    pravastatin (PRAVACHOL) 20 MG tablet Take 1 tablet (20 mg) by mouth every evening    mycophenolate (CELLCEPT - GENERIC EQUIVALENT) 250 MG capsule Take 6 capsules (1,500 mg) by mouth 2 times daily    aspirin EC 81 MG EC tablet Take 1 tablet (81 mg) by mouth daily    calcium carb 1250 mg, 500 mg [place],/vitamin D 200 units (OSCAL WITH D) 500-200 MG-UNIT per tablet Take 1 tablet by mouth 2 times daily (with meals)    multivitamin, therapeutic with minerals (THERA-VIT-M) TABS tablet Take 1 tablet by mouth daily    pantoprazole (PROTONIX) 40 MG EC tablet Take 1 tablet (40 mg) by mouth every morning      Facility Administered Medications as of 5/5/2017             lidocaine (LMX4) kit Apply topically once as needed for mild pain          ROS:   Constitutional: No fever, chills, or sweats. No weight gain/loss.   ENT: No visual disturbance, ear ache, epistaxis, sore throat.   Allergies/Immunologic: Negative.   Respiratory: No cough, hemoptysis.   Cardiovascular: As per HPI.   GI: No nausea, vomiting, hematemesis, melena, or hematochezia.   : No urinary frequency, dysuria, or hematuria.   Integument: Negative.   Psychiatric: Negative.   Neuro: Negative.   Endocrinology: Negative.   Musculoskeletal: Negative    Exam:  /80 (BP Location: Left arm, Patient Position: Chair, Cuff Size: Adult Regular)  Pulse 97  Ht 1.676 m (5' 6\")  Wt 72.6 kg (160 lb)  SpO2 99%  BMI 25.82 kg/m2  In general, the patient is a pleasant male in no apparent distress.    HEENT: NC/AT. PERRLA. EOMI.  Sclerae white, not injected.    Neck:  No adenopathy, No thyromegaly.    COR: No JVP.  RRR.  Normal S1 S2.  No S3/S4. No murmur, rub click, or gallop.    Lungs:  " CTA. No crackles/wheeze.    Abdomen: soft, nontender, nondistended.  No organomegaly.  Extremities:  No clubbing, cyanosis, or edema.    Neuro: Alert & Oriented x 3, grossly non focal.    Labs:  CBC RESULTS:   Lab Results   Component Value Date    WBC 12.6 (H) 04/25/2017    RBC 3.76 (L) 04/25/2017    HGB 12.2 (L) 04/25/2017    HCT 34.9 (L) 04/25/2017    MCV 93 04/25/2017    MCH 32.4 04/25/2017    MCHC 35.0 04/25/2017    RDW 12.6 04/25/2017     04/25/2017       BMP RESULTS:  Lab Results   Component Value Date     04/26/2017    POTASSIUM 3.6 04/26/2017    CHLORIDE 107 04/26/2017    CO2 21 04/26/2017    ANIONGAP 10 04/26/2017     (H) 04/26/2017    BUN 19 04/26/2017    CR 0.74 04/26/2017    GFRESTIMATED >90  Non  GFR Calc   04/26/2017    GFRESTBLACK >90   GFR Calc   04/26/2017    DAISY 9.0 04/26/2017      LIPID RESULTS:  Lab Results   Component Value Date    CHOL 198 01/13/2017    HDL 88 01/13/2017    LDL 93 01/13/2017    TRIG 82 01/13/2017       IMMUNOSUPPRESSANT LEVELS  Lab Results   Component Value Date    TACROL 9.0 04/25/2017    DOSTAC Not Provided 04/25/2017       No components found for: CK  Lab Results   Component Value Date    MAG 2.0 04/25/2017     Lab Results   Component Value Date    A1C 11.6 (H) 04/25/2017     Lab Results   Component Value Date    PHOS 3.0 03/28/2017     Lab Results   Component Value Date    NTBNP 1945 (H) 06/29/2016     Lab Results   Component Value Date    SAITESTMET [facility] 03/15/2017    SAICELL Class I 03/15/2017    [id number] None 03/15/2017    KP5IQUMMWT B:8 03/15/2017    SAIREPCOM  03/15/2017      Test performed by modified procedure. Serum heat inactivated. High-risk,   mfi >3,000. Mod-risk, mfi 500-3,000.       Lab Results   Component Value Date    SAIITESTME [facility] 03/15/2017    SAIICELL Class II 03/15/2017    [id number] None 03/15/2017    [id number] None 03/15/2017    SAIIREPCOM  03/15/2017      Test performed by modified procedure.  Serum heat inactivated. High-risk,   mfi >3,000. Mod-risk, mfi 500-3,000.       Lab Results   Component Value Date    CSPEC Plasma, EDTA anticoagulant 03/15/2017       Echocardiogram 3/15/17:  Interpretation Summary  Global and regional left ventricular function is normal with an EF of 60-65%.  Global right ventricular function is normal.  The inferior vena cava is normal. Estimated mean right atrial pressure is <3mmHg.  No pericardial effusion is present.  This study was compared with the study from 2/14/2017. There has been no change.    Assessment and Plan:  Javi Shetty is a 46 year-old male with a history of Chagas cardiomyopathy (diagnosed in 2010) and biventricular systolic heart failure (LVEF 15-20% since 2015, on home milrinone since 8/2016) who subsequently underwent orthotopic heart transplantation on 12/12/16. Continues to do very well, and his blood sugars are well controlled on insulin. Blood pressures and weights remain stable, and his renal function is normal. Biopsy results pending. Told Javi it would most likely be fine for him to return to work in another month as he is doing so well.    Change in immunosuppression: no  Reason for Change: Tacrolimus level not checked today; was 9.0 on 4/25 (goal 10-12).  Other Changes: start Gabapentin 300mg qhs per neuro recs.  Follow-Up: 1 month for 6-month testing    Next Biopsy: 1 month  Next Angiogram: one year post-transplant  Next Angiogram with IVUS: yes  Next Stress Test: per protocol     2. 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    3. Chagas: follows with ID; continue INH.    4. Diabetes mellitus type 2: continue Novolog and Lantus.      [facility]  Heart Transplant  Pager [phone]

## 2017-05-05 NOTE — IP AVS SNAPSHOT
[address] [address]                                       After Visit Summary   5/5/2017    Javi Bansal    MRN: [id number]           After Visit Summary Signature Page     I have received my discharge instructions, and my questions have been answered. I have discussed any challenges I see with this plan with the nurse or doctor.    ..........................................................................................................................................  Patient/Patient Representative Signature      ..........................................................................................................................................  Patient Representative Print Name and Relationship to Patient    ..................................................               ................................................  Date                                            Time    ..........................................................................................................................................  Reviewed by Signature/Title    ...................................................              ..............................................  Date                                                            Time

## 2017-05-05 NOTE — PATIENT INSTRUCTIONS
You were seen in clinic for routine 5 month post heart transplant evaluation.  Recent available results appear normal and stable.  No medication changes were made during this clinic visit.   Coordinator will consult Dr. Mcclellan regarding returning to work and will contact Neurology regarding adding a medication to reduce your hand numbness.  Please continue to closely monitor your blood sugars and  follow up with your diabetes specialist.     Follow up:  Coordinator will update you with pending results.  Your next scheduled routine heart transplant evaluation is on June 1.  Please bring in or send outstanding medical bills to your coordinator.  Please call with any questions/concerns.  [phone].

## 2017-05-05 NOTE — PROCEDURES
FINAL CARDIAC CATH REPORT:     PROCEDURES PERFORMED:   Endomyocardial Biopsy    PHYSICIANS:  Attending Physician: Dante Way MD  Interventional Cardiology Fellow: None  Cardiology Fellow: Broderick Brambila    INDICATION:  Javi Bansal is a 46 year old male with hx of OHT (2/2 Chagas) 12/12/16. Here for routine biopsy.    DESCRIPTION:  1. Consent obtained with discussion of risks.  All questions were answered.  2. Sterile prep and procedure.  3. Location with Sheaths:   Rt IJ  7 Fr 10 cm [short]  4. Access: Local anesthetic with lidocaine.  A standard 18 guage needle with ultrasound guidance was used to establish vascular access using a modified Seldinger technique.  5. Estimated blood loss: < 5 ml    MEDICATIONS:  None    Procedures:    ENDOMYOCARDIAL BIOPSY:  1. Successful collection of 5 right ventricular endomyocardial biopsy samples using the Cordis.      Sheath Removal:  The 7Fr sheath was manually removed in the cardiac catheterization laboratory.    Contrast: Isovue, 0 ml     Fluoroscopy Time: 1.0 min    COMPLICATIONS:  1. None    SUMMARY:   Biopsies taken and sent to labs for pathology    PLAN:   >> Bedrest per protocol.    The attending interventional cardiologist was present and supervised all critical aspects the procedure.    See CVIS report for final draft.    GIOVANNY Brambila   Cardiology Fellow      Staff Cardiologist: I personally performed the endomyocardial biopsy.  I agree with the documentation above.    MD Dante Bess   Cardiology Staff

## 2017-05-05 NOTE — PROGRESS NOTES
1055 Pt arrived on 2a post heart biopsy. VSS Ra. Dressing c/d/i. No pain. Dc instructions reviewed with pt, copy given to pt. 1110 Pt dc'd to clinic for appt acc by .

## 2017-05-05 NOTE — DISCHARGE INSTRUCTIONS
[facility]                        Interventional Cardiology  Discharge Instructions   Post Right Heart Cath      AFTER YOU GO HOME:    DO drink plenty of fluids    DO resume your regular diet and medications unless otherwise instructed by your Primary Physician    Do Not scrub the procedure site vigorously    No lotion or powder to the puncture site for 3 days    CALL YOUR PRIMARY PHYSICIAN IF: You may resume all normal activity.  Monitor neck site for bleeding, swelling, or voice changes. If you notice bleeding or swelling immediately apply pressure to the site and call number below to speak with Cardiology Fellow.  If you experience any changes in your breathing you should call your doctor immediately or come to the closest Emergency Department.  Do not drive yourself.    ADDITIONAL INSTRUCTIONS: Medications: You are to resume all home medications including anticoagulation therapy unless otherwise advised by your primary cardiologist or nurse coordinator.    Follow Up: Per your primary cardiology team    If you have any questions or concerns regarding your procedure site please call [phone] at anytime and ask for Cardiology Fellow on call.  They are available 24 hours a day.  You may also contact the Cardiology Clinic after hours number at [phone].                                                       Telephone Numbers [phone] Monday-Friday 8:00 am to 4:30 pm    [phone] After 4:30 pm Monday-Friday, Weekends & Holidays  Ask for Interventional Cardiologist on call. Someone is on call 24 hours/day   [facility] toll free number [phone] Monday-Friday 8:00 am to 4:30 pm   [facility] Emergency Dept [phone]

## 2017-05-05 NOTE — IP AVS SNAPSHOT
MRN:[id number]                      After Visit Summary   5/5/2017    Javi Bansal    MRN: [id number]           Visit Information        Department      5/5/2017  9:24 AM [address] [facility] [place]          Review of your medicines      UNREVIEWED medicines. 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)        Dose:  81 mg   Take 1 tablet (81 mg) by mouth daily   Quantity:  90 tablet   Refills:  3       calcium carb 1250 mg (500 mg [place])/vitamin D 200 units 500-200 MG-UNIT per tablet   Commonly known as:  OSCAL with D   Used for:  Heart replaced by transplant (H)        Dose:  1 tablet   Take 1 tablet by mouth 2 times daily (with meals)   Quantity:  60 tablet   Refills:  11       insulin aspart 100 UNIT/ML injection   Commonly known as:  NovoLOG PEN   Used for:  Hyperglycemia        Dose:  10 Units   Inject 10 Units Subcutaneous 3 times daily (with meals)   Quantity:  9 mL   Refills:  1       insulin glargine 100 UNIT/ML injection   Commonly known as:  LANTUS   Used for:  Hyperglycemia        Dose:  30 Units   Inject 30 Units Subcutaneous every 24 hours   Quantity:  9 mL   Refills:  1       isoniazid 300 MG tablet   Commonly known as:  NYDRAZID   Used for:  Heart replaced by transplant (H)        Dose:  300 mg   Take 1 tablet (300 mg) by mouth daily   Quantity:  90 tablet   Refills:  3       ketoconazole 2 % shampoo   Commonly known as:  NIZORAL   Used for:  Seborrhea        Apply topically three times a week Alternate with Head and Shoulders.   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 (H)        Dose:  1 tablet   Take 1 tablet by mouth daily   Quantity:  30 each   Refills:  11       mycophenolate 250 MG capsule   Commonly known as:  CELLCEPT - GENERIC EQUIVALENT   Used for:  Heart replaced by transplant  (H)        Dose:  1500 mg   Take 6 capsules (1,500 mg) by mouth 2 times daily   Quantity:  360 capsule   Refills:  11       nystatin 870514 UNIT/ML suspension   Commonly known as:  MYCOSTATIN   Used for:  Heart replaced by transplant (H)        Dose:  062855 Units   Take 5 mLs (500,000 Units) by mouth 4 times daily   Quantity:  473 mL   Refills:  3       pantoprazole 40 MG EC tablet   Commonly known as:  PROTONIX   Used for:  Heart replaced by transplant (H)        Dose:  40 mg   Take 1 tablet (40 mg) by mouth every morning   Quantity:  30 tablet   Refills:  11       pravastatin 20 MG tablet   Commonly known as:  PRAVACHOL   Used for:  Heart replaced by transplant (H)        Dose:  20 mg   Take 1 tablet (20 mg) by mouth every evening   Quantity:  30 tablet   Refills:  11       predniSONE 5 MG tablet   Commonly known as:  DELTASONE   Used for:  Heart replaced by transplant (H)        Take 5mg(1 tablet) in the morning and in the evening.   Quantity:  60 tablet   Refills:  1       tacrolimus 1 MG capsule   Commonly known as:  PROGRAF - GENERIC EQUIVALENT   Used for:  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   Quantity:  90 capsule   Refills:  11         CONTINUE these medicines which have NOT CHANGED        Dose / Directions    alcohol swab prep pads   Used for:  Hyperglycemia        Use to swab area of injection/raymond as directed.   Quantity:  100 each   Refills:  1       B-D SHARPS DISPOSAL BY MAIL [facility]   Used for:  Hyperglycemia        1 container   Quantity:  1 each   Refills:  1       blood glucose monitoring lancets   Used for:  Hyperglycemia        Use to test blood sugar 4 times daily or as directed.   Quantity:  1 Box   Refills:  1       blood glucose monitoring test strip   Commonly known as:  no brand specified   Used for:  Hyperglycemia        Use to test blood sugar 4 times daily or as directed.   Quantity:  1 Box   Refills:  1        insulin pen needle 32G X 4 MM   Used for:  Hyperglycemia        Use pen needles daily or as directed.   Quantity:  100 each   Refills:  1       order for DME   Used for:  Lesion of right ulnar nerve        Equipment being ordered: Splint - Cubital tunnel splint for ulnar neuropathy   Quantity:  1 Device   Refills:  0                Protect others around you: Learn how to safely use, store and throw away your medicines at www.disposemymeds.org.         Follow-ups after your visit        Your next 10 appointments already scheduled     May 05, 2017 10:30 AM CDT   Heart Cath Heart Biopsy with UUHCVR3   [facility] ([facility], [facility])    [address] [facility] [address] [phone]            May 05, 2017 12:45 PM CDT   RETURN HEART TRANSPLANT with SAMM Ramos CNP   [facility] ([facility])    [address] [place] [id number] [phone]            May 15, 2017  9:00 AM CDT   (Arrive by 8:45 AM)   RETURN DIABETES with Alla Jerez PA-C   [facility] ([facility])    [address] [place] [id number] [phone]            May 31, 2017  1:30 PM CDT   (Arrive by 1:15 PM)   Office Visit with Agueda Osorio RN   [facility] ([facility])    [address] [place] [id number] [phone]           Bring a current list of meds and any records pertaining to this visit.  For Physicals, please bring immunization records and any forms needing to be filled out.  Please arrive 10 minutes early to complete paperwork.            Jun 01, 2017  8:00 AM CDT   MR CARDIAC W CONTRAST W FLOW QUANT with UUMMARICARMEN UU CV MR NURSE   [facility] ([facility], [facility])    [address] [facility] [id number] [phone]            Take your medicines as usual, unless your doctor tells you not to.   If you take Viagra, Levitra or Cialis, stop taking it 48 hours before your test.   If you take Aggrenox or dipyridamole (Persantine, Permole), stop taking it 48 hours before your test.   If you take Theophylline or Aminophylline, stop taking it 12 hours before your test.   If you are diabetic and take oral hypoglycemics, do not take them on the day of your test.  The day before your exam, drink extra fluids at least six 8-ounce glasses (unless your doctor wants you to limit your fluids).  Stop all caffeine 12 hours before the test. This includes coffee, tea, soda, chocolate and certain medicines (such as Anacin, Excedrin and NoDoz). Also avoid decaf coffee and tea, as these contain small amounts of caffeine.  Do not eat or drink for 3 hours before your exam. You may drink water and take your morning medicines.  You may need a blood test (creatinine test) within 30 days of your exam. Follow your doctor s orders if this is arranged before your exam.  If you are very claustrophobic, please let you doctor know. You may get a sedative medicine from your doctor before you arrive. Bring the medicine to the exam. Do not take it at home. Arrive one hour early. Bring someone who can take you home after the test. Your medicine will make you sleepy. After the exam, you may not drive, take a bus or take a taxi by yourself.  The MRI machine uses a strong magnet. Please wear clothes without metal (snaps, zippers). A sweatsuit works well, or we may give you a hospital gown.  Please remove any body piercings and hair extensions before you arrive. You will remove watches, jewelry, hairpins, wallets, dentures, partial dental plates and hearing aids. You may wear contact lenses, and you may be able to wear your rings. We have a safe place to keep your personal items, but it is safer to leave them at home.   **IMPORTANT** THE INSTRUCTIONS BELOW ARE ONLY FOR THOSE  PATIENTS WHO HAVE BEEN TOLD THEY WILL RECEIVE SEDATION OR GENERAL ANESTHESIA DURING THEIR MRI PROCEDURE:  IF YOU WILL RECEIVE SEDATION (take medicine to help you relax during your exam):   You must get the medicine from your doctor before you arrive. Bring the medicine to the exam. Do not take it at home.   Arrive one hour early. Bring someone who can take you home after the test. Your medicine will make you sleepy. After the exam, you may not drive, take a bus or take a taxi by yourself.   No eating 8 hours before your exam. You may have clear liquids up until 4 hours before your exam. (Clear liquids include water, clear tea, black coffee and fruit juice without pulp.)  IF YOU WILL RECEIVE ANESTHESIA (be asleep for your exam):   Arrive 1 1/2 hours early. Bring someone who can take you home after the test. You may not drive, take a bus or take a taxi by yourself.   No eating 8 hours before your exam. You may have clear liquids up until 4 hours before your exam. (Clear liquids include water, clear tea, black coffee and fruit juice without pulp.)  If you have any questions, please contact your Imaging Department exam site.            Jun 01, 2017 10:15 AM CDT   (Arrive by 10:00 AM)   Return Visit with Ivan Burrell MD   [facility] ([facility])    [address] [address] [phone]            Jun 02, 2017  9:15 AM CDT   LAB with ACUTE CARE LAB [facility] [place], Lab ([facility], [place])    [address] [facility] [address]              Patient must bring picture ID.  Patient should be prepared to give a urine specimen  Please do not eat 10-12 hours before your appointment if you are coming in fasting for labs on lipids, cholesterol, or glucose (sugar).  Pregnant women should follow their Care Team instructions. Water with medications is okay. Do not drink coffee or other fluids.   If  you have concerns about taking  your medications, please ask at office or if scheduling via [employer], send a message by clicking on Secure Messaging, Message Your Care Team.            Jun 02, 2017 10:00 AM CDT   Procedure - 2.5 hour with U2A ROOM 15   Unit 2A [facility] [place] ([facility])    [address] [facility] [address]               Jun 02, 2017 11:00 AM CDT   Cath 90 Minute with UUHCVR4   [facility]  [facility] ([facility])    [address] [facility] [address]   [phone]               Care Instructions        Further instructions from your care team       [facility]                        Interventional Cardiology  Discharge Instructions   Post Right Heart Cath      AFTER YOU GO HOME:    DO drink plenty of fluids    DO resume your regular diet and medications unless otherwise instructed by your Primary Physician    Do Not scrub the procedure site vigorously    No lotion or powder to the puncture site for 3 days    CALL YOUR PRIMARY PHYSICIAN IF: You may resume all normal activity.  Monitor neck site for bleeding, swelling, or voice changes. If you notice bleeding or swelling immediately apply pressure to the site and call number below to speak with Cardiology Fellow.  If you experience any changes in your breathing you should call your doctor immediately or come to the closest Emergency Department.  Do not drive yourself.    ADDITIONAL INSTRUCTIONS: Medications: You are to resume all home medications including anticoagulation therapy unless otherwise advised by your primary cardiologist or nurse coordinator.    Follow Up: Per your primary cardiology team    If you have any questions or concerns regarding your procedure site please call [phone] at anytime and ask for Cardiology Fellow on call.  They are available 24 hours a day.  You may also contact the Cardiology Clinic after  "hours number at [phone].                                                       Telephone Numbers [phone] Monday-Friday 8:00 am to 4:30 pm    [phone] After 4:30 pm Monday-Friday, Weekends & Holidays  Ask for Interventional Cardiologist on call. Someone is on call 24 hours/day   [facility] toll free number [phone] Monday-Friday 8:00 am to 4:30 pm   [facility] Emergency Dept [phone]                    Additional Information About Your Visit        [employer] Information     [employer] lets you send messages to your doctor, view your test results, renew your prescriptions, schedule appointments and more. To sign up, go to [email or web address] . Click on \"Log in\" on the left side of the screen, which will take you to the Welcome page. Then click on \"Sign up Now\" on the right side of the page.     You will be asked to enter the access code listed below, as well as some personal information. Please follow the directions to create your username and password.     Your access code is: [email or web address]  Expires: 2017  6:30 AM     Your access code will  in 90 days. If you need help or a new code, please call your [place] clinic or [phone].        Care EveryWhere ID     This is your Care EveryWhere ID. This could be used by other organizations to access your [place] medical records  [id number]         Primary Care Provider Office Phone # Fax #    Ivan Burrell [phone]      Thank you!     Thank you for choosing [place] for your care. Our goal is always to provide you with excellent care. Hearing back from our patients is one way we can continue to improve our services. Please take a few minutes to complete the written survey that you may receive in the mail after you visit with us. Thank you!             Medication List: This is a list of all your medications and when to take them. Check marks below indicate your daily home schedule. Keep this list as a " reference.      Medications           Morning Afternoon Evening Bedtime As Needed    alcohol swab prep pads   Use to swab area of injection/raymond as directed.                                aspirin 81 MG EC tablet   Take 1 tablet (81 mg) by mouth daily                                B-D SHARPS DISPOSAL BY MAIL [facility]   1 container                                blood glucose monitoring lancets   Use to test blood sugar 4 times daily or as directed.                                blood glucose monitoring test strip   Commonly known as:  no brand specified   Use to test blood sugar 4 times daily or as directed.                                calcium carb 1250 mg (500 mg [place])/vitamin D 200 units 500-200 MG-UNIT per tablet   Commonly known as:  OSCAL with D   Take 1 tablet by mouth 2 times daily (with meals)                                insulin aspart 100 UNIT/ML injection   Commonly known as:  NovoLOG PEN   Inject 10 Units Subcutaneous 3 times daily (with meals)                                insulin glargine 100 UNIT/ML injection   Commonly known as:  LANTUS   Inject 30 Units Subcutaneous every 24 hours                                insulin pen needle 32G X 4 MM   Use pen needles daily or as directed.                                isoniazid 300 MG tablet   Commonly known as:  NYDRAZID   Take 1 tablet (300 mg) by mouth daily                                ketoconazole 2 % shampoo   Commonly known as:  NIZORAL   Apply topically three times a week Alternate with Head and Shoulders.                                multivitamin, therapeutic with minerals Tabs tablet   Take 1 tablet by mouth daily                                mycophenolate 250 MG capsule   Commonly known as:  CELLCEPT - GENERIC EQUIVALENT   Take 6 capsules (1,500 mg) by mouth 2 times daily                                nystatin 978653 UNIT/ML suspension   Commonly known as:  MYCOSTATIN   Take 5 mLs (500,000 Units) by mouth 4 times daily                                 order for DME   Equipment being ordered: Splint - Cubital tunnel splint for ulnar neuropathy                                pantoprazole 40 MG EC tablet   Commonly known as:  PROTONIX   Take 1 tablet (40 mg) by mouth every morning                                pravastatin 20 MG tablet   Commonly known as:  PRAVACHOL   Take 1 tablet (20 mg) by mouth every evening                                predniSONE 5 MG tablet   Commonly known as:  DELTASONE   Take 5mg(1 tablet) in the morning and in the evening.                                tacrolimus 1 MG capsule   Commonly known as:  PROGRAF - GENERIC EQUIVALENT   Take 2mg (2 capsules) in the morning and 1mg (1 capsule) in the evening.

## 2017-05-05 NOTE — PROGRESS NOTES
1020 Pt on 2a prepped and heart biopsy. Lidocaine applied to right neck. H&P current.  at BS. Pt consented.

## 2017-05-05 NOTE — MR AVS SNAPSHOT
After Visit Summary   5/5/2017    Javi Bansal    MRN: [id number]           Patient Information     Date Of Birth          1970        Visit Information        Provider Department      5/5/2017 12:45 PM Carlos Daniels Erin K, [facility]        Care Instructions    You were seen in clinic for routine 5 month post heart transplant evaluation.  Recent available results appear normal and stable.  No medication changes were made during this clinic visit.   Coordinator will consult Dr. Mcclellan regarding returning to work and will contact Neurology regarding adding a medication to reduce your hand numbness.  Please continue to closely monitor your blood sugars and  follow up with your diabetes specialist.     Follow up:  Coordinator will update you with pending results.  Your next scheduled routine heart transplant evaluation is on June 1.  Please bring in or send outstanding medical bills to your coordinator.  Please call with any questions/concerns.  [phone].          Follow-ups after your visit        Your next 10 appointments already scheduled     May 15, 2017  9:00 AM CDT   (Arrive by 8:45 AM)   RETURN DIABETES with Alla Jerez PA-C   [facility] ([facility])    [address] [place] [id number] [phone]            May 31, 2017  1:30 PM CDT   (Arrive by 1:15 PM)   Office Visit with Agueda Osorio RN   [facility] Diabetes ([facility])    [address] [place] [id number] [phone]           Bring a current list of meds and any records pertaining to this visit.  For Physicals, please bring immunization records and any forms needing to be filled out.  Please arrive 10 minutes early to complete paperwork.            Jun 01, 2017  8:00 AM CDT   MR CARDIAC W CONTRAST W FLOW QUANT with UUMR4, UU CV MR NURSE   [facility],  MRI ([facility] [place] [place])    [address] [id number] [phone]           Take your medicines as usual, unless your doctor tells you not to.   If you take Viagra, Levitra or Cialis, stop taking it 48 hours before your test.   If you take Aggrenox or dipyridamole (Persantine, Permole), stop taking it 48 hours before your test.   If you take Theophylline or Aminophylline, stop taking it 12 hours before your test.   If you are diabetic and take oral hypoglycemics, do not take them on the day of your test.  The day before your exam, drink extra fluids at least six 8-ounce glasses (unless your doctor wants you to limit your fluids).  Stop all caffeine 12 hours before the test. This includes coffee, tea, soda, chocolate and certain medicines (such as Anacin, Excedrin and NoDoz). Also avoid decaf coffee and tea, as these contain small amounts of caffeine.  Do not eat or drink for 3 hours before your exam. You may drink water and take your morning medicines.  You may need a blood test (creatinine test) within 30 days of your exam. Follow your doctor s orders if this is arranged before your exam.  If you are very claustrophobic, please let you doctor know. You may get a sedative medicine from your doctor before you arrive. Bring the medicine to the exam. Do not take it at home. Arrive one hour early. Bring someone who can take you home after the test. Your medicine will make you sleepy. After the exam, you may not drive, take a bus or take a taxi by yourself.  The MRI machine uses a strong magnet. Please wear clothes without metal (snaps, zippers). A sweatsuit works well, or we may give you a hospital gown.  Please remove any body piercings and hair extensions before you arrive. You will remove watches, jewelry, hairpins, wallets, dentures, partial dental plates and hearing aids. You may wear contact lenses, and you may be able to wear your rings. We have  a safe place to keep your personal items, but it is safer to leave them at home.   **IMPORTANT** THE INSTRUCTIONS BELOW ARE ONLY FOR THOSE PATIENTS WHO HAVE BEEN TOLD THEY WILL RECEIVE SEDATION OR GENERAL ANESTHESIA DURING THEIR MRI PROCEDURE:  IF YOU WILL RECEIVE SEDATION (take medicine to help you relax during your exam):   You must get the medicine from your doctor before you arrive. Bring the medicine to the exam. Do not take it at home.   Arrive one hour early. Bring someone who can take you home after the test. Your medicine will make you sleepy. After the exam, you may not drive, take a bus or take a taxi by yourself.   No eating 8 hours before your exam. You may have clear liquids up until 4 hours before your exam. (Clear liquids include water, clear tea, black coffee and fruit juice without pulp.)  IF YOU WILL RECEIVE ANESTHESIA (be asleep for your exam):   Arrive 1 1/2 hours early. Bring someone who can take you home after the test. You may not drive, take a bus or take a taxi by yourself.   No eating 8 hours before your exam. You may have clear liquids up until 4 hours before your exam. (Clear liquids include water, clear tea, black coffee and fruit juice without pulp.)  If you have any questions, please contact your Imaging Department exam site.            Jun 01, 2017 10:15 AM CDT   (Arrive by 10:00 AM)   Return Visit with Ivan Burrell MD   [facility] ([facility])    [address]  [address]th [facility] [id number] [phone]            Jun 02, 2017  9:15 AM CDT   LAB with ACUTE CARE LAB [facility] [place], Lab ([facility] [place])    [address] [facility] [address] [facility] [address]              Patient must bring picture ID.  Patient should be prepared to give a urine specimen  Please do not eat 10-12 hours before your appointment if you are coming in fasting for labs on lipids, cholesterol, or glucose  (sugar).  Pregnant women should follow their Care Team instructions. Water with medications is okay. Do not drink coffee or other fluids.   If you have concerns about taking  your medications, please ask at office or if scheduling via [employer], send a message by clicking on Secure Messaging, Message Your Care Team.            Jun 02, 2017 10:00 AM CDT   Procedure - 2.5 hour with U2A ROOM 15   Unit 2A [facility] [place] ([facility])    [address] [facility] [address]               Jun 02, 2017 11:00 AM CDT   Cath 90 Minute with UUHCVR4   [facility] [place],  Heart Cath Lab ([facility])    [address] [address]   [phone]            Jun 02, 2017  2:00 PM CDT   (Arrive by 1:45 PM)   RETURN HEART TRANSPLANT with Geena Mcclellan MD   [facility] ([facility] [place])    [address] [place] [id number] [phone]            Jul 05, 2017  9:30 AM CDT   (Arrive by 9:15 AM)   Return Visit with Ivan Schuler MD   [facility] ([facility])    [address] [place] [id number] [phone]              Future tests that were ordered for you today     Open Future Orders        Priority Expected Expires Ordered    CBC with platelets differential Routine 5/5/2017 7/5/2017 5/5/2017    Tacrolimus level Routine 5/5/2017 7/5/2017 5/5/2017            Who to contact     If you have questions or need follow up information about today's clinic visit or your schedule please contact [facility] directly at [phone].  Normal or non-critical lab and imaging results will be communicated to you by MyChart, letter or phone within 4 business days after the clinic has received the results. If you do not hear from us within 7 days, please contact the clinic through [employer] or  "phone. If you have a critical or abnormal lab result, we will notify you by phone as soon as possible.  Submit refill requests through [employer] or call your pharmacy and they will forward the refill request to us. Please allow 3 business days for your refill to be completed.          Additional Information About Your Visit        [employer] Information     [employer] lets you send messages to your doctor, view your test results, renew your prescriptions, schedule appointments and more. To sign up, go to [email or web address] . Click on \"Log in\" on the left side of the screen, which will take you to the Welcome page. Then click on \"Sign up Now\" on the right side of the page.     You will be asked to enter the access code listed below, as well as some personal information. Please follow the directions to create your username and password.     Your access code is: [email or web address]  Expires: 2017  6:30 AM     Your access code will  in 90 days. If you need help or a new code, please call your [place] clinic or [phone].        Care EveryWhere ID     This is your Care EveryWhere ID. This could be used by other organizations to access your [place] medical records  [id number]        Your Vitals Were     Pulse Height Pulse Oximetry BMI (Body Mass Index)          97 1.676 m (5' 6\") 99% 25.82 kg/m2         Blood Pressure from Last 3 Encounters:   17 119/80   17 119/80   17 109/80    Weight from Last 3 Encounters:   17 72.6 kg (160 lb)   17 73 kg (160 lb 14.4 oz)   17 69 kg (152 lb 1.6 oz)              Today, you had the following     No orders found for display       Primary Care Provider Office Phone # Fax #    Ivan Burrell [phone]       [address] [address]        Thank you!     Thank you for choosing [facility]  for your care. Our goal is always to provide you with excellent care. Hearing back from our " patients is one way we can continue to improve our services. Please take a few minutes to complete the written survey that you may receive in the mail after your visit with us. Thank you!             Your Updated Medication List - Protect others around you: Learn how to safely use, store and throw away your medicines at www.disposemymeds.org.          This list is accurate as of: 5/5/17  1:17 PM.  Always use your most recent med list.                   Brand Name Dispense Instructions for use    alcohol swab prep pads     100 each    Use to swab area of injection/raymond as directed.       aspirin 81 MG EC tablet     90 tablet    Take 1 tablet (81 mg) by mouth daily       B-D SHARPS DISPOSAL BY MAIL Misc     1 each    1 container       blood glucose monitoring lancets     1 Box    Use to test blood sugar 4 times daily or as directed.       blood glucose monitoring test strip    no brand specified    1 Box    Use to test blood sugar 4 times daily or as directed.       calcium carb 1250 mg (500 mg [place])/vitamin D 200 units 500-200 MG-UNIT per tablet    OSCAL with D    60 tablet    Take 1 tablet by mouth 2 times daily (with meals)       insulin aspart 100 UNIT/ML injection    NovoLOG PEN    9 mL    Inject 10 Units Subcutaneous 3 times daily (with meals)       insulin glargine 100 UNIT/ML injection    LANTUS    9 mL    Inject 30 Units Subcutaneous every 24 hours       insulin pen needle 32G X 4 MM     100 each    Use pen needles daily or as directed.       isoniazid 300 MG tablet    NYDRAZID    90 tablet    Take 1 tablet (300 mg) by mouth daily       ketoconazole 2 % shampoo    NIZORAL    120 mL    Apply topically three times a week Alternate with Head and Shoulders.       multivitamin, therapeutic with minerals Tabs tablet     30 each    Take 1 tablet by mouth daily       mycophenolate 250 MG capsule    CELLCEPT - GENERIC EQUIVALENT    360 capsule    Take 6 capsules (1,500 mg) by mouth 2 times daily       nystatin [id number]  UNIT/ML suspension    MYCOSTATIN    473 mL    Take 5 mLs (500,000 Units) by mouth 4 times daily       order for DME     1 Device    Equipment being ordered: Splint - Cubital tunnel splint for ulnar neuropathy       pantoprazole 40 MG EC tablet    PROTONIX    30 tablet    Take 1 tablet (40 mg) by mouth every morning       pravastatin 20 MG tablet    PRAVACHOL    30 tablet    Take 1 tablet (20 mg) by mouth every evening       predniSONE 5 MG tablet    DELTASONE    60 tablet    Take 5mg(1 tablet) in the morning and in the evening.       tacrolimus 1 MG capsule    PROGRAF - GENERIC EQUIVALENT    90 capsule    Take 2mg (2 capsules) in the morning and 1mg (1 capsule) in the evening.

## 2017-05-06 NOTE — PROGRESS NOTES
ADULT HEART TRANSPLANT CLINIC    HPI:   Javi Shetty is a 46 year-old male with a history of Chagas cardiomyopathy (diagnosed in 2010) and biventricular systolic heart failure (LVEF 15-20% since 2015, on home milrinone since 8/2016) who subsequently underwent orthotopic heart transplantation on 12/12/16. His postoperative course was complicated by rejection, ventricular tachycardia, and reactivation of his Chagas.      His first surveillance biopsy on 12/19 showed grade 2R ACR, which was treated with IV steroids. Repeat biopsy showed persistent rejection, which was treated with IV steroids and thymoglobulin. Biopsies have been negative for rejection since then. Graft function has remained normal with an EF of 60-65% on 1/13/17.  Baseline angiogram showed no evidence of CAV. Was hospitalized last week with hyperglycemia, and was started on insulin. Patient presents to clinic today as part of his routine 5-month post-transplant surveillance.    Javi continues to feel well, but is disappointed that he needs to be on insulin now. He is feeling comfortable with checking blood sugars at home and giving himself insulin, and his blood sugars have been well controlled. Continues to have numbness and tingling mainly in his right fourth and fifth fingers, and wonders if he will be able to start medication for that. 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 He continues to struggle to pay his medical bills and is eager to contribute to his family again.    Blood pressures and weights have been stable at home, and he denies any dizziness, SOB, edema, chest pain, or palpitations. He was able to start cardiac rehab yesterday, and he is looking forward to betting stronger. Overall he is feeling good and is happy with the progress he has made.    TRANSPLANT MEDICATIONS:  Tacrolimus 2mg/1mg daily  MMF 1.5gm  BID  Prednisone 5mg BID  Nystatin    LAST BIOPSY: 4/4/17  LAST ANGIOGRAM: 1/10/17  CMV: D+/R+  EBV: D+/R+  Intolerance to medications: none      PAST MEDICAL HISTORY:  Past Medical History:   Diagnosis Date     Chagas cardiomyopathy      Chronic systolic heart failure (H)      Diabetes (H)      Dual ICD (implantable cardioverter-defibrillator) in place 10/20/2015     Essential hypertension      Gastroesophageal reflux disease      H/O gastric ulcer      Hypertension      Premature ventricular contractions      Presbyopia      Pterygium     ?? suspected , but unclear dx       CURRENT MEDICATIONS:  Prescription Medications as of 5/5/2017             insulin glargine (LANTUS) 100 UNIT/ML injection Inject 30 Units Subcutaneous every 24 hours    insulin aspart (NOVOLOG PEN) 100 UNIT/ML injection Inject 10 Units Subcutaneous 3 times daily (with meals)    insulin pen needle 32G X 4 MM Use pen needles daily or as directed.    blood glucose monitoring (NO BRAND SPECIFIED) test strip Use to test blood sugar 4 times daily or as directed.    blood glucose monitoring (ACCU-CHEK FASTCLIX) lancets Use to test blood sugar 4 times daily or as directed.    Sharps Container (B-D SHARPS DISPOSAL BY MAIL) MISC 1 container    alcohol swab prep pads Use to swab area of injection/raymond as directed.    predniSONE (DELTASONE) 5 MG tablet Take 5mg(1 tablet) in the morning and in the evening.    tacrolimus (PROGRAF - GENERIC EQUIVALENT) 1 MG capsule Take 2mg (2 capsules) in the morning and 1mg (1 capsule) in the evening.    ketoconazole (NIZORAL) 2 % shampoo Apply topically three times a week Alternate with Head and Shoulders.    nystatin (MYCOSTATIN) 282128 UNIT/ML suspension Take 5 mLs (500,000 Units) by mouth 4 times daily    order for DME Equipment being ordered: Splint - Cubital tunnel splint for ulnar neuropathy    isoniazid (NYDRAZID) 300 MG tablet Take 1 tablet (300 mg) by mouth daily    pravastatin (PRAVACHOL) 20 MG tablet Take 1  "tablet (20 mg) by mouth every evening    mycophenolate (CELLCEPT - GENERIC EQUIVALENT) 250 MG capsule Take 6 capsules (1,500 mg) by mouth 2 times daily    aspirin EC 81 MG EC tablet Take 1 tablet (81 mg) by mouth daily    calcium carb 1250 mg, 500 mg [place],/vitamin D 200 units (OSCAL WITH D) 500-200 MG-UNIT per tablet Take 1 tablet by mouth 2 times daily (with meals)    multivitamin, therapeutic with minerals (THERA-VIT-M) TABS tablet Take 1 tablet by mouth daily    pantoprazole (PROTONIX) 40 MG EC tablet Take 1 tablet (40 mg) by mouth every morning      Facility Administered Medications as of 5/5/2017             lidocaine (LMX4) kit Apply topically once as needed for mild pain          ROS:   Constitutional: No fever, chills, or sweats. No weight gain/loss.   ENT: No visual disturbance, ear ache, epistaxis, sore throat.   Allergies/Immunologic: Negative.   Respiratory: No cough, hemoptysis.   Cardiovascular: As per HPI.   GI: No nausea, vomiting, hematemesis, melena, or hematochezia.   : No urinary frequency, dysuria, or hematuria.   Integument: Negative.   Psychiatric: Negative.   Neuro: Negative.   Endocrinology: Negative.   Musculoskeletal: Negative    Exam:  /80 (BP Location: Left arm, Patient Position: Chair, Cuff Size: Adult Regular)  Pulse 97  Ht 1.676 m (5' 6\")  Wt 72.6 kg (160 lb)  SpO2 99%  BMI 25.82 kg/m2  In general, the patient is a pleasant male in no apparent distress.    HEENT: NC/AT. PERRLA. EOMI.  Sclerae white, not injected.    Neck:  No adenopathy, No thyromegaly.    COR: No JVP.  RRR.  Normal S1 S2.  No S3/S4. No murmur, rub click, or gallop.    Lungs:  CTA. No crackles/wheeze.    Abdomen: soft, nontender, nondistended.  No organomegaly.  Extremities:  No clubbing, cyanosis, or edema.    Neuro: Alert & Oriented x 3, grossly non focal.    Labs:  CBC RESULTS:   Lab Results   Component Value Date    WBC 12.6 (H) 04/25/2017    RBC 3.76 (L) 04/25/2017    HGB 12.2 (L) 04/25/2017    HCT " 34.9 (L) 04/25/2017    MCV 93 04/25/2017    MCH 32.4 04/25/2017    MCHC 35.0 04/25/2017    RDW 12.6 04/25/2017     04/25/2017       BMP RESULTS:  Lab Results   Component Value Date     04/26/2017    POTASSIUM 3.6 04/26/2017    CHLORIDE 107 04/26/2017    CO2 21 04/26/2017    ANIONGAP 10 04/26/2017     (H) 04/26/2017    BUN 19 04/26/2017    CR 0.74 04/26/2017    GFRESTIMATED >90  Non  GFR Calc   04/26/2017    GFRESTBLACK >90   GFR Calc   04/26/2017    DAISY 9.0 04/26/2017      LIPID RESULTS:  Lab Results   Component Value Date    CHOL 198 01/13/2017    HDL 88 01/13/2017    LDL 93 01/13/2017    TRIG 82 01/13/2017       IMMUNOSUPPRESSANT LEVELS  Lab Results   Component Value Date    TACROL 9.0 04/25/2017    DOSTAC Not Provided 04/25/2017       No components found for: CK  Lab Results   Component Value Date    MAG 2.0 04/25/2017     Lab Results   Component Value Date    A1C 11.6 (H) 04/25/2017     Lab Results   Component Value Date    PHOS 3.0 03/28/2017     Lab Results   Component Value Date    NTBNP 1945 (H) 06/29/2016     Lab Results   Component Value Date    SAITESTMET [facility] 03/15/2017    SAICELL Class I 03/15/2017    SH2MIPACB None 03/15/2017    JE7RFQUQCC B:8 03/15/2017    SAIREPCOM  03/15/2017      Test performed by modified procedure. Serum heat inactivated. High-risk,   mfi >3,000. Mod-risk, mfi 500-3,000.       Lab Results   Component Value Date    SAIITESTME [facility] 03/15/2017    SAIICELL Class II 03/15/2017    [id number] None 03/15/2017    [id number] None 03/15/2017    SAIIREPCOM  03/15/2017      Test performed by modified procedure. Serum heat inactivated. High-risk,   mfi >3,000. Mod-risk, mfi 500-3,000.       Lab Results   Component Value Date    CSPEC Plasma, EDTA anticoagulant 03/15/2017       Echocardiogram 3/15/17:  Interpretation Summary  Global and regional left ventricular function is normal with an EF of 60-65%.  Global right ventricular function is  normal.  The inferior vena cava is normal. Estimated mean right atrial pressure is <3mmHg.  No pericardial effusion is present.  This study was compared with the study from 2/14/2017. There has been no change.    Assessment and Plan:  Javi Shetty is a 46 year-old male with a history of Chagas cardiomyopathy (diagnosed in 2010) and biventricular systolic heart failure (LVEF 15-20% since 2015, on home milrinone since 8/2016) who subsequently underwent orthotopic heart transplantation on 12/12/16. Continues to do very well, and his blood sugars are well controlled on insulin. Blood pressures and weights remain stable, and his renal function is normal. Biopsy results pending. Told Javi it would most likely be fine for him to return to work in another month as he is doing so well.    Change in immunosuppression: no  Reason for Change: Tacrolimus level not checked today; was 9.0 on 4/25 (goal 10-12).  Other Changes: start Gabapentin 300mg qhs per neuro recs.  Follow-Up: 1 month for 6-month testing    Next Biopsy: 1 month  Next Angiogram: one year post-transplant  Next Angiogram with IVUS: yes  Next Stress Test: per protocol     2. 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    3. Chagas: follows with ID; continue INH.    4. Diabetes mellitus type 2: continue Novolog and Lantus.      [facility]  Heart Transplant  Pager [phone]

## 2017-05-08 LAB
COPATH REPORT: NORMAL
PRA SINGLE ANTIGEN IGG ANTIBODY: NORMAL

## 2017-05-09 ENCOUNTER — TELEPHONE (OUTPATIENT)
Dept: TRANSPLANT | Facility: CLINIC | Age: 47
End: 2017-05-09

## 2017-05-09 DIAGNOSIS — Z94.1 HEART REPLACED BY TRANSPLANT (H): ICD-10-CM

## 2017-05-09 RX ORDER — PREDNISONE 5 MG/1
5 TABLET ORAL DAILY
Qty: 30 TABLET | Refills: 1 | Status: SHIPPED | OUTPATIENT
Start: 2017-05-09 | End: 2017-06-06

## 2017-05-09 NOTE — TELEPHONE ENCOUNTER
Called patient to confirm recent negative heart biopsy: no rejection. INstructed patient to reduce his prednisone dose to 5 mg in the morning.  Patient confirms instructions and states he had been taking his prednisone as a single dose (10 mg/day in the morning rather than 5 mg BID) per instructions he received in the hospital in order to reduce nighttime sleeplessness.  Patient states his blood sugars have been as low as 65 to 70 in the morning and up to 170 in the evening; he denies any episodes of dizziness/confusions nor any higher or lower values since being discharged from the hospital.    Confirmed his next DM appointment on Monday, May 15 at 0900. Instructed patient to have labs drawn that day at 0830 including a 12 hour FK level.  Patient verbalizes understanding plan of care and agrees to follow.     services used for this phone call.

## 2017-05-10 ENCOUNTER — AMBULATORY - HEALTHEAST (OUTPATIENT)
Dept: CARDIAC REHAB | Facility: HOSPITAL | Age: 47
End: 2017-05-10

## 2017-05-10 DIAGNOSIS — Z94.1 STATUS POST HEART TRANSPLANT (H): ICD-10-CM

## 2017-05-12 ENCOUNTER — AMBULATORY - HEALTHEAST (OUTPATIENT)
Dept: CARDIAC REHAB | Facility: HOSPITAL | Age: 47
End: 2017-05-12

## 2017-05-12 DIAGNOSIS — Z94.1 STATUS POST HEART TRANSPLANT (H): ICD-10-CM

## 2017-05-12 LAB
DONOR IDENTIFICATION: NORMAL
DSA COMMENTS: NORMAL
DSA PRESENT: NO
DSA TEST METHOD: NORMAL
ORGAN: NORMAL
SA1 CELL: NORMAL
SA1 COMMENTS: NORMAL
SA1 HI RISK ABY: NORMAL
SA1 MOD RISK ABY: NORMAL
SA1 TEST METHOD: NORMAL

## 2017-05-15 ENCOUNTER — OFFICE VISIT (OUTPATIENT)
Dept: INTERPRETER SERVICES | Facility: CLINIC | Age: 47
End: 2017-05-15

## 2017-05-15 ENCOUNTER — OFFICE VISIT (OUTPATIENT)
Dept: ENDOCRINOLOGY | Facility: CLINIC | Age: 47
End: 2017-05-15

## 2017-05-15 VITALS
HEART RATE: 93 BPM | SYSTOLIC BLOOD PRESSURE: 118 MMHG | HEIGHT: 66 IN | WEIGHT: 164 LBS | BODY MASS INDEX: 26.36 KG/M2 | DIASTOLIC BLOOD PRESSURE: 85 MMHG

## 2017-05-15 DIAGNOSIS — E09.9 STEROID-INDUCED DIABETES MELLITUS (H): ICD-10-CM

## 2017-05-15 DIAGNOSIS — T38.0X5A STEROID-INDUCED DIABETES MELLITUS (H): ICD-10-CM

## 2017-05-15 DIAGNOSIS — Z94.1 HEART REPLACED BY TRANSPLANT (H): ICD-10-CM

## 2017-05-15 DIAGNOSIS — E09.9 STEROID-INDUCED DIABETES MELLITUS (H): Primary | ICD-10-CM

## 2017-05-15 DIAGNOSIS — T38.0X5A STEROID-INDUCED DIABETES MELLITUS (H): Primary | ICD-10-CM

## 2017-05-15 DIAGNOSIS — R73.9 HYPERGLYCEMIA: ICD-10-CM

## 2017-05-15 LAB
ANION GAP SERPL CALCULATED.3IONS-SCNC: 10 MMOL/L (ref 3–14)
BUN SERPL-MCNC: 22 MG/DL (ref 7–30)
C PEPTIDE SERPL-MCNC: 2.4 NG/ML (ref 0.9–6.9)
CALCIUM SERPL-MCNC: 9.5 MG/DL (ref 8.5–10.1)
CHLORIDE SERPL-SCNC: 108 MMOL/L (ref 94–109)
CO2 SERPL-SCNC: 25 MMOL/L (ref 20–32)
CREAT SERPL-MCNC: 0.77 MG/DL (ref 0.66–1.25)
ERYTHROCYTE [DISTWIDTH] IN BLOOD BY AUTOMATED COUNT: 12.6 % (ref 10–15)
GFR SERPL CREATININE-BSD FRML MDRD: NORMAL ML/MIN/1.7M2
GLUCOSE SERPL-MCNC: 83 MG/DL (ref 70–99)
HCT VFR BLD AUTO: 37.6 % (ref 40–53)
HGB BLD-MCNC: 12.4 G/DL (ref 13.3–17.7)
MCH RBC QN AUTO: 32 PG (ref 26.5–33)
MCHC RBC AUTO-ENTMCNC: 33 G/DL (ref 31.5–36.5)
MCV RBC AUTO: 97 FL (ref 78–100)
MISCELLANEOUS TEST: NORMAL
PLATELET # BLD AUTO: 162 10E9/L (ref 150–450)
POTASSIUM SERPL-SCNC: 3.5 MMOL/L (ref 3.4–5.3)
RBC # BLD AUTO: 3.87 10E12/L (ref 4.4–5.9)
SODIUM SERPL-SCNC: 142 MMOL/L (ref 133–144)
TACROLIMUS BLD-MCNC: 12.7 UG/L (ref 5–15)
TME LAST DOSE: 2030 H
WBC # BLD AUTO: 5.9 10E9/L (ref 4–11)

## 2017-05-15 PROCEDURE — 80197 ASSAY OF TACROLIMUS: CPT | Performed by: INTERNAL MEDICINE

## 2017-05-15 PROCEDURE — 84681 ASSAY OF C-PEPTIDE: CPT | Performed by: INTERNAL MEDICINE

## 2017-05-15 ASSESSMENT — PAIN SCALES - GENERAL: PAINLEVEL: NO PAIN (0)

## 2017-05-15 NOTE — MR AVS SNAPSHOT
After Visit Summary   5/15/2017    Javi Bansal    MRN: [id number]           Patient Information     Date Of Birth          1970        Visit Information        Provider Department      5/15/2017 8:45 AM Alla Marroquin PA-C; [employer] [facility] Endocrinology        Today's Diagnoses     Steroid-induced diabetes mellitus (H)    -  1    Hyperglycemia          Care Instructions    Por favor, reduzca watt  LANTUS a 24 unidades.    Si ve numeros menos que < 80, bajase a solo 21 unidades.        Si eliminan watt Prednisona baje a 21 unidades y llamame.      Con watt Novolog, vladimir 5 unidades para comer si watt azucar es <100.  Si es >100, sigue con 10 unidades.       My best wishes,    Alla Marroquin PA-C, [facility]  [facility]  Diabetes, Endocrinology, and Metabolism  [phone] Appointments/Nurse  [phone] pager   [phone] nurse line  [phone] URGENTafter hours/weekend Endocrinologist on call      MI OFICINA :  [phone] ALLA MARROQUIN        Follow-ups after your visit        Follow-up notes from your care team     Return in about 4 weeks (around 6/12/2017).      Your next 10 appointments already scheduled     May 31, 2017  1:30 PM CDT   (Arrive by 1:15 PM)   Office Visit with Agueda Osorio RN   [facility] Diabetes ([facility])    [address] [address] [phone]           Bring a current list of meds and any records pertaining to this visit.  For Physicals, please bring immunization records and any forms needing to be filled out.  Please arrive 10 minutes early to complete paperwork.            Jun 01, 2017  8:00 AM CDT   MR CARDIAC W CONTRAST W FLOW QUANT with UPEDRORLilia, UU CV MR NURSE   [facility], [place], [facility] ([facility], [facility])    [address] [facility] [address] [phone]           Take your medicines as usual,  unless your doctor tells you not to.   If you take Viagra, Levitra or Cialis, stop taking it 48 hours before your test.   If you take Aggrenox or dipyridamole (Persantine, Permole), stop taking it 48 hours before your test.   If you take Theophylline or Aminophylline, stop taking it 12 hours before your test.   If you are diabetic and take oral hypoglycemics, do not take them on the day of your test.  The day before your exam, drink extra fluids at least six 8-ounce glasses (unless your doctor wants you to limit your fluids).  Stop all caffeine 12 hours before the test. This includes coffee, tea, soda, chocolate and certain medicines (such as Anacin, Excedrin and NoDoz). Also avoid decaf coffee and tea, as these contain small amounts of caffeine.  Do not eat or drink for 3 hours before your exam. You may drink water and take your morning medicines.  You may need a blood test (creatinine test) within 30 days of your exam. Follow your doctor s orders if this is arranged before your exam.  If you are very claustrophobic, please let you doctor know. You may get a sedative medicine from your doctor before you arrive. Bring the medicine to the exam. Do not take it at home. Arrive one hour early. Bring someone who can take you home after the test. Your medicine will make you sleepy. After the exam, you may not drive, take a bus or take a taxi by yourself.  The MRI machine uses a strong magnet. Please wear clothes without metal (snaps, zippers). A sweatsuit works well, or we may give you a hospital gown.  Please remove any body piercings and hair extensions before you arrive. You will remove watches, jewelry, hairpins, wallets, dentures, partial dental plates and hearing aids. You may wear contact lenses, and you may be able to wear your rings. We have a safe place to keep your personal items, but it is safer to leave them at home.   **IMPORTANT** THE INSTRUCTIONS BELOW ARE ONLY FOR THOSE PATIENTS WHO HAVE BEEN TOLD THEY  WILL RECEIVE SEDATION OR GENERAL ANESTHESIA DURING THEIR MRI PROCEDURE:  IF YOU WILL RECEIVE SEDATION (take medicine to help you relax during your exam):   You must get the medicine from your doctor before you arrive. Bring the medicine to the exam. Do not take it at home.   Arrive one hour early. Bring someone who can take you home after the test. Your medicine will make you sleepy. After the exam, you may not drive, take a bus or take a taxi by yourself.   No eating 8 hours before your exam. You may have clear liquids up until 4 hours before your exam. (Clear liquids include water, clear tea, black coffee and fruit juice without pulp.)  IF YOU WILL RECEIVE ANESTHESIA (be asleep for your exam):   Arrive 1 1/2 hours early. Bring someone who can take you home after the test. You may not drive, take a bus or take a taxi by yourself.   No eating 8 hours before your exam. You may have clear liquids up until 4 hours before your exam. (Clear liquids include water, clear tea, black coffee and fruit juice without pulp.)  If you have any questions, please contact your Imaging Department exam site.            Jun 01, 2017 10:15 AM CDT   (Arrive by 10:00 AM)   Return Visit with Ivan Burrell MD   [facility] ([facility])    [address] [facility]  [address] [facility] [id number] [phone]            Jun 02, 2017  9:15 AM CDT   LAB with ACUTE CARE LAB [facility], [place], Lab ([facility])    [address] [facility] [address]              Patient must bring picture ID.  Patient should be prepared to give a urine specimen  Please do not eat 10-12 hours before your appointment if you are coming in fasting for labs on lipids, cholesterol, or glucose (sugar).  Pregnant women should follow their Care Team instructions. Water with medications is okay. Do not drink coffee or other fluids.   If you have concerns about taking   your medications, please ask at office or if scheduling via Patsnap, send a message by clicking on Secure Messaging, Message Your Care Team.            Jun 02, 2017 10:00 AM CDT   Procedure - 2.5 hour with U2A ROOM 15   [address]A [facility] ([facility], [facility])    [address] [facility] [address]               Jun 02, 2017 11:00 AM CDT   Cath 90 Minute with [id number]   [facility], [place],  Heart Cath Lab ([facility])    [address] [facility] [address] [phone]            Jun 02, 2017  2:00 PM CDT   (Arrive by 1:45 PM)   RETURN HEART TRANSPLANT with Geena Mcclellan MD   [facility] Heart Care ([facility])    [address] [place] [address] [phone]            Jun 20, 2017 10:30 AM CDT   (Arrive by 10:15 AM)   RETURN DIABETES with Alla Jerez PA-C   [facility] Endocrinology ([facility])    [address] [place] [address] [phone]            Jul 26, 2017  9:30 AM CDT   (Arrive by 9:15 AM)   Return Visit with Ivan Schuler MD   [facility] Infectious Diseases ([facility])    [address] [place] [address] [phone]              Who to contact     Please call your clinic at [phone] to:    Ask questions about your health    Make or cancel appointments    Discuss your medicines    Learn about your test results    Speak to your doctor   If you have compliments or concerns about an experience at your clinic, or if you wish to file a complaint, please contact [facility] Patient Relations at [phone] or email us at [email or web address]         Additional Information About Your Visit        digiSchoolhart Information     Patsnap is an electronic gateway that provides easy, online access to your  "medical records. With InCast, you can request a clinic appointment, read your test results, renew a prescription or communicate with your care team.     To sign up for InCast visit the website at [email or web address]   You will be asked to enter the access code listed below, as well as some personal information. Please follow the directions to create your username and password.     Your access code is: [email or web address]  Expires: 2017  6:30 AM     Your access code will  in 90 days. If you need help or a new code, please contact your [facility] or call [phone] for assistance.        Care EveryWhere ID     This is your Care EveryWhere ID. This could be used by other organizations to access your [place] medical records  [id number]        Your Vitals Were     Pulse Height BMI (Body Mass Index)             93 1.676 m (5' 6\") 26.47 kg/m2          Blood Pressure from Last 3 Encounters:   05/15/17 118/85   17 119/80   17 119/80    Weight from Last 3 Encounters:   05/15/17 74.4 kg (164 lb)   17 72.6 kg (160 lb)   17 73 kg (160 lb 14.4 oz)              Today, you had the following     No orders found for display         Today's Medication Changes          These changes are accurate as of: 5/15/17 10:15 AM.  If you have any questions, ask your nurse or doctor.               These medicines have changed or have updated prescriptions.        Dose/Directions    blood glucose monitoring test strip   Commonly known as:  no brand specified   This may have changed:  additional instructions   Used for:  Hyperglycemia   Changed by:  Alla Jerez PA-C        Use to test blood sugar 4 times daily as well as if  Symptoms of high or low BG.   Quantity:  2 Box   Refills:  1            Where to get your medicines      These medications were sent to [facility] [place], [place] - [address] [facility]  [address] [facility] [address] "     Phone:  [phone]     blood glucose monitoring test strip                Primary Care Provider Office Phone # Fax #    Ivan Burrell [phone]       [address] [address]        Thank you!     Thank you for choosing [facility] [place] ENDOCRINOLOGY  for your care. Our goal is always to provide you with excellent care. Hearing back from our patients is one way we can continue to improve our services. Please take a few minutes to complete the written survey that you may receive in the mail after your visit with us. Thank you!             Your Updated Medication List - Protect others around you: Learn how to safely use, store and throw away your medicines at www.disposemymeds.org.          This list is accurate as of: 5/15/17 10:15 AM.  Always use your most recent med list.                   Brand Name Dispense Instructions for use    alcohol swab prep pads     100 each    Use to swab area of injection/raymond as directed.       aspirin 81 MG EC tablet     90 tablet    Take 1 tablet (81 mg) by mouth daily       B-D SHARPS DISPOSAL BY MAIL Misc     1 each    1 container       blood glucose monitoring lancets     1 Box    Use to test blood sugar 4 times daily or as directed.       blood glucose monitoring test strip    no brand specified    2 Box    Use to test blood sugar 4 times daily as well as if  Symptoms of high or low BG.       calcium carb 1250 mg (500 mg Cachil DeHe)/vitamin D 200 units 500-200 MG-UNIT per tablet    OSCAL with D    60 tablet    Take 1 tablet by mouth 2 times daily (with meals)       insulin aspart 100 UNIT/ML injection    NovoLOG PEN    9 mL    Inject 10 Units Subcutaneous 3 times daily (with meals)       insulin glargine 100 UNIT/ML injection    LANTUS    9 mL    Inject 30 Units Subcutaneous every 24 hours       insulin pen needle 32G X 4 MM     100 each    Use pen needles daily or as directed.       isoniazid 300 MG tablet    NYDRAZID    90 tablet     Take 1 tablet (300 mg) by mouth daily       ketoconazole 2 % shampoo    NIZORAL    120 mL    Apply topically three times a week Alternate with Head and Shoulders.       multivitamin, therapeutic with minerals Tabs tablet     30 each    Take 1 tablet by mouth daily       mycophenolate 250 MG capsule    CELLCEPT - GENERIC EQUIVALENT    360 capsule    Take 6 capsules (1,500 mg) by mouth 2 times daily       nystatin 185044 UNIT/ML suspension    MYCOSTATIN    473 mL    Take 5 mLs (500,000 Units) by mouth 4 times daily       order for DME     1 Device    Equipment being ordered: Splint - Cubital tunnel splint for ulnar neuropathy       pantoprazole 40 MG EC tablet    PROTONIX    30 tablet    Take 1 tablet (40 mg) by mouth every morning       pravastatin 20 MG tablet    PRAVACHOL    30 tablet    Take 1 tablet (20 mg) by mouth every evening       predniSONE 5 MG tablet    DELTASONE    30 tablet    Take 1 tablet (5 mg) by mouth daily       tacrolimus 1 MG capsule    PROGRAF - GENERIC EQUIVALENT    90 capsule    Take 2mg (2 capsules) in the morning and 1mg (1 capsule) in the evening.

## 2017-05-15 NOTE — PATIENT INSTRUCTIONS
Por favor, reduzca watt  LANTUS a 24 unidades.    Si ve numeros menos que < 80, bajase a solo 21 unidades.        Si eliminan watt Prednisona baje a 21 unidades y llamame.      Con watt Novolog, vladimir 5 unidades para comer si watt azucar es <100.  Si es >100, sigue con 10 unidades.       My best wishes,    Alla Marroquin PA-C, MPAS  [facility]  Diabetes, Endocrinology, and Metabolism  [phone] Appointments/Nurse  [phone] pager   [phone] nurse line  [phone] URGENTafter hours/weekend Endocrinologist on call      MI OFICINA :  [phone] ALLA MARROQUIN

## 2017-05-15 NOTE — PROGRESS NOTES
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  There was of course concern about new onset type 1 diabetes, but I see now that his NIDA jessica is back now with a normal value of <5.0. Thus, his diabetes is thought to be secondary to steroids vs tacrolimus causing some insulin resistance. He is not known to have Chagas gastrointestinal disease. His Prednisone dose was decreased last week from 10 to 5 mg daily.     He reports that he is doing well with Lantus taking 27 units each morning and Aspart 10 units there times daily with meals.   He has awoke twice with low BG since I last saw him at 39, and 66.  We had discussed decreasing Lantus further, to 24 units if this happened, but he has not done this.  We also discussed, at some length, taking just 5 units of Novolog with meal prior to exercise but he forgot to do this and once could not exercise as arrived with BG 80 and after juice was only 98.  Other times was 166 prior and came down to 132 with exercise and once 132 down to 116.      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 He also has no past medical history of Malignant melanoma (H) or Skin disease.  Immunization History   Administered Date(s) Administered     Hepatitis B 2016, 09/15/2016, 2017     Influenza (IIV3) 10/05/2016     Influenza Vaccine IM 3yrs+ 4 Valent IIV4 2011,  "03/21/2015     Pneumococcal (PCV 13) 08/04/2016     Pneumococcal 23 valent 03/21/2015     TDAP Vaccine (Boostrix) 08/04/2016       Javi's   Social History     Social History     Marital status:      Spouse name: N/A     Number of children: N/A     Years of education: N/A     Occupational History     Not on file.     Social History Main Topics     Smoking status: Never Smoker     Smokeless tobacco: Not on file     Alcohol use No     Drug use: No     Sexual activity: Not on file     Other Topics Concern     Not on file     Social History Narrative      He reads very little in Hebrew.  He does understand numbers.  He is from [place].     Javi's family history includes Brain Cancer in his mother. There is no history of Melanoma or Skin Cancer.  His brother dies of Chagas cardiomyopathy.  He had an EF of only 10%.        ROS:   Patient denies any fevers, chills or sweats as well as any changes in vision, problems with floaters or field cuts in vision, pain or problems with dentition, new or different headaches.  Patient denies symptoms of hypo and hyperglycemia except as above.   Patients denies marked or new fatigue, cough, shortness of breath, chest pain or pressure.  He feels he is really improving readily and  expresses gratitude for all who have helped him.  He is excited to see what he is able to do when he starts cardiac rehab soon.    There has been no pain with or other changes in urination or  itching or pain in genital areas.  Patient denies any noted swelling in feet, ankles or otherwise, loss of sensation or pain  in feet or other areas.    Patient also denies current difficulties with depressed mood, anhedonia or worrying too much.        Exam:    /85  Pulse 93  Ht 1.676 m (5' 6\")  Wt 74.4 kg (164 lb)  BMI 26.47 kg/m2    General: Pleasant, well nourished and hydrated male in NAD. He is with .    Psych:  Mood is \" very good,\" affect is appropriate.  Thought form and " content are fluid and coherent.  Pt. very introspective.   HEENT: Eyes and sclera are clear. Extraocular movements are grossly intact without proptosis.  Nares are patent, mucous membranes moist.  Neck: No masses or JVD are noted.    Resp: Easy and unlabored breathing.   Neuro: Alert and oriented, communicating clearly.   Ext: no swelling or edema  Data:      Last Basic Metabolic Panel:  Lab Results   Component Value Date     04/26/2017      Lab Results   Component Value Date    POTASSIUM 3.6 04/26/2017     Lab Results   Component Value Date    CHLORIDE 107 04/26/2017     Lab Results   Component Value Date    DAISY 9.0 04/26/2017     Lab Results   Component Value Date    CO2 21 04/26/2017     Lab Results   Component Value Date    BUN 19 04/26/2017     Lab Results   Component Value Date    CR 0.74 04/26/2017     Lab Results   Component Value Date     04/26/2017       GFR Estimate   Date Value Ref Range Status   05/15/2017 >90  Non  GFR Calc   >60 mL/min/1.7m2 Final   04/26/2017 >90  Non  GFR Calc   >60 mL/min/1.7m2 Final   04/25/2017 >90  Non  GFR Calc   >60 mL/min/1.7m2 Final         Lab Results   Component Value Date    A1C 11.6 (H) 04/25/2017    A1C 5.4 12/11/2016    A1C 5.8 06/18/2016     No results found for: MICROL  No results found for: MICROALBUMIN  Lab Results   Component Value Date    CPEPT 0.6 (L) 04/25/2017    GADAB  04/25/2017     <5.0  Reference range: 0.0 to 5.0  Unit: IU/mL  (Note)  INTERPRETIVE INFORMATION:  Glutamic Acid Decarboxylase  Antibody  A value greater than 5.0 IU/mL is considered positive for  Glutamic Acid Decarboxylase Antibody.  Performed by [facility],  [address] [place] [address],[place] [address] [phone]  [email or web address], Calvin Hernández MD, Lab. Director       Cholesterol   Date Value Ref Range Status   01/13/2017 198 <200 mg/dL Final   06/18/2016 166 <200 mg/dL Final     HDL Cholesterol   Date Value Ref Range Status   01/13/2017  88 >39 mg/dL Final   2016 38 (L) >39 mg/dL Final     LDL Cholesterol Calculated   Date Value Ref Range Status   2017 93 <100 mg/dL Final     Comment:     Desirable:       <100 mg/dl   2016 114 (H) <100 mg/dL Final     Comment:     Above desirable:  100-129 mg/dl   Borderline High:  130-159 mg/dL   High:             160-189 mg/dL   Very high:       >189 mg/dl       Triglycerides   Date Value Ref Range Status   2017 82 <150 mg/dL Final     Comment:     Fasting specimen   2016 73 <150 mg/dL Final     No results found for: CHOLHDLRATIO    Most recent eye exam date: : Not Found       Assessment/Plan:        Javi is a 46 year old male with 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  His control is extremely rather tight and I think needs to be reduced as at risk for hypoglycemia.  He readily recognizes and treats hypoglycemia though he is having some difficulties with other directions.      Decrease Lantus to 24 units.  Patient is able to repeat back will need further  to 21 units if he has BG less than 80.    Decrease Novolog to 5 units prior to activity ( starts cardiac rehab May 5.)   If BG is less than 120 before exercise or 90 after rehab or exercise he can forgo Novolog with meals prior to his rehab sessions.      Always carry juice or fruit snacks in case of lows.    If BG <70 call my pager or office number (he has both) .    We spent a lot of time discussing that it appears his blood sugars will continue to come down with steroid taper and if blood sugars are low we need to decrease, but not stop all insulin.  He is to call me if he has low blood sugar numbers again.  He has my pager and office numbers on print out and card.      Pt is able to state in his own words plan as well as prevention and treatment of hypoglycemia.  Drawings accompany written  instructions.      NIDA, ICSAB, C-peptide and glucose levels are pending in hopes of clarifying diabetes etiology, pathology and anticipated course and choose best long term medications.      I anticipate further basal and decreases.  RTC in 2 and 4 weeks.      >50% of 40 minute visit spent in counseling, education and coordination of care related to options for better glycemic control as well as preventing, detecting, and treating hypoglycemia.      It is my privilege to be involved in the care of the above patient.     Alla Jerez PA-C, MPAS  [facility]  Diabetes, Endocrinology, and Metabolism  [phone] Appointments/Nurse  [phone] pager  [phone] nurse line

## 2017-05-15 NOTE — LETTER
5/15/2017       RE: Javi Bansal   [address] [place] [address]     Dear Colleague,    Thank you for referring your patient, Javi Bansal, to the [facility] ENDOCRINOLOGY at [facility]. Please see a copy of my visit note below.    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  There was of course concern about new onset type 1 diabetes, but I see now that his NIDA jessica is back now with a normal value of <5.0. Thus, his diabetes is thought to be secondary to steroids vs tacrolimus causing some insulin resistance. He is not known to have Chagas gastrointestinal disease. His Prednisone dose was decreased last week from 10 to 5 mg daily.     He reports that he is doing well with Lantus taking 27 units each morning and Aspart 10 units there times daily with meals.   He has awoke twice with low BG since I last saw him at 39, and 66.  We had discussed decreasing Lantus further, to 24 units if this happened, but he has not done this.  We also discussed, at some length, taking just 5 units of Novolog with meal prior to exercise but he forgot to do this and once could not exercise as arrived with BG 80 and after juice was only 98.  Other times was 166 prior and came down to 132 with exercise and once 132 down to 116.      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 He also has no past medical history of Malignant melanoma (H) or Skin disease.  Immunization History   Administered Date(s) Administered     Hepatitis B 08/04/2016, 09/15/2016, 02/09/2017     Influenza (IIV3) 10/05/2016     Influenza Vaccine IM 3yrs+ 4 Valent IIV4 11/01/2011, 03/21/2015     Pneumococcal (PCV 13) 08/04/2016     Pneumococcal 23 valent 03/21/2015     TDAP Vaccine (Boostrix) 08/04/2016       Javi's   Social History     Social History     Marital status:      Spouse name: N/A     Number of children: N/A     Years of education: N/A     Occupational History     Not on file.     Social History Main Topics     Smoking status: Never Smoker     Smokeless tobacco: Not on file     Alcohol use No     Drug use: No     Sexual activity: Not on file     Other Topics Concern     Not on file     Social History Narrative      He reads very little in Dominican.  He does understand numbers.  He is from [facility].     Javi's family history includes Brain Cancer in his mother. There is no history of Melanoma or Skin Cancer.  His brother dies of Chagas cardiomyopathy.  He had an EF of only 10%.        ROS:   Patient denies any fevers, chills or sweats as well as any changes in vision, problems with floaters or field cuts in vision, pain or problems with dentition, new or different headaches.  Patient denies symptoms of hypo and hyperglycemia except as above.   Patients denies marked or new fatigue, cough, shortness of breath, chest pain or pressure.  He feels he is really improving readily and  expresses gratitude for all who have helped him.  He is excited to see what he is able to do when he starts cardiac rehab soon.    There has been no pain with or other changes in urination or  itching or pain in genital areas.  Patient denies any noted swelling in feet, ankles or otherwise, loss of sensation or pain  in feet or other areas.    Patient also denies current difficulties  "with depressed mood, anhedonia or worrying too much.        Exam:    /85  Pulse 93  Ht 1.676 m (5' 6\")  Wt 74.4 kg (164 lb)  BMI 26.47 kg/m2    General: Pleasant, well nourished and hydrated male in NAD. He is with .    Psych:  Mood is \" very good,\" affect is appropriate.  Thought form and content are fluid and coherent.  Pt. very introspective.   HEENT: Eyes and sclera are clear. Extraocular movements are grossly intact without proptosis.  Nares are patent, mucous membranes moist.  Neck: No masses or JVD are noted.    Resp: Easy and unlabored breathing.   Neuro: Alert and oriented, communicating clearly.   Ext: no swelling or edema  Data:      Last Basic Metabolic Panel:  Lab Results   Component Value Date     04/26/2017      Lab Results   Component Value Date    POTASSIUM 3.6 04/26/2017     Lab Results   Component Value Date    CHLORIDE 107 04/26/2017     Lab Results   Component Value Date    DAISY 9.0 04/26/2017     Lab Results   Component Value Date    CO2 21 04/26/2017     Lab Results   Component Value Date    BUN 19 04/26/2017     Lab Results   Component Value Date    CR 0.74 04/26/2017     Lab Results   Component Value Date     04/26/2017       GFR Estimate   Date Value Ref Range Status   05/15/2017 >90  Non  GFR Calc   >60 mL/min/1.7m2 Final   04/26/2017 >90  Non  GFR Calc   >60 mL/min/1.7m2 Final   04/25/2017 >90  Non  GFR Calc   >60 mL/min/1.7m2 Final         Lab Results   Component Value Date    A1C 11.6 (H) 04/25/2017    A1C 5.4 12/11/2016    A1C 5.8 06/18/2016     No results found for: MICROL  No results found for: MICROALBUMIN  Lab Results   Component Value Date    CPEPT 0.6 (L) 04/25/2017    GADAB  04/25/2017     <5.0  Reference range: 0.0 to 5.0  Unit: IU/mL  (Note)  INTERPRETIVE INFORMATION:  Glutamic Acid Decarboxylase  Antibody  A value greater than 5.0 IU/mL is considered positive for  Glutamic Acid " Decarboxylase Antibody.  Performed by [facility],  [address] [facility] [place] [address] [phone]  [email or web address], Calvin Hernández MD, Lab. Director       Cholesterol   Date Value Ref Range Status   2017 198 <200 mg/dL Final   2016 166 <200 mg/dL Final     HDL Cholesterol   Date Value Ref Range Status   2017 88 >39 mg/dL Final   2016 38 (L) >39 mg/dL Final     LDL Cholesterol Calculated   Date Value Ref Range Status   2017 93 <100 mg/dL Final     Comment:     Desirable:       <100 mg/dl   2016 114 (H) <100 mg/dL Final     Comment:     Above desirable:  100-129 mg/dl   Borderline High:  130-159 mg/dL   High:             160-189 mg/dL   Very high:       >189 mg/dl       Triglycerides   Date Value Ref Range Status   2017 82 <150 mg/dL Final     Comment:     Fasting specimen   2016 73 <150 mg/dL Final     No results found for: CHOLHDLRATIO    Most recent eye exam date: : Not Found       Assessment/Plan:        Javi is a 46 year old male with 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  His control is extremely rather tight and I think needs to be reduced as at risk for hypoglycemia.  He readily recognizes and treats hypoglycemia though he is having some difficulties with other directions.      Decrease Lantus to 24 units.  Patient is able to repeat back will need further  to 21 units if he has BG less than 80.    Decrease Novolog to 5 units prior to activity ( starts cardiac rehab May 5.)   If BG is less than 120 before exercise or 90 after rehab or exercise he can forgo Novolog with meals prior to his rehab sessions.      Always carry juice or fruit snacks in case of lows.    If BG <70 call my pager or office number (he has both) .    We spent a lot of time discussing that it appears his blood sugars will continue to come down with steroid  taper and if blood sugars are low we need to decrease, but not stop all insulin.  He is to call me if he has low blood sugar numbers again.  He has my pager and office numbers on print out and card.      Pt is able to state in his own words plan as well as prevention and treatment of hypoglycemia.  Drawings accompany written instructions.      NIDA, ICSAB, C-peptide and glucose levels are pending in hopes of clarifying diabetes etiology, pathology and anticipated course and choose best long term medications.      I anticipate further basal and decreases.  RTC in 2 and 4 weeks.      >50% of 40 minute visit spent in counseling, education and coordination of care related to options for better glycemic control as well as preventing, detecting, and treating hypoglycemia.      It is my privilege to be involved in the care of the above patient.     Alla Jerez PA-C, MPAS  [facility]  Diabetes, Endocrinology, and Metabolism  [phone] Appointments/Nurse  [phone] pager  [phone] nurse line

## 2017-05-16 ENCOUNTER — TELEPHONE (OUTPATIENT)
Dept: TRANSPLANT | Facility: CLINIC | Age: 47
End: 2017-05-16

## 2017-05-16 NOTE — TELEPHONE ENCOUNTER
Called patient with recent results.  FK level (12.7 at 12 hours) appear somewhat elevated, but Cr is normal: no changes at this time. Coordinator will consult RC and update patient if any changes are needed.  Patient reports he is feeling very well, particularly when going to cardiac rehab.    Patient reports blood sugars better controlled and Endocrine has lowered his insulin dosing.  Confirmed receiving the medical bills (he left with Endocrine) and have given them to SW to request assistance.  Coordinator or SW will update patent when/if these bills may be paid.  Patient verbalizes understanding plan of care and agrees to follow.

## 2017-05-17 ENCOUNTER — AMBULATORY - HEALTHEAST (OUTPATIENT)
Dept: CARDIAC REHAB | Facility: HOSPITAL | Age: 47
End: 2017-05-17

## 2017-05-17 DIAGNOSIS — Z94.1 STATUS POST HEART TRANSPLANT (H): ICD-10-CM

## 2017-05-17 LAB — PANC ISLET CELL AB TITR SER: NORMAL {TITER}

## 2017-05-18 DIAGNOSIS — E09.9 STEROID-INDUCED DIABETES MELLITUS (H): Primary | ICD-10-CM

## 2017-05-18 DIAGNOSIS — T38.0X5A STEROID-INDUCED DIABETES MELLITUS (H): Primary | ICD-10-CM

## 2017-05-18 RX ORDER — INSULIN GLARGINE 100 [IU]/ML
24 INJECTION, SOLUTION SUBCUTANEOUS DAILY
Qty: 30 ML | Refills: 1 | Status: SHIPPED | OUTPATIENT
Start: 2017-05-18 | End: 2017-05-19

## 2017-05-19 ENCOUNTER — AMBULATORY - HEALTHEAST (OUTPATIENT)
Dept: CARDIAC REHAB | Facility: HOSPITAL | Age: 47
End: 2017-05-19

## 2017-05-19 DIAGNOSIS — Z94.1 STATUS POST HEART TRANSPLANT (H): ICD-10-CM

## 2017-05-19 DIAGNOSIS — T38.0X5A STEROID-INDUCED DIABETES MELLITUS (H): ICD-10-CM

## 2017-05-19 DIAGNOSIS — E09.9 STEROID-INDUCED DIABETES MELLITUS (H): ICD-10-CM

## 2017-05-22 DIAGNOSIS — R73.9 HYPERGLYCEMIA: ICD-10-CM

## 2017-05-22 RX ORDER — INSULIN GLARGINE 100 [IU]/ML
24 INJECTION, SOLUTION SUBCUTANEOUS DAILY
Qty: 30 ML | Refills: 1 | Status: SHIPPED | OUTPATIENT
Start: 2017-05-22 | End: 2018-06-13

## 2017-05-22 NOTE — TELEPHONE ENCOUNTER
Novolog Flexpen         Last Written Prescription Date: 4/26/2017  Last Fill Quantity: 15, # refills: 1  Last Office Visit with [facility], [facility] or [facility] prescribing provider:  05/15/2017   Next 5 appointments (look out 90 days)     May 31, 2017  1:30 PM CDT   (Arrive by 1:15 PM)   Office Visit with Agueda Osorio RN   [facility] Diabetes ([facility])    [address] [place] [address] [id number] [phone]                   BP Readings from Last 3 Encounters:   05/15/17 118/85   05/05/17 119/80   05/05/17 119/80     No results found for: MICROL  No results found for: UMALCR  Creatinine   Date Value Ref Range Status   05/15/2017 0.77 0.66 - 1.25 mg/dL Final   ]  GFR Estimate   Date Value Ref Range Status   05/15/2017 >90  Non  GFR Calc   >60 mL/min/1.7m2 Final   04/26/2017 >90  Non  GFR Calc   >60 mL/min/1.7m2 Final   04/25/2017 >90  Non  GFR Calc   >60 mL/min/1.7m2 Final     GFR Estimate If Black   Date Value Ref Range Status   05/15/2017 >90   GFR Calc   >60 mL/min/1.7m2 Final   04/26/2017 >90   GFR Calc   >60 mL/min/1.7m2 Final   04/25/2017 >90   GFR Calc   >60 mL/min/1.7m2 Final     Lab Results   Component Value Date    CHOL 198 01/13/2017     Lab Results   Component Value Date    HDL 88 01/13/2017     Lab Results   Component Value Date    LDL 93 01/13/2017     Lab Results   Component Value Date    TRIG 82 01/13/2017     No results found for: CHOLHDLRATIO  Lab Results   Component Value Date    AST 28 05/05/2017     Lab Results   Component Value Date    ALT 26 05/05/2017     Lab Results   Component Value Date    A1C 11.6 04/25/2017    A1C 5.4 12/11/2016    A1C 5.8 06/18/2016     Potassium   Date Value Ref Range Status   05/15/2017 3.5 3.4 - 5.3 mmol/L Final     **PATIENT STATES THAT DOSE HAS BEEN DECREASED, PLEASE SEND PHARMACY NEW RX WITH CORRECT DOSE**    Jim  Avel  [facility]  [phone]

## 2017-05-23 LAB — LAB SCANNED RESULT: NORMAL

## 2017-05-24 ENCOUNTER — AMBULATORY - HEALTHEAST (OUTPATIENT)
Dept: CARDIAC REHAB | Facility: HOSPITAL | Age: 47
End: 2017-05-24

## 2017-05-24 DIAGNOSIS — Z94.1 STATUS POST HEART TRANSPLANT (H): ICD-10-CM

## 2017-05-26 ENCOUNTER — AMBULATORY - HEALTHEAST (OUTPATIENT)
Dept: CARDIAC REHAB | Facility: HOSPITAL | Age: 47
End: 2017-05-26

## 2017-05-26 DIAGNOSIS — Z94.1 STATUS POST HEART TRANSPLANT (H): ICD-10-CM

## 2017-05-31 ENCOUNTER — AMBULATORY - HEALTHEAST (OUTPATIENT)
Dept: CARDIAC REHAB | Facility: HOSPITAL | Age: 47
End: 2017-05-31

## 2017-05-31 ENCOUNTER — OFFICE VISIT (OUTPATIENT)
Dept: EDUCATION SERVICES | Facility: CLINIC | Age: 47
End: 2017-05-31

## 2017-05-31 DIAGNOSIS — E11.9 DIABETES MELLITUS, TYPE 2 (H): Primary | ICD-10-CM

## 2017-05-31 DIAGNOSIS — Z94.1 STATUS POST HEART TRANSPLANT (H): ICD-10-CM

## 2017-05-31 DIAGNOSIS — R73.9 HYPERGLYCEMIA: ICD-10-CM

## 2017-05-31 PROBLEM — R73.09 ABNORMAL BLOOD SUGAR: Status: RESOLVED | Noted: 2017-04-24 | Resolved: 2017-05-31

## 2017-05-31 NOTE — MR AVS SNAPSHOT
After Visit Summary   5/31/2017    Javi Bansal    MRN: [id number]           Patient Information     Date Of Birth          1970        Visit Information        Provider Department      5/31/2017 1:15 PM Agueda Osorio, TOBY; [employer] [facility] Diabetes        Today's Diagnoses     Diabetes mellitus, type 2 (H)    -  1    Hyperglycemia           Follow-ups after your visit        Your next 10 appointments already scheduled     Jun 01, 2017  7:45 AM CDT   MR CARDIAC W CONTRAST W FLOW QUANT with UUMR4, UU CV MR NURSE   [facility] [place], MRI ([facility])    [address] [facility] [address] [phone]           Take your medicines as usual, unless your doctor tells you not to.   If you take Viagra, Levitra or Cialis, stop taking it 48 hours before your test.   If you take Aggrenox or dipyridamole (Persantine, Permole), stop taking it 48 hours before your test.   If you take Theophylline or Aminophylline, stop taking it 12 hours before your test.   If you are diabetic and take oral hypoglycemics, do not take them on the day of your test.  The day before your exam, drink extra fluids at least six 8-ounce glasses (unless your doctor wants you to limit your fluids).  Stop all caffeine 12 hours before the test. This includes coffee, tea, soda, chocolate and certain medicines (such as Anacin, Excedrin and NoDoz). Also avoid decaf coffee and tea, as these contain small amounts of caffeine.  Do not eat or drink for 3 hours before your exam. You may drink water and take your morning medicines.  You may need a blood test (creatinine test) within 30 days of your exam. Follow your doctor s orders if this is arranged before your exam.  If you are very claustrophobic, please let you doctor know. You may get a sedative medicine from your doctor before you arrive. Bring the medicine to the exam. Do not take it  at home. Arrive one hour early. Bring someone who can take you home after the test. Your medicine will make you sleepy. After the exam, you may not drive, take a bus or take a taxi by yourself.  The MRI machine uses a strong magnet. Please wear clothes without metal (snaps, zippers). A sweatsuit works well, or we may give you a hospital gown.  Please remove any body piercings and hair extensions before you arrive. You will remove watches, jewelry, hairpins, wallets, dentures, partial dental plates and hearing aids. You may wear contact lenses, and you may be able to wear your rings. We have a safe place to keep your personal items, but it is safer to leave them at home.   **IMPORTANT** THE INSTRUCTIONS BELOW ARE ONLY FOR THOSE PATIENTS WHO HAVE BEEN TOLD THEY WILL RECEIVE SEDATION OR GENERAL ANESTHESIA DURING THEIR MRI PROCEDURE:  IF YOU WILL RECEIVE SEDATION (take medicine to help you relax during your exam):   You must get the medicine from your doctor before you arrive. Bring the medicine to the exam. Do not take it at home.   Arrive one hour early. Bring someone who can take you home after the test. Your medicine will make you sleepy. After the exam, you may not drive, take a bus or take a taxi by yourself.   No eating 8 hours before your exam. You may have clear liquids up until 4 hours before your exam. (Clear liquids include water, clear tea, black coffee and fruit juice without pulp.)  IF YOU WILL RECEIVE ANESTHESIA (be asleep for your exam):   Arrive 1 1/2 hours early. Bring someone who can take you home after the test. You may not drive, take a bus or take a taxi by yourself.   No eating 8 hours before your exam. You may have clear liquids up until 4 hours before your exam. (Clear liquids include water, clear tea, black coffee and fruit juice without pulp.)  If you have any questions, please contact your Imaging Department exam site.            Jun 01, 2017 10:00 AM CDT   Return Visit with Ivan Holloway  MD Indra   [facility] ([facility])    [address] [place] [address]  4th [facility] [address] [phone]            Jun 02, 2017  8:00 AM CDT   Lab with [facility]    Health Lab ([facility])    [address] [place] [address]  1st [facility] [id number] [phone]            Jun 02, 2017  8:15 AM CDT   RETURN HEART TRANSPLANT with Geena Mcclellan MD   [facility] Heart Care ([facility])    [address] [place] [address]  3rd [facility] [id number] [phone]            Jun 02, 2017  9:45 AM CDT   Procedure - 2.5 hour with U2A ROOM 15   [address] [facility] ([facility])    [address] [facility] [address]               Jun 02, 2017 11:00 AM CDT   Cath 90 Minute with UUHCVR4   [facility] [place],  Heart Cath Lab ([facility])    [address] [facility] [address] [phone]            Jun 20, 2017 10:30 AM CDT   (Arrive by 10:15 AM)   RETURN DIABETES with Alla Jerez PA-C   [facility] Endocrinology ([facility])    [address] [place] [id number] [phone]            Jul 26, 2017  9:30 AM CDT   (Arrive by 9:15 AM)   Return Visit with Ivan Schuler MD   [facility] and Infectious Diseases ([facility])    [address] [place] [address]  3rd [facility] [address] [phone]            Jul 28, 2017  9:00 AM CDT   Lab with [facility]    Health Lab ([facility])    [address] [place] [id number] [phone]              Who to contact     Please call your clinic at [phone] to:    Ask questions about your health    Make or cancel appointments    Discuss your medicines    Learn about your test results    Speak to your doctor   If you have compliments or  concerns about an experience at your clinic, or if you wish to file a complaint, please contact [facility] Patient Relations at [phone] or email us at [email or web address]         Additional Information About Your Visit        [employer] Information     [employer] is an electronic gateway that provides easy, online access to your medical records. With [employer], you can request a clinic appointment, read your test results, renew a prescription or communicate with your care team.     To sign up for [employer] visit the website at [email or web address]   You will be asked to enter the access code listed below, as well as some personal information. Please follow the directions to create your username and password.     Your access code is: [email or web address]  Expires: 2017  6:30 AM     Your access code will  in 90 days. If you need help or a new code, please contact your [facility] Clinic or call [phone] for assistance.        Care EveryWhere ID     This is your Care EveryWhere ID. This could be used by other organizations to access your [facility] medical records  [id number]         Blood Pressure from Last 3 Encounters:   05/15/17 118/85   17 119/80   17 119/80    Weight from Last 3 Encounters:   05/15/17 74.4 kg (164 lb)   17 72.6 kg (160 lb)   17 73 kg (160 lb 14.4 oz)              We Performed the Following     DIABETES EDUCATION - Individual  []          Today's Medication Changes          These changes are accurate as of: 17  7:44 PM.  If you have any questions, ask your nurse or doctor.               Start taking these medicines.        Dose/Directions    blood glucose monitoring lancets   Used for:  Diabetes mellitus, type 2 (H)   Started by:  Agueda Osorio, RN        Use to test blood sugars 4 times daily or as directed.   Quantity:  1 Box   Refills:  11       blood glucose monitoring test strip    Commonly known as:  no brand specified   Used for:  Hyperglycemia   Started by:  Agueda Osorio, RN        Contour Next test strips. Use to test blood sugar 4 times daily as well as if  Symptoms of high or low BG.   Quantity:  2 Box   Refills:  11            Where to get your medicines      These medications were sent to [facility] - [place], [place] [facility] [address]  [address] [facility], [facility] [address]    Hours:  Mon-Fri 8:30am-5:00pm Toll Free [phone] Phone:  [phone]     blood glucose monitoring lancets         Some of these will need a paper prescription and others can be bought over the counter.  Ask your nurse if you have questions.     Bring a paper prescription for each of these medications     blood glucose monitoring test strip                Primary Care Provider Office Phone # Fax #    Ivan Burrell [phone]       [facility] [address] [facility] [address]        Thank you!     Thank you for choosing [facility]ET[facility]  for your care. Our goal is always to provide you with excellent care. Hearing back from our patients is one way we can continue to improve our services. Please take a few minutes to complete the written survey that you may receive in the mail after your visit with us. Thank you!             Your Updated Medication List - Protect others around you: Learn how to safely use, store and throw away your medicines at www.disposemymeds.org.          This list is accurate as of: 5/31/17  7:44 PM.  Always use your most recent med list.                   Brand Name Dispense Instructions for use    alcohol swab prep pads     100 each    Use to swab area of injection/raymond as directed.       aspirin 81 MG EC tablet     90 tablet    Take 1 tablet (81 mg) by mouth daily       B-D SHARPS DISPOSAL BY MAIL Misc     1 each    1 container       blood glucose monitoring lancets     1 Box    Use to test blood sugars 4  times daily or as directed.       blood glucose monitoring test strip    no brand specified    2 Box    Contour Next test strips. Use to test blood sugar 4 times daily as well as if  Symptoms of high or low BG.       calcium carb 1250 mg (500 mg Chilkat)/vitamin D 200 units 500-200 MG-UNIT per tablet    OSCAL with D    60 tablet    Take 1 tablet by mouth 2 times daily (with meals)       insulin aspart 100 UNIT/ML injection    NovoLOG PEN    9 mL    Inject 10 Units Subcutaneous 3 times daily (with meals)       insulin glargine 100 UNIT/ML injection     30 mL    Inject 24 Units Subcutaneous daily       insulin pen needle 32G X 4 MM     100 each    Use pen needles daily or as directed.       isoniazid 300 MG tablet    NYDRAZID    90 tablet    Take 1 tablet (300 mg) by mouth daily       ketoconazole 2 % shampoo    NIZORAL    120 mL    Apply topically three times a week Alternate with Head and Shoulders.       multivitamin, therapeutic with minerals Tabs tablet     30 each    Take 1 tablet by mouth daily       mycophenolate 250 MG capsule    CELLCEPT - GENERIC EQUIVALENT    360 capsule    Take 6 capsules (1,500 mg) by mouth 2 times daily       nystatin 301197 UNIT/ML suspension    MYCOSTATIN    473 mL    Take 5 mLs (500,000 Units) by mouth 4 times daily       order for DME     1 Device    Equipment being ordered: Splint - Cubital tunnel splint for ulnar neuropathy       pantoprazole 40 MG EC tablet    PROTONIX    30 tablet    Take 1 tablet (40 mg) by mouth every morning       pravastatin 20 MG tablet    PRAVACHOL    30 tablet    Take 1 tablet (20 mg) by mouth every evening       predniSONE 5 MG tablet    DELTASONE    30 tablet    Take 1 tablet (5 mg) by mouth daily       tacrolimus 1 MG capsule    PROGRAF - GENERIC EQUIVALENT    90 capsule    Take 2mg (2 capsules) in the morning and 1mg (1 capsule) in the evening.

## 2017-06-01 ENCOUNTER — OFFICE VISIT (OUTPATIENT)
Dept: INTERNAL MEDICINE | Facility: CLINIC | Age: 47
End: 2017-06-01

## 2017-06-01 ENCOUNTER — HOSPITAL ENCOUNTER (OUTPATIENT)
Dept: MRI IMAGING | Facility: CLINIC | Age: 47
Discharge: HOME | End: 2017-06-01
Attending: INTERNAL MEDICINE | Admitting: INTERNAL MEDICINE
Payer: COMMERCIAL

## 2017-06-01 VITALS
HEART RATE: 96 BPM | SYSTOLIC BLOOD PRESSURE: 106 MMHG | DIASTOLIC BLOOD PRESSURE: 74 MMHG | WEIGHT: 165.3 LBS | BODY MASS INDEX: 26.68 KG/M2

## 2017-06-01 DIAGNOSIS — E11.9 TYPE 2 DIABETES MELLITUS WITHOUT COMPLICATION, UNSPECIFIED LONG TERM INSULIN USE STATUS: ICD-10-CM

## 2017-06-01 DIAGNOSIS — Z94.1 HEART REPLACED BY TRANSPLANT (H): Primary | ICD-10-CM

## 2017-06-01 DIAGNOSIS — Z94.1 HEART REPLACED BY TRANSPLANT (H): ICD-10-CM

## 2017-06-01 DIAGNOSIS — Z13.89 SCREENING FOR DIABETIC PERIPHERAL NEUROPATHY: ICD-10-CM

## 2017-06-01 DIAGNOSIS — R73.9 HYPERGLYCEMIA: ICD-10-CM

## 2017-06-01 DIAGNOSIS — Z13.5 SCREENING FOR DIABETIC RETINOPATHY: ICD-10-CM

## 2017-06-01 DIAGNOSIS — E78.5 HYPERLIPEMIA: Primary | ICD-10-CM

## 2017-06-01 DIAGNOSIS — E11.9 DIABETES MELLITUS, TYPE 2 (H): ICD-10-CM

## 2017-06-01 DIAGNOSIS — B57.2 CHAGAS DISEASE: ICD-10-CM

## 2017-06-01 LAB — LAB SCANNED RESULT: NORMAL

## 2017-06-01 PROCEDURE — 75565 CARD MRI VELOC FLOW MAPPING: CPT | Mod: 26 | Performed by: INTERNAL MEDICINE

## 2017-06-01 PROCEDURE — A9585 GADOBUTROL INJECTION: HCPCS | Performed by: INTERNAL MEDICINE

## 2017-06-01 PROCEDURE — 25000128 H RX IP 250 OP 636: Performed by: INTERNAL MEDICINE

## 2017-06-01 PROCEDURE — 75561 CARDIAC MRI FOR MORPH W/DYE: CPT | Mod: 26 | Performed by: INTERNAL MEDICINE

## 2017-06-01 PROCEDURE — 75561 CARDIAC MRI FOR MORPH W/DYE: CPT

## 2017-06-01 RX ORDER — GADOBUTROL 604.72 MG/ML
10 INJECTION INTRAVENOUS ONCE
Status: COMPLETED | OUTPATIENT
Start: 2017-06-01 | End: 2017-06-01

## 2017-06-01 RX ORDER — LIDOCAINE 40 MG/G
CREAM TOPICAL
Status: CANCELLED | OUTPATIENT
Start: 2017-06-01

## 2017-06-01 RX ADMIN — GADOBUTROL 10 ML: 604.72 INJECTION INTRAVENOUS at 09:24

## 2017-06-01 ASSESSMENT — PAIN SCALES - GENERAL: PAINLEVEL: NO PAIN (0)

## 2017-06-01 NOTE — PROGRESS NOTES
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  Diagnosed with diabetes at that time. Started on insulin at that time.   It appears that his blood glucose rapidly corrected with insulin and he is now taking:  Lantus 21 units  Novolog 5 units with each meal  Meter download indicates that he is checking 4 times daily.  All BG's in target range with exception of one episode of hypoglycemia.     ASSESSMENT:          BG values are: in goal  Patient's most recent   Lab Results   Component Value Date    A1C 11.6 04/25/2017    is not meeting goal of <7.0    Todays Blood Glucose result was 112 on Delisa Contour meter.    EDUCATION and INSTRUCTION PROVIDED AT THIS VISIT:       Reviewed patient's injection technique and made corrections as needed.    He is taking Novolog before meals.  He reduced his Lantus dose to 21 units on the advise of Alla Jerez about a month ago, he states.   Reviewed his BG results with him.  No changes in insulin dosing recommended at this time. He has a follow up with Ms. Jerez in about a week.   Reviewed treatment of hypoglycemia and made suggestion to him about treatment.    Discussed when to call the medical team about blood glucoses that are too high and what sorts of things can cause hyperglycemia.   He states that he has no questions, except when he might be able to stop taking insulin.  Will discuss with Ms. Jerez.  Currently on a steroid taper.    Reminded to make opthamology appointment within the next couple of months, once his visual blurriness subsides from his hyperglycemia.        Patient-stated goal written and given to Javi Craft Shetty.  Verbalized and demonstrated understanding of instructions.      PLAN:  See patient instructions  AVS printed and given to patient    FOLLOW-UP:        Time spent with patient at today's visit was 30 minutes.      Any diabetes medication dose changes were made via the CDE Protocol and Collaborative Practice Agreement with [facility] and  [facility].  A copy of this encounter was provided to patient's referring provider.

## 2017-06-01 NOTE — PROGRESS NOTES
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 Has been doing well overall - since last visit, he was hospitalized with new onset diabetes, suspected steroid induced. He has been following with endocrinology and has been doing well. Is on Lantus and short acting insulin, with blood glucoses at goal. He says he has not had any low BGs since he last saw endocrine and his insulin doses were reduced. AMs normally run 80s-90s. Weight has been stable. No chest pain, dyspnea. Minimal peripheral edema. Tolerating all his medications. R arm neuropathy symptoms resolved. Did have a bad headache last night that is resolved this AM, BG was 96 at that time.     Physical Exam   O: /74  Pulse 96  Wt 75 kg (165 lb 4.8 oz)  BMI 26.68 kg/m2  Gen: Alert, oriented, pleasant, NAD  HEENT: PERRL, EOMI, moist mucous membranes, oropharynx w/o erythema, exudate, or lesions.  Skin: No visible rashes  Neck: No lymphadenopathy  CV: RRR, no m/r/g  Lungs: CTAB, good air movement bilaterally  Abdomen: Soft, NTND. BS present.  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     Screening for diabetic retinopathy  -     OPHTHALMOLOGY ADULT REFERRAL    Type 2 diabetes mellitus without complication, unspecified long term insulin use status (H)  -     Refilled test strips and lancets.  - Blood glucoses under good control, no changes to insulin  at this time, has endocrinology contact info, continue to follow with them    H/o latent TB - on INH with normal LFTs at last check    I have reviewed and discussed the plan with my attending physician, Dr. Mckeon.     Ivan Burrell MD  P: [phone]      Pt was seen and examined with Dr. Burrell; confirmed normal cardiac and lung exams;  I agree with the detailed A/P as documented above.    Liliana Moreira MD

## 2017-06-01 NOTE — MR AVS SNAPSHOT
After Visit Summary   6/1/2017    Javi Bansal    MRN: [id number]           Patient Information     Date Of Birth          1970        Visit Information        Provider Department      6/1/2017 10:00 AM Ivan Burrell MD; [employer] [facility]        Today's Diagnoses     Screening for diabetic retinopathy        Screening for diabetic peripheral neuropathy        Hyperglycemia        Type 2 diabetes mellitus without complication, unspecified long term insulin use status (H)           Follow-ups after your visit        Additional Services     OPHTHALMOLOGY ADULT REFERRAL       Your provider has referred you to: [facility] [phone]   [email or web address]    Please be aware that coverage of these services is subject to the terms and limitations of your health insurance plan.  Call member services at your health plan with any benefit or coverage questions.      Please bring the following with you to your appointment:    (1) Any X-Rays, CTs or MRIs which have been performed.  Contact the facility where they were done to arrange for  prior to your scheduled appointment.    (2) List of current medications  (3) This referral request   (4) Any documents/labs given to you for this referral                  Follow-up notes from your care team     Return in about 6 months (around 12/1/2017).      Your next 10 appointments already scheduled     Jun 02, 2017  8:00 AM CDT   Lab with  LAB    [facility])    [address] [place] [id number] [phone]            Jun 02, 2017  8:15 AM CDT   RETURN HEART TRANSPLANT with Geena Mcclellan MD   [facility] Care [facility])    [address] [place] [address] [id number] [phone]            Jun 02, 2017  9:45 AM CDT    Procedure - 2.5 hour with U2A ROOM 15   Unit 2A [facility] ([facility])    [address] [address]               Jun 02, 2017 11:00 AM CDT   Cath 90 Minute with JAMESUHJOSLYN   [facility] ([facility])    [address] [address]   [phone]            Jun 08, 2017 10:30 AM CDT   NEW RETINA with Isabela Balbuena MD   [facility] Clinic ([facility])    [address]  [address]  [address]  [facility] [address]   [phone]            Jun 20, 2017 10:30 AM CDT   (Arrive by 10:15 AM)   RETURN DIABETES with Alla Jerez PA-C   [facility] Endocrinology ([facility])    [address]  [address]rd [facility] [address] [phone]            Jul 26, 2017  9:30 AM CDT   (Arrive by 9:15 AM)   Return Visit with Ivan Schuler MD   [facility] and Infectious Diseases ([facility])    [address]  3rd Floor  [place] [address]   [phone]            Jul 28, 2017  9:00 AM CDT   Lab with  LAB    Health Lab ([facility])    [address]  [address]st [facility] [address]   [phone]            Jul 28, 2017  9:30 AM CDT   (Arrive by 9:15 AM)   RETURN HEART TRANSPLANT with SAMM Ramos [facility] Heart Care ([facility])    [address]  3rd Floor  [facility] [address]   [phone]            Jul 28, 2017 12:00 PM CDT   Heart Cath Heart Biopsy with UUHJOSLYN   [facility] ([facility])    [address] [address]   [phone]              Who to contact     Please call your clinic at [phone] to:    Ask questions about your health    Make or cancel appointments    Discuss your  medicines    Learn about your test results    Speak to your doctor   If you have compliments or concerns about an experience at your clinic, or if you wish to file a complaint, please contact [facility] Patient Relations at [phone] or email us at [email or web address]         Additional Information About Your Visit        [employer] Information     [employer] is an electronic gateway that provides easy, online access to your medical records. With [employer]Report, you can request a clinic appointment, read your test results, renew a prescription or communicate with your care team.     To sign up for [employer] visit the website at [email or web address]   You will be asked to enter the access code listed below, as well as some personal information. Please follow the directions to create your username and password.     Your access code is: [email or web address]  Expires: 2017  6:30 AM     Your access code will  in 90 days. If you need help or a new code, please contact your [facility] or call [phone] for assistance.        Care EveryWhere ID     This is your Care EveryWhere ID. This could be used by other organizations to access your [facility] medical records  [id number]        Your Vitals Were     Pulse BMI (Body Mass Index)                96 26.68 kg/m2           Blood Pressure from Last 3 Encounters:   17 106/74   05/15/17 118/85   17 119/80    Weight from Last 3 Encounters:   17 75 kg (165 lb 4.8 oz)   05/15/17 74.4 kg (164 lb)   17 72.6 kg (160 lb)              We Performed the Following     OPHTHALMOLOGY ADULT REFERRAL          Today's Medication Changes          These changes are accurate as of: 17 11:27 AM.  If you have any questions, ask your nurse or doctor.               Start taking these medicines.        Dose/Directions    blood glucose monitoring test strip   Commonly known as:  no brand specified   Used  for:  Hyperglycemia   Started by:  Ivna Burrell MD        Contour Next test strips. Use to test blood sugar 4 times daily as well as if  Symptoms of high or low BG.   Quantity:  2 Box   Refills:  11            Where to get your medicines      These medications were sent to [facility] - [place], [place] [facility] [address]   [address] [facility] [address]    Hours:  Mon-Fri 8:30am-5:00pm Toll Free [phone] Phone:  [phone]     blood glucose monitoring lancets    blood glucose monitoring test strip                Primary Care Provider Office Phone # Fax #    Ivan Burrell [phone]       [facility] [address] [facility] [address]        Thank you!     Thank you for choosing [facility]  for your care. Our goal is always to provide you with excellent care. Hearing back from our patients is one way we can continue to improve our services. Please take a few minutes to complete the written survey that you may receive in the mail after your visit with us. Thank you!             Your Updated Medication List - Protect others around you: Learn how to safely use, store and throw away your medicines at www.disposemymeds.org.          This list is accurate as of: 6/1/17 11:27 AM.  Always use your most recent med list.                   Brand Name Dispense Instructions for use    alcohol swab prep pads     100 each    Use to swab area of injection/raymond as directed.       aspirin 81 MG EC tablet     90 tablet    Take 1 tablet (81 mg) by mouth daily       B-D SHARPS DISPOSAL BY MAIL [facility]     1 each    1 container       blood glucose monitoring lancets     1 Box    Use to test blood sugars 4 times daily or as directed.       blood glucose monitoring test strip    no brand specified    2 Box    Contour Next test strips. Use to test blood sugar 4 times daily as well as if  Symptoms of high or low BG.       calcium carb  1250 mg (500 mg [place])/vitamin D 200 units 500-200 MG-UNIT per tablet    OSCAL with D    60 tablet    Take 1 tablet by mouth 2 times daily (with meals)       insulin aspart 100 UNIT/ML injection    NovoLOG PEN    9 mL    Inject 10 Units Subcutaneous 3 times daily (with meals)       insulin glargine 100 UNIT/ML injection     30 mL    Inject 24 Units Subcutaneous daily       insulin pen needle 32G X 4 MM     100 each    Use pen needles daily or as directed.       isoniazid 300 MG tablet    NYDRAZID    90 tablet    Take 1 tablet (300 mg) by mouth daily       ketoconazole 2 % shampoo    NIZORAL    120 mL    Apply topically three times a week Alternate with Head and Shoulders.       multivitamin, therapeutic with minerals Tabs tablet     30 each    Take 1 tablet by mouth daily       mycophenolate 250 MG capsule    CELLCEPT - GENERIC EQUIVALENT    360 capsule    Take 6 capsules (1,500 mg) by mouth 2 times daily       nystatin [id number] UNIT/ML suspension    MYCOSTATIN    473 mL    Take 5 mLs (500,000 Units) by mouth 4 times daily       order for DME     1 Device    Equipment being ordered: Splint - Cubital tunnel splint for ulnar neuropathy       pantoprazole 40 MG EC tablet    PROTONIX    30 tablet    Take 1 tablet (40 mg) by mouth every morning       pravastatin 20 MG tablet    PRAVACHOL    30 tablet    Take 1 tablet (20 mg) by mouth every evening       predniSONE 5 MG tablet    DELTASONE    30 tablet    Take 1 tablet (5 mg) by mouth daily       tacrolimus 1 MG capsule    PROGRAF - GENERIC EQUIVALENT    90 capsule    Take 2mg (2 capsules) in the morning and 1mg (1 capsule) in the evening.

## 2017-06-01 NOTE — NURSING NOTE
Chief Complaint   Patient presents with     Recheck Medication     Patient here for medication recheck.       Lokesh Guillen CMA at 10:26 AM on 6/1/2017.

## 2017-06-02 ENCOUNTER — APPOINTMENT (OUTPATIENT)
Dept: MEDSURG UNIT | Facility: CLINIC | Age: 47
End: 2017-06-02
Attending: NURSE PRACTITIONER
Payer: COMMERCIAL

## 2017-06-02 ENCOUNTER — OFFICE VISIT (OUTPATIENT)
Dept: CARDIOLOGY | Facility: CLINIC | Age: 47
End: 2017-06-02
Attending: NURSE PRACTITIONER
Payer: COMMERCIAL

## 2017-06-02 ENCOUNTER — RESULTS ONLY (OUTPATIENT)
Dept: OTHER | Facility: CLINIC | Age: 47
End: 2017-06-02

## 2017-06-02 ENCOUNTER — APPOINTMENT (OUTPATIENT)
Dept: CARDIOLOGY | Facility: CLINIC | Age: 47
End: 2017-06-02
Attending: INTERNAL MEDICINE
Payer: COMMERCIAL

## 2017-06-02 ENCOUNTER — HOSPITAL ENCOUNTER (OUTPATIENT)
Facility: CLINIC | Age: 47
Discharge: HOME | End: 2017-06-02
Admitting: INTERNAL MEDICINE
Payer: COMMERCIAL

## 2017-06-02 VITALS
HEART RATE: 91 BPM | HEIGHT: 67 IN | OXYGEN SATURATION: 100 % | BODY MASS INDEX: 25.98 KG/M2 | DIASTOLIC BLOOD PRESSURE: 89 MMHG | SYSTOLIC BLOOD PRESSURE: 115 MMHG | WEIGHT: 165.5 LBS

## 2017-06-02 VITALS
HEART RATE: 94 BPM | DIASTOLIC BLOOD PRESSURE: 82 MMHG | OXYGEN SATURATION: 100 % | TEMPERATURE: 98.3 F | WEIGHT: 165.5 LBS | RESPIRATION RATE: 16 BRPM | BODY MASS INDEX: 25.98 KG/M2 | HEIGHT: 67 IN | SYSTOLIC BLOOD PRESSURE: 118 MMHG

## 2017-06-02 DIAGNOSIS — Z94.1 HEART REPLACED BY TRANSPLANT (H): Primary | ICD-10-CM

## 2017-06-02 DIAGNOSIS — Z94.1 HEART REPLACED BY TRANSPLANT (H): ICD-10-CM

## 2017-06-02 DIAGNOSIS — B57.2 CHAGAS DISEASE: ICD-10-CM

## 2017-06-02 DIAGNOSIS — E78.5 HYPERLIPEMIA: ICD-10-CM

## 2017-06-02 DIAGNOSIS — E11.9 DIABETES MELLITUS, TYPE 2 (H): ICD-10-CM

## 2017-06-02 LAB
ALBUMIN SERPL-MCNC: 4 G/DL (ref 3.4–5)
ALP SERPL-CCNC: 96 U/L (ref 40–150)
ALT SERPL W P-5'-P-CCNC: 25 U/L (ref 0–70)
ANION GAP SERPL CALCULATED.3IONS-SCNC: 7 MMOL/L (ref 3–14)
AST SERPL W P-5'-P-CCNC: 33 U/L (ref 0–45)
BILIRUB SERPL-MCNC: 0.6 MG/DL (ref 0.2–1.3)
BUN SERPL-MCNC: 20 MG/DL (ref 7–30)
CALCIUM SERPL-MCNC: 9.4 MG/DL (ref 8.5–10.1)
CHLORIDE SERPL-SCNC: 109 MMOL/L (ref 94–109)
CHOLEST SERPL-MCNC: 139 MG/DL
CK SERPL-CCNC: 47 U/L (ref 30–300)
CO2 SERPL-SCNC: 27 MMOL/L (ref 20–32)
CREAT SERPL-MCNC: 0.81 MG/DL (ref 0.66–1.25)
ERYTHROCYTE [DISTWIDTH] IN BLOOD BY AUTOMATED COUNT: 12.1 % (ref 10–15)
GFR SERPL CREATININE-BSD FRML MDRD: NORMAL ML/MIN/1.7M2
GLUCOSE SERPL-MCNC: 77 MG/DL (ref 70–99)
HBA1C MFR BLD: 7.5 % (ref 4.3–6)
HCT VFR BLD AUTO: 34.9 % (ref 40–53)
HDLC SERPL-MCNC: 49 MG/DL
HGB BLD-MCNC: 11.7 G/DL (ref 13.3–17.7)
LDLC SERPL CALC-MCNC: 68 MG/DL
MCH RBC QN AUTO: 31.7 PG (ref 26.5–33)
MCHC RBC AUTO-ENTMCNC: 33.5 G/DL (ref 31.5–36.5)
MCV RBC AUTO: 95 FL (ref 78–100)
MISCELLANEOUS TEST: NORMAL
NONHDLC SERPL-MCNC: 90 MG/DL
PLATELET # BLD AUTO: 227 10E9/L (ref 150–450)
POTASSIUM SERPL-SCNC: 3.8 MMOL/L (ref 3.4–5.3)
PRA DONOR SPECIFIC ABY: NORMAL
PROT SERPL-MCNC: 7.1 G/DL (ref 6.8–8.8)
RBC # BLD AUTO: 3.69 10E12/L (ref 4.4–5.9)
SODIUM SERPL-SCNC: 143 MMOL/L (ref 133–144)
TACROLIMUS BLD-MCNC: 7.6 UG/L (ref 5–15)
TME LAST DOSE: 2000 H
TRIGL SERPL-MCNC: 109 MG/DL
WBC # BLD AUTO: 4.2 10E9/L (ref 4–11)

## 2017-06-02 PROCEDURE — 88346 IMFLUOR 1ST 1ANTB STAIN PX: CPT | Performed by: INTERNAL MEDICINE

## 2017-06-02 PROCEDURE — 86832 HLA CLASS I HIGH DEFIN QUAL: CPT | Performed by: INTERNAL MEDICINE

## 2017-06-02 PROCEDURE — 80061 LIPID PANEL: CPT | Performed by: INTERNAL MEDICINE

## 2017-06-02 PROCEDURE — 85027 COMPLETE CBC AUTOMATED: CPT | Performed by: INTERNAL MEDICINE

## 2017-06-02 PROCEDURE — 93505 ENDOMYOCARDIAL BIOPSY: CPT | Mod: 26 | Performed by: INTERNAL MEDICINE

## 2017-06-02 PROCEDURE — 82550 ASSAY OF CK (CPK): CPT | Performed by: INTERNAL MEDICINE

## 2017-06-02 PROCEDURE — 86352 CELL FUNCTION ASSAY W/STIM: CPT | Performed by: INTERNAL MEDICINE

## 2017-06-02 PROCEDURE — 40000166 ZZH STATISTIC PP CARE STAGE 1

## 2017-06-02 PROCEDURE — 86833 HLA CLASS II HIGH DEFIN QUAL: CPT | Performed by: INTERNAL MEDICINE

## 2017-06-02 PROCEDURE — 36415 COLL VENOUS BLD VENIPUNCTURE: CPT | Performed by: INTERNAL MEDICINE

## 2017-06-02 PROCEDURE — 99212 OFFICE O/P EST SF 10 MIN: CPT | Mod: 25

## 2017-06-02 PROCEDURE — 80053 COMPREHEN METABOLIC PANEL: CPT | Performed by: INTERNAL MEDICINE

## 2017-06-02 PROCEDURE — 27211181 ZZH BALLOON TIP PRESSURE CR5

## 2017-06-02 PROCEDURE — 27210787 ZZH MANIFOLD CR2

## 2017-06-02 PROCEDURE — C1893 INTRO/SHEATH, FIXED,NON-PEEL: HCPCS

## 2017-06-02 PROCEDURE — 87799 DETECT AGENT NOS DNA QUANT: CPT | Performed by: INTERNAL MEDICINE

## 2017-06-02 PROCEDURE — 99215 OFFICE O/P EST HI 40 MIN: CPT | Mod: 25 | Performed by: INTERNAL MEDICINE

## 2017-06-02 PROCEDURE — 83036 HEMOGLOBIN GLYCOSYLATED A1C: CPT | Performed by: INTERNAL MEDICINE

## 2017-06-02 PROCEDURE — 88307 TISSUE EXAM BY PATHOLOGIST: CPT | Performed by: INTERNAL MEDICINE

## 2017-06-02 PROCEDURE — 27211047 ZZH FORCEP BIOPSY CR10

## 2017-06-02 PROCEDURE — 93505 ENDOMYOCARDIAL BIOPSY: CPT

## 2017-06-02 PROCEDURE — 80197 ASSAY OF TACROLIMUS: CPT | Performed by: INTERNAL MEDICINE

## 2017-06-02 PROCEDURE — 02BM3ZX EXCISION OF VENTRICULAR SEPTUM, PERCUTANEOUS APPROACH, DIAGNOSTIC: ICD-10-PCS | Performed by: INTERNAL MEDICINE

## 2017-06-02 PROCEDURE — 88350 IMFLUOR EA ADDL 1ANTB STN PX: CPT | Performed by: INTERNAL MEDICINE

## 2017-06-02 PROCEDURE — 27210982 ZZH KIT RT HC TOTES DISP CR7

## 2017-06-02 RX ORDER — LIDOCAINE 40 MG/G
CREAM TOPICAL
Status: COMPLETED | OUTPATIENT
Start: 2017-06-02 | End: 2017-06-02

## 2017-06-02 RX ADMIN — LIDOCAINE: 40 CREAM TOPICAL at 10:36

## 2017-06-02 ASSESSMENT — PAIN SCALES - GENERAL: PAINLEVEL: NO PAIN (0)

## 2017-06-02 NOTE — LETTER
6/2/2017      RE: Javi Bansal  [address] [place] [address]       Dear Colleague,    Thank you for the opportunity to participate in the care of your patient, Javi Bansal, at the [facility]. Please see a copy of my visit note below.    ADULT HEART TRANSPLANT CLINIC      June 2, 2017    Dear Colleagues:      I had the pleasure of seeing Javi Shetty in the [facility] Cardiac Transplant Clinic today.      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  His hospitalization was complicated by 2R rejection as well as ventricular tachycardia requiring treatment with steroids and eventually thromboglobulin. He has had some evidence of Chagas reactivation and completed a 60 day course of benznidazole 150 mg PO BID. Chagas serologies have been negative since then.     He initially had high filling pressures postoperatively, but these normalized with further diuresis and treatment of rejection.     Since the time of his initial discharge, he has been able to resume INH therapy for latent TB, without any side effects.  He also had 1 episode of 2R rejection again which was in the setting of relatively high immunosuppression doses and received steroid treatment for this with resolution.  His graft function remained normal throughout that.      He presents today for routine followup of his heart transplant and also followup of this hospitalization.  He says overall that he is feeling very well.  He denies any PND or orthopnea.  He denies palpitations or syncope.  He denies lower extremity edema.     He does have a rash on his sternum and back which is raised without itching- no fevers     Transplant details:   Date: 12/12/2016   History of rejection: 2 R  12/27/2016 treated with thymo, again 2/10/17  treated with steroids   History of opportunistic infection: none   Transplant medication intolerances: none, although had rejection on azathioprine   CMV R+/D+, EBV R+/D+, HIV negative, HSV 1/2 R+/-, D unknown, donor negative for Heb B and  C       Patient Active Problem List    Diagnosis Date Noted     Pneumonia 03/09/2017     Priority: Medium     Ventricular tachycardia, non-sustained (H) 01/04/2017     Priority: Medium     Elevated liver enzymes 12/20/2016     Priority: Medium     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 12/19/2016     Priority: Medium     Need for prophylactic antibiotic 12/19/2016     Priority: Medium     Heart failure (H) 12/12/2016     Priority: Medium     Heart replaced by transplant (H) 12/12/2016     Priority: Medium     Heart transplant candidate 12/11/2016     Priority: Medium     Chest pain 07/11/2016     Priority: Medium     Chronic systolic heart failure (H)      Priority: Medium     CHF (congestive heart failure) (H) 06/17/2016     Priority: Medium     Cardiac defibrillator in situ-Medtronic, dual chamber - Not Dependent 06/16/2016     Priority: Medium     Chagas cardiomyopathy 05/26/2016     Priority: Medium     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 500 mg)  14 DPT: 2R, neg/neg treated w/IV pred x 3 (1000mg) + thymo x 3  21 DPT: 1R, neg/neg       ACP (advance care planning) 10/10/2016     Advance Care Planning 10/10/2016: Receipt of ACP document:  Received: invalid HCD document dated 8-4-16.  Document not previously scanned. Validation form completed indicating invalid document. Copy sent to client with information and facilitation resources. Validation form sent to be scanned as notation of invalid document received. Confirmed/documented designated decision maker(s).  Added by Namrata Neely RN Advance Care Planning Liaison with [employer]               PAST MEDICAL  HISTORY:  Past Medical History:   Diagnosis Date     Chagas cardiomyopathy      Chronic systolic heart failure (H)      Diabetes (H)      Dual ICD (implantable cardioverter-defibrillator) in place 10/20/2015     Essential hypertension      Gastroesophageal reflux disease      H/O gastric ulcer      Hypertension      Premature ventricular contractions      Presbyopia      Pterygium     ?? suspected , but unclear dx   s/p OHT 12/12/2016      CURRENT MEDICATIONS:    Prescription Medications as of 6/2/2017             blood glucose monitoring (ONE TOUCH DELICA) lancets Use to test blood sugars 4 times daily or as directed.    blood glucose monitoring (NO BRAND SPECIFIED) test strip Contour Next test strips. Use to test blood sugar 4 times daily as well as if  Symptoms of high or low BG.    insulin glargine (BASAGLAR KWIKPEN) 100 UNIT/ML injection Inject 24 Units Subcutaneous daily    insulin aspart (NOVOLOG PEN) 100 UNIT/ML injection Inject 10 Units Subcutaneous 3 times daily (with meals)    predniSONE (DELTASONE) 5 MG tablet Take 1 tablet (5 mg) by mouth daily    insulin pen needle 32G X 4 MM Use pen needles daily or as directed.    Sharps Container (B-D SHARPS DISPOSAL BY MAIL) MISC 1 container    alcohol swab prep pads Use to swab area of injection/raymond as directed.    tacrolimus (PROGRAF - GENERIC EQUIVALENT) 1 MG capsule Take 2mg (2 capsules) in the morning and 1mg (1 capsule) in the evening.    ketoconazole (NIZORAL) 2 % shampoo Apply topically three times a week Alternate with Head and Shoulders.    nystatin (MYCOSTATIN) 723894 UNIT/ML suspension Take 5 mLs (500,000 Units) by mouth 4 times daily    order for DME Equipment being ordered: Splint - Cubital tunnel splint for ulnar neuropathy    isoniazid (NYDRAZID) 300 MG tablet Take 1 tablet (300 mg) by mouth daily    pravastatin (PRAVACHOL) 20 MG tablet Take 1 tablet (20 mg) by mouth every evening    mycophenolate (CELLCEPT - GENERIC EQUIVALENT) 250 MG capsule  "Take 6 capsules (1,500 mg) by mouth 2 times daily    aspirin EC 81 MG EC tablet Take 1 tablet (81 mg) by mouth daily    calcium carb 1250 mg, 500 mg [place],/vitamin D 200 units (OSCAL WITH D) 500-200 MG-UNIT per tablet Take 1 tablet by mouth 2 times daily (with meals)    multivitamin, therapeutic with minerals (THERA-VIT-M) TABS tablet Take 1 tablet by mouth daily    pantoprazole (PROTONIX) 40 MG EC tablet Take 1 tablet (40 mg) by mouth every morning      Facility Administered Medications as of 6/2/2017             gadobutrol (GADAVIST) injection 10 mL Inject 10 mLs into the vein once          ROS:   Constitutional: No fever, chills, or sweats. Weight has stabilized    ENT: No visual disturbance, ear ache, epistaxis, sore throat.   Allergies/Immunologic: Negative.   Respiratory:  Cough improved, hemoptysis.   Cardiovascular: As per HPI.   GI: No nausea, vomiting, hematemesis, melena, or hematochezia.   : No urinary frequency, dysuria, or hematuria.   Integument: Negative.   Psychiatric: Negative.   Neuro: Negative.   Endocrinology: Negative.   Musculoskeletal: Negative  Skin: see above     Exam:  /89 (BP Location: Left arm, Patient Position: Chair, Cuff Size: Adult Regular)  Pulse 91  Ht 1.69 m (5' 6.53\")  Wt 75.1 kg (165 lb 8 oz)  SpO2 100%  BMI 26.28 kg/m2  In general, the patient is a pleasant male in no apparent distress.    HEENT: NC/AT. PERRLA. EOMI.  Sclerae white, not injected.    Neck:  No adenopathy, No thyromegaly.    COR: No jugular venous distention.  RRR.  Normal S1 S2 splits physiologically.  No murmur, rub click, or gallop.    Lungs:  CTA. No rhonchi.    Abdomen: soft, nontender, nondistended.  No organomegaly.  Extremities:  No clubbing, cyanosis, or edema.    Neuro: Alert & Oriented x 3, grossly non focal.  Sternal wound is well healed - papular rash with some pustules on his trunk (anterior and posterior)     Labs:  CBC RESULTS:   Lab Results   Component Value Date    WBC 5.9 05/15/2017 "    RBC 3.87 (L) 05/15/2017    HGB 12.4 (L) 05/15/2017    HCT 37.6 (L) 05/15/2017    MCV 97 05/15/2017    MCH 32.0 05/15/2017    MCHC 33.0 05/15/2017    RDW 12.6 05/15/2017     05/15/2017       BMP RESULTS:  Lab Results   Component Value Date     05/15/2017    POTASSIUM 3.5 05/15/2017    CHLORIDE 108 05/15/2017    CO2 25 05/15/2017    ANIONGAP 10 05/15/2017    GLC 83 05/15/2017    BUN 22 05/15/2017    CR 0.77 05/15/2017    GFRESTIMATED >90  Non  GFR Calc   05/15/2017    GFRESTBLACK >90   GFR Calc   05/15/2017    DAISY 9.5 05/15/2017      LIPID RESULTS:  Lab Results   Component Value Date    CHOL 198 01/13/2017    HDL 88 01/13/2017    LDL 93 01/13/2017    TRIG 82 01/13/2017    NHDL 110 01/13/2017       IMMUNOSUPPRESSANT LEVELS  Lab Results   Component Value Date    TACROL 12.7 05/15/2017    DOSTAC 2030 05/15/2017       No components found for: CK  Lab Results   Component Value Date    MAG 2.0 04/25/2017     Lab Results   Component Value Date    A1C 11.6 (H) 04/25/2017     Lab Results   Component Value Date    PHOS 3.0 03/28/2017     Lab Results   Component Value Date    NTBNPI 1316 (H) 03/09/2017     Lab Results   Component Value Date    SAITESTMET [facility] 05/05/2017    SAICELL Class I 05/05/2017    [id number] None 05/05/2017    [id number] None 05/05/2017    SAIREPCOM  05/05/2017      Test performed by modified procedure. Serum heat inactivated. High-risk,   mfi >3,000. Mod-risk, mfi 500-3,000.       Lab Results   Component Value Date    SAIITESTME [facility] 05/05/2017    SAIICELL Class II 05/05/2017    [id number] None 05/05/2017    JX4FRFJXEJ DP:01 05/05/2017    SAIIREPCOM  05/05/2017      Test performed by modified procedure. Serum heat inactivated. High-risk,   mfi >3,000. Mod-risk, mfi 500-3,000.       Lab Results   Component Value Date    CSPEC Plasma, EDTA anticoagulant 03/15/2017     Last echo and baseline coronary angiogram reviewed   Last bx reviewed - 1 R     Echo 1/13/2017      Procedure  Echocardiogram with two-dimensional, color and spectral Doppler performed.  Contrast Optison. Optison (NDC #2837-6129-08) given intravenously. Patient  was given 3 ml mixture of 3 ml Optison and 6 ml saline. 6 ml wasted. IV start  location R Upper arm.      Interpretation Summary  Global and regional left ventricular function is normal with an EF of 60-65%.  Right ventricular function, chamber size, wall motion, and thickness are  normal.  Pulmonary artery systolic pressure is normal.  The inferior vena cava was normal in size with preserved respiratory  variability. Estimated mean right atrial pressure is <3 mmHg.  No pericardial effusion is present.  This study was compared with the study from 12/29/2016. There has been no  change.    Cardiac MRI: 6/1/2017   IMPRESSION:  1.  Normal left ventricular size and systolic function with a  calculated ejection fraction of  57 %.  2.  Normal right ventricular size and mildly reduced systolic function  with a calculated ejection fraction of 46%.   3.  On delayed enhancement imaging, there is no abnormal late  gadolinium enhancement.   4.  Compared with the prior study dated 1/5/2017, inferolateral scar  is no longer seen.     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     With regard to his cardiac transplantation, he has normal graft function.  He has had 2 episodes of 2R rejection, which have not caused too much problem.  He is euvolemic on exam today. His DSAs are negative.      Filling pressures were reassuring as was his last echo.  If this next biopsy is negative I will lower his tac goal to 8 -10 and drop his prednisone. I will continue  MMF 1500 b.i.d.    For infection ppx: will stop nystatin when steroids are off       For other transplant medications, he is on aspirin and statin as well as appropriate prophylaxis as  listed below.     Change in immunosuppression: if biopsy negative will stop steriods and nystatin   Reason for Change:see above   Other Changes: see above    Follow-Up: 2 months     Next Biopsy: 2 month  Next Angiogram: testing is just done   Next Angiogram with IVUS: yes, at one year post tx  Next Stress Test: per protocol     - Viral serostatus & prophylaxis: CMV R+/D+, EBV R+/D+, HIV negative, HSV 1/2 R+/-, D unknown, donor negative for Heb B and C   - Immunization status:  VZV seropositive, hepatitis B immune.    Other:     reactivation of his Chagas disease: treated he is now off medication for this and weekly PCRs have been negative.      With regard to his latent TB, he is on INH therapy and tolerating this.     Rash: sending to derm     Sincerely,      Geena Mcclellan MD   Cardiology Staff     CC  Patient Care Team:  Ivan Burrell MD as PCP - General (Student in organized health care education/training program)  Broderick Gao MD, MD as MD (Cardiology)  Shauna Cuellar MD as MD (Infectious Diseases)  Marino Woo MD as MD (Dermatology)  Bryant Bartholomew MD as MD (Family Medicine - Sports Medicine)  Isabela Balbuena MD (Ophthalmology)      Broderick Gao MD   [address] [address]

## 2017-06-02 NOTE — PROCEDURES
PRELIMINARY CARDIAC CATH REPORT:     PROCEDURES PERFORMED:   Endomyocardial Biopsy  Right Heart Catheterization    PHYSICIANS:  Attending Physician: Dante Way MD  Interventional Cardiology Fellow: None  Cardiology Fellow: George Adams MD    INDICATION:  Javi Bansal is a 46 year old male with OHT Dec 2016 is here for RHC      DESCRIPTION:  1. Consent obtained with discussion of risks.  All questions were answered.  2. Sterile prep and procedure.  3. Location with Sheaths:   Rt IJ  7 Fr 10 cm [short], 7 Fr Diag sheath  4. Access: Local anesthetic with lidocaine.  A standard 18 guage needle with ultrasound guidance was used to establish vascular access using a modified Seldinger technique.  5. Diagnostic Catheters:   4 Fr  Keene Satya  6. Estimated blood loss: < 5 ml    MEDICATIONS:  The procedure was performed under local anesthesia with lidocaine  Heart rate, BP, respiration, oxygen saturation and patient responses were monitored throughout the procedure with the assistance of the RN under my supervision.    Procedures:    ENDOMYOCARDIAL BIOPSY:  1. Successful collection of four right ventricular endomyocardial biopsy samples using the Jawz.    HEMODYNAMICS:  1. HR 89 bpm  2. /71/81 mmHg  3. RA 10/10/8   4. RV 28/10  5. PA 26/12/17   6. PCW 13/20/13  7. PA sat 60.4%   8. PCW sat 95.5%  9. Hgb 11.1 g/dL   10. Donnell CO 4.9   11. Donnell CI 2.7   12. PVR 0.81          Sheath Removal:  The 7 Fr sheath was manually removed in the cardiac catheterization laboratory.    Contrast: Isovue, 0 ml     Fluoroscopy Time: 1.2 min    COMPLICATIONS:  1. None    SUMMARY:   >> Normal right sided filling pressures.  >> Normal left sided filling pressures.  >> Normal PA pressures  >> Normal cardiac output, 4.9 L/min with index 2.7 L/min/m2  >> Endomyocardial biopsy samples collected. Results pending    PLAN:   >>. Discharge today per protocol    The attending interventional cardiologist was present and supervised  all critical aspects the procedure.      See CVIS report for final draft.    George Adams MD  Cardiology Fellow    Staff Cardiologist: I supervised the cardiology fellow and reviewed the hemodynamic findings with the fellow at the completion of the procedure.  I agree with the documentation above.    Dante Way MD

## 2017-06-02 NOTE — PROGRESS NOTES
1125 Pt arrived on 2a post RHC and biopsy. VSS Ra. Dressing c/d/i. No pain.  at BS. Pt declines dc instructions due to multiple procedures. Pt given crackers and juice. 1140 Pt dc'd home.

## 2017-06-02 NOTE — IP AVS SNAPSHOT
[address] [address]                                       After Visit Summary   6/2/2017    Javi Bansal    MRN: [id number]           After Visit Summary Signature Page     I have received my discharge instructions, and my questions have been answered. I have discussed any challenges I see with this plan with the nurse or doctor.    ..........................................................................................................................................  Patient/Patient Representative Signature      ..........................................................................................................................................  Patient Representative Print Name and Relationship to Patient    ..................................................               ................................................  Date                                            Time    ..........................................................................................................................................  Reviewed by Signature/Title    ...................................................              ..............................................  Date                                                            Time

## 2017-06-02 NOTE — IP AVS SNAPSHOT
MRN:[id number]                      After Visit Summary   6/2/2017    Javi Bansal    MRN: [id number]           Visit Information        Department      6/2/2017 10:00 AM [address] [facility] [place]          Review of your medicines      UNREVIEWED medicines. 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)        Dose:  81 mg   Take 1 tablet (81 mg) by mouth daily   Quantity:  90 tablet   Refills:  3       calcium carb 1250 mg (500 mg [place])/vitamin D 200 units 500-200 MG-UNIT per tablet   Commonly known as:  OSCAL with D   Used for:  Heart replaced by transplant (H)        Dose:  1 tablet   Take 1 tablet by mouth 2 times daily (with meals)   Quantity:  60 tablet   Refills:  11       insulin aspart 100 UNIT/ML injection   Commonly known as:  NovoLOG PEN   Used for:  Hyperglycemia        Dose:  10 Units   Inject 10 Units Subcutaneous 3 times daily (with meals)   Quantity:  9 mL   Refills:  1       insulin glargine 100 UNIT/ML injection   Used for:  Steroid-induced diabetes mellitus (H)        Dose:  24 Units   Inject 24 Units Subcutaneous daily   Quantity:  30 mL   Refills:  1       isoniazid 300 MG tablet   Commonly known as:  NYDRAZID   Used for:  Heart replaced by transplant (H)        Dose:  300 mg   Take 1 tablet (300 mg) by mouth daily   Quantity:  90 tablet   Refills:  3       ketoconazole 2 % shampoo   Commonly known as:  NIZORAL   Used for:  Seborrhea        Apply topically three times a week Alternate with Head and Shoulders.   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 (H)        Dose:  1 tablet   Take 1 tablet by mouth daily   Quantity:  30 each   Refills:  11       mycophenolate 250 MG capsule   Commonly known as:  CELLCEPT - GENERIC EQUIVALENT   Used for:  Heart replaced by transplant (H)         Dose:  1500 mg   Take 6 capsules (1,500 mg) by mouth 2 times daily   Quantity:  360 capsule   Refills:  11       nystatin 086745 UNIT/ML suspension   Commonly known as:  MYCOSTATIN   Used for:  Heart replaced by transplant (H)        Dose:  864650 Units   Take 5 mLs (500,000 Units) by mouth 4 times daily   Quantity:  473 mL   Refills:  3       pantoprazole 40 MG EC tablet   Commonly known as:  PROTONIX   Used for:  Heart replaced by transplant (H)        Dose:  40 mg   Take 1 tablet (40 mg) by mouth every morning   Quantity:  30 tablet   Refills:  11       pravastatin 20 MG tablet   Commonly known as:  PRAVACHOL   Used for:  Heart replaced by transplant (H)        Dose:  20 mg   Take 1 tablet (20 mg) by mouth every evening   Quantity:  30 tablet   Refills:  11       predniSONE 5 MG tablet   Commonly known as:  DELTASONE   Used for:  Heart replaced by transplant (H)        Dose:  5 mg   Take 1 tablet (5 mg) by mouth daily   Quantity:  30 tablet   Refills:  1       tacrolimus 1 MG capsule   Commonly known as:  PROGRAF - GENERIC EQUIVALENT   Used for:  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   Quantity:  90 capsule   Refills:  11         CONTINUE these medicines which have NOT CHANGED        Dose / Directions    alcohol swab prep pads   Used for:  Hyperglycemia        Use to swab area of injection/raymond as directed.   Quantity:  100 each   Refills:  1       B-D SHARPS DISPOSAL BY MAIL [facility]   Used for:  Hyperglycemia        1 container   Quantity:  1 each   Refills:  1       blood glucose monitoring lancets   Used for:  Type 2 diabetes mellitus without complication, unspecified long term insulin use status (H)        Use to test blood sugars 4 times daily or as directed.   Quantity:  1 Box   Refills:  11       blood glucose monitoring test strip   Commonly known as:  no brand specified   Used for:  Hyperglycemia        Contour Next test strips. Use  to test blood sugar 4 times daily as well as if  Symptoms of high or low BG.   Quantity:  2 Box   Refills:  11       insulin pen needle 32G X 4 MM   Used for:  Hyperglycemia        Use pen needles daily or as directed.   Quantity:  100 each   Refills:  1       order for DME   Used for:  Lesion of right ulnar nerve        Equipment being ordered: Splint - Cubital tunnel splint for ulnar neuropathy   Quantity:  1 Device   Refills:  0                Protect others around you: Learn how to safely use, store and throw away your medicines at www.disposemymeds.org.         Follow-ups after your visit        Your next 10 appointments already scheduled     Jun 08, 2017 10:30 AM CDT   NEW RETINA with Isabela Balbuena MD   [facility] ([facility])    [address]  [address] [place] [address]  [facility]  [facility] [address]   [phone]            Jun 20, 2017 10:30 AM CDT   (Arrive by 10:15 AM)   RETURN DIABETES with Alla Jerez PA-C   [facility] ([facility])    [address] [place] [address]  [address] [facility] [address]   [phone]            Jul 06, 2017  1:30 PM CDT   (Arrive by 1:15 PM)   Transplant Skin Check with GARY Murillo MD   [facility] Dermat[facility] ([facility])    [address] [place] [address]  [address] [facility] [address]   [phone]            Jul 26, 2017  9:30 AM CDT   (Arrive by 9:15 AM)   Return Visit with Ivan Schuler MD   [facility] ([facility])    [address] [place] [address]  [address] [facility] [address]   [phone]            Jul 28, 2017  9:00 AM CDT   Lab with  LAB   [facility] Lab ([facility])    [address] [place] [address]  [address] [facility] [address]   [phone]            Jul 28, 2017  9:30 AM CDT   (Arrive by 9:15 AM)   RETURN HEART TRANSPLANT with SAMM Ramos [facility]  [facility] ([facility])    [address] [place] [address]   [phone]            Jul 28, 2017 11:00 AM CDT   Procedure - 2.5 hour with U2A ROOM 17   Unit [address] [facility] ([facility])    [address] [facility] [address]               Jul 28, 2017 12:00 PM CDT   Heart Cath Heart Biopsy with [id number]   [facility] ([facility])    [address] [facility] [address]   [phone]            Sep 19, 2017  2:00 PM CDT   (Arrive by 1:45 PM)   RETURN HEART TRANSPLANT with SAMM Ramos CNP   [facility] ([facility])    [address] [place] [address]   [phone]            Dec 15, 2017 11:30 AM CST   (Arrive by 11:15 AM)   HEART TRANSPLANT ANNUAL with Geena Mcclellan MD   [facility] ([facility])    [address] [place] [address]   [phone]               Care Instructions        Further instructions from your care team       [facility]                        Interventional Cardiology  Discharge Instructions   Post Right Heart Cath      AFTER YOU GO HOME:    DO drink plenty of fluids    DO resume your regular diet and medications unless otherwise instructed by your Primary Physician    Do Not scrub the procedure site vigorously    No lotion or powder to the puncture site for 3 days    CALL YOUR PRIMARY PHYSICIAN IF: You may resume all normal activity.  Monitor neck site for bleeding, swelling, or voice changes. If you notice bleeding or swelling immediately apply pressure to the site and call number below to speak with Cardiology Fellow.  If you experience any changes in your breathing you should call your doctor immediately or come to the closest Emergency Department.  Do not drive  "yourself.    ADDITIONAL INSTRUCTIONS: Medications: You are to resume all home medications including anticoagulation therapy unless otherwise advised by your primary cardiologist or nurse coordinator.    Follow Up: Per your primary cardiology team    If you have any questions or concerns regarding your procedure site please call [phone] at anytime and ask for Cardiology Fellow on call.  They are available 24 hours a day.  You may also contact the Cardiology Clinic after hours number at [phone].                                                       Telephone Numbers [phone] Monday-Friday 8:00 am to 4:30 pm    [phone] After 4:30 pm Monday-Friday, Weekends & Holidays  Ask for Interventional Cardiologist on call. Someone is on call 24 hours/day   [facility] toll free number [phone] Monday-Friday 8:00 am to 4:30 pm   [facility] Emergency Dept [phone]                    Additional Information About Your Visit        [employer] Information     [employer] lets you send messages to your doctor, view your test results, renew your prescriptions, schedule appointments and more. To sign up, go to [email or web address] . Click on \"Log in\" on the left side of the screen, which will take you to the Welcome page. Then click on \"Sign up Now\" on the right side of the page.     You will be asked to enter the access code listed below, as well as some personal information. Please follow the directions to create your username and password.     Your access code is: [email or web address]  Expires: 2017  6:30 AM     Your access code will  in 90 days. If you need help or a new code, please call your [facility] clinic or [phone].        Care EveryWhere ID     This is your Care EveryWhere ID. This could be used by other organizations to access your [facility] medical records  [id number]        Your Vitals Were     Blood Pressure Pulse Temperature Respirations Height Weight    113/76 (BP Location: Left arm) 92 " "98.3  F (36.8  C) (Oral) 16 1.69 m (5' 6.53\") 75.1 kg (165 lb 8 oz)    Pulse Oximetry BMI (Body Mass Index)                99% 26.28 kg/m2           Primary Care Provider Office Phone # Fax #    Ivan Burrell [phone]      Thank you!     Thank you for choosing [facility] for your care. Our goal is always to provide you with excellent care. Hearing back from our patients is one way we can continue to improve our services. Please take a few minutes to complete the written survey that you may receive in the mail after you visit with us. Thank you!             Medication List: This is a list of all your medications and when to take them. Check marks below indicate your daily home schedule. Keep this list as a reference.      Medications           Morning Afternoon Evening Bedtime As Needed    alcohol swab prep pads   Use to swab area of injection/raymond as directed.                                aspirin 81 MG EC tablet   Take 1 tablet (81 mg) by mouth daily                                B-D SHARPS DISPOSAL BY MAIL Misc   1 container                                blood glucose monitoring lancets   Use to test blood sugars 4 times daily or as directed.                                blood glucose monitoring test strip   Commonly known as:  no brand specified   Contour Next test strips. Use to test blood sugar 4 times daily as well as if  Symptoms of high or low BG.                                calcium carb 1250 mg (500 mg [place])/vitamin D 200 units 500-200 MG-UNIT per tablet   Commonly known as:  OSCAL with D   Take 1 tablet by mouth 2 times daily (with meals)                                insulin aspart 100 UNIT/ML injection   Commonly known as:  NovoLOG PEN   Inject 10 Units Subcutaneous 3 times daily (with meals)                                insulin glargine 100 UNIT/ML injection   Inject 24 Units Subcutaneous daily                                insulin pen needle 32G X 4 MM   Use pen " needles daily or as directed.                                isoniazid 300 MG tablet   Commonly known as:  NYDRAZID   Take 1 tablet (300 mg) by mouth daily                                ketoconazole 2 % shampoo   Commonly known as:  NIZORAL   Apply topically three times a week Alternate with Head and Shoulders.                                multivitamin, therapeutic with minerals Tabs tablet   Take 1 tablet by mouth daily                                mycophenolate 250 MG capsule   Commonly known as:  CELLCEPT - GENERIC EQUIVALENT   Take 6 capsules (1,500 mg) by mouth 2 times daily                                nystatin 445087 UNIT/ML suspension   Commonly known as:  MYCOSTATIN   Take 5 mLs (500,000 Units) by mouth 4 times daily                                order for DME   Equipment being ordered: Splint - Cubital tunnel splint for ulnar neuropathy                                pantoprazole 40 MG EC tablet   Commonly known as:  PROTONIX   Take 1 tablet (40 mg) by mouth every morning                                pravastatin 20 MG tablet   Commonly known as:  PRAVACHOL   Take 1 tablet (20 mg) by mouth every evening                                predniSONE 5 MG tablet   Commonly known as:  DELTASONE   Take 1 tablet (5 mg) by mouth daily                                tacrolimus 1 MG capsule   Commonly known as:  PROGRAF - GENERIC EQUIVALENT   Take 2mg (2 capsules) in the morning and 1mg (1 capsule) in the evening.

## 2017-06-02 NOTE — PATIENT INSTRUCTIONS
You were seen in clinic for routine 6 month post heart transplant evaluation.  Available results appear to indicate good heart function and lab results within normal limits.  Your coordinator will update you with pending results and if any changes are needed.   Alcohol can interact with your medications, but drinking one beer every once in a while is okay as long as you have not had any problems with drinking too much alcohol in the past.  You should NOT drink more than one beer at a time.  No medication changes were made during this clinic visit.    Follow up:  Your coordinator will update you with pending test results and if any changes are needed.  Your coordinator will contact the [facility] to confirm current medication refills, but you should also contact them (their number is on your prescription bottles) to request medication refills.  At check out today, we will schedule you to see a dermatologist to evaluate the rash on your chest and back  We will communicate with your diabetes doctor if any changes are made to medications which may affect your diabetes and insulin requirements.  Your next transplant appointments are scheduled for July 28th, 2017 and you have several other appointments (for diabetes, eyes and [place]) between now and then.  Please call with any questions/concerns.

## 2017-06-02 NOTE — NURSING NOTE
Patient seen in clinic for routine 6 month post heart tx evaluation.  Patient reports he is feeling well; his diet and sleep are both good.  Patient denies SOB, pain, swelling, N/V or diarrhea.  He continues to participate in cardiac rehabilitation: 2x/week, 40 minutes each session (20 minutes cardio, 20 minutes strength training).  Patient does report mild rash on his chest and back; plan to schedule dermatology evaluation.  Patient confirms scheduled DM appointment in approximately 3 weeks; if his bx today is negative, his prednisone and nystatin will be stopped and his DM management will need to be reevaluated.  Per , patient may now return to non physically strenuous work.  Patient confirms he no longer has a home RN set up his medications.  Coordinator will contact [facility] to request automatic calls (in [place]) when refills are needed, but instructed patient to contact the pharmacy for refills too.    No medication changes made during this visit.    Dermatology evaluation scheduled for beginning of July 2017  Coordinator will update patient with pending test results and if any changes are needed.  Confirmed upcoming appointments, including next scheduled tx appts on July 28.  Instructed patient to call coordinator with any questions/concerns.     used for entirety of this clinic visit.

## 2017-06-02 NOTE — PROGRESS NOTES
ADULT HEART TRANSPLANT CLINIC    June 2, 2017    Dear Colleagues:      I had the pleasure of seeing Javi Shetty in the [facility] today.      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  His hospitalization was complicated by 2R rejection as well as ventricular tachycardia requiring treatment with steroids and eventually thromboglobulin. He has had some evidence of Chagas reactivation and completed a 60 day course of benznidazole 150 mg PO BID.     He initially had high filling pressures postoperatively, but these normalized with further diuresis and treatment of rejection.     Since the time of his initial discharge, he has been able to resume INH therapy for latent TB, without any side effects.  He also had 1 episode of 2R rejection again which was in the setting of relatively high immunosuppression doses and received steroid treatment for this with resolution.  His graft function remained normal throughout that.      More recently, he was admitted with what was thought to be a community-acquired pneumonia.  He had CT imaging of this and ID consulted.  Many fungal serologies were sent, which were all negative and it did respond to antibacterial treatment.  He presents today for routine followup of his heart transplant and also followup of this hospitalization.  He says overall that he is feeling much better since the time of discharge and after antibiotics were started.  His shortness of breath has resolved.  His cough is nearly complete.  He denies any fevers, night sweats or chills.   He denies any dysuria, and again his cough has improved.  He denies headache.  Noted some rash on his chest and upper back over the last 3 weeks. Continues to do cardiac rehab twice a week and also exercises at home. His CMRI yesterday showed normal LVEF and RVEF without late  enhancement.     He denies any PND or orthopnea.  He denies palpitations or syncope.  He denies lower extremity edema.     Transplant details:   Date: 12/12/2016   History of rejection: 2 R  12/27/2016 treated with thymo, again 2/10/17 treated with steroids   History of opportunistic infection: none   Transplant medication intolerances: none, although had rejection on azathioprine   CMV R+/D+, EBV R+/D+, HIV negative, HSV 1/2 R+/-, D unknown, donor negative for Heb B and  C       Patient Active Problem List    Diagnosis Date Noted     Pneumonia 03/09/2017     Priority: Medium     Ventricular tachycardia, non-sustained (H) 01/04/2017     Priority: Medium     Elevated liver enzymes 12/20/2016     Priority: Medium     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 12/19/2016     Priority: Medium     Need for prophylactic antibiotic 12/19/2016     Priority: Medium     Heart failure (H) 12/12/2016     Priority: Medium     Heart replaced by transplant (H) 12/12/2016     Priority: Medium     Heart transplant candidate 12/11/2016     Priority: Medium     Chest pain 07/11/2016     Priority: Medium     Chronic systolic heart failure (H)      Priority: Medium     CHF (congestive heart failure) (H) 06/17/2016     Priority: Medium     Cardiac defibrillator in situ-Medtronic, dual chamber - Not Dependent 06/16/2016     Priority: Medium     Chagas cardiomyopathy 05/26/2016     Priority: Medium     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 500 mg)  14 DPT: 2R, neg/neg treated w/IV pred x 3 (1000mg) + thymo x 3  21 DPT: 1R, neg/neg       ACP (advance care planning) 10/10/2016     Advance Care Planning 10/10/2016: Receipt of ACP document:  Received: invalid HCD document dated 8-4-16.  Document not previously scanned. Validation form completed indicating invalid document. Copy sent to client with information and facilitation resources. Validation  form sent to be scanned as notation of invalid document received. Confirmed/documented designated decision maker(s).  Added by Namrata Neely RN Advance Care Planning Liaison with [employer]               PAST MEDICAL HISTORY:  Past Medical History:   Diagnosis Date     Chagas cardiomyopathy      Chronic systolic heart failure (H)      Diabetes (H)      Dual ICD (implantable cardioverter-defibrillator) in place 10/20/2015     Essential hypertension      Gastroesophageal reflux disease      H/O gastric ulcer      Hypertension      Premature ventricular contractions      Presbyopia      Pterygium     ?? suspected , but unclear dx   s/p OHT 12/12/2016      CURRENT MEDICATIONS:    Prescription Medications as of 6/2/2017             blood glucose monitoring (ONE TOUCH DELICA) lancets Use to test blood sugars 4 times daily or as directed.    blood glucose monitoring (NO BRAND SPECIFIED) test strip Contour Next test strips. Use to test blood sugar 4 times daily as well as if  Symptoms of high or low BG.    insulin glargine (BASAGLAR KWIKPEN) 100 UNIT/ML injection Inject 24 Units Subcutaneous daily    insulin aspart (NOVOLOG PEN) 100 UNIT/ML injection Inject 10 Units Subcutaneous 3 times daily (with meals)    predniSONE (DELTASONE) 5 MG tablet Take 1 tablet (5 mg) by mouth daily    insulin pen needle 32G X 4 MM Use pen needles daily or as directed.    Sharps Container (B-D SHARPS DISPOSAL BY MAIL) MISC 1 container    alcohol swab prep pads Use to swab area of injection/raymond as directed.    tacrolimus (PROGRAF - GENERIC EQUIVALENT) 1 MG capsule Take 2mg (2 capsules) in the morning and 1mg (1 capsule) in the evening.    ketoconazole (NIZORAL) 2 % shampoo Apply topically three times a week Alternate with Head and Shoulders.    nystatin (MYCOSTATIN) 219468 UNIT/ML suspension Take 5 mLs (500,000 Units) by mouth 4 times daily    order for DME Equipment being ordered: Splint - Cubital tunnel splint for ulnar neuropathy     "isoniazid (NYDRAZID) 300 MG tablet Take 1 tablet (300 mg) by mouth daily    pravastatin (PRAVACHOL) 20 MG tablet Take 1 tablet (20 mg) by mouth every evening    mycophenolate (CELLCEPT - GENERIC EQUIVALENT) 250 MG capsule Take 6 capsules (1,500 mg) by mouth 2 times daily    aspirin EC 81 MG EC tablet Take 1 tablet (81 mg) by mouth daily    calcium carb 1250 mg, 500 mg [place],/vitamin D 200 units (OSCAL WITH D) 500-200 MG-UNIT per tablet Take 1 tablet by mouth 2 times daily (with meals)    multivitamin, therapeutic with minerals (THERA-VIT-M) TABS tablet Take 1 tablet by mouth daily    pantoprazole (PROTONIX) 40 MG EC tablet Take 1 tablet (40 mg) by mouth every morning      Facility Administered Medications as of 6/2/2017             gadobutrol (GADAVIST) injection 10 mL Inject 10 mLs into the vein once          ROS:   Constitutional: No fever, chills, or sweats. Weight has stabilized    ENT: No visual disturbance, ear ache, epistaxis, sore throat.   Allergies/Immunologic: Negative.   Respiratory:  Cough improved, hemoptysis.   Cardiovascular: As per HPI.   GI: No nausea, vomiting, hematemesis, melena, or hematochezia.   : No urinary frequency, dysuria, or hematuria.   Integument: Negative.   Psychiatric: Negative.   Neuro: Negative.   Endocrinology: Negative.   Musculoskeletal: Negative    Exam:  /89 (BP Location: Left arm, Patient Position: Chair, Cuff Size: Adult Regular)  Pulse 91  Ht 1.69 m (5' 6.53\")  Wt 75.1 kg (165 lb 8 oz)  SpO2 100%  BMI 26.28 kg/m2  In general, the patient is a pleasant male in no apparent distress.    HEENT: NC/AT. PERRLA. EOMI.  Sclerae white, not injected.    Neck:  No adenopathy, No thyromegaly.    COR: No jugular venous distention.  RRR.  Normal S1 S2 splits physiologically.  No murmur, rub click, or gallop.    Lungs:  CTA. No rhonchi.    Abdomen: soft, nontender, nondistended.  No organomegaly.  Extremities:  No clubbing, cyanosis, or edema.    Neuro: Alert & Oriented x 3, " grossly non focal.  Skin: Sternal wound is well healed, there is papular and possibly pustular rash over anterior chest around his sternotomy scar and over his upper back without erythema.    Labs:  CBC RESULTS:   Lab Results   Component Value Date    WBC 5.9 05/15/2017    RBC 3.87 (L) 05/15/2017    HGB 12.4 (L) 05/15/2017    HCT 37.6 (L) 05/15/2017    MCV 97 05/15/2017    MCH 32.0 05/15/2017    MCHC 33.0 05/15/2017    RDW 12.6 05/15/2017     05/15/2017       BMP RESULTS:  Lab Results   Component Value Date     05/15/2017    POTASSIUM 3.5 05/15/2017    CHLORIDE 108 05/15/2017    CO2 25 05/15/2017    ANIONGAP 10 05/15/2017    GLC 83 05/15/2017    BUN 22 05/15/2017    CR 0.77 05/15/2017    GFRESTIMATED >90  Non  GFR Calc   05/15/2017    GFRESTBLACK >90   GFR Calc   05/15/2017    DAISY 9.5 05/15/2017      LIPID RESULTS:  Lab Results   Component Value Date    CHOL 198 01/13/2017    HDL 88 01/13/2017    LDL 93 01/13/2017    TRIG 82 01/13/2017    NHDL 110 01/13/2017       IMMUNOSUPPRESSANT LEVELS  Lab Results   Component Value Date    TACROL 12.7 05/15/2017    DOSTAC 2030 05/15/2017       No components found for: CK  Lab Results   Component Value Date    MAG 2.0 04/25/2017     Lab Results   Component Value Date    A1C 11.6 (H) 04/25/2017     Lab Results   Component Value Date    PHOS 3.0 03/28/2017     Lab Results   Component Value Date    NTBNPI 1316 (H) 03/09/2017     Lab Results   Component Value Date    SAITESTMET SA HI 05/05/2017    SAICELL Class I 05/05/2017    [id number] None 05/05/2017    [id number] None 05/05/2017    SAIREPCOM  05/05/2017      Test performed by modified procedure. Serum heat inactivated. High-risk,   mfi >3,000. Mod-risk, mfi 500-3,000.       Lab Results   Component Value Date    SAIITESTME SA HI 05/05/2017    SAIICELL Class II 05/05/2017    [id number] None 05/05/2017    DN9NGLKSVV DP:01 05/05/2017    IIROXI  05/05/2017      Test performed by modified  procedure. Serum heat inactivated. High-risk,   mfi >3,000. Mod-risk, mfi 500-3,000.       Lab Results   Component Value Date    CSPEC Plasma, EDTA anticoagulant 03/15/2017     Last echo and baseline coronary angiogram reviewed   Last bx reviewed     Echo 1/13/2017     Procedure  Echocardiogram with two-dimensional, color and spectral Doppler performed.  Contrast Optison. Optison (NDC #0187-5952-14) given intravenously. Patient  was given 3 ml mixture of 3 ml Optison and 6 ml saline. 6 ml wasted. IV start  location R Upper arm.      Interpretation Summary  Global and regional left ventricular function is normal with an EF of 60-65%.  Right ventricular function, chamber size, wall motion, and thickness are  normal.  Pulmonary artery systolic pressure is normal.  The inferior vena cava was normal in size with preserved respiratory  variability. Estimated mean right atrial pressure is <3 mmHg.  No pericardial effusion is present.  This study was compared with the study from 12/29/2016. There has been no  change.    CMRI 6/1/2017    IMPRESSION:  1.  Normal left ventricular size and systolic function with a  calculated ejection fraction of  57 %.  2.  Normal right ventricular size and mildly reduced systolic function  with a calculated ejection fraction of 46%.   3.  On delayed enhancement imaging, there is no abnormal late  gadolinium enhancement.   4.  Compared with the prior study dated 1/5/2017, inferolateral scar  is no longer seen.     The MRI sequences and imaging planes in this study were tailored for  cardiac imaging and are suboptimal for evaluation of non-cardiac  structures.     FINDINGS     Left ventricle  Cavity size: Normal  Wall thickness: Normal  Global systolic function: Normal with a quantitative LV ejection  fraction of 57%.   Regional wall motion: Normal  Delayed enhancement: No abnormal hyperenhancement. Specifically the  previously described area of focal transmural hyperenhancement in the  mid  inferolateral segment is not seen on today's study. This may  reflect differences in image acquisition or regression/contraction of  the scar from previous rejection.  No LV thrombus.     Right ventricle  Cavity size: Normal  Wall thickness: Normal  Global systolic function: Mildly reduced with a quantitative RV  ejection fraction of 46%.   Regional wall motion: Normal     Atria  LA size: Dilated consistent with post transplant status with a bicaval  anastomosis.  RA size: Normal     Valves  Aortic Valve  Valve morphology: Tricuspid  Aortic stenosis: None  Aortic regurgitation: None     Mitral Valve  Mitral stenosis: None  Mitral regurgitation: mild     Tricuspid Valve   Tricuspid stenosis: None  Tricuspid regurgitation: mild.     Pulmonic Valve  Pulmonic stenosis: None  Pulmonic regurgitation: Mild     Extracardiac  Aortic root dimension: 3.1 cm (at the sinuses of Valsalva)  Main PA dimension: 2.3 cm  Pericardial thickness: Normal  Pericardial effusion:None  Pleural effusion: None     Measurements  LV volumes absolute and indexed to BSA with age and gender-matched  normal values in parentheses (mean, 95% CI) are listed below:     EDV: 151 ml (159, 117-200 mL)  EDVI: 82 ml/m2 (81, 64-99 mL/m2)  ESV: 65 ml (54, 31-76 mL)  ESVI: 36 ml/m2  (27, 17-38 mL/m2)     RV volumes absolute and indexed to BSA with age and gender-matched  normal values in parentheses (mean, 95% CI) are listed below:     EDV: 121 ml (166, 116-216 mL)  EDVI: 66 ml/m2 (85,  mL/m2)  ESV: 66 ml (59, 29-89 mL)  ESVI: 36 ml/m2  (30, 16-45 mL/m2)     LV wall thickness - Anteroseptal: 1.1 cm  LV wall thickness - Inferolateral: 1.0 cm  LV end-diastolic diameter: 5.6 cm  LV end-systolic diameter: 4.1 cm  LA willy-posterior diameter: 3.9 cm     Sedation and contrast  Sedation used: No  Contrast administered: 10 ml Gadavist     Pulse sequences used  SSFP Localizers  SSFP Cine  IR SSFP Single Shot  IR GRE Segmented  Contrast-enhanced early and late  enhancement imaging  Image post-processing was performed on a [employer] workstation     I have personally reviewed the examination and initial interpretation  and I agree with the findings.     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     His course has been complicated by 2R rejection, which has now had twice, and reactivation of his Chagas disease which has been treated     With regard to his Chagas, this has been treated for 60 days, and therefore, we have stopped. We have been sending repeat Chagas test to the [facility] and have been negative.       With regard to his latent TB, he is on INH therapy and tolerating this.     With regard to his cardiac transplantation, he has normal graft function.  He has had 2 episodes of 2R rejection, which have not caused too much problem.  He is euvolemic on exam today.  Filling pressures were reassuring as was his last echo.  We are continuing his tac goal at 10-12 and MF 1500 b.i.d., and we have been tapering down prednisone.  He is due for his endomyocardial biopsy this afternoon.  If negative, will stop his prednisone and nystatin.   Also, we probably could lower his tacrolimus target to 8-10.  He could apply for a job and he should continue exercise.      For infection ppx:   - Viral serostatus & prophylaxis: CMV R+/D+, EBV R+/D+, HIV negative, HSV 1/2 R+/-, D unknown, donor negative for Heb B and C; stopping valcyte today per protocol   - Immunization status:  VZV seropositive, hepatitis B immune.    Latent TB.  On INH again and tolerating this.  Skin rash: Dermatology referral.     For other transplant medications, he is on aspirin and statin as well as appropriate prophylaxis as listed below.     Change in immunosuppression: pending biopsy today   Reason for Change: NA  Other Changes: see above    Follow-Up: 2  months.      Next Biopsy: 2 months  Next Angiogram with IVUS: yes, at one year   Next Stress Test: per protocol        Sincerely,      Geena Mcclellan MD   Cardiology Staff     CC  Patient Care Team:  Ivan Burrell MD as PCP - General (Student in organized health care education/training program)  Broderick Gao MD, MD as MD (Cardiology)  Shauna Cuellar MD as MD (Infectious Diseases)  Marino Woo MD as MD (Dermatology)  Bryant Bartholomew MD as MD (Family Medicine - Sports Medicine)  Isabela Balbuena MD (Ophthalmology)      Broderick Gao MD   [place] [phone]

## 2017-06-02 NOTE — PROGRESS NOTES
ADULT HEART TRANSPLANT CLINIC      June 2, 2017    Dear Colleagues:      I had the pleasure of seeing Javi Shetty in the [facility] Cardiac Transplant Clinic today.      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  His hospitalization was complicated by 2R rejection as well as ventricular tachycardia requiring treatment with steroids and eventually thromboglobulin. He has had some evidence of Chagas reactivation and completed a 60 day course of benznidazole 150 mg PO BID. Chagas serologies have been negative since then.     He initially had high filling pressures postoperatively, but these normalized with further diuresis and treatment of rejection.     Since the time of his initial discharge, he has been able to resume INH therapy for latent TB, without any side effects.  He also had 1 episode of 2R rejection again which was in the setting of relatively high immunosuppression doses and received steroid treatment for this with resolution.  His graft function remained normal throughout that.      He presents today for routine followup of his heart transplant and also followup of this hospitalization.  He says overall that he is feeling very well.  He denies any PND or orthopnea.  He denies palpitations or syncope.  He denies lower extremity edema.     He does have a rash on his sternum and back which is raised without itching- no fevers     Transplant details:   Date: 12/12/2016   History of rejection: 2 R  12/27/2016 treated with thymo, again 2/10/17 treated with steroids   History of opportunistic infection: none   Transplant medication intolerances: none, although had rejection on azathioprine   CMV R+/D+, EBV R+/D+, HIV negative, HSV 1/2 R+/-, D unknown, donor negative for Heb B and  C       Patient Active Problem List    Diagnosis Date Noted     Pneumonia 03/09/2017     Priority: Medium      Ventricular tachycardia, non-sustained (H) 01/04/2017     Priority: Medium     Elevated liver enzymes 12/20/2016     Priority: Medium     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 12/19/2016     Priority: Medium     Need for prophylactic antibiotic 12/19/2016     Priority: Medium     Heart failure (H) 12/12/2016     Priority: Medium     Heart replaced by transplant (H) 12/12/2016     Priority: Medium     Heart transplant candidate 12/11/2016     Priority: Medium     Chest pain 07/11/2016     Priority: Medium     Chronic systolic heart failure (H)      Priority: Medium     CHF (congestive heart failure) (H) 06/17/2016     Priority: Medium     Cardiac defibrillator in situ-Medtronic, dual chamber - Not Dependent 06/16/2016     Priority: Medium     Chagas cardiomyopathy 05/26/2016     Priority: Medium     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 500 mg)  14 DPT: 2R, neg/neg treated w/IV pred x 3 (1000mg) + thymo x 3  21 DPT: 1R, neg/neg       ACP (advance care planning) 10/10/2016     Advance Care Planning 10/10/2016: Receipt of ACP document:  Received: invalid HCD document dated 8-4-16.  Document not previously scanned. Validation form completed indicating invalid document. Copy sent to client with information and facilitation resources. Validation form sent to be scanned as notation of invalid document received. Confirmed/documented designated decision maker(s).  Added by Namrata Neely RN Advance Care Planning Liaison with [employer]               PAST MEDICAL HISTORY:  Past Medical History:   Diagnosis Date     Chagas cardiomyopathy      Chronic systolic heart failure (H)      Diabetes (H)      Dual ICD (implantable cardioverter-defibrillator) in place 10/20/2015     Essential hypertension      Gastroesophageal reflux disease      H/O gastric ulcer      Hypertension      Premature ventricular contractions       Presbyopia      Pterygium     ?? suspected , but unclear dx   s/p OHT 12/12/2016      CURRENT MEDICATIONS:    Prescription Medications as of 6/2/2017             blood glucose monitoring (ONE TOUCH DELICA) lancets Use to test blood sugars 4 times daily or as directed.    blood glucose monitoring (NO BRAND SPECIFIED) test strip Contour Next test strips. Use to test blood sugar 4 times daily as well as if  Symptoms of high or low BG.    insulin glargine (BASAGLAR KWIKPEN) 100 UNIT/ML injection Inject 24 Units Subcutaneous daily    insulin aspart (NOVOLOG PEN) 100 UNIT/ML injection Inject 10 Units Subcutaneous 3 times daily (with meals)    predniSONE (DELTASONE) 5 MG tablet Take 1 tablet (5 mg) by mouth daily    insulin pen needle 32G X 4 MM Use pen needles daily or as directed.    Sharps Container (B-D SHARPS DISPOSAL BY MAIL) MISC 1 container    alcohol swab prep pads Use to swab area of injection/raymond as directed.    tacrolimus (PROGRAF - GENERIC EQUIVALENT) 1 MG capsule Take 2mg (2 capsules) in the morning and 1mg (1 capsule) in the evening.    ketoconazole (NIZORAL) 2 % shampoo Apply topically three times a week Alternate with Head and Shoulders.    nystatin (MYCOSTATIN) 041624 UNIT/ML suspension Take 5 mLs (500,000 Units) by mouth 4 times daily    order for DME Equipment being ordered: Splint - Cubital tunnel splint for ulnar neuropathy    isoniazid (NYDRAZID) 300 MG tablet Take 1 tablet (300 mg) by mouth daily    pravastatin (PRAVACHOL) 20 MG tablet Take 1 tablet (20 mg) by mouth every evening    mycophenolate (CELLCEPT - GENERIC EQUIVALENT) 250 MG capsule Take 6 capsules (1,500 mg) by mouth 2 times daily    aspirin EC 81 MG EC tablet Take 1 tablet (81 mg) by mouth daily    calcium carb 1250 mg, 500 mg [place],/vitamin D 200 units (OSCAL WITH D) 500-200 MG-UNIT per tablet Take 1 tablet by mouth 2 times daily (with meals)    multivitamin, therapeutic with minerals (THERA-VIT-M) TABS tablet Take 1 tablet by mouth  "daily    pantoprazole (PROTONIX) 40 MG EC tablet Take 1 tablet (40 mg) by mouth every morning      Facility Administered Medications as of 6/2/2017             gadobutrol (GADAVIST) injection 10 mL Inject 10 mLs into the vein once          ROS:   Constitutional: No fever, chills, or sweats. Weight has stabilized    ENT: No visual disturbance, ear ache, epistaxis, sore throat.   Allergies/Immunologic: Negative.   Respiratory:  Cough improved, hemoptysis.   Cardiovascular: As per HPI.   GI: No nausea, vomiting, hematemesis, melena, or hematochezia.   : No urinary frequency, dysuria, or hematuria.   Integument: Negative.   Psychiatric: Negative.   Neuro: Negative.   Endocrinology: Negative.   Musculoskeletal: Negative  Skin: see above     Exam:  /89 (BP Location: Left arm, Patient Position: Chair, Cuff Size: Adult Regular)  Pulse 91  Ht 1.69 m (5' 6.53\")  Wt 75.1 kg (165 lb 8 oz)  SpO2 100%  BMI 26.28 kg/m2  In general, the patient is a pleasant male in no apparent distress.    HEENT: NC/AT. PERRLA. EOMI.  Sclerae white, not injected.    Neck:  No adenopathy, No thyromegaly.    COR: No jugular venous distention.  RRR.  Normal S1 S2 splits physiologically.  No murmur, rub click, or gallop.    Lungs:  CTA. No rhonchi.    Abdomen: soft, nontender, nondistended.  No organomegaly.  Extremities:  No clubbing, cyanosis, or edema.    Neuro: Alert & Oriented x 3, grossly non focal.  Sternal wound is well healed - papular rash with some pustules on his trunk (anterior and posterior)     Labs:  CBC RESULTS:   Lab Results   Component Value Date    WBC 5.9 05/15/2017    RBC 3.87 (L) 05/15/2017    HGB 12.4 (L) 05/15/2017    HCT 37.6 (L) 05/15/2017    MCV 97 05/15/2017    MCH 32.0 05/15/2017    MCHC 33.0 05/15/2017    RDW 12.6 05/15/2017     05/15/2017       BMP RESULTS:  Lab Results   Component Value Date     05/15/2017    POTASSIUM 3.5 05/15/2017    CHLORIDE 108 05/15/2017    CO2 25 05/15/2017    ANIONGAP " 10 05/15/2017    GLC 83 05/15/2017    BUN 22 05/15/2017    CR 0.77 05/15/2017    GFRESTIMATED >90  Non  GFR Calc   05/15/2017    GFRESTBLACK >90   GFR Calc   05/15/2017    DAISY 9.5 05/15/2017      LIPID RESULTS:  Lab Results   Component Value Date    CHOL 198 01/13/2017    HDL 88 01/13/2017    LDL 93 01/13/2017    TRIG 82 01/13/2017    NHDL 110 01/13/2017       IMMUNOSUPPRESSANT LEVELS  Lab Results   Component Value Date    TACROL 12.7 05/15/2017    DOSTAC 2030 05/15/2017       No components found for: CK  Lab Results   Component Value Date    MAG 2.0 04/25/2017     Lab Results   Component Value Date    A1C 11.6 (H) 04/25/2017     Lab Results   Component Value Date    PHOS 3.0 03/28/2017     Lab Results   Component Value Date    NTBNPI 1316 (H) 03/09/2017     Lab Results   Component Value Date    SAITESTMET SA HI 05/05/2017    SAICELL Class I 05/05/2017    XZ2BROGMC None 05/05/2017    WH6WIFTLYF None 05/05/2017    SAIREPCOM  05/05/2017      Test performed by modified procedure. Serum heat inactivated. High-risk,   mfi >3,000. Mod-risk, mfi 500-3,000.       Lab Results   Component Value Date    SAIITESTME SA HI 05/05/2017    SAIICELL Class II 05/05/2017    ZY4OGVHMF None 05/05/2017    DC8YOGZOBN DP:01 05/05/2017    SAIIREPCOM  05/05/2017      Test performed by modified procedure. Serum heat inactivated. High-risk,   mfi >3,000. Mod-risk, mfi 500-3,000.       Lab Results   Component Value Date    CSPEC Plasma, EDTA anticoagulant 03/15/2017     Last echo and baseline coronary angiogram reviewed   Last bx reviewed - 1 R     Echo 1/13/2017     Procedure  Echocardiogram with two-dimensional, color and spectral Doppler performed.  Contrast Optison. Optison (NDC #8806-9792-04) given intravenously. Patient  was given 3 ml mixture of 3 ml Optison and 6 ml saline. 6 ml wasted. IV start  location R Upper arm.      Interpretation Summary  Global and regional left ventricular function is normal with  an EF of 60-65%.  Right ventricular function, chamber size, wall motion, and thickness are  normal.  Pulmonary artery systolic pressure is normal.  The inferior vena cava was normal in size with preserved respiratory  variability. Estimated mean right atrial pressure is <3 mmHg.  No pericardial effusion is present.  This study was compared with the study from 12/29/2016. There has been no  change.    Cardiac MRI: 6/1/2017   IMPRESSION:  1.  Normal left ventricular size and systolic function with a  calculated ejection fraction of  57 %.  2.  Normal right ventricular size and mildly reduced systolic function  with a calculated ejection fraction of 46%.   3.  On delayed enhancement imaging, there is no abnormal late  gadolinium enhancement.   4.  Compared with the prior study dated 1/5/2017, inferolateral scar  is no longer seen.     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     With regard to his cardiac transplantation, he has normal graft function.  He has had 2 episodes of 2R rejection, which have not caused too much problem.  He is euvolemic on exam today. His DSAs are negative.      Filling pressures were reassuring as was his last echo.  If this next biopsy is negative I will lower his tac goal to 8 -10 and drop his prednisone. I will continue  MMF 1500 b.i.d.    For infection ppx: will stop nystatin when steroids are off       For other transplant medications, he is on aspirin and statin as well as appropriate prophylaxis as listed below.     Change in immunosuppression: if biopsy negative will stop steriods and nystatin   Reason for Change:see above   Other Changes: see above    Follow-Up: 2 months     Next Biopsy: 2 month  Next Angiogram: testing is just done   Next Angiogram with IVUS: yes, at one year post tx  Next Stress Test: per protocol     - Viral serostatus &  prophylaxis: CMV R+/D+, EBV R+/D+, HIV negative, HSV 1/2 R+/-, D unknown, donor negative for Heb B and C   - Immunization status:  VZV seropositive, hepatitis B immune.    Other:     reactivation of his Chagas disease: treated he is now off medication for this and weekly PCRs have been negative.      With regard to his latent TB, he is on INH therapy and tolerating this.     Rash: sending to derm     Sincerely,      Geena Mcclellan MD   Cardiology Staff     CC  Patient Care Team:  Ivan Burrell MD as PCP - General (Student in organized health care education/training program)  Broderick Gao MD, MD as MD (Cardiology)  Shauna Cuellar MD as MD (Infectious Diseases)  Marino Woo MD as MD (Dermatology)  Bryant Bartholomew MD as MD (Family Medicine - Sports Medicine)  Isabela Balbuena MD (Ophthalmology)      Broderick Gao MD   [address] [address]

## 2017-06-02 NOTE — MR AVS SNAPSHOT
After Visit Summary   6/2/2017    Javi Bansal    MRN: [id number]           Patient Information     Date Of Birth          1970        Visit Information        Provider Department      6/2/2017 8:15 AM Geena Mcclellan MD; [facility]        Today's Diagnoses     Heart replaced by transplant (H)    -  1      Care Instructions    You were seen in clinic for routine 6 month post heart transplant evaluation.  Available results appear to indicate good heart function and lab results within normal limits.  Your coordinator will update you with pending results and if any changes are needed.   Alcohol can interact with your medications, but drinking one beer every once in a while is okay as long as you have not had any problems with drinking too much alcohol in the past.  You should NOT drink more than one beer at a time.  No medication changes were made during this clinic visit.    Follow up:  Your coordinator will update you with pending test results and if any changes are needed.  Your coordinator will contact the [facility] to confirm current medication refills, but you should also contact them (their number is on your prescription bottles) to request medication refills.  At check out today, we will schedule you to see a dermatologist to evaluate the rash on your chest and back  We will communicate with your diabetes doctor if any changes are made to medications which may affect your diabetes and insulin requirements.  Your next transplant appointments are scheduled for July 28th, 2017 and you have several other appointments (for diabetes, eyes and Chagas) between now and then.  Please call with any questions/concerns.            Follow-ups after your visit        Additional Services     DERMATOLOGY REFERRAL       Your provider has referred you to: [facility]: Dermatology Clinic [facility] [phone]    [email or web address]    Please be aware that coverage of these services is subject to the terms and limitations of your health insurance plan.  Call member services at your health plan with any benefit or coverage questions.      Please bring the following with you to your appointment:    (1) Any X-Rays, CTs or MRIs which have been performed.  Contact the facility where they were done to arrange for  prior to your scheduled appointment.    (2) List of current medications  (3) This referral request   (4) Any documents/labs given to you for this referral                  Follow-up notes from your care team     Return for Physical Exam.      Your next 10 appointments already scheduled     Jun 02, 2017  9:45 AM CDT   Procedure - 2.5 hour with U2A ROOM 15   [address] [facility] [place] ([facility])    [address] [address]               Jun 02, 2017 11:00 AM CDT   Cath 90 Minute with UUHCVR4   [facility] [place]  Heart Cath Lab ([facility])    [address] [address]   [phone]            Jun 08, 2017 10:30 AM CDT   NEW RETINA with Isabela Balbuena MD   Eye Clinic ([facility])    [address]  [address]  [address]  [facility] [address]   [phone]            Jun 20, 2017 10:30 AM CDT   (Arrive by 10:15 AM)   RETURN DIABETES with Alla Jerez PA-C   [facility] ([facility])    [address] [place] [address]   [phone]            Jul 06, 2017  1:30 PM CDT   (Arrive by 1:15 PM)   Transplant Skin Check with GARY Murillo MD   [facility] ([facility])    [address] [place] [address]   [phone]            Jul 26, 2017  9:30 AM CDT   (Arrive by 9:15 AM)   Return Visit with Ivan  Vandana Schuler MD   [facility] ([facility])    [address]  3rd Floor  [place] [address]   [phone]            Jul 28, 2017  9:00 AM CDT   Lab with  LAB    Health Lab ([facility])    [address]  1st Floor  [place] [address] [phone]            Jul 28, 2017  9:30 AM CDT   (Arrive by 9:15 AM)   RETURN HEART TRANSPLANT with SAMM Ramos CNP   [facility] ([facility])    [address]  3rd Floor  [place] [address] [phone]            Jul 28, 2017 11:00 AM CDT   Procedure - 2.5 hour with U2A ROOM 17   [address] [facility] ([facility])    [address] [address]               Jul 28, 2017 12:00 PM CDT   Heart Cath Heart Biopsy with UUHCVR4   [facility] ([facility])    [address] [address] [phone]              Future tests that were ordered for you today     Open Future Orders        Priority Expected Expires Ordered    Coronary Angiography Adult Order Routine  6/1/2018 6/1/2017    EKG 12-lead complete with read (future) Routine  6/1/2018 6/1/2017    MRI Cardiac w/contrast & stress & flow Routine  6/1/2018 6/1/2017    Card Cardiopulmonary stress test - adult Routine  7/16/2017 6/1/2017    X-ray Chest 2 vws* Routine 6/1/2017 6/1/2018 6/1/2017    Heart Cath Heart biopsy Routine  6/2/2018 6/1/2017            Who to contact     If you have questions or need follow up information about today's clinic visit or your schedule please contact [facility] directly at [phone].  Normal or non-critical lab and imaging results will be communicated to you by MyChart, letter or phone within 4 business days after the clinic has received the results. If you do not hear from us within 7  "days, please contact the clinic through [employer] or phone. If you have a critical or abnormal lab result, we will notify you by phone as soon as possible.  Submit refill requests through [employer] or call your pharmacy and they will forward the refill request to us. Please allow 3 business days for your refill to be completed.          Additional Information About Your Visit        [employer] Information     [employer] lets you send messages to your doctor, view your test results, renew your prescriptions, schedule appointments and more. To sign up, go to [email or web address] . Click on \"Log in\" on the left side of the screen, which will take you to the Welcome page. Then click on \"Sign up Now\" on the right side of the page.     You will be asked to enter the access code listed below, as well as some personal information. Please follow the directions to create your username and password.     Your access code is: [email or web address]  Expires: 2017  6:30 AM     Your access code will  in 90 days. If you need help or a new code, please call your [place] clinic or [phone].        Care EveryWhere ID     This is your Care EveryWhere ID. This could be used by other organizations to access your [place] medical records  [id number]        Your Vitals Were     Pulse Height Pulse Oximetry BMI (Body Mass Index)          91 1.69 m (5' 6.53\") 100% 26.28 kg/m2         Blood Pressure from Last 3 Encounters:   17 115/89   17 106/74   05/15/17 118/85    Weight from Last 3 Encounters:   17 75.1 kg (165 lb 8 oz)   17 75 kg (165 lb 4.8 oz)   05/15/17 74.4 kg (164 lb)              We Performed the Following     DERMATOLOGY REFERRAL        Primary Care Provider Office Phone # Fax #    Ivan Burrell [phone]       [address] [address]        Thank you!     Thank you for choosing [facility]  for your care. Our goal is always to provide you " with excellent care. Hearing back from our patients is one way we can continue to improve our services. Please take a few minutes to complete the written survey that you may receive in the mail after your visit with us. Thank you!             Your Updated Medication List - Protect others around you: Learn how to safely use, store and throw away your medicines at www.disposemymeds.org.          This list is accurate as of: 6/2/17  9:44 AM.  Always use your most recent med list.                   Brand Name Dispense Instructions for use    alcohol swab prep pads     100 each    Use to swab area of injection/raymond as directed.       aspirin 81 MG EC tablet     90 tablet    Take 1 tablet (81 mg) by mouth daily       B-D SHARPS DISPOSAL BY MAIL Misc     1 each    1 container       blood glucose monitoring lancets     1 Box    Use to test blood sugars 4 times daily or as directed.       blood glucose monitoring test strip    no brand specified    2 Box    Contour Next test strips. Use to test blood sugar 4 times daily as well as if  Symptoms of high or low BG.       calcium carb 1250 mg (500 mg [place])/vitamin D 200 units 500-200 MG-UNIT per tablet    OSCAL with D    60 tablet    Take 1 tablet by mouth 2 times daily (with meals)       insulin aspart 100 UNIT/ML injection    NovoLOG PEN    9 mL    Inject 10 Units Subcutaneous 3 times daily (with meals)       insulin glargine 100 UNIT/ML injection     30 mL    Inject 24 Units Subcutaneous daily       insulin pen needle 32G X 4 MM     100 each    Use pen needles daily or as directed.       isoniazid 300 MG tablet    NYDRAZID    90 tablet    Take 1 tablet (300 mg) by mouth daily       ketoconazole 2 % shampoo    NIZORAL    120 mL    Apply topically three times a week Alternate with Head and Shoulders.       multivitamin, therapeutic with minerals Tabs tablet     30 each    Take 1 tablet by mouth daily       mycophenolate 250 MG capsule    CELLCEPT - GENERIC EQUIVALENT    360  capsule    Take 6 capsules (1,500 mg) by mouth 2 times daily       nystatin 788246 UNIT/ML suspension    MYCOSTATIN    473 mL    Take 5 mLs (500,000 Units) by mouth 4 times daily       order for DME     1 Device    Equipment being ordered: Splint - Cubital tunnel splint for ulnar neuropathy       pantoprazole 40 MG EC tablet    PROTONIX    30 tablet    Take 1 tablet (40 mg) by mouth every morning       pravastatin 20 MG tablet    PRAVACHOL    30 tablet    Take 1 tablet (20 mg) by mouth every evening       predniSONE 5 MG tablet    DELTASONE    30 tablet    Take 1 tablet (5 mg) by mouth daily       tacrolimus 1 MG capsule    PROGRAF - GENERIC EQUIVALENT    90 capsule    Take 2mg (2 capsules) in the morning and 1mg (1 capsule) in the evening.

## 2017-06-02 NOTE — DISCHARGE INSTRUCTIONS
Former[facility]                        Interventional Cardiology  Discharge Instructions   Post Right Heart Cath      AFTER YOU GO HOME:    DO drink plenty of fluids    DO resume your regular diet and medications unless otherwise instructed by your Primary Physician    Do Not scrub the procedure site vigorously    No lotion or powder to the puncture site for 3 days    CALL YOUR PRIMARY PHYSICIAN IF: You may resume all normal activity.  Monitor neck site for bleeding, swelling, or voice changes. If you notice bleeding or swelling immediately apply pressure to the site and call number below to speak with Cardiology Fellow.  If you experience any changes in your breathing you should call your doctor immediately or come to the closest Emergency Department.  Do not drive yourself.    ADDITIONAL INSTRUCTIONS: Medications: You are to resume all home medications including anticoagulation therapy unless otherwise advised by your primary cardiologist or nurse coordinator.    Follow Up: Per your primary cardiology team    If you have any questions or concerns regarding your procedure site please call [phone] at anytime and ask for Cardiology Fellow on call.  They are available 24 hours a day.  You may also contact the Cardiology Clinic after hours number at [phone].                                                       Telephone Numbers [phone] Monday-Friday 8:00 am to 4:30 pm    [phone] After 4:30 pm Monday-Friday, Weekends & Holidays  Ask for Interventional Cardiologist on call. Someone is on call 24 hours/day   [facility] toll free number [phone] Monday-Friday 8:00 am to 4:30 pm   [facility] Emergency Dept [phone]

## 2017-06-03 LAB
CMV DNA SPEC NAA+PROBE-ACNC: 370 [IU]/ML
CMV DNA SPEC NAA+PROBE-LOG#: 2.6 {LOG_IU}/ML
SPECIMEN SOURCE: ABNORMAL

## 2017-06-05 LAB
COPATH REPORT: NORMAL
EBV DNA # SPEC NAA+PROBE: NORMAL {COPIES}/ML
EBV DNA SPEC NAA+PROBE-LOG#: NORMAL {LOG_COPIES}/ML

## 2017-06-06 ENCOUNTER — TELEPHONE (OUTPATIENT)
Dept: TRANSPLANT | Facility: CLINIC | Age: 47
End: 2017-06-06

## 2017-06-06 LAB — LAB SCANNED RESULT: NORMAL

## 2017-06-06 NOTE — TELEPHONE ENCOUNTER
Called patient with recent results. Bx was negative for rejection (1R, neg/neg); instructed patient to STOP taking prednisone and Nystatin. Patient also no longer needs to take Bactrim.  FK level, 7.6, is now within goal range: 8 to 10 and no change is needed.  Coordinator will update patient with pending test results.  Patient states he is feeling well and has not been needing to take any SS insulin and may need even need less now that prednisone has been stopped. Coordinator will confirm with  Specialty Pharmacy to not send out any prednisone, Nystatin and that SMZ is no longer needed.  Coordinator will update patient with pending test results.  Confirmed upcoming appts with ophthomology this Thursday and with Endo on 6/20.    Patient verbalizes understanding plan of care and agrees to follow.   services used for this call.

## 2017-06-07 ENCOUNTER — AMBULATORY - HEALTHEAST (OUTPATIENT)
Dept: CARDIAC REHAB | Facility: HOSPITAL | Age: 47
End: 2017-06-07

## 2017-06-07 ENCOUNTER — TELEPHONE (OUTPATIENT)
Dept: TRANSPLANT | Facility: CLINIC | Age: 47
End: 2017-06-07

## 2017-06-07 DIAGNOSIS — Z94.1 HEART REPLACED BY TRANSPLANT (H): Primary | ICD-10-CM

## 2017-06-07 DIAGNOSIS — R73.9 HYPERGLYCEMIA: ICD-10-CM

## 2017-06-07 DIAGNOSIS — Z94.1 STATUS POST HEART TRANSPLANT (H): ICD-10-CM

## 2017-06-07 NOTE — TELEPHONE ENCOUNTER
Called patient to request he have blood drawn again tomorrow per ID.  Patient agrees to have the blood drawn tomorrow in the [facility] lab at 0930.  Patient verbalizes understanding plan of care and agrees to follow.

## 2017-06-08 ENCOUNTER — OFFICE VISIT (OUTPATIENT)
Dept: OPHTHALMOLOGY | Facility: CLINIC | Age: 47
End: 2017-06-08
Attending: OPHTHALMOLOGY
Payer: COMMERCIAL

## 2017-06-08 DIAGNOSIS — E11.9 TYPE 2 DIABETES MELLITUS WITHOUT COMPLICATION, WITHOUT LONG-TERM CURRENT USE OF INSULIN (H): Primary | ICD-10-CM

## 2017-06-08 DIAGNOSIS — Z22.7 LATENT TUBERCULOSIS INFECTION: ICD-10-CM

## 2017-06-08 DIAGNOSIS — Z13.5 SCREENING FOR DIABETIC RETINOPATHY: ICD-10-CM

## 2017-06-08 DIAGNOSIS — Z94.1 HEART REPLACED BY TRANSPLANT (H): ICD-10-CM

## 2017-06-08 LAB
ALBUMIN SERPL-MCNC: 4.1 G/DL (ref 3.4–5)
ALP SERPL-CCNC: 120 U/L (ref 40–150)
ALT SERPL W P-5'-P-CCNC: 32 U/L (ref 0–70)
AST SERPL W P-5'-P-CCNC: 50 U/L (ref 0–45)
BILIRUB DIRECT SERPL-MCNC: <0.1 MG/DL (ref 0–0.2)
BILIRUB SERPL-MCNC: 0.5 MG/DL (ref 0.2–1.3)
PROT SERPL-MCNC: 7.4 G/DL (ref 6.8–8.8)

## 2017-06-08 PROCEDURE — 99214 OFFICE O/P EST MOD 30 MIN: CPT | Mod: ZF

## 2017-06-08 PROCEDURE — 92015 DETERMINE REFRACTIVE STATE: CPT | Mod: ZF

## 2017-06-08 ASSESSMENT — CUP TO DISC RATIO
OS_RATIO: 0.2
OD_RATIO: 0.2

## 2017-06-08 ASSESSMENT — VISUAL ACUITY
OD_SC: 20/20-2
METHOD: SNELLEN - LINEAR
OS_SC: 20/20-1

## 2017-06-08 ASSESSMENT — REFRACTION_MANIFEST
OD_SPHERE: -0.25
OD_AXIS: 125
OD_ADD: +2.25
OD_CYLINDER: +0.25
OS_SPHERE: +0.25
OS_ADD: +2.25
OS_CYLINDER: +0.25
OS_AXIS: 045

## 2017-06-08 ASSESSMENT — CONF VISUAL FIELD
OS_NORMAL: 1
OD_NORMAL: 1

## 2017-06-08 ASSESSMENT — TONOMETRY
IOP_METHOD: APPLANATION
OS_IOP_MMHG: 16
OD_IOP_MMHG: 16

## 2017-06-08 ASSESSMENT — SLIT LAMP EXAM - LIDS
COMMENTS: NORMAL
COMMENTS: NORMAL

## 2017-06-08 ASSESSMENT — EXTERNAL EXAM - RIGHT EYE: OD_EXAM: NORMAL

## 2017-06-08 ASSESSMENT — EXTERNAL EXAM - LEFT EYE: OS_EXAM: NORMAL

## 2017-06-08 NOTE — NURSING NOTE
Chief Complaints and History of Present Illnesses   Patient presents with     Eye Exam For Diabetes     HPI    Affected eye(s):  Both   Symptoms:     Blurred vision   Decreased vision   Floaters   No flashes         Do you have eye pain now?:  No      Comments:  BE cloudy, gradually getting worse. Last eye exam many years ago. No eye surgeries. H/o heart transplant.   Lab Results       Component                Value               Date                       A1C                      7.5                 06/02/2017                 A1C                      11.6                04/25/2017                 A1C                      5.4                 12/11/2016                 A1C                      5.8                 06/18/2016                Nadya Kay COA June 8, 2017 10:50 AM

## 2017-06-08 NOTE — LETTER
6/8/2017       RE: Javi Bansal  [address] [place] [address]     Dear Colleague,    Thank you for referring your patient, Javi Bansal, to the EYE CLINIC at [facility]. Please see a copy of my visit note below.    I have confirmed the patient's and reviewed Past Medical History, Past Surgical History, Social History, Family History, Problem List, Medication List and agree with Tech note.    CC: blurry vision both eyes     HPI: Diabetes mellitus  Just diagnosed several weeks ago per patient     Assessment/plan:   1.  Diabetes mellitus no nonproliferative diabetic retinopathy     Blood glucose control   Last HbA1C is 7.5   Monitor     2.  Pterygium both eyes    Defer surgery to avoid recurrence, not covering pupil    Sunglasses when outside    3.  Presbyopia   Over the counter reading glasses     RTC one year     Isabela Smith MD PhD.  Professor & Chair

## 2017-06-08 NOTE — MR AVS SNAPSHOT
After Visit Summary   6/8/2017    Javi Bansal    MRN: [id number]           Patient Information     Date Of Birth          1970        Visit Information        Provider Department      6/8/2017 10:15 AM Isabela Balbuena MD; [facility] LANGUAGE [facility] Eye Clinic        Today's Diagnoses     Type 2 diabetes mellitus without complication, without long-term current use of insulin (H)    -  1    Screening for diabetic retinopathy           Follow-ups after your visit        Follow-up notes from your care team     Return in about 1 year (around 6/8/2018) for Diabetic exam.      Your next 10 appointments already scheduled     Jun 20, 2017 10:30 AM CDT   (Arrive by 10:15 AM)   RETURN DIABETES with Alla Jerez PA-C   [facility] Endocrinology ([facility])    [address] [place] [address]   [phone]            Jul 06, 2017  1:30 PM CDT   (Arrive by 1:15 PM)   Transplant Skin Check with GARY Murillo MD   [facility] Dermatology ([facility])    [address] [place] [address]   [phone]            Jul 26, 2017  9:30 AM CDT   (Arrive by 9:15 AM)   Return Visit with Ivan Schuler MD   [facility] and Infectious Diseases ([facility])    [address] [place] [address]   [phone]            Aug 01, 2017  9:00 AM CDT   Lab with  LAB   [facility] Lab ([facility])    [address] [place] [address]   [phone]            Aug 01, 2017  9:30 AM CDT   (Arrive by 9:15 AM)   RETURN HEART TRANSPLANT with Mary Huffman NP   [facility] Heart Care ([facility])    [address] [place] [address]   [phone]            Aug 01, 2017 11:00 AM CDT   Procedure - 2.5 hour with U2A ROOM 16   [address] [facility] ([facility])    [address] [address]               Aug 01, 2017 12:00 PM CDT   Heart Cath Heart Biopsy with UUHCVR4   [facility] ([facility])    [address] [address]   [phone]            Sep 19, 2017 10:00 AM CDT   Procedure - 2.5 hour with U2A ROOM 15   [address] [facility] ([facility])    [address] [address]               Sep 19, 2017 11:00 AM CDT   Heart Cath Heart Biopsy with UUHCVR4   [facility] ([facility])    [address] [address]   [phone]            Sep 19, 2017  2:00 PM CDT   (Arrive by 1:45 PM)   RETURN HEART TRANSPLANT with SAMM Ramos [facility] ([facility])    [address] [place] [id number] [phone]              Who to contact     Please call your clinic at [phone] to:    Ask questions about your health    Make or cancel appointments    Discuss your medicines    Learn about your test results    Speak to your doctor   If you have compliments or concerns about an experience at your clinic, or if you wish to file a complaint, please contact [facility] Patient Relations at [phone] or email us at [email or web address]         Additional Information About Your Visit        [employer] Information     [employer] is an electronic gateway that provides easy, online access to your medical records. With [employer], you can request a clinic appointment, read your test results, renew a prescription or communicate with your care team.     To sign up for [employer] visit the website at [email or web address]   You will be asked to enter the access code listed below, as well as some personal  information. Please follow the directions to create your username and password.     Your access code is: [email or web address]  Expires: 2017  6:30 AM     Your access code will  in 90 days. If you need help or a new code, please contact your [facility] or call [phone] for assistance.        Care EveryWhere ID     This is your Care EveryWhere ID. This could be used by other organizations to access your [place] medical records  [id number]         Blood Pressure from Last 3 Encounters:   17 118/82   17 115/89   17 106/74    Weight from Last 3 Encounters:   17 75.1 kg (165 lb 8 oz)   17 75.1 kg (165 lb 8 oz)   17 75 kg (165 lb 4.8 oz)              Today, you had the following     No orders found for display         Today's Medication Changes          These changes are accurate as of: 17 11:46 AM.  If you have any questions, ask your nurse or doctor.               These medicines have changed or have updated prescriptions.        Dose/Directions    insulin aspart 100 UNIT/ML injection   Commonly known as:  NovoLOG PEN   This may have changed:  how much to take   Used for:  Hyperglycemia        Dose:  10 Units   Inject 10 Units Subcutaneous 3 times daily (with meals)   Quantity:  9 mL   Refills:  1       insulin glargine 100 UNIT/ML injection   This may have changed:  how much to take   Used for:  Steroid-induced diabetes mellitus (H)        Dose:  24 Units   Inject 24 Units Subcutaneous daily   Quantity:  30 mL   Refills:  1                Primary Care Provider Office Phone # Fax #    Ivan Burrell [phone]       [address] [address]        Thank you!     Thank you for choosing EYE CLINIC  for your care. Our goal is always to provide you with excellent care. Hearing back from our patients is one way we can continue to improve our services. Please take a few minutes to complete the  written survey that you may receive in the mail after your visit with us. Thank you!             Your Updated Medication List - Protect others around you: Learn how to safely use, store and throw away your medicines at www.disposemymeds.org.          This list is accurate as of: 6/8/17 11:46 AM.  Always use your most recent med list.                   Brand Name Dispense Instructions for use    alcohol swab prep pads     100 each    Use to swab area of injection/raymond as directed.       aspirin 81 MG EC tablet     90 tablet    Take 1 tablet (81 mg) by mouth daily       B-D SHARPS DISPOSAL BY MAIL Misc     1 each    1 container       blood glucose monitoring lancets     1 Box    Use to test blood sugars 4 times daily or as directed.       blood glucose monitoring test strip    no brand specified    2 Box    Contour Next test strips. Use to test blood sugar 4 times daily as well as if  Symptoms of high or low BG.       calcium carb 1250 mg (500 mg [place])/vitamin D 200 units 500-200 MG-UNIT per tablet    OSCAL with D    60 tablet    Take 1 tablet by mouth 2 times daily (with meals)       insulin aspart 100 UNIT/ML injection    NovoLOG PEN    9 mL    Inject 10 Units Subcutaneous 3 times daily (with meals)       insulin glargine 100 UNIT/ML injection     30 mL    Inject 24 Units Subcutaneous daily       insulin pen needle 32G X 4 MM     100 each    Use pen needles daily or as directed.       isoniazid 300 MG tablet    NYDRAZID    90 tablet    Take 1 tablet (300 mg) by mouth daily       ketoconazole 2 % shampoo    NIZORAL    120 mL    Apply topically three times a week Alternate with Head and Shoulders.       multivitamin, therapeutic with minerals Tabs tablet     30 each    Take 1 tablet by mouth daily       mycophenolate 250 MG capsule    CELLCEPT - GENERIC EQUIVALENT    360 capsule    Take 6 capsules (1,500 mg) by mouth 2 times daily       order for DME     1 Device    Equipment being ordered: Splint - Cubital tunnel  splint for ulnar neuropathy       pantoprazole 40 MG EC tablet    PROTONIX    30 tablet    Take 1 tablet (40 mg) by mouth every morning       pravastatin 20 MG tablet    PRAVACHOL    30 tablet    Take 1 tablet (20 mg) by mouth every evening       tacrolimus 1 MG capsule    PROGRAF - GENERIC EQUIVALENT    90 capsule    Take 2mg (2 capsules) in the morning and 1mg (1 capsule) in the evening.

## 2017-06-08 NOTE — PROGRESS NOTES
I have confirmed the patient's and reviewed Past Medical History, Past Surgical History, Social History, Family History, Problem List, Medication List and agree with Tech note.    CC: blurry vision both eyes     HPI: Diabetes mellitus  Just diagnosed several weeks ago per patient     Assessment/plan:   1.  Diabetes mellitus no nonproliferative diabetic retinopathy     Blood glucose control   Last HbA1C is 7.5   Monitor     2.  Pterygium both eyes    Defer surgery to avoid recurrence, not covering pupil    Sunglasses when outside    3.  Presbyopia   Over the counter reading glasses     RTC one year     Isabela Smith MD PhD.  Professor & Chair

## 2017-06-09 ENCOUNTER — AMBULATORY - HEALTHEAST (OUTPATIENT)
Dept: CARDIAC REHAB | Facility: HOSPITAL | Age: 47
End: 2017-06-09

## 2017-06-09 DIAGNOSIS — Z94.1 STATUS POST HEART TRANSPLANT (H): ICD-10-CM

## 2017-06-09 LAB
CMV DNA SPEC NAA+PROBE-ACNC: 814 [IU]/ML
CMV DNA SPEC NAA+PROBE-LOG#: 2.9 {LOG_IU}/ML
SPECIMEN SOURCE: ABNORMAL

## 2017-06-10 ENCOUNTER — HOSPITAL ENCOUNTER (EMERGENCY)
Facility: CLINIC | Age: 47
Discharge: HOME | End: 2017-06-10
Attending: EMERGENCY MEDICINE | Admitting: EMERGENCY MEDICINE
Payer: COMMERCIAL

## 2017-06-10 ENCOUNTER — APPOINTMENT (OUTPATIENT)
Dept: GENERAL RADIOLOGY | Facility: CLINIC | Age: 47
End: 2017-06-10
Attending: EMERGENCY MEDICINE
Payer: COMMERCIAL

## 2017-06-10 ENCOUNTER — TELEPHONE (OUTPATIENT)
Dept: EMERGENCY MEDICINE | Facility: CLINIC | Age: 47
End: 2017-06-10

## 2017-06-10 VITALS
SYSTOLIC BLOOD PRESSURE: 109 MMHG | TEMPERATURE: 99 F | DIASTOLIC BLOOD PRESSURE: 71 MMHG | RESPIRATION RATE: 16 BRPM | OXYGEN SATURATION: 97 % | HEIGHT: 67 IN | BODY MASS INDEX: 25.81 KG/M2 | WEIGHT: 164.46 LBS

## 2017-06-10 DIAGNOSIS — N17.9 ACUTE KIDNEY INJURY (H): ICD-10-CM

## 2017-06-10 LAB
ALBUMIN SERPL-MCNC: 3.8 G/DL (ref 3.4–5)
ALP SERPL-CCNC: 132 U/L (ref 40–150)
ALT SERPL W P-5'-P-CCNC: 45 U/L (ref 0–70)
ANION GAP SERPL CALCULATED.3IONS-SCNC: 10 MMOL/L (ref 3–14)
AST SERPL W P-5'-P-CCNC: 60 U/L (ref 0–45)
BASOPHILS # BLD AUTO: 0 10E9/L (ref 0–0.2)
BASOPHILS NFR BLD AUTO: 0.2 %
BILIRUB SERPL-MCNC: 0.3 MG/DL (ref 0.2–1.3)
BUN SERPL-MCNC: 24 MG/DL (ref 7–30)
CALCIUM SERPL-MCNC: 8.8 MG/DL (ref 8.5–10.1)
CHLORIDE SERPL-SCNC: 108 MMOL/L (ref 94–109)
CO2 SERPL-SCNC: 23 MMOL/L (ref 20–32)
CREAT SERPL-MCNC: 1.06 MG/DL (ref 0.66–1.25)
CREAT SERPL-MCNC: 1.52 MG/DL (ref 0.66–1.25)
CRP SERPL-MCNC: 24 MG/L (ref 0–8)
DIFFERENTIAL METHOD BLD: ABNORMAL
EOSINOPHIL # BLD AUTO: 0 10E9/L (ref 0–0.7)
EOSINOPHIL NFR BLD AUTO: 0.2 %
ERYTHROCYTE [DISTWIDTH] IN BLOOD BY AUTOMATED COUNT: 12.1 % (ref 10–15)
ERYTHROCYTE [SEDIMENTATION RATE] IN BLOOD BY WESTERGREN METHOD: 18 MM/H (ref 0–15)
FLUAV H1 2009 PAND RNA SPEC QL NAA+PROBE: NEGATIVE
FLUAV H1 RNA SPEC QL NAA+PROBE: NEGATIVE
FLUAV H3 RNA SPEC QL NAA+PROBE: NEGATIVE
FLUAV RNA SPEC QL NAA+PROBE: NEGATIVE
FLUBV RNA SPEC QL NAA+PROBE: NEGATIVE
GFR SERPL CREATININE-BSD FRML MDRD: 49 ML/MIN/1.7M2
GFR SERPL CREATININE-BSD FRML MDRD: 75 ML/MIN/1.7M2
GLUCOSE BLDC GLUCOMTR-MCNC: 87 MG/DL (ref 70–99)
GLUCOSE SERPL-MCNC: 84 MG/DL (ref 70–99)
HADV DNA SPEC QL NAA+PROBE: NEGATIVE
HADV DNA SPEC QL NAA+PROBE: NEGATIVE
HCT VFR BLD AUTO: 34.4 % (ref 40–53)
HGB BLD-MCNC: 11.2 G/DL (ref 13.3–17.7)
HMPV RNA SPEC QL NAA+PROBE: POSITIVE
HPIV1 RNA SPEC QL NAA+PROBE: NEGATIVE
HPIV2 RNA SPEC QL NAA+PROBE: NEGATIVE
HPIV3 RNA SPEC QL NAA+PROBE: NEGATIVE
IMM GRANULOCYTES # BLD: 0.1 10E9/L (ref 0–0.4)
IMM GRANULOCYTES NFR BLD: 2.6 %
INR PPP: 1.13 (ref 0.86–1.14)
LYMPHOCYTES # BLD AUTO: 1 10E9/L (ref 0.8–5.3)
LYMPHOCYTES NFR BLD AUTO: 23.9 %
MCH RBC QN AUTO: 30.5 PG (ref 26.5–33)
MCHC RBC AUTO-ENTMCNC: 32.6 G/DL (ref 31.5–36.5)
MCV RBC AUTO: 94 FL (ref 78–100)
MICROBIOLOGIST REVIEW: ABNORMAL
MONOCYTES # BLD AUTO: 0.5 10E9/L (ref 0–1.3)
MONOCYTES NFR BLD AUTO: 12.2 %
NEUTROPHILS # BLD AUTO: 2.6 10E9/L (ref 1.6–8.3)
NEUTROPHILS NFR BLD AUTO: 60.9 %
NRBC # BLD AUTO: 0 10*3/UL
NRBC BLD AUTO-RTO: 0 /100
PLATELET # BLD AUTO: 154 10E9/L (ref 150–450)
POTASSIUM SERPL-SCNC: 4 MMOL/L (ref 3.4–5.3)
PROCALCITONIN SERPL-MCNC: 0.11 NG/ML
PROT SERPL-MCNC: 7.2 G/DL (ref 6.8–8.8)
RBC # BLD AUTO: 3.67 10E12/L (ref 4.4–5.9)
RHINOVIRUS RNA SPEC QL NAA+PROBE: NEGATIVE
RSV RNA SPEC QL NAA+PROBE: NEGATIVE
RSV RNA SPEC QL NAA+PROBE: NEGATIVE
SODIUM SERPL-SCNC: 140 MMOL/L (ref 133–144)
SPECIMEN SOURCE: ABNORMAL
TROPONIN I SERPL-MCNC: NORMAL UG/L (ref 0–0.04)
WBC # BLD AUTO: 4.3 10E9/L (ref 4–11)

## 2017-06-10 PROCEDURE — 96361 HYDRATE IV INFUSION ADD-ON: CPT | Performed by: EMERGENCY MEDICINE

## 2017-06-10 PROCEDURE — 82565 ASSAY OF CREATININE: CPT | Performed by: EMERGENCY MEDICINE

## 2017-06-10 PROCEDURE — 71020 XR CHEST 2 VW: CPT

## 2017-06-10 PROCEDURE — 80053 COMPREHEN METABOLIC PANEL: CPT | Performed by: EMERGENCY MEDICINE

## 2017-06-10 PROCEDURE — 85652 RBC SED RATE AUTOMATED: CPT | Performed by: EMERGENCY MEDICINE

## 2017-06-10 PROCEDURE — 87040 BLOOD CULTURE FOR BACTERIA: CPT | Performed by: EMERGENCY MEDICINE

## 2017-06-10 PROCEDURE — 99285 EMERGENCY DEPT VISIT HI MDM: CPT | Mod: 25 | Performed by: EMERGENCY MEDICINE

## 2017-06-10 PROCEDURE — 25000125 ZZHC RX 250: Performed by: EMERGENCY MEDICINE

## 2017-06-10 PROCEDURE — 99284 EMERGENCY DEPT VISIT MOD MDM: CPT | Mod: Z6 | Performed by: EMERGENCY MEDICINE

## 2017-06-10 PROCEDURE — 86140 C-REACTIVE PROTEIN: CPT | Performed by: EMERGENCY MEDICINE

## 2017-06-10 PROCEDURE — 96360 HYDRATION IV INFUSION INIT: CPT | Performed by: EMERGENCY MEDICINE

## 2017-06-10 PROCEDURE — 25000128 H RX IP 250 OP 636: Performed by: EMERGENCY MEDICINE

## 2017-06-10 PROCEDURE — 00000146 ZZHCL STATISTIC GLUCOSE BY METER IP

## 2017-06-10 PROCEDURE — 84484 ASSAY OF TROPONIN QUANT: CPT | Performed by: EMERGENCY MEDICINE

## 2017-06-10 PROCEDURE — 85610 PROTHROMBIN TIME: CPT | Performed by: EMERGENCY MEDICINE

## 2017-06-10 PROCEDURE — 84145 PROCALCITONIN (PCT): CPT | Performed by: EMERGENCY MEDICINE

## 2017-06-10 PROCEDURE — 87633 RESP VIRUS 12-25 TARGETS: CPT | Performed by: EMERGENCY MEDICINE

## 2017-06-10 PROCEDURE — 85025 COMPLETE CBC W/AUTO DIFF WBC: CPT | Performed by: EMERGENCY MEDICINE

## 2017-06-10 RX ORDER — IPRATROPIUM BROMIDE AND ALBUTEROL SULFATE 2.5; .5 MG/3ML; MG/3ML
3 SOLUTION RESPIRATORY (INHALATION) ONCE
Status: COMPLETED | OUTPATIENT
Start: 2017-06-10 | End: 2017-06-10

## 2017-06-10 RX ORDER — SODIUM CHLORIDE 9 MG/ML
INJECTION, SOLUTION INTRAVENOUS CONTINUOUS
Status: DISCONTINUED | OUTPATIENT
Start: 2017-06-10 | End: 2017-06-10 | Stop reason: HOSPADM

## 2017-06-10 RX ADMIN — SODIUM CHLORIDE 500 ML: 9 INJECTION, SOLUTION INTRAVENOUS at 03:23

## 2017-06-10 RX ADMIN — SODIUM CHLORIDE: 9 INJECTION, SOLUTION INTRAVENOUS at 02:53

## 2017-06-10 RX ADMIN — IPRATROPIUM BROMIDE AND ALBUTEROL SULFATE 3 ML: .5; 3 SOLUTION RESPIRATORY (INHALATION) at 02:23

## 2017-06-10 ASSESSMENT — ENCOUNTER SYMPTOMS
SINUS PRESSURE: 1
COUGH: 1
FEVER: 1
RHINORRHEA: 1
SORE THROAT: 0

## 2017-06-10 NOTE — DISCHARGE INSTRUCTIONS
TODAY'S VISIT:  You were seen today for infectious sounding respiratory symptoms.   - As part of your workup today we found that your kidney function is not as good as it was most recently, most often from being dehydrated when you are ill like you currently are.  It is very important that we watch this closely to minimize additional injury and to ensure that you continue to improve.  - We discussed her case with the Cardiology/Transplant team on call, and they agree with the plan for hydration as we did here tonight and then to have you follow-up closely with her Transplant Coordinator    FOLLOW-UP:  Please make an appointment to follow up with:  - Your Transplant team.  Please contact your Transplant Coordinator as soon as possible to arrange a follow-up appointment for Monday.     OTHER INSTRUCTIONS:  - Do your best to stay hydrated.    RETURN TO THE EMERGENCY DEPARTMENT  Return to the Emergency Department at any time for new/worsening symptoms.         Discharge Instructions for Acute Kidney Injury  You have been diagnosed with acute kidney injury. This means that your kidneys are not working properly. When both kidneys are healthy, they help filter out fluid and waste from the blood and body. Acute kidney injury has many causes including urinary blockages, infection, lack of enough blood supply, and medications that can injure kidneys. In some cases, acute kidney injury is temporary, lasting several days to a few months, because the kidney has the capacity to repair itself. But, it may also result in chronic kidney disease or end stage renal failure. Here are some instructions for you to follow as you recover.  Home care    Follow any instructions for eating and drinking given to you by your doctor.    Drink less fluid, if instructed by your doctor.    Keep a record of everything you eat and drink.    Measure the amount of urine and stool you have each day.    Weigh yourself every day, at the same time of day,  and in the same kind of clothes. Keep a daily record of your daily weights.    Take your temperature every day. Keep a record of the results.    Learn to take your own blood pressure. Keep a record of your results. Ask your doctor when you should seek emergency medical attention. He or she will tell you which blood pressure reading is dangerous.    Avoid contact with people who have infections (colds, bronchitis, or skin conditions).    Practice good personal hygiene. This is especially important if you have a catheter in place when you leave the hospital. Doing so helps keep you safe from infection.    Take your medications exactly as directed.    You may require frequent blood and urine tests to monitor your kidney function.  Follow-up care  Make a follow-up appointment as directed by our staff.     When to seek medical care  Call your doctor right away if you have any of the following:    Signs of bladder infection (urinating more often than usual; burning, pain, bleeding, or hesitancy when you urinate)    Signs of infection around your catheter (redness, swelling, warmth, or drainage)    Rapid weight loss or gain, such as 3 pounds or more in 24 hours or 6 pounds or more in 7 days    Fever above 100.4 F (38.0 C) or chills    Muscle aches    Night sweats    Very little or no urine output    Swelling of your hands, legs, or feet    Back pain    Abdominal pain    Extreme tiredness     4183-6878 [employer]. [address] [place], [place] [id number]. All rights reserved. This information is not intended as a substitute for professional medical care. Always follow your healthcare professional's instructions.        Instrucciones de ese para la insuficiencia renal aguda  A usted le moran diagnosticado insuficiencia renal aguda. Etta significa que valreie riñones no están funcionando correctamente. Cuando ambos riñones están sanos, ayudan a eliminar líquidos y desechos de la rogerio y del cuerpo. La  insuficiencia renal aguda tiene muchas causas, por ejemplo obstrucciones de las vías urinarias, infecciones, riego sanguíneo insuficiente y medicamentos que dañan los riñones. En algunos casos, la insuficiencia renal aguda es temporal, y dura entre algunos días y algunos meses, debido a que el riñón tiene la capacidad de repararse. Sin embargo, esta situación también puede derivar en insuficiencia renal terminal. Siga estas instrucciones skye la recuperación.  Cuidados en la casa    Siga las instrucciones que le haya dado el médico para comer y beber.    [place] menos líquidos, si se lo ha recomendado watt médico.    Lleve un registro de todo lo que coma y mariama.    Mida la cantidad de orina y de heces que evacúa todos los días.    Pésese todos los días, a la misma hora del día y con el mismo tipo de ropa. Lleve un registro diario de watt peso.    Tómese la temperatura diariamente. Lleve un registro de los resultados.    Aprenda a tomarse la presión arterial. Lleve un registro de valerie resultados. Pregúntele al médico cuándo debe obtener atención médica de emergencia. El médico le dirá qué niveles de presión arterial son peligrosos.    Evite el contacto con personas que tengan infecciones (resfriados, bronquitis, o trastornos de la piel).    Practique walter buena higiene personal. [place] es especialmente importante si usted tiene un catéter permanente cuando sale del hospital. La buena higiene le ayudará a prevenir las infecciones.    [place] valerie medicamentos exactamente abril le indiquen.    Es posible que necesite realizarse pruebas de rogerio y orina frecuentes para vigilar watt funcionamiento renal.  Seguimiento  Virgilio walter farhana de control según le indique nuestro personal.     Cuándo buscar atención médica  Llame a watt médico de inmediato si nota alguno de los siguientes síntomas:    Señales de infección de la vejiga (si orina más seguido de lo normal, siente ardor, dolor, o si tiene sangrado o problemas para orinar)    Señales de  infección alrededor de watt catéter (enrojecimiento, inflamación, calor o secreción)    Aumento o pérdida de peso rápidos, por ejemplo 3 libras o más en 24 horas o 6 libras o más en 7 días    Fiebre de más de 100.4 F (38.0  C) o escalofríos    Katia musculares    Sudores nocturnos    Muy poca o ninguna orina    Hinchazón en valerie salvador, piernas o pies    Dolor de espalda    Dolor abdominal    Cansancio extremo     [id number] [employer]. [address] [place] [address], [place] [address]. Todos los derechos reservados. Esta información no pretende sustituir la atención médica profesional. Sólo watt médico puede diagnosticar y tratar un problema de cesar.

## 2017-06-10 NOTE — TELEPHONE ENCOUNTER
[facility]  Emergency Department Lab result notification:    [facility] ED lab result protocol used  Miscellaneous lab protocol    Reason for call  Notify of lab results, assess symptoms,  review ED providers recommendations/discharge instructions (if necessary) and advise per ED lab result f/u protocol    Lab Result  Respiratory Virus Panel by PCR POSITIVE for: Human Metapneumovirus  Information table from ED Provider visit on 6/10/17  Symptoms reported at ED visit (Chief complaint, HPI) Javi Bansal is a 46 year old male who presents with fever and painful breathing. 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  He was just seen in clinic 8 days ago for routine follow up after hospitalization. At that time he reported overall improvement to his well-being at the time. His cardiologist was going to clear him to return to work. However 2 days ago he developed sinus congestion, rhinorrhea, and a deep cough productive of yellow sputum. He had 1 episode of scant hemoptysis last night, none since.  He has subjective waxing-waning fevers and burning irritation to throat when he coughs.  No history of pulmonary disease. He is not on inhalers or breathing treatments. He denies any recent travel.  He denies any sick contacts. He notes symptoms started with a cold and seasonal allergies. He didn t come in yesterday because he thought symptoms would improve, and came in tonight due to persistent and worsening symptoms. He is concerned for infection. He denies changes to stool or urine, other than a softer stool today followed by normal stools.      Patient had recent transplanted heart biopsy that was negative for rejection. Patient is followed by Dr. Mcclellan [facility].    ED providers Impression and Plan (applicable  "information) TODAY'S VISIT:  You were seen today for infectious sounding respiratory symptoms.   - As part of your workup today we found that your kidney function is not as good as it was most recently, most often from being dehydrated when you are ill like you currently are.  It is very important that we watch this closely to minimize additional injury and to ensure that you continue to improve.  - We discussed her case with the Cardiology/Transplant team on call, and they agree with the plan for hydration as we did here tonight and then to have you follow-up closely with her Transplant Coordinator     FOLLOW-UP:  Please make an appointment to follow up with:  - Your Transplant team.  Please contact your Transplant Coordinator as soon as possible to arrange a follow-up appointment for Monday.      OTHER INSTRUCTIONS:  - Do your best to stay hydrated.     RETURN TO THE EMERGENCY DEPARTMENT  Return to the Emergency Department at any time for new/worsening symptoms.    Miscellaneous information N/A     RN Assessment (Patient s current Symptoms), include time called.  [Insert Left message here if message left]  Did speak with patient via  [facility] #[id number]).  Javi reports \"I feel like I have a fever\", does have thermometer at home.  Did have low grade temp in ED, has not checked temp at home yet since discharge.  Did review discharge instructions with Javi, questions answered.  ED contact ([facility]NA triage) number provided for any concerns.  Reiterated need to call transplant coordinator Monday as instructed in ED.   New/worsening of symptoms to be seen back in ED with.  Javi verbalizes understanding.    Please Contact your PCP clinic or return to the Emergency department if your:    Symptoms return.    Symptoms worsen or other concerning symptom's.    PCP follow-up Questions asked: NO    Noemi Rojas, RN    [facility] RN  Lung Nodule and ED Lab Results F/U RN  Epic pool (ED late result f/u RN) : " P [id number]   # [phone]    Copy of Lab result   Order   Respiratory Virus Panel by PCR [[id number]] (Order [id number])   Exam Information   Exam Date Exam Time Accession # Results    6/10/17  1:50 AM     Component Results   Component Value Flag Ref Range Units Status Collected Lab   Respiratory Virus Source Nasopharyngeal    Final 06/10/2017  1:50 AM 75   Influenza A Negative  NEG  Final 06/10/2017  1:50 AM 75   Influenza A, H1 Negative  NEG  Final 06/10/2017  1:50 AM 75   Influenza A, H3 Negative  NEG  Final 06/10/2017  1:50 AM 75   Influenza A 2009 H1N1 Negative  NEG  Final 06/10/2017  1:50 AM 75   Influenza B Negative  NEG  Final 06/10/2017  1:50 AM 75   Respiratory Syncytial Virus A Negative  NEG  Final 06/10/2017  1:50 AM 75   Respiratory Syncytial Virus B Negative  NEG  Final 06/10/2017  1:50 AM 75   Parainfluenza Virus 1 Negative  NEG  Final 06/10/2017  1:50 AM 75   Parainfluenza Virus 2 Negative  NEG  Final 06/10/2017  1:50 AM 75   Parainfluenza Virus 3 Negative  NEG  Final 06/10/2017  1:50 AM 75   Human Metapneumovirus Positive (A) NEG  Final 06/10/2017  1:50 AM 75   Human Rhinovirus Negative  NEG  Final 06/10/2017  1:50 AM 75   Adenovirus Species B/E Negative  NEG  Final 06/10/2017  1:50 AM 75   Adenovirus Species C Negative  NEG  Final 06/10/2017  1:50 AM 75

## 2017-06-10 NOTE — ED AVS SNAPSHOT
[facility], Emergency Department    [address] [address]    Phone:  [phone]                                       Javi Bansal   MRN: [id number]    Department:  [facility], Emergency Department   Date of Visit:  6/10/2017           Patient Information     Date Of Birth          1970        Your diagnoses for this visit were:     Acute kidney injury (H)        You were seen by Elo Pinto MD.        Discharge Instructions       TODAY'S VISIT:  You were seen today for infectious sounding respiratory symptoms.   - As part of your workup today we found that your kidney function is not as good as it was most recently, most often from being dehydrated when you are ill like you currently are.  It is very important that we watch this closely to minimize additional injury and to ensure that you continue to improve.  - We discussed her case with the Cardiology/Transplant team on call, and they agree with the plan for hydration as we did here tonight and then to have you follow-up closely with her Transplant Coordinator    FOLLOW-UP:  Please make an appointment to follow up with:  - Your Transplant team.  Please contact your Transplant Coordinator as soon as possible to arrange a follow-up appointment for Monday.     OTHER INSTRUCTIONS:  - Do your best to stay hydrated.    RETURN TO THE EMERGENCY DEPARTMENT  Return to the Emergency Department at any time for new/worsening symptoms.         Discharge Instructions for Acute Kidney Injury  You have been diagnosed with acute kidney injury. This means that your kidneys are not working properly. When both kidneys are healthy, they help filter out fluid and waste from the blood and body. Acute kidney injury has many causes including urinary blockages, infection, lack of enough blood supply, and medications that can injure kidneys. In some cases, acute kidney injury is temporary, lasting several days to a few months, because  the kidney has the capacity to repair itself. But, it may also result in chronic kidney disease or end stage renal failure. Here are some instructions for you to follow as you recover.  Home care    Follow any instructions for eating and drinking given to you by your doctor.    Drink less fluid, if instructed by your doctor.    Keep a record of everything you eat and drink.    Measure the amount of urine and stool you have each day.    Weigh yourself every day, at the same time of day, and in the same kind of clothes. Keep a daily record of your daily weights.    Take your temperature every day. Keep a record of the results.    Learn to take your own blood pressure. Keep a record of your results. Ask your doctor when you should seek emergency medical attention. He or she will tell you which blood pressure reading is dangerous.    Avoid contact with people who have infections (colds, bronchitis, or skin conditions).    Practice good personal hygiene. This is especially important if you have a catheter in place when you leave the hospital. Doing so helps keep you safe from infection.    Take your medications exactly as directed.    You may require frequent blood and urine tests to monitor your kidney function.  Follow-up care  Make a follow-up appointment as directed by our staff.     When to seek medical care  Call your doctor right away if you have any of the following:    Signs of bladder infection (urinating more often than usual; burning, pain, bleeding, or hesitancy when you urinate)    Signs of infection around your catheter (redness, swelling, warmth, or drainage)    Rapid weight loss or gain, such as 3 pounds or more in 24 hours or 6 pounds or more in 7 days    Fever above 100.4 F (38.0 C) or chills    Muscle aches    Night sweats    Very little or no urine output    Swelling of your hands, legs, or feet    Back pain    Abdominal pain    Extreme tiredness     [id number] [employer]. [address] [place] [address]  [place] [address]. All rights reserved. This information is not intended as a substitute for professional medical care. Always follow your healthcare professional's instructions.        Instrucciones de ese para la insuficiencia renal aguda  A usted le moran diagnosticado insuficiencia renal aguda. Landa significa que valerie riñones no están funcionando correctamente. Cuando ambos riñones están sanos, ayudan a eliminar líquidos y desechos de la rogerio y del cuerpo. La insuficiencia renal aguda tiene muchas causas, por ejemplo obstrucciones de las vías urinarias, infecciones, riego sanguíneo insuficiente y medicamentos que dañan los riñones. En algunos casos, la insuficiencia renal aguda es temporal, y dura entre algunos días y algunos meses, debido a que el riñón tiene la capacidad de repararse. Sin embargo, esta situación también puede derivar en insuficiencia renal terminal. Siga estas instrucciones skye la recuperación.  Cuidados en la casa    Siga las instrucciones que le haya dado el médico para comer y beber.    [place] menos líquidos, si se lo ha recomendado watt médico.    Lleve un registro de todo lo que coma y mariama.    Mida la cantidad de orina y de heces que evacúa todos los días.    Pésese todos los días, a la misma hora del día y con el mismo tipo de ropa. Lleve un registro diario de watt peso.    Tómese la temperatura diariamente. Lleve un registro de los resultados.    Aprenda a tomarse la presión arterial. Lleve un registro de valerie resultados. Pregúntele al médico cuándo debe obtener atención médica de emergencia. El médico le dirá qué niveles de presión arterial son peligrosos.    Evite el contacto con personas que tengan infecciones (resfriados, bronquitis, o trastornos de la piel).    Practique walter buena higiene personal. Landa es especialmente importante si usted tiene un catéter permanente cuando sale del hospital. La buena higiene le ayudará a prevenir las infecciones.    [place] valerie medicamentos  exactamente abril le indiquen.    Es posible que necesite realizarse pruebas de rogerio y orina frecuentes para vigilar watt funcionamiento renal.  Seguimiento  Virgilio walter farhana de control según le indique nuestro personal.     Cuándo buscar atención médica  Llame a watt médico de inmediato si nota alguno de los siguientes síntomas:    Señales de infección de la vejiga (si orina más seguido de lo normal, siente ardor, dolor, o si tiene sangrado o problemas para orinar)    Señales de infección alrededor de watt catéter (enrojecimiento, inflamación, calor o secreción)    Aumento o pérdida de peso rápidos, por ejemplo 3 libras o más en 24 horas o 6 libras o más en 7 días    Fiebre de más de 100.4 F (38.0  C) o escalofríos    Katia musculares    Sudores nocturnos    Muy poca o ninguna orina    Hinchazón en valerie salvador, piernas o pies    Dolor de espalda    Dolor abdominal    Cansancio extremo     [id number] [employer]. [address] [place] [address]. Todos los derechos reservados. Esta información no pretende sustituir la atención médica profesional. Sólo watt médico puede diagnosticar y tratar un problema de cesar.          Future Appointments        Provider Department Dept Phone Center    6/20/2017 10:30 AM Alla Jerez PA-C [facility] Endocrinology [phone] [facility]    7/6/2017 1:30 PM GARY Murillo MD [facility] Dermatology [phone] [facility]    7/26/2017 9:30 AM Ivan Schuler MD [facility] Infectious Diseases [phone] [facility]    8/1/2017 9:00 AM Lab [facility] Lab [phone] [facility]    8/1/2017 9:30 AM Mary Huffman NP [facility] [phone] [facility]    8/1/2017 11:00 AM U2A ROOM 16 [address] [facility] O    8/1/2017 12:00 PM [facility] HEART CATH LAB ROOM 4 [facility],  Heart Cath Lab [phone] [facility]    9/19/2017 10:00 AM U2A ROOM 15 [address] [facility] O    9/19/2017 11:00 AM [facility]  CATH LAB ROOM 4 [facility],  Heart Cath Lab [phone] [place] O    9/19/2017 2:00 PM SAMM Jeffers CNP [facility] [phone] [facility]    12/14/2017 10:30 AM U2A ROOM 10 [address] [facility] O    12/14/2017 12:00 PM [facility] HEART CATH LAB ROOM 4 [facility],  Heart Cath Lab [phone] [place] O    12/15/2017 9:00 AM [facility] CV MR IMAGING NURSE; [facility] MRI ROOM 4 [facility] [phone] [facility]    12/15/2017 11:30 AM Geena Mcclellan MD [facility] [phone] [facility]    6/13/2018 10:15 AM Isabela Balbuena MD [facility] [phone] [facility]      24 Hour Appointment Hotline       To make an appointment at any [facility], call [phone] ([phone]). If you don't have a family doctor or clinic, we will help you find one. [facility] clinics are conveniently located to serve the needs of you and your family.             Review of your medicines      Our records show that you are taking the medicines listed below. If these are incorrect, please call your family doctor or clinic.        Dose / Directions Last dose taken    alcohol swab prep pads   Quantity:  100 each        Use to swab area of injection/raymond as directed.   Refills:  1        aspirin 81 MG EC tablet   Dose:  81 mg   Quantity:  90 tablet        Take 1 tablet (81 mg) by mouth daily   Refills:  3        B-D SHARPS DISPOSAL BY MAIL Misc   Quantity:  1 each        1 container   Refills:  1        blood glucose monitoring lancets   Quantity:  1 Box        Use to test blood sugars 4 times daily or as directed.   Refills:  11        blood glucose monitoring test strip   Commonly known as:  no brand specified   Quantity:  2 Box        Contour Next test strips. Use to test blood sugar 4 times daily as well as if  Symptoms of high or low BG.   Refills:  11        calcium carb 1250 mg (500 mg [place])/vitamin D 200 units 500-200 MG-UNIT per  tablet   Commonly known as:  OSCAL with D   Dose:  1 tablet   Quantity:  60 tablet        Take 1 tablet by mouth 2 times daily (with meals)   Refills:  11        insulin aspart 100 UNIT/ML injection   Commonly known as:  NovoLOG PEN   Dose:  10 Units   Quantity:  9 mL        Inject 10 Units Subcutaneous 3 times daily (with meals)   Refills:  1        insulin glargine 100 UNIT/ML injection   Dose:  24 Units   Quantity:  30 mL        Inject 24 Units Subcutaneous daily   Refills:  1        insulin pen needle 32G X 4 MM   Quantity:  100 each        Use pen needles daily or as directed.   Refills:  1        isoniazid 300 MG tablet   Commonly known as:  NYDRAZID   Dose:  300 mg   Quantity:  90 tablet        Take 1 tablet (300 mg) by mouth daily   Refills:  3        ketoconazole 2 % shampoo   Commonly known as:  NIZORAL   Quantity:  120 mL        Apply topically three times a week Alternate with Head and Shoulders.   Refills:  3        multivitamin, therapeutic with minerals Tabs tablet   Dose:  1 tablet   Quantity:  30 each        Take 1 tablet by mouth daily   Refills:  11        mycophenolate 250 MG capsule   Commonly known as:  CELLCEPT - GENERIC EQUIVALENT   Dose:  1500 mg   Quantity:  360 capsule        Take 6 capsules (1,500 mg) by mouth 2 times daily   Refills:  11        order for DME   Quantity:  1 Device        Equipment being ordered: Splint - Cubital tunnel splint for ulnar neuropathy   Refills:  0        pantoprazole 40 MG EC tablet   Commonly known as:  PROTONIX   Dose:  40 mg   Quantity:  30 tablet        Take 1 tablet (40 mg) by mouth every morning   Refills:  11        pravastatin 20 MG tablet   Commonly known as:  PRAVACHOL   Dose:  20 mg   Quantity:  30 tablet        Take 1 tablet (20 mg) by mouth every evening   Refills:  11        tacrolimus 1 MG capsule   Commonly known as:  PROGRAF - GENERIC EQUIVALENT   Quantity:  90 capsule        Take 2mg (2 capsules) in the morning and 1mg (1 capsule) in the  evening.   Refills:  11                Procedures and tests performed during your visit     Procedure/Test Number of Times Performed    Blood culture 2    CBC with platelets differential 1    CRP inflammation 1    Comprehensive metabolic panel 1    Creatinine 1    Erythrocyte sedimentation rate auto 1    Glucose by meter 1    INR 1    ISTAT CG4 gases lactate ana nursing POCT 1    Procalcitonin 1    Respiratory Virus Panel by PCR 1    Troponin I 1    XR Chest 2 Views 1      Orders Needing Specimen Collection     None      Pending Results     Date and Time Order Name Status Description    6/10/2017 0148 Respiratory Virus Panel by PCR In process     6/10/2017 0148 XR Chest 2 Views Preliminary     6/10/2017 0109 Blood culture In process     6/10/2017 0109 Blood culture In process             Pending Culture Results     Date and Time Order Name Status Description    6/10/2017 0148 Respiratory Virus Panel by PCR In process     6/10/2017 0109 Blood culture In process     6/10/2017 0109 Blood culture In process             Pending Results Instructions     If you had any lab results that were not finalized at the time of your Discharge, you can call the ED Lab Result RN at [phone]. You will be contacted by this team for any positive Lab results or changes in treatment. The nurses are available 7 days a week from 10A to 6:30P.  You can leave a message 24 hours per day and they will return your call.        Thank you for choosing [facility]       Thank you for choosing [facility] for your care. Our goal is always to provide you with excellent care. Hearing back from our patients is one way we can continue to improve our services. Please take a few minutes to complete the written survey that you may receive in the mail after you visit with us. Thank you!        [employer]hart Information     [employer] lets you send messages to your doctor, view your test results, renew your prescriptions, schedule appointments and more. To sign up, go  "to [email or web address] . Click on \"Log in\" on the left side of the screen, which will take you to the Welcome page. Then click on \"Sign up Now\" on the right side of the page.     You will be asked to enter the access code listed below, as well as some personal information. Please follow the directions to create your username and password.     Your access code is: [email or web address]  Expires: 2017  6:30 AM     Your access code will  in 90 days. If you need help or a new code, please call your [facility] clinic or [phone].        Care EveryWhere ID     This is your Care EveryWhere ID. This could be used by other organizations to access your [facility] medical records  [id number]        After Visit Summary       This is your record. Keep this with you and show to your community pharmacist(s) and doctor(s) at your next visit.                  "

## 2017-06-10 NOTE — ED PROVIDER NOTES
[place] EMERGENCY DEPARTMENT ([facility])  Harmony 10, 2017  ED 8   History     Chief Complaint   Patient presents with     Fever     Pleurisy     The history is provided by the patient and medical records. The history is limited by a language barrier (Mozambican). A  was used.     Javi Bansal is a 46 year old male who presents with fever and painful breathing.     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  He was just seen in clinic 8 days ago for routine follow up after hospitalization. At that time he reported overall improvement to his well-being at the time. His cardiologist was going to clear him to return to work.    However, 2 days ago he developed sinus congestion, rhinorrhea, and a deep cough productive of yellow sputum. He had 1 episode of scant hemoptysis last night, none since.  He has subjective waxing-waning fevers and burning irritation to throat when he coughs.  No history of pulmonary disease. He is not on inhalers or breathing treatments. He denies any recent travel.  He denies any sick contacts. He notes symptoms started with a cold and seasonal allergies. He didn t come in yesterday because he thought symptoms would improve, and came in tonight due to persistent and worsening symptoms. He is concerned for infection. He denies changes to stool or urine, other than a softer stool today followed by normal stools.  No other new symptoms or complaints at this time.  Please see ROS further details.    Patient had recent transplanted heart biopsy that was negative for rejection. Patient is followed by Dr. Mcclellan of the [facility].     I have reviewed the Medications, Allergies, Past Medical and Surgical History, and Social History in the  "Epic system.    Review of Systems   Constitutional: Positive for fatigue and fever.   HENT: Positive for congestion, rhinorrhea and sinus pressure. Negative for sore throat.    Eyes: Negative.    Respiratory: Positive for cough. Negative for shortness of breath.    Cardiovascular: Positive for chest pain. Negative for palpitations (tightness in with cough) and leg swelling.   Gastrointestinal: Negative for abdominal pain, nausea and vomiting.   Endocrine: Negative.    Genitourinary: Negative.    Musculoskeletal: Negative for back pain and neck pain.   Skin: Negative for color change and rash.   Allergic/Immunologic: Positive for immunocompromised state.   Neurological: Negative for syncope and light-headedness.   Hematological: Negative.    Psychiatric/Behavioral: Negative.        Physical Exam   BP: 118/72  Heart Rate: 105  Temp: 99  F (37.2  C)  Resp: 16  Height: 169 cm (5' 6.53\")  Weight: 74.6 kg (164 lb 7.4 oz)  SpO2: 95 %  Physical Exam  CONSTITUTIONAL: Well-developed and well-nourished. Awake and alert. Non-toxic appearance. No acute distress.   HENT:   - Head: Normocephalic and atraumatic.   - Ears: Hearing and external ear grossly normal.   - Nose: Nose normal. No rhinorrhea. No epistaxis.  Sounds a bit nasally congested.  - Mouth/Throat: Oropharynx is clear and MMM.  Uvula midline  EYES: Conjunctivae and lids are normal. No scleral icterus.   NECK: Normal range of motion and phonation normal. Neck supple.  No tracheal deviation, no stridor. No edema or erythema noted.  CARDIOVASCULAR: Normal rate, regular rhythm and no appreciable abnormal heart sounds.  PULMONARY/CHEST: Effort normal. No accessory muscle usage or stridor. No respiratory distress.  Does have wheeze component bilaterally but otherwise normal work of breathing.  ABDOMEN: Soft, non-distended. No tenderness. No rigidity, rebound or guarding.   MUSCULOSKELETAL: Extremities warm and seemingly well perfused. No edema or calf " tenderness.  NEUROLOGIC: Awake, alert. Not disoriented. He displays no atrophy and no tremor.  Normal tone. No seizure activity. Coordination normal. GCS 15  SKIN: Skin is warm and dry. No rash noted. No diaphoresis. No pallor.   PSYCHIATRIC: Normal mood and affect. Speech and behavior normal. Thought processes linear. Cognition and memory are normal.     ED Course     ED Course   Value Comment By Time   WBC: 4.3 Normal. Elo Pinto MD 06/10 0235    Slightly below previous Elo Pinto MD 06/10 0235   Procalcitonin: 0.11 Low risk of systemic infection Elo Pinto MD 06/10 0246   Creatinine: (!) 1.52 Increased from previous (0.81 ~ one week ago). Elo Pinto MD 06/10 0246     Procedures      Assessments & Plan (with Medical Decision Making)   IMPRESSION / PLAN: 47 yo M, PMH notable for previous heart transplant in setting of Chagas, presents with URI-type symptoms as described further above in the HPI/ROS.  Clinically, patient appears nontoxic,NAD.  He appears to have normal work of breathing. Does have some wheezing component on exam but no history of asthma or COPD, etc.  Clinically am most suspicious for a viral illness but he is immunosuppressed because of his previous transplant and so we will evaluate further with CXR, cultures, respiratory viral panel and I'll order an neb for the wheezing component and continued to clinically reassess.  I do believe this sounds infectious as opposed to transplant rejection, heart failure, ACS, etc.    RESULTS:  See ED Course section above for particular pertinent findings and comments  - Labs: Creatinine is somewhat increased from previous, only mild elevation of inflammatory markers, troponin negative  - Imaging: Images and written preliminary reports reviewed by myself and revealed:  --- CXR: No acute findings    INTERVENTIONS:   - IVF  - Duoneb    DISCUSSIONS:  - w/ Cards 2 attending: They will have us hydrated in ED to ensure improving, and if so  D/C with close F/U. No other immediate concerns from their standpoint, no other immediate recommendations/requests.   - w/ Patient: I have reviewed the findings, tentative plan with the patient and his loved one (via interpretation). They expressed understanding and agreement with this plan. All questions answered to the best of our ability at this time.       DISPOSITION/PLANNING:  Pt signed out to overnight EM MD  - IMPRESSION AT SHIFT CHANGE: URI symptoms, JOSE LUIS/mild Cr increase  - PLAN: Observe w/ IVF in ED overnight, recheck Cr in AM to ensure improving. If worsening/not improving, admit to Cards 2, if improving, D/C to home and have call transplant coordinator in morning to arrange close F/U  --- Pending: Repeat Cr in AM, Respiratory viral panel, blood culture        ______________________________________________________________________________    - I have reviewed the available nursing notes.            New Prescriptions    No medications on file       Final diagnoses:   None   I, Ira Dominguez, am serving as a trained medical scribe to document services personally performed by Elo Pinto MD, based on the provider's statements to me.  This document has been checked and approved by Dr. Blair HEADLEY, Elo Pinto MD, was physically present and have reviewed and verified the accuracy of this note documented by Ira Dominguez medical scribe.       6/10/2017   [facility], [place], EMERGENCY DEPARTMENT     Elo Pinto MD  06/12/17 0244

## 2017-06-10 NOTE — ED AVS SNAPSHOT
[facility], [place], Emergency Department    [address] [place] [address]    Phone:  [phone]                                       Javi Bansal   MRN: [id number]    Department:  [facility], Emergency Department   Date of Visit:  6/10/2017           After Visit Summary Signature Page     I have received my discharge instructions, and my questions have been answered. I have discussed any challenges I see with this plan with the nurse or doctor.    ..........................................................................................................................................  Patient/Patient Representative Signature      ..........................................................................................................................................  Patient Representative Print Name and Relationship to Patient    ..................................................               ................................................  Date                                            Time    ..........................................................................................................................................  Reviewed by Signature/Title    ...................................................              ..............................................  Date                                                            Time

## 2017-06-10 NOTE — ED NOTES
Patient presents with c/o fever, sore throat and chest discomfort with coughing. Patient did not take temp at home - temp in triage is 99.0. Took ibuprofen yesterday morning. Hx heart transplant.

## 2017-06-12 ASSESSMENT — ENCOUNTER SYMPTOMS
NAUSEA: 0
BACK PAIN: 0
EYES NEGATIVE: 1
HEMATOLOGIC/LYMPHATIC NEGATIVE: 1
VOMITING: 0
NECK PAIN: 0
PSYCHIATRIC NEGATIVE: 1
FATIGUE: 1
COLOR CHANGE: 0
SHORTNESS OF BREATH: 0
ABDOMINAL PAIN: 0
LIGHT-HEADEDNESS: 0
PALPITATIONS: 0
ENDOCRINE NEGATIVE: 1

## 2017-06-13 ENCOUNTER — TELEPHONE (OUTPATIENT)
Dept: TRANSPLANT | Facility: CLINIC | Age: 47
End: 2017-06-13

## 2017-06-13 DIAGNOSIS — Z94.1 HEART REPLACED BY TRANSPLANT (H): Primary | ICD-10-CM

## 2017-06-13 NOTE — TELEPHONE ENCOUNTER
"Called Javi at home to see how he is feeling- especially with regards to recent increased positive CMV results.  Vicky Mcclellan and Dane reviewed results and have requested that he have another level drawn this week- Javi agrees to come to clinic lab on Friday @ 0930 to have drawn- labs ordered.    He says in general he feels week- he has \"a little cough and a little yellow phlegm\" but denies shortness of breath and or fever.  Denies pain with cough.    Informed to call care coordinator office with any/all changes concerning to him regarding possibility of infection.  "

## 2017-06-14 ENCOUNTER — AMBULATORY - HEALTHEAST (OUTPATIENT)
Dept: CARDIAC REHAB | Facility: HOSPITAL | Age: 47
End: 2017-06-14

## 2017-06-14 DIAGNOSIS — Z94.1 STATUS POST HEART TRANSPLANT (H): ICD-10-CM

## 2017-06-14 LAB
DONOR IDENTIFICATION: NORMAL
DSA COMMENTS: NORMAL
DSA PRESENT: NO
DSA TEST METHOD: NORMAL
LAB SCANNED RESULT: NORMAL
ORGAN: NORMAL
SA1 CELL: NORMAL
SA1 COMMENTS: NORMAL
SA1 HI RISK ABY: NORMAL
SA1 MOD RISK ABY: NORMAL
SA1 TEST METHOD: NORMAL
SA2 CELL: NORMAL
SA2 COMMENTS: NORMAL
SA2 HI RISK ABY UA: NORMAL
SA2 MOD RISK ABY: NORMAL
SA2 TEST METHOD: NORMAL

## 2017-06-15 ENCOUNTER — AMBULATORY - HEALTHEAST (OUTPATIENT)
Dept: CARDIAC REHAB | Facility: HOSPITAL | Age: 47
End: 2017-06-15

## 2017-06-15 ENCOUNTER — TELEPHONE (OUTPATIENT)
Dept: TRANSPLANT | Facility: CLINIC | Age: 47
End: 2017-06-15

## 2017-06-15 DIAGNOSIS — Z94.1 STATUS POST HEART TRANSPLANT (H): ICD-10-CM

## 2017-06-15 NOTE — TELEPHONE ENCOUNTER
Discussed with Edyta Rojas, Hernandez pt's drop in Immuknow score from 382 to 3. Unclear why that may have happened. States sometimes stress and lack of sleep can make a difference  -  typically not that much. May have been a problem with the sample. Enc recheck.

## 2017-06-15 NOTE — TELEPHONE ENCOUNTER
Called pt with the help of  letting pt know that his T. Cruzi specimen is negative. Will continue to check per Dr. Mcclellan's orders. Pt verbalized understanding and states he is feeling great.

## 2017-06-16 DIAGNOSIS — Z94.1 HEART REPLACED BY TRANSPLANT (H): ICD-10-CM

## 2017-06-16 LAB
BACTERIA SPEC CULT: NO GROWTH
BACTERIA SPEC CULT: NO GROWTH
BASOPHILS # BLD AUTO: 0 10E9/L (ref 0–0.2)
BASOPHILS NFR BLD AUTO: 0.6 %
DIFFERENTIAL METHOD BLD: ABNORMAL
EOSINOPHIL # BLD AUTO: 0.1 10E9/L (ref 0–0.7)
EOSINOPHIL NFR BLD AUTO: 2.7 %
ERYTHROCYTE [DISTWIDTH] IN BLOOD BY AUTOMATED COUNT: 11.8 % (ref 10–15)
HCT VFR BLD AUTO: 34.8 % (ref 40–53)
HGB BLD-MCNC: 11.6 G/DL (ref 13.3–17.7)
IMM GRANULOCYTES # BLD: 0.1 10E9/L (ref 0–0.4)
IMM GRANULOCYTES NFR BLD: 4.2 %
LYMPHOCYTES # BLD AUTO: 1.1 10E9/L (ref 0.8–5.3)
LYMPHOCYTES NFR BLD AUTO: 32.4 %
MCH RBC QN AUTO: 31.4 PG (ref 26.5–33)
MCHC RBC AUTO-ENTMCNC: 33.3 G/DL (ref 31.5–36.5)
MCV RBC AUTO: 94 FL (ref 78–100)
MICRO REPORT STATUS: NORMAL
MICRO REPORT STATUS: NORMAL
MONOCYTES # BLD AUTO: 0.4 10E9/L (ref 0–1.3)
MONOCYTES NFR BLD AUTO: 12.6 %
NEUTROPHILS # BLD AUTO: 1.6 10E9/L (ref 1.6–8.3)
NEUTROPHILS NFR BLD AUTO: 47.5 %
NRBC # BLD AUTO: 0 10*3/UL
NRBC BLD AUTO-RTO: 0 /100
PLATELET # BLD AUTO: 262 10E9/L (ref 150–450)
RBC # BLD AUTO: 3.7 10E12/L (ref 4.4–5.9)
SPECIMEN SOURCE: NORMAL
SPECIMEN SOURCE: NORMAL
TACROLIMUS BLD-MCNC: 11 UG/L (ref 5–15)
TME LAST DOSE: NORMAL H
WBC # BLD AUTO: 3.3 10E9/L (ref 4–11)

## 2017-06-16 PROCEDURE — 80197 ASSAY OF TACROLIMUS: CPT | Performed by: INTERNAL MEDICINE

## 2017-06-17 LAB
CMV DNA SPEC NAA+PROBE-ACNC: 288 [IU]/ML
CMV DNA SPEC NAA+PROBE-LOG#: 2.5 {LOG_IU}/ML
SPECIMEN SOURCE: ABNORMAL

## 2017-06-20 ENCOUNTER — OFFICE VISIT (OUTPATIENT)
Dept: ENDOCRINOLOGY | Facility: CLINIC | Age: 47
End: 2017-06-20

## 2017-06-20 VITALS
HEART RATE: 98 BPM | WEIGHT: 163.5 LBS | SYSTOLIC BLOOD PRESSURE: 102 MMHG | DIASTOLIC BLOOD PRESSURE: 70 MMHG | BODY MASS INDEX: 25.97 KG/M2

## 2017-06-20 DIAGNOSIS — T38.0X5A STEROID-INDUCED DIABETES MELLITUS (H): Primary | ICD-10-CM

## 2017-06-20 DIAGNOSIS — E09.9 STEROID-INDUCED DIABETES MELLITUS (H): Primary | ICD-10-CM

## 2017-06-20 NOTE — MR AVS SNAPSHOT
After Visit Summary   6/20/2017    Javi Bansal    MRN: [id number]           Patient Information     Date Of Birth          1970        Visit Information        Provider Department      6/20/2017 10:15 AM Alla Jerez PA-C; MANINDER MARIN [facility]        Care Instructions    For now, please quit taking lunchtime Novolog, but keep checking sugar 2 hours after eating.  If your blood sugar is often >160, please restart taking it.      In two weeks if your fasting blood sugars are always <120, quit Lantus 10 units daily.    Call me in two weeks to let me know how it is going.     [phone] - Alla Jerez                Follow-ups after your visit        Follow-up notes from your care team     Return in about 3 months (around 9/20/2017).      Your next 10 appointments already scheduled     Jul 06, 2017  1:30 PM CDT   (Arrive by 1:15 PM)   Transplant Skin Check with GARY Murillo MD   [facility] Dermatology ([facility])    [address] [place] [address]  [address] [facility] [address]   [phone]            Jul 19, 2017  9:00 AM CDT   (Arrive by 8:45 AM)   Return Visit with Ivan Schuler MD   [facility] and Infectious Diseases ([facility])    [address] [place] [address]  [address] [facility] [address]   [phone]            Aug 01, 2017  9:00 AM CDT   Lab with  LAB   [facility] Lab ([facility])    [address] [place] [address]   [phone]            Aug 01, 2017  9:30 AM CDT   (Arrive by 9:15 AM)   RETURN HEART TRANSPLANT with Mary Huffman NP   [facility] Heart Care ([facility])    [address] [place] [address]  [address] [facility] [address] [phone]            Aug 01, 2017 11:00 AM CDT   Procedure - 2.5 hour with U2A ROOM 16   [address] [facility] ([facility]  [facility])    [address] [facility] [address]               Aug 01, 2017 12:00 PM CDT   Heart Cath Heart Biopsy with UUHCVR4   [facility])    [address] [facility] [address]   [phone]            Sep 19, 2017  9:30 AM CDT   Lab with  LAB    Health Lab ([facility])    [address] [facility]  1st Floor  [facility] [address] [phone]            Sep 19, 2017 10:00 AM CDT   Procedure - 2.5 hour with U2A ROOM 15   [address] [facility] ([facility])    [address] [facility] [address]               Sep 19, 2017 11:00 AM CDT   Heart Cath Heart Biopsy with UUHCVR4   [facility])    [address] [facility] [address]   [phone]            Sep 19, 2017  2:00 PM CDT   (Arrive by 1:45 PM)   RETURN HEART TRANSPLANT with SAMM Ramos [facility] Heart Care ([facility])    [address] [facility] [address]  3rd Floor  [facility] [address] [phone]              Who to contact     Please call your clinic at [phone] to:    Ask questions about your health    Make or cancel appointments    Discuss your medicines    Learn about your test results    Speak to your doctor   If you have compliments or concerns about an experience at your clinic, or if you wish to file a complaint, please contact [facility] Patient Relations at [phone] or email us at [email or web address]         Additional Information About Your Visit        The [employer] Project[employer] Information     [employer] is an electronic gateway that provides easy, online access to your medical records. With [employer], you can request a clinic appointment, read your test results, renew a prescription or communicate with your care team.     To sign up for  Dakshat visit the website at [email or web address]   You will be asked to enter the access code listed below, as well as some personal information. Please follow the directions to create your username and password.     Your access code is: [email or web address]  Expires: 2017  6:30 AM     Your access code will  in 90 days. If you need help or a new code, please contact your [facility] or call [phone] for assistance.        Care EveryWhere ID     This is your Care EveryWhere ID. This could be used by other organizations to access your [place] medical records  [id number]        Your Vitals Were     Pulse BMI (Body Mass Index)                98 25.97 kg/m2           Blood Pressure from Last 3 Encounters:   17 102/70   06/10/17 109/71   17 118/82    Weight from Last 3 Encounters:   17 74.2 kg (163 lb 8 oz)   06/10/17 74.6 kg (164 lb 7.4 oz)   17 75.1 kg (165 lb 8 oz)              Today, you had the following     No orders found for display         Today's Medication Changes          These changes are accurate as of: 17 11:26 AM.  If you have any questions, ask your nurse or doctor.               These medicines have changed or have updated prescriptions.        Dose/Directions    insulin glargine 100 UNIT/ML injection   This may have changed:  how much to take   Used for:  Steroid-induced diabetes mellitus (H)        Dose:  24 Units   Inject 24 Units Subcutaneous daily   Quantity:  30 mL   Refills:  1         Stop taking these medicines if you haven't already. Please contact your care team if you have questions.     insulin aspart 100 UNIT/ML injection   Commonly known as:  NovoLOG PEN   Stopped by:  Alla Jerez PA-C                    Primary Care Provider    Physician No Ref-Primary       No address on file        Thank you!     Thank you for choosing [facility] ENDOCRINOLOG[facility]  for your care. Our goal is always to provide you with excellent  care. Hearing back from our patients is one way we can continue to improve our services. Please take a few minutes to complete the written survey that you may receive in the mail after your visit with us. Thank you!             Your Updated Medication List - Protect others around you: Learn how to safely use, store and throw away your medicines at www.disposemymeds.org.          This list is accurate as of: 6/20/17 11:26 AM.  Always use your most recent med list.                   Brand Name Dispense Instructions for use    alcohol swab prep pads     100 each    Use to swab area of injection/raymond as directed.       aspirin 81 MG EC tablet     90 tablet    Take 1 tablet (81 mg) by mouth daily       B-D SHARPS DISPOSAL BY MAIL Misc     1 each    1 container       blood glucose monitoring lancets     1 Box    Use to test blood sugars 4 times daily or as directed.       blood glucose monitoring test strip    no brand specified    2 Box    Contour Next test strips. Use to test blood sugar 4 times daily as well as if  Symptoms of high or low BG.       calcium carb 1250 mg (500 mg Eklutna)/vitamin D 200 units 500-200 MG-UNIT per tablet    OSCAL with D    60 tablet    Take 1 tablet by mouth 2 times daily (with meals)       insulin glargine 100 UNIT/ML injection     30 mL    Inject 24 Units Subcutaneous daily       insulin pen needle 32G X 4 MM     100 each    Use pen needles daily or as directed.       isoniazid 300 MG tablet    NYDRAZID    90 tablet    Take 1 tablet (300 mg) by mouth daily       ketoconazole 2 % shampoo    NIZORAL    120 mL    Apply topically three times a week Alternate with Head and Shoulders.       multivitamin, therapeutic with minerals Tabs tablet     30 each    Take 1 tablet by mouth daily       mycophenolate 250 MG capsule    CELLCEPT - GENERIC EQUIVALENT    360 capsule    Take 6 capsules (1,500 mg) by mouth 2 times daily       order for DME     1 Device    Equipment being ordered: Splint - Cubital  tunnel splint for ulnar neuropathy       pantoprazole 40 MG EC tablet    PROTONIX    30 tablet    Take 1 tablet (40 mg) by mouth every morning       pravastatin 20 MG tablet    PRAVACHOL    30 tablet    Take 1 tablet (20 mg) by mouth every evening       tacrolimus 1 MG capsule    PROGRAF - GENERIC EQUIVALENT    90 capsule    Take 2mg (2 capsules) in the morning and 1mg (1 capsule) in the evening.

## 2017-06-20 NOTE — LETTER
2017       RE: Javi Bansal   [address] [place] [address]     Dear Colleague,    Thank you for referring your patient, Javi Bansal, to the [facility] ENDOCRINOLOGY at [facility]. Please see a copy of my visit note below.    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 There was of course concern about new onset type 1 diabetes, but I see now that his NIDA jessica is back now with a normal value of <5.0. Thus, his diabetes is thought to be secondary to steroids vs tacrolimus causing some insulin resistance. He is not known to have Chagas gastrointestinal disease. He has since been seen in ER with JOSE LUIS.     He had been taking Lantus 21 units daily an Novolog 5 units / meal but in recent weeks saw lower BG with decreased Prednisone, which he no lone gr takes and has now decreased BG to 10 units Lantus daily and 5 units Humalog with lunch only.  He has had symptoms of low BG and BG <70 easily treated and this decreased dose. Denies sx of high BG.  Overall happy,.  Glad he will be applying for jobs and looking forward to getting back to work.      We reviewed glucometer data together.  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 He also has no past medical history of Malignant melanoma (H)  "or Skin disease.  Immunization History   Administered Date(s) Administered     Hepatitis B 08/04/2016, 09/15/2016, 02/09/2017     Influenza (IIV3) 10/05/2016     Influenza Vaccine IM 3yrs+ 4 Valent IIV4 11/01/2011, 03/21/2015     Pneumococcal (PCV 13) 08/04/2016     Pneumococcal 23 valent 03/21/2015     TDAP Vaccine (Boostrix) 08/04/2016       Javi's   Social History     Social History     Marital status:      Spouse name: N/A     Number of children: N/A     Years of education: N/A     Occupational History     Not on file.     Social History Main Topics     Smoking status: Never Smoker     Smokeless tobacco: Not on file     Alcohol use No     Drug use: No     Sexual activity: Not on file     Other Topics Concern     Not on file     Social History Narrative   He does not read or write and prefers oral instructions.        Javi's family history includes Brain Cancer in his mother. There is no history of Melanoma, Skin Cancer, Glaucoma, or Macular Degeneration.    ROS:   Patient denies any fevers, chills or sweats as well as any changes in vision, problems with floaters or field cuts in vision, pain or problems with dentition, new or different headaches.  Patient denies symptoms of hypo and hyperglycemia except as above.   Patients denies marked fatigue, cough, shortness of breath, chest pain or pressure.  There has been no pain with or other changes in urination or  itching or pain in genital areas.  Patient denies any noted swelling in feet, ankles or otherwise, loss of sensation or pain  in feet or other areas.    Patient also denies current difficulties with depressed mood, anhedonia or worrying too much.        Exam:    /70  Pulse 98  Wt 74.2 kg (163 lb 8 oz)  BMI 25.97 kg/m2    General: Pleasant, well nourished and hydrated male in NAD. Seen with .    Psych:  Mood is \"good,\" affect is appropriate.  Thought form and content are fluid and coherent.    HEENT: Eyes and sclera are clear. " Extraocular movements are grossly intact without proptosis.  Nares are patent, mucous membranes moist.  Neck: No masses or JVD are noted.    Resp: Easy and unlabored breathing.   Neuro: Alert and oriented, communicating clearly.   Ext: no swelling or edema  Data:      Last Basic Metabolic Panel:  Lab Results   Component Value Date     06/10/2017      Lab Results   Component Value Date    POTASSIUM 4.0 06/10/2017     Lab Results   Component Value Date    CHLORIDE 108 06/10/2017     Lab Results   Component Value Date    DAISY 8.8 06/10/2017     Lab Results   Component Value Date    CO2 23 06/10/2017     Lab Results   Component Value Date    BUN 24 06/10/2017     Lab Results   Component Value Date    CR 1.06 06/10/2017     Lab Results   Component Value Date    GLC 84 06/10/2017       GFR Estimate   Date Value Ref Range Status   06/10/2017 75 >60 mL/min/1.7m2 Final     Comment:     Non  GFR Calc   06/10/2017 49 (L) >60 mL/min/1.7m2 Final     Comment:     Non  GFR Calc   06/02/2017 >90  Non  GFR Calc   >60 mL/min/1.7m2 Final       Lab Results   Component Value Date    A1C 7.5 (H) 06/02/2017    A1C 11.6 (H) 04/25/2017    A1C 5.4 12/11/2016    A1C 5.8 06/18/2016       Lab Results   Component Value Date    CPEPT 2.4 05/15/2017    GADAB  04/25/2017     <5.0  Reference range: 0.0 to 5.0  Unit: IU/mL  (Note)  INTERPRETIVE INFORMATION:  Glutamic Acid Decarboxylase  Antibody  A value greater than 5.0 IU/mL is considered positive for  Glutamic Acid Decarboxylase Antibody.  Performed by [facility],  [address] [place] [id number] [phone]  [email or web address], Calvin Hernández MD, Lab. Director      ISCAB  05/15/2017     <1:4  Reference range: <1:4  (Note)  INTERPRETIVE INFORMATION: Islet Cell Ab, IgG  Islet cell antibodies (ICAs) are associated with type 1  diabetes (TID), an autoimmune endocrine disorder. ICAs may  be present years before the onset of clinical symptoms.  To  calculate Juvenile Diabetes Foundation (JDF) units:  multiply the titer x 5 (1:8  8 x 5 = 40 JDF Units).  Test developed and characteristics determined by [facility]. See Compliance Statement A: [email or web address]  Performed by [facility],  [address], [facility] [place] [address] [phone]  [email or web address], Calvin Hernández MD, Lab. Director       Cholesterol   Date Value Ref Range Status   06/02/2017 139 <200 mg/dL Final   01/13/2017 198 <200 mg/dL Final     HDL Cholesterol   Date Value Ref Range Status   06/02/2017 49 >39 mg/dL Final   01/13/2017 88 >39 mg/dL Final     LDL Cholesterol Calculated   Date Value Ref Range Status   06/02/2017 68 <100 mg/dL Final     Comment:     Desirable:       <100 mg/dl   01/13/2017 93 <100 mg/dL Final     Comment:     Desirable:       <100 mg/dl     Triglycerides   Date Value Ref Range Status   06/02/2017 109 <150 mg/dL Final   01/13/2017 82 <150 mg/dL Final     Comment:     Fasting specimen     No results found for: CHOLHDLRATIO    Most recent eye exam date: : 06/08/2017           Assessment/Plan:        Javi Bansal is a 46 year old male with longstanding history of Chagas cardiomyopathy who underwent cardiac transplantation on 12/12/2016 was recently admitted to [facility] from 4/24 - 4/26/2017 with new onset diabetes and blood glucose in the 700's. He is now doing well, actually having some hypoglycemia on minimal doses of Lantus.  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     Advised to quit lunch time Novolog, check BG 2 H after lunch.  Advise if consistently >180.    If fasting blood glucose remain <120 in 1-2 weeks, also quit taking morning Lantus.  Please call in a couple of weeks to let me know how BG remain off insulin.      Continue checking fasting BG and BG 1-2 hours after  Eating larger meals at least twice weekly .  Call clinics if too high, >120 fasting or often >160 after meals.        RTC 3 months.       Alla Jerez PA-C,  MPAS  [facility]  Diabetes, Endocrinology, and Metabolism  [phone] Appointments/Nurse  [phone] pager  [phone] nurse line

## 2017-06-20 NOTE — PROGRESS NOTES
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 There was of course concern about new onset type 1 diabetes, but I see now that his NIDA jessica is back now with a normal value of <5.0. Thus, his diabetes is thought to be secondary to steroids vs tacrolimus causing some insulin resistance. He is not known to have Chagas gastrointestinal disease. He has since been seen in ER with JOSE LUIS.     He had been taking Lantus 21 units daily an Novolog 5 units / meal but in recent weeks saw lower BG with decreased Prednisone, which he no lone gr takes and has now decreased BG to 10 units Lantus daily and 5 units Humalog with lunch only.  He has had symptoms of low BG and BG <70 easily treated and this decreased dose. Denies sx of high BG.  Overall happy,.  Glad he will be applying for jobs and looking forward to getting back to work.      We reviewed glucometer data together.  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 He also has no past medical history of Malignant melanoma (H) or Skin disease.  Immunization History   Administered Date(s) Administered     Hepatitis B 2016, 09/15/2016, 2017     Influenza (IIV3) 10/05/2016     Influenza Vaccine IM 3yrs+ 4 Valent IIV4 2011, 2015     Pneumococcal (PCV 13) 2016     Pneumococcal 23 valent 2015     TDAP  "Vaccine (Boostrix) 08/04/2016       Javi's   Social History     Social History     Marital status:      Spouse name: N/A     Number of children: N/A     Years of education: N/A     Occupational History     Not on file.     Social History Main Topics     Smoking status: Never Smoker     Smokeless tobacco: Not on file     Alcohol use No     Drug use: No     Sexual activity: Not on file     Other Topics Concern     Not on file     Social History Narrative   He does not read or write and prefers oral instructions.        Javi's family history includes Brain Cancer in his mother. There is no history of Melanoma, Skin Cancer, Glaucoma, or Macular Degeneration.    ROS:   Patient denies any fevers, chills or sweats as well as any changes in vision, problems with floaters or field cuts in vision, pain or problems with dentition, new or different headaches.  Patient denies symptoms of hypo and hyperglycemia except as above.   Patients denies marked fatigue, cough, shortness of breath, chest pain or pressure.  There has been no pain with or other changes in urination or  itching or pain in genital areas.  Patient denies any noted swelling in feet, ankles or otherwise, loss of sensation or pain  in feet or other areas.    Patient also denies current difficulties with depressed mood, anhedonia or worrying too much.        Exam:    /70  Pulse 98  Wt 74.2 kg (163 lb 8 oz)  BMI 25.97 kg/m2    General: Pleasant, well nourished and hydrated male in NAD. Seen with .    Psych:  Mood is \"good,\" affect is appropriate.  Thought form and content are fluid and coherent.    HEENT: Eyes and sclera are clear. Extraocular movements are grossly intact without proptosis.  Nares are patent, mucous membranes moist.  Neck: No masses or JVD are noted.    Resp: Easy and unlabored breathing.   Neuro: Alert and oriented, communicating clearly.   Ext: no swelling or edema  Data:      Last Basic Metabolic Panel:  Lab Results "   Component Value Date     06/10/2017      Lab Results   Component Value Date    POTASSIUM 4.0 06/10/2017     Lab Results   Component Value Date    CHLORIDE 108 06/10/2017     Lab Results   Component Value Date    DAISY 8.8 06/10/2017     Lab Results   Component Value Date    CO2 23 06/10/2017     Lab Results   Component Value Date    BUN 24 06/10/2017     Lab Results   Component Value Date    CR 1.06 06/10/2017     Lab Results   Component Value Date    GLC 84 06/10/2017       GFR Estimate   Date Value Ref Range Status   06/10/2017 75 >60 mL/min/1.7m2 Final     Comment:     Non  GFR Calc   06/10/2017 49 (L) >60 mL/min/1.7m2 Final     Comment:     Non  GFR Calc   06/02/2017 >90  Non  GFR Calc   >60 mL/min/1.7m2 Final       Lab Results   Component Value Date    A1C 7.5 (H) 06/02/2017    A1C 11.6 (H) 04/25/2017    A1C 5.4 12/11/2016    A1C 5.8 06/18/2016       Lab Results   Component Value Date    CPEPT 2.4 05/15/2017    GADAB  04/25/2017     <5.0  Reference range: 0.0 to 5.0  Unit: IU/mL  (Note)  INTERPRETIVE INFORMATION:  Glutamic Acid Decarboxylase  Antibody  A value greater than 5.0 IU/mL is considered positive for  Glutamic Acid Decarboxylase Antibody.  Performed by [facility],  [address] [place] [address] [phone]  [email or web address], Calvin Hernández MD, Lab. Director      ISCAB  05/15/2017     <1:4  Reference range: <1:4  (Note)  INTERPRETIVE INFORMATION: Islet Cell Ab, IgG  Islet cell antibodies (ICAs) are associated with type 1  diabetes (TID), an autoimmune endocrine disorder. ICAs may  be present years before the onset of clinical symptoms. To  calculate Juvenile Diabetes Foundation (JDF) units:  multiply the titer x 5 (1:8  8 x 5 = 40 JDF Units).  Test developed and characteristics determined by [facility]. See Compliance Statement A: [employer]/  Performed by [facility],  [address] [facility] [place] [address]  [phone]  [email or web address], Calvin Hernández MD, Lab. Director       Cholesterol   Date Value Ref Range Status   06/02/2017 139 <200 mg/dL Final   01/13/2017 198 <200 mg/dL Final     HDL Cholesterol   Date Value Ref Range Status   06/02/2017 49 >39 mg/dL Final   01/13/2017 88 >39 mg/dL Final     LDL Cholesterol Calculated   Date Value Ref Range Status   06/02/2017 68 <100 mg/dL Final     Comment:     Desirable:       <100 mg/dl   01/13/2017 93 <100 mg/dL Final     Comment:     Desirable:       <100 mg/dl     Triglycerides   Date Value Ref Range Status   06/02/2017 109 <150 mg/dL Final   01/13/2017 82 <150 mg/dL Final     Comment:     Fasting specimen     No results found for: CHOLHDLRATIO    Most recent eye exam date: : 06/08/2017           Assessment/Plan:        Javi Bansal is a 46 year old male with longstanding history of Chagas cardiomyopathy who underwent cardiac transplantation on 12/12/2016 was recently admitted to [facility] from 4/24 - 4/26/2017 with new onset diabetes and blood glucose in the 700's. He is now doing well, actually having some hypoglycemia on minimal doses of Lantus.  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     Advised to quit lunch time Novolog, check BG 2 H after lunch.  Advise if consistently >180.    If fasting blood glucose remain <120 in 1-2 weeks, also quit taking morning Lantus.  Please call in a couple of weeks to let me know how BG remain off insulin.      Continue checking fasting BG and BG 1-2 hours after  Eating larger meals at least twice weekly .  Call clinics if too high, >120 fasting or often >160 after meals.        RTC 3 months.       Alla Jerez PA-C, MPAS  [facility]  Diabetes, Endocrinology, and Metabolism  [phone] Appointments/Nurse  [phone] pager  [phone] nurse line

## 2017-06-20 NOTE — PATIENT INSTRUCTIONS
For now, please quit taking lunchtime Novolog, but keep checking sugar 2 hours after eating.  If your blood sugar is often >160, please restart taking it.      In two weeks if your fasting blood sugars are always <120, quit Lantus 10 units daily.    Call me in two weeks to let me know how it is going.     [phone] - Alla Jerez

## 2017-06-21 ENCOUNTER — AMBULATORY - HEALTHEAST (OUTPATIENT)
Dept: CARDIAC REHAB | Facility: HOSPITAL | Age: 47
End: 2017-06-21

## 2017-06-21 DIAGNOSIS — Z94.1 STATUS POST HEART TRANSPLANT (H): ICD-10-CM

## 2017-06-23 ENCOUNTER — AMBULATORY - HEALTHEAST (OUTPATIENT)
Dept: CARDIAC REHAB | Facility: HOSPITAL | Age: 47
End: 2017-06-23

## 2017-06-23 DIAGNOSIS — Z94.1 STATUS POST HEART TRANSPLANT (H): ICD-10-CM

## 2017-06-29 ENCOUNTER — TELEPHONE (OUTPATIENT)
Dept: TRANSPLANT | Facility: CLINIC | Age: 47
End: 2017-06-29

## 2017-06-29 DIAGNOSIS — Z94.1 HEART REPLACED BY TRANSPLANT (H): Primary | ICD-10-CM

## 2017-06-29 NOTE — TELEPHONE ENCOUNTER
Called patient to review recent results. FK appears slightly above goal level; BMP was not drawn.  No dose change, but instructed patient to recheck a 12 hour FK level, BMP and CBC on Monday, July 3 at 0930.  Patient verbalizes understanding plan of care and agrees to follow.  Patient states he feels well. He will bring some additional bills to the tx office for SW to review.

## 2017-07-03 ENCOUNTER — TELEPHONE (OUTPATIENT)
Dept: TRANSPLANT | Facility: CLINIC | Age: 47
End: 2017-07-03

## 2017-07-03 DIAGNOSIS — Z94.1 HEART REPLACED BY TRANSPLANT (H): ICD-10-CM

## 2017-07-03 DIAGNOSIS — B57.2 CHAGAS CARDIOMYOPATHY: ICD-10-CM

## 2017-07-03 DIAGNOSIS — Z22.7 LATENT TUBERCULOSIS INFECTION: ICD-10-CM

## 2017-07-03 LAB
ALBUMIN SERPL-MCNC: 4 G/DL (ref 3.4–5)
ALP SERPL-CCNC: 110 U/L (ref 40–150)
ALT SERPL W P-5'-P-CCNC: 16 U/L (ref 0–70)
ANION GAP SERPL CALCULATED.3IONS-SCNC: 10 MMOL/L (ref 3–14)
AST SERPL W P-5'-P-CCNC: 28 U/L (ref 0–45)
BASOPHILS # BLD AUTO: 0 10E9/L (ref 0–0.2)
BASOPHILS NFR BLD AUTO: 0.4 %
BILIRUB DIRECT SERPL-MCNC: 0.1 MG/DL (ref 0–0.2)
BILIRUB SERPL-MCNC: 0.4 MG/DL (ref 0.2–1.3)
BUN SERPL-MCNC: 28 MG/DL (ref 7–30)
CALCIUM SERPL-MCNC: 8.8 MG/DL (ref 8.5–10.1)
CHLORIDE SERPL-SCNC: 108 MMOL/L (ref 94–109)
CO2 SERPL-SCNC: 22 MMOL/L (ref 20–32)
CREAT SERPL-MCNC: 1.16 MG/DL (ref 0.66–1.25)
DIFFERENTIAL METHOD BLD: ABNORMAL
EOSINOPHIL # BLD AUTO: 0.1 10E9/L (ref 0–0.7)
EOSINOPHIL NFR BLD AUTO: 1.9 %
ERYTHROCYTE [DISTWIDTH] IN BLOOD BY AUTOMATED COUNT: 12 % (ref 10–15)
GFR SERPL CREATININE-BSD FRML MDRD: 67 ML/MIN/1.7M2
GLUCOSE SERPL-MCNC: 98 MG/DL (ref 70–99)
HCT VFR BLD AUTO: 33.8 % (ref 40–53)
HGB BLD-MCNC: 11.4 G/DL (ref 13.3–17.7)
IMM GRANULOCYTES # BLD: 0.1 10E9/L (ref 0–0.4)
IMM GRANULOCYTES NFR BLD: 1.9 %
LYMPHOCYTES # BLD AUTO: 1.2 10E9/L (ref 0.8–5.3)
LYMPHOCYTES NFR BLD AUTO: 22.6 %
MCH RBC QN AUTO: 30.6 PG (ref 26.5–33)
MCHC RBC AUTO-ENTMCNC: 33.7 G/DL (ref 31.5–36.5)
MCV RBC AUTO: 91 FL (ref 78–100)
MONOCYTES # BLD AUTO: 0.6 10E9/L (ref 0–1.3)
MONOCYTES NFR BLD AUTO: 11.3 %
NEUTROPHILS # BLD AUTO: 3.3 10E9/L (ref 1.6–8.3)
NEUTROPHILS NFR BLD AUTO: 61.9 %
NRBC # BLD AUTO: 0 10*3/UL
NRBC BLD AUTO-RTO: 0 /100
PLATELET # BLD AUTO: 209 10E9/L (ref 150–450)
POTASSIUM SERPL-SCNC: 4.2 MMOL/L (ref 3.4–5.3)
PROT SERPL-MCNC: 7.1 G/DL (ref 6.8–8.8)
RBC # BLD AUTO: 3.72 10E12/L (ref 4.4–5.9)
SODIUM SERPL-SCNC: 140 MMOL/L (ref 133–144)
TACROLIMUS BLD-MCNC: 9.7 UG/L (ref 5–15)
TME LAST DOSE: NORMAL H
WBC # BLD AUTO: 5.3 10E9/L (ref 4–11)

## 2017-07-03 PROCEDURE — 80197 ASSAY OF TACROLIMUS: CPT | Performed by: INTERNAL MEDICINE

## 2017-07-03 NOTE — TELEPHONE ENCOUNTER
Called patient to confirm recent test results.  FK level (9.7) appears within goal,  Cr also wnl; no changes needed.  Discussed patient's plans to bring hospital/clinic bills to the tx office; confirmed that I cannot guarantee the SW will be able to pay for these bills too, but will ask on the patient's behalf.  Patient states he is feeling well.  Patient verbalizes understanding plan of care and agrees to follow.   used for this call.

## 2017-07-04 LAB — MISCELLANEOUS TEST: NORMAL

## 2017-07-05 ENCOUNTER — AMBULATORY - HEALTHEAST (OUTPATIENT)
Dept: CARDIAC REHAB | Facility: HOSPITAL | Age: 47
End: 2017-07-05

## 2017-07-05 DIAGNOSIS — Z94.1 STATUS POST HEART TRANSPLANT (H): ICD-10-CM

## 2017-07-06 ENCOUNTER — OFFICE VISIT (OUTPATIENT)
Dept: DERMATOLOGY | Facility: CLINIC | Age: 47
End: 2017-07-06

## 2017-07-06 DIAGNOSIS — L70.8 STEROID-INDUCED ACNE: ICD-10-CM

## 2017-07-06 DIAGNOSIS — T38.0X5A STEROID-INDUCED ACNE: ICD-10-CM

## 2017-07-06 DIAGNOSIS — L70.8 OTHER ACNE: Primary | ICD-10-CM

## 2017-07-06 ASSESSMENT — PAIN SCALES - GENERAL: PAINLEVEL: NO PAIN (0)

## 2017-07-06 NOTE — PROGRESS NOTES
"[facility] Dermatology Note      Dermatology Problem List:  1.Steroid-induced acne    Encounter Date: Jul 6, 2017    CC:   Chief Complaint   Patient presents with     Derm Problem     Javi is here today for a skin check. Javi notes\" I have a couple of concerns\"         History of Present Illness:  Mr. 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 The patient is Slovenian speaking and an  was used.  The patient feels well after his transplant and in fact is ready to go back to work. Denies CP or SOB.  The patient has some areas of concern on his skin on his forehead and lower back. He states over his forehead he has \"small little bumps that are itchy.\" He states he has similar lesions that are pruritic on his back. He states the eruptions started 2 months ago and unsure if related to his transplant meds.  Of note the patient was on prednisone, that he started around the time of his transplant, and stopped the prednisone about 1 month ago.  No other areas of the skin he is concerned about.  Denies personal history of skin cancer.    Past Medical History:   Patient Active Problem List   Diagnosis     Chagas cardiomyopathy     Cardiac defibrillator in situ-[employer], dual chamber - Not Dependent     CHF (congestive heart failure) (H)     Chronic systolic heart failure (H)     Chest pain     ACP (advance care planning)     Heart transplant candidate     Heart failure (H)     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     Heart replaced by transplant (H)     Need for prophylactic antibiotic     Elevated liver enzymes     Ventricular tachycardia, non-sustained (H)     Heart transplant graft rejection (H)     Pneumonia     Past Medical History:   Diagnosis Date     Chagas cardiomyopathy      Chronic systolic heart failure (H)      " Diabetes (H)      Dual ICD (implantable cardioverter-defibrillator) in place 10/20/2015     Essential hypertension      Gastroesophageal reflux disease      H/O gastric ulcer      Hypertension      Premature ventricular contractions      Presbyopia      Pterygium     ?? suspected , but unclear dx     Past Surgical History:   Procedure Laterality Date     CARDIAC SURGERY      AICD     TRANSPLANT HEART RECIPIENT N/A 12/12/2016    Procedure: TRANSPLANT HEART RECIPIENT;  Surgeon: Elo Madsen MD;  Location:  OR       Social History:  The patient does not work but is looking to apply for a job soon. The patient  use of tanning beds.    Family History:  There is no family history of skin cancer.    Medications:  Current Outpatient Prescriptions   Medication Sig Dispense Refill     Multiple Vitamin (M.V.I. ADULT) INJ        insulin pen needle 32G X 4 MM Use pen needles daily or as directed. 100 each 1     blood glucose monitoring (ONE TOUCH DELICA) lancets Use to test blood sugars 4 times daily or as directed. 1 Box 11     blood glucose monitoring (NO BRAND SPECIFIED) test strip Contour Next test strips. Use to test blood sugar 4 times daily as well as if  Symptoms of high or low BG. 2 Box 11     insulin glargine (BASAGLAR KWIKPEN) 100 UNIT/ML injection Inject 24 Units Subcutaneous daily (Patient taking differently: Inject 21 Units Subcutaneous daily ) 30 mL 1     Sharps Container (B-D SHARPS DISPOSAL BY MAIL) MISC 1 container 1 each 1     alcohol swab prep pads Use to swab area of injection/raymond as directed. 100 each 1     tacrolimus (PROGRAF - GENERIC EQUIVALENT) 1 MG capsule Take 2mg (2 capsules) in the morning and 1mg (1 capsule) in the evening. 90 capsule 11     ketoconazole (NIZORAL) 2 % shampoo Apply topically three times a week Alternate with Head and Shoulders. 120 mL 3     order for DME Equipment being ordered: Splint - Cubital tunnel splint for ulnar neuropathy 1 Device 0     isoniazid (NYDRAZID) 300  MG tablet Take 1 tablet (300 mg) by mouth daily 90 tablet 3     pravastatin (PRAVACHOL) 20 MG tablet Take 1 tablet (20 mg) by mouth every evening 30 tablet 11     mycophenolate (CELLCEPT - GENERIC EQUIVALENT) 250 MG capsule Take 6 capsules (1,500 mg) by mouth 2 times daily 360 capsule 11     aspirin EC 81 MG EC tablet Take 1 tablet (81 mg) by mouth daily 90 tablet 3     calcium carb 1250 mg, 500 mg [place],/vitamin D 200 units (OSCAL WITH D) 500-200 MG-UNIT per tablet Take 1 tablet by mouth 2 times daily (with meals) 60 tablet 11     multivitamin, therapeutic with minerals (THERA-VIT-M) TABS tablet Take 1 tablet by mouth daily 30 each 11     pantoprazole (PROTONIX) 40 MG EC tablet Take 1 tablet (40 mg) by mouth every morning 30 tablet 11        No Known Allergies      Review of Systems:  -As per HPI  -Constitutional: The patient denies fatigue, fevers, chills, unintended weight loss, and night sweats.  -HEENT: Patient denies nonhealing oral sores.  -Skin: As above in HPI. No additional skin concerns.    Physical exam:  Vitals: There were no vitals taken for this visit.  GEN: This is a well developed, well-nourished male in no acute distress, in a pleasant mood.    SKIN: Total skin excluding the undergarment areas was performed. The exam included the head/face, neck, both arms, chest, back, abdomen, both legs, digits and/or nails.   -There are superifical acneiform papules with intermixed closed comedones on the forehead, chest, and back. No underlying erythema, or scarring.  -No other lesions of concern on areas examined.     Impression/Plan:  1. Steroid-induced acne- Characteristic eruption of acne over chest, back, and forehead, in the setting of recent steroid use. Will prescribed the patient BP 5% lotion, which he will use in the evening over affected areas 3x/week. Instructed to call if no improvement in 1 months time.    Use benzoyl peroxide 5% lotion qhs 3x/day    CC Dr. Murillo on close of this  encounter.  Follow-up prn for new or changing lesions. 1 month if patient doesn't respond to treatment, earlier for new or changing lesions.       Dr. Murillo staffed the patient.    Staff Involved:  Resident(Maykel Cardona)/Staff(as above)

## 2017-07-06 NOTE — MR AVS SNAPSHOT
After Visit Summary   7/6/2017    Javi Bansal    MRN: [id number]           Patient Information     Date Of Birth          1970        Visit Information        Provider Department      7/6/2017 1:15 PM GARY Murillo MD; LANGUAGE [facility]        Today's Diagnoses     Other acne    -  1      Care Instructions            Start over-the-counter benzoyl peroxide 5% wash on the forehead, chest, and back.   (Clean&Clear makes this product. It is available here at the pharmacy or at [facility]). This medication can bleach your towels and clothing.     It is found in a purple tube in the acne aisle.                         Follow-ups after your visit        Follow-up notes from your care team     Return if symptoms worsen or fail to improve.      Your next 10 appointments already scheduled     Jul 19, 2017  9:00 AM CDT   (Arrive by 8:45 AM)   Return Visit with Ivan Schuler MD   [facility] ([facility])    [address] [place] [address]  [address]rd [facility] [address]   [phone]            Aug 01, 2017  9:00 AM CDT   Lab with  LAB   [facility] ([facility])    [address] [place] [address]   [phone]            Aug 01, 2017  9:30 AM CDT   (Arrive by 9:15 AM)   RETURN HEART TRANSPLANT with Mary Huffman NP   [facility] ([facility])    [address] [place] [id number]   [phone]            Aug 01, 2017 11:00 AM CDT   Procedure - 2.5 hour with U2A ROOM 16   [address] [facility] ([facility])    [address] [facility] [address]               Aug 01, 2017 12:00 PM CDT   Heart Cath Heart Biopsy with UUHCVR4   [facility] ([facility])    [address] [facility]  [address]   [phone]            Sep 19, 2017  9:30 AM CDT   Lab with UC LAB    [facility] ([facility])    [address] [address] [phone]            Sep 19, 2017 10:00 AM CDT   Procedure - 2.5 hour with U2A ROOM 15   [address] [facility] ([facility])    [address] [address]               Sep 19, 2017 11:00 AM CDT   Heart Cath Heart Biopsy with UUHCVR4   [facility] ([facility])    [address] [address] [phone]            Sep 19, 2017  2:00 PM CDT   (Arrive by 1:45 PM)   RETURN HEART TRANSPLANT with SAMM Ramos CNP   [facility] ([facility])    [address] [place] [address] [phone]            Oct 24, 2017  9:30 AM CDT   (Arrive by 9:15 AM)   RETURN DIABETES with Alal Jerez PA-C   [facility] ([facility])    [address] [place] [id number] [phone]              Who to contact     Please call your clinic at [phone] to:    Ask questions about your health    Make or cancel appointments    Discuss your medicines    Learn about your test results    Speak to your doctor   If you have compliments or concerns about an experience at your clinic, or if you wish to file a complaint, please contact [facility] Patient Relations at [phone] or email us at [email or web address]         Additional Information About Your Visit        Strikefacehart Information     HelloNature is an electronic gateway that provides easy, online access to your medical records. With HelloNature, you can request a clinic appointment, read your test results, renew a prescription or communicate with your care team.     To sign up for Hello[employer] visit the  website at [email or web address]   You will be asked to enter the access code listed below, as well as some personal information. Please follow the directions to create your username and password.     Your access code is: [email or web address]  Expires: 10/1/2017  6:30 AM     Your access code will  in 90 days. If you need help or a new code, please contact your [facility] or call [phone] for assistance.        Care EveryWhere ID     This is your Care EveryWhere ID. This could be used by other organizations to access your [facility] medical records  [id number]         Blood Pressure from Last 3 Encounters:   17 102/70   06/10/17 109/71   17 118/82    Weight from Last 3 Encounters:   17 74.2 kg (163 lb 8 oz)   06/10/17 74.6 kg (164 lb 7.4 oz)   17 75.1 kg (165 lb 8 oz)              Today, you had the following     No orders found for display         Today's Medication Changes          These changes are accurate as of: 17  2:10 PM.  If you have any questions, ask your nurse or doctor.               Start taking these medicines.        Dose/Directions    benzoyl peroxide 5 % Lotn lotion   Used for:  Other acne   Started by:  GARY Murillo MD        Apply topically At Bedtime   Quantity:  30 mL   Refills:  3         These medicines have changed or have updated prescriptions.        Dose/Directions    insulin glargine 100 UNIT/ML injection   This may have changed:  how much to take   Used for:  Steroid-induced diabetes mellitus (H)        Dose:  24 Units   Inject 24 Units Subcutaneous daily   Quantity:  30 mL   Refills:  1            Where to get your medicines      Some of these will need a paper prescription and others can be bought over the counter.  Ask your nurse if you have questions.     Bring a paper prescription for each of these medications     benzoyl peroxide 5 % Lotn lotion                Primary Care Provider    Physician No Ref-Primary        No address on file        Equal Access to Services     [facility] : Hadii bharati ruiz fabiola Vuong, wazenda luqjuan, qaybta kaalkori regisrosalukas, talisha angel luis carneymadinaenrico harrell. So [facility] [phone].    ATENCIÓN: Si habla español, tiene a watt disposición servicios gratuitos de asistencia lingüística. Bryaname al [phone].    We comply with applicable federal civil rights laws and [place] laws. We do not discriminate on the basis of race, color, national origin, age, disability sex, sexual orientation or gender identity.            Thank you!     Thank you for choosing [facility]  for your care. Our goal is always to provide you with excellent care. Hearing back from our patients is one way we can continue to improve our services. Please take a few minutes to complete the written survey that you may receive in the mail after your visit with us. Thank you!             Your Updated Medication List - Protect others around you: Learn how to safely use, store and throw away your medicines at www.disposemymeds.org.          This list is accurate as of: 7/6/17  2:10 PM.  Always use your most recent med list.                   Brand Name Dispense Instructions for use Diagnosis    alcohol swab prep pads     100 each    Use to swab area of injection/raymond as directed.    Hyperglycemia       aspirin 81 MG EC tablet     90 tablet    Take 1 tablet (81 mg) by mouth daily    Heart replaced by transplant (H)       B-D SHARPS DISPOSAL BY MAIL Misc     1 each    1 container    Hyperglycemia       benzoyl peroxide 5 % Lotn lotion     30 mL    Apply topically At Bedtime    Other acne       blood glucose monitoring lancets     1 Box    Use to test blood sugars 4 times daily or as directed.    Type 2 diabetes mellitus without complication, unspecified long term insulin use status (H)       blood glucose monitoring test strip    no brand specified    2 Box    Contour Next test strips. Use to test blood sugar 4  times daily as well as if  Symptoms of high or low BG.    Hyperglycemia       calcium carb 1250 mg (500 mg [place])/vitamin D 200 units 500-200 MG-UNIT per tablet    OSCAL with D    60 tablet    Take 1 tablet by mouth 2 times daily (with meals)    Heart replaced by transplant (H)       insulin glargine 100 UNIT/ML injection     30 mL    Inject 24 Units Subcutaneous daily    Steroid-induced diabetes mellitus (H)       insulin pen needle 32G X 4 MM     100 each    Use pen needles daily or as directed.    Hyperglycemia       isoniazid 300 MG tablet    NYDRAZID    90 tablet    Take 1 tablet (300 mg) by mouth daily    Heart replaced by transplant (H)       ketoconazole 2 % shampoo    NIZORAL    120 mL    Apply topically three times a week Alternate with Head and Shoulders.    Seborrhea       M.V.I. ADULT Inj           multivitamin, therapeutic with minerals Tabs tablet     30 each    Take 1 tablet by mouth daily    Heart replaced by transplant (H)       mycophenolate 250 MG capsule    CELLCEPT - GENERIC EQUIVALENT    360 capsule    Take 6 capsules (1,500 mg) by mouth 2 times daily    Heart replaced by transplant (H)       order for DME     1 Device    Equipment being ordered: Splint - Cubital tunnel splint for ulnar neuropathy    Lesion of right ulnar nerve       pantoprazole 40 MG EC tablet    PROTONIX    30 tablet    Take 1 tablet (40 mg) by mouth every morning    Heart replaced by transplant (H)       pravastatin 20 MG tablet    PRAVACHOL    30 tablet    Take 1 tablet (20 mg) by mouth every evening    Heart replaced by transplant (H)       tacrolimus 1 MG capsule    PROGRAF - GENERIC EQUIVALENT    90 capsule    Take 2mg (2 capsules) in the morning and 1mg (1 capsule) in the evening.    Heart replaced by transplant (H)

## 2017-07-06 NOTE — NURSING NOTE
"Dermatology Rooming Note    Javi Bansal's goals for this visit include:   Chief Complaint   Patient presents with     Derm Problem     Javi is here today for a skin check. Javi notes\" I have a couple of concerns\"     Pina Gupta MA    "

## 2017-07-06 NOTE — LETTER
"7/6/2017       RE: Javi Bansal  [address]  [place] [address]     Dear Colleague,    Thank you for referring your patient, Javi Bansal, to the [facility] D[facility] at [facility]. Please see a copy of my visit note below.    [facility] Dermatology Note      Dermatology Problem List:  1.Steroid-induced acne    Encounter Date: Jul 6, 2017    CC:   Chief Complaint   Patient presents with     Derm Problem     Javi is here today for a skin check. Javi notes\" I have a couple of concerns\"         History of Present Illness:  Mr. 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 The patient is [place] speaking and an  was used.  The patient feels well after his transplant and in fact is ready to go back to work. Denies CP or SOB.  The patient has some areas of concern on his skin on his forehead and lower back. He states over his forehead he has \"small little bumps that are itchy.\" He states he has similar lesions that are pruritic on his back. He states the eruptions started 2 months ago and unsure if related to his transplant meds.  Of note the patient was on prednisone, that he started around the time of his transplant, and stopped the prednisone about 1 month ago.  No other areas of the skin he is concerned about.  Denies personal history of skin cancer.    Past Medical History:   Patient Active Problem List   Diagnosis     Chagas cardiomyopathy     Cardiac defibrillator in situ-Medtronic, dual chamber - Not Dependent     CHF (congestive heart failure) (H)     Chronic systolic heart failure (H)     Chest pain     ACP (advance care planning)     Heart transplant candidate     Heart failure (H)     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 "     Heart replaced by transplant (H)     Need for prophylactic antibiotic     Elevated liver enzymes     Ventricular tachycardia, non-sustained (H)     Heart transplant graft rejection (H)     Pneumonia     Past Medical History:   Diagnosis Date     Chagas cardiomyopathy      Chronic systolic heart failure (H)      Diabetes (H)      Dual ICD (implantable cardioverter-defibrillator) in place 10/20/2015     Essential hypertension      Gastroesophageal reflux disease      H/O gastric ulcer      Hypertension      Premature ventricular contractions      Presbyopia      Pterygium     ?? suspected , but unclear dx     Past Surgical History:   Procedure Laterality Date     CARDIAC SURGERY      AICD     TRANSPLANT HEART RECIPIENT N/A 12/12/2016    Procedure: TRANSPLANT HEART RECIPIENT;  Surgeon: Elo Madsen MD;  Location:  OR       Social History:  The patient does not work but is looking to apply for a job soon. The patient  use of tanning beds.    Family History:  There is no family history of skin cancer.    Medications:  Current Outpatient Prescriptions   Medication Sig Dispense Refill     Multiple Vitamin (M.V.I. ADULT) INJ        insulin pen needle 32G X 4 MM Use pen needles daily or as directed. 100 each 1     blood glucose monitoring (ONE TOUCH DELICA) lancets Use to test blood sugars 4 times daily or as directed. 1 Box 11     blood glucose monitoring (NO BRAND SPECIFIED) test strip Contour Next test strips. Use to test blood sugar 4 times daily as well as if  Symptoms of high or low BG. 2 Box 11     insulin glargine (BASAGLAR KWIKPEN) 100 UNIT/ML injection Inject 24 Units Subcutaneous daily (Patient taking differently: Inject 21 Units Subcutaneous daily ) 30 mL 1     Sharps Container (B-D SHARPS DISPOSAL BY MAIL) MISC 1 container 1 each 1     alcohol swab prep pads Use to swab area of injection/raymond as directed. 100 each 1     tacrolimus (PROGRAF - GENERIC EQUIVALENT) 1 MG capsule Take 2mg (2 capsules) in  the morning and 1mg (1 capsule) in the evening. 90 capsule 11     ketoconazole (NIZORAL) 2 % shampoo Apply topically three times a week Alternate with Head and Shoulders. 120 mL 3     order for DME Equipment being ordered: Splint - Cubital tunnel splint for ulnar neuropathy 1 Device 0     isoniazid (NYDRAZID) 300 MG tablet Take 1 tablet (300 mg) by mouth daily 90 tablet 3     pravastatin (PRAVACHOL) 20 MG tablet Take 1 tablet (20 mg) by mouth every evening 30 tablet 11     mycophenolate (CELLCEPT - GENERIC EQUIVALENT) 250 MG capsule Take 6 capsules (1,500 mg) by mouth 2 times daily 360 capsule 11     aspirin EC 81 MG EC tablet Take 1 tablet (81 mg) by mouth daily 90 tablet 3     calcium carb 1250 mg, 500 mg [place],/vitamin D 200 units (OSCAL WITH D) 500-200 MG-UNIT per tablet Take 1 tablet by mouth 2 times daily (with meals) 60 tablet 11     multivitamin, therapeutic with minerals (THERA-VIT-M) TABS tablet Take 1 tablet by mouth daily 30 each 11     pantoprazole (PROTONIX) 40 MG EC tablet Take 1 tablet (40 mg) by mouth every morning 30 tablet 11        No Known Allergies      Review of Systems:  -As per HPI  -Constitutional: The patient denies fatigue, fevers, chills, unintended weight loss, and night sweats.  -HEENT: Patient denies nonhealing oral sores.  -Skin: As above in HPI. No additional skin concerns.    Physical exam:  Vitals: There were no vitals taken for this visit.  GEN: This is a well developed, well-nourished male in no acute distress, in a pleasant mood.    SKIN: Total skin excluding the undergarment areas was performed. The exam included the head/face, neck, both arms, chest, back, abdomen, both legs, digits and/or nails.   -There are superifical acneiform papules with intermixed closed comedones on the forehead, chest, and back. No underlying erythema, or scarring.  -No other lesions of concern on areas examined.     Impression/Plan:  1. Steroid-induced acne- Characteristic eruption of acne over  chest, back, and forehead, in the setting of recent steroid use. Will prescribed the patient BP 5% lotion, which he will use in the evening over affected areas 3x/week. Instructed to call if no improvement in 1 months time.    Use benzoyl peroxide 5% lotion qhs 3x/day    CC Dr. Murillo on close of this encounter.  Follow-up prn for new or changing lesions. 1 month if patient doesn't respond to treatment, earlier for new or changing lesions.       Dr. Murillo staffed the patient.    Staff Involved:  Resident(Maykel Cardona)/Staff(as above)      I talked with and examined Javi Craft Shetty and I agree with the assessment and the plan. ANABELLA Murillo MD.

## 2017-07-06 NOTE — PATIENT INSTRUCTIONS
Start over-the-counter benzoyl peroxide 5% wash on the forehead, chest, and back.   (Clean&Clear makes this product. It is available here at the pharmacy or at [facility]). This medication can bleach your towels and clothing.     It is found in a purple tube in the acne aisle.

## 2017-07-11 DIAGNOSIS — Z94.1 HEART REPLACED BY TRANSPLANT (H): Primary | ICD-10-CM

## 2017-07-12 ENCOUNTER — AMBULATORY - HEALTHEAST (OUTPATIENT)
Dept: CARDIAC REHAB | Facility: HOSPITAL | Age: 47
End: 2017-07-12

## 2017-07-12 DIAGNOSIS — Z94.1 STATUS POST HEART TRANSPLANT (H): ICD-10-CM

## 2017-07-15 NOTE — PROGRESS NOTES
I talked with and examined Javi Basnal and I agree with the assessment and the plan. ANABELLA Murillo MD.

## 2017-07-17 LAB — LAB SCANNED RESULT: NORMAL

## 2017-07-19 ENCOUNTER — OFFICE VISIT (OUTPATIENT)
Dept: INFECTIOUS DISEASES | Facility: CLINIC | Age: 47
End: 2017-07-19
Attending: INTERNAL MEDICINE
Payer: COMMERCIAL

## 2017-07-19 VITALS
RESPIRATION RATE: 18 BRPM | HEIGHT: 67 IN | TEMPERATURE: 97.6 F | OXYGEN SATURATION: 100 % | WEIGHT: 160 LBS | SYSTOLIC BLOOD PRESSURE: 110 MMHG | HEART RATE: 89 BPM | BODY MASS INDEX: 25.11 KG/M2 | DIASTOLIC BLOOD PRESSURE: 77 MMHG

## 2017-07-19 DIAGNOSIS — B57.2 CHAGAS CARDIOMYOPATHY: ICD-10-CM

## 2017-07-19 DIAGNOSIS — B25.9 CYTOMEGALOVIRUS INFECTION, UNSPECIFIED CYTOMEGALOVIRAL INFECTION TYPE (H): Primary | ICD-10-CM

## 2017-07-19 DIAGNOSIS — Z94.1 HEART TRANSPLANT RECIPIENT (H): ICD-10-CM

## 2017-07-19 DIAGNOSIS — Z22.7 LATENT TUBERCULOSIS INFECTION: ICD-10-CM

## 2017-07-19 DIAGNOSIS — B25.9 CYTOMEGALOVIRUS INFECTION, UNSPECIFIED CYTOMEGALOVIRAL INFECTION TYPE (H): ICD-10-CM

## 2017-07-19 DIAGNOSIS — Z94.1 HEART REPLACED BY TRANSPLANT (H): ICD-10-CM

## 2017-07-19 LAB
ANION GAP SERPL CALCULATED.3IONS-SCNC: 8 MMOL/L (ref 3–14)
BUN SERPL-MCNC: 28 MG/DL (ref 7–30)
CALCIUM SERPL-MCNC: 9.5 MG/DL (ref 8.5–10.1)
CHLORIDE SERPL-SCNC: 108 MMOL/L (ref 94–109)
CO2 SERPL-SCNC: 25 MMOL/L (ref 20–32)
CREAT SERPL-MCNC: 1.16 MG/DL (ref 0.66–1.25)
ERYTHROCYTE [DISTWIDTH] IN BLOOD BY AUTOMATED COUNT: 12 % (ref 10–15)
GFR SERPL CREATININE-BSD FRML MDRD: 67 ML/MIN/1.7M2
GLUCOSE SERPL-MCNC: 89 MG/DL (ref 70–99)
HCT VFR BLD AUTO: 35.6 % (ref 40–53)
HGB BLD-MCNC: 12.3 G/DL (ref 13.3–17.7)
MCH RBC QN AUTO: 30.6 PG (ref 26.5–33)
MCHC RBC AUTO-ENTMCNC: 34.6 G/DL (ref 31.5–36.5)
MCV RBC AUTO: 89 FL (ref 78–100)
PLATELET # BLD AUTO: 204 10E9/L (ref 150–450)
POTASSIUM SERPL-SCNC: 4.1 MMOL/L (ref 3.4–5.3)
RBC # BLD AUTO: 4.02 10E12/L (ref 4.4–5.9)
SODIUM SERPL-SCNC: 141 MMOL/L (ref 133–144)
TACROLIMUS BLD-MCNC: 7.5 UG/L (ref 5–15)
TME LAST DOSE: 2030 H
WBC # BLD AUTO: 4.9 10E9/L (ref 4–11)

## 2017-07-19 PROCEDURE — 80048 BASIC METABOLIC PNL TOTAL CA: CPT | Performed by: INTERNAL MEDICINE

## 2017-07-19 PROCEDURE — 85027 COMPLETE CBC AUTOMATED: CPT | Performed by: INTERNAL MEDICINE

## 2017-07-19 PROCEDURE — 36415 COLL VENOUS BLD VENIPUNCTURE: CPT | Performed by: INTERNAL MEDICINE

## 2017-07-19 PROCEDURE — 80197 ASSAY OF TACROLIMUS: CPT | Performed by: INTERNAL MEDICINE

## 2017-07-19 PROCEDURE — 99212 OFFICE O/P EST SF 10 MIN: CPT | Mod: ZF

## 2017-07-19 ASSESSMENT — PAIN SCALES - GENERAL: PAINLEVEL: NO PAIN (0)

## 2017-07-19 NOTE — LETTER
7/19/2017      RE: Javi Bansal  [address]  [place] [address]         [facility]  Transplant Infectious Disease Progress Note     Patient:  Javi Bansal, Date of birth 1970, Medical record number [id number]  Date of Visit:  07/19/2017  Consult requested by Dr. Mcclellan  for evaluation of Chaga's and LTBI         Assessment and Recommendations:   Recommendations:  - continue monthly monitoring of Chagas PCR, purple top tube send to [facility] then [facility] through December  - ordered misc lab test: purple top for chagas PCR to be sent to [facility] then [facility].    - stopped the INH today as he has completed therapy.   - ordered CMV PCR today then monthly I ordered for 4 more months.     No follow up scheduled at this time.  I am happy to see Javi in the future should I be of assistance.     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  Javi looks great today.    ID issues:.   - low level CMV viremia:  Continuing to monitor, has not risen.  If rises to 1500 IU then will treat. CMV R+ = moderate risk, and w/ treatment of rejection thought the virus level would have risen, but thus far has not.  Clinically asymptomatic.      - pneumonia:  Was hospitalized 3/9-3/12.  Clinically very acute course that resolved quickly is most likely consistent with a bacterial process, procal was positive.  However the CT chest was quite impressive with scattered ggo/ nodules.  IFD w/up negative from blood work. Clinically he has improved w/ course of levofloxacin.  Repeat CT scan w/ significant improvement, almost resolution.     - Chronic Chagas heart disease now s/p OHT as above:  In coordination w/ the CDC we were monitoring Chagas PCR in a pre-emptive approach after transplant.  Likely in the setting of  immunosuppression and treatment of rejection, not unexpectedly the Chagas PCR did elevate.  At this time we do not believe that it has yet re-infected the heart.  So while many subjects will be Chagas PCR positive after transplant, the approach of pre-emptive is to treat the Chagas before re-infection of the heart occurs.  With rising PCRs from 12/26, and very low level parasitemia from thick smear on 1/5/17, in the setting of treatment of rejection, Benznidazole was initiated on 1/6/17, dosing at 150 mg BID, which is just below 5 mg/kg.  The [facility] contacted me with the following results:    Chagas PCRs:  12/19/16 negative and remains negative since    12/26/16 1st positive: 32    1/2/17 +   30    1/9/17    +   28    The 60 day course of Benznidazole has since completed, which he tolerated well.  Chagas PCR testing has since remained negative.  Plans outlined above.       Other Notable information:  Once infected pts are infected for life w/ about 30% developing end organ disease.  The adrenal glands have been suggested as a reservoir for infection, but this is not fully delineated.  Heart transplantation is the therapy of choice for Chagas heart disease in [place] and the 3rd leading cause for heart transplantation.  In extensive review of the literature, here's the issues in heart transplantation of Chagas disease.     1. Reactivation is common occurring in 30-50% and mimics rejection, and can present as a febrile illness.    2. In both [place] and [place] guidelines, screening rather than prophylaxis for infection is recommended.  Thus a screening protocol as outlined above will be needed.    3. Published data from [place] report that the incidence of rejection and survival in Chagas related heart transplant is as good and sometimes better than OHT for other     reasons.     4. Differences in immunosuppression needs to be considered and I will discuss this with Dr. Mcclellan.     5.  [facility] contact:  Division of Parasitic  Diseases and Malaria.  [phone].  E-mail:  [email or web address].  Emergency  [phone].     References:  AJT 2011; 11. 672.    Curr Opin Infect Dis 2012 25, 4 pg 450.     J Heart Lung Transplant 2010; 29; 286        Journal of cardiac failure 2009; 15; 249  *Consented Javi for the [facility] testing of Chagas and use of therapy if indicated after transplant.  A  was present and very helpful with the [place] forms of the consent.  Javi is not able to read Australian or english, but we went through all the forms, he understands all the risks and benefits, and his wife was also present during the consent process (she is able to read Australian).    -Future Chagas testing:  [facility] w/ purple top tube.  Will be send to [facility] then [facility].      -  LTBI:  Treatment is recommended given the 20x's increased risk of reactivation in the transplant population compared to the general population.  If we have time prior to transplant then rifampin might be the option, I'll have to check drug-drug interactions with milrinone. Or INH for 9 months will be the other option.  Either way, we can proceed with transplant.  Rifampin is not recommended if the transplant is to occur soon and we do not use after transplant due to drug-drug interactions. Started INH/ B6 on 9/17/16.  Tolerating medications well. No active peripheral neuropathy currently.  Completed a course, dc'd therapy today, 7/19/17.     Other ID issues:  - prophylaxis: none currently  - serostatus: EBV, CMV, VZV seropositive, Hep C negative  - immunizations:  Up to date.    - isolation:  Good hand hygiene    Attestation:  I have reviewed today's vital signs, medications, labs and imaging.      Ivan Schuler,   Pager [phone]           History of Infectious Disease Illness:   This is a very pleasant 46 year old male from [facility], well known to me, w/ Chagas cardiomyopathy, now s/p OHT 12/12/16.      Pre-Transplant, Javi  was diagnosed with Chagas in  in [place] when he developed CHF, at which time he received 3 months of treatment with nifurtimox ending 2012 (care everywhere).  Javi states he felt well after treatment for about 1.5 years, then developed progressive cardiomyopathy..  Incidentally Diogenes's brother  from Chagas dz at the age of 20.    Pre-transplant testing was also + for quantiferon.  Diogenes does not recall having had a positive test in the past and has not had tz for active or latent TB.  [place] And has lived in [place], [place], [place] and [place].      Javi was hospitalized from 16-17 for the OHT, received on 16.  Course complicated by right pleural effusions (), BiV Dysfunction on  w/ cellular grade 2R rejection treated w/ increase in steroids.  By 16 SVT / A-tach w/ Bx on  notable for cellular grade 2R/3A rejection, treated w/ burst of steroids w/ taper, Thymo and change of immunosuppression to Tacro and MMF.  NSVT led to cardiac MRI w/ temp pacer wires, ? Of possible myocardial scar.  During this time we have been monitoring weekly Chagas PCRs via CDC.  By  the Chagas PCR was moderately elevated w/ the PCR from  pending (since returned at the same level).  On 17 given rise of Chagas PCR, results of rare parasites identified locally on thick smear from 17 and treatment of rejection, the decision was made to re-treat the Chagas, this time w/ Benznidazole, 5 mg/kg BID started on 17, 150 BID dosed just slightly below 5 mg/kg BID.  A 60 day treatment course was completed and the Chagas PCRs blood remain negative.     Javi was hospitalized 3/9-3/12/17 w/ pneumonia.  Although his symptoms were acute and responded very rapidly to treatment, the CT chest is quite impressive for a mixed diffuse nodular pattern.  He was treated w/ a course of levovloxacin and all blood work (beta D glucan, fungal ab's, galactomannan, etc returned negative).   "Clinically he only had a mild cough w/ mostly the feeling that he needs to clear his throat.      Since our last visit, Javi is doing really well.  He is planning on going back to work next week.  Good energy.  No sob/ SEPULVEDA, n/v/d or abdominal pain.  Mild residual cough w/ URI symptoms about two weeks ago, improving.  No sore throat or rhinorrhea currently.  No vision changes or headaches.  He did have headaches w/ high blood sugars, but now off steroids and has not had these symptoms.     Transplants:  OHT       Immunization History   Administered Date(s) Administered     HepB-Peds 08/04/2016, 09/15/2016, 02/09/2017     Influenza (IIV3) 10/05/2016     Influenza Vaccine IM 3yrs+ 4 Valent IIV4 11/01/2011, 03/21/2015     Pneumococcal (PCV 13) 08/04/2016     Pneumococcal 23 valent 03/21/2015     TDAP Vaccine (Boostrix) 08/04/2016       Current Outpatient Prescriptions   Medication     benzoyl peroxide 5 % LOTN lotion     tacrolimus (PROGRAF - GENERIC EQUIVALENT) 1 MG capsule     ketoconazole (NIZORAL) 2 % shampoo     isoniazid (NYDRAZID) 300 MG tablet     pravastatin (PRAVACHOL) 20 MG tablet     mycophenolate (CELLCEPT - GENERIC EQUIVALENT) 250 MG capsule     aspirin EC 81 MG EC tablet     calcium carb 1250 mg, 500 mg [place],/vitamin D 200 units (OSCAL WITH D) 500-200 MG-UNIT per tablet     multivitamin, therapeutic with minerals (THERA-VIT-M) TABS tablet     pantoprazole (PROTONIX) 40 MG EC tablet     Sharps Container (B-D SHARPS DISPOSAL BY MAIL) MISC     alcohol swab prep pads     order for DME     No current facility-administered medications for this visit.             Allergies:   No Known Allergies       Physical Exam:   Vitals were reviewed.    /77 (BP Location: Right arm, Patient Position: Sitting, Cuff Size: Adult Regular)  Pulse 89  Temp 97.6  F (36.4  C) (Oral)  Resp 18  Ht 1.69 m (5' 6.53\")  Wt 72.6 kg (160 lb)  SpO2 100%  BMI 25.41 kg/m2    Physical Examination:  GENERAL:  well-developed, " well-nourished, ambulating w/out assistance, comfortable on room air.   HEENT:  Head is normocephalic, atraumatic   EYES:  Eyes have anicteric sclerae without conjunctival injection or stigmata of endocarditis.    ENT:  Oropharynx is moist without exudates or ulcers. Tongue is midline  NECK:  Supple. No cervical lymphadenopathy.  No JVD noted.   LUNGS:  Clear to auscultation bilateral.   CARDIOVASCULAR:  Regular rate and rhythm with no gallops or rubs.  ABDOMEN:  Normal bowel sounds, soft, nontender. No appreciable hepatosplenomegaly.  SKIN:  No acute rashes.  No stigmata of endocarditis.  NEUROLOGIC:  Grossly nonfocal. Active x4 extremities  No CVA or spinal tenderness  Chest wall incision well healed.           Laboratory Data:     CD4 counts    Absolute CD4   Date Value Ref Range Status   01/03/2017 8 (L) 441 - 2156 cells/uL Final   01/02/2017 5 (L) 441 - 2156 cells/uL Final   01/01/2017  441 - 2156 cells/uL Final    Test not available at time of request  RESCHEDULED FOR 1/2/17 CE MALIK ON U6C 01/01/17 0916 NYD     12/31/2016 6 (L) 441 - 2156 cells/uL Final       Inflammatory Markers    Recent Labs   Lab Test  06/10/17   0124  02/21/17   1038  06/18/16   0720   SED  18*   --    --    CRP  24.0*  <2.9  7.0       Immune Globulin Studies  No lab results found.  Metabolic Studies       Recent Labs   Lab Test  07/03/17   0940  06/10/17   0620  06/10/17   0124  06/02/17   0843  05/15/17   0906  04/26/17   0920  04/25/17   0536   03/28/17   0730   03/17/17   1216   NA  140   --   140  143  142  138  140   < >  141   < >  141   POTASSIUM  4.2   --   4.0  3.8  3.5  3.6  3.6   < >  3.6   < >  3.6   CHLORIDE  108   --   108  109  108  107  108   < >  106   < >  103   CO2  22   --   23  27  25  21  24   < >  27   < >  29   ANIONGAP  10   --   10  7  10  10  8   < >  8   < >  9   BUN  28   --   24  20  22  19  26   < >  24   < >  29   CR  1.16  1.06  1.52*  0.81  0.77  0.74  0.84   < >  0.92   < >  1.10   GFRESTIMATED  67   75  49*  >90  Non  GFR Calc    >90  Non  GFR Calc    >90  Non  GFR Calc    >90  Non  GFR Calc     < >  88   < >  72   GLC  98   --   84  77  83  242*  120*   < >  161*   < >  184*   DAISY  8.8   --   8.8  9.4  9.5  9.0  8.8   < >  9.6   < >  9.4   PHOS   --    --    --    --    --    --    --    --   3.0   --   3.1   MAG   --    --    --    --    --    --   2.0   --   1.5*   --   2.0    < > = values in this interval not displayed.       Hepatic Studies    Recent Labs   Lab Test  07/03/17   0940  06/10/17   0124  06/08/17   0948  06/02/17   0843  05/05/17   0907  04/24/17   1451   BILITOTAL  0.4  0.3  0.5  0.6  0.5  1.0   ALKPHOS  110  132  120  96  67  92   ALBUMIN  4.0  3.8  4.1  4.0  3.9  4.1   AST  28  60*  50*  33  28  21   ALT  16  45  32  25  26  35       Pancreatitis testing    Recent Labs   Lab Test  06/02/17   0843  01/13/17   0803  12/11/16   2352  06/18/16   0720   AMYLASE   --    --   82   --    TRIG  109  82   --   73       Hematology Studies      Recent Labs   Lab Test  07/03/17   0940  06/16/17   0944  06/10/17   0124  06/02/17   0843  05/15/17   0906  04/25/17   0536  03/28/17   0730  03/20/17   1006  03/17/17   1216   WBC  5.3  3.3*  4.3  4.2  5.9  12.6*  10.3  12.6*  17.7*   ANEU  3.3  1.6  2.6   --    --    --   7.1  9.1*  15.7*   ALYM  1.2  1.1  1.0   --    --    --   1.8  2.0  0.5*   GAGE  0.6  0.4  0.5   --    --    --   1.3  1.0  0.3   AEOS  0.1  0.1  0.0   --    --    --   0.0  0.1  0.0   HGB  11.4*  11.6*  11.2*  11.7*  12.4*  12.2*  13.5  13.2*  13.1*   HCT  33.8*  34.8*  34.4*  34.9*  37.6*  34.9*  41.0  38.5*  38.9*   MCV  91  94  94  95  97  93  100  97  98   PLT  209  262  154  227  162  200  199  318  372       Clotting Studies    Recent Labs   Lab Test  06/10/17   0124  03/09/17   0850  12/19/16   0408  12/17/16   1430  12/14/16   0826  12/14/16   0400  12/14/16   0017  12/13/16   2034   INR  1.13  1.12  1.52*  1.67*   --    1.48*   --    --    PTT   --    --    --    --   29  31  31  31       Urine Studies    Recent Labs   Lab Test  04/24/17   2200  03/09/17   1033  12/20/16   1043  12/18/16   1451  12/11/16   2238   URINEPH  5.5  6.0  5.0  5.5  7.0   NITRITE  Negative  Negative  Negative  Negative  Negative   LEUKEST  Negative  Negative  Negative  Negative  Negative   WBCU  <1  <1  1  0  0       Microbiology:  quantiferon + 6/18/16  Parasite stain 1/5/17 w/ one identified 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     Virology:  Hepatitis B Testing     Recent Labs   Lab Test  03/15/17   1046  01/13/17   0803   HBCAB  Nonreactive  Nonreactive   HEPBANG  Nonreactive  Nonreactive       Hepatitis C Testing     Hepatitis C Antibody   Date Value Ref Range Status   03/15/2017  NR Final    Nonreactive   Assay performance characteristics have not been established for newborns,   infants, and children     01/13/2017  NR Final    Nonreactive   Assay performance characteristics have not been established for newborns,   infants, and children         Imaging:  CT chest: 4/24/17: Status post cardiac transplant, near complete resolution of bilateral pulmonary areas of consolidation and centrilobular nodules.    Cardiac MRI: 1/5/17:   1.  Normal left ventricular size and systolic function with a visually estimated ejection fraction of  65 %.  2.  Normal right ventricular size and systolic function.    3.  On late gadolinium enhancement imaging, there is a focal area of transmural hyperenhancement in the mid inferolateral segment suggestive of myocardial scar. These findings are likely related to his previous episodes of cardiac rejection.    CTA chest: 7/16/16:   1. No pulmonary embolism.  2. Cardiomegaly with central bronchial wall thickening, perhaps representing mild axial interstitial pulmonary edema.  3. A few perivascular nodules in the lateral aspect of the right upper lobe and superior aspect of the right lower lobe. These may represent sequela of  infection/inflammation. In a low-risk patient no followup is necessary. In a high-risk patient followup chest CT in 12 months is recommended.  4. Scattered peripheral atelectasis versus fibrotic changes.    CT a/p: 6/20/16:   1. Cardiomegaly with mild interstitial edema and small right pleural effusion.  2. Small volume free fluid in the pelvis with dependent hyperdensity, indicating hemoperitoneum. No etiology for hemorrhage in the abdomen and pelvis identified.     CT head 6/20/16: 2.7 cm area of slightly expansile sclerosis involving the outer table of the left parietal bone. This is likely benign, most likely an osteoma. Otherwise normal head CT.        [facility]  Transplant Infectious Disease Progress Note     Patient:  Javi Bansal, Date of birth 1970, Medical record number [id number]  Date of Visit:  07/19/2017  Consult requested by Dr. Mcclellan  for evaluation of Chaga's and LTBI         Assessment and Recommendations:   Recommendations:  - weekly monitoring of Chagas PCR, purple top tube send to [facility] then [facility] through April.  In May we can to go every other week testing.  And June-January we can test monthly.  - ordered misc lab test: purple top for chagas PCR to be sent to [facility] then [facility].   - continues on INH for a planned 9 month course to end around our next visit.  - ordered repeat CT chest for the last week in April    F/u in 3 months.     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  Dr. Mcclellan discussed w/ me, I ordered a parasite smear and other blood work which has returned negative.  Leukocytosis trended down.  Continue to monitor.     - pneumonia:  Was hospitalized 3/9-3/12.  Clinically very acute course that  resolved quickly is most likely consistent with a bacterial process, procal was positive.  However the CT chest was quite impressive with scattered ggo/ nodules.  IFD w/up negative from blood work. Clinically he has improved w/ course of levofloxacin.  I would like to f/u the CT chest for resolution.     - Chronic Chagas heart disease now s/p OHT as above:  In coordination w/ the CDC we were monitoring Chagas PCR in a pre-emptive approach after transplant.  Likely in the setting of immunosuppression and treatment of rejection, not unexpectedly the Chagas PCR did elevate.  At this time we do not believe that it has yet re-infected the heart.  So while many subjects will be Chagas PCR positive after transplant, the approach of pre-emptive is to treat the Chagas before re-infection of the heart occurs.  With rising PCRs from 12/26, and very low level parasitemia from thick smear on 1/5/17, in the setting of treatment of rejection, Benznidazole was initiated on 1/6/17, dosing at 150 mg BID, which is just below 5 mg/kg.  The CDC contacted me with the following results:    Chagas PCRs:  12/19/16 negative    12/26/16 1st positive: 32    1/2/17 +   30    1/9/17    +   28    The 60 day course of Benznidazole has since completed, which he tolerated well.  Chagas PCR testing has since remained negative.  Plans outlined above.       Other Notable information:  Once infected pts are infected for life w/ about 30% developing end organ disease.  The adrenal glands have been suggested as a reservoir for infection, but this is not fully delineated.  Heart transplantation is the therapy of choice for Chagas heart disease in [place] and the 3rd leading cause for heart transplantation.  In extensive review of the literature, here's the issues in heart transplantation of Chagas disease.     1. Reactivation is common occurring in 30-50% and mimics rejection, and can present as a febrile illness.    2. In both [place] and [place] guidelines,  screening rather than prophylaxis for infection is recommended.  Thus a screening protocol as outlined above will be needed.    3. Published data from [place] report that the incidence of rejection and survival in Chagas related heart transplant is as good and sometimes better than OHT for other     reasons.     4. Differences in immunosuppression needs to be considered and I will discuss this with Dr. Mcclellan.     5.  [facility] contact:  Division of Parasitic Diseases and Malaria.  [phone].  E-mail:  [email or web address].  Emergency  [phone].     References:  AJT 2011; 11. 672.    Curr Opin Infect Dis 2012 25, 4 pg 450.     J Heart Lung Transplant 2010; 29; 286        Journal of cardiac failure 2009; 15; 249  *Consented Javi for the [facility] testing of Chagas and use of therapy if indicated after transplant.  A  was present and very helpful with the [place] forms of the consent.  Javi is not able to read [place] or english, but we went through all the forms, he understands all the risks and benefits, and his wife was also present during the consent process (she is able to read [place]).    -Future Chagas testing:  Needs to be ordered as misc lab in EPIC w/ purple top tube.  Will be send to [facility] then [facility].      -  LTBI:  Treatment is recommended given the 20x's increased risk of reactivation in the transplant population compared to the general population.  If we have time prior to transplant then rifampin might be the option, I'll have to check drug-drug interactions with milrinone. Or INH for 9 months will be the other option.  Either way, we can proceed with transplant.  Rifampin is not recommended if the transplant is to occur soon and we do not use after transplant due to drug-drug interactions. Started INH/ B6 on 9/17/16.  Tolerating medications well. No active peripheral neuropathy currently.     Other ID issues:  - prophylaxis: none currently  - serostatus: EBV, CMV, VZV seropositive, Hep  C negative  - immunizations:  Up to date.    - isolation:  Good hand hygiene    Attestation:  I have reviewed today's vital signs, medications, labs and imaging.      Ivan Schuler,   Pager [phone]           History of Infectious Disease Illness:   This is a very pleasant 46 year old male from [facility], well known to me, w/ Chagas cardiomyopathy, now s/p OHT 16.      Pre-Transplant, Javi was diagnosed with Chagas in  in [place] when he developed CHF, at which time he received 3 months of treatment with nifurtimox ending 2012 (care everywhere).  Javi states he felt well after treatment for about 1.5 years, then developed progressive cardiomyopathy..  Incidentally Diogenes's brother  from Chagas dz at the age of 20.    Pre-transplant testing was also + for quantiferon.  Diogenes does not recall having had a positive test in the past and has not had tz for active or latent TB.  [place] And has lived in [place], [place], [place] and [place].      Javi was hospitalized from 16-17 for the OHT, received on 16.  Course complicated by right pleural effusions (), BiV Dysfunction on  w/ cellular grade 2R rejection treated w/ increase in steroids.  By 16 SVT / A-tach w/ Bx on  notable for cellular grade 2R/3A rejection, treated w/ burst of steroids w/ taper, Thymo and change of immunosuppression to Tacro and MMF.  NSVT led to cardiac MRI w/ temp pacer wires, ? Of possible myocardial scar.  During this time we have been monitoring weekly Chagas PCRs via [facility].  By  the Chagas PCR was moderately elevated w/ the PCR from  pending (since returned at the same level).  On 17 given rise of Chagas PCR, results of rare parasites identified locally on thick smear from 17 and treatment of rejection, the decision was made to re-treat the Chagas, this time w/ Benznidazole, 5 mg/kg BID started on 17, 150 BID dosed just slightly below 5 mg/kg BID.  A  60 day treatment course was completed and the Chagas PCRs blood remain negative.     Javi was hospitalized 3/9-3/12/17 w/ pneumonia.  Although his symptoms were acute and responded very rapidly to treatment, the CT chest is quite impressive for a mixed diffuse nodular pattern.  He was treated w/ a course of levovloxacin and all blood work (beta D glucan, fungal ab's, galactomannan, etc returned negative).  Clinically he only had a mild cough w/ mostly the feeling that he needs to clear his throat.  No fevers or chills.  Good energy and exercising, looking forward to starting cardiac rehab.  He has some numbness along the incision of the chest wall and reports numbness in his left hand.  Otherwise he is doing well.  No headaches, vision changes, mouth pain, rashes, n/v/d or abdominal pain.       Transplants:  OHT       Immunization History   Administered Date(s) Administered     HepB-Peds 08/04/2016, 09/15/2016, 02/09/2017     Influenza (IIV3) 10/05/2016     Influenza Vaccine IM 3yrs+ 4 Valent IIV4 11/01/2011, 03/21/2015     Pneumococcal (PCV 13) 08/04/2016     Pneumococcal 23 valent 03/21/2015     TDAP Vaccine (Boostrix) 08/04/2016       Current Outpatient Prescriptions   Medication     benzoyl peroxide 5 % LOTN lotion     tacrolimus (PROGRAF - GENERIC EQUIVALENT) 1 MG capsule     ketoconazole (NIZORAL) 2 % shampoo     isoniazid (NYDRAZID) 300 MG tablet     pravastatin (PRAVACHOL) 20 MG tablet     mycophenolate (CELLCEPT - GENERIC EQUIVALENT) 250 MG capsule     aspirin EC 81 MG EC tablet     calcium carb 1250 mg, 500 mg [place],/vitamin D 200 units (OSCAL WITH D) 500-200 MG-UNIT per tablet     multivitamin, therapeutic with minerals (THERA-VIT-M) TABS tablet     pantoprazole (PROTONIX) 40 MG EC tablet     Sharps Container (B-D SHARPS DISPOSAL BY MAIL) MISC     alcohol swab prep pads     order for DME     No current facility-administered medications for this visit.             Allergies:   No Known Allergies        "Physical Exam:   Vitals were reviewed.    /77 (BP Location: Right arm, Patient Position: Sitting, Cuff Size: Adult Regular)  Pulse 89  Temp 97.6  F (36.4  C) (Oral)  Resp 18  Ht 1.69 m (5' 6.53\")  Wt 72.6 kg (160 lb)  SpO2 100%  BMI 25.41 kg/m2    Physical Examination:  GENERAL:  well-developed, well-nourished, ambulating w/out assistance, comfortable on room air.  More moon faced today.   HEENT:  Head is normocephalic, atraumatic   EYES:  Eyes have anicteric sclerae without conjunctival injection or stigmata of endocarditis.    ENT:  Oropharynx is moist without exudates or ulcers. Tongue is midline  NECK:  Supple. No cervical lymphadenopathy.  No JVD noted.   LUNGS:  Clear to auscultation bilateral.   CARDIOVASCULAR:  Regular rate and rhythm with no gallops or rubs.  ABDOMEN:  Normal bowel sounds, soft, nontender. No appreciable hepatosplenomegaly.  SKIN:  No acute rashes.  No stigmata of endocarditis.  NEUROLOGIC:  Grossly nonfocal. Active x4 extremities  No CVA or spinal tenderness  Chest wall incision well healed.           Laboratory Data:     CD4 counts    Absolute CD4   Date Value Ref Range Status   01/03/2017 8 (L) 441 - 2156 cells/uL Final   01/02/2017 5 (L) 441 - 2156 cells/uL Final   01/01/2017  441 - 2156 cells/uL Final    Test not available at time of request  RESCHEDULED FOR 1/2/17 CE KING ON U6C 01/01/17 0916 NYD     12/31/2016 6 (L) 441 - 2156 cells/uL Final       Inflammatory Markers    Recent Labs   Lab Test  06/10/17   0124  02/21/17   1038  06/18/16   0720   SED  18*   --    --    CRP  24.0*  <2.9  7.0       Immune Globulin Studies  No lab results found.  Metabolic Studies       Recent Labs   Lab Test  07/03/17   0940  06/10/17   0620  06/10/17   0124  06/02/17   0843  05/15/17   0906  04/26/17   0920  04/25/17   0536   03/28/17   0730   03/17/17   1216   NA  140   --   140  143  142  138  140   < >  141   < >  141   POTASSIUM  4.2   --   4.0  3.8  3.5  3.6  3.6   < >  3.6   < > "  3.6   CHLORIDE  108   --   108  109  108  107  108   < >  106   < >  103   CO2  22   --   23  27  25  21  24   < >  27   < >  29   ANIONGAP  10   --   10  7  10  10  8   < >  8   < >  9   BUN  28   --   24  20  22  19  26   < >  24   < >  29   CR  1.16  1.06  1.52*  0.81  0.77  0.74  0.84   < >  0.92   < >  1.10   GFRESTIMATED  67  75  49*  >90  Non  GFR Calc    >90  Non  GFR Calc    >90  Non  GFR Calc    >90  Non  GFR Calc     < >  88   < >  72   GLC  98   --   84  77  83  242*  120*   < >  161*   < >  184*   DAISY  8.8   --   8.8  9.4  9.5  9.0  8.8   < >  9.6   < >  9.4   PHOS   --    --    --    --    --    --    --    --   3.0   --   3.1   MAG   --    --    --    --    --    --   2.0   --   1.5*   --   2.0    < > = values in this interval not displayed.       Hepatic Studies    Recent Labs   Lab Test  07/03/17   0940  06/10/17   0124  06/08/17   0948  06/02/17   0843  05/05/17   0907  04/24/17   1451   BILITOTAL  0.4  0.3  0.5  0.6  0.5  1.0   ALKPHOS  110  132  120  96  67  92   ALBUMIN  4.0  3.8  4.1  4.0  3.9  4.1   AST  28  60*  50*  33  28  21   ALT  16  45  32  25  26  35       Pancreatitis testing    Recent Labs   Lab Test  06/02/17   0843  01/13/17   0803  12/11/16   2352  06/18/16   0720   AMYLASE   --    --   82   --    TRIG  109  82   --   73       Hematology Studies      Recent Labs   Lab Test  07/03/17   0940  06/16/17   0944  06/10/17   0124  06/02/17   0843  05/15/17   0906  04/25/17   0536  03/28/17   0730  03/20/17   1006  03/17/17   1216   WBC  5.3  3.3*  4.3  4.2  5.9  12.6*  10.3  12.6*  17.7*   ANEU  3.3  1.6  2.6   --    --    --   7.1  9.1*  15.7*   ALYM  1.2  1.1  1.0   --    --    --   1.8  2.0  0.5*   GAGE  0.6  0.4  0.5   --    --    --   1.3  1.0  0.3   AEOS  0.1  0.1  0.0   --    --    --   0.0  0.1  0.0   HGB  11.4*  11.6*  11.2*  11.7*  12.4*  12.2*  13.5  13.2*  13.1*   HCT  33.8*  34.8*  34.4*  34.9*  37.6*  34.9*   41.0  38.5*  38.9*   MCV  91  94  94  95  97  93  100  97  98   PLT  209  262  154  227  162  200  199  318  372       Clotting Studies    Recent Labs   Lab Test  06/10/17   0124  03/09/17   0850  12/19/16   0408  12/17/16   1430  12/14/16   0826  12/14/16   0400  12/14/16   0017  12/13/16   2034   INR  1.13  1.12  1.52*  1.67*   --   1.48*   --    --    PTT   --    --    --    --   29  31  31  31       Urine Studies    Recent Labs   Lab Test  04/24/17   2200  03/09/17   1033  12/20/16   1043  12/18/16   1451  12/11/16   2238   URINEPH  5.5  6.0  5.0  5.5  7.0   NITRITE  Negative  Negative  Negative  Negative  Negative   LEUKEST  Negative  Negative  Negative  Negative  Negative   WBCU  <1  <1  1  0  0       Microbiology:  quantiferon + 6/18/16  Parasite stain 1/5/17 w/ one identified 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     Virology:  Hepatitis B Testing     Recent Labs   Lab Test  03/15/17   1046  01/13/17   0803   HBCAB  Nonreactive  Nonreactive   HEPBANG  Nonreactive  Nonreactive       Hepatitis C Testing     Hepatitis C Antibody   Date Value Ref Range Status   03/15/2017  NR Final    Nonreactive   Assay performance characteristics have not been established for newborns,   infants, and children     01/13/2017  NR Final    Nonreactive   Assay performance characteristics have not been established for newborns,   infants, and children         Imaging:  Cardiac MRI: 1/5/17:   1.  Normal left ventricular size and systolic function with a visually estimated ejection fraction of  65 %.  2.  Normal right ventricular size and systolic function.    3.  On late gadolinium enhancement imaging, there is a focal area of transmural hyperenhancement in the mid inferolateral segment suggestive of myocardial scar. These findings are likely related to his previous episodes of cardiac rejection.    CTA chest: 7/16/16:   1. No pulmonary embolism.  2. Cardiomegaly with central bronchial wall thickening, perhaps representing mild axial  interstitial pulmonary edema.  3. A few perivascular nodules in the lateral aspect of the right upper lobe and superior aspect of the right lower lobe. These may represent sequela of infection/inflammation. In a low-risk patient no followup is necessary. In a high-risk patient followup chest CT in 12 months is recommended.  4. Scattered peripheral atelectasis versus fibrotic changes.    CT a/p: 6/20/16:   1. Cardiomegaly with mild interstitial edema and small right pleural effusion.  2. Small volume free fluid in the pelvis with dependent hyperdensity, indicating hemoperitoneum. No etiology for hemorrhage in the abdomen and pelvis identified.     CT head 6/20/16: 2.7 cm area of slightly expansile sclerosis involving the outer table of the left parietal bone. This is likely benign, most likely an osteoma. Otherwise normal head CT.      Ivan Schuler MD

## 2017-07-19 NOTE — NURSING NOTE
"Chief Complaint   Patient presents with     RECHECK     Chagas cardiomyopahty and positive quantiferon gold       Initial /77 (BP Location: Right arm, Patient Position: Sitting, Cuff Size: Adult Regular)  Pulse 89  Temp 97.6  F (36.4  C) (Oral)  Resp 18  Ht 1.69 m (5' 6.53\")  Wt 72.6 kg (160 lb)  SpO2 100%  BMI 25.41 kg/m2 Estimated body mass index is 25.41 kg/(m^2) as calculated from the following:    Height as of this encounter: 1.69 m (5' 6.53\").    Weight as of this encounter: 72.6 kg (160 lb).  Medication Reconciliation: complete     Chantale Lund [facility]  7/19/2017 8:59 AM        "

## 2017-07-19 NOTE — PROGRESS NOTES
[facility]  Transplant Infectious Disease Progress Note     Patient:  Javi Bansal, Date of birth 1970, Medical record number [id number]  Date of Visit:  07/19/2017  Consult requested by Dr. Mcclellan  for evaluation of Chaga's and LTBI         Assessment and Recommendations:   Recommendations:  - continue monthly monitoring of Chagas PCR, purple top tube send to [facility] then [facility] through December  - ordered misc lab test: purple top for chagas PCR to be sent to [facility] then [facility].    - stopped the INH today as he has completed therapy.   - ordered CMV PCR today then monthly I ordered for 4 more months.     No follow up scheduled at this time.  I am happy to see Javi in the future should I be of assistance.     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  Javi looks great today.    ID issues:.   - low level CMV viremia:  Continuing to monitor, has not risen.  If rises to 1500 IU then will treat. CMV R+ = moderate risk, and w/ treatment of rejection thought the virus level would have risen, but thus far has not.  Clinically asymptomatic.      - pneumonia:  Was hospitalized 3/9-3/12.  Clinically very acute course that resolved quickly is most likely consistent with a bacterial process, procal was positive.  However the CT chest was quite impressive with scattered ggo/ nodules.  IFD w/up negative from blood work. Clinically he has improved w/ course of levofloxacin.  Repeat CT scan w/ significant improvement, almost resolution.     - Chronic Chagas heart disease now s/p OHT as above:  In coordination w/ the CDC we were monitoring Chagas PCR in a pre-emptive approach after transplant.  Likely in the setting of immunosuppression and treatment of rejection, not unexpectedly the Chagas PCR did elevate.  At this time we do not  believe that it has yet re-infected the heart.  So while many subjects will be Chagas PCR positive after transplant, the approach of pre-emptive is to treat the Chagas before re-infection of the heart occurs.  With rising PCRs from 12/26, and very low level parasitemia from thick smear on 1/5/17, in the setting of treatment of rejection, Benznidazole was initiated on 1/6/17, dosing at 150 mg BID, which is just below 5 mg/kg.  The [facility] contacted me with the following results:    Chagas PCRs:  12/19/16 negative and remains negative since    12/26/16 1st positive: 32    1/2/17 +   30    1/9/17    +   28    The 60 day course of Benznidazole has since completed, which he tolerated well.  Chagas PCR testing has since remained negative.  Plans outlined above.       Other Notable information:  Once infected pts are infected for life w/ about 30% developing end organ disease.  The adrenal glands have been suggested as a reservoir for infection, but this is not fully delineated.  Heart transplantation is the therapy of choice for Chagas heart disease in [place] and the 3rd leading cause for heart transplantation.  In extensive review of the literature, here's the issues in heart transplantation of Chagas disease.     1. Reactivation is common occurring in 30-50% and mimics rejection, and can present as a febrile illness.    2. In both [place] and [place] guidelines, screening rather than prophylaxis for infection is recommended.  Thus a screening protocol as outlined above will be needed.    3. Published data from [place] report that the incidence of rejection and survival in Chagas related heart transplant is as good and sometimes better than OHT for other     reasons.     4. Differences in immunosuppression needs to be considered and I will discuss this with Dr. Mcclellan.     5.  [facility] [place] contact:  Division of Parasitic Diseases and Malaria.  [phone].  E-mail:  [email or web address].  Emergency  [phone].     References:   AJT 2011; 11. 672.    Curr Opin Infect Dis 2012 25, 4 pg 450.     J Heart Lung Transplant 2010; 29; 286        Journal of cardiac failure 2009; 15; 249  *Consented Javi for the [facility] testing of Chagas and use of therapy if indicated after transplant.  A  was present and very helpful with the Czech forms of the consent.  Javi is not able to read Czech or english, but we went through all the forms, he understands all the risks and benefits, and his wife was also present during the consent process (she is able to read Czech).    -Future Chagas testing:  Needs to be ordered as misc lab in EPIC w/ purple top tube.  Will be send to [facility] then [facility].      -  LTBI:  Treatment is recommended given the 20x's increased risk of reactivation in the transplant population compared to the general population.  If we have time prior to transplant then rifampin might be the option, I'll have to check drug-drug interactions with milrinone. Or INH for 9 months will be the other option.  Either way, we can proceed with transplant.  Rifampin is not recommended if the transplant is to occur soon and we do not use after transplant due to drug-drug interactions. Started INH/ B6 on 9/17/16.  Tolerating medications well. No active peripheral neuropathy currently.  Completed a course, dc'd therapy today, 7/19/17.     Other ID issues:  - prophylaxis: none currently  - serostatus: EBV, CMV, VZV seropositive, Hep C negative  - immunizations:  Up to date.    - isolation:  Good hand hygiene    Attestation:  I have reviewed today's vital signs, medications, labs and imaging.      Ivan Schuler,   Pager [phone]           History of Infectious Disease Illness:   This is a very pleasant 46 year old male from [facility], well known to me, w/ Chagas cardiomyopathy, now s/p OHT 12/12/16.      Pre-Transplant, Javi was diagnosed with Chagas in 2011 in [place] when he developed CHF, at which time he received 3 months of treatment with  nifurtimox ending 2012 (care everywhere).  Javi states he felt well after treatment for about 1.5 years, then developed progressive cardiomyopathy..  Incidentally Diogenes's brother  from Chagas dz at the age of 20.    Pre-transplant testing was also + for quantiferon.  Diogenes does not recall having had a positive test in the past and has not had tz for active or latent TB.  [place] has lived in [place], [place], [place] and [place].      Javi was hospitalized from 16-17 for the OHT, received on 16.  Course complicated by right pleural effusions (), BiV Dysfunction on  w/ cellular grade 2R rejection treated w/ increase in steroids.  By 16 SVT / A-tach w/ Bx on  notable for cellular grade 2R/3A rejection, treated w/ burst of steroids w/ taper, Thymo and change of immunosuppression to Tacro and MMF.  NSVT led to cardiac MRI w/ temp pacer wires, ? Of possible myocardial scar.  During this time we have been monitoring weekly Chagas PCRs via CDC.  By  the Chagas PCR was moderately elevated w/ the PCR from  pending (since returned at the same level).  On 17 given rise of Chagas PCR, results of rare parasites identified locally on thick smear from 17 and treatment of rejection, the decision was made to re-treat the Chagas, this time w/ Benznidazole, 5 mg/kg BID started on 17, 150 BID dosed just slightly below 5 mg/kg BID.  A 60 day treatment course was completed and the Chagas PCRs blood remain negative.     Javi was hospitalized 3/9-3/12/17 w/ pneumonia.  Although his symptoms were acute and responded very rapidly to treatment, the CT chest is quite impressive for a mixed diffuse nodular pattern.  He was treated w/ a course of levovloxacin and all blood work (beta D glucan, fungal ab's, galactomannan, etc returned negative).  Clinically he only had a mild cough w/ mostly the feeling that he needs to clear his throat.      Since our last  "visit, Javi is doing really well.  He is planning on going back to work next week.  Good energy.  No sob/ SEPULVEDA, n/v/d or abdominal pain.  Mild residual cough w/ URI symptoms about two weeks ago, improving.  No sore throat or rhinorrhea currently.  No vision changes or headaches.  He did have headaches w/ high blood sugars, but now off steroids and has not had these symptoms.     Transplants:  OHT       Immunization History   Administered Date(s) Administered     HepB-Peds 08/04/2016, 09/15/2016, 02/09/2017     Influenza (IIV3) 10/05/2016     Influenza Vaccine IM 3yrs+ 4 Valent IIV4 11/01/2011, 03/21/2015     Pneumococcal (PCV 13) 08/04/2016     Pneumococcal 23 valent 03/21/2015     TDAP Vaccine (Boostrix) 08/04/2016       Current Outpatient Prescriptions   Medication     benzoyl peroxide 5 % LOTN lotion     tacrolimus (PROGRAF - GENERIC EQUIVALENT) 1 MG capsule     ketoconazole (NIZORAL) 2 % shampoo     isoniazid (NYDRAZID) 300 MG tablet     pravastatin (PRAVACHOL) 20 MG tablet     mycophenolate (CELLCEPT - GENERIC EQUIVALENT) 250 MG capsule     aspirin EC 81 MG EC tablet     calcium carb 1250 mg, 500 mg [place],/vitamin D 200 units (OSCAL WITH D) 500-200 MG-UNIT per tablet     multivitamin, therapeutic with minerals (THERA-VIT-M) TABS tablet     pantoprazole (PROTONIX) 40 MG EC tablet     Sharps Container (B-D SHARPS DISPOSAL BY MAIL) MISC     alcohol swab prep pads     order for DME     No current facility-administered medications for this visit.             Allergies:   No Known Allergies       Physical Exam:   Vitals were reviewed.    /77 (BP Location: Right arm, Patient Position: Sitting, Cuff Size: Adult Regular)  Pulse 89  Temp 97.6  F (36.4  C) (Oral)  Resp 18  Ht 1.69 m (5' 6.53\")  Wt 72.6 kg (160 lb)  SpO2 100%  BMI 25.41 kg/m2    Physical Examination:  GENERAL:  well-developed, well-nourished, ambulating w/out assistance, comfortable on room air.   HEENT:  Head is normocephalic, atraumatic "   EYES:  Eyes have anicteric sclerae without conjunctival injection or stigmata of endocarditis.    ENT:  Oropharynx is moist without exudates or ulcers. Tongue is midline  NECK:  Supple. No cervical lymphadenopathy.  No JVD noted.   LUNGS:  Clear to auscultation bilateral.   CARDIOVASCULAR:  Regular rate and rhythm with no gallops or rubs.  ABDOMEN:  Normal bowel sounds, soft, nontender. No appreciable hepatosplenomegaly.  SKIN:  No acute rashes.  No stigmata of endocarditis.  NEUROLOGIC:  Grossly nonfocal. Active x4 extremities  No CVA or spinal tenderness  Chest wall incision well healed.           Laboratory Data:     CD4 counts    Absolute CD4   Date Value Ref Range Status   01/03/2017 8 (L) 441 - 2156 cells/uL Final   01/02/2017 5 (L) 441 - 2156 cells/uL Final   01/01/2017  441 - 2156 cells/uL Final    Test not available at time of request  RESCHEDULED FOR 1/2/17 CE MALIK ON U6C 01/01/17 0916 NYD     12/31/2016 6 (L) 441 - 2156 cells/uL Final       Inflammatory Markers    Recent Labs   Lab Test  06/10/17   0124  02/21/17   1038  06/18/16   0720   SED  18*   --    --    CRP  24.0*  <2.9  7.0       Immune Globulin Studies  No lab results found.  Metabolic Studies       Recent Labs   Lab Test  07/03/17   0940  06/10/17   0620  06/10/17   0124  06/02/17   0843  05/15/17   0906  04/26/17   0920  04/25/17   0536   03/28/17   0730   03/17/17   1216   NA  140   --   140  143  142  138  140   < >  141   < >  141   POTASSIUM  4.2   --   4.0  3.8  3.5  3.6  3.6   < >  3.6   < >  3.6   CHLORIDE  108   --   108  109  108  107  108   < >  106   < >  103   CO2  22   --   23  27  25  21  24   < >  27   < >  29   ANIONGAP  10   --   10  7  10  10  8   < >  8   < >  9   BUN  28   --   24  20  22  19  26   < >  24   < >  29   CR  1.16  1.06  1.52*  0.81  0.77  0.74  0.84   < >  0.92   < >  1.10   GFRESTIMATED  67  75  49*  >90  Non  GFR Calc    >90  Non  GFR Calc    >90  Non   GFR Calc    >90  Non  GFR Calc     < >  88   < >  72   GLC  98   --   84  77  83  242*  120*   < >  161*   < >  184*   DAISY  8.8   --   8.8  9.4  9.5  9.0  8.8   < >  9.6   < >  9.4   PHOS   --    --    --    --    --    --    --    --   3.0   --   3.1   MAG   --    --    --    --    --    --   2.0   --   1.5*   --   2.0    < > = values in this interval not displayed.       Hepatic Studies    Recent Labs   Lab Test  07/03/17   0940  06/10/17   0124  06/08/17   0948  06/02/17   0843  05/05/17   0907  04/24/17   1451   BILITOTAL  0.4  0.3  0.5  0.6  0.5  1.0   ALKPHOS  110  132  120  96  67  92   ALBUMIN  4.0  3.8  4.1  4.0  3.9  4.1   AST  28  60*  50*  33  28  21   ALT  16  45  32  25  26  35       Pancreatitis testing    Recent Labs   Lab Test  06/02/17   0843  01/13/17   0803  12/11/16   2352  06/18/16   0720   AMYLASE   --    --   82   --    TRIG  109  82   --   73       Hematology Studies      Recent Labs   Lab Test  07/03/17   0940  06/16/17   0944  06/10/17   0124  06/02/17   0843  05/15/17   0906  04/25/17   0536  03/28/17   0730  03/20/17   1006  03/17/17   1216   WBC  5.3  3.3*  4.3  4.2  5.9  12.6*  10.3  12.6*  17.7*   ANEU  3.3  1.6  2.6   --    --    --   7.1  9.1*  15.7*   ALYM  1.2  1.1  1.0   --    --    --   1.8  2.0  0.5*   GAGE  0.6  0.4  0.5   --    --    --   1.3  1.0  0.3   AEOS  0.1  0.1  0.0   --    --    --   0.0  0.1  0.0   HGB  11.4*  11.6*  11.2*  11.7*  12.4*  12.2*  13.5  13.2*  13.1*   HCT  33.8*  34.8*  34.4*  34.9*  37.6*  34.9*  41.0  38.5*  38.9*   MCV  91  94  94  95  97  93  100  97  98   PLT  209  262  154  227  162  200  199  318  372       Clotting Studies    Recent Labs   Lab Test  06/10/17   0124  03/09/17   0850  12/19/16   0408  12/17/16   1430  12/14/16   0826  12/14/16   0400  12/14/16   0017  12/13/16   2034   INR  1.13  1.12  1.52*  1.67*   --   1.48*   --    --    PTT   --    --    --    --   29  31  31  31       Urine Studies    Recent Labs   Lab Test   04/24/17   2200  03/09/17   1033  12/20/16   1043  12/18/16   1451  12/11/16   2238   URINEPH  5.5  6.0  5.0  5.5  7.0   NITRITE  Negative  Negative  Negative  Negative  Negative   LEUKEST  Negative  Negative  Negative  Negative  Negative   WBCU  <1  <1  1  0  0       Microbiology:  quantiferon + 6/18/16  Parasite stain 1/5/17 w/ one identified 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     Virology:  Hepatitis B Testing     Recent Labs   Lab Test  03/15/17   1046  01/13/17   0803   HBCAB  Nonreactive  Nonreactive   HEPBANG  Nonreactive  Nonreactive       Hepatitis C Testing     Hepatitis C Antibody   Date Value Ref Range Status   03/15/2017  NR Final    Nonreactive   Assay performance characteristics have not been established for newborns,   infants, and children     01/13/2017  NR Final    Nonreactive   Assay performance characteristics have not been established for newborns,   infants, and children         Imaging:  CT chest: 4/24/17: Status post cardiac transplant, near complete resolution of bilateral pulmonary areas of consolidation and centrilobular nodules.    Cardiac MRI: 1/5/17:   1.  Normal left ventricular size and systolic function with a visually estimated ejection fraction of  65 %.  2.  Normal right ventricular size and systolic function.    3.  On late gadolinium enhancement imaging, there is a focal area of transmural hyperenhancement in the mid inferolateral segment suggestive of myocardial scar. These findings are likely related to his previous episodes of cardiac rejection.    CTA chest: 7/16/16:   1. No pulmonary embolism.  2. Cardiomegaly with central bronchial wall thickening, perhaps representing mild axial interstitial pulmonary edema.  3. A few perivascular nodules in the lateral aspect of the right upper lobe and superior aspect of the right lower lobe. These may represent sequela of infection/inflammation. In a low-risk patient no followup is necessary. In a high-risk patient followup chest CT in  12 months is recommended.  4. Scattered peripheral atelectasis versus fibrotic changes.    CT a/p: 6/20/16:   1. Cardiomegaly with mild interstitial edema and small right pleural effusion.  2. Small volume free fluid in the pelvis with dependent hyperdensity, indicating hemoperitoneum. No etiology for hemorrhage in the abdomen and pelvis identified.     CT head 6/20/16: 2.7 cm area of slightly expansile sclerosis involving the outer table of the left parietal bone. This is likely benign, most likely an osteoma. Otherwise normal head CT.

## 2017-07-19 NOTE — PATIENT INSTRUCTIONS
I will follow up the blood work from today and touch base with the transplant team.  You do not need to schedule an appointment to see me at this time.  I am happy to see you again if I can help.

## 2017-07-19 NOTE — PROGRESS NOTES
[facility]  Transplant Infectious Disease Progress Note     Patient:  Javi Bansal, Date of birth 1970, Medical record number [id number]  Date of Visit:  07/19/2017  Consult requested by Dr. Mcclellan  for evaluation of Chaga's and LTBI         Assessment and Recommendations:   Recommendations:  - weekly monitoring of Chagas PCR, purple top tube send to [facility] then [facility] in [place] through April.  In May we can to go every other week testing.  And June-January we can test monthly.  - ordered misc lab test: purple top for chagas PCR to be sent to [facility] then [facility] in [place].   - continues on INH for a planned 9 month course to end around our next visit.  - ordered repeat CT chest for the last week in April    F/u in 3 months.     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  Dr. Mcclellan discussed w/ me, I ordered a parasite smear and other blood work which has returned negative.  Leukocytosis trended down.  Continue to monitor.     - pneumonia:  Was hospitalized 3/9-3/12.  Clinically very acute course that resolved quickly is most likely consistent with a bacterial process, procal was positive.  However the CT chest was quite impressive with scattered ggo/ nodules.  IFD w/up negative from blood work. Clinically he has improved w/ course of levofloxacin.  I would like to f/u the CT chest for resolution.     - Chronic Chagas heart disease now s/p OHT as above:  In coordination w/ the CDC we were monitoring Chagas PCR in a pre-emptive approach after transplant.  Likely in the setting of immunosuppression and treatment of rejection, not unexpectedly the Chagas PCR did elevate.  At this time we do not believe that it has yet re-infected the heart.  So while many  subjects will be Chagas PCR positive after transplant, the approach of pre-emptive is to treat the Chagas before re-infection of the heart occurs.  With rising PCRs from 12/26, and very low level parasitemia from thick smear on 1/5/17, in the setting of treatment of rejection, Benznidazole was initiated on 1/6/17, dosing at 150 mg BID, which is just below 5 mg/kg.  The [facility] contacted me with the following results:    Chagas PCRs:  12/19/16 negative    12/26/16 1st positive: 32    1/2/17 +   30    1/9/17    +   28    The 60 day course of Benznidazole has since completed, which he tolerated well.  Chagas PCR testing has since remained negative.  Plans outlined above.       Other Notable information:  Once infected pts are infected for life w/ about 30% developing end organ disease.  The adrenal glands have been suggested as a reservoir for infection, but this is not fully delineated.  Heart transplantation is the therapy of choice for Chagas heart disease in [place] and the 3rd leading cause for heart transplantation.  In extensive review of the literature, here's the issues in heart transplantation of Chagas disease.     1. Reactivation is common occurring in 30-50% and mimics rejection, and can present as a febrile illness.    2. In both [place] and [place] guidelines, screening rather than prophylaxis for infection is recommended.  Thus a screening protocol as outlined above will be needed.    3. Published data from [place] report that the incidence of rejection and survival in Chagas related heart transplant is as good and sometimes better than OHT for other     reasons.     4. Differences in immunosuppression needs to be considered and I will discuss this with Dr. Mcclellan.     5.  [facility] contact:  Division of Parasitic Diseases and Malaria.  [phone].  E-mail:  [email or web address].  Emergency  [phone].     References:  AJT 2011; 11. 672.    Curr Opin Infect Dis 2012 25, 4 pg 450.     J Heart Lung Transplant  2010; 29; 286        Journal of cardiac failure 2009; 15; 249  *Consented Javi for the CDC testing of Chagas and use of therapy if indicated after transplant.  A  was present and very helpful with the Japanese forms of the consent.  Javi is not able to read Japanese or english, but we went through all the forms, he understands all the risks and benefits, and his wife was also present during the consent process (she is able to read Japanese).    -Future Chagas testing:  [facility]  [facility].      -  LTBI:  Treatment is recommended given the 20x's increased risk of reactivation in the transplant population compared to the general population.  If we have time prior to transplant then rifampin might be the option, I'll have to check drug-drug interactions with milrinone. Or INH for 9 months will be the other option.  Either way, we can proceed with transplant.  Rifampin is not recommended if the transplant is to occur soon and we do not use after transplant due to drug-drug interactions. Started INH/ B6 on 9/17/16.  Tolerating medications well. No active peripheral neuropathy currently.     Other ID issues:  - prophylaxis: none currently  - serostatus: EBV, CMV, VZV seropositive, Hep C negative  - immunizations:  Up to date.    - isolation:  Good hand hygiene    Attestation:  I have reviewed today's vital signs, medications, labs and imaging.      Ivan Schuler,   Pager [phone]           History of Infectious Disease Illness:   This is a very pleasant 46 year old male from [facility], well known to me, w/ Chagas cardiomyopathy, now s/p OHT 12/12/16.      Pre-Transplant, Javi was diagnosed with Chagas in 2011 in [place] when he developed CHF, at which time he received 3 months of treatment with nifurtimox ending 1/2012 (care everywhere).  Javi states he felt well after treatment for about 1.5 years, then developed progressive  cardiomyopathy..  Incidentally Diogenes's brother  from Chagas dz at the age of 20.    Pre-transplant testing was also + for quantiferon.  Diogenes does not recall having had a positive test in the past and has not had tz for active or latent TB.  He immig[place] And has lived in [place], [place], [place] and [place].      Javi was hospitalized from 16-17 for the OHT, received on 16.  Course complicated by right pleural effusions (), BiV Dysfunction on  w/ cellular grade 2R rejection treated w/ increase in steroids.  By 16 SVT / A-tach w/ Bx on  notable for cellular grade 2R/3A rejection, treated w/ burst of steroids w/ taper, Thymo and change of immunosuppression to Tacro and MMF.  NSVT led to cardiac MRI w/ temp pacer wires, ? Of possible myocardial scar.  During this time we have been monitoring weekly Chagas PCRs via CDC.  By  the Chagas PCR was moderately elevated w/ the PCR from  pending (since returned at the same level).  On 17 given rise of Chagas PCR, results of rare parasites identified locally on thick smear from 17 and treatment of rejection, the decision was made to re-treat the Chagas, this time w/ Benznidazole, 5 mg/kg BID started on 17, 150 BID dosed just slightly below 5 mg/kg BID.  A 60 day treatment course was completed and the Chagas PCRs blood remain negative.     Javi was hospitalized 3/9-3/12/17 w/ pneumonia.  Although his symptoms were acute and responded very rapidly to treatment, the CT chest is quite impressive for a mixed diffuse nodular pattern.  He was treated w/ a course of levovloxacin and all blood work (beta D glucan, fungal ab's, galactomannan, etc returned negative).  Clinically he only had a mild cough w/ mostly the feeling that he needs to clear his throat.  No fevers or chills.  Good energy and exercising, looking forward to starting cardiac rehab.  He has some numbness along the incision of the chest wall and  "reports numbness in his left hand.  Otherwise he is doing well.  No headaches, vision changes, mouth pain, rashes, n/v/d or abdominal pain.       Transplants:  OHT       Immunization History   Administered Date(s) Administered     HepB-Peds 08/04/2016, 09/15/2016, 02/09/2017     Influenza (IIV3) 10/05/2016     Influenza Vaccine IM 3yrs+ 4 Valent IIV4 11/01/2011, 03/21/2015     Pneumococcal (PCV 13) 08/04/2016     Pneumococcal 23 valent 03/21/2015     TDAP Vaccine (Boostrix) 08/04/2016       Current Outpatient Prescriptions   Medication     benzoyl peroxide 5 % LOTN lotion     tacrolimus (PROGRAF - GENERIC EQUIVALENT) 1 MG capsule     ketoconazole (NIZORAL) 2 % shampoo     isoniazid (NYDRAZID) 300 MG tablet     pravastatin (PRAVACHOL) 20 MG tablet     mycophenolate (CELLCEPT - GENERIC EQUIVALENT) 250 MG capsule     aspirin EC 81 MG EC tablet     calcium carb 1250 mg, 500 mg [place],/vitamin D 200 units (OSCAL WITH D) 500-200 MG-UNIT per tablet     multivitamin, therapeutic with minerals (THERA-VIT-M) TABS tablet     pantoprazole (PROTONIX) 40 MG EC tablet     Sharps Container (B-D SHARPS DISPOSAL BY MAIL) MISC     alcohol swab prep pads     order for DME     No current facility-administered medications for this visit.             Allergies:   No Known Allergies       Physical Exam:   Vitals were reviewed.    /77 (BP Location: Right arm, Patient Position: Sitting, Cuff Size: Adult Regular)  Pulse 89  Temp 97.6  F (36.4  C) (Oral)  Resp 18  Ht 1.69 m (5' 6.53\")  Wt 72.6 kg (160 lb)  SpO2 100%  BMI 25.41 kg/m2    Physical Examination:  GENERAL:  well-developed, well-nourished, ambulating w/out assistance, comfortable on room air.  More moon faced today.   HEENT:  Head is normocephalic, atraumatic   EYES:  Eyes have anicteric sclerae without conjunctival injection or stigmata of endocarditis.    ENT:  Oropharynx is moist without exudates or ulcers. Tongue is midline  NECK:  Supple. No cervical lymphadenopathy.  " No JVD noted.   LUNGS:  Clear to auscultation bilateral.   CARDIOVASCULAR:  Regular rate and rhythm with no gallops or rubs.  ABDOMEN:  Normal bowel sounds, soft, nontender. No appreciable hepatosplenomegaly.  SKIN:  No acute rashes.  No stigmata of endocarditis.  NEUROLOGIC:  Grossly nonfocal. Active x4 extremities  No CVA or spinal tenderness  Chest wall incision well healed.           Laboratory Data:     CD4 counts    Absolute CD4   Date Value Ref Range Status   01/03/2017 8 (L) 441 - 2156 cells/uL Final   01/02/2017 5 (L) 441 - 2156 cells/uL Final   01/01/2017  441 - 2156 cells/uL Final    Test not available at time of request  RESCHEDULED FOR 1/2/17 CE MALIK ON U6C 01/01/17 0916 NYD     12/31/2016 6 (L) 441 - 2156 cells/uL Final       Inflammatory Markers    Recent Labs   Lab Test  06/10/17   0124  02/21/17   1038  06/18/16   0720   SED  18*   --    --    CRP  24.0*  <2.9  7.0       Immune Globulin Studies  No lab results found.  Metabolic Studies       Recent Labs   Lab Test  07/03/17   0940  06/10/17   0620  06/10/17   0124  06/02/17   0843  05/15/17   0906  04/26/17   0920  04/25/17   0536   03/28/17   0730   03/17/17   1216   NA  140   --   140  143  142  138  140   < >  141   < >  141   POTASSIUM  4.2   --   4.0  3.8  3.5  3.6  3.6   < >  3.6   < >  3.6   CHLORIDE  108   --   108  109  108  107  108   < >  106   < >  103   CO2  22   --   23  27  25  21  24   < >  27   < >  29   ANIONGAP  10   --   10  7  10  10  8   < >  8   < >  9   BUN  28   --   24  20  22  19  26   < >  24   < >  29   CR  1.16  1.06  1.52*  0.81  0.77  0.74  0.84   < >  0.92   < >  1.10   GFRESTIMATED  67  75  49*  >90  Non  GFR Calc    >90  Non  GFR Calc    >90  Non  GFR Calc    >90  Non  GFR Calc     < >  88   < >  72   GLC  98   --   84  77  83  242*  120*   < >  161*   < >  184*   DAISY  8.8   --   8.8  9.4  9.5  9.0  8.8   < >  9.6   < >  9.4   PHOS   --    --     --    --    --    --    --    --   3.0   --   3.1   MAG   --    --    --    --    --    --   2.0   --   1.5*   --   2.0    < > = values in this interval not displayed.       Hepatic Studies    Recent Labs   Lab Test  07/03/17   0940  06/10/17   0124  06/08/17   0948  06/02/17   0843  05/05/17   0907  04/24/17   1451   BILITOTAL  0.4  0.3  0.5  0.6  0.5  1.0   ALKPHOS  110  132  120  96  67  92   ALBUMIN  4.0  3.8  4.1  4.0  3.9  4.1   AST  28  60*  50*  33  28  21   ALT  16  45  32  25  26  35       Pancreatitis testing    Recent Labs   Lab Test  06/02/17   0843  01/13/17   0803  12/11/16   2352  06/18/16   0720   AMYLASE   --    --   82   --    TRIG  109  82   --   73       Hematology Studies      Recent Labs   Lab Test  07/03/17   0940  06/16/17   0944  06/10/17   0124  06/02/17   0843  05/15/17   0906  04/25/17   0536  03/28/17   0730  03/20/17   1006  03/17/17   1216   WBC  5.3  3.3*  4.3  4.2  5.9  12.6*  10.3  12.6*  17.7*   ANEU  3.3  1.6  2.6   --    --    --   7.1  9.1*  15.7*   ALYM  1.2  1.1  1.0   --    --    --   1.8  2.0  0.5*   GAGE  0.6  0.4  0.5   --    --    --   1.3  1.0  0.3   AEOS  0.1  0.1  0.0   --    --    --   0.0  0.1  0.0   HGB  11.4*  11.6*  11.2*  11.7*  12.4*  12.2*  13.5  13.2*  13.1*   HCT  33.8*  34.8*  34.4*  34.9*  37.6*  34.9*  41.0  38.5*  38.9*   MCV  91  94  94  95  97  93  100  97  98   PLT  209  262  154  227  162  200  199  318  372       Clotting Studies    Recent Labs   Lab Test  06/10/17   0124  03/09/17   0850  12/19/16   0408  12/17/16   1430  12/14/16   0826  12/14/16   0400  12/14/16   0017  12/13/16   2034   INR  1.13  1.12  1.52*  1.67*   --   1.48*   --    --    PTT   --    --    --    --   29  31  31  31       Urine Studies    Recent Labs   Lab Test  04/24/17   2200  03/09/17   1033  12/20/16   1043  12/18/16   1451  12/11/16   2238   URINEPH  5.5  6.0  5.0  5.5  7.0   NITRITE  Negative  Negative  Negative  Negative  Negative   LEUKEST  Negative  Negative   Negative  Negative  Negative   WBCU  <1  <1  1  0  0       Microbiology:  quantiferon + 6/18/16  Parasite stain 1/5/17 w/ one identified 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     Virology:  Hepatitis B Testing     Recent Labs   Lab Test  03/15/17   1046  01/13/17   0803   HBCAB  Nonreactive  Nonreactive   HEPBANG  Nonreactive  Nonreactive       Hepatitis C Testing     Hepatitis C Antibody   Date Value Ref Range Status   03/15/2017  NR Final    Nonreactive   Assay performance characteristics have not been established for newborns,   infants, and children     01/13/2017  NR Final    Nonreactive   Assay performance characteristics have not been established for newborns,   infants, and children         Imaging:  Cardiac MRI: 1/5/17:   1.  Normal left ventricular size and systolic function with a visually estimated ejection fraction of  65 %.  2.  Normal right ventricular size and systolic function.    3.  On late gadolinium enhancement imaging, there is a focal area of transmural hyperenhancement in the mid inferolateral segment suggestive of myocardial scar. These findings are likely related to his previous episodes of cardiac rejection.    CTA chest: 7/16/16:   1. No pulmonary embolism.  2. Cardiomegaly with central bronchial wall thickening, perhaps representing mild axial interstitial pulmonary edema.  3. A few perivascular nodules in the lateral aspect of the right upper lobe and superior aspect of the right lower lobe. These may represent sequela of infection/inflammation. In a low-risk patient no followup is necessary. In a high-risk patient followup chest CT in 12 months is recommended.  4. Scattered peripheral atelectasis versus fibrotic changes.    CT a/p: 6/20/16:   1. Cardiomegaly with mild interstitial edema and small right pleural effusion.  2. Small volume free fluid in the pelvis with dependent hyperdensity, indicating hemoperitoneum. No etiology for hemorrhage in the abdomen and pelvis identified.     CT head  6/20/16: 2.7 cm area of slightly expansile sclerosis involving the outer table of the left parietal bone. This is likely benign, most likely an osteoma. Otherwise normal head CT.

## 2017-07-20 LAB
CMV DNA SPEC NAA+PROBE-ACNC: 216 [IU]/ML
CMV DNA SPEC NAA+PROBE-LOG#: 2.3 {LOG_IU}/ML
SPECIMEN SOURCE: ABNORMAL

## 2017-07-31 ENCOUNTER — TELEPHONE (OUTPATIENT)
Dept: TRANSPLANT | Facility: CLINIC | Age: 47
End: 2017-07-31

## 2017-07-31 DIAGNOSIS — Z94.1 HEART REPLACED BY TRANSPLANT (H): Primary | ICD-10-CM

## 2017-07-31 RX ORDER — LIDOCAINE 40 MG/G
CREAM TOPICAL
Status: CANCELLED | OUTPATIENT
Start: 2017-07-31

## 2017-08-01 ENCOUNTER — APPOINTMENT (OUTPATIENT)
Dept: MEDSURG UNIT | Facility: CLINIC | Age: 47
End: 2017-08-01
Attending: INTERNAL MEDICINE
Payer: COMMERCIAL

## 2017-08-01 ENCOUNTER — HOSPITAL ENCOUNTER (OUTPATIENT)
Facility: CLINIC | Age: 47
Discharge: HOME | End: 2017-08-01
Attending: INTERNAL MEDICINE | Admitting: INTERNAL MEDICINE
Payer: COMMERCIAL

## 2017-08-01 ENCOUNTER — OFFICE VISIT (OUTPATIENT)
Dept: CARDIOLOGY | Facility: CLINIC | Age: 47
End: 2017-08-01
Attending: NURSE PRACTITIONER
Payer: COMMERCIAL

## 2017-08-01 ENCOUNTER — APPOINTMENT (OUTPATIENT)
Dept: CARDIOLOGY | Facility: CLINIC | Age: 47
End: 2017-08-01
Attending: INTERNAL MEDICINE
Payer: COMMERCIAL

## 2017-08-01 VITALS
RESPIRATION RATE: 18 BRPM | DIASTOLIC BLOOD PRESSURE: 76 MMHG | SYSTOLIC BLOOD PRESSURE: 118 MMHG | BODY MASS INDEX: 24.91 KG/M2 | OXYGEN SATURATION: 99 % | TEMPERATURE: 97.9 F | HEART RATE: 82 BPM | WEIGHT: 158.73 LBS

## 2017-08-01 VITALS
WEIGHT: 159 LBS | HEIGHT: 67 IN | SYSTOLIC BLOOD PRESSURE: 112 MMHG | HEART RATE: 87 BPM | OXYGEN SATURATION: 96 % | BODY MASS INDEX: 24.96 KG/M2 | DIASTOLIC BLOOD PRESSURE: 78 MMHG

## 2017-08-01 DIAGNOSIS — Z94.1 HEART REPLACED BY TRANSPLANT (H): Primary | ICD-10-CM

## 2017-08-01 DIAGNOSIS — Z94.1 HEART REPLACED BY TRANSPLANT (H): ICD-10-CM

## 2017-08-01 LAB
ANION GAP SERPL CALCULATED.3IONS-SCNC: 8 MMOL/L (ref 3–14)
BUN SERPL-MCNC: 25 MG/DL (ref 7–30)
CALCIUM SERPL-MCNC: 9.3 MG/DL (ref 8.5–10.1)
CHLORIDE SERPL-SCNC: 109 MMOL/L (ref 94–109)
CO2 SERPL-SCNC: 24 MMOL/L (ref 20–32)
CREAT SERPL-MCNC: 1.27 MG/DL (ref 0.66–1.25)
ERYTHROCYTE [DISTWIDTH] IN BLOOD BY AUTOMATED COUNT: 11.9 % (ref 10–15)
GFR SERPL CREATININE-BSD FRML MDRD: 61 ML/MIN/1.7M2
GLUCOSE SERPL-MCNC: 94 MG/DL (ref 70–99)
HCT VFR BLD AUTO: 37.4 % (ref 40–53)
HGB BLD-MCNC: 12.2 G/DL (ref 13.3–17.7)
MCH RBC QN AUTO: 29.4 PG (ref 26.5–33)
MCHC RBC AUTO-ENTMCNC: 32.6 G/DL (ref 31.5–36.5)
MCV RBC AUTO: 90 FL (ref 78–100)
PLATELET # BLD AUTO: 210 10E9/L (ref 150–450)
POTASSIUM SERPL-SCNC: 4.1 MMOL/L (ref 3.4–5.3)
RBC # BLD AUTO: 4.15 10E12/L (ref 4.4–5.9)
SODIUM SERPL-SCNC: 141 MMOL/L (ref 133–144)
TACROLIMUS BLD-MCNC: 7.3 UG/L (ref 5–15)
TME LAST DOSE: NORMAL H
WBC # BLD AUTO: 4.4 10E9/L (ref 4–11)

## 2017-08-01 PROCEDURE — 36415 COLL VENOUS BLD VENIPUNCTURE: CPT | Performed by: INTERNAL MEDICINE

## 2017-08-01 PROCEDURE — 02BK3ZX EXCISION OF RIGHT VENTRICLE, PERCUTANEOUS APPROACH, DIAGNOSTIC: ICD-10-PCS | Performed by: INTERNAL MEDICINE

## 2017-08-01 PROCEDURE — 93505 ENDOMYOCARDIAL BIOPSY: CPT | Mod: 26 | Performed by: INTERNAL MEDICINE

## 2017-08-01 PROCEDURE — 85027 COMPLETE CBC AUTOMATED: CPT | Performed by: INTERNAL MEDICINE

## 2017-08-01 PROCEDURE — 99212 OFFICE O/P EST SF 10 MIN: CPT

## 2017-08-01 PROCEDURE — 80197 ASSAY OF TACROLIMUS: CPT | Performed by: INTERNAL MEDICINE

## 2017-08-01 PROCEDURE — 87799 DETECT AGENT NOS DNA QUANT: CPT | Performed by: INTERNAL MEDICINE

## 2017-08-01 PROCEDURE — 80048 BASIC METABOLIC PNL TOTAL CA: CPT | Performed by: INTERNAL MEDICINE

## 2017-08-01 PROCEDURE — 99214 OFFICE O/P EST MOD 30 MIN: CPT | Mod: 25 | Performed by: NURSE PRACTITIONER

## 2017-08-01 RX ORDER — LIDOCAINE 40 MG/G
CREAM TOPICAL
Status: COMPLETED | OUTPATIENT
Start: 2017-08-01 | End: 2017-08-01

## 2017-08-01 RX ORDER — IOPAMIDOL 755 MG/ML
6 INJECTION, SOLUTION INTRAVASCULAR ONCE
Status: COMPLETED | OUTPATIENT
Start: 2017-08-01 | End: 2017-08-01

## 2017-08-01 RX ADMIN — LIDOCAINE: 40 CREAM TOPICAL at 11:33

## 2017-08-01 RX ADMIN — IOPAMIDOL 6 ML: 755 INJECTION, SOLUTION INTRAVASCULAR at 13:00

## 2017-08-01 ASSESSMENT — PAIN SCALES - GENERAL: PAINLEVEL: NO PAIN (0)

## 2017-08-01 NOTE — PATIENT INSTRUCTIONS
You were seen in clinic for routine 8 month post heart transplant evaluation. Available results appear to show good kidney function and good blood counts.  Medication changes:  You may STOP taking protonix    Follow up:  Coordinator will update you with pending test results.  Your next scheduled biopsy (after today) is on September 19, 2017  Please call with any questions/concerns.  [phone]

## 2017-08-01 NOTE — NURSING NOTE
Patient seen in clinic for routine 8 month post heart transplant evaluation.  Patient appears to be doing very well. 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  He has a chair at his work station but prefers to stand. He denies any swelling, palpitations, SOB or pain.  Patient also reports his blood sugars are now well controlled without the use of insulin after previously stopping prednisone.  BP also well controlled.  Confirmed having given recent hospital bills to , who may contact the patient regarding these bills (previously paid?) and whether additional funds are available for future bills.  Confirmed schedule of follow up appointments: every 2 months until his annual eval in December (2 to 3 day of appointments), then every 4 months until year 2, then q 6 months until year 3. No scheduled biopsies after year 4.  Medication changes:  protonix stopped (after prednisone dc'd)    Follow up:  Coordinator will update patient with pending test results.  Next routine bx/clinic visit scheduled for September 19, 2017

## 2017-08-01 NOTE — IP AVS SNAPSHOT
MRN:[id number]                      After Visit Summary   8/1/2017    Javi Bansal    MRN: [id number]           Visit Information        Department      8/1/2017 11:18 AM [address] [facility] [place]          Review of your medicines      UNREVIEWED medicines. 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   Quantity:  90 capsule   Refills:  11         CONTINUE these medicines which have NOT CHANGED        Dose / Directions    alcohol swab prep pads   Used for:  Hyperglycemia        Use to swab area of injection/raymond as directed.    Quantity:  100 each   Refills:  1       B-D SHARPS DISPOSAL BY MAIL Misc   Used for:  Hyperglycemia        1 container   Quantity:  1 each   Refills:  1       order for DME   Used for:  Lesion of right ulnar nerve        Equipment being ordered: Splint - Cubital tunnel splint for ulnar neuropathy   Quantity:  1 Device   Refills:  0                Protect others around you: Learn how to safely use, store and throw away your medicines at www.disposemymeds.org.         Follow-ups after your visit        Your next 10 appointments already scheduled     Sep 19, 2017  9:30 AM CDT   Lab with UC LAB   [facility] ([facility])    [address] [address]   [phone]            Sep 19, 2017 10:00 AM CDT   Procedure - 2.5 hour with U2A ROOM 15   [address] [facility] [place] ([facility])    [address] [facility] [address]               Sep 19, 2017 11:00 AM CDT   Heart Cath Heart Biopsy [facility] TATYANA   [facility]    [address] [facility] [address]   [phone]            Sep 19, 2017  2:00 PM CDT   (Arrive by 1:45 PM)   RETURN HEART TRANSPLANT with SAMM Ramos CNP   [facility] ([facility])    [address] [place] [address]   [phone]            Oct 24, 2017  9:30 AM CDT   (Arrive by 9:15 AM)   RETURN DIABETES with SANJIV Lewis [facility] ([facility])    [address] [place] [address]   [phone]            Dec 14, 2017 10:30 AM CST   Procedure 1.5 hr with U2A ROOM 10   [address] [facility] [place] ([facility])    [address] [facility] [address]               Dec 14, 2017 12:00 PM CST   Cath 90 Minute with TATYANA   [facility]  [facility] ([facility])    [address] [facility] [address]   [phone]            Dec 15, 2017  9:00 AM CST   MR CARDIAC W CONTRAST STRESS AND FLOW with UUMR4, UU CV MR NURSE   [facility] [place] MRI ([facility])    [address] [facility] [address]   [phone]           Take your medicines as usual, unless your doctor tells you not to.   If you take Viagra, Levitra or Cialis, stop taking it 48 hours before your test.   If you take Aggrenox or dipyridamole (Persantine, Permole), stop taking it 48 hours before your test.   If you take Theophylline or Aminophylline, stop taking it 12 hours before your test.   If you are diabetic and take oral hypoglycemics, do not take them on the day of your test.  The day before your exam, drink extra fluids at least six 8-ounce glasses (unless your doctor wants you to limit your fluids).  Stop all caffeine 12 hours before the test. This includes coffee, tea, soda, chocolate and certain medicines (such as Anacin, Excedrin and NoDoz). Also avoid decaf coffee and tea, as these contain small amounts of caffeine.  Do not eat or drink for 3 hours before your exam. You may drink water and take your morning medicines.  You may need a blood test (creatinine test) within 30 days of your exam. Follow your doctor s orders if this is arranged before your exam.  If you are very claustrophobic, please let you doctor know. You may get a sedative medicine from your doctor before you arrive. Bring the medicine to the exam. Do not take it at home. Arrive one hour early. Bring someone who can take you home after the test. Your medicine will make you sleepy. After the exam, you may not drive, take a bus or take a taxi by yourself.  The MRI machine uses a strong magnet. Please wear clothes without metal (snaps, zippers). A sweatsuit works well, or we may give you a hospital gown.  Please  remove any body piercings and hair extensions before you arrive. You will remove watches, jewelry, hairpins, wallets, dentures, partial dental plates and hearing aids. You may wear contact lenses, and you may be able to wear your rings. We have a safe place to keep your personal items, but it is safer to leave them at home.   **IMPORTANT** THE INSTRUCTIONS BELOW ARE ONLY FOR THOSE PATIENTS WHO HAVE BEEN TOLD THEY WILL RECEIVE SEDATION OR GENERAL ANESTHESIA DURING THEIR MRI PROCEDURE:  IF YOU WILL RECEIVE SEDATION (take medicine to help you relax during your exam):   You must get the medicine from your doctor before you arrive. Bring the medicine to the exam. Do not take it at home.   Arrive one hour early. Bring someone who can take you home after the test. Your medicine will make you sleepy. After the exam, you may not drive, take a bus or take a taxi by yourself.   No eating 8 hours before your exam. You may have clear liquids up until 4 hours before your exam. (Clear liquids include water, clear tea, black coffee and fruit juice without pulp.)  IF YOU WILL RECEIVE ANESTHESIA (be asleep for your exam):   Arrive 1 1/2 hours early. Bring someone who can take you home after the test. You may not drive, take a bus or take a taxi by yourself.   No eating 8 hours before your exam. You may have clear liquids up until 4 hours before your exam. (Clear liquids include water, clear tea, black coffee and fruit juice without pulp.)  If you have any questions, please contact your Imaging Department exam site.            Dec 15, 2017 11:30 AM CST   (Arrive by 11:15 AM)   HEART TRANSPLANT ANNUAL with Geena Mcclellan MD   [facility] ([facility])    [address] [facility]  3rd [facility] [id number] [phone]               Care Instructions        Further instructions from your care team       [facility]  Interventional Radiology   Drainage Of Fluid  "Collection      AFTER YOU GO HOME:    Have an adult staty with you for 24 hours.     Drink plenty of fluids and resume your regular diet.    For 24 hours:     No heavy lifting greater than 10 lb until instructed by your physician     Call Your Physician if:    You developl nausea or vomiting.    Your develop hives or rash or unexplained itching    Additional Instructions: Please call for the following problems:    You have increased pain in your abdomen    Fever greater than 100.5 F and chills    You feel nauseated and  \"just not right\"      Change dressing every 48 hour or when it gets wet    Interventional Radiology Department    Physician:                Oleg GEORGES  Date: August 1, 2017  Telehone numbers: [phone]...Monday-Friday 8:00am-4:30pm                                  [phone]... After 4:30 Monday-Friday, Weekends and Holiday. Ask for the Interverntional Radiologist on call. Someone is on call 24 hours a day.                                     Additional Information About Your Visit        Care EveryWhere ID     This is your Care EveryWhere ID. This could be used by other organizations to access your [facility] medical records  [id number]        Your Vitals Were     Blood Pressure Pulse Temperature Respirations Weight Pulse Oximetry    118/76 (BP Location: Left arm) 82 97.9  F (36.6  C) (Oral) 18 72 kg (158 lb 11.7 oz) 99%    BMI (Body Mass Index)                   24.91 kg/m2            Primary Care Provider    Physician No Ref-Primary      Equal Access to Services     JASWINDER KHAN AH: Hadii bharati ruiz hadasho Soomaali, waaxda luqadaha, qaybta kaalmada sulema, talisha harrell. So [facility] [phone].    ATENCIÓN: Si habla español, tiene a watt disposición servicios gratuitos de asistencia lingüística. Llame al [phone].    We comply with applicable federal civil rights laws and [place] laws. We do not discriminate on the basis of race, color, national origin, age, " disability sex, sexual orientation or gender identity.            Thank you!     Thank you for choosing [facility] for your care. Our goal is always to provide you with excellent care. Hearing back from our patients is one way we can continue to improve our services. Please take a few minutes to complete the written survey that you may receive in the mail after you visit with us. Thank you!             Medication List: This is a list of all your medications and when to take them. Check marks below indicate your daily home schedule. Keep this list as a reference.      Medications           Morning Afternoon Evening Bedtime As Needed    alcohol swab prep pads   Use to swab area of injection/raymond as directed.                                aspirin 81 MG EC tablet   Take 1 tablet (81 mg) by mouth daily                                B-D SHARPS DISPOSAL BY MAIL Misc   1 container                                benzoyl peroxide 5 % Lotn lotion   Apply topically At Bedtime                                ketoconazole 2 % shampoo   Commonly known as:  NIZORAL   Apply topically three times a week Alternate with Head and Shoulders.                                multivitamin, therapeutic with minerals Tabs tablet   Take 1 tablet by mouth daily                                mycophenolate 250 MG capsule   Commonly known as:  CELLCEPT - GENERIC EQUIVALENT   Take 6 capsules (1,500 mg) by mouth 2 times daily                                order for DME   Equipment being ordered: Splint - Cubital tunnel splint for ulnar neuropathy                                pravastatin 20 MG tablet   Commonly known as:  PRAVACHOL   Take 1 tablet (20 mg) by mouth every evening                                tacrolimus 1 MG capsule   Commonly known as:  PROGRAF - GENERIC EQUIVALENT   Take 2mg (2 capsules) in the morning and 1mg (1 capsule) in the evening.

## 2017-08-01 NOTE — IP AVS SNAPSHOT
[address] [address]                                       After Visit Summary   8/1/2017    Javi Bansal    MRN: [id number]           After Visit Summary Signature Page     I have received my discharge instructions, and my questions have been answered. I have discussed any challenges I see with this plan with the nurse or doctor.    ..........................................................................................................................................  Patient/Patient Representative Signature      ..........................................................................................................................................  Patient Representative Print Name and Relationship to Patient    ..................................................               ................................................  Date                                            Time    ..........................................................................................................................................  Reviewed by Signature/Title    ...................................................              ..............................................  Date                                                            Time

## 2017-08-01 NOTE — MR AVS SNAPSHOT
After Visit Summary   8/1/2017    Javi Bansal    MRN: [id number]           Patient Information     Date Of Birth          1970        Visit Information        Provider Department      8/1/2017 9:15 AM Mary Huffman NP; MANINDER MARIN [facility]        Care Instructions    You were seen in clinic for routine 8 month post heart transplant evaluation. Available results appear to show good kidney function and good blood counts.  Medication changes:  You may STOP taking protonix    Follow up:  Coordinator will update you with pending test results.  Your next scheduled biopsy (after today) is on September 19, 2017  Please call with any questions/concerns.  [phone]          Follow-ups after your visit        Your next 10 appointments already scheduled     Aug 01, 2017 11:00 AM CDT   Procedure - 2.5 hour with U2A ROOM 16   Unit [address]A [facility] ([facility])    [address] [facility] [address]               Aug 01, 2017 12:00 PM CDT   Heart Cath Heart Biopsy with UJOSLYN   [facility] ([facility])    [address] [facility] [address]   [phone]            Sep 19, 2017  9:30 AM CDT   Lab with  LAB    [facility] ([facility])    [address] [place] [address]   [phone]            Sep 19, 2017 10:00 AM CDT   Procedure - 2.5 hour with U2A ROOM 15   [address] [facility] ([facility])    [address] [facility] [address]               Sep 19, 2017 11:00 AM CDT   Heart Cath Heart Biopsy with UCVR4   [facility] ([facility])    [address] [facility] [address]   [phone]            Sep 19, 2017  2:00 PM CDT   (Arrive by 1:45 PM)   RETURN HEART TRANSPLANT with  SAMM Ramos CNP   [facility] ([facility])    [address] [place] [address] [phone]            Oct 24, 2017  9:30 AM CDT   (Arrive by 9:15 AM)   RETURN DIABETES with Alla Jerez PA-C   [facility] ([facility])    [address] [place] [address]   [phone]            Dec 14, 2017 10:30 AM CST   Procedure 1.5 hr with U2A ROOM 10   [address]A [facility] ([facility])    [address] [facility] [address]               Dec 14, 2017 12:00 PM CST   Cath 90 Minute with UUHCVR4   [facility]  [facility] ([facility])    [address] [facility] [address] [phone]            Dec 15, 2017 11:30 AM CST   (Arrive by 11:15 AM)   HEART TRANSPLANT ANNUAL with Geena Mcclellan MD   [facility] ([facility])    [address] [place] [address] [phone]              Who to contact     If you have questions or need follow up information about today's clinic visit or your schedule please contact [facility] directly at [phone].  Normal or non-critical lab and imaging results will be communicated to you by MyChart, letter or phone within 4 business days after the clinic has received the results. If you do not hear from us within 7 days, please contact the clinic through MyChart or phone. If you have a critical or abnormal lab result, we will notify you by phone as soon as possible.  Submit refill requests through [employer] or call your pharmacy and they will forward the refill request to us. Please allow 3 business days for your refill to be completed.          Additional Information About Your Visit        Care EveryWhere ID     This is your Care EveryWhere ID. This could be used by other organizations  "to access your [facility] medical records  [id number]        Your Vitals Were     Pulse Height Pulse Oximetry BMI (Body Mass Index)          87 1.7 m (5' 6.93\") 96% 24.96 kg/m2         Blood Pressure from Last 3 Encounters:   08/01/17 112/78   07/19/17 110/77   06/20/17 102/70    Weight from Last 3 Encounters:   08/01/17 72.1 kg (159 lb)   07/19/17 72.6 kg (160 lb)   06/20/17 74.2 kg (163 lb 8 oz)              Today, you had the following     No orders found for display       Primary Care Provider    Physician No Ref-Primary       No address on file        Equal Access to Services     TYRON KHAN : Aniya Vuong, brenda humphreys, david leone, talisha garcia . So [facility] [phone].    ATENCIÓN: Si habla español, tiene a watt disposición servicios gratuitos de asistencia lingüística. Llame al [phone].    We comply with applicable federal civil rights laws and [place] laws. We do not discriminate on the basis of race, color, national origin, age, disability sex, sexual orientation or gender identity.            Thank you!     Thank you for choosing [facility]  for your care. Our goal is always to provide you with excellent care. Hearing back from our patients is one way we can continue to improve our services. Please take a few minutes to complete the written survey that you may receive in the mail after your visit with us. Thank you!             Your Updated Medication List - Protect others around you: Learn how to safely use, store and throw away your medicines at www.disposemymeds.org.          This list is accurate as of: 8/1/17 10:28 AM.  Always use your most recent med list.                   Brand Name Dispense Instructions for use Diagnosis    alcohol swab prep pads     100 each    Use to swab area of injection/raymond as directed.    Hyperglycemia       aspirin 81 MG EC tablet     90 tablet    Take 1 tablet (81 mg) by mouth daily    Heart " replaced by transplant (H)       B-D SHARPS DISPOSAL BY MAIL Misc     1 each    1 container    Hyperglycemia       benzoyl peroxide 5 % Lotn lotion     30 mL    Apply topically At Bedtime    Other acne       calcium carb 1250 mg (500 mg [place])/vitamin D 200 units 500-200 MG-UNIT per tablet    OSCAL with D    60 tablet    Take 1 tablet by mouth 2 times daily (with meals)    Heart replaced by transplant (H)       ketoconazole 2 % shampoo    NIZORAL    120 mL    Apply topically three times a week Alternate with Head and Shoulders.    Seborrhea       multivitamin, therapeutic with minerals Tabs tablet     30 each    Take 1 tablet by mouth daily    Heart replaced by transplant (H)       mycophenolate 250 MG capsule    CELLCEPT - GENERIC EQUIVALENT    360 capsule    Take 6 capsules (1,500 mg) by mouth 2 times daily    Heart replaced by transplant (H)       order for DME     1 Device    Equipment being ordered: Splint - Cubital tunnel splint for ulnar neuropathy    Lesion of right ulnar nerve       pravastatin 20 MG tablet    PRAVACHOL    30 tablet    Take 1 tablet (20 mg) by mouth every evening    Heart replaced by transplant (H)       tacrolimus 1 MG capsule    PROGRAF - GENERIC EQUIVALENT    90 capsule    Take 2mg (2 capsules) in the morning and 1mg (1 capsule) in the evening.    Heart replaced by transplant (H)

## 2017-08-01 NOTE — NURSING NOTE
Chief Complaint   Patient presents with     Follow Up For     heart tx return 32 weeks, month 8     Vitals were taken and medications were reconciled.     Eulalia Fernandes,GABRIELLE  9:56 AM

## 2017-08-01 NOTE — PROCEDURES
PRELIMINARY CARDIAC CATH REPORT:     PROCEDURES PERFORMED:   1. Right heart catheterization.    PHYSICIANS:  1. Attending Cardiology Staff: Dr. Angus MD  2. Sub-speciality Fellow: Dr. Alexsander Stephenson MD  3. Cardiology Fellow: [facility]    INDICATION:  Javi Bansal is a 46 year old male with a PMHx of Chaga's cardiomyopathy s/p OHT in 12/2016. Post-op course c/b grade 2R rejection, reactivation of Chaga's, and VT. He presents for routine surveillance EMB. Last RHC/biopsy was in 03/2017 with last EF 03/2017 65%.     Consent obtained with discussion of risks via .  All questions were answered. Sterile prep and procedure.    DESCRIPTION:  1. Location: RIV  2. Access: Local anesthetic with lidocaine.  A micropuncture (21 g) needle with ultrasound guidance was used to establish vascular access using a modified Seldinger technique.  3. Sheath: 7Fr Standard sheath and then upsized to 7Fr Daig sheath   4. Catheters: 5.5 Fr bioptome  5. Fluoroscopy time of 3.6 min; 6 mL of contrast   6. Estimated blood loss of <5 mL.  7. See below for procedure details.      MEDICATIONS:  1. Local anesthesia with lidocaine.     HEMODYNAMICS:  1. HR 85 bpm  2. NIBP 114/77/91 mmHg    ENDOMYOCARDIAL BIOPSY:  1. Successful collection of four right ventricular endomyocardial biopsy samples.    COMPLICATIONS:  1. None    SUMMARY:   1. Biopsies obtained for pathologic evaluation.    PLAN:   1. Discharge today per protocol.  2. Continued medical management and lifestyle modification for cardiovascular risk factor optimization.     The Attending Cardiologist was present for the entire procedure.    Please refer to the CVIS report for the final draft.      Jin Marshall [facility]   General Cardiology Fellow  Pager [phone]

## 2017-08-01 NOTE — PROGRESS NOTES
1255 Pt returned to unit 2a s/p RHC/biopsy. Right neck site CDI, no bleeding. VSS. Pt denies pain. Discharge instructions reviewed with pt, pt verbalizes understanding,  present.   1305 Pt ambulated off unit with steady gait

## 2017-08-01 NOTE — PROGRESS NOTES
ADULT HEART TRANSPLANT CLINIC  August 1, 2017    HPI:   Mr. Javi Bansal is a 46yr old male with a history of Chagas cardiomyopathy (diagnosed in 2010, treated with nifurtimox for 3 months) and biventricular systolic heart failure (LVEF 15-20% since 2015, on home milrinone since 8/2016), now s/p OHT 12/12/16, who presents to clinic for routine surveillance.  He is accompanied by a .    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      He was started on azathioprine postoperatively due to reports of better outcomes.  His initial biopsy on 12/19/16 showed 2R ACR, which was treated with IV steroids.  Repeat biopsy 12/27/16 showed 2R/3A ACR, which was treated with IV steroids and thymoglobulin.  He had another episode of 2R ACR 2/10/17, which was treated with steroids.  His biopsies have since been negative for significant rejection.  Baseline coronary angiogram showed no evidence of CAV.  His graft function remained stable throughout his rejection episodes, and continues to remain stable with LVEF 60-65% per echo 3/2017.  He has no DSAs.  Last RHC 6/2/17 showed normal biventricular filling pressures, with CI 2.7.    Since his last visit, Mr. Iglesia Shetty reports that he feels great.  He started working about 1 week ago, and states that things have gone well.  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  He continues to exercise daily, stating that he has been running without exertional chest pain, palpitations, and shortness of breath.  He reports that his headaches have resolved since coming off of prednisone.  He notes that  he is no longer on insulin and that his BGs have remained stable since coming off prednisone.  He states that he has a great appetite, with no nausea, vomiting, or diarrhea. His weight and BP remain stable.  His rash has resolved.  He otherwise denies dizziness, fevers, chills, and fluid retention.  He reports medication compliance, with no concerns.    Serostatus:  CMV D+/R+  EBV D+/R+    Immunosuppression:  MMF  Tacrolimus      Patient Active Problem List    Diagnosis Date Noted     Pneumonia 03/09/2017     Priority: Medium     Heart transplant graft rejection (H) 01/05/2017     Priority: Medium     7 DPT: 2R, focal + C4d/-C3d: treated w/IV pred (1000, 500. 500 mg)  14 DPT: 2R, neg/neg treated w/IV pred x 3 (1000mg) + thymo x 3  21 DPT: 1R, neg/neg       Ventricular tachycardia, non-sustained (H) 01/04/2017     Priority: Medium     Elevated liver enzymes 12/20/2016     Priority: Medium     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 12/19/2016     Priority: Medium     Need for prophylactic antibiotic 12/19/2016     Priority: Medium     Heart failure (H) 12/12/2016     Priority: Medium     Heart replaced by transplant (H) 12/12/2016     Priority: Medium     Heart transplant candidate 12/11/2016     Priority: Medium     ACP (advance care planning) 10/10/2016     Priority: Medium     Advance Care Planning 10/10/2016: Receipt of ACP document:  Received: invalid HCD document dated 8-4-16.  Document not previously scanned. Validation form completed indicating invalid document. Copy sent to client with information and facilitation resources. Validation form sent to be scanned as notation of invalid document received. Confirmed/documented designated decision maker(s).  Added by Namrata Neely RN Advance Care Planning Liaison with [employer]             Chest pain 07/11/2016     Priority: Medium     Chronic systolic heart failure (H)      Priority: Medium     CHF (congestive heart failure) (H)  06/17/2016     Priority: Medium     Cardiac defibrillator in situ-Medtronic, dual chamber - Not Dependent 06/16/2016     Priority: Medium     Chagas cardiomyopathy 05/26/2016     Priority: Medium       PAST MEDICAL HISTORY:  Past Medical History:   Diagnosis Date     Chagas cardiomyopathy      Chronic systolic heart failure (H)      Diabetes (H)      Dual ICD (implantable cardioverter-defibrillator) in place 10/20/2015     Essential hypertension      Gastroesophageal reflux disease      H/O gastric ulcer      Hypertension      Premature ventricular contractions      Presbyopia      Pterygium     ?? suspected , but unclear dx       CURRENT MEDICATIONS:  Prescription Medications as of 8/1/2017             benzoyl peroxide 5 % LOTN lotion Apply topically At Bedtime    Sharps Container (B-D SHARPS DISPOSAL BY MAIL) MISC 1 container    alcohol swab prep pads Use to swab area of injection/raymond as directed.    tacrolimus (PROGRAF - GENERIC EQUIVALENT) 1 MG capsule Take 2mg (2 capsules) in the morning and 1mg (1 capsule) in the evening.    ketoconazole (NIZORAL) 2 % shampoo Apply topically three times a week Alternate with Head and Shoulders.    order for DME Equipment being ordered: Splint - Cubital tunnel splint for ulnar neuropathy    pravastatin (PRAVACHOL) 20 MG tablet Take 1 tablet (20 mg) by mouth every evening    mycophenolate (CELLCEPT - GENERIC EQUIVALENT) 250 MG capsule Take 6 capsules (1,500 mg) by mouth 2 times daily    aspirin EC 81 MG EC tablet Take 1 tablet (81 mg) by mouth daily    calcium carb 1250 mg, 500 mg [place],/vitamin D 200 units (OSCAL WITH D) 500-200 MG-UNIT per tablet Take 1 tablet by mouth 2 times daily (with meals)    multivitamin, therapeutic with minerals (THERA-VIT-M) TABS tablet Take 1 tablet by mouth daily    pantoprazole (PROTONIX) 40 MG EC tablet Take 1 tablet (40 mg) by mouth every morning          ROS:   Constitutional: No fever, chills, or sweats. No weight gain/loss.   ENT: No visual  "disturbance, ear ache, epistaxis, sore throat.   Allergies/Immunologic: Negative.   Respiratory: No cough, hemoptysis.   Cardiovascular: As per HPI.   GI: No nausea, vomiting, hematemesis, melena, or hematochezia.   : No urinary frequency, dysuria, or hematuria.   Integument: Negative.   Psychiatric: Negative.   Neuro: Negative.   Endocrinology: Negative.   Musculoskeletal: Negative    Exam:  /78 (BP Location: Left arm, Patient Position: Chair, Cuff Size: Adult Regular)  Pulse 87  Ht 1.7 m (5' 6.93\")  Wt 72.1 kg (159 lb)  SpO2 96%  BMI 24.96 kg/m2  In general, the patient is a pleasant male in no apparent distress.    HEENT: NC/AT. PERRLA. EOMI.  Sclerae white, not injected.    Neck:  No adenopathy, No thyromegaly.    COR: No jugular venous distention when sitting upright.  RRR.  Normal S1 S2 splits physiologically.  No murmur, rub click, or gallop.    Lungs:  CTA. No rhonchi.    Abdomen: soft, nontender, nondistended.  No organomegaly.  Extremities:  No clubbing, cyanosis, or LE edema.    Neuro: Alert & Oriented x 3, grossly non focal.    Labs:  CBC RESULTS:   Lab Results   Component Value Date    WBC 4.4 08/01/2017    RBC 4.15 (L) 08/01/2017    HGB 12.2 (L) 08/01/2017    HCT 37.4 (L) 08/01/2017    MCV 90 08/01/2017    MCH 29.4 08/01/2017    MCHC 32.6 08/01/2017    RDW 11.9 08/01/2017     08/01/2017       BMP RESULTS:  Lab Results   Component Value Date     07/19/2017    POTASSIUM 4.1 07/19/2017    CHLORIDE 108 07/19/2017    CO2 25 07/19/2017    ANIONGAP 8 07/19/2017    GLC 89 07/19/2017    BUN 28 07/19/2017    CR 1.16 07/19/2017    GFRESTIMATED 67 07/19/2017    GFRESTBLACK 81 07/19/2017    DAISY 9.5 07/19/2017      LIPID RESULTS:  Lab Results   Component Value Date    CHOL 139 06/02/2017    HDL 49 06/02/2017    LDL 68 06/02/2017    TRIG 109 06/02/2017    NHDL 90 06/02/2017       IMMUNOSUPPRESSANT LEVELS  Lab Results   Component Value Date    TACROL 7.5 07/19/2017    DOSTAC 2030 07/19/2017 "       No components found for: CK  Lab Results   Component Value Date    MAG 2.0 2017     Lab Results   Component Value Date    A1C 7.5 (H) 2017     Lab Results   Component Value Date    PHOS 3.0 2017     Lab Results   Component Value Date    NTBNPI 1316 (H) 2017     Lab Results   Component Value Date    SAITESTMET SA HI 2017    SAICELL Class I 2017    [id number] None 2017    QV1TFLPNHS None 2017    SAIREPCOM  2017      Test performed by modified procedure. Serum heat inactivated. High-risk,   mfi >3,000. Mod-risk, mfi 500-3,000.       Lab Results   Component Value Date    SAIITESTME SA HI 2017    SAIICELL Class II 2017    LR0FKRRYS None 2017    EF0KRWTRRZ None 2017    SAIIREPCOM  2017      Test performed by modified procedure. Serum heat inactivated. High-risk,   mfi >3,000. Mod-risk, mfi 500-3,000.       Lab Results   Component Value Date    CSPEC Plasma, EDTA anticoagulant 2017       Diagnostic Studies:  Recent Results (from the past 4320 hour(s))   Echocardiogram Limited    Narrative    [id number]  [id number]  [id number]  [id number]^JOB^LOTTIE^NAMAN           [facility],[place]  Echocardiography Laboratory  [address] [place] [id number]     Name: AFUA BROOKS  MRN: [id number]  : 1970  Study Date: 03/15/2017 10:50 AM  Age: 46 yrs  Gender: Male  Patient Location: [facility]  Reason For Study: Heart transplant status  Ordering Physician: LOTTIE KAISER  Referring Physician: LOTTIE KAISER  Performed By: Radha Burrell     BSA: 1.8 m2  Height: 67 in  Weight: 150 lb  BP: 110/81 mmHg  _____________________________________________________________________________  __        Procedure  Limited Echocardiogram with portions of two-dimensional, color and spectral  Doppler performed. Echocardiogram with two-dimensional, color and spectral  Doppler  performed.  _____________________________________________________________________________  __        Interpretation Summary  Global and regional left ventricular function is normal with an EF of 60-65%.  Global right ventricular function is normal.  The inferior vena cava is normal. Estimated mean right atrial pressure is <3  mmHg.  No pericardial effusion is present.  This study was compared with the study from 2/14/2017. There has been no  change.  _____________________________________________________________________________  __        Left Ventricle  Global and regional left ventricular function is normal with an EF of 60-65%.  Left ventricular size is normal. Left ventricular wall thickness is normal.  Diastolic function not assessed due to heart transplant. No regional wall  motion abnormalities are seen.     Right Ventricle  The right ventricle is normal size. Global right ventricular function is  normal.     Atria  The left atrium is enlarged due to cardiac transplantation. The right atrium  is enlarged due to cardiac transplantation.     Mitral Valve  The mitral valve is normal. Trace mitral insufficiency is present.        Aortic Valve  Aortic valve is normal in structure and function. Trace aortic insufficiency  is present.     Tricuspid Valve  The tricuspid valve is normal. Trace tricuspid insufficiency is present.  Pulmonary artery systolic pressure cannot be assessed. The peak velocity of  the tricuspid regurgitant jet is not obtainable.     Pulmonic Valve  The pulmonic valve cannot be assessed.     Vessels  The aorta root is normal. The inferior vena cava is normal. Estimated mean  right atrial pressure is <3 mmHg.     Pericardium  No pericardial effusion is present.        Compared to Previous Study  This study was compared with the study from 2/14/2017 . There has been no  change.  _____________________________________________________________________________  __  MMode/2D Measurements &  Calculations     IVSd: 1.00 cm  LVIDd: 2.9 cm  LVIDs: 2.0 cm  LVPWd: 0.95 cm  FS: 32.1 %  EDV(Teich): 32.0 ml  ESV(Teich): 12.1 ml  LV mass(C)d: 74.8 grams  asc Aorta Diam: 2.5 cm        Doppler Measurements & Calculations  MV E max slava: 78.4 cm/sec  MV dec time: 0.19 sec  Medial E/e': 8.1              _____________________________________________________________________________  __        Report approved by: Wander Espino 03/15/2017 11:42 AM      ECHO LIMITED WITH OPTISON    Narrative    [id number]  ECH74  [id number]  [id number]^JOB^LOTTIE^NAMAN           [facility],[place]  Echocardiography Laboratory  [address] [place] [id number]     Name: AFUA BROOKS  MRN: [id number]  : 1970  Study Date: 2017 10:34 AM  Age: 46 yrs  Gender: Male  Patient Location: [facility]  Reason For Study: , Heart transplant status  Ordering Physician: LOTTIE KAISER  Referring Physician: LOTTIE KAISER  Performed By: Vikas Lebron RDCS     BSA: 1.8 m2  Height: 67 in  Weight: 149 lb  HR: 106  BP: 123/86 mmHg  _____________________________________________________________________________  __        Procedure  Limited Echocardiogram with portions of two-dimensional, color and spectral  Doppler performed. Contrast Optison. Optison (NDC #3825-1135-27) given  intravenously. Patient was given 4 ml mixture of 3 ml Optison and 6 ml saline.  5 ml wasted. IV start location LAC . IV start time 10:50 .  _____________________________________________________________________________  __        Interpretation Summary  Global and regional left ventricular function is normal with an EF of 55-60%.  Global right ventricular function is normal.  The inferior vena cava was normal in size with preserved respiratory  variability.  No pericardial effusion is present.  _____________________________________________________________________________  __        Left Ventricle  Left ventricular  size is normal. Global and regional left ventricular function  is normal with an EF of 55-60%.     Right Ventricle  The right ventricle is normal size. Global right ventricular function is  normal.     Tricuspid Valve  The tricuspid valve is normal. The peak velocity of the tricuspid regurgitant  jet is not obtainable.     Vessels  The inferior vena cava was normal in size with preserved respiratory  variability.        Pericardium  No pericardial effusion is present.     Compared to Previous Study  This study was compared with the study from 1.13.17, no significant changes  noted .  _____________________________________________________________________________  __     MMode/2D Measurements & Calculations  IVSd: 0.92 cm  LVIDd: 3.9 cm  LVIDs: 2.3 cm  LVPWd: 0.88 cm  FS: 42.3 %  EDV(Teich): 66.7 ml  ESV(Teich): 17.4 ml  LV mass(C)d: 106.6 grams                 _____________________________________________________________________________  __           Report approved by: Wander Acharya 02/14/2017 11:46 AM          Assessment and Plan:  Mr. Javi Bansal is a 46yr old male with a history of Chagas cardiomyopathy (diagnosed in 2010, treated with nifurtimox for 3 months) and biventricular systolic heart failure (LVEF 15-20% since 2015, on home milrinone since 8/2016), now s/p OHT 12/12/16, who presents to clinic for routine surveillance.  He is accompanied by a .    NICM, s/p OHT 12/12/16  His postoperative course was complicated by rejection; respiratory failure and JOSE LUIS, which resolved with diuresis; surgical blood loss anemia, s/p 1u PRBCs 12/17/16; right pleural effusion, requiring pigtail placement 12/17/16; atrial and ventricular tachyarrhythmias; and Chagas reactivation 12/26, treated with benznidasole per Transplant ID.      He was started on azathioprine postoperatively due to reports of better outcomes.  His initial biopsy on 12/19/16 showed 2R ACR, which was treated with  IV steroids.  Repeat biopsy 12/27/16 showed 2R/3A ACR, which was treated with IV steroids and thymoglobulin.  He had another episode of 2R ACR 2/10/17, which was treated with steroids.  His biopsies have since been negative for significant rejection.  Baseline coronary angiogram showed no evidence of CAV.  His graft function remained stable throughout his rejection episodes, and continues to remain stable with LVEF 60-65% per echo 3/2017.  He has no DSAs.  Last RHC 6/2/17 showed normal biventricular filling pressures, with CI 2.7.    Mr. Iglesia Shetty continues to do well following his transplant.  Labs today show stable electrolytes.  Kidney function remains slightly elevated, so advised that he hydrate well, especially during the day at work.  CBC shows stable blood counts.  EBV, CMV, and tacro levels pending.  He will have his biopsy following this appointment.      Change in immunosuppression: yes  Reason for Change: adjust tacrolimus dosage to keep level within goal range  Other Changes: may d/c protonix, as he is off of prednisone  Follow-Up: per protocol    Next Biopsy: per protocol  Next Angiogram: first annual  Next Angiogram with IVUS: yes  Next Stress Test: per protocol      Chagas   Chagas reactivated following his transplant (12/26/16).  He was treated with Benznidazole for 60 days.  Chagas PCRs have remained negative.  He continues to have weekly PCR monitoring through April, then will decrease to every other week through June, then monthly from June to January.  He continues to follow with Transplant ID.    Latent TB  He remains on INH per Transplant ID.        The above was reviewed with Mr. Iglesia Shetty via .  Mr. Iglesia Shetty reported understanding of information, and will call with further questions/concerns.    Mary Huffman, DNP, APRN, FNP-BC  Advanced Heart Failure Nurse Practitioner  [facility]  [phone]      CC  Patient Care Team:  No  Ref-Primary, Physician as PCP - General  Broderick Gao MD, MD as MD (Cardiology)  Shauna Cuellar MD as MD (Infectious Diseases)  Marino Woo MD as MD (Dermatology)  Bryant Bartholomew MD as MD (Family Medicine - Sports Medicine)  Isabela Balbuena MD (Ophthalmology)  DYLON VALLES

## 2017-08-01 NOTE — DISCHARGE INSTRUCTIONS
"[facility]  Interventional Radiology   Drainage Of Fluid Collection      AFTER YOU GO HOME:    Have an adult staty with you for 24 hours.     Drink plenty of fluids and resume your regular diet.    For 24 hours:     No heavy lifting greater than 10 lb until instructed by your physician     Call Your Physician if:    You developl nausea or vomiting.    Your develop hives or rash or unexplained itching    Additional Instructions: Please call for the following problems:    You have increased pain in your abdomen    Fever greater than 100.5 F and chills    You feel nauseated and  \"just not right\"      Change dressing every 48 hour or when it gets wet    Interventional Radiology Department    Physician:                Oleg GEORGES  Date: August 1, 2017  Telehone numbers: [phone]...Monday-Friday 8:00am-4:30pm                                  [phone]... After 4:30 Monday-Friday, Weekends and Holiday. Ask for the Interverntional Radiologist on call. Someone is on call 24 hours a day.                                  "

## 2017-08-01 NOTE — LETTER
8/1/2017      RE: Javi Bansal  [address] [facility] [address]  [place] [address]       Dear Colleague,    Thank you for the opportunity to participate in the care of your patient, Javi Bansal, at the [facility] at [facility]. Please see a copy of my visit note below.    ADULT HEART TRANSPLANT CLINIC  August 1, 2017    HPI:   Mr. Javi Bansal is a 46yr old male with a history of Chagas cardiomyopathy (diagnosed in 2010, treated with nifurtimox for 3 months) and biventricular systolic heart failure (LVEF 15-20% since 2015, on home milrinone since 8/2016), now s/p OHT 12/12/16, who presents to clinic for routine surveillance.  He is accompanied by a .    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      He was started on azathioprine postoperatively due to reports of better outcomes.  His initial biopsy on 12/19/16 showed 2R ACR, which was treated with IV steroids.  Repeat biopsy 12/27/16 showed 2R/3A ACR, which was treated with IV steroids and thymoglobulin.  He had another episode of 2R ACR 2/10/17, which was treated with steroids.  His biopsies have since been negative for significant rejection.  Baseline coronary angiogram showed no evidence of CAV.  His graft function remained stable throughout his rejection episodes, and continues to remain stable with LVEF 60-65% per echo 3/2017.  He has no DSAs.  Last RHC 6/2/17 showed normal biventricular filling pressures, with CI 2.7.    Since his last visit, Mr. Iglesia Shetty reports that he feels great.  He started working about 1 week ago, and states that  things have gone well.  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  He continues to exercise daily, stating that he has been running without exertional chest pain, palpitations, and shortness of breath.  He reports that his headaches have resolved since coming off of prednisone.  He notes that he is no longer on insulin and that his BGs have remained stable since coming off prednisone.  He states that he has a great appetite, with no nausea, vomiting, or diarrhea. His weight and BP remain stable.  His rash has resolved.  He otherwise denies dizziness, fevers, chills, and fluid retention.  He reports medication compliance, with no concerns.    Serostatus:  CMV D+/R+  EBV D+/R+    Immunosuppression:  MMF  Tacrolimus      Patient Active Problem List    Diagnosis Date Noted     Pneumonia 03/09/2017     Priority: Medium     Heart transplant graft rejection (H) 01/05/2017     Priority: Medium     7 DPT: 2R, focal + C4d/-C3d: treated w/IV pred (1000, 500. 500 mg)  14 DPT: 2R, neg/neg treated w/IV pred x 3 (1000mg) + thymo x 3  21 DPT: 1R, neg/neg       Ventricular tachycardia, non-sustained (H) 01/04/2017     Priority: Medium     Elevated liver enzymes 12/20/2016     Priority: Medium     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 12/19/2016     Priority: Medium     Need for prophylactic antibiotic 12/19/2016     Priority: Medium     Heart failure (H) 12/12/2016     Priority: Medium     Heart replaced by transplant (H) 12/12/2016     Priority: Medium     Heart transplant candidate 12/11/2016     Priority: Medium     ACP (advance care planning) 10/10/2016     Priority: Medium     Advance Care Planning 10/10/2016: Receipt of ACP document:  Received: invalid HCD document dated 8-4-16.  Document not previously scanned. Validation form completed indicating invalid document. Copy sent to client with information and facilitation resources.  Validation form sent to be scanned as notation of invalid document received. Confirmed/documented designated decision maker(s).  Added by Namrata Neely RN Advance Care Planning Liaison with [employer]             Chest pain 07/11/2016     Priority: Medium     Chronic systolic heart failure (H)      Priority: Medium     CHF (congestive heart failure) (H) 06/17/2016     Priority: Medium     Cardiac defibrillator in situ-Medtronic, dual chamber - Not Dependent 06/16/2016     Priority: Medium     Chagas cardiomyopathy 05/26/2016     Priority: Medium       PAST MEDICAL HISTORY:  Past Medical History:   Diagnosis Date     Chagas cardiomyopathy      Chronic systolic heart failure (H)      Diabetes (H)      Dual ICD (implantable cardioverter-defibrillator) in place 10/20/2015     Essential hypertension      Gastroesophageal reflux disease      H/O gastric ulcer      Hypertension      Premature ventricular contractions      Presbyopia      Pterygium     ?? suspected , but unclear dx       CURRENT MEDICATIONS:  Prescription Medications as of 8/1/2017             benzoyl peroxide 5 % LOTN lotion Apply topically At Bedtime    Sharps Container (B-D SHARPS DISPOSAL BY MAIL) MISC 1 container    alcohol swab prep pads Use to swab area of injection/raymond as directed.    tacrolimus (PROGRAF - GENERIC EQUIVALENT) 1 MG capsule Take 2mg (2 capsules) in the morning and 1mg (1 capsule) in the evening.    ketoconazole (NIZORAL) 2 % shampoo Apply topically three times a week Alternate with Head and Shoulders.    order for DME Equipment being ordered: Splint - Cubital tunnel splint for ulnar neuropathy    pravastatin (PRAVACHOL) 20 MG tablet Take 1 tablet (20 mg) by mouth every evening    mycophenolate (CELLCEPT - GENERIC EQUIVALENT) 250 MG capsule Take 6 capsules (1,500 mg) by mouth 2 times daily    aspirin EC 81 MG EC tablet Take 1 tablet (81 mg) by mouth daily    calcium carb 1250 mg, 500 mg [place],/vitamin D 200 units (OSCAL WITH D)  "500-200 MG-UNIT per tablet Take 1 tablet by mouth 2 times daily (with meals)    multivitamin, therapeutic with minerals (THERA-VIT-M) TABS tablet Take 1 tablet by mouth daily    pantoprazole (PROTONIX) 40 MG EC tablet Take 1 tablet (40 mg) by mouth every morning          ROS:   Constitutional: No fever, chills, or sweats. No weight gain/loss.   ENT: No visual disturbance, ear ache, epistaxis, sore throat.   Allergies/Immunologic: Negative.   Respiratory: No cough, hemoptysis.   Cardiovascular: As per HPI.   GI: No nausea, vomiting, hematemesis, melena, or hematochezia.   : No urinary frequency, dysuria, or hematuria.   Integument: Negative.   Psychiatric: Negative.   Neuro: Negative.   Endocrinology: Negative.   Musculoskeletal: Negative    Exam:  /78 (BP Location: Left arm, Patient Position: Chair, Cuff Size: Adult Regular)  Pulse 87  Ht 1.7 m (5' 6.93\")  Wt 72.1 kg (159 lb)  SpO2 96%  BMI 24.96 kg/m2  In general, the patient is a pleasant male in no apparent distress.    HEENT: NC/AT. PERRLA. EOMI.  Sclerae white, not injected.    Neck:  No adenopathy, No thyromegaly.    COR: No jugular venous distention when sitting upright.  RRR.  Normal S1 S2 splits physiologically.  No murmur, rub click, or gallop.    Lungs:  CTA. No rhonchi.    Abdomen: soft, nontender, nondistended.  No organomegaly.  Extremities:  No clubbing, cyanosis, or LE edema.    Neuro: Alert & Oriented x 3, grossly non focal.    Labs:  CBC RESULTS:   Lab Results   Component Value Date    WBC 4.4 08/01/2017    RBC 4.15 (L) 08/01/2017    HGB 12.2 (L) 08/01/2017    HCT 37.4 (L) 08/01/2017    MCV 90 08/01/2017    MCH 29.4 08/01/2017    MCHC 32.6 08/01/2017    RDW 11.9 08/01/2017     08/01/2017       BMP RESULTS:  Lab Results   Component Value Date     07/19/2017    POTASSIUM 4.1 07/19/2017    CHLORIDE 108 07/19/2017    CO2 25 07/19/2017    ANIONGAP 8 07/19/2017    GLC 89 07/19/2017    BUN 28 07/19/2017    CR 1.16 07/19/2017    " GFRESTIMATED 67 2017    GFRESTBLACK 81 2017    DAISY 9.5 2017      LIPID RESULTS:  Lab Results   Component Value Date    CHOL 139 2017    HDL 49 2017    LDL 68 2017    TRIG 109 2017    NHDL 90 2017       IMMUNOSUPPRESSANT LEVELS  Lab Results   Component Value Date    TACROL 7.5 2017    DOSTAC 2030 2017       No components found for: CK  Lab Results   Component Value Date    MAG 2.0 2017     Lab Results   Component Value Date    A1C 7.5 (H) 2017     Lab Results   Component Value Date    PHOS 3.0 2017     Lab Results   Component Value Date    NTBNPI 1316 (H) 2017     Lab Results   Component Value Date    SAITESTMET SA HI 2017    SAICELL Class I 2017    [id number] None 2017    PF1PLILGHG None 2017    SAIREPCOM  2017      Test performed by modified procedure. Serum heat inactivated. High-risk,   mfi >3,000. Mod-risk, mfi 500-3,000.       Lab Results   Component Value Date    SAIITESTME SA HI 2017    SAIICELL Class II 2017    [id number] None 2017    GZ3YYVLKEH None 2017    SAIIREPCOM  2017      Test performed by modified procedure. Serum heat inactivated. High-risk,   mfi >3,000. Mod-risk, mfi 500-3,000.       Lab Results   Component Value Date    CSPEC Plasma, EDTA anticoagulant 2017       Diagnostic Studies:  Recent Results (from the past 4320 hour(s))   Echocardiogram Limited    Narrative    [id number]  [id number]  [id number]  [id number]^JOB^LOTTIE^NAMAN           [facility],[place]  Echocardiography Laboratory  [address] [place] [id number]     Name: AFUA BROOKS BA  MRN: [id number]  : 1970  Study Date: 03/15/2017 10:50 AM  Age: 46 yrs  Gender: Male  Patient Location: [facility]  Reason For Study: Heart transplant status  Ordering Physician: LOTTIE KAISER  Referring Physician: LOTTIE KAISER  Performed By:  Radha Burrell     BSA: 1.8 m2  Height: 67 in  Weight: 150 lb  BP: 110/81 mmHg  _____________________________________________________________________________  __        Procedure  Limited Echocardiogram with portions of two-dimensional, color and spectral  Doppler performed. Echocardiogram with two-dimensional, color and spectral  Doppler performed.  _____________________________________________________________________________  __        Interpretation Summary  Global and regional left ventricular function is normal with an EF of 60-65%.  Global right ventricular function is normal.  The inferior vena cava is normal. Estimated mean right atrial pressure is <3  mmHg.  No pericardial effusion is present.  This study was compared with the study from 2/14/2017. There has been no  change.  _____________________________________________________________________________  __        Left Ventricle  Global and regional left ventricular function is normal with an EF of 60-65%.  Left ventricular size is normal. Left ventricular wall thickness is normal.  Diastolic function not assessed due to heart transplant. No regional wall  motion abnormalities are seen.     Right Ventricle  The right ventricle is normal size. Global right ventricular function is  normal.     Atria  The left atrium is enlarged due to cardiac transplantation. The right atrium  is enlarged due to cardiac transplantation.     Mitral Valve  The mitral valve is normal. Trace mitral insufficiency is present.        Aortic Valve  Aortic valve is normal in structure and function. Trace aortic insufficiency  is present.     Tricuspid Valve  The tricuspid valve is normal. Trace tricuspid insufficiency is present.  Pulmonary artery systolic pressure cannot be assessed. The peak velocity of  the tricuspid regurgitant jet is not obtainable.     Pulmonic Valve  The pulmonic valve cannot be assessed.     Vessels  The aorta root is normal. The inferior vena cava is normal.  Estimated mean  right atrial pressure is <3 mmHg.     Pericardium  No pericardial effusion is present.        Compared to Previous Study  This study was compared with the study from 2017 . There has been no  change.  _____________________________________________________________________________  __  MMode/2D Measurements & Calculations     IVSd: 1.00 cm  LVIDd: 2.9 cm  LVIDs: 2.0 cm  LVPWd: 0.95 cm  FS: 32.1 %  EDV(Teich): 32.0 ml  ESV(Teich): 12.1 ml  LV mass(C)d: 74.8 grams  asc Aorta Diam: 2.5 cm        Doppler Measurements & Calculations  MV E max slava: 78.4 cm/sec  MV dec time: 0.19 sec  Medial E/e': 8.1              _____________________________________________________________________________  __        Report approved by: Wander Espino 03/15/2017 11:42 AM      ECHO LIMITED WITH OPTISON    Narrative    [id number]  ECH74  [id number]  [id number]^JOB^LOTTIE^NAMAN           [facility],[place]  Echocardiography Laboratory  [address] [place] [id number]     Name: AFUA BROOKS  MRN: [id number]  : 1970  Study Date: 2017 10:34 AM  Age: 46 yrs  Gender: Male  [facility]  Reason For Study: , Heart transplant status  Ordering Physician: LOTTIE KAISER  Referring Physician: LOTTIE KAISER  Performed By: Vikas Lebron RDCS     BSA: 1.8 m2  Height: 67 in  Weight: 149 lb  HR: 106  BP: 123/86 mmHg  _____________________________________________________________________________  __        Procedure  Limited Echocardiogram with portions of two-dimensional, color and spectral  Doppler performed. Contrast Optison. Optison (NDC #8812-6553-01) given  intravenously. Patient was given 4 ml mixture of 3 ml Optison and 6 ml saline.  5 ml wasted. IV start location LAC . IV start time 10:50 .  _____________________________________________________________________________  __        Interpretation Summary  Global and regional left ventricular  function is normal with an EF of 55-60%.  Global right ventricular function is normal.  The inferior vena cava was normal in size with preserved respiratory  variability.  No pericardial effusion is present.  _____________________________________________________________________________  __        Left Ventricle  Left ventricular size is normal. Global and regional left ventricular function  is normal with an EF of 55-60%.     Right Ventricle  The right ventricle is normal size. Global right ventricular function is  normal.     Tricuspid Valve  The tricuspid valve is normal. The peak velocity of the tricuspid regurgitant  jet is not obtainable.     Vessels  The inferior vena cava was normal in size with preserved respiratory  variability.        Pericardium  No pericardial effusion is present.     Compared to Previous Study  This study was compared with the study from 1.13.17, no significant changes  noted .  _____________________________________________________________________________  __     MMode/2D Measurements & Calculations  IVSd: 0.92 cm  LVIDd: 3.9 cm  LVIDs: 2.3 cm  LVPWd: 0.88 cm  FS: 42.3 %  EDV(Teich): 66.7 ml  ESV(Teich): 17.4 ml  LV mass(C)d: 106.6 grams                 _____________________________________________________________________________  __           Report approved by: Wander Acharya 02/14/2017 11:46 AM          Assessment and Plan:  Mr. Javi Craft Shetty is a 46yr old male with a history of Chagas cardiomyopathy (diagnosed in 2010, treated with nifurtimox for 3 months) and biventricular systolic heart failure (LVEF 15-20% since 2015, on home milrinone since 8/2016), now s/p OHT 12/12/16, who presents to clinic for routine surveillance.  He is accompanied by a .    NICM, s/p OHT 12/12/16  His postoperative course was complicated by rejection; respiratory failure and JOSE LUIS, which resolved with diuresis; surgical blood loss anemia, s/p 1u PRBCs 12/17/16; right  pleural effusion, requiring pigtail placement 12/17/16; atrial and ventricular tachyarrhythmias; and Chagas reactivation 12/26, treated with benznidasole per Transplant ID.      He was started on azathioprine postoperatively due to reports of better outcomes.  His initial biopsy on 12/19/16 showed 2R ACR, which was treated with IV steroids.  Repeat biopsy 12/27/16 showed 2R/3A ACR, which was treated with IV steroids and thymoglobulin.  He had another episode of 2R ACR 2/10/17, which was treated with steroids.  His biopsies have since been negative for significant rejection.  Baseline coronary angiogram showed no evidence of CAV.  His graft function remained stable throughout his rejection episodes, and continues to remain stable with LVEF 60-65% per echo 3/2017.  He has no DSAs.  Last RHC 6/2/17 showed normal biventricular filling pressures, with CI 2.7.    Mr. Iglesia Shetty continues to do well following his transplant.  Labs today show stable electrolytes.  Kidney function remains slightly elevated, so advised that he hydrate well, especially during the day at work.  CBC shows stable blood counts.  EBV, CMV, and tacro levels pending.  He will have his biopsy following this appointment.      Change in immunosuppression: yes  Reason for Change: adjust tacrolimus dosage to keep level within goal range  Other Changes: may d/c protonix, as he is off of prednisone  Follow-Up: per protocol    Next Biopsy: per protocol  Next Angiogram: first annual  Next Angiogram with IVUS: yes  Next Stress Test: per protocol      Chagas   Chagas reactivated following his transplant (12/26/16).  He was treated with Benznidazole for 60 days.  Chagas PCRs have remained negative.  He continues to have weekly PCR monitoring through April, then will decrease to every other week through June, then monthly from June to January.  He continues to follow with Transplant ID.    Latent TB  He remains on INH per Transplant ID.        The above was  reviewed with Mr. Iglesia Shetty via .  Mr. Iglesia Shetty reported understanding of information, and will call with further questions/concerns.    Mary Huffman, DNP, APRN, FNP-BC  Advanced Heart Failure Nurse Practitioner  [facility]  [phone]        Patient Care Team:  No Ref-Primary, Physician as PCP - General  Broderick Gao MD, MD as MD (Cardiology)  Shauna Cuellar MD as MD (Infectious Diseases)  Marino Woo MD as MD (Dermatology)  Bryant Bartholomew MD as MD (Family Medicine - Sports Medicine)  Isabela Balbuena MD (Ophthalmology)  DYLON VALLES

## 2017-08-01 NOTE — PROGRESS NOTES
D: Pt arrived in cath lab for right heart catheterization and endomyocardial tissue biopsy  I: Right heart catheterization and endomyocardial biopsy completed per MD.  7fr sheath removed from RIJ site, manual pressure applied until hemostasis achieved.  A: RIJ site is clean, dry and intact. Soft, no hematoma.  P: Pt to transfer to 2A for discharge.     Report called to 2A RN.     Pt educated on watching neck site for bleeding, hematoma, pressure on airway and voice changes.

## 2017-08-02 ENCOUNTER — TELEPHONE (OUTPATIENT)
Dept: TRANSPLANT | Facility: CLINIC | Age: 47
End: 2017-08-02

## 2017-08-02 LAB
CMV DNA SPEC NAA+PROBE-ACNC: ABNORMAL [IU]/ML
CMV DNA SPEC NAA+PROBE-LOG#: <2.1 {LOG_IU}/ML
COPATH REPORT: NORMAL
EBV DNA # SPEC NAA+PROBE: NORMAL {COPIES}/ML
EBV DNA SPEC NAA+PROBE-LOG#: NORMAL {LOG_COPIES}/ML
SPECIMEN SOURCE: ABNORMAL

## 2017-08-02 NOTE — TELEPHONE ENCOUNTER
LM on patient's phone: recent bx was negative and FK level was within goal: no changes needed. Requested he call back to confirm.   Awaiting call back   used for this call.

## 2017-08-14 ENCOUNTER — HOSPITAL ENCOUNTER (EMERGENCY)
Facility: CLINIC | Age: 47
Discharge: HOME | End: 2017-08-14
Attending: EMERGENCY MEDICINE | Admitting: EMERGENCY MEDICINE
Payer: COMMERCIAL

## 2017-08-14 VITALS
SYSTOLIC BLOOD PRESSURE: 132 MMHG | TEMPERATURE: 97.6 F | HEART RATE: 94 BPM | DIASTOLIC BLOOD PRESSURE: 84 MMHG | RESPIRATION RATE: 16 BRPM | OXYGEN SATURATION: 98 %

## 2017-08-14 DIAGNOSIS — Z94.1 HEART REPLACED BY TRANSPLANT (H): ICD-10-CM

## 2017-08-14 DIAGNOSIS — B02.9 HERPES ZOSTER WITHOUT COMPLICATION: ICD-10-CM

## 2017-08-14 PROCEDURE — 99284 EMERGENCY DEPT VISIT MOD MDM: CPT | Mod: Z6 | Performed by: EMERGENCY MEDICINE

## 2017-08-14 PROCEDURE — 99282 EMERGENCY DEPT VISIT SF MDM: CPT | Performed by: EMERGENCY MEDICINE

## 2017-08-14 RX ORDER — GABAPENTIN 300 MG/1
300 CAPSULE ORAL 3 TIMES DAILY
Qty: 90 CAPSULE | Refills: 1 | Status: ON HOLD | OUTPATIENT
Start: 2017-08-14 | End: 2018-06-13

## 2017-08-14 RX ORDER — VALACYCLOVIR HYDROCHLORIDE 1 G/1
1000 TABLET, FILM COATED ORAL 3 TIMES DAILY
Qty: 21 TABLET | Refills: 0 | Status: SHIPPED | OUTPATIENT
Start: 2017-08-14 | End: 2018-06-13

## 2017-08-14 ASSESSMENT — ENCOUNTER SYMPTOMS
SHORTNESS OF BREATH: 0
ABDOMINAL PAIN: 0
FEVER: 0

## 2017-08-14 NOTE — DISCHARGE INSTRUCTIONS
Call your transplant coordinator to be seen in follow-up later this week for reevaluation.    Valacyclovir as directed.    Neurontin as directed.    Return to the emergency Department for any problems.

## 2017-08-14 NOTE — ED AVS SNAPSHOT
[facility], Emergency Department    [address] [facility] [address]    Phone:  [phone]                                       Javi Bansal   MRN: [id number]    Department:  [facility], Emergency Department   Date of Visit:  8/14/2017           Patient Information     Date Of Birth          1970        Your diagnoses for this visit were:     Herpes zoster without complication        You were seen by Rakan Jaramillo MD.        Discharge Instructions       Call your transplant coordinator to be seen in follow-up later this week for reevaluation.    Valacyclovir as directed.    Neurontin as directed.    Return to the emergency Department for any problems.    Future Appointments        Provider Department Dept Phone Center    9/19/2017 10:00 AM U2A ROOM 15 Unit 2A [facility] O    9/19/2017 11:00 AM [facility] HEART CATH LAB ROOM 4 [facility]  Heart Cath Lab [phone] [facility]    9/22/2017 8:30 AM Lab  Health Lab [phone] [facility]    9/22/2017 9:00 AM SAMM Viveros CNP [facility] [phone] [facility]    10/24/2017 9:30 AM Alla Jerez PA-C  Health Endocrinology [phone] [facility]    12/14/2017 10:30 AM U2A ROOM 10 [address]A [facility] O    12/14/2017 12:00 PM [facility] HEART CATH LAB ROOM 4 [facility]  Heart Cath Lab [phone] [facility]    12/15/2017 9:00 AM  CV MR IMAGING NURSE; [facility] MRI ROOM 4 [facility], [phone] [facility]    12/15/2017 11:30 AM Geena Mcclellan MD [facility] [phone] [facility]    6/13/2018 10:15 AM Isabela Balbuena MD [facility] [phone] [facility]      24 Hour Appointment Hotline       To make an appointment at any [facility], call [phone] ([phone]). If you don't have a family doctor or clinic, we will help you find one. [facility] are conveniently located  to serve the needs of you and your family.             Review of your medicines      START taking        Dose / Directions Last dose taken    gabapentin 300 MG capsule   Commonly known as:  NEURONTIN   Dose:  300 mg   Quantity:  90 capsule        Take 1 capsule (300 mg) by mouth 3 times daily Begin by taking one capsule once per day, the next day you may take 1 capsule twice per day and on the 3rd day begin taking 1 capsule 3 times per day.   Refills:  1        valACYclovir 1000 mg tablet   Commonly known as:  VALTREX   Dose:  1000 mg   Quantity:  21 tablet        Take 1 tablet (1,000 mg) by mouth 3 times daily   Refills:  0          Our records show that you are taking the medicines listed below. If these are incorrect, please call your family doctor or clinic.        Dose / Directions Last dose taken    alcohol swab prep pads   Quantity:  100 each        Use to swab area of injection/raymond as directed.   Refills:  1        aspirin 81 MG EC tablet   Dose:  81 mg   Quantity:  90 tablet        Take 1 tablet (81 mg) by mouth daily   Refills:  3        B-D SHARPS DISPOSAL BY MAIL Misc   Quantity:  1 each        1 container   Refills:  1        benzoyl peroxide 5 % Lotn lotion   Quantity:  30 mL        Apply topically At Bedtime   Refills:  3        ketoconazole 2 % shampoo   Commonly known as:  NIZORAL   Quantity:  120 mL        Apply topically three times a week Alternate with Head and Shoulders.   Refills:  3        multivitamin, therapeutic with minerals Tabs tablet   Dose:  1 tablet   Quantity:  30 each        Take 1 tablet by mouth daily   Refills:  11        mycophenolate 250 MG capsule   Commonly known as:  GENERIC EQUIVALENT   Dose:  1500 mg   Quantity:  360 capsule        Take 6 capsules (1,500 mg) by mouth 2 times daily   Refills:  11        order for DME   Quantity:  1 Device        Equipment being ordered: Splint - Cubital tunnel splint for ulnar neuropathy   Refills:  0        pravastatin 20 MG tablet    Commonly known as:  PRAVACHOL   Dose:  20 mg   Quantity:  30 tablet        Take 1 tablet (20 mg) by mouth every evening   Refills:  11        tacrolimus 1 MG capsule   Commonly known as:  GENERIC EQUIVALENT   Quantity:  90 capsule        Take 2mg (2 capsules) in the morning and 1mg (1 capsule) in the evening.   Refills:  11                Prescriptions were sent or printed at these locations (2 Prescriptions)                   Other Prescriptions                Printed at Department/Unit printer (2 of 2)         valACYclovir (VALTREX) 1000 mg tablet               gabapentin (NEURONTIN) 300 MG capsule                Orders Needing Specimen Collection     None      Pending Results     No orders found from 8/12/2017 to 8/15/2017.            Pending Culture Results     No orders found from 8/12/2017 to 8/15/2017.            Pending Results Instructions     If you had any lab results that were not finalized at the time of your Discharge, you can call the ED Lab Result RN at [phone]. You will be contacted by this team for any positive Lab results or changes in treatment. The nurses are available 7 days a week from 10A to 6:30P.  You can leave a message 24 hours per day and they will return your call.        Thank you for choosing [facility]       Thank you for choosing [facility] for your care. Our goal is always to provide you with excellent care. Hearing back from our patients is one way we can continue to improve our services. Please take a few minutes to complete the written survey that you may receive in the mail after you visit with us. Thank you!        Care EveryWhere ID     This is your Care EveryWhere ID. This could be used by other organizations to access your [facility] medical records  [id number]        Equal Access to Services     JASWINDER KHAN AH: Aniya Vuong, brenda humphreys, qatalisha land. [facility] [phone].    ATENCIÓN: Vikram garay  español, tiene a watt disposición servicios gratuitos de asistencia lingüística. Llabhijit al [phone].    We comply with applicable federal civil rights laws and [place] laws. We do not discriminate on the basis of race, color, national origin, age, disability sex, sexual orientation or gender identity.            After Visit Summary       This is your record. Keep this with you and show to your community pharmacist(s) and doctor(s) at your next visit.

## 2017-08-14 NOTE — ED AVS SNAPSHOT
[facility] [place], Emergency Department    [address] [place] [address]    Phone:  [phone]                                       Javi Bansal   MRN: [id number]    Department:  [facility], Emergency Department   Date of Visit:  8/14/2017           After Visit Summary Signature Page     I have received my discharge instructions, and my questions have been answered. I have discussed any challenges I see with this plan with the nurse or doctor.    ..........................................................................................................................................  Patient/Patient Representative Signature      ..........................................................................................................................................  Patient Representative Print Name and Relationship to Patient    ..................................................               ................................................  Date                                            Time    ..........................................................................................................................................  Reviewed by Signature/Title    ...................................................              ..............................................  Date                                                            Time

## 2017-08-14 NOTE — ED PROVIDER NOTES
History     Chief Complaint   Patient presents with     Rash     HPI  Javi Bansal is a 46 year old male with a history of CHF s/p transplant complicated by graft rejection. The patient presents to the ED today seeking evaluation of a painful, shingles-like rash that developed last weekend (8/5/17) along his left leg and buttocks. He is reports that the pain is significant enough to prevent him from running and working. He is followed by Dr. Mcclellan here, though he states that she is leaving the clinic and he needs to set up care with another physician.     PAST MEDICAL HISTORY:   Past Medical History:   Diagnosis Date     Chagas cardiomyopathy      Chronic systolic heart failure (H)      Diabetes (H)      Dual ICD (implantable cardioverter-defibrillator) in place 10/20/2015     Essential hypertension      Gastroesophageal reflux disease      H/O gastric ulcer      Hypertension      Premature ventricular contractions      Presbyopia      Pterygium     ?? suspected , but unclear dx       PAST SURGICAL HISTORY:   Past Surgical History:   Procedure Laterality Date     CARDIAC SURGERY      AICD     TRANSPLANT HEART RECIPIENT N/A 12/12/2016    Procedure: TRANSPLANT HEART RECIPIENT;  Surgeon: Elo Madsen MD;  Location:  OR       FAMILY HISTORY:   Family History   Problem Relation Age of Onset     Brain Cancer Mother      Melanoma No family hx of      Skin Cancer No family hx of      Glaucoma No family hx of      Macular Degeneration No family hx of        SOCIAL HISTORY:   Social History   Substance Use Topics     Smoking status: Never Smoker     Smokeless tobacco: Never Used     Alcohol use No       Patient's Medications   New Prescriptions    GABAPENTIN (NEURONTIN) 300 MG CAPSULE    Take 1 capsule (300 mg) by mouth 3 times daily Begin by taking one capsule once per day, the next day you may take 1 capsule twice per day and on the 3rd day begin taking 1 capsule 3 times per day.     VALACYCLOVIR (VALTREX) 1000 MG TABLET    Take 1 tablet (1,000 mg) by mouth 3 times daily   Previous Medications    ALCOHOL SWAB PREP PADS    Use to swab area of injection/raymond as directed.    ASPIRIN EC 81 MG EC TABLET    Take 1 tablet (81 mg) by mouth daily    BENZOYL PEROXIDE 5 % LOTN LOTION    Apply topically At Bedtime    KETOCONAZOLE (NIZORAL) 2 % SHAMPOO    Apply topically three times a week Alternate with Head and Shoulders.    MULTIVITAMIN, THERAPEUTIC WITH MINERALS (THERA-VIT-M) TABS TABLET    Take 1 tablet by mouth daily    MYCOPHENOLATE (CELLCEPT - GENERIC EQUIVALENT) 250 MG CAPSULE    Take 6 capsules (1,500 mg) by mouth 2 times daily    ORDER FOR DME    Equipment being ordered: Splint - Cubital tunnel splint for ulnar neuropathy    PRAVASTATIN (PRAVACHOL) 20 MG TABLET    Take 1 tablet (20 mg) by mouth every evening    SHARPS CONTAINER (B-D SHARPS DISPOSAL BY MAIL) MISC    1 container    TACROLIMUS (PROGRAF - GENERIC EQUIVALENT) 1 MG CAPSULE    Take 2mg (2 capsules) in the morning and 1mg (1 capsule) in the evening.   Modified Medications    No medications on file   Discontinued Medications    No medications on file        No Known Allergies        I have reviewed the Medications, Allergies, Past Medical and Surgical History, and Social History in the Epic system.    Review of Systems   Constitutional: Negative for fever.   Respiratory: Negative for shortness of breath.    Cardiovascular: Negative for chest pain.   Gastrointestinal: Negative for abdominal pain.   Skin: Positive for rash (painful, along left thigh and buttocks).   All other systems reviewed and are negative.      Physical Exam   BP: 126/86  Pulse: 95  Temp: 97.6  F (36.4  C)  Resp: 18  SpO2: 100 %  Physical Exam   Constitutional: He is oriented to person, place, and time. He appears well-developed and well-nourished.  Non-toxic appearance. He does not appear ill. No distress.   HENT:   Head: Normocephalic and atraumatic.   Eyes: EOM are  normal. Pupils are equal, round, and reactive to light. No scleral icterus.   Neck: Normal range of motion. Neck supple.   Cardiovascular: Normal rate.    Pulmonary/Chest: Effort normal. No respiratory distress.   Neurological: He is alert and oriented to person, place, and time. He has normal strength. No sensory deficit.   Skin: Skin is warm and dry. Rash noted. Rash is vesicular. No pallor.   Clustered vesicular eruption noted left hip and upper leg in a dermatomal distribution consistent with zoster.  See photograph   Psychiatric: He has a normal mood and affect. His behavior is normal.   Nursing note and vitals reviewed.            ED Course     ED Course     Procedures          Consults  Cardiology: Responded (08/14/17 4051)    Assessments & Plan (with Medical Decision Making)   This patient presented to the Emergency Department with a rash consistent with zoster. He was afebrile, non-toxic, and on exam had lesions confined to one dermatome. There was no evidence of disseminated zoster. He was immunosuppressed secondary to a heart transplant, but given that this is confined to one dermatome we will treat as per usual with Valacyclovir and he was given a prescription for Neurontin for pain. He will follow up with his transplant coordinator and was discharged in good condition.     I have reviewed the nursing notes.    I have reviewed the findings, diagnosis, plan and need for follow up with the patient.  This part of the document was transcribed by Michelle Arthur Scribe.   New Prescriptions    GABAPENTIN (NEURONTIN) 300 MG CAPSULE    Take 1 capsule (300 mg) by mouth 3 times daily Begin by taking one capsule once per day, the next day you may take 1 capsule twice per day and on the 3rd day begin taking 1 capsule 3 times per day.    VALACYCLOVIR (VALTREX) 1000 MG TABLET    Take 1 tablet (1,000 mg) by mouth 3 times daily       Final diagnoses:   Herpes zoster without complication   Domingo HEADLEY  Brayan, am serving as a trained medical scribe to document services personally performed by Rakan Jaramillo MD, based on the provider's statements to me.      I, Rakan Jaramillo MD, was physically present and have reviewed and verified the accuracy of this note documented by Domingo Schmidt.       8/14/2017   [facility], [place], EMERGENCY DEPARTMENT     Rakan Jaramillo MD  08/14/17 0906

## 2017-09-20 ENCOUNTER — PRE VISIT (OUTPATIENT)
Dept: TRANSPLANT | Facility: CLINIC | Age: 47
End: 2017-09-20

## 2017-09-20 DIAGNOSIS — Z94.1 HEART REPLACED BY TRANSPLANT (H): Primary | ICD-10-CM

## 2017-09-20 RX ORDER — LIDOCAINE 40 MG/G
CREAM TOPICAL
Status: CANCELLED | OUTPATIENT
Start: 2017-09-20

## 2017-09-20 NOTE — TELEPHONE ENCOUNTER
LM on patient's phone requesting call back to confirm upcoming lab, clinic and bx appointments.   Patient to allow for a 12 hour FK level and should not eat for 3 to 4 hours prior to the bx but should take his AM medications with sips of water.  Awaiting call back.

## 2017-09-21 ENCOUNTER — TELEPHONE (OUTPATIENT)
Dept: TRANSPLANT | Facility: CLINIC | Age: 47
End: 2017-09-21

## 2017-09-21 NOTE — TELEPHONE ENCOUNTER
LM through  to remind pt of appointments 9/22. Reminded pt of appointment times/locations, not to eat or drink after midnight and not to take morning immunosuppressants prior to lab draw. Asked pt to call SOT back at [phone] with any questions.

## 2017-09-22 ENCOUNTER — HOSPITAL ENCOUNTER (OUTPATIENT)
Facility: CLINIC | Age: 47
Discharge: HOME | End: 2017-09-22
Attending: INTERNAL MEDICINE | Admitting: INTERNAL MEDICINE
Payer: COMMERCIAL

## 2017-09-22 ENCOUNTER — APPOINTMENT (OUTPATIENT)
Dept: MEDSURG UNIT | Facility: CLINIC | Age: 47
End: 2017-09-22
Attending: NURSE PRACTITIONER
Payer: COMMERCIAL

## 2017-09-22 ENCOUNTER — APPOINTMENT (OUTPATIENT)
Dept: CARDIOLOGY | Facility: CLINIC | Age: 47
End: 2017-09-22
Attending: INTERNAL MEDICINE
Payer: COMMERCIAL

## 2017-09-22 ENCOUNTER — OFFICE VISIT (OUTPATIENT)
Dept: CARDIOLOGY | Facility: CLINIC | Age: 47
End: 2017-09-22
Attending: NURSE PRACTITIONER
Payer: COMMERCIAL

## 2017-09-22 VITALS
SYSTOLIC BLOOD PRESSURE: 108 MMHG | WEIGHT: 159.3 LBS | HEIGHT: 67 IN | HEART RATE: 86 BPM | OXYGEN SATURATION: 98 % | DIASTOLIC BLOOD PRESSURE: 74 MMHG | BODY MASS INDEX: 25 KG/M2

## 2017-09-22 VITALS
RESPIRATION RATE: 18 BRPM | DIASTOLIC BLOOD PRESSURE: 78 MMHG | TEMPERATURE: 97.7 F | HEART RATE: 85 BPM | OXYGEN SATURATION: 98 % | SYSTOLIC BLOOD PRESSURE: 112 MMHG

## 2017-09-22 DIAGNOSIS — B02.9 HERPES ZOSTER WITHOUT COMPLICATION: ICD-10-CM

## 2017-09-22 DIAGNOSIS — D84.9 IMMUNOSUPPRESSION (H): ICD-10-CM

## 2017-09-22 DIAGNOSIS — B57.2 CHAGAS DISEASE: ICD-10-CM

## 2017-09-22 DIAGNOSIS — Z94.1 HEART REPLACED BY TRANSPLANT (H): ICD-10-CM

## 2017-09-22 DIAGNOSIS — D64.9 ANEMIA, UNSPECIFIED TYPE: ICD-10-CM

## 2017-09-22 DIAGNOSIS — R79.9 ABNORMAL BLOOD FINDING: ICD-10-CM

## 2017-09-22 DIAGNOSIS — R93.89 ABNORMAL CHEST CT: ICD-10-CM

## 2017-09-22 DIAGNOSIS — Z94.1 STATUS POST HEART TRANSPLANTATION (H): Primary | ICD-10-CM

## 2017-09-22 DIAGNOSIS — B57.2 CHAGAS CARDIOMYOPATHY: ICD-10-CM

## 2017-09-22 LAB
ANION GAP SERPL CALCULATED.3IONS-SCNC: 6 MMOL/L (ref 3–14)
BUN SERPL-MCNC: 16 MG/DL (ref 7–30)
CALCIUM SERPL-MCNC: 9.1 MG/DL (ref 8.5–10.1)
CHLORIDE SERPL-SCNC: 109 MMOL/L (ref 94–109)
CO2 SERPL-SCNC: 26 MMOL/L (ref 20–32)
CREAT SERPL-MCNC: 0.92 MG/DL (ref 0.66–1.25)
ERYTHROCYTE [DISTWIDTH] IN BLOOD BY AUTOMATED COUNT: 13.3 % (ref 10–15)
FERRITIN SERPL-MCNC: 318 NG/ML (ref 26–388)
GFR SERPL CREATININE-BSD FRML MDRD: 87 ML/MIN/1.7M2
GLUCOSE SERPL-MCNC: 96 MG/DL (ref 70–99)
HCT VFR BLD AUTO: 36 % (ref 40–53)
HGB BLD-MCNC: 11.8 G/DL (ref 13.3–17.7)
IRON SATN MFR SERPL: 27 % (ref 15–46)
IRON SERPL-MCNC: 109 UG/DL (ref 35–180)
MCH RBC QN AUTO: 29.5 PG (ref 26.5–33)
MCHC RBC AUTO-ENTMCNC: 32.8 G/DL (ref 31.5–36.5)
MCV RBC AUTO: 90 FL (ref 78–100)
MISCELLANEOUS TEST: NORMAL
PLATELET # BLD AUTO: 203 10E9/L (ref 150–450)
POTASSIUM SERPL-SCNC: 4 MMOL/L (ref 3.4–5.3)
RBC # BLD AUTO: 4 10E12/L (ref 4.4–5.9)
SODIUM SERPL-SCNC: 141 MMOL/L (ref 133–144)
TACROLIMUS BLD-MCNC: 6.8 UG/L (ref 5–15)
TIBC SERPL-MCNC: 397 UG/DL (ref 240–430)
TME LAST DOSE: NORMAL H
WBC # BLD AUTO: 4.3 10E9/L (ref 4–11)

## 2017-09-22 PROCEDURE — 02BK3ZX EXCISION OF RIGHT VENTRICLE, PERCUTANEOUS APPROACH, DIAGNOSTIC: ICD-10-PCS | Performed by: INTERNAL MEDICINE

## 2017-09-22 PROCEDURE — 27210982 ZZH KIT RT HC TOTES DISP CR7

## 2017-09-22 PROCEDURE — 36415 COLL VENOUS BLD VENIPUNCTURE: CPT | Performed by: INTERNAL MEDICINE

## 2017-09-22 PROCEDURE — T1013 SIGN LANG/ORAL INTERPRETER: HCPCS | Mod: U3

## 2017-09-22 PROCEDURE — 93505 ENDOMYOCARDIAL BIOPSY: CPT

## 2017-09-22 PROCEDURE — 80048 BASIC METABOLIC PNL TOTAL CA: CPT | Performed by: INTERNAL MEDICINE

## 2017-09-22 PROCEDURE — 40000166 ZZH STATISTIC PP CARE STAGE 1

## 2017-09-22 PROCEDURE — 88346 IMFLUOR 1ST 1ANTB STAIN PX: CPT | Performed by: INTERNAL MEDICINE

## 2017-09-22 PROCEDURE — C1893 INTRO/SHEATH, FIXED,NON-PEEL: HCPCS

## 2017-09-22 PROCEDURE — 87799 DETECT AGENT NOS DNA QUANT: CPT | Performed by: INTERNAL MEDICINE

## 2017-09-22 PROCEDURE — 99213 OFFICE O/P EST LOW 20 MIN: CPT | Mod: ZF

## 2017-09-22 PROCEDURE — 27211047 ZZH FORCEP BIOPSY CR10

## 2017-09-22 PROCEDURE — 82728 ASSAY OF FERRITIN: CPT | Performed by: INTERNAL MEDICINE

## 2017-09-22 PROCEDURE — 88307 TISSUE EXAM BY PATHOLOGIST: CPT | Performed by: INTERNAL MEDICINE

## 2017-09-22 PROCEDURE — 93505 ENDOMYOCARDIAL BIOPSY: CPT | Mod: 26 | Performed by: INTERNAL MEDICINE

## 2017-09-22 PROCEDURE — 99214 OFFICE O/P EST MOD 30 MIN: CPT | Mod: ZP | Performed by: NURSE PRACTITIONER

## 2017-09-22 PROCEDURE — 83540 ASSAY OF IRON: CPT | Performed by: INTERNAL MEDICINE

## 2017-09-22 PROCEDURE — 83550 IRON BINDING TEST: CPT | Performed by: INTERNAL MEDICINE

## 2017-09-22 PROCEDURE — 88350 IMFLUOR EA ADDL 1ANTB STN PX: CPT | Performed by: INTERNAL MEDICINE

## 2017-09-22 PROCEDURE — 80197 ASSAY OF TACROLIMUS: CPT | Performed by: INTERNAL MEDICINE

## 2017-09-22 PROCEDURE — 85027 COMPLETE CBC AUTOMATED: CPT | Performed by: INTERNAL MEDICINE

## 2017-09-22 RX ORDER — LIDOCAINE 40 MG/G
CREAM TOPICAL
Status: COMPLETED | OUTPATIENT
Start: 2017-09-22 | End: 2017-09-22

## 2017-09-22 RX ADMIN — LIDOCAINE: 40 CREAM TOPICAL at 10:50

## 2017-09-22 ASSESSMENT — PAIN SCALES - GENERAL: PAINLEVEL: NO PAIN (0)

## 2017-09-22 NOTE — DISCHARGE INSTRUCTIONS
[facility]                        Interventional Cardiology  Discharge Instructions   Post Right Heart Biopsy      AFTER YOU GO HOME:    DO drink plenty of fluids    DO resume your regular diet and medications unless otherwise instructed by your Primary Physician    Do Not scrub the procedure site vigorously    No lotion or powder to the puncture site for 3 days    CALL YOUR PRIMARY PHYSICIAN IF: You may resume all normal activity.  Monitor neck site for bleeding, swelling, or voice changes. If you notice bleeding or swelling immediately apply pressure to the site and call number below to speak with Cardiology Fellow.  If you experience any changes in your breathing you should call your doctor immediately or come to the closest Emergency Department.  Do not drive yourself.    ADDITIONAL INSTRUCTIONS: Medications: You are to resume all home medications including anticoagulation therapy unless otherwise advised by your primary cardiologist or nurse coordinator.    Follow Up: Per your primary cardiology team    If you have any questions or concerns regarding your procedure site please call [phone] at anytime and ask for Cardiology Fellow on call.  They are available 24 hours a day.  You may also contact the Cardiology Clinic after hours number at [phone].                                                       Telephone Numbers [phone] Monday-Friday 8:00 am to 4:30 pm    [phone] After 4:30 pm Monday-Friday, Weekends & Holidays  Ask for Interventional Cardiologist on call. Someone is on call 24 hours/day   [facility] toll free number [phone] Monday-Friday 8:00 am to 4:30 pm   [facility] Emergency Dept [phone]

## 2017-09-22 NOTE — IP AVS SNAPSHOT
[address] [address]                                       After Visit Summary   9/22/2017    Javi Bansal    MRN: [id number]           After Visit Summary Signature Page     I have received my discharge instructions, and my questions have been answered. I have discussed any challenges I see with this plan with the nurse or doctor.    ..........................................................................................................................................  Patient/Patient Representative Signature      ..........................................................................................................................................  Patient Representative Print Name and Relationship to Patient    ..................................................               ................................................  Date                                            Time    ..........................................................................................................................................  Reviewed by Signature/Title    ...................................................              ..............................................  Date                                                            Time

## 2017-09-22 NOTE — PROCEDURES
CARDIAC CATH REPORT:   PROCEDURES PERFORMED:   Endomyocardial Biopsy    PHYSICIANS:  Attending Physician: Trung Torres MD PhD  Cardiology Fellow: Alisha Woodward MD    INDICATION:  Javi Bansal is a 46 year old male s/p OHT 12/12/16, referred by Dr. Mcclellan for routine endomyocardial biopsy for heart transplant monitoring.  Last one was August 1 2017, no issues.    DESCRIPTION:  1. Consent obtained with discussion of risks.  All questions were answered.  2. Sterile prep and procedure.  3. Location with Sheaths:   Rt IJ  7 Fr 40 cm [extra long] DAIG  4. Access: Local anesthetic with lidocaine.  A standard 18 guage needle with ultrasound guidance was used to establish vascular access using a modified Seldinger technique.  6. Estimated blood loss: < 5 ml    MEDICATIONS:  The procedure was performed under local sedation 1% lidocaine.  Heart rate, BP, respiration, oxygen saturation and patient responses were monitored throughout the procedure with the assistance of the RN under my supervision.    Procedures:    ENDOMYOCARDIAL BIOPSY:  1. Successful collection of 5 right ventricular endomyocardial biopsy samples using the Jawz.    Sheath Removal:  The right internal jugular sheath was manually removed in the cardiac catheterization laboratory.    Contrast: Isovue, 0 ml     Fluoroscopy Time: 2 min    COMPLICATIONS:  1. None    SUMMARY:    Successful collection of 5 right ventricular endomyocardial biopsy samples    PLAN:   >> ASA 81 mg qd  >> Continued medical management and lifestyle modification for cardiovascular risk factor optimization.   >> Discharge today per protocol  >> Follow up biopsy results    The attending interventional cardiologist was present and supervised all critical aspects the procedure.    See CVIS report for final draft.    Alisha Woodward   Cardiology Fellow        ATTENDING ATTESTATION: I was present and supervised the cardiology fellow throughout the procedure and reviewed  the findings with the fellow at the completion of the procedure.  I agree with the documentation above.    Trung Torres MD, PhD  Interventional/Critical Care Cardiology  [phone]    September 22, 2017

## 2017-09-22 NOTE — IP AVS SNAPSHOT
MRN:[id number]                      After Visit Summary   9/22/2017    Javi Bansal    MRN: [id number]           Visit Information        Department      9/22/2017 10:22 AM [address] [facility] [place] [facility]          Review of your medicines      UNREVIEWED medicines. 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)        Dose:  81 mg   Take 1 tablet (81 mg) by mouth daily   Quantity:  90 tablet   Refills:  3       benzoyl peroxide 5 % Lotn lotion   Used for:  Other acne        Apply topically At Bedtime   Quantity:  30 mL   Refills:  3       gabapentin 300 MG capsule   Commonly known as:  NEURONTIN        Dose:  300 mg   Take 1 capsule (300 mg) by mouth 3 times daily Begin by taking one capsule once per day, the next day you may take 1 capsule twice per day and on the 3rd day begin taking 1 capsule 3 times per day.   Quantity:  90 capsule   Refills:  1       ketoconazole 2 % shampoo   Commonly known as:  NIZORAL   Used for:  Seborrhea        Apply topically three times a week Alternate with Head and Shoulders.   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 (H)        Dose:  1 tablet   Take 1 tablet by mouth daily   Quantity:  30 each   Refills:  11       mycophenolate 250 MG capsule   Commonly known as:  GENERIC EQUIVALENT   Used for:  Heart replaced by transplant (H)        Dose:  1500 mg   Take 6 capsules (1,500 mg) by mouth 2 times daily   Quantity:  360 capsule   Refills:  11       pravastatin 20 MG tablet   Commonly known as:  PRAVACHOL   Used for:  Heart replaced by transplant (H)        Dose:  20 mg   Take 1 tablet (20 mg) by mouth every evening   Quantity:  30 tablet   Refills:  11       tacrolimus 1 MG capsule   Commonly known as:  GENERIC EQUIVALENT   Used for:  Heart replaced by transplant (H)        Take  2mg (2 capsules) in the morning and 1mg (1 capsule) in the evening.   Quantity:  90 capsule   Refills:  11       valACYclovir 1000 mg tablet   Commonly known as:  VALTREX        Dose:  1000 mg   Take 1 tablet (1,000 mg) by mouth 3 times daily   Quantity:  21 tablet   Refills:  0         CONTINUE these medicines which have NOT CHANGED        Dose / Directions    alcohol swab prep pads   Used for:  Hyperglycemia        Use to swab area of injection/raymond as directed.   Quantity:  100 each   Refills:  1       B-D SHARPS DISPOSAL BY MAIL Misc   Used for:  Hyperglycemia        1 container   Quantity:  1 each   Refills:  1       order for DME   Used for:  Lesion of right ulnar nerve        Equipment being ordered: Splint - Cubital tunnel splint for ulnar neuropathy   Quantity:  1 Device   Refills:  0                Protect others around you: Learn how to safely use, store and throw away your medicines at www.disposemymeds.org.         Follow-ups after your visit        Your next 10 appointments already scheduled     Sep 22, 2017 11:00 AM CDT   Heart Cath Heart Biopsy with [facility]CVR[facility]   [facility] ([facility])    [address] [address]   [phone]            Oct 24, 2017  9:30 AM CDT   (Arrive by 9:15 AM)   RETURN DIABETES with SANJIV Lewis [facility] Endocrinology ([facility])    [address] [place] [address]   [phone]            Dec 14, 2017 10:30 AM CST   Procedure 1.5 hr with U2A ROOM 10   [address] [facility] [place] ([facility])    [address] [address]               Dec 14, 2017 12:00 PM CST   Cath 90 Minute with UUHCVR4   [facility] ([facility])    [address] [address]   [phone]            Dec 19, 2017  9:00 AM CST   MR HUNG REID  CONTRAST STRESS AND FLOW with UUMR4, UU CV MR NURSE, MG STRESS RM   [facility] [place], MRI ([facility] [place])    [address] [address] [phone]           Take your medicines as usual, unless your doctor tells you not to.   If you take Viagra, Levitra or Cialis, stop taking it 48 hours before your test.   If you take Aggrenox or dipyridamole (Persantine, Permole), stop taking it 48 hours before your test.   If you take Theophylline or Aminophylline, stop taking it 12 hours before your test.   If you are diabetic and take oral hypoglycemics, do not take them on the day of your test.  The day before your exam, drink extra fluids at least six 8-ounce glasses (unless your doctor wants you to limit your fluids).  Stop all caffeine 12 hours before the test. This includes coffee, tea, soda, chocolate and certain medicines (such as Anacin, Excedrin and NoDoz). Also avoid decaf coffee and tea, as these contain small amounts of caffeine.  Do not eat or drink for 3 hours before your exam. You may drink water and take your morning medicines.  You may need a blood test (creatinine test) within 30 days of your exam. Follow your doctor s orders if this is arranged before your exam.  If you are very claustrophobic, please let you doctor know. You may get a sedative medicine from your doctor before you arrive. Bring the medicine to the exam. Do not take it at home. Arrive one hour early. Bring someone who can take you home after the test. Your medicine will make you sleepy. After the exam, you may not drive, take a bus or take a taxi by yourself.  The MRI machine uses a strong magnet. Please wear clothes without metal (snaps, zippers). A sweatsuit works well, or we may give you a hospital gown.  Please remove any body piercings and hair extensions before you arrive. You will remove watches, jewelry, hairpins, wallets, dentures, partial dental plates and  hearing aids. You may wear contact lenses, and you may be able to wear your rings. We have a safe place to keep your personal items, but it is safer to leave them at home.   **IMPORTANT** THE INSTRUCTIONS BELOW ARE ONLY FOR THOSE PATIENTS WHO HAVE BEEN TOLD THEY WILL RECEIVE SEDATION OR GENERAL ANESTHESIA DURING THEIR MRI PROCEDURE:  IF YOU WILL RECEIVE SEDATION (take medicine to help you relax during your exam):   You must get the medicine from your doctor before you arrive. Bring the medicine to the exam. Do not take it at home.   Arrive one hour early. Bring someone who can take you home after the test. Your medicine will make you sleepy. After the exam, you may not drive, take a bus or take a taxi by yourself.   No eating 8 hours before your exam. You may have clear liquids up until 4 hours before your exam. (Clear liquids include water, clear tea, black coffee and fruit juice without pulp.)  IF YOU WILL RECEIVE ANESTHESIA (be asleep for your exam):   Arrive 1 1/2 hours early. Bring someone who can take you home after the test. You may not drive, take a bus or take a taxi by yourself.   No eating 8 hours before your exam. You may have clear liquids up until 4 hours before your exam. (Clear liquids include water, clear tea, black coffee and fruit juice without pulp.)  If you have any questions, please contact your Imaging Department exam site.            Dec 19, 2017 11:00 AM CST   (Arrive by 10:45 AM)   HEART TRANSPLANT ANNUAL with Geena Mcclellan MD   [facility] Heart Care ([facility])    [address] [place] [id number] [phone]            Dec 27, 2017  1:00 PM CST   (Arrive by 12:45 PM)   Transplant Skin Check with GARY Murillo MD   [facility] Dermatology ([facility])    [address] [place] [id number] [phone]            Jun 13, 2018 10:15 AM CDT   RETURN RETINA with Isabela Coronel  Joaquin Lund MD   [facility] ([facility])    [address]  [address]  [address]  [facility] [id number] [phone]               Care Instructions        Further instructions from your care team       [facility]                        Interventional Cardiology  Discharge Instructions   Post Right Heart Biopsy      AFTER YOU GO HOME:    DO drink plenty of fluids    DO resume your regular diet and medications unless otherwise instructed by your Primary Physician    Do Not scrub the procedure site vigorously    No lotion or powder to the puncture site for 3 days    CALL YOUR PRIMARY PHYSICIAN IF: You may resume all normal activity.  Monitor neck site for bleeding, swelling, or voice changes. If you notice bleeding or swelling immediately apply pressure to the site and call number below to speak with Cardiology Fellow.  If you experience any changes in your breathing you should call your doctor immediately or come to the closest Emergency Department.  Do not drive yourself.    ADDITIONAL INSTRUCTIONS: Medications: You are to resume all home medications including anticoagulation therapy unless otherwise advised by your primary cardiologist or nurse coordinator.    Follow Up: Per your primary cardiology team    If you have any questions or concerns regarding your procedure site please call [phone] at anytime and ask for Cardiology Fellow on call.  They are available 24 hours a day.  You may also contact the Cardiology Clinic after hours number at [phone].                                                       Telephone Numbers [phone] Monday-Friday 8:00 am to 4:30 pm    [phone] After 4:30 pm Monday-Friday, Weekends & Holidays  Ask for Interventional Cardiologist on call. Someone is on call 24 hours/day   [facility] toll free number [phone] Monday-Friday 8:00 am to 4:30 pm   [facility] Emergency Dept [phone]                     Additional Information About Your Visit        Care EveryWhere ID     This is your Care EveryWhere ID. This could be used by other organizations to access your [facility] medical records  [id number]        Your Vitals Were     Blood Pressure Pulse Temperature Respirations Pulse Oximetry       110/80 (BP Location: Right arm) 84 97.7  F (36.5  C) (Oral) 18 99%        Primary Care Provider    None Specified      Equal Access to Services     [facility] UCLAROBERTO : Hadii aad ku hadasho Soomaali, waaxda luqadaha, qaybta kaalmada adeegyada, waxay idiin haysamanthan adeantonio aro latabbybridgett . So [facility] [phone].    ATENCIÓN: Si habla español, tiene a watt disposición servicios gratuitos de asistencia lingüística. Bridger al [phone].    We comply with applicable federal civil rights laws and [place] laws. We do not discriminate on the basis of race, color, national origin, age, disability sex, sexual orientation or gender identity.            Thank you!     Thank you for choosing [facility] for your care. Our goal is always to provide you with excellent care. Hearing back from our patients is one way we can continue to improve our services. Please take a few minutes to complete the written survey that you may receive in the mail after you visit with us. Thank you!             Medication List: This is a list of all your medications and when to take them. Check marks below indicate your daily home schedule. Keep this list as a reference.      Medications           Morning Afternoon Evening Bedtime As Needed    alcohol swab prep pads   Use to swab area of injection/raymond as directed.                                aspirin 81 MG EC tablet   Take 1 tablet (81 mg) by mouth daily                                B-D SHARPS DISPOSAL BY MAIL Misc   1 container                                benzoyl peroxide 5 % Lotn lotion   Apply topically At Bedtime                                gabapentin 300 MG capsule   Commonly known as:  NEURONTIN   Take 1  capsule (300 mg) by mouth 3 times daily Begin by taking one capsule once per day, the next day you may take 1 capsule twice per day and on the 3rd day begin taking 1 capsule 3 times per day.                                ketoconazole 2 % shampoo   Commonly known as:  NIZORAL   Apply topically three times a week Alternate with Head and Shoulders.                                multivitamin, therapeutic with minerals Tabs tablet   Take 1 tablet by mouth daily                                mycophenolate 250 MG capsule   Commonly known as:  GENERIC EQUIVALENT   Take 6 capsules (1,500 mg) by mouth 2 times daily                                order for DME   Equipment being ordered: Splint - Cubital tunnel splint for ulnar neuropathy                                pravastatin 20 MG tablet   Commonly known as:  PRAVACHOL   Take 1 tablet (20 mg) by mouth every evening                                tacrolimus 1 MG capsule   Commonly known as:  GENERIC EQUIVALENT   Take 2mg (2 capsules) in the morning and 1mg (1 capsule) in the evening.                                valACYclovir 1000 mg tablet   Commonly known as:  VALTREX   Take 1 tablet (1,000 mg) by mouth 3 times daily

## 2017-09-22 NOTE — PROGRESS NOTES
"[facility]    HPI:   Mr. Javi Bansal is a 46yr old male with a history of Chagas cardiomyopathy (diagnosed in 2010, treated with nifurtimox for 3 months) and biventricular systolic heart failure (LVEF 15-20% since 2015, on home milrinone since 8/2016), now s/p OHT 12/12/16, who presents to clinic for routine surveillance. He is accompanied by a .     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 We are following monthly Chagas labs. He is due to see ID in October.     He was started on azathioprine postoperatively due to reports of better outcomes.  His initial biopsy on 12/19/16 showed 2R ACR, which was treated with IV steroids.  Repeat biopsy 12/27/16 showed 2R/3A ACR, which was treated with IV steroids and thymoglobulin.  He had another episode of 2R ACR 2/10/17, which was treated with steroids.  His biopsies have since been negative for significant rejection.  Baseline coronary angiogram showed no evidence of CAV.  His graft function remained stable throughout his rejection episodes, and continues to remain stable (cardiac MRI in June with EF 57%).  He has no DSAs. Last RHC 6/2/17 showed normal biventricular filling pressures, with CI 2.7. Last bx 8/1/17 showed 1R ACR and no AMR, negative c4d.     Since last visit, patient reports feeling \"great\". He did go to the ED mid-August for a thigh rash that appeared consistent with shingles. He was given a one weeks supply of Valtrex and gabapentin for nerve pain. Patient statues the rash and pain have completely resolved. 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 He is also " working out before he goes to work. Patient denies chest pain, dyspnea, fatigue, and lightheadedness exertion. Patient denies fever, chills, oral lesions, headache, palpitations, SOB, SEPULVEDA, PND, orthopnea, nausea, vomiting, diarrhea, or LE edema.     PAST MEDICAL HISTORY:  Past Medical History:   Diagnosis Date     Chagas cardiomyopathy      Chronic systolic heart failure (H)      Diabetes (H)      Dual ICD (implantable cardioverter-defibrillator) in place 10/20/2015     Essential hypertension      Gastroesophageal reflux disease      H/O gastric ulcer      Hypertension      Premature ventricular contractions      Presbyopia      Pterygium     ?? suspected , but unclear dx       FAMILY HISTORY:  Family History   Problem Relation Age of Onset     Brain Cancer Mother      Melanoma No family hx of      Skin Cancer No family hx of      Glaucoma No family hx of      Macular Degeneration No family hx of        SOCIAL HISTORY:  Social History     Social History     Marital status:      Spouse name: N/A     Number of children: N/A     Years of education: N/A     Social History Main Topics     Smoking status: Never Smoker     Smokeless tobacco: Never Used     Alcohol use No     Drug use: No     Sexual activity: Not on file     Other Topics Concern     Not on file     Social History Narrative       CURRENT MEDICATIONS:    Current Outpatient Prescriptions on File Prior to Visit:  valACYclovir (VALTREX) 1000 mg tablet Take 1 tablet (1,000 mg) by mouth 3 times daily   gabapentin (NEURONTIN) 300 MG capsule Take 1 capsule (300 mg) by mouth 3 times daily Begin by taking one capsule once per day, the next day you may take 1 capsule twice per day and on the 3rd day begin taking 1 capsule 3 times per day.   benzoyl peroxide 5 % LOTN lotion Apply topically At Bedtime   Sharps Container (B-D SHARPS DISPOSAL BY MAIL) MISC 1 container   alcohol swab prep pads Use to swab area of injection/raymond as directed.   tacrolimus (PROGRAF -  "GENERIC EQUIVALENT) 1 MG capsule Take 2mg (2 capsules) in the morning and 1mg (1 capsule) in the evening.   ketoconazole (NIZORAL) 2 % shampoo Apply topically three times a week Alternate with Head and Shoulders.   order for DME Equipment being ordered: Splint - Cubital tunnel splint for ulnar neuropathy   pravastatin (PRAVACHOL) 20 MG tablet Take 1 tablet (20 mg) by mouth every evening   mycophenolate (CELLCEPT - GENERIC EQUIVALENT) 250 MG capsule Take 6 capsules (1,500 mg) by mouth 2 times daily   aspirin EC 81 MG EC tablet Take 1 tablet (81 mg) by mouth daily   multivitamin, therapeutic with minerals (THERA-VIT-M) TABS tablet Take 1 tablet by mouth daily     No current facility-administered medications on file prior to visit.     ROS:  CONSTITUTIONAL: Denies fever, chills, fatigue, or weight fluctuations.   HEENT: Denies headache, vision changes, and changes in speech.   CV: see hpi   PULMONARY: see hpi   GI:Denies nausea, vomiting, diarrhea, and abdominal pain. Bowel movements are regular.   : Denies urinary alterations, dysuria, urinary frequency, hematuria, and abnormal drainage.   EXT: Denies lower extremity edema or color changes.   SKIN: Resolved lesions on thigh, no other rash.   MUSCULOSKELETAL: Denies upper or lower extremity weakness and pain.   NEUROLOGIC: Denies lightheadedness, dizziness, seizures, or upper or lower extremity paresthesia.     EXAM:  /74 (BP Location: Left arm, Patient Position: Chair, Cuff Size: Adult Regular)  Pulse 86  Ht 1.699 m (5' 6.9\")  Wt 72.3 kg (159 lb 4.8 oz)  SpO2 98%  BMI 25.02 kg/m2    GENERAL: Appears comfortable, in no acute distress.   HEENT: Eye symmetrical, no discharge or icterus bilaterally. Mucous membranes moist and without lesions.  CV: RRR, +S1S2, no murmur, rub, or gallop. JVP not appreciable at 45 degrees.   RESPIRATORY: Respirations regular, even, and unlabored. Lungs CTA throughout.   GI: Soft and non distended with normoactive bowel sounds " present in all quadrants. No tenderness, rebound, guarding. No hepatomegaly.   EXTREMITIES: No peripheral edema. 2+ bilateral pedal pulses.   NEUROLOGIC: Alert and oriented x 3. No focal deficits.   MUSCULOSKELETAL: No joint swelling or tenderness.   SKIN: No jaundice. No rashes or lesions. Completely healed vesicles/scarring over left anterior and lateral thigh.     Labs - reviewed with patient in clinic today:  CBC RESULTS:  Lab Results   Component Value Date    WBC 4.3 09/22/2017    RBC 4.00 (L) 09/22/2017    HGB 11.8 (L) 09/22/2017    HCT 36.0 (L) 09/22/2017    MCV 90 09/22/2017    MCH 29.5 09/22/2017    MCHC 32.8 09/22/2017    RDW 13.3 09/22/2017     09/22/2017       CMP RESULTS:  Lab Results   Component Value Date     09/22/2017    POTASSIUM 4.0 09/22/2017    CHLORIDE 109 09/22/2017    CO2 26 09/22/2017    ANIONGAP 6 09/22/2017    GLC 96 09/22/2017    BUN 16 09/22/2017    CR 0.92 09/22/2017    GFRESTIMATED 87 09/22/2017    GFRESTBLACK >90 09/22/2017    DAISY 9.1 09/22/2017    BILITOTAL 0.4 07/03/2017    ALBUMIN 4.0 07/03/2017    ALKPHOS 110 07/03/2017    ALT 16 07/03/2017    AST 28 07/03/2017        INR RESULTS:  Lab Results   Component Value Date    INR 1.13 06/10/2017       LIPID RESULTS:  Lab Results   Component Value Date    CHOL 139 06/02/2017    HDL 49 06/02/2017    LDL 68 06/02/2017    TRIG 109 06/02/2017       IMMUNOSUPPRESSANT LEVELS:  Lab Results   Component Value Date    TACROL 7.3 08/01/2017    DOSTAC 07/31/17  2040 08/01/2017       No components found for: CK  Lab Results   Component Value Date    MAG 2.0 04/25/2017     Lab Results   Component Value Date    A1C 7.5 (H) 06/02/2017     Lab Results   Component Value Date    PHOS 3.0 03/28/2017     Lab Results   Component Value Date    NTBNP 1944 (H) 06/29/2016     Lab Results   Component Value Date    SAITESTMET SA HI 06/02/2017    SAICELL Class I 06/02/2017    [id number] None 06/02/2017    [id number] None 06/02/2017    ANGELIQUE   06/02/2017      Test performed by modified procedure. Serum heat inactivated. High-risk,   mfi >3,000. Mod-risk, mfi 500-3,000.       Lab Results   Component Value Date    SAIITESTME SA HI 06/02/2017    SAIICELL Class II 06/02/2017    [id number] None 06/02/2017    [id number] None 06/02/2017    SAIIREPCOM  06/02/2017      Test performed by modified procedure. Serum heat inactivated. High-risk,   mfi >3,000. Mod-risk, mfi 500-3,000.       Lab Results   Component Value Date    CSPEC  08/01/2017     Plasma  CORRECTED ON 08/01 AT 1331: PREVIOUSLY REPORTED AS Whole blood, EDTA   anticoagulant         Diagnostic Studies:  RHC 6/2/17:      Cardiac MRI 6/1/17:  1.  Normal left ventricular size and systolic function with a  calculated ejection fraction of  57 %.  2.  Normal right ventricular size and mildly reduced systolic function  with a calculated ejection fraction of 46%.   3.  On delayed enhancement imaging, there is no abnormal late  gadolinium enhancement.   4.  Compared with the prior study dated 1/5/2017, inferolateral scar  is no longer seen.    TTE 3/15/17:  Global and regional left ventricular function is normal with an EF of 60-65%.  Global right ventricular function is normal.  The inferior vena cava is normal. Estimated mean right atrial pressure is <3  mmHg.  No pericardial effusion is present.  This study was compared with the study from 2/14/2017. There has been no  change.    Assessment/Plan:  Mr. Iglesia Shetty is a 46 year old male who is s/p orthotopic heart transplant 12/2016 for Chagas cardiomyopathy. Post-op course complicated by rejection with atrial and ventricular arrhythmias, respiratory failure and JOSE LUIS, which resolved with diuresis, surgical blood loss anemia, right pleural effusion, requiring pigtail placement, and Chagas reactivation 12/26, treated with benznidasole. More recently, he has done remarkably well without recent rejection or evidence of graft dysfunction. He is back to work. Recent  episode of shingles treated with 7 days of Valtrex, this appears to have resolved.     # Status post OHT on 12/2016, history of Chagas cardiomyopathy  # Chronic immunosuppression  * Rejection history: 2R on first tx with steroids, repeat bx 2R/3A on second tx with steroids +thymo, 2R in February treated with steroids  * Recent immunosuppression changes: none    Immunosuppression:  - CNI: tac 2/1 mg. Trough level goal 8-10.   - Antiproliferative: Mycophenolate 1500 mg bid    Prophylaxis:  - CAV: ASA 81 mg and pravastatin 10 mg    Serostatus: CMV: D+/R+. EBV: D+/R+  Intolerance to medications: none    # Recent shingles, resolved  Dx in ED in 8/2017. Started on Valtrex 1 g TID x7 days as well as gabapentin. Lesions have resolved, no neuropathic pain. Will have patient see transplant ID soon (due for Chagas f/u) to discuss any further recs.     # Chagas, reactivated post-transplant   Following monthly labs per transplant ID. Due for follow-up in October, will help patient schedule.    # History of PNA/abnormal chest CT  Hospitalized in March. CT chest with scatteredGGP/nodules.Per ID, rec f/u chest CT - not ordered. Patient will see in clinic.     #  Health Care Maintenance  - Immunizations: up to date, recommend flu shot   - Dermatology: seen July 2017    Follow up:  Next biopsy: pending today  Next angiogram: at annual  Next stress test: per protocol   Next transplant clinic visit December with Dr. Mcclellan        25 minutes spent face-to-face with patient, >50% in counseling and/or coordination of care as described above      Rosibel Hu, ESTEFANÍA, NP-C  9/22/2017          CC  DYLON VALLES, BONIFACIO AUSTIN

## 2017-09-22 NOTE — LETTER
9/22/2017      RE: Javi Bansal  [address] [place] [address]       Dear Colleague,    Thank you for the opportunity to participate in the care of your patient, Javi Bansal, at the [facility]. Please see a copy of my visit note below.    [facility]    HPI:   Mr. Javi Bansal is a 46yr old male with a history of Chagas cardiomyopathy (diagnosed in 2010, treated with nifurtimox for 3 months) and biventricular systolic heart failure (LVEF 15-20% since 2015, on home milrinone since 8/2016), now s/p OHT 12/12/16, who presents to clinic for routine surveillance. He is accompanied by a .     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 We are following monthly Chagas labs. He is due to see ID in October.     He was started on azathioprine postoperatively due to reports of better outcomes.  His initial biopsy on 12/19/16 showed 2R ACR, which was treated with IV steroids.  Repeat biopsy 12/27/16 showed 2R/3A ACR, which was treated with IV steroids and thymoglobulin.  He had another episode of 2R ACR 2/10/17, which was treated with steroids.  His biopsies have since been negative for significant rejection.  Baseline coronary angiogram showed no evidence of CAV.  His graft function remained stable throughout his rejection episodes, and continues to remain stable (cardiac MRI in June with EF 57%).  He has no DSAs. Last RHC 6/2/17 showed normal biventricular filling pressures, with CI 2.7. Last bx 8/1/17 showed 1R ACR and no AMR, negative c4d.     Since last visit, patient  "reports feeling \"great\". He did go to the ED mid-August for a thigh rash that appeared consistent with shingles. He was given a one weeks supply of Valtrex and gabapentin for nerve pain. Patient statues the rash and pain have completely resolved. 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 He is also working out before he goes to work. Patient denies chest pain, dyspnea, fatigue, and lightheadedness exertion. Patient denies fever, chills, oral lesions, headache, palpitations, SOB, SEPULVEDA, PND, orthopnea, nausea, vomiting, diarrhea, or LE edema.     PAST MEDICAL HISTORY:  Past Medical History:   Diagnosis Date     Chagas cardiomyopathy      Chronic systolic heart failure (H)      Diabetes (H)      Dual ICD (implantable cardioverter-defibrillator) in place 10/20/2015     Essential hypertension      Gastroesophageal reflux disease      H/O gastric ulcer      Hypertension      Premature ventricular contractions      Presbyopia      Pterygium     ?? suspected , but unclear dx       FAMILY HISTORY:  Family History   Problem Relation Age of Onset     Brain Cancer Mother      Melanoma No family hx of      Skin Cancer No family hx of      Glaucoma No family hx of      Macular Degeneration No family hx of        SOCIAL HISTORY:  Social History     Social History     Marital status:      Spouse name: N/A     Number of children: N/A     Years of education: N/A     Social History Main Topics     Smoking status: Never Smoker     Smokeless tobacco: Never Used     Alcohol use No     Drug use: No     Sexual activity: Not on file     Other Topics Concern     Not on file     Social History Narrative       CURRENT MEDICATIONS:    Current Outpatient Prescriptions on File Prior to Visit:  valACYclovir (VALTREX) 1000 mg tablet Take 1 tablet (1,000 mg) by mouth 3 times daily   gabapentin (NEURONTIN) 300 MG capsule Take 1 capsule (300 mg) by mouth 3 times daily Begin by taking one capsule once per " "day, the next day you may take 1 capsule twice per day and on the 3rd day begin taking 1 capsule 3 times per day.   benzoyl peroxide 5 % LOTN lotion Apply topically At Bedtime   Sharps Container (B-D SHARPS DISPOSAL BY MAIL) MISC 1 container   alcohol swab prep pads Use to swab area of injection/raymond as directed.   tacrolimus (PROGRAF - GENERIC EQUIVALENT) 1 MG capsule Take 2mg (2 capsules) in the morning and 1mg (1 capsule) in the evening.   ketoconazole (NIZORAL) 2 % shampoo Apply topically three times a week Alternate with Head and Shoulders.   order for DME Equipment being ordered: Splint - Cubital tunnel splint for ulnar neuropathy   pravastatin (PRAVACHOL) 20 MG tablet Take 1 tablet (20 mg) by mouth every evening   mycophenolate (CELLCEPT - GENERIC EQUIVALENT) 250 MG capsule Take 6 capsules (1,500 mg) by mouth 2 times daily   aspirin EC 81 MG EC tablet Take 1 tablet (81 mg) by mouth daily   multivitamin, therapeutic with minerals (THERA-VIT-M) TABS tablet Take 1 tablet by mouth daily     No current facility-administered medications on file prior to visit.     ROS:  CONSTITUTIONAL: Denies fever, chills, fatigue, or weight fluctuations.   HEENT: Denies headache, vision changes, and changes in speech.   CV: see hpi   PULMONARY: see hpi   GI:Denies nausea, vomiting, diarrhea, and abdominal pain. Bowel movements are regular.   : Denies urinary alterations, dysuria, urinary frequency, hematuria, and abnormal drainage.   EXT: Denies lower extremity edema or color changes.   SKIN: Resolved lesions on thigh, no other rash.   MUSCULOSKELETAL: Denies upper or lower extremity weakness and pain.   NEUROLOGIC: Denies lightheadedness, dizziness, seizures, or upper or lower extremity paresthesia.     EXAM:  /74 (BP Location: Left arm, Patient Position: Chair, Cuff Size: Adult Regular)  Pulse 86  Ht 1.699 m (5' 6.9\")  Wt 72.3 kg (159 lb 4.8 oz)  SpO2 98%  BMI 25.02 kg/m2    GENERAL: Appears comfortable, in no " acute distress.   HEENT: Eye symmetrical, no discharge or icterus bilaterally. Mucous membranes moist and without lesions.  CV: RRR, +S1S2, no murmur, rub, or gallop. JVP not appreciable at 45 degrees.   RESPIRATORY: Respirations regular, even, and unlabored. Lungs CTA throughout.   GI: Soft and non distended with normoactive bowel sounds present in all quadrants. No tenderness, rebound, guarding. No hepatomegaly.   EXTREMITIES: No peripheral edema. 2+ bilateral pedal pulses.   NEUROLOGIC: Alert and oriented x 3. No focal deficits.   MUSCULOSKELETAL: No joint swelling or tenderness.   SKIN: No jaundice. No rashes or lesions. Completely healed vesicles/scarring over left anterior and lateral thigh.     Labs - reviewed with patient in clinic today:  CBC RESULTS:  Lab Results   Component Value Date    WBC 4.3 09/22/2017    RBC 4.00 (L) 09/22/2017    HGB 11.8 (L) 09/22/2017    HCT 36.0 (L) 09/22/2017    MCV 90 09/22/2017    MCH 29.5 09/22/2017    MCHC 32.8 09/22/2017    RDW 13.3 09/22/2017     09/22/2017       CMP RESULTS:  Lab Results   Component Value Date     09/22/2017    POTASSIUM 4.0 09/22/2017    CHLORIDE 109 09/22/2017    CO2 26 09/22/2017    ANIONGAP 6 09/22/2017    GLC 96 09/22/2017    BUN 16 09/22/2017    CR 0.92 09/22/2017    GFRESTIMATED 87 09/22/2017    GFRESTBLACK >90 09/22/2017    DAISY 9.1 09/22/2017    BILITOTAL 0.4 07/03/2017    ALBUMIN 4.0 07/03/2017    ALKPHOS 110 07/03/2017    ALT 16 07/03/2017    AST 28 07/03/2017        INR RESULTS:  Lab Results   Component Value Date    INR 1.13 06/10/2017       LIPID RESULTS:  Lab Results   Component Value Date    CHOL 139 06/02/2017    HDL 49 06/02/2017    LDL 68 06/02/2017    TRIG 109 06/02/2017       IMMUNOSUPPRESSANT LEVELS:  Lab Results   Component Value Date    TACROL 7.3 08/01/2017    DOSTAC 07/31/17  2040 08/01/2017       No components found for: CK  Lab Results   Component Value Date    MAG 2.0 04/25/2017     Lab Results   Component Value  Date    A1C 7.5 (H) 06/02/2017     Lab Results   Component Value Date    PHOS 3.0 03/28/2017     Lab Results   Component Value Date    NTBNP 1944 (H) 06/29/2016     Lab Results   Component Value Date    SAITESTMET SA HI 06/02/2017    SAICELL Class I 06/02/2017    [id number] None 06/02/2017    [id number] None 06/02/2017    SAIREPCOM  06/02/2017      Test performed by modified procedure. Serum heat inactivated. High-risk,   mfi >3,000. Mod-risk, mfi 500-3,000.       Lab Results   Component Value Date    SAIITESTME SA HI 06/02/2017    SAIICELL Class II 06/02/2017    [id number] None 06/02/2017    [id number] None 06/02/2017    SAIIREPCOM  06/02/2017      Test performed by modified procedure. Serum heat inactivated. High-risk,   mfi >3,000. Mod-risk, mfi 500-3,000.       Lab Results   Component Value Date    CSPEC  08/01/2017     Plasma  CORRECTED ON 08/01 AT 1331: PREVIOUSLY REPORTED AS Whole blood, EDTA   anticoagulant         Diagnostic Studies:  RHC 6/2/17:      Cardiac MRI 6/1/17:  1.  Normal left ventricular size and systolic function with a  calculated ejection fraction of  57 %.  2.  Normal right ventricular size and mildly reduced systolic function  with a calculated ejection fraction of 46%.   3.  On delayed enhancement imaging, there is no abnormal late  gadolinium enhancement.   4.  Compared with the prior study dated 1/5/2017, inferolateral scar  is no longer seen.    TTE 3/15/17:  Global and regional left ventricular function is normal with an EF of 60-65%.  Global right ventricular function is normal.  The inferior vena cava is normal. Estimated mean right atrial pressure is <3  mmHg.  No pericardial effusion is present.  This study was compared with the study from 2/14/2017. There has been no  change.    Assessment/Plan:  Mr. Iglesia Shetty is a 46 year old male who is s/p orthotopic heart transplant 12/2016 for Chagas cardiomyopathy. Post-op course complicated by rejection with atrial and ventricular  arrhythmias, respiratory failure and JOSE LUIS, which resolved with diuresis, surgical blood loss anemia, right pleural effusion, requiring pigtail placement, and Chagas reactivation 12/26, treated with benznidasole. More recently, he has done remarkably well without recent rejection or evidence of graft dysfunction. He is back to work. Recent episode of shingles treated with 7 days of Valtrex, this appears to have resolved.     # Status post OHT on 12/2016, history of Chagas cardiomyopathy  # Chronic immunosuppression  * Rejection history: 2R on first tx with steroids, repeat bx 2R/3A on second tx with steroids +thymo, 2R in February treated with steroids  * Recent immunosuppression changes: none    Immunosuppression:  - CNI: tac 2/1 mg. Trough level goal 8-10.   - Antiproliferative: Mycophenolate 1500 mg bid    Prophylaxis:  - CAV: ASA 81 mg and pravastatin 10 mg    Serostatus: CMV: D+/R+. EBV: D+/R+  Intolerance to medications: none    # Recent shingles, resolved  Dx in ED in 8/2017. Started on Valtrex 1 g TID x7 days as well as gabapentin. Lesions have resolved, no neuropathic pain. Will have patient see transplant ID soon (due for Chagas f/u) to discuss any further recs.     # Chagas, reactivated post-transplant   Following monthly labs per transplant ID. Due for follow-up in October, will help patient schedule.    # History of PNA/abnormal chest CT  Hospitalized in March. CT chest with scatteredGGP/nodules.Per ID, rec f/u chest CT - not ordered. Patient will see in clinic.     #  Health Care Maintenance  - Immunizations: up to date, recommend flu shot   - Dermatology: seen July 2017    Follow up:  Next biopsy: pending today  Next angiogram: at annual  Next stress test: per protocol   Next transplant clinic visit December with Dr. Mcclellan      25 minutes spent face-to-face with patient, >50% in counseling and/or coordination of care as described above      Rosibel Hu DNP, NP-C  9/22/2017        DYLON SHINE  HAI ROCHE, BONIFACIO AUSTIN

## 2017-09-22 NOTE — PROGRESS NOTES
Pt back from CCL s/p heart biopsy.  VSS.  Pt alert and oriented x4.  Pt denies any pain.  Rt neck site F/D/I.  Pt tolerating ambulation in the hallway and up to the bathroom.  1145-Pt tolerated po well.  D/C instructions went over with and given to pt, all questions answered.  at the bedside.  1150-Pt D/C'ed to home with his mother.

## 2017-09-22 NOTE — MR AVS SNAPSHOT
After Visit Summary   9/22/2017    Javi Bansal    MRN: [id number]           Patient Information     Date Of Birth          1970        Visit Information        Provider Department      9/22/2017 8:45 AM Joelle Duarte; Vanessa Hu, APRN CNP [facility] [place] [facility]        Today's Diagnoses     Abnormal blood finding    -  1    Anemia, unspecified type          Care Instructions    You were seen in clinic for routine post transplant evaluation.  Available results appear to show good kidney function and stable hemoglobin and white blood cells.    No medication changes were made today in clinic    Coordinator will update you with pending test results and if any changes are needed.  Please contact coordinator with any questions/concerns.  [phone].          Follow-ups after your visit        Your next 10 appointments already scheduled     Oct 04, 2017 10:30 AM CDT   (Arrive by 10:15 AM)   Return Visit with Ivan Schuler MD   [facility] and Infectious Diseases ([facility])    [address] [place] [address]   [phone]            Oct 24, 2017  9:30 AM CDT   (Arrive by 9:15 AM)   RETURN DIABETES with Alla Jerez PA-C   [facility] [place] [facility] ([facility])    [address] [place] [address]   [phone]            Dec 14, 2017 10:30 AM CST   Procedure 1.5 hr with U2A ROOM 10   [address] 2A [facility] ([facility])    [address] [facility] [address]               Dec 14, 2017 12:00 PM CST   Cath 90 Minute with UUHCVR4   [facility],  Heart Cath Lab ([facility])    [address] [facility] [facility] [address]   [phone]            Dec 19, 2017  9:00 AM CST   MR CARDIAC W CONTRAST STRESS AND FLOW with SESAR, UU CV MR NURSE, MG STRESS [facility]  ([facility] [place])    [address] [facility] [id number] [phone]           Take your medicines as usual, unless your doctor tells you not to.   If you take Viagra, Levitra or Cialis, stop taking it 48 hours before your test.   If you take Aggrenox or dipyridamole (Persantine, Permole), stop taking it 48 hours before your test.   If you take Theophylline or Aminophylline, stop taking it 12 hours before your test.   If you are diabetic and take oral hypoglycemics, do not take them on the day of your test.  The day before your exam, drink extra fluids at least six 8-ounce glasses (unless your doctor wants you to limit your fluids).  Stop all caffeine 12 hours before the test. This includes coffee, tea, soda, chocolate and certain medicines (such as Anacin, Excedrin and NoDoz). Also avoid decaf coffee and tea, as these contain small amounts of caffeine.  Do not eat or drink for 3 hours before your exam. You may drink water and take your morning medicines.  You may need a blood test (creatinine test) within 30 days of your exam. Follow your doctor s orders if this is arranged before your exam.  If you are very claustrophobic, please let you doctor know. You may get a sedative medicine from your doctor before you arrive. Bring the medicine to the exam. Do not take it at home. Arrive one hour early. Bring someone who can take you home after the test. Your medicine will make you sleepy. After the exam, you may not drive, take a bus or take a taxi by yourself.  The MRI machine uses a strong magnet. Please wear clothes without metal (snaps, zippers). A sweatsuit works well, or we may give you a hospital gown.  Please remove any body piercings and hair extensions before you arrive. You will remove watches, jewelry, hairpins, wallets, dentures, partial dental plates and hearing aids. You may wear contact lenses, and you may be able to wear your rings. We have a  safe place to keep your personal items, but it is safer to leave them at home.   **IMPORTANT** THE INSTRUCTIONS BELOW ARE ONLY FOR THOSE PATIENTS WHO HAVE BEEN TOLD THEY WILL RECEIVE SEDATION OR GENERAL ANESTHESIA DURING THEIR MRI PROCEDURE:  IF YOU WILL RECEIVE SEDATION (take medicine to help you relax during your exam):   You must get the medicine from your doctor before you arrive. Bring the medicine to the exam. Do not take it at home.   Arrive one hour early. Bring someone who can take you home after the test. Your medicine will make you sleepy. After the exam, you may not drive, take a bus or take a taxi by yourself.   No eating 8 hours before your exam. You may have clear liquids up until 4 hours before your exam. (Clear liquids include water, clear tea, black coffee and fruit juice without pulp.)  IF YOU WILL RECEIVE ANESTHESIA (be asleep for your exam):   Arrive 1 1/2 hours early. Bring someone who can take you home after the test. You may not drive, take a bus or take a taxi by yourself.   No eating 8 hours before your exam. You may have clear liquids up until 4 hours before your exam. (Clear liquids include water, clear tea, black coffee and fruit juice without pulp.)  If you have any questions, please contact your Imaging Department exam site.            Dec 19, 2017 11:00 AM CST   (Arrive by 10:45 AM)   HEART TRANSPLANT ANNUAL with Geena Mcclellan MD   [facility] ([facility])    [address] [place] [address]   [phone]            Dec 27, 2017  1:00 PM CST   (Arrive by 12:45 PM)   Transplant Skin Check with GARY Murillo MD   [facility] Dermatology ([facility])    [address] [place] [address]   [phone]            Jun 13, 2018 10:15 AM CDT   RETURN RETINA with Isabela Balbuena MD   [facility] Clinic ([facility])    [address] S[address]  "[address]  9th Fl Clin 9a  [facility] [address]   [phone]              Who to contact     If you have questions or need follow up information about today's clinic visit or your schedule please contact [facility] directly at [phone].  Normal or non-critical lab and imaging results will be communicated to you by MyChart, letter or phone within 4 business days after the clinic has received the results. If you do not hear from us within 7 days, please contact the clinic through MyChart or phone. If you have a critical or abnormal lab result, we will notify you by phone as soon as possible.  Submit refill requests through [employer] or call your pharmacy and they will forward the refill request to us. Please allow 3 business days for your refill to be completed.          Additional Information About Your Visit        Care EveryWhere ID     This is your Care EveryWhere ID. This could be used by other organizations to access your [facility] medical records  [id number]        Your Vitals Were     Pulse Height Pulse Oximetry BMI (Body Mass Index)          86 1.699 m (5' 6.9\") 98% 25.02 kg/m2         Blood Pressure from Last 3 Encounters:   09/22/17 110/80   09/22/17 108/74   08/14/17 132/84    Weight from Last 3 Encounters:   09/22/17 72.3 kg (159 lb 4.8 oz)   08/01/17 72 kg (158 lb 11.7 oz)   08/01/17 72.1 kg (159 lb)              Today, you had the following     No orders found for display       Primary Care Provider    None Specified       No primary provider on file.        Equal Access to Services     [facility] : Hadii bharati ku hadasho Solauraali, waaxda luqadaha, qaybta kaalmada adeegyada, talisha garcia . So [facility] [phone].    ATENCIÓN: Si habla español, tiene a watt disposición servicios gratuitos de asistencia lingüística. Llame al [phone].    We comply with applicable federal civil rights laws and [place] laws. We do not discriminate on the basis of race, color, " national origin, age, disability sex, sexual orientation or gender identity.            Thank you!     Thank you for choosing [facility]  for your care. Our goal is always to provide you with excellent care. Hearing back from our patients is one way we can continue to improve our services. Please take a few minutes to complete the written survey that you may receive in the mail after your visit with us. Thank you!             Your Updated Medication List - Protect others around you: Learn how to safely use, store and throw away your medicines at www.disposemymeds.org.          This list is accurate as of: 9/22/17 10:22 AM.  Always use your most recent med list.                   Brand Name Dispense Instructions for use Diagnosis    alcohol swab prep pads     100 each    Use to swab area of injection/raymond as directed.    Hyperglycemia       aspirin 81 MG EC tablet     90 tablet    Take 1 tablet (81 mg) by mouth daily    Heart replaced by transplant (H)       B-D SHARPS DISPOSAL BY MAIL Misc     1 each    1 container    Hyperglycemia       benzoyl peroxide 5 % Lotn lotion     30 mL    Apply topically At Bedtime    Other acne       gabapentin 300 MG capsule    NEURONTIN    90 capsule    Take 1 capsule (300 mg) by mouth 3 times daily Begin by taking one capsule once per day, the next day you may take 1 capsule twice per day and on the 3rd day begin taking 1 capsule 3 times per day.        ketoconazole 2 % shampoo    NIZORAL    120 mL    Apply topically three times a week Alternate with Head and Shoulders.    Seborrhea       multivitamin, therapeutic with minerals Tabs tablet     30 each    Take 1 tablet by mouth daily    Heart replaced by transplant (H)       mycophenolate 250 MG capsule    GENERIC EQUIVALENT    360 capsule    Take 6 capsules (1,500 mg) by mouth 2 times daily    Heart replaced by transplant (H)       order for DME     1 Device    Equipment being ordered: Splint - Cubital tunnel splint for  ulnar neuropathy    Lesion of right ulnar nerve       pravastatin 20 MG tablet    PRAVACHOL    30 tablet    Take 1 tablet (20 mg) by mouth every evening    Heart replaced by transplant (H)       tacrolimus 1 MG capsule    GENERIC EQUIVALENT    90 capsule    Take 2mg (2 capsules) in the morning and 1mg (1 capsule) in the evening.    Heart replaced by transplant (H)       valACYclovir 1000 mg tablet    VALTREX    21 tablet    Take 1 tablet (1,000 mg) by mouth 3 times daily

## 2017-09-22 NOTE — NURSING NOTE
Chief Complaint   Patient presents with     Follow Up For     heart TX F/U (40 weeks, month 10)     Vitals were taken and medications were reconciled.  Oren Krishnamurthy, JULIETTEA  9:22 AM

## 2017-09-22 NOTE — PROGRESS NOTES
Pt arrives to 2a, with mom, for heart biopsy. H&P is up to date. Consent is signed.  at bedside.

## 2017-09-22 NOTE — NURSING NOTE
Patient seen in clinic for routine 10 month post heart tx evaluation.  Patient reports working full time and feeling very well; he denies any sob, pain, swelling, fever, or GI symptoms.  Recent Shingles breakout on upper L thigh (seen in ED) appears largely resolved; patient has completed Valcyte and gabapentin rx and denies pain or numbness.   Available results appear to show normal renal stable and stable CBC.  No medication changes made during this clinic visit    Follow up:  Coordinator will update patient with pending test results and if any changes are needed.  Coordinator will contact ID to schedule f/u visit ([facility] and Oziel).  AVS given to patient.     used during this clinic visit.

## 2017-09-22 NOTE — PATIENT INSTRUCTIONS
You were seen in clinic for routine post transplant evaluation.  Available results appear to show good kidney function and stable hemoglobin and white blood cells.    No medication changes were made today in clinic    Coordinator will update you with pending test results and if any changes are needed.  Please contact coordinator with any questions/concerns.  [phone].

## 2017-09-23 LAB
CMV DNA SPEC NAA+PROBE-ACNC: <137 [IU]/ML
CMV DNA SPEC NAA+PROBE-LOG#: <2.1 {LOG_IU}/ML
SPECIMEN SOURCE: ABNORMAL

## 2017-09-24 LAB
EBV DNA # SPEC NAA+PROBE: NORMAL {COPIES}/ML
EBV DNA SPEC NAA+PROBE-LOG#: NORMAL {LOG_COPIES}/ML

## 2017-09-25 LAB — COPATH REPORT: NORMAL

## 2017-09-26 NOTE — ADDENDUM NOTE
Encounter addended by: Trung Torres MD on: 9/26/2017  1:43 PM<BR>     Actions taken: Sign clinical note

## 2017-09-27 ENCOUNTER — TELEPHONE (OUTPATIENT)
Dept: TRANSPLANT | Facility: CLINIC | Age: 47
End: 2017-09-27

## 2017-09-27 NOTE — TELEPHONE ENCOUNTER
Attempted to contact patient with assistance of .  LM on patient's cell phone requesting call back to review recent results. Bx was grade 1R, neg/neg.  FK was 6.8; plan to increase dose slightly. Awaiting call back

## 2017-09-29 ENCOUNTER — TELEPHONE (OUTPATIENT)
Dept: TRANSPLANT | Facility: CLINIC | Age: 47
End: 2017-09-29

## 2017-10-01 LAB — LAB SCANNED RESULT: NORMAL

## 2017-10-03 ENCOUNTER — TELEPHONE (OUTPATIENT)
Dept: TRANSPLANT | Facility: CLINIC | Age: 47
End: 2017-10-03

## 2017-10-03 DIAGNOSIS — Z94.1 HEART REPLACED BY TRANSPLANT (H): ICD-10-CM

## 2017-10-03 RX ORDER — TACROLIMUS 1 MG/1
2 CAPSULE ORAL 2 TIMES DAILY
Qty: 120 CAPSULE | Refills: 11 | Status: SHIPPED | OUTPATIENT
Start: 2017-10-03 | End: 2018-06-13

## 2017-10-04 ENCOUNTER — OFFICE VISIT (OUTPATIENT)
Dept: INFECTIOUS DISEASES | Facility: CLINIC | Age: 47
End: 2017-10-04
Attending: INTERNAL MEDICINE
Payer: COMMERCIAL

## 2017-10-04 VITALS
SYSTOLIC BLOOD PRESSURE: 110 MMHG | RESPIRATION RATE: 16 BRPM | HEART RATE: 93 BPM | WEIGHT: 157.6 LBS | DIASTOLIC BLOOD PRESSURE: 74 MMHG | OXYGEN SATURATION: 99 % | HEIGHT: 67 IN | TEMPERATURE: 98.1 F | BODY MASS INDEX: 24.74 KG/M2

## 2017-10-04 DIAGNOSIS — Z23 NEED FOR INFLUENZA VACCINATION: ICD-10-CM

## 2017-10-04 DIAGNOSIS — B57.2 CHAGAS CARDIOMYOPATHY: ICD-10-CM

## 2017-10-04 DIAGNOSIS — B02.9 HERPES ZOSTER WITHOUT COMPLICATION: Primary | ICD-10-CM

## 2017-10-04 DIAGNOSIS — Z94.1 HEART TRANSPLANT RECIPIENT (H): ICD-10-CM

## 2017-10-04 DIAGNOSIS — B25.8 OTHER CYTOMEGALOVIRAL DISEASES (H): ICD-10-CM

## 2017-10-04 PROCEDURE — 99212 OFFICE O/P EST SF 10 MIN: CPT | Mod: 25,ZF

## 2017-10-04 PROCEDURE — 25000128 H RX IP 250 OP 636: Mod: ZF | Performed by: INTERNAL MEDICINE

## 2017-10-04 PROCEDURE — G0008 ADMIN INFLUENZA VIRUS VAC: HCPCS | Mod: ZF

## 2017-10-04 PROCEDURE — 90686 IIV4 VACC NO PRSV 0.5 ML IM: CPT | Mod: ZF | Performed by: INTERNAL MEDICINE

## 2017-10-04 RX ADMIN — INFLUENZA A VIRUS A/MICHIGAN/45/2015 X-275 (H1N1) ANTIGEN (FORMALDEHYDE INACTIVATED), INFLUENZA A VIRUS A/HONG KONG/4801/2014 X-263B (H3N2) ANTIGEN (FORMALDEHYDE INACTIVATED), INFLUENZA B VIRUS B/PHUKET/3073/2013 ANTIGEN (FORMALDEHYDE INACTIVATED), AND INFLUENZA B VIRUS B/BRISBANE/60/2008 ANTIGEN (FORMALDEHYDE INACTIVATED) 0.5 ML: 15; 15; 15; 15 INJECTION, SUSPENSION INTRAMUSCULAR at 11:07

## 2017-10-04 ASSESSMENT — PAIN SCALES - GENERAL: PAINLEVEL: NO PAIN (0)

## 2017-10-04 NOTE — NURSING NOTE
"Chief Complaint   Patient presents with     RECHECK     S/P Heart TX, shingles infection       Initial /74 (BP Location: Right arm, Patient Position: Sitting, Cuff Size: Adult Regular)  Pulse 93  Temp 98.1  F (36.7  C) (Oral)  Resp 16  Ht 1.699 m (5' 6.89\")  Wt 71.5 kg (157 lb 9.6 oz)  SpO2 99%  BMI 24.76 kg/m2 Estimated body mass index is 24.76 kg/(m^2) as calculated from the following:    Height as of this encounter: 1.699 m (5' 6.89\").    Weight as of this encounter: 71.5 kg (157 lb 9.6 oz).  Medication Reconciliation: complete     Chantale Lund [facility]  10/4/2017 10:39 AM        "

## 2017-10-04 NOTE — MR AVS SNAPSHOT
After Visit Summary   10/4/2017    Javi Bansal    MRN: [id number]           Patient Information     Date Of Birth          1970        Visit Information        Provider Department      10/4/2017 10:15 AM Ivan Schuler MD; [employer] [facility] and Infectious Diseases        Today's Diagnoses     Need for influenza vaccination    -  1      Care Instructions    You are doing really well.  Please call if you have that rash again.            Follow-ups after your visit        Follow-up notes from your care team     Return if symptoms worsen or fail to improve.      Your next 10 appointments already scheduled     Oct 11, 2017  9:30 AM CDT   LAB with  LAB   [facility] ([facility])    [address] [place] [address] [phone]           Patient must bring picture ID. Patient should be prepared to give a urine specimen  Please do not eat 10-12 hours before your appointment if you are coming in fasting for labs on lipids, cholesterol, or glucose (sugar). Pregnant women should follow their Care Team instructions. Water with medications is okay. Do not drink coffee or other fluids. If you have concerns about taking  your medications, please ask at office or if scheduling via [employer], send a message by clicking on Secure Messaging, Message Your Care Team.            Oct 24, 2017  9:30 AM CDT   (Arrive by 9:15 AM)   RETURN DIABETES with Alla Jerez PA-C   [facility] Endocrinology ([facility])    [address] [place] [address] [phone]            Dec 14, 2017 10:30 AM CST   Procedure 1.5 hr with U2A ROOM 10   Unit 2A [facility] ([facility])    [address] [facility] [address]               Dec 14, 2017 12:00 PM CST   Cath 90 Minute with [id number]   [facility],  Heart Cath Lab  ([facility])    [address] [facility] [address]   [phone]            Dec 19, 2017  9:00 AM CST   MR CARDIAC W CONTRAST STRESS AND FLOW with UJAMES SELBYU CV MR NURSE, MG STRESS RM   [facility] MRI ([facility])    [address] [facility] [address]   [phone]           Take your medicines as usual, unless your doctor tells you not to.   If you take Viagra, Levitra or Cialis, stop taking it 48 hours before your test.   If you take Aggrenox or dipyridamole (Persantine, Permole), stop taking it 48 hours before your test.   If you take Theophylline or Aminophylline, stop taking it 12 hours before your test.   If you are diabetic and take oral hypoglycemics, do not take them on the day of your test.  The day before your exam, drink extra fluids at least six 8-ounce glasses (unless your doctor wants you to limit your fluids).  Stop all caffeine 12 hours before the test. This includes coffee, tea, soda, chocolate and certain medicines (such as Anacin, Excedrin and NoDoz). Also avoid decaf coffee and tea, as these contain small amounts of caffeine.  Do not eat or drink for 3 hours before your exam. You may drink water and take your morning medicines.  You may need a blood test (creatinine test) within 30 days of your exam. Follow your doctor s orders if this is arranged before your exam.  If you are very claustrophobic, please let you doctor know. You may get a sedative medicine from your doctor before you arrive. Bring the medicine to the exam. Do not take it at home. Arrive one hour early. Bring someone who can take you home after the test. Your medicine will make you sleepy. After the exam, you may not drive, take a bus or take a taxi by yourself.  The MRI machine uses a strong magnet. Please wear clothes without metal (snaps, zippers). A sweatsuit works well, or we may give you a hospital gown.   Please remove any body piercings and hair extensions before you arrive. You will remove watches, jewelry, hairpins, wallets, dentures, partial dental plates and hearing aids. You may wear contact lenses, and you may be able to wear your rings. We have a safe place to keep your personal items, but it is safer to leave them at home.   **IMPORTANT** THE INSTRUCTIONS BELOW ARE ONLY FOR THOSE PATIENTS WHO HAVE BEEN TOLD THEY WILL RECEIVE SEDATION OR GENERAL ANESTHESIA DURING THEIR MRI PROCEDURE:  IF YOU WILL RECEIVE SEDATION (take medicine to help you relax during your exam):   You must get the medicine from your doctor before you arrive. Bring the medicine to the exam. Do not take it at home.   Arrive one hour early. Bring someone who can take you home after the test. Your medicine will make you sleepy. After the exam, you may not drive, take a bus or take a taxi by yourself.   No eating 8 hours before your exam. You may have clear liquids up until 4 hours before your exam. (Clear liquids include water, clear tea, black coffee and fruit juice without pulp.)  IF YOU WILL RECEIVE ANESTHESIA (be asleep for your exam):   Arrive 1 1/2 hours early. Bring someone who can take you home after the test. You may not drive, take a bus or take a taxi by yourself.   No eating 8 hours before your exam. You may have clear liquids up until 4 hours before your exam. (Clear liquids include water, clear tea, black coffee and fruit juice without pulp.)  If you have any questions, please contact your Imaging Department exam site.            Dec 19, 2017 11:00 AM CST   (Arrive by 10:45 AM)   HEART TRANSPLANT ANNUAL with Geena Mcclellan MD   [facility] Care ([facility])    [address] [place] [address]  3rd [facility] [address]   [phone]            Dec 27, 2017  1:00 PM CST   (Arrive by 12:45 PM)   Transplant Skin Check with GARY Murillo MD   [facility] Dermatology ([facility]  "Center)    [address] [facility] [address]  3rd Floor  [facility] [address] [phone]            Jun 13, 2018 10:15 AM CDT   RETURN RETINA with Isabela Balbuena MD   Eye Clinic ([facility])    [address]  [address] [facility]  9th Fl Clin 9a  [facility] [address]   [phone]              Future tests that were ordered for you today     Open Future Orders        Priority Expected Expires Ordered    Basic metabolic panel Routine 10/10/2017 12/3/2017 10/3/2017    Tacrolimus level Routine 10/10/2017 12/3/2017 10/3/2017            Who to contact     If you have questions or need follow up information about today's clinic visit or your schedule please contact [facility] AND INFECTIOUS DISEASES directly at [phone].  Normal or non-critical lab and imaging results will be communicated to you by MyChart, letter or phone within 4 business days after the clinic has received the results. If you do not hear from us within 7 days, please contact the clinic through MyChart or phone. If you have a critical or abnormal lab result, we will notify you by phone as soon as possible.  Submit refill requests through [employer] or call your pharmacy and they will forward the refill request to us. Please allow 3 business days for your refill to be completed.          Additional Information About Your Visit        Care EveryWhere ID     This is your Care EveryWhere ID. This could be used by other organizations to access your [facility] medical records  [id number]        Your Vitals Were     Pulse Temperature Respirations Height Pulse Oximetry BMI (Body Mass Index)    93 98.1  F (36.7  C) (Oral) 16 1.699 m (5' 6.89\") 99% 24.76 kg/m2       Blood Pressure from Last 3 Encounters:   10/04/17 110/74   09/22/17 112/78   09/22/17 108/74    Weight from Last 3 Encounters:   10/04/17 71.5 kg (157 lb 9.6 oz)   09/22/17 72.3 kg (159 lb 4.8 oz)   08/01/17 72 kg (158 lb 11.7 oz)              Today, you " had the following     No orders found for display       Primary Care Provider    None Specified       No primary provider on file.        Equal Access to Services     ANSONTYRON [facility] Center[facility] : Hadii aad ku hadsusanlina Vuong, osmanda petr, abbeyemil woodyhillarylukas leone, waxay idiin haysamanthabridgett carneymadinaenrico garcia . So [facility] [phone].    ATENCIÓN: Si habla español, tiene a watt disposición servicios gratuitos de asistencia lingüística. Llame al [phone].    We comply with applicable federal civil rights laws and [place] laws. We do not discriminate on the basis of race, color, national origin, age, disability, sex, sexual orientation, or gender identity.            Thank you!     Thank you for choosing [facility] AND INFECTIOUS DISEASES  for your care. Our goal is always to provide you with excellent care. Hearing back from our patients is one way we can continue to improve our services. Please take a few minutes to complete the written survey that you may receive in the mail after your visit with us. Thank you!             Your Updated Medication List - Protect others around you: Learn how to safely use, store and throw away your medicines at www.disposemymeds.org.          This list is accurate as of: 10/4/17 10:53 AM.  Always use your most recent med list.                   Brand Name Dispense Instructions for use Diagnosis    aspirin 81 MG EC tablet     90 tablet    Take 1 tablet (81 mg) by mouth daily    Heart replaced by transplant (H)       benzoyl peroxide 5 % Lotn lotion     30 mL    Apply topically At Bedtime    Other acne       gabapentin 300 MG capsule    NEURONTIN    90 capsule    Take 1 capsule (300 mg) by mouth 3 times daily Begin by taking one capsule once per day, the next day you may take 1 capsule twice per day and on the 3rd day begin taking 1 capsule 3 times per day.        ketoconazole 2 % shampoo    NIZORAL    120 mL    Apply topically three times a week Alternate with Head and Shoulders.     Seborrhea       multivitamin, therapeutic with minerals Tabs tablet     30 each    Take 1 tablet by mouth daily    Heart replaced by transplant (H)       mycophenolate 250 MG capsule    GENERIC EQUIVALENT    360 capsule    Take 6 capsules (1,500 mg) by mouth 2 times daily    Heart replaced by transplant (H)       pravastatin 20 MG tablet    PRAVACHOL    30 tablet    Take 1 tablet (20 mg) by mouth every evening    Heart replaced by transplant (H)       tacrolimus 1 MG capsule    GENERIC EQUIVALENT    120 capsule    Take 2 capsules (2 mg) by mouth 2 times daily    Heart replaced by transplant (H)

## 2017-10-04 NOTE — PROGRESS NOTES
[facility]  Transplant Infectious Disease Progress Note     Patient:  Javi Bansal, Date of birth 1970, Medical record number [id number]  Date of Visit:  10/04/2017  Consult requested by Dr. Mcclellan  for evaluation of Chaga's and LTBI         Assessment and Recommendations:   Recommendations:  - administered the influenza vaccine today  - no secondary prophylaxis for zoster at this time, should the zoster recur, will consider then.   - monthly monitoring of Chagas PCR, purple top tube send to [facility] then [facility] through December  - ordered misc lab test: purple top for chagas PCR to be sent to [facility] then [facility].    - monthly CMV PCR through December.     No follow up scheduled at this time.  I am happy to see Javi in the future should I be of assistance.     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  Javi looks great today.  No further episodes of rejection and the Chagas PCR since remains negative.     ID issues:  - Single dermatomal zoster 8/2017: Resolved w/ oral course of valtrex.  No residual pain.  As this was his first episode, I am not inclined to use longer secondary prophylaxis.  Should he develop this again, he should call my office for treatment.     - low level CMV viremia:  Continuing to monitor, has not risen.  If rises to 1500 IU then will treat. CMV R+ = moderate risk, and w/ treatment of rejection thought the virus level would have risen, but thus far has not.  Clinically asymptomatic.  The viremia has remained low level and now <137.  He is coming up to the one year kevin in December, will order a few more CMV PCRs monthly to take it out to one year.      - pneumonia:  Was hospitalized 3/9-3/12.  Clinically very acute course that resolved quickly is most likely consistent with a bacterial process,  procal was positive.  However the CT chest was quite impressive with scattered ggo/ nodules.  IFD w/up negative from blood work. Clinically he has improved w/ course of levofloxacin.  Repeat CT scan w/ significant improvement, almost resolution.     - Chronic Chagas heart disease now s/p OHT as above:  In coordination w/ the CDC we were monitoring Chagas PCR in a pre-emptive approach after transplant.  Likely in the setting of immunosuppression and treatment of rejection, not unexpectedly the Chagas PCR did elevate.  At this time we do not believe that it has yet re-infected the heart.  So while many subjects will be Chagas PCR positive after transplant, the approach of pre-emptive is to treat the Chagas before re-infection of the heart occurs.  With rising PCRs from 12/26, and very low level parasitemia from thick smear on 1/5/17, in the setting of treatment of rejection, Benznidazole was initiated on 1/6/17, dosing at 150 mg BID, which is just below 5 mg/kg.  The CDC contacted me with the following results:    Chagas PCRs:  12/19/16 negative and remains negative since    12/26/16 1st positive: 32    1/2/17 +   30    1/9/17    +   28    The 60 day course of Benznidazole has since completed, which he tolerated well.  Chagas PCR testing has since remained negative.  Plans outlined above.       Other Notable information:  Once infected pts are infected for life w/ about 30% developing end organ disease.  The adrenal glands have been suggested as a reservoir for infection, but this is not fully delineated.  Heart transplantation is the therapy of choice for Chagas heart disease in [place] and the 3rd leading cause for heart transplantation.  In extensive review of the literature, here's the issues in heart transplantation of Chagas disease.     1. Reactivation is common occurring in 30-50% and mimics rejection, and can present as a febrile illness.    2. In both [place] and [place] guidelines, screening rather than  prophylaxis for infection is recommended.  Thus a screening protocol as outlined above will be needed.    3. Published data from [place] report that the incidence of rejection and survival in Chagas related heart transplant is as good and sometimes better than OHT for other reasons.     4. Differences in immunosuppression needs to be considered and was discussed w/ Dr. Mcclellan.     5.  [facility][note 3] contact:  Division of Parasitic Diseases and Malaria.  [phone].  E-mail:  [email or web address].  Emergency  [phone].     References:  AJT 2011; 11. 672.    Curr Opin Infect Dis 2012 25, 4 pg 450.     J Heart Lung Transplant 2010; 29; 286        Journal of cardiac failure 2009; 15; 249  *Consented Javi for the [facility][note 3] testing of Chagas and use of therapy if indicated after transplant.  A  was present and very helpful with the Niuean forms of the consent.  Javi is not able to read Niuean or english, but we went through all the forms, he understands all the risks and benefits, and his wife was also present during the consent process (she is able to read Niuean).    -Future Chagas testing:  Needs to be ordered as misc lab in EPIC w/ purple top tube.  Will be send to [facility] then [facility][note 3].      -  LTBI:  Treatment is recommended given the 20x's increased risk of reactivation in the transplant population compared to the general population.  If we have time prior to transplant then rifampin might be the option, I'll have to check drug-drug interactions with milrinone. Or INH for 9 months will be the other option.  Either way, we can proceed with transplant.  Rifampin is not recommended if the transplant is to occur soon and we do not use after transplant due to drug-drug interactions. Started INH/ B6 on 9/17/16.  Tolerating medications well. No active peripheral neuropathy currently.  Completed a course, dc'd therapy, 7/19/17.     Other ID issues:  - prophylaxis: none currently  - serostatus: EBV, CMV, VZV  seropositive, Hep C negative  - immunizations:  Up to date.    - isolation:  Good hand hygiene    Attestation:  I have reviewed today's vital signs, medications, labs and imaging.      Ivan Schuler,   Pager [phone]           History of Infectious Disease Illness:   This is a very pleasant 46 year old male from [facility], well known to me, w/ Chagas cardiomyopathy, now s/p OHT 16.      Pre-Transplant, Javi was diagnosed with Chagas in  in [place] when he developed CHF, at which time he received 3 months of treatment with nifurtimox ending 2012 (care everywhere).  Javi states he felt well after treatment for about 1.5 years, then developed progressive cardiomyopathy..  Incidentally Diogenes's brother  from Chagas dz at the age of 20.    Pre-transplant testing was also + for quantiferon.  Diogenes does not recall having had a positive test in the past and has not had tz for active or latent TB.  [place] has lived in [place], [place], [place] and [place].      Javi was hospitalized from 16-17 for the OHT, received on 16.  Course complicated by right pleural effusions (), BiV Dysfunction on  w/ cellular grade 2R rejection treated w/ increase in steroids.  By 16 SVT / A-tach w/ Bx on  notable for cellular grade 2R/3A rejection, treated w/ burst of steroids w/ taper, Thymo and change of immunosuppression to Tacro and MMF.  NSVT led to cardiac MRI w/ temp pacer wires, ? Of possible myocardial scar.  During this time we have been monitoring weekly Chagas PCRs via CDC.  By  the Chagas PCR was moderately elevated w/ the PCR from  pending (since returned at the same level).  On 17 given rise of Chagas PCR, results of rare parasites identified locally on thick smear from 17 and treatment of rejection, the decision was made to re-treat the Chagas, this time w/ Benznidazole, 5 mg/kg BID started on 17, 150 BID dosed just slightly below  5 mg/kg BID.  A 60 day treatment course was completed and the Chagas PCRs blood remain negative.     Javi was hospitalized 3/9-3/12/17 w/ pneumonia.  Although his symptoms were acute and responded very rapidly to treatment, the CT chest is quite impressive for a mixed diffuse nodular pattern.  He was treated w/ a course of levovloxacin and all blood work (beta D glucan, fungal ab's, galactomannan, etc returned negative).  Clinically he only had a mild cough w/ mostly the feeling that he needs to clear his throat.      LTBI therapy completed 7/19/17.  Since our last visit Javi is doing really well overall, but was seen in the ED w/ dermatomal shingles on the left lateral thigh on 8/5/17, sent home w/ valtrex and gabapentin.  Since then, after about three weeks the rash resolved and he has not residual pain.  Clinically he denies fevers, chills, mouth pain, dysphagia, n/v/d or abdominal pain, no cough / sob or new skin rashes.  No vision changes or headaches.  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     Transplants:  OHT       Immunization History   Administered Date(s) Administered     HepB 08/04/2016, 09/15/2016, 02/09/2017     Influenza (IIV3) 10/05/2016     Influenza Vaccine IM 3yrs+ 4 Valent IIV4 11/01/2011, 03/21/2015     Pneumococcal (PCV 13) 08/04/2016     Pneumococcal 23 valent 03/21/2015     TDAP Vaccine (Boostrix) 08/04/2016       Current Outpatient Prescriptions   Medication     tacrolimus (GENERIC EQUIVALENT) 1 MG capsule     valACYclovir (VALTREX) 1000 mg tablet     gabapentin (NEURONTIN) 300 MG capsule     benzoyl peroxide 5 % LOTN lotion     Sharps Container (B-D SHARPS DISPOSAL BY MAIL) MISC     alcohol swab prep pads     ketoconazole (NIZORAL) 2 % shampoo     order for DME     pravastatin (PRAVACHOL) 20 MG tablet     mycophenolate (CELLCEPT - GENERIC EQUIVALENT) 250 MG capsule     aspirin EC 81 MG EC tablet     multivitamin,  "therapeutic with minerals (THERA-VIT-M) TABS tablet     No current facility-administered medications for this visit.             Allergies:   No Known Allergies       Physical Exam:   Vitals were reviewed.    /74 (BP Location: Right arm, Patient Position: Sitting, Cuff Size: Adult Regular)  Pulse 93  Temp 98.1  F (36.7  C) (Oral)  Resp 16  Ht 1.699 m (5' 6.89\")  Wt 71.5 kg (157 lb 9.6 oz)  SpO2 99%  BMI 24.76 kg/m2     Physical Examination:  GENERAL:  well-developed, well-nourished, ambulating w/out assistance, comfortable on room air.   HEENT:  Head is normocephalic, atraumatic   EYES:  Eyes have anicteric sclerae without conjunctival injection or stigmata of endocarditis.    ENT:  Oropharynx is moist without exudates or ulcers. Tongue is midline  NECK:  Supple. No cervical lymphadenopathy.  No JVD noted.   LUNGS:  Clear to auscultation bilateral.   CARDIOVASCULAR:  Regular rate and rhythm with no gallops or rubs.  ABDOMEN:  Normal bowel sounds, soft, nontender. No appreciable hepatosplenomegaly.  SKIN:  No acute rashes. Slight residual skin changes from dermatomal zoster on left lateral thigh. No stigmata of endocarditis.  NEUROLOGIC:  Grossly nonfocal. Active x4 extremities  No CVA or spinal tenderness  Chest wall incision well healed.           Laboratory Data:     CD4 counts    Absolute CD4   Date Value Ref Range Status   01/03/2017 8 (L) 441 - 2156 cells/uL Final   01/02/2017 5 (L) 441 - 2156 cells/uL Final   01/01/2017  441 - 2156 cells/uL Final    Test not available at time of request  RESCHEDULED FOR 1/2/17 CE MALIK ON U6C 01/01/17 0916 NYD     12/31/2016 6 (L) 441 - 2156 cells/uL Final       Inflammatory Markers    Recent Labs   Lab Test  06/10/17   0124  02/21/17   1038  06/18/16   0720   SED  18*   --    --    CRP  24.0*  <2.9  7.0       Immune Globulin Studies  No lab results found.  Metabolic Studies       Recent Labs   Lab Test  09/22/17   0901  08/01/17   0926  07/19/17   0948  " 07/03/17   0940  06/10/17   0620  06/10/17   0124  06/02/17   0843   04/25/17   0536   03/28/17   0730   03/17/17   1216   NA  141  141  141  140   --   140  143   < >  140   < >  141   < >  141   POTASSIUM  4.0  4.1  4.1  4.2   --   4.0  3.8   < >  3.6   < >  3.6   < >  3.6   CHLORIDE  109  109  108  108   --   108  109   < >  108   < >  106   < >  103   CO2  26  24  25  22   --   23  27   < >  24   < >  27   < >  29   ANIONGAP  6  8  8  10   --   10  7   < >  8   < >  8   < >  9   BUN  16  25  28  28   --   24  20   < >  26   < >  24   < >  29   CR  0.92  1.27*  1.16  1.16  1.06  1.52*  0.81   < >  0.84   < >  0.92   < >  1.10   GFRESTIMATED  87  61  67  67  75  49*  >90  Non  GFR Calc     < >  >90  Non  GFR Calc     < >  88   < >  72   GLC  96  94  89  98   --   84  77   < >  120*   < >  161*   < >  184*   DAISY  9.1  9.3  9.5  8.8   --   8.8  9.4   < >  8.8   < >  9.6   < >  9.4   PHOS   --    --    --    --    --    --    --    --    --    --   3.0   --   3.1   MAG   --    --    --    --    --    --    --    --   2.0   --   1.5*   --   2.0    < > = values in this interval not displayed.       Hepatic Studies    Recent Labs   Lab Test  07/03/17   0940  06/10/17   0124  06/08/17   0948  06/02/17   0843  05/05/17   0907  04/24/17   1451   BILITOTAL  0.4  0.3  0.5  0.6  0.5  1.0   ALKPHOS  110  132  120  96  67  92   ALBUMIN  4.0  3.8  4.1  4.0  3.9  4.1   AST  28  60*  50*  33  28  21   ALT  16  45  32  25  26  35       Pancreatitis testing    Recent Labs   Lab Test  06/02/17   0843  01/13/17   0803  12/11/16   2352  06/18/16   0720   AMYLASE   --    --   82   --    TRIG  109  82   --   73       Hematology Studies      Recent Labs   Lab Test  09/22/17   0901  08/01/17   0926  07/19/17   0948  07/03/17   0940  06/16/17   0944  06/10/17   0124   03/28/17   0730  03/20/17   1006  03/17/17   1216   WBC  4.3  4.4  4.9  5.3  3.3*  4.3   < >  10.3  12.6*  17.7*   ANEU   --    --    --   3.3   1.6  2.6   --   7.1  9.1*  15.7*   ALYM   --    --    --   1.2  1.1  1.0   --   1.8  2.0  0.5*   GAGE   --    --    --   0.6  0.4  0.5   --   1.3  1.0  0.3   AEOS   --    --    --   0.1  0.1  0.0   --   0.0  0.1  0.0   HGB  11.8*  12.2*  12.3*  11.4*  11.6*  11.2*   < >  13.5  13.2*  13.1*   HCT  36.0*  37.4*  35.6*  33.8*  34.8*  34.4*   < >  41.0  38.5*  38.9*   MCV  90  90  89  91  94  94   < >  100  97  98   PLT  203  210  204  209  262  154   < >  199  318  372    < > = values in this interval not displayed.       Clotting Studies    Recent Labs   Lab Test  06/10/17   0124  03/09/17   0850  12/19/16   0408  12/17/16   1430  12/14/16   0826  12/14/16   0400  12/14/16   0017  12/13/16   2034   INR  1.13  1.12  1.52*  1.67*   --   1.48*   --    --    PTT   --    --    --    --   29  31  31  31       Urine Studies    Recent Labs   Lab Test  04/24/17   2200  03/09/17   1033  12/20/16   1043  12/18/16   1451  12/11/16   2238   URINEPH  5.5  6.0  5.0  5.5  7.0   NITRITE  Negative  Negative  Negative  Negative  Negative   LEUKEST  Negative  Negative  Negative  Negative  Negative   WBCU  <1  <1  1  0  0       Microbiology:  quantiferon + 6/18/16  Parasite stain 1/5/17 w/ one identified 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       Virology:  Hepatitis B Testing     Recent Labs   Lab Test  03/15/17   1046  01/13/17   0803   HBCAB  Nonreactive  Nonreactive   HEPBANG  Nonreactive  Nonreactive       Hepatitis C Testing     Hepatitis C Antibody   Date Value Ref Range Status   03/15/2017  NR Final    Nonreactive   Assay performance characteristics have not been established for newborns,   infants, and children     01/13/2017  NR Final    Nonreactive   Assay performance characteristics have not been established for newborns,   infants, and children         Imaging:  CXR: 6/10/17: No acute airspace opacities. Stable mild scarring in the right upper lobe.    MRI cardiac: 6/1/17:   1.  Normal left ventricular size and systolic function  with a calculated ejection fraction of  57 %.  2.  Normal right ventricular size and mildly reduced systolic function with a calculated ejection fraction of 46%.   3.  On delayed enhancement imaging, there is no abnormal late gadolinium enhancement.   4.  Compared with the prior study dated 1/5/2017, inferolateral scar is no longer seen.     CT chest: 4/24/17: Status post cardiac transplant, near complete resolution of bilateral pulmonary areas of consolidation and centrilobular nodules.    Cardiac MRI: 1/5/17:   1.  Normal left ventricular size and systolic function with a visually estimated ejection fraction of  65 %.  2.  Normal right ventricular size and systolic function.    3.  On late gadolinium enhancement imaging, there is a focal area of transmural hyperenhancement in the mid inferolateral segment suggestive of myocardial scar. These findings are likely related to his previous episodes of cardiac rejection.    CTA chest: 7/16/16:   1. No pulmonary embolism.  2. Cardiomegaly with central bronchial wall thickening, perhaps representing mild axial interstitial pulmonary edema.  3. A few perivascular nodules in the lateral aspect of the right upper lobe and superior aspect of the right lower lobe. These may represent sequela of infection/inflammation. In a low-risk patient no followup is necessary. In a high-risk patient followup chest CT in 12 months is recommended.  4. Scattered peripheral atelectasis versus fibrotic changes.    CT a/p: 6/20/16:   1. Cardiomegaly with mild interstitial edema and small right pleural effusion.  2. Small volume free fluid in the pelvis with dependent hyperdensity, indicating hemoperitoneum. No etiology for hemorrhage in the abdomen and pelvis identified.     CT head 6/20/16: 2.7 cm area of slightly expansile sclerosis involving the outer table of the left parietal bone. This is likely benign, most likely an osteoma. Otherwise normal head CT.

## 2017-10-04 NOTE — LETTER
10/4/2017      RE: Javi Bansal  [address] [place] [address]         [facility]  Transplant Infectious Disease Progress Note     Patient:  Javi Bansal, Date of birth 1970, Medical record number [id number]  Date of Visit:  10/04/2017  Consult requested by Dr. Mcclellan  for evaluation of Chaga's and LTBI         Assessment and Recommendations:   Recommendations:  - administered the influenza vaccine today  - no secondary prophylaxis for zoster at this time, should the zoster recur, will consider then.   - monthly monitoring of Chagas PCR, purple top tube send to MD then CDC through December  - ordered misc lab test: purple top for chagas PCR to be sent to MD then [facility].    - monthly CMV PCR through December.     No follow up scheduled at this time.  I am happy to see Javi in the future should I be of assistance.     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  Javi looks great today.  No further episodes of rejection and the Chagas PCR since remains negative.     ID issues:  - Single dermatomal zoster 8/2017: Resolved w/ oral course of valtrex.  No residual pain.  As this was his first episode, I am not inclined to use longer secondary prophylaxis.  Should he develop this again, he should call my office for treatment.     - low level CMV viremia:  Continuing to monitor, has not risen.  If rises to 1500 IU then will treat. CMV R+ = moderate risk, and w/ treatment of rejection thought the virus level would have risen, but thus far has not.  Clinically asymptomatic.  The viremia has remained low level and now <137.  He is coming up to the one year kevin in December, will order a few more CMV PCRs monthly to take it out to one year.      - pneumonia:  Was hospitalized 3/9-3/12.  Clinically  very acute course that resolved quickly is most likely consistent with a bacterial process, procal was positive.  However the CT chest was quite impressive with scattered ggo/ nodules.  IFD w/up negative from blood work. Clinically he has improved w/ course of levofloxacin.  Repeat CT scan w/ significant improvement, almost resolution.     - Chronic Chagas heart disease now s/p OHT as above:  In coordination w/ the [facility] we were monitoring Chagas PCR in a pre-emptive approach after transplant.  Likely in the setting of immunosuppression and treatment of rejection, not unexpectedly the Chagas PCR did elevate.  At this time we do not believe that it has yet re-infected the heart.  So while many subjects will be Chagas PCR positive after transplant, the approach of pre-emptive is to treat the Chagas before re-infection of the heart occurs.  With rising PCRs from 12/26, and very low level parasitemia from thick smear on 1/5/17, in the setting of treatment of rejection, Benznidazole was initiated on 1/6/17, dosing at 150 mg BID, which is just below 5 mg/kg.  The CDC contacted me with the following results:    Chagas PCRs:  12/19/16 negative and remains negative since    12/26/16 1st positive: 32    1/2/17 +   30    1/9/17    +   28    The 60 day course of Benznidazole has since completed, which he tolerated well.  Chagas PCR testing has since remained negative.  Plans outlined above.       Other Notable information:  Once infected pts are infected for life w/ about 30% developing end organ disease.  The adrenal glands have been suggested as a reservoir for infection, but this is not fully delineated.  Heart transplantation is the therapy of choice for Chagas heart disease in [place] and the 3rd leading cause for heart transplantation.  In extensive review of the literature, here's the issues in heart transplantation of Chagas disease.     1. Reactivation is common occurring in 30-50% and mimics rejection, and can present as  a febrile illness.    2. In both [place] and [place] guidelines, screening rather than prophylaxis for infection is recommended.  Thus a screening protocol as outlined above will be needed.    3. Published data from [place] report that the incidence of rejection and survival in Chagas related heart transplant is as good and sometimes better than OHT for other reasons.     4. Differences in immunosuppression needs to be considered and was discussed w/ Dr. Mcclellan.     5.  [facility] contact:  Division of Parasitic Diseases and Malaria.  [phone].  E-mail:  Parasites@cdc.gov.  Emergency  [phone].     References:  AJT 2011; 11. 672.    Curr Opin Infect Dis 2012 25, 4 pg 450.     J Heart Lung Transplant 2010; 29; 286        Journal of cardiac failure 2009; 15; 249  *Consented Javi for the [facility] testing of Chagas and use of therapy if indicated after transplant.  A  was present and very helpful with the [place] forms of the consent.  Javi is not able to read [place] or english, but we went through all the forms, he understands all the risks and benefits, and his wife was also present during the consent process (she is able to read [place]).    -Future Chagas testing:  Needs to be ordered as misc lab in EPIC w/ purple top tube.  Will be send to MD then [facility].      -  LTBI:  Treatment is recommended given the 20x's increased risk of reactivation in the transplant population compared to the general population.  If we have time prior to transplant then rifampin might be the option, I'll have to check drug-drug interactions with milrinone. Or INH for 9 months will be the other option.  Either way, we can proceed with transplant.  Rifampin is not recommended if the transplant is to occur soon and we do not use after transplant due to drug-drug interactions. Started INH/ B6 on 9/17/16.  Tolerating medications well. No active peripheral neuropathy currently.  Completed a course, dc'd therapy, 7/19/17.      Other ID issues:  - prophylaxis: none currently  - serostatus: EBV, CMV, VZV seropositive, Hep C negative  - immunizations:  Up to date.    - isolation:  Good hand hygiene    Attestation:  I have reviewed today's vital signs, medications, labs and imaging.      Ivan Schuler,   Pager [phone]           History of Infectious Disease Illness:   This is a very pleasant 46 year old male from [facility], well known to me, w/ Chagas cardiomyopathy, now s/p OHT 16.      Pre-Transplant, Javi was diagnosed with Chagas in  in [place] when he developed CHF, at which time he received 3 months of treatment with nifurtimox ending 2012 (care everywhere).  Javi states he felt well after treatment for about 1.5 years, then developed progressive cardiomyopathy..  Incidentally Diogenes's brother  from Chagas dz at the age of 20.    Pre-transplant testing was also + for quantiferon.  Diogenes does not recall having had a positive test in the past and has not had tz for active or latent TB.  He [place] And has lived in [place], [place], [place] and [place].      Javi was hospitalized from 16-17 for the OHT, received on 16.  Course complicated by right pleural effusions (), BiV Dysfunction on  w/ cellular grade 2R rejection treated w/ increase in steroids.  By 16 SVT / A-tach w/ Bx on  notable for cellular grade 2R/3A rejection, treated w/ burst of steroids w/ taper, Thymo and change of immunosuppression to Tacro and MMF.  NSVT led to cardiac MRI w/ temp pacer wires, ? Of possible myocardial scar.  During this time we have been monitoring weekly Chagas PCRs via CDC.  By  the Chagas PCR was moderately elevated w/ the PCR from  pending (since returned at the same level).  On 17 given rise of Chagas PCR, results of rare parasites identified locally on thick smear from 17 and treatment of rejection, the decision was made to re-treat the Chagas, this time  w/ Benznidazole, 5 mg/kg BID started on 1/6/17, 150 BID dosed just slightly below 5 mg/kg BID.  A 60 day treatment course was completed and the Chagas PCRs blood remain negative.     Javi was hospitalized 3/9-3/12/17 w/ pneumonia.  Although his symptoms were acute and responded very rapidly to treatment, the CT chest is quite impressive for a mixed diffuse nodular pattern.  He was treated w/ a course of levovloxacin and all blood work (beta D glucan, fungal ab's, galactomannan, etc returned negative).  Clinically he only had a mild cough w/ mostly the feeling that he needs to clear his throat.      LTBI therapy completed 7/19/17.  Since our last visit Javi is doing really well overall, but was seen in the ED w/ dermatomal shingles on the left lateral thigh on 8/5/17, sent home w/ valtrex and gabapentin.  Since then, after about three weeks the rash resolved and he has not residual pain.  Clinically he denies fevers, chills, mouth pain, dysphagia, n/v/d or abdominal pain, no cough / sob or new skin rashes.  No vision changes or headaches.  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     Transplants:  OHT       Immunization History   Administered Date(s) Administered     HepB 08/04/2016, 09/15/2016, 02/09/2017     Influenza (IIV3) 10/05/2016     Influenza Vaccine IM 3yrs+ 4 Valent IIV4 11/01/2011, 03/21/2015     Pneumococcal (PCV 13) 08/04/2016     Pneumococcal 23 valent 03/21/2015     TDAP Vaccine (Boostrix) 08/04/2016       Current Outpatient Prescriptions   Medication     tacrolimus (GENERIC EQUIVALENT) 1 MG capsule     valACYclovir (VALTREX) 1000 mg tablet     gabapentin (NEURONTIN) 300 MG capsule     benzoyl peroxide 5 % LOTN lotion     Sharps Container (B-D SHARPS DISPOSAL BY MAIL) MISC     alcohol swab prep pads     ketoconazole (NIZORAL) 2 % shampoo     order for DME     pravastatin (PRAVACHOL) 20 MG tablet     mycophenolate (CELLCEPT - GENERIC  "EQUIVALENT) 250 MG capsule     aspirin EC 81 MG EC tablet     multivitamin, therapeutic with minerals (THERA-VIT-M) TABS tablet     No current facility-administered medications for this visit.             Allergies:   No Known Allergies       Physical Exam:   Vitals were reviewed.    /74 (BP Location: Right arm, Patient Position: Sitting, Cuff Size: Adult Regular)  Pulse 93  Temp 98.1  F (36.7  C) (Oral)  Resp 16  Ht 1.699 m (5' 6.89\")  Wt 71.5 kg (157 lb 9.6 oz)  SpO2 99%  BMI 24.76 kg/m2     Physical Examination:  GENERAL:  well-developed, well-nourished, ambulating w/out assistance, comfortable on room air.   HEENT:  Head is normocephalic, atraumatic   EYES:  Eyes have anicteric sclerae without conjunctival injection or stigmata of endocarditis.    ENT:  Oropharynx is moist without exudates or ulcers. Tongue is midline  NECK:  Supple. No cervical lymphadenopathy.  No JVD noted.   LUNGS:  Clear to auscultation bilateral.   CARDIOVASCULAR:  Regular rate and rhythm with no gallops or rubs.  ABDOMEN:  Normal bowel sounds, soft, nontender. No appreciable hepatosplenomegaly.  SKIN:  No acute rashes. Slight residual skin changes from dermatomal zoster on left lateral thigh. No stigmata of endocarditis.  NEUROLOGIC:  Grossly nonfocal. Active x4 extremities  No CVA or spinal tenderness  Chest wall incision well healed.           Laboratory Data:     CD4 counts    Absolute CD4   Date Value Ref Range Status   01/03/2017 8 (L) 441 - 2156 cells/uL Final   01/02/2017 5 (L) 441 - 2156 cells/uL Final   01/01/2017  441 - 2156 cells/uL Final    Test not available at time of request  RESCHEDULED FOR 1/2/17 CE KING ON U6C 01/01/17 0916 NYD     12/31/2016 6 (L) 441 - 2156 cells/uL Final       Inflammatory Markers    Recent Labs   Lab Test  06/10/17   0124  02/21/17   1038  06/18/16   0720   SED  18*   --    --    CRP  24.0*  <2.9  7.0       Immune Globulin Studies  No lab results found.  Metabolic Studies     "   Recent Labs   Lab Test  09/22/17   0901  08/01/17   0926  07/19/17   0948  07/03/17   0940  06/10/17   0620  06/10/17   0124  06/02/17   0843   04/25/17   0536   03/28/17   0730   03/17/17   1216   NA  141  141  141  140   --   140  143   < >  140   < >  141   < >  141   POTASSIUM  4.0  4.1  4.1  4.2   --   4.0  3.8   < >  3.6   < >  3.6   < >  3.6   CHLORIDE  109  109  108  108   --   108  109   < >  108   < >  106   < >  103   CO2  26  24  25  22   --   23  27   < >  24   < >  27   < >  29   ANIONGAP  6  8  8  10   --   10  7   < >  8   < >  8   < >  9   BUN  16  25  28  28   --   24  20   < >  26   < >  24   < >  29   CR  0.92  1.27*  1.16  1.16  1.06  1.52*  0.81   < >  0.84   < >  0.92   < >  1.10   GFRESTIMATED  87  61  67  67  75  49*  >90  Non  GFR Calc     < >  >90  Non  GFR Calc     < >  88   < >  72   GLC  96  94  89  98   --   84  77   < >  120*   < >  161*   < >  184*   DAISY  9.1  9.3  9.5  8.8   --   8.8  9.4   < >  8.8   < >  9.6   < >  9.4   PHOS   --    --    --    --    --    --    --    --    --    --   3.0   --   3.1   MAG   --    --    --    --    --    --    --    --   2.0   --   1.5*   --   2.0    < > = values in this interval not displayed.       Hepatic Studies    Recent Labs   Lab Test  07/03/17   0940  06/10/17   0124  06/08/17   0948  06/02/17   0843  05/05/17   0907  04/24/17   1451   BILITOTAL  0.4  0.3  0.5  0.6  0.5  1.0   ALKPHOS  110  132  120  96  67  92   ALBUMIN  4.0  3.8  4.1  4.0  3.9  4.1   AST  28  60*  50*  33  28  21   ALT  16  45  32  25  26  35       Pancreatitis testing    Recent Labs   Lab Test  06/02/17   0843  01/13/17   0803  12/11/16   2352  06/18/16   0720   AMYLASE   --    --   82   --    TRIG  109  82   --   73       Hematology Studies      Recent Labs   Lab Test  09/22/17   0901  08/01/17   0926  07/19/17   0948  07/03/17   0940  06/16/17   0944  06/10/17   0124   03/28/17   0730  03/20/17   1006  03/17/17   1216   WBC  4.3  4.4   4.9  5.3  3.3*  4.3   < >  10.3  12.6*  17.7*   ANEU   --    --    --   3.3  1.6  2.6   --   7.1  9.1*  15.7*   ALYM   --    --    --   1.2  1.1  1.0   --   1.8  2.0  0.5*   GAGE   --    --    --   0.6  0.4  0.5   --   1.3  1.0  0.3   AEOS   --    --    --   0.1  0.1  0.0   --   0.0  0.1  0.0   HGB  11.8*  12.2*  12.3*  11.4*  11.6*  11.2*   < >  13.5  13.2*  13.1*   HCT  36.0*  37.4*  35.6*  33.8*  34.8*  34.4*   < >  41.0  38.5*  38.9*   MCV  90  90  89  91  94  94   < >  100  97  98   PLT  203  210  204  209  262  154   < >  199  318  372    < > = values in this interval not displayed.       Clotting Studies    Recent Labs   Lab Test  06/10/17   0124  03/09/17   0850  12/19/16   0408  12/17/16   1430  12/14/16   0826  12/14/16   0400  12/14/16   0017  12/13/16   2034   INR  1.13  1.12  1.52*  1.67*   --   1.48*   --    --    PTT   --    --    --    --   29  31  31  31       Urine Studies    Recent Labs   Lab Test  04/24/17   2200  03/09/17   1033  12/20/16   1043  12/18/16   1451  12/11/16   2238   URINEPH  5.5  6.0  5.0  5.5  7.0   NITRITE  Negative  Negative  Negative  Negative  Negative   LEUKEST  Negative  Negative  Negative  Negative  Negative   WBCU  <1  <1  1  0  0       Microbiology:  quantiferon + 6/18/16  Parasite stain 1/5/17 w/ one identified 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       Virology:  Hepatitis B Testing     Recent Labs   Lab Test  03/15/17   1046  01/13/17   0803   HBCAB  Nonreactive  Nonreactive   HEPBANG  Nonreactive  Nonreactive       Hepatitis C Testing     Hepatitis C Antibody   Date Value Ref Range Status   03/15/2017  NR Final    Nonreactive   Assay performance characteristics have not been established for newborns,   infants, and children     01/13/2017  NR Final    Nonreactive   Assay performance characteristics have not been established for newborns,   infants, and children         Imaging:  CXR: 6/10/17: No acute airspace opacities. Stable mild scarring in the right upper  lobe.    MRI cardiac: 6/1/17:   1.  Normal left ventricular size and systolic function with a calculated ejection fraction of  57 %.  2.  Normal right ventricular size and mildly reduced systolic function with a calculated ejection fraction of 46%.   3.  On delayed enhancement imaging, there is no abnormal late gadolinium enhancement.   4.  Compared with the prior study dated 1/5/2017, inferolateral scar is no longer seen.     CT chest: 4/24/17: Status post cardiac transplant, near complete resolution of bilateral pulmonary areas of consolidation and centrilobular nodules.    Cardiac MRI: 1/5/17:   1.  Normal left ventricular size and systolic function with a visually estimated ejection fraction of  65 %.  2.  Normal right ventricular size and systolic function.    3.  On late gadolinium enhancement imaging, there is a focal area of transmural hyperenhancement in the mid inferolateral segment suggestive of myocardial scar. These findings are likely related to his previous episodes of cardiac rejection.    CTA chest: 7/16/16:   1. No pulmonary embolism.  2. Cardiomegaly with central bronchial wall thickening, perhaps representing mild axial interstitial pulmonary edema.  3. A few perivascular nodules in the lateral aspect of the right upper lobe and superior aspect of the right lower lobe. These may represent sequela of infection/inflammation. In a low-risk patient no followup is necessary. In a high-risk patient followup chest CT in 12 months is recommended.  4. Scattered peripheral atelectasis versus fibrotic changes.    CT a/p: 6/20/16:   1. Cardiomegaly with mild interstitial edema and small right pleural effusion.  2. Small volume free fluid in the pelvis with dependent hyperdensity, indicating hemoperitoneum. No etiology for hemorrhage in the abdomen and pelvis identified.     CT head 6/20/16: 2.7 cm area of slightly expansile sclerosis involving the outer table of the left parietal bone. This is likely benign,  most likely an osteoma. Otherwise normal head CT.      Ivan Schuler MD

## 2017-10-04 NOTE — NURSING NOTE
"Injectable Influenza Immunization Documentation    1.  Has the patient received the information for the injectable influenza vaccine? YES     2. Is the patient 6 months of age or older? YES     3. Does the patient have any of the following contraindications?         Severe allergy to eggs? No     Severe allergic reaction to previous influenza vaccines? No   Severe allergy to latex? No       History of Guillain-Phoenix syndrome? No     Currently have a temperature greater than 100.4F? No        4.  Severely egg allergic patients should have flu vaccine eligibility assessed by an MD, RN, or pharmacist, and those who received flu vaccine should be observed for 15 min by an MD, RN, Pharmacist, Medical Technician, or member of clinic staff.\": YES    5. Latex-allergic patients should be given latex-free influenza vaccine Yes. Please reference the Vaccine latex table to determine if your clinic s product is latex-containing.       Vaccination given by Chantale Lund [facility]  10/4/2017 11:04 AM            "

## 2017-10-11 DIAGNOSIS — Z94.1 HEART REPLACED BY TRANSPLANT (H): ICD-10-CM

## 2017-10-11 DIAGNOSIS — B57.2 CHAGAS CARDIOMYOPATHY: ICD-10-CM

## 2017-10-11 DIAGNOSIS — B25.9 CYTOMEGALOVIRUS INFECTION, UNSPECIFIED CYTOMEGALOVIRAL INFECTION TYPE (H): ICD-10-CM

## 2017-10-11 DIAGNOSIS — B25.8 OTHER CYTOMEGALOVIRAL DISEASES (H): ICD-10-CM

## 2017-10-11 LAB
ANION GAP SERPL CALCULATED.3IONS-SCNC: 5 MMOL/L (ref 3–14)
BUN SERPL-MCNC: 22 MG/DL (ref 7–30)
CALCIUM SERPL-MCNC: 9.2 MG/DL (ref 8.5–10.1)
CHLORIDE SERPL-SCNC: 108 MMOL/L (ref 94–109)
CO2 SERPL-SCNC: 26 MMOL/L (ref 20–32)
CREAT SERPL-MCNC: 1.13 MG/DL (ref 0.66–1.25)
GFR SERPL CREATININE-BSD FRML MDRD: 69 ML/MIN/1.7M2
GLUCOSE SERPL-MCNC: 89 MG/DL (ref 70–99)
MISCELLANEOUS TEST: NORMAL
POTASSIUM SERPL-SCNC: 4.4 MMOL/L (ref 3.4–5.3)
SODIUM SERPL-SCNC: 139 MMOL/L (ref 133–144)
TACROLIMUS BLD-MCNC: 10.5 UG/L (ref 5–15)
TME LAST DOSE: NORMAL H

## 2017-10-11 PROCEDURE — 80197 ASSAY OF TACROLIMUS: CPT | Performed by: INTERNAL MEDICINE

## 2017-10-17 ENCOUNTER — TELEPHONE (OUTPATIENT)
Dept: TRANSPLANT | Facility: CLINIC | Age: 47
End: 2017-10-17

## 2017-10-17 DIAGNOSIS — Z94.1 HEART REPLACED BY TRANSPLANT (H): Primary | ICD-10-CM

## 2017-10-17 NOTE — TELEPHONE ENCOUNTER
Called patient for health update and to review recent results. Patient reports he is doing very well. Discussed that his recent FK level was a little elevated after recent dose increase, but will plan to recheck another level prior to DM appt next week on 10/24: labs at 0900 in the [facility] [place] [place] lab.  No dose changes made.  Patient verbalizes understanding plan of care and agrees to follow.   used for this conversation.

## 2017-10-17 NOTE — Clinical Note
Please schedule this patient to have labs drawn in the [facility] [place], [place] lab on Tuesday, 10/24 at 0900.  Patient is aware of the appt. Thanks, Manoj

## 2017-10-20 ENCOUNTER — APPOINTMENT (OUTPATIENT)
Dept: GENERAL RADIOLOGY | Facility: CLINIC | Age: 47
End: 2017-10-20
Attending: EMERGENCY MEDICINE
Payer: COMMERCIAL

## 2017-10-20 ENCOUNTER — HOSPITAL ENCOUNTER (EMERGENCY)
Facility: CLINIC | Age: 47
Discharge: HOME | End: 2017-10-21
Attending: EMERGENCY MEDICINE | Admitting: INTERNAL MEDICINE
Payer: COMMERCIAL

## 2017-10-20 DIAGNOSIS — N17.9 ACUTE KIDNEY INJURY (H): ICD-10-CM

## 2017-10-20 DIAGNOSIS — J11.1 FLU: ICD-10-CM

## 2017-10-20 DIAGNOSIS — J11.1 INFLUENZA-LIKE ILLNESS: ICD-10-CM

## 2017-10-20 DIAGNOSIS — Z94.1 HEART TRANSPLANTED (H): ICD-10-CM

## 2017-10-20 LAB
ALBUMIN UR-MCNC: 30 MG/DL
ANION GAP SERPL CALCULATED.3IONS-SCNC: 8 MMOL/L (ref 3–14)
APPEARANCE UR: CLEAR
BASOPHILS # BLD AUTO: 0 10E9/L (ref 0–0.2)
BASOPHILS NFR BLD AUTO: 0.2 %
BILIRUB UR QL STRIP: NEGATIVE
BUN SERPL-MCNC: 22 MG/DL (ref 7–30)
CALCIUM SERPL-MCNC: 8.8 MG/DL (ref 8.5–10.1)
CAOX CRY #/AREA URNS HPF: ABNORMAL /HPF
CHLORIDE SERPL-SCNC: 107 MMOL/L (ref 94–109)
CO2 SERPL-SCNC: 23 MMOL/L (ref 20–32)
COLOR UR AUTO: YELLOW
CREAT SERPL-MCNC: 2.09 MG/DL (ref 0.66–1.25)
DIFFERENTIAL METHOD BLD: ABNORMAL
EOSINOPHIL # BLD AUTO: 0 10E9/L (ref 0–0.7)
EOSINOPHIL NFR BLD AUTO: 0.5 %
ERYTHROCYTE [DISTWIDTH] IN BLOOD BY AUTOMATED COUNT: 13.3 % (ref 10–15)
FLUAV+FLUBV AG SPEC QL: NEGATIVE
FLUAV+FLUBV AG SPEC QL: NEGATIVE
GFR SERPL CREATININE-BSD FRML MDRD: 34 ML/MIN/1.7M2
GLUCOSE SERPL-MCNC: 110 MG/DL (ref 70–99)
GLUCOSE UR STRIP-MCNC: NEGATIVE MG/DL
HCT VFR BLD AUTO: 35.8 % (ref 40–53)
HGB BLD-MCNC: 11.7 G/DL (ref 13.3–17.7)
HGB UR QL STRIP: NEGATIVE
HYALINE CASTS #/AREA URNS LPF: 33 /LPF (ref 0–2)
IMM GRANULOCYTES # BLD: 0 10E9/L (ref 0–0.4)
IMM GRANULOCYTES NFR BLD: 1 %
KETONES UR STRIP-MCNC: 5 MG/DL
LEUKOCYTE ESTERASE UR QL STRIP: NEGATIVE
LYMPHOCYTES # BLD AUTO: 1.4 10E9/L (ref 0.8–5.3)
LYMPHOCYTES NFR BLD AUTO: 33 %
MCH RBC QN AUTO: 29 PG (ref 26.5–33)
MCHC RBC AUTO-ENTMCNC: 32.7 G/DL (ref 31.5–36.5)
MCV RBC AUTO: 89 FL (ref 78–100)
MONOCYTES # BLD AUTO: 0.7 10E9/L (ref 0–1.3)
MONOCYTES NFR BLD AUTO: 17 %
MUCOUS THREADS #/AREA URNS LPF: PRESENT /LPF
NEUTROPHILS # BLD AUTO: 2 10E9/L (ref 1.6–8.3)
NEUTROPHILS NFR BLD AUTO: 48.3 %
NITRATE UR QL: NEGATIVE
NRBC # BLD AUTO: 0 10*3/UL
NRBC BLD AUTO-RTO: 0 /100
PH UR STRIP: 5 PH (ref 5–7)
PLATELET # BLD AUTO: 196 10E9/L (ref 150–450)
POTASSIUM SERPL-SCNC: 3.9 MMOL/L (ref 3.4–5.3)
RBC # BLD AUTO: 4.03 10E12/L (ref 4.4–5.9)
RBC #/AREA URNS AUTO: 3 /HPF (ref 0–2)
SODIUM SERPL-SCNC: 138 MMOL/L (ref 133–144)
SOURCE: ABNORMAL
SP GR UR STRIP: 1.02 (ref 1–1.03)
SPECIMEN SOURCE: NORMAL
TRANS CELLS #/AREA URNS HPF: <1 /HPF (ref 0–1)
UROBILINOGEN UR STRIP-MCNC: NORMAL MG/DL (ref 0–2)
WBC # BLD AUTO: 4.1 10E9/L (ref 4–11)
WBC #/AREA URNS AUTO: 1 /HPF (ref 0–2)

## 2017-10-20 PROCEDURE — 99285 EMERGENCY DEPT VISIT HI MDM: CPT | Mod: 25

## 2017-10-20 PROCEDURE — 96374 THER/PROPH/DIAG INJ IV PUSH: CPT

## 2017-10-20 PROCEDURE — 99284 EMERGENCY DEPT VISIT MOD MDM: CPT | Mod: 25

## 2017-10-20 PROCEDURE — 99285 EMERGENCY DEPT VISIT HI MDM: CPT | Mod: Z6 | Performed by: EMERGENCY MEDICINE

## 2017-10-20 PROCEDURE — 96361 HYDRATE IV INFUSION ADD-ON: CPT | Mod: 59

## 2017-10-20 PROCEDURE — 87804 INFLUENZA ASSAY W/OPTIC: CPT | Mod: 91 | Performed by: EMERGENCY MEDICINE

## 2017-10-20 PROCEDURE — 85025 COMPLETE CBC W/AUTO DIFF WBC: CPT | Performed by: EMERGENCY MEDICINE

## 2017-10-20 PROCEDURE — 81001 URINALYSIS AUTO W/SCOPE: CPT | Performed by: EMERGENCY MEDICINE

## 2017-10-20 PROCEDURE — 80076 HEPATIC FUNCTION PANEL: CPT | Performed by: EMERGENCY MEDICINE

## 2017-10-20 PROCEDURE — 25000132 ZZH RX MED GY IP 250 OP 250 PS 637: Performed by: EMERGENCY MEDICINE

## 2017-10-20 PROCEDURE — 25000128 H RX IP 250 OP 636: Performed by: EMERGENCY MEDICINE

## 2017-10-20 PROCEDURE — 80048 BASIC METABOLIC PNL TOTAL CA: CPT | Performed by: EMERGENCY MEDICINE

## 2017-10-20 PROCEDURE — 87040 BLOOD CULTURE FOR BACTERIA: CPT | Performed by: EMERGENCY MEDICINE

## 2017-10-20 PROCEDURE — 71020 XR CHEST 2 VW: CPT

## 2017-10-20 RX ORDER — ONDANSETRON 4 MG/1
4 TABLET, ORALLY DISINTEGRATING ORAL EVERY 6 HOURS PRN
Status: CANCELLED | OUTPATIENT
Start: 2017-10-20

## 2017-10-20 RX ORDER — KETOROLAC TROMETHAMINE 30 MG/ML
30 INJECTION, SOLUTION INTRAMUSCULAR; INTRAVENOUS ONCE
Status: COMPLETED | OUTPATIENT
Start: 2017-10-20 | End: 2017-10-20

## 2017-10-20 RX ORDER — ONDANSETRON 2 MG/ML
4 INJECTION INTRAMUSCULAR; INTRAVENOUS EVERY 6 HOURS PRN
Status: CANCELLED | OUTPATIENT
Start: 2017-10-20

## 2017-10-20 RX ORDER — NALOXONE HYDROCHLORIDE 0.4 MG/ML
.1-.4 INJECTION, SOLUTION INTRAMUSCULAR; INTRAVENOUS; SUBCUTANEOUS
Status: CANCELLED | OUTPATIENT
Start: 2017-10-20

## 2017-10-20 RX ORDER — OSELTAMIVIR PHOSPHATE 75 MG/1
75 CAPSULE ORAL ONCE
Status: COMPLETED | OUTPATIENT
Start: 2017-10-20 | End: 2017-10-20

## 2017-10-20 RX ADMIN — SODIUM CHLORIDE 500 ML: 9 INJECTION, SOLUTION INTRAVENOUS at 21:28

## 2017-10-20 RX ADMIN — KETOROLAC TROMETHAMINE 30 MG: 30 INJECTION, SOLUTION INTRAMUSCULAR at 20:24

## 2017-10-20 RX ADMIN — SODIUM CHLORIDE 500 ML: 9 INJECTION, SOLUTION INTRAVENOUS at 20:23

## 2017-10-20 RX ADMIN — OSELTAMIVIR PHOSPHATE 75 MG: 75 CAPSULE ORAL at 22:52

## 2017-10-20 ASSESSMENT — ENCOUNTER SYMPTOMS
DIFFICULTY URINATING: 0
SORE THROAT: 1
VOMITING: 0
CONFUSION: 0
COLOR CHANGE: 0
NECK STIFFNESS: 0
PALPITATIONS: 0
RHINORRHEA: 1
ABDOMINAL PAIN: 0
NAUSEA: 0
HEMATURIA: 0
DIARRHEA: 1
WOUND: 0
CHILLS: 1
HEADACHES: 1
MYALGIAS: 1
FEVER: 1
ARTHRALGIAS: 1
COUGH: 1
SINUS PRESSURE: 1
SHORTNESS OF BREATH: 1

## 2017-10-20 ASSESSMENT — PAIN DESCRIPTION - DESCRIPTORS: DESCRIPTORS: ACHING;HEADACHE

## 2017-10-20 NOTE — IP AVS SNAPSHOT
[address] [address]    Phone:  [phone]                                       After Visit Summary   10/20/2017    Javi Bansal    MRN: [id number]           After Visit Summary Signature Page     I have received my discharge instructions, and my questions have been answered. I have discussed any challenges I see with this plan with the nurse or doctor.    ..........................................................................................................................................  Patient/Patient Representative Signature      ..........................................................................................................................................  Patient Representative Print Name and Relationship to Patient    ..................................................               ................................................  Date                                            Time    ..........................................................................................................................................  Reviewed by Signature/Title    ...................................................              ..............................................  Date                                                            Time

## 2017-10-20 NOTE — ED NOTES
Javi comes in for a cough for the last 24 hours and a HA. He complains of coughing up pink phlegm and having greenish drainage from his nose. His heart transplant is less than a year ago. He also has body aches and shortness of breath.

## 2017-10-20 NOTE — IP AVS SNAPSHOT
Javi Rahman #[id number] (CSN: [id number])  (46 year old M)  (Adm: 10/20/17)     [id number]               [address] [facility]: [phone]            Patient Demographics     Patient Name Sex          Age SSN Address Phone    Javi Rahman Male 1970 (46 year old) [id number] [place] [id number] [phone] (Home)  [phone] (Mobile) *Preferred*      Emergency Contact(s)     Name Relation Home Work Mobile    ROQUE LEGER Sister [phone]    AGUILAR,GRISELDA Sister [phone]    MARCELL LAMAS Friend [phone]      Admission Information     Attending Provider Admitting Provider Admission Type Admission Date/Time     Clare Sparks MD Elective 10/20/17  1916    Discharge Date Hospital Service Auth/Cert Status Service Area     Cardiology [facility]    Unit Room/Bed Admission Status       [facility] 6502/6502-01 Admission (Confirmed)       Admission     Complaint          Hospital Account     Name Acct ID Class Status Primary Coverage    Javi Rahman [id number] Emergency Open BLUE PLUS - BLUE PLUS MNCARE            Guarantor Account (for Hospital Account #[id number])     Name Relation to Pt Service Area Active? Acct Type    Javi Rahman Self FCS Yes Personal/Family    Address Phone          [address] [place], [place] [id number] [phone](H)              Coverage Information (for Hospital Account #[id number])     F/O Payor/Plan Precert #    BLUE PLUS/BLUE PLUS MNCARE     Subscriber Subscriber #    Javi Rahman [id number]    Address Phone    [address] [address]  [place] [address] [phone]                                                INTERAGENCY TRANSFER FORM - PHYSICIAN ORDERS   10/20/2017                       [address] [facility]: [phone]            Attending Provider: (none)     "Allergies:  No Known Allergies    Infection:  None   Service:  CARDIOLOGY    Ht:  1.65 m (5' 4.96\")   Wt:  70.3 kg (154 lb 14.4 oz)   Admission Wt:  72.6 kg (160 lb)    BMI:  25.81 kg/m 2   BSA:  1.8 m 2            ED Clinical Impression     Diagnosis Description Comment Added By Time Added    Acute kidney injury (H) [N17.9] Acute kidney injury (H) [N17.9]  Nieves Longo MD 10/20/2017  9:50 PM    Influenza-like illness [R69] Influenza-like illness [R69]  Nieves Longo MD 10/20/2017  9:50 PM    Heart transplanted (H) [Z94.1] Heart transplanted (H) [Z94.1]  Nieves Longo MD 10/20/2017  9:51 PM      Hospital Problems as of 10/21/2017     None      Non-Hospital Problems as of 10/21/2017              Priority Class Noted    Chagas cardiomyopathy Medium  5/26/2016    Cardiac defibrillator in situ-Medtronic, dual chamber - Not Dependent Medium  6/16/2016    CHF (congestive heart failure) (H) Medium  6/17/2016    Chronic systolic heart failure (H) Medium  Unknown    Chest pain Medium  7/11/2016    ACP (advance care planning) Medium  10/10/2016    Heart transplant candidate Medium  12/11/2016    Heart failure (H) Medium  12/12/2016    Heart replaced by transplant (H) Medium  12/12/2016    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 Medium  12/19/2016    Need for prophylactic antibiotic Medium  12/19/2016    Elevated liver enzymes Medium  12/20/2016    Ventricular tachycardia, non-sustained (H) Medium  1/4/2017    Heart transplant graft rejection (H) Medium  1/5/2017    Pneumonia Medium  3/9/2017      Code Status History     Date Active Date Inactive Code Status Order ID Comments User Context    10/21/2017  3:22 PM  Full Code [id number]  Michael Chan MD Outpatient    9/22/2017 10:26 AM 9/22/2017 11:50 AM Full Code [id number]  Manoj Hannah RN Inpatient    8/1/2017 11:25 AM 8/1/2017  3:14 PM Full Code [id number]  Manoj Hannah RN Inpatient    6/2/2017 10:17 " AM 6/2/2017  1:45 PM Full Code [id number]  Manoj Hannah RN Inpatient    5/5/2017 10:00 AM 5/5/2017 11:02 AM Full Code [id number]  Manoj Hannah RN Inpatient    4/26/2017  1:26 PM 5/5/2017 10:00 AM Full Code [id number]  Elo Sibley MD Outpatient    4/24/2017  6:17 PM 4/26/2017  1:26 PM Full Code [id number]  Elo Sibley MD Inpatient    4/4/2017  9:57 AM 4/4/2017  3:22 PM Full Code [id number]  Manoj Hannah, RN Inpatient    3/15/2017 12:26 PM 3/15/2017  5:54 PM Full Code [id number]  Manoj Hannah RN Inpatient    3/12/2017  9:50 AM 3/15/2017 12:26 PM Full Code [id number]  Sandee Meng MD Outpatient    3/9/2017  5:36 PM 3/12/2017  9:50 AM Full Code [id number]  Sandee Meng MD Inpatient    2/21/2017 11:35 AM 2/21/2017  1:05 PM Full Code [id number]  Manoj Hannah RN Inpatient    2/10/2017  9:15 AM 2/10/2017 11:05 AM Full Code [id number]  Manoj Hannah RN Inpatient    1/27/2017 11:29 AM 1/27/2017  3:49 PM Full Code [id number]  Manoj Hannah RN Inpatient    1/9/2017 10:23 AM 1/27/2017 11:29 AM Full Code [id number]  Prakash Rosario PA-C Outpatient    12/12/2016 12:49 PM 1/9/2017 10:23 AM Full Code [id number]  Jorge Luis Childs MD Inpatient    7/12/2016  6:34 PM 12/12/2016 12:49 PM Full Code [id number]  Kylie Pelaez MD Outpatient    7/11/2016  7:22 PM 7/12/2016  6:34 PM Full Code [id number]  Byron Flower MD Inpatient    6/20/2016  7:02 PM 7/11/2016  7:22 PM Full Code [id number]  Adelina Simeon MD Outpatient    6/17/2016 12:45 PM 6/20/2016  7:02 PM Full Code [id number]  Adelina Simeon MD Inpatient    6/17/2016  8:51 AM 6/17/2016 12:45 PM Full Code [id number]  Ny Gilmore RN Inpatient    5/25/2016  2:56 PM 5/27/2016  7:49 PM Full Code [id number]  Ny Gilmore RN Outpatient      Current Code Status     Date Active Code Status Order ID Comments User Context       Prior      Summary of Visit     Reason for your hospital stay       You were admitted for an upper  respiratory tract infection due to the Parainfluenza virus, which causes a self-limiting illness. No changes to your medications were made during this hospitalization.                Medication Review      START taking        Dose / Directions Comments    fluticasone 50 MCG/ACT spray   Commonly known as:  FLONASE   Used for:  Influenza-like illness        Dose:  1 spray   Spray 1 spray into both nostrils daily   Quantity:  1 Bottle   Refills:  1          CONTINUE these medications which have NOT CHANGED        Dose / Directions Comments    aspirin 81 MG EC tablet   Used for:  Heart replaced by transplant (H)        Dose:  81 mg   Take 1 tablet (81 mg) by mouth daily   Quantity:  90 tablet   Refills:  3        benzoyl peroxide 5 % Lotn lotion   Used for:  Other acne        Apply topically At Bedtime   Quantity:  30 mL   Refills:  3    - Apply to forehead, back, and chest    - Use 3 times per week       gabapentin 300 MG capsule   Commonly known as:  NEURONTIN        Dose:  300 mg   Take 1 capsule (300 mg) by mouth 3 times daily Begin by taking one capsule once per day, the next day you may take 1 capsule twice per day and on the 3rd day begin taking 1 capsule 3 times per day.   Quantity:  90 capsule   Refills:  1        ketoconazole 2 % shampoo   Commonly known as:  NIZORAL   Used for:  Seborrhea        Apply topically three times a week Alternate with Head and Shoulders.   Quantity:  120 mL   Refills:  3        multivitamin, therapeutic with minerals Tabs tablet   Used for:  Heart replaced by transplant (H)        Dose:  1 tablet   Take 1 tablet by mouth daily   Quantity:  30 each   Refills:  11        mycophenolate 250 MG capsule   Commonly known as:  GENERIC EQUIVALENT   Used for:  Heart replaced by transplant (H)        Dose:  1500 mg   Take 6 capsules (1,500 mg) by mouth 2 times daily   Quantity:  360 capsule   Refills:  11        pravastatin 20 MG tablet   Commonly known as:  PRAVACHOL   Used for:  Heart  replaced by transplant (H)        Dose:  20 mg   Take 1 tablet (20 mg) by mouth every evening   Quantity:  30 tablet   Refills:  11        tacrolimus 1 MG capsule   Commonly known as:  GENERIC EQUIVALENT   Used for:  Heart replaced by transplant (H)        Dose:  2 mg   Take 2 capsules (2 mg) by mouth 2 times daily   Quantity:  120 capsule   Refills:  11    Dose increase               After Care     Activity       Your activity upon discharge: activity as tolerated       Diet       Follow this diet upon discharge: Low glycemic index diet       Monitor and record       blood glucose 4 times a day, before meals and at bedtime             Follow-Up Appointment Instructions     Adult [facility] Follow-up and recommended labs and tests       Follow up with your transplant coordinator on 10/24/17 as previously scheduled for hospital follow-up.  No follow up labs or test are needed.     Appointments on [place] and/or [facility] (with [facility] or [facility] provider or service). Call [phone] if you haven't heard regarding these appointments within 7 days of discharge.             Your next 10 appointments already scheduled     Oct 24, 2017  9:00 AM CDT   LAB with [facility] ([facility])    [address] [place] [address] [phone]           Patient must bring picture ID. Patient should be prepared to give a urine specimen  Please do not eat 10-12 hours before your appointment if you are coming in fasting for labs on lipids, cholesterol, or glucose (sugar). Pregnant women should follow their Care Team instructions. Water with medications is okay. Do not drink coffee or other fluids. If you have concerns about taking  your medications, please ask at office or if scheduling via U*tiquehart, send a message by clicking on Secure Messaging, Message Your Care Team.            Oct 24, 2017  9:15 AM CDT   RETURN DIABETES with Alla Jerez PA-C   [facility]  Endocrinology ([facility])    [address]  3rd Floor  [facility] [address]   [phone]            Dec 14, 2017 10:30 AM CST   Procedure 1.5 hr with U2A ROOM 10   Unit 2A [facility] ([facility])    [address] [facility] [address]               Dec 14, 2017 12:00 PM CST   Cath 90 Minute with UUHCVR4   [facility]  Heart Cath Lab ([facility])    [address] [facility] [address] [phone]            Dec 19, 2017  9:00 AM CST   MR CARDIAC W CONTRAST STRESS AND FLOW with UUMR4, UU CV MR NURSE, MG STRESS RM   [facility] [place], MRI ([facility])    [address] [address] [phone]           Take your medicines as usual, unless your doctor tells you not to.   If you take Viagra, Levitra or Cialis, stop taking it 48 hours before your test.   If you take Aggrenox or dipyridamole (Persantine, Permole), stop taking it 48 hours before your test.   If you take Theophylline or Aminophylline, stop taking it 12 hours before your test.   If you are diabetic and take oral hypoglycemics, do not take them on the day of your test.  The day before your exam, drink extra fluids at least six 8-ounce glasses (unless your doctor wants you to limit your fluids).  Stop all caffeine 12 hours before the test. This includes coffee, tea, soda, chocolate and certain medicines (such as Anacin, Excedrin and NoDoz). Also avoid decaf coffee and tea, as these contain small amounts of caffeine.  Do not eat or drink for 3 hours before your exam. You may drink water and take your morning medicines.  You may need a blood test (creatinine test) within 30 days of your exam. Follow your doctor s orders if this is arranged before your exam.  If you are very claustrophobic, please let you doctor know. You may get a sedative medicine  from your doctor before you arrive. Bring the medicine to the exam. Do not take it at home. Arrive one hour early. Bring someone who can take you home after the test. Your medicine will make you sleepy. After the exam, you may not drive, take a bus or take a taxi by yourself.  The MRI machine uses a strong magnet. Please wear clothes without metal (snaps, zippers). A sweatsuit works well, or we may give you a hospital gown.  Please remove any body piercings and hair extensions before you arrive. You will remove watches, jewelry, hairpins, wallets, dentures, partial dental plates and hearing aids. You may wear contact lenses, and you may be able to wear your rings. We have a safe place to keep your personal items, but it is safer to leave them at home.   **IMPORTANT** THE INSTRUCTIONS BELOW ARE ONLY FOR THOSE PATIENTS WHO HAVE BEEN TOLD THEY WILL RECEIVE SEDATION OR GENERAL ANESTHESIA DURING THEIR MRI PROCEDURE:  IF YOU WILL RECEIVE SEDATION (take medicine to help you relax during your exam):   You must get the medicine from your doctor before you arrive. Bring the medicine to the exam. Do not take it at home.   Arrive one hour early. Bring someone who can take you home after the test. Your medicine will make you sleepy. After the exam, you may not drive, take a bus or take a taxi by yourself.   No eating 8 hours before your exam. You may have clear liquids up until 4 hours before your exam. (Clear liquids include water, clear tea, black coffee and fruit juice without pulp.)  IF YOU WILL RECEIVE ANESTHESIA (be asleep for your exam):   Arrive 1 1/2 hours early. Bring someone who can take you home after the test. You may not drive, take a bus or take a taxi by yourself.   No eating 8 hours before your exam. You may have clear liquids up until 4 hours before your exam. (Clear liquids include water, clear tea, black coffee and fruit juice without pulp.)  If you have any questions, please contact your Imaging Department  "exam site.            Dec 19, 2017 11:00 AM CST   (Arrive by 10:45 AM)   HEART TRANSPLANT ANNUAL with Geena Mcclellan MD   [facility] Heart Care ([facility])    [address]  [address] [facility] [address]   [phone]            Dec 27, 2017  1:00 PM CST   (Arrive by 12:45 PM)   Transplant Skin Check with GARY Murillo MD   [facility] Dermatology ([facility])    [address]  [address] [facility] [address] [phone]            Jun 13, 2018 10:15 AM CDT   RETURN RETINA with Isabela Balbuena MD   [facility] Clinic ([facility])    [address]  [facility] [address]   [phone]              Statement of Approval     Ordered          10/21/17 1522  I have reviewed and agree with all the recommendations and orders detailed in this document.  EFFECTIVE NOW     Approved and electronically signed by:  Michael Chan MD                                                 INTERAGENCY TRANSFER FORM - NURSING   10/20/2017                       [address] [facility]: [phone]            Attending Provider: (none)    Allergies:  No Known Allergies    Infection:  None   Service:  CARDIOLOGY    Ht:  1.65 m (5' 4.96\")   Wt:  70.3 kg (154 lb 14.4 oz)   Admission Wt:  72.6 kg (160 lb)    BMI:  25.81 kg/m 2   BSA:  1.8 m 2            Advance Directives        Does patient have a scanned Advance Directive/ACP document in EPIC?           No        Immunizations     Name Date      HepB 02/09/17     HepB 09/15/16     HepB 08/04/16     Influenza (IIV3) 10/05/16     Influenza Vaccine IM 3yrs+ 4 Valent IIV4 10/04/17     Influenza Vaccine IM 3yrs+ 4 Valent IIV4 03/21/15     Influenza Vaccine IM 3yrs+ 4 Valent IIV4 11/01/11     Pneumococcal (PCV 13) 08/04/16     Pneumococcal 23 valent 03/21/15     TDAP Vaccine (Boostrix) 08/04/16       ASSESSMENT     Discharge Profile Flowsheet " "    GASTROINTESTINAL (ADULT,PEDIATRIC,OB)     Inspection under devices  Full 10/21/17 1345    GI WDL  WDL 10/21/17 1345   Skin WDL  ex 10/21/17 1345    COMMUNICATION ASSESSMENT     Skin Color/Characteristics  without discoloration 10/21/17 1345    Patient's communication style  spoken language (non-English) 10/20/17 1819   Skin Temperature  warm 10/21/17 1345    Patient's primary language  Belarusian 10/20/17 1819   Skin Moisture  dry 10/21/17 1345     services offered to the patient? (Install  services phone or TTY, if applicable)  yes 10/20/17 1819   Skin Integrity  scar(s) 10/21/17 1345    Did the patient decline [place]  and sign paper waiver form? (Place signed waiver form on chart)  no 10/20/17 1819   SAFETY      SKIN     Safety WDL  WDL 10/21/17 1345    Inspection of bony prominences  Full 10/21/17 1345   All Alarms  none present 10/21/17 0319                 Assessment WDL (Within Defined Limits) Definitions           Safety WDL     Effective: 09/28/15    Row Information: <b>WDL Definition:</b> Bed in low position, wheels locked; call light in reach; upper side rails up x 2; ID band on<br> <font color=\"gray\"><i>Item=AS safety wdl>>List=AS safety wdl>>Version=F14</i></font>      Skin WDL     Effective: 09/28/15    Row Information: <b>WDL Definition:</b> Warm; dry; intact; elastic; without discoloration; pressure points without redness<br> <font color=\"gray\"><i>Item=AS skin wdl>>List=AS skin wdl>>Version=F14</i></font>      Vitals     Vital Signs Flowsheet     VITAL SIGNS     Functioning  can do most things, but pain gets in the way of some 10/21/17 1333    Temp  98  F (36.7  C) 10/21/17 1333   Sleep  awake with occasional pain 10/21/17 1333    Temp src  Oral 10/21/17 1333   HEIGHT AND WEIGHT      Resp  18 10/21/17 1333   Height  1.65 m (5' 4.96\") 10/20/17 1833    Pulse  98 10/20/17 2112   Height Method  Stated 10/20/17 1833    Heart Rate  83 10/21/17 1333   Weight  70.3 kg (154 " lb 14.4 oz) 10/20/17 2341    Pulse/Heart Rate Source  Monitor 10/20/17 2112   Weight Method  Standing scale 10/20/17 2341    BP  103/74 10/21/17 1333   BSA (Calculated - sq m)  1.82 10/20/17 1833    BP Location  Left arm 10/21/17 1150   BMI (Calculated)  26.71 10/20/17 1833    OXYGEN THERAPY     BENJA COMA SCALE      SpO2  99 % 10/21/17 1333   Best Eye Response  4-->(E4) spontaneous 10/21/17 0319    O2 Device  None (Room air) 10/21/17 1333   Best Motor Response  6-->(M6) obeys commands 10/21/17 0319    PAIN/COMFORT     Best Verbal Response  5-->(V5) oriented 10/21/17 0319    Patient Currently in Pain  yes 10/21/17 1333   H[place]ricane Coma Scale Score  15 10/21/17 0319    Preferred Pain Scale  CAPA (Clinically Aligned Pain Assessment) ([facility] Adults Only) 10/21/17 1333   POSITIONING      Pain Location  Head 10/21/17 1333   Body Position  independently positioning 10/21/17 1150    Pain Orientation  Mid 10/21/17 1333   Head of Bed (HOB)  HOB at 20 degrees 10/21/17 0319    Pain Descriptors  Aching;Headache 10/21/17 1333   DAILY CARE      Response to Interventions  Absence of nonverbal indicators of pain 10/21/17 0516   Activity Management  activity adjusted per tolerance 10/21/17 1042    CLINICALLY ALIGNED PAIN ASSESSMENT (CAPA) ([facility] ADULTS ONLY)     Activity Assistance Provided  independent 10/21/17 1042    Comfort  comfortably manageable 10/21/17 1333   ECG      Change in Pain  about the same 10/21/17 1333   ECG Rhythm  Sinus rhythm 10/21/17 1042    Pain Control  partially effective 10/21/17 1333   Ectopy  None 10/21/17 1042            Patient Lines/Drains/Airways Status    Active LINES/DRAINS/AIRWAYS     Name: Placement date: Placement time: Site: Days: Last dressing change:    Peripheral IV 04/24/17 Left 04/24/17   1455      180     Peripheral IV 10/20/17 Right Upper forearm 10/20/17   2021   Upper forearm   less than 1     Wound 12/13/16 Left Heel Suspected pressure ulcer  12/13/16   0800   Heel   312     Rash Bilateral lower leg             Incision/Surgical Site 12/12/16 Anterior Chest 12/12/16   1159    313     Incision/Surgical Site 12/20/16 Left;Midline;Right Abdomen 12/20/16   1707    304     Incision/Surgical Site 01/04/17 Left;Upper Chest 01/04/17   0800    290             Patient Lines/Drains/Airways Status    Active PICC/CVC     None            Intake/Output Detail Report     Date Intake     Output Net    Shift P.O. I.V. IV Piggyback Total Urine Total       Adrianna 10/20/17 0700 - 10/20/17 1459 -- -- -- -- -- -- 0    Noc 10/20/17 1500 - 10/20/17 2359 -- -- -- -- -- -- 0    Day 10/21/17 0000 - 10/21/17 0659 360 -- -- 360 1075 1075 -715    Adrianna 10/21/17 0700 - 10/21/17 1459 600 -- 1000 1600 1975 1975 -375    Noc 10/21/17 1500 - 10/21/17 2359 -- -- -- -- -- -- 0      Case Management/Discharge Planning     Case Management/Discharge Planning Flowsheet     LIVING ENVIRONMENT     QUESTION TO PATIENT:  Has a member of your family or a partner(now or in the past) intimidated, hurt, manipulated, or controlled you in any way?  no 10/20/17 1828    Lives With  spouse 10/20/17 2357   QUESTION TO PATIENT: Do you feel safe going back to the place where you are living?  yes 10/20/17 1828    Living Arrangements  [place] 03/11/17 1654   OBSERVATION: Is there reason to believe there has been maltreatment of a vulnerable adult (ie. Physical/Sexual/Emotional abuse, self neglect, lack of adequate food, shelter, medical care, or financial exploitation)?  no 10/20/17 1828    COPING/STRESS     (R) MENTAL HEALTH SUICIDE RISK      Major Change/Loss/Stressor  none 10/21/17 1203   Are you depressed or being treated for depression?  No 10/21/17 1202    Patient Personal Strengths  zenon/spirituality;motivated;positive attitude;strong support system 06/17/16 1255   HOMICIDE RISK      ABUSE RISK SCREEN     Feels Like Hurting Others  no 10/20/17 1828                  [address] [facility] [facility]: [phone]             Medication Administration Report for Javi Rahman as of 10/21/17 1637   Legend:    Given Hold Not Given Due Canceled Entry Other Actions    Time Time (Time) Time  Time-Action       Inactive    Active    Linked        Medications 10/15/17 10/16/17 10/17/17 10/18/17 10/19/17 10/20/17 10/21/17    acetaminophen (TYLENOL) tablet 325-650 mg  Dose: 325-650 mg Freq: EVERY 4 HOURS PRN Route: PO  PRN Reasons: mild pain,fever  Start: 10/21/17 1247   Admin Instructions: Maximum acetaminophen dose from all sources = 75 mg/kg/day not to exceed 4 grams/day.               amoxicillin (AMOXIL) tablet 875 mg  Dose: 875 mg Freq: EVERY 12 HOURS SCHEDULED Route: PO  Indications Comment: Likely sinusitis  Start: 10/21/17 1200   End: 10/28/17 0759          1330 (875 mg)-Given       [ ] 2000           aspirin EC EC tablet 81 mg  Dose: 81 mg Freq: DAILY Route: PO  Start: 10/21/17 0800   Admin Instructions: DO NOT CRUSH.           0910 (81 mg)-Given           dextromethorphan-guaiFENesin (MUCINEX DM)  MG per 12 hr tablet 1 tablet  Dose: 1 tablet Freq: 2 TIMES DAILY PRN Route: PO  PRN Reason: cough  Start: 10/21/17 0000   Admin Instructions: DO NOT CRUSH.           0932 (1 tablet)-Given           fluticasone (FLONASE) 50 MCG/ACT spray 1 spray  Dose: 1 spray Freq: DAILY Route: BOTH NOSTRIL  Start: 10/21/17 0800          0908 (1 spray)-Given           gabapentin (NEURONTIN) capsule 100 mg  Dose: 100 mg Freq: 3 TIMES DAILY Route: PO  Start: 10/21/17 0800          0910 (100 mg)-Given       1421 (100 mg)-Given       [ ] 2000           glucose 40 % gel 15-30 g  Dose: 15-30 g Freq: EVERY 15 MIN PRN Route: PO  PRN Reason: low blood sugar  Start: 10/20/17 2337   Admin Instructions: Give 15 g for BG 51 to 69 mg/dL IF patient is conscious and able to swallow. Give 30 g for BG less than or equal to 50 mg/dL IF patient is conscious and able to swallow. Do NOT give glucose gel via enteral tube.  IF patient has enteral tube: give  apple juice 120 mL (4 oz or 15 g of CHO) via enteral tube for BG 51 to 69 mg/dL.  Give apple juice 240 mL (8 oz or 30 g of CHO) via enteral tube for BG less than or equal to 50 mg/dL.    ~Oral gel is preferable for conscious and able to swallow patient.   ~IF gel unavailable or patient refuses may provide apple juice 120 mL (4 oz or 15 g of CHO). Document juice on I and O flowsheet.              Or  dextrose 50 % injection 25-50 mL  Dose: 25-50 mL Freq: EVERY 15 MIN PRN Route: IV  PRN Reason: low blood sugar  Start: 10/20/17 2337   Admin Instructions: Use if have IV access, BG less than 70 mg/dL and meet dose criteria below:  Dose if conscious and alert (or disorientated) and NPO = 25 mL  Dose if unconscious / not alert = 50 mL  Vesicant. Up to 25g may be given IV Push undiluted. Each 5g over 1 minute.              Or  glucagon injection 1 mg  Dose: 1 mg Freq: EVERY 15 MIN PRN Route: SC  PRN Reason: low blood sugar  PRN Comment: May repeat x 1 only  Start: 10/20/17 2337   Admin Instructions: May give SQ or IM. ONLY use glucagon IF patient has NO IV access AND is UNABLE to swallow AND blood glucose is LESS than or EQUAL to 50 mg/dL.               insulin aspart (NovoLOG) inj (RAPID ACTING)  Dose: 1-3 Units Freq: AT BEDTIME Route: SC  Start: 10/21/17 0015   Admin Instructions: LOW INSULIN RESISTANCE DOSING    Do Not give Bedtime Correction Insulin if BG less than 200.   For  - 299 give 1 unit.   For  - 399 give 2 units   For BG greater than or equal 400 give 3 units.   Notify provider if glucose greater than or equal to 350 mg/dL after administration of correction dose.  If given at mealtime, must be administered 5 min before meal or immediately after.           (9218)-Not Given [C]       [ ] 2200           insulin aspart (NovoLOG) inj (RAPID ACTING)  Dose: 1-3 Units Freq: 3 TIMES DAILY BEFORE MEALS Route: SC  Start: 10/21/17 0930   Admin Instructions: Correction Scale - LOW INSULIN RESISTANCE DOSING      Do Not give Correction Insulin if Pre-Meal BG less than 140.   For Pre-Meal  - 239 give 1 unit.   For Pre-Meal  - 339 give 2 units.   For Pre-Meal BG greater than or equal to 340 give 3 units.   To be given with prandial insulin, and based on pre-meal blood glucose.   Notify provider if glucose greater than or equal to 350 mg/dL after administration of correction dose.  If given at mealtime, must be administered 5 min before meal or immediately after.           (0914)-Not Given [C]       (1239)-Not Given [C]       (1631)-Not Given [C]           loratadine (CLARITIN) tablet 10 mg  Dose: 10 mg Freq: DAILY Route: PO  Start: 10/21/17 0045          0144 (10 mg)-Given       [ ] 2200           multivitamin, therapeutic with minerals (THERA-VIT-M) tablet 1 tablet  Dose: 1 tablet Freq: DAILY Route: PO  Start: 10/21/17 0800          0910 (1 tablet)-Given           mycophenolate (GENERIC EQUIVALENT) capsule 1,500 mg  Dose: 1,500 mg Freq: 2 TIMES DAILY Route: PO  Start: 10/21/17 0730   Admin Instructions: Check if BLOOD LEVEL is needed BEFORE administering dose.  This order specifically allows the use of GENERIC mycophenolate as an equivalent of CELLCEPT capsules.    When possible, take 1 to 2 hours apart from any product containing magnesium or aluminum.           0910 (1,500 mg)-Given       [ ] 2000           pravastatin (PRAVACHOL) tablet 20 mg  Dose: 20 mg Freq: EVERY EVENING Route: PO  Start: 10/21/17 0730          [ ] 2000           sodium chloride (OCEAN) 0.65 % nasal spray 1 spray  Dose: 1 spray Freq: EVERY 1 HOUR PRN Route: BOTH NOSTRIL  PRN Reason: congestion  Start: 10/21/17 1136              tacrolimus (GENERIC EQUIVALENT) capsule 2 mg  Dose: 2 mg Freq: 2 TIMES DAILY. Route: PO  Start: 10/21/17 0800   Admin Instructions: Check if BLOOD LEVEL is needed BEFORE administering dose.  This order specifically allows the use of a generic equivalent of tacrolimus (PROGRAF) capsules.           0910 (2  mg)-Given       [ ] 1800          Completed Medications  Medications 10/15/17 10/16/17 10/17/17 10/18/17 10/19/17 10/20/17 10/21/17         Dose: 500 mL Freq: ONCE Route: IV  Last Dose: Stopped (10/20/17 2253)  Start: 10/20/17 2123   End: 10/20/17 2253         2128 (500 mL)-New Bag       2253-Stopped              Dose: 500 mL Freq: ONCE Route: IV  Last Dose: Stopped (10/20/17 2126)  Start: 10/20/17 1939   End: 10/20/17 2126         2023 (500 mL)-New Bag       2126-Stopped              Dose: 30 mg Freq: ONCE Route: IV  Start: 10/20/17 1939   End: 10/20/17 2024   Admin Instructions: Up to 30mg may be given IV Push undiluted over 2 minutes.          2024 (30 mg)-Given              Dose: 1,000 mL Freq: ONCE Route: IV  Last Dose: 1,000 mL (10/21/17 0046)  Start: 10/21/17 0030   End: 10/21/17 0846          0046 (1,000 mL)-New Bag             Dose: 75 mg Freq: ONCE Route: PO  Indications Comment: strongly suspect influenza in immunosuppressed patient  Start: 10/20/17 2155   End: 10/20/17 2252         2252 (75 mg)-Given              Dose: 5 mg Freq: ONCE Route: PO  Start: 10/21/17 1300   End: 10/21/17 1330          1330 (5 mg)-Given          Discontinued Medications  Medications 10/15/17 10/16/17 10/17/17 10/18/17 10/19/17 10/20/17 10/21/17         Dose: 325 mg Freq: EVERY 4 HOURS PRN Route: PO  PRN Reasons: mild pain,fever  Start: 10/21/17 0001   End: 10/21/17 1247   Admin Instructions: Maximum acetaminophen dose from all sources = 75 mg/kg/day not to exceed 4 grams/day.           0144 (325 mg)-Given       0916 (325 mg)-Given       1247-Med Discontinued         Dose: 300 mg Freq: 3 TIMES DAILY Route: PO  Start: 10/21/17 0800   End: 10/21/17 0737          0737-Med Discontinued         Dose: 75 mg Freq: 2 TIMES DAILY Route: PO  Indications of Use: INFLUENZA  Start: 10/21/17 1145   End: 10/21/17 1149                 1149-Med Discontinued                INTERAGENCY TRANSFER FORM - NOTES (H&P, Discharge Summary, Consults,  Procedures, Therapies)   10/20/2017                       [address] [facility]: [phone]               History & Physicals      H&P by Broderick Kovacs MD at 10/20/2017 11:24 PM     Author:  Broderick Kovacs MD Service:  Cardiology Author Type:  Resident    Filed:  10/21/2017  2:21 AM Date of Service:  10/20/2017 11:24 PM Creation Time:  10/20/2017 11:18 PM    Status:  Cosign Needed :  Broderick Kovacs MD (Resident)    Cosign Required:  Yes                 INTERNAL MEDICINE HISTORY & PHYSICAL   Javi Bansal ([id number]) admitted on 10/20/2017  Primary care provider: No primary care provider on file.         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 Since then he's had increasing fevers/chills, worsening nasal congestion with with facial pressure, lots of mucous drainage with itchy throat. Says he feels like he has the flu. Has not been eating or drinking as much fluids and feels his urine is very dark. Had one loose stool this morning. He denies orthopnea, PND, lower extremity swelling. Denies chest pain, shortness of breath. Denies rashes or joint pains.         In the ED found to have a Cr of 2 and was given 1L of NS. Flu was negative but was started on oseltamivir.           Past Medical History       Patient Active Problem List   Diagnosis     Chagas cardiomyopathy     Cardiac defibrillator in situ-[employer], dual chamber - Not Dependent     CHF (congestive heart  failure) (H)     Chronic systolic heart failure (H)     Chest pain     ACP (advance care planning)     Heart transplant candidate     Heart failure (H)     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     Heart replaced by transplant (H)     Need for prophylactic antibiotic     Elevated liver enzymes     Ventricular tachycardia, non-sustained (H)     Heart transplant graft rejection (H)     Pneumonia           Past Surgical History       Past Surgical History:   Procedure Laterality Date     CARDIAC SURGERY      AICD     TRANSPLANT HEART RECIPIENT N/A 12/12/2016    Procedure: TRANSPLANT HEART RECIPIENT;  Surgeon: Elo Madsen MD;  Location:  OR            Medications     No current facility-administered medications on file prior to encounter.   Current Outpatient Prescriptions on File Prior to Encounter:  tacrolimus (GENERIC EQUIVALENT) 1 MG capsule Take 2 capsules (2 mg) by mouth 2 times daily   gabapentin (NEURONTIN) 300 MG capsule Take 1 capsule (300 mg) by mouth 3 times daily Begin by taking one capsule once per day, the next day you may take 1 capsule twice per day and on the 3rd day begin taking 1 capsule 3 times per day.   benzoyl peroxide 5 % LOTN lotion Apply topically At Bedtime   ketoconazole (NIZORAL) 2 % shampoo Apply topically three times a week Alternate with Head and Shoulders.   pravastatin (PRAVACHOL) 20 MG tablet Take 1 tablet (20 mg) by mouth every evening   mycophenolate (CELLCEPT - GENERIC EQUIVALENT) 250 MG capsule Take 6 capsules (1,500 mg) by mouth 2 times daily   aspirin EC 81 MG EC tablet Take 1 tablet (81 mg) by mouth daily   multivitamin, therapeutic with minerals (THERA-VIT-M) TABS tablet Take 1 tablet by mouth daily            Allergies   No Known Allergies         Social History     Social History     Social History     Marital status:      Spouse name: N/A     Number of children: N/A     Years of education: N/A     Occupational History     Not on  "file.     Social History Main Topics     Smoking status: Never Smoker     Smokeless tobacco: Never Used     Alcohol use No     Drug use: No     Sexual activity: Not on file     Other Topics Concern     Not on file     Social History Narrative            Family History     Family History   Problem Relation Age of Onset     Brain Cancer Mother      Melanoma No family hx of      Skin Cancer No family hx of      Glaucoma No family hx of      Macular Degeneration No family hx of             Review of Systems     10 pt ROS negative except for as noted in HPI.          Vitals and Exam     Physical exam:  BP 96/68  Pulse 98  Temp 98.9  F (37.2  C) (Oral)  Resp 18  Ht 1.65 m (5' 4.96\")  Wt 72.6 kg (160 lb)  SpO2 96%  BMI 26.66 kg/m2  Wt Readings from Last 2 Encounters:   10/20/17 72.6 kg (160 lb)   10/04/17 71.5 kg (157 lb 9.6 oz)     No intake or output data in the 24 hours ending 10/20/17 2318    Physical Exam:   General: Pleasant male  HEENT: No Scleral icterus. NCAT, MMM. PERRL, EOMI. OP clear without exudate. Nasal erythema and clear drainage. Frontal/maxillary facial pressure   Cardiac: Normal rate, regular rhythm. No m/r/g. Normal S1, S2. No JVD  Pulm: CTAB, no wheezes, or crackles. Normal respiratory effort  Abd: Soft, non distended, non tender abdomen. No CVA tenderness.  Skin: No jaundice, No rash  Extremities: No LE edema  Joints: No inflammation noted on joints  Neuro: A&Ox3, no focal deficits         Labs   CBC  Recent Labs  Lab 10/20/17  2022   WBC 4.1   RBC 4.03*   HGB 11.7*   HCT 35.8*   MCV 89   MCH 29.0   MCHC 32.7   RDW 13.3          BMP  Recent Labs  Lab 10/20/17  2022      POTASSIUM 3.9   CHLORIDE 107   CO2 23   ANIONGAP 8   *   BUN 22   CR 2.09*   GFRESTIMATED 34*   GFRESTBLACK 41*   DAISY 8.8        INRNo lab results found in last 7 days.    Liver panelNo lab results found in last 7 days.    Urinalysis  Recent Labs   Lab Test  10/20/17   2053   COLOR  Yellow   APPEARANCE  Clear "   URINEGLC  Negative   URINEBILI  Negative   URINEKETONE  5*   SG  1.021   UBLD  Negative   URINEPH  5.0   PROTEIN  30*   NITRITE  Negative   LEUKEST  Negative   RBCU  3*   WBCU  1       Cultures    Imaging/procedure results:  # CXR:  Impression: No acute airspace opacity.     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    URI   With 3 days of runny nose, subjective fevers, chills, facial pain/pressure. Flu negative. No white count. CMV was negative as recently as 10/11.[SG1.1]  Treated for CAP in March with negative fungal workup at that time. Patient appears non-toxic and is hemodynamically stable.[SG1.2]    -Continue Tamiflu for now in immunosuppressed patient  -Will add antihistamine/flonase for symptomatic treatment  -Parasite smear[SG1.1]  -Respiratory Panel  -Procal  -Will check CMV, EBV, Quant Gold for thoroughness[SG1.2]     JOSE LUIS   Baseline Cr 1.13. Currently 2.09. Could be prerenal in setting of poor PO intake. However, some RBCs in urine. Now s/p toradol in ED. Given 1L in ED.  -Will give additional 1L overnight  -tacro level in am    Chronic Chagas heart disease now s/p OHT 12/12/16 c/b 2R rejection (last biopsy 1R 9/22)  CMV R+/D+, EBV R+/D+, HIV neg  LTBI s/p INH therapy  Treated with benznidazole thearpy early 2017. Chagas PCRs trended by  ID.[SG1.1] Bedside echo demonstrating normal IVC, cardiac function similar to prior.   -Melchor[SG1.2]nue tacro 2mg BID, mycophenolate 1500mg BID  -Continue ASA/Statin  -Tacro level in AM (therapeutic goal 8-10)    H/o low level CMV viremia   Undetectable since 8/1. Last checked 10/11[SG1.1]  -Recheck CMV[SG1.2]    Steroid-induced DM2  Improved since off prednisone. No longer on anticlycemics. A1c July 7.5.  -Hypoglycemia protocol, [facility]        Broderick Kovacs  Internal Medicine, PGY2  [phone]      Patient will be seen and discussed with primary team in am.[SG1.1]       Revision History        User Key Date/Time User Provider Type Action    > SG1.2 10/21/2017  2:21 AM Broderick Kovacs MD Resident Sign     SG1.1 10/20/2017 11:18 PM Broderick Kovacs MD Resident                   Discharge Summaries     No notes of this type exist for this encounter.      Consult Notes     No notes of this type exist for this encounter.         Progress Notes - Physician (Notes for yesterday and today)      ED Provider Notes by Nieves Longo MD at 10/20/2017  5:59 PM     Author:  Nieves Longo MD Service:  Emergency Medicine Author Type:  Physician    Filed:  10/21/2017 12:16 AM Date of Service:  10/20/2017  5:59 PM Creation Time:  10/20/2017  7:38 PM    Status:  Signed :  Nieves Longo MD (Physician)           History[NC1.1]     Chief Complaint   Patient presents with     Cough     Headache[NC1.2]     HPI  Javi Bansal is a 46 year old male who presents to the emergency department today for fever, cough, headache, muscle aches.  Patient has a history of cardiomyopathy and had a heart transplant in December 2016 and is thus immunosuppressed.  He noticed several symptoms all of which started yesterday.  He has had a subjective fever and chills but has not measured his temperature with thermometer.  He has had nasal congestion, sore throat, a cough productive of green sputum.  He feels  a bit SOB.  No chest pain or abdominal pain.  No nausea or vomiting, he did have one loose stool earlier this morning.  No dysuria or hematuria.  No open wounds or skin rash.  He has had a headache as well as myalgias.  He received an influenza vaccination this week in clinic.[NC1.1]    Past Medical History:   Diagnosis Date     Chagas cardiomyopathy      Chronic systolic heart failure (H)      Diabetes (H)      Dual ICD (implantable cardioverter-defibrillator) in place 10/20/2015     Essential hypertension      Gastroesophageal reflux disease      H/O gastric ulcer      Hypertension      Premature ventricular contractions      Presbyopia      Pterygium     ?? suspected , but unclear dx       Past Surgical History:   Procedure Laterality Date     CARDIAC SURGERY      AICD     TRANSPLANT HEART RECIPIENT N/A 12/12/2016    Procedure: TRANSPLANT HEART RECIPIENT;  Surgeon: Elo Madsen MD;  Location:  OR       Family History   Problem Relation Age of Onset     Brain Cancer Mother      Melanoma No family hx of      Skin Cancer No family hx of      Glaucoma No family hx of      Macular Degeneration No family hx of        Social History   Substance Use Topics     Smoking status: Never Smoker     Smokeless tobacco: Never Used     Alcohol use No[NC1.2]         I have reviewed the Medications, Allergies, Past Medical and Surgical History, and Social History in the Epic system.    Review of Systems   Constitutional: Positive for[NC1.1] chills[NC1.3] and[NC1.1] fever (subjective)[NC1.3].   HENT: Positive for[NC1.1] congestion[NC1.3],[NC1.1] rhinorrhea[NC1.3],[NC1.1] sinus pressure[NC1.3] and[NC1.1] sore throat[NC1.3].    Respiratory: Positive for[NC1.1] cough[NC1.3] and[NC1.1] shortness of breath[NC1.3].    Cardiovascular: Negative for[NC1.1] chest pain[NC1.3] and[NC1.1] palpitations[NC1.3].   Gastrointestinal: Positive for[NC1.1] diarrhea[NC1.3]. Negative for[NC1.1] abdominal pain[NC1.3],[NC1.1] nausea[NC1.3]  "and[NC1.1] vomiting[NC1.3].   Genitourinary: Negative for[NC1.1] difficulty urinating[NC1.3] and[NC1.1] hematuria[NC1.3].   Musculoskeletal: Positive for[NC1.1] arthralgias[NC1.3] and[NC1.1] myalgias[NC1.3]. Negative for[NC1.1] neck stiffness[NC1.3].   Skin: Negative for[NC1.1] color change[NC1.3],[NC1.1] rash[NC1.3] and[NC1.1] wound[NC1.3].   Neurological: Positive for[NC1.1] headaches[NC1.3].   Psychiatric/Behavioral: Negative for[NC1.1] confusion[NC1.3].[NC1.1]   All other systems reviewed and are negative[NC1.3].      Physical Exam[NC1.1]   BP: 90/67  Pulse: 99  Heart Rate: 87  Temp: 98.9  F (37.2  C)  Resp: 24  Height: 165 cm (5' 4.96\")  Weight: 72.6 kg (160 lb)  SpO2: 99 %[NC1.2]      Physical Exam   Constitutional: He appears[NC1.1] distressed[NC1.4].[NC1.1]   Adult male, alert, cooperative, some minimal distress[NC1.3]   HENT:   Head:[NC1.1] Normocephalic[NC1.3] and[NC1.1] atraumatic[NC1.3].   Right Ear:[NC1.1] External ear[NC1.3] normal.   Left Ear:[NC1.1] External ear[NC1.3] normal.   Mouth/Throat: No[NC1.1] oropharyngeal exudate[NC1.3].[NC1.1]   Edematous nasal turbinates with clear discharge  Oropharynx slightly erythematous with no exudate, symmetric posterior oropharynx[NC1.3]   Eyes:[NC1.1] Pupils are equal, round, and reactive to light[NC1.3].[NC1.1] No scleral icterus[NC1.3].   Cardiovascular:[NC1.1] Normal rate[NC1.3],[NC1.1] regular rhythm[NC1.3],[NC1.1] normal heart sounds[NC1.3] and[NC1.1] intact distal pulses[NC1.3].[NC1.1]    No murmur[NC1.3] heard.  Pulmonary/Chest:[NC1.1] Effort normal[NC1.3]. No[NC1.1] respiratory distress[NC1.3]. He has[NC1.1] no wheezes[NC1.3]. He has[NC1.1] no rales[NC1.3].[NC1.1]   Some slight coarse breath sounds B, no rhonchi or wheezing[NC1.3]   Abdominal:[NC1.1] Soft[NC1.3].[NC1.1] Bowel sounds are normal[NC1.3]. He exhibits[NC1.1] no distension[NC1.3]. There is[NC1.1] no tenderness[NC1.3]. There is[NC1.1] no rebound[NC1.3].   Musculoskeletal: He exhibits no[NC1.1] " edema[NC1.3] or[NC1.1] tenderness[NC1.3].   Skin: Skin is[NC1.1] warm[NC1.3].[NC1.1] No rash[NC1.3] noted. He is[NC1.1] not diaphoretic[NC1.3].[NC1.1]   Nursing note[NC1.3] and[NC1.1] vitals[NC1.3] reviewed.      ED Course[NC1.1]     ED Course[NC1.2]     Procedures             Critical Care time:[NC1.1]  none[NC1.4]             Results for orders placed or performed during the hospital encounter of 10/20/17 (from the past 24 hour(s))   Chest XR,  PA & LAT    Narrative    Exam: XR CHEST 2 VW, 10/20/2017 8:00 PM    Indication: cough, fever, eval for infiltrate, heart tx patient    Comparison: X-ray chest dated 6/10/2017    Findings:     Frontal and lateral view of the chest. Unchanged median sternotomy  wires. The cardiac silhouette is within normal limits. No significant  pleural effusion or pneumothorax. No acute airspace opacity. No acute  osseous abnormality. Visualized abdominal structures are unremarkable.      Impression    Impression: No acute airspace opacity.     I have personally reviewed the examination and initial interpretation  and I agree with the findings.    HUMAIRA VERNON MD   CBC with platelets differential   Result Value Ref Range    WBC 4.1 4.0 - 11.0 10e9/L    RBC Count 4.03 (L) 4.4 - 5.9 10e12/L    Hemoglobin 11.7 (L) 13.3 - 17.7 g/dL    Hematocrit 35.8 (L) 40.0 - 53.0 %    MCV 89 78 - 100 fl    MCH 29.0 26.5 - 33.0 pg    MCHC 32.7 31.5 - 36.5 g/dL    RDW 13.3 10.0 - 15.0 %    Platelet Count 196 150 - 450 10e9/L    Diff Method Automated Method     % Neutrophils 48.3 %    % Lymphocytes 33.0 %    % Monocytes 17.0 %    % Eosinophils 0.5 %    % Basophils 0.2 %    % Immature Granulocytes 1.0 %    Nucleated RBCs 0 0 /100    Absolute Neutrophil 2.0 1.6 - 8.3 10e9/L    Absolute Lymphocytes 1.4 0.8 - 5.3 10e9/L    Absolute Monocytes 0.7 0.0 - 1.3 10e9/L    Absolute Eosinophils 0.0 0.0 - 0.7 10e9/L    Absolute Basophils 0.0 0.0 - 0.2 10e9/L    Abs Immature Granulocytes 0.0 0 - 0.4 10e9/L    Absolute  Nucleated RBC 0.0    Basic metabolic panel   Result Value Ref Range    Sodium 138 133 - 144 mmol/L    Potassium 3.9 3.4 - 5.3 mmol/L    Chloride 107 94 - 109 mmol/L    Carbon Dioxide 23 20 - 32 mmol/L    Anion Gap 8 3 - 14 mmol/L    Glucose 110 (H) 70 - 99 mg/dL    Urea Nitrogen 22 7 - 30 mg/dL    Creatinine 2.09 (H) 0.66 - 1.25 mg/dL    GFR Estimate 34 (L) >60 mL/min/1.7m2    GFR Estimate If Black 41 (L) >60 mL/min/1.7m2    Calcium 8.8 8.5 - 10.1 mg/dL   Blood Culture ONE site   Result Value Ref Range    Specimen Description Blood Right Arm     Culture Micro PENDING    Hepatic panel   Result Value Ref Range    Bilirubin Direct 0.1 0.0 - 0.2 mg/dL    Bilirubin Total 0.3 0.2 - 1.3 mg/dL    Albumin 3.8 3.4 - 5.0 g/dL    Protein Total 7.6 6.8 - 8.8 g/dL    Alkaline Phosphatase 180 (H) 40 - 150 U/L    ALT 43 0 - 70 U/L    AST 36 0 - 45 U/L   Influenza A/B antigen   Result Value Ref Range    Influenza A/B Agn Specimen Nasopharyngeal     Influenza A Negative NEG^Negative    Influenza B Negative NEG^Negative   UA with Microscopic reflex to Culture   Result Value Ref Range    Color Urine Yellow     Appearance Urine Clear     Glucose Urine Negative NEG^Negative mg/dL    Bilirubin Urine Negative NEG^Negative    Ketones Urine 5 (A) NEG^Negative mg/dL    Specific Gravity Urine 1.021 1.003 - 1.035    Blood Urine Negative NEG^Negative    pH Urine 5.0 5.0 - 7.0 pH    Protein Albumin Urine 30 (A) NEG^Negative mg/dL    Urobilinogen mg/dL Normal 0.0 - 2.0 mg/dL    Nitrite Urine Negative NEG^Negative    Leukocyte Esterase Urine Negative NEG^Negative    Source Midstream Urine     WBC Urine 1 0 - 2 /HPF    RBC Urine 3 (H) 0 - 2 /HPF    Transitional Epi <1 0 - 1 /HPF    Mucous Urine Present (A) NEG^Negative /LPF    Hyaline Casts 33 (H) 0 - 2 /LPF    Calcium Oxalate Few (A) NEG^Negative /HPF[NC1.2]              Assessments & Plan (with Medical Decision Making)   Patient presents with the above complaint.  He is immunosuppressive  susceptible to multiple infections.  On initial evaluation a temperature of 98.9 .  He does not appear to be in any distress but has obvious nasal congestion and cough, he did show me that it is productive of green sputum.  Given he has conglomeration of symptoms including subjective fever, chills, headache, myalgias, nasal congestion, sore throat, and cough, I highly suspect influenza.  He did receive influenza vaccine this week, however it has likely not had time to fully take effect.      We did establish IV access and we did a laboratory analysis.  IV fluids were given as well.  CBC[NC1.1] shows a white count of 4.1, hemoglobin 11.7.[SA1.1]  BMP[NC1.1] de[NC1.4]monstrated normal electrolytes, creatinine today is 2.09 which is an acute bump from his baseline, most recent creatinine was checked 9 days ago at which point it was 1.13[SA1.1],[NC1.1] i[NC1.4]t is possible this is all pre-renal as he has not been drinking well as he has not been feeling well[SA1.1],[NC1.4] however I still have concerns about this acute kidney injury. He is on immunosuppressant medications which can certainly cause issues with the kidney.[SA1.1] B[NC1.4]lood culture ×1 was obtained.  Influenza swab[NC1.1] was negative. It is possible that this is a false negative, as clinically I do believe he has influenza and he is certainly susceptible to it as he is an immunosuppressed heart transplant patient. I would lean towards treating him. I did order a dose of Tamiflu for him.  UA was checked and this showed no evidence of infections, though he does have some casts. Chest x-ray was also done and was read by radiology as no acute airspace opacity.    I have spoken to Dr. Bills[SA1.1]nicol[NC1.4] about this patient.  Dr. Finn[SA1.1]klyie[NC1.4] agrees it is reasonable to admit him at least overnight for IV fluids and to ensure that his creatinine turns around and starts to trend in the right direction.  I would recommend that Tamiflu be  continued.  The patient will be admitted to the Cardiology II service as soon as a bed is available.            This part of the document was transcribed by Michelle Mccarty [employer].[SA1.1]                                            I have reviewed the nursing notes.    I have reviewed the findings, diagnosis, plan and need for follow up with the patient.[NC1.1]    Current Discharge Medication List          Final diagnoses:   Acute kidney injury (H)   Influenza-like illness   Heart transplanted (H)[NC1.2]       10/20/2017   [facility], [place], EMERGENCY DEPARTMENT[NC1.1]     Nieves Longo MD  10/21/17 0016  [NC1.5]     Revision History        User Key Date/Time User Provider Type Action    > NC1.5 10/21/2017 12:16 AM Nieves Longo MD Physician Sign     NC1.2 10/21/2017 12:14 AM Nieves Longo MD Physician      NC1.4 10/21/2017 12:12 AM Nieves Longo MD Physician      SA1.1 10/20/2017 10:59 PM Bhargavi Strauss Scribe Share     NC1.3 10/20/2017 10:07 PM Nieves Longo MD Physician Share     NC1.1 10/20/2017  7:38 PM Nieves Longo MD Physician                   Procedure Notes     No notes of this type exist for this encounter.      Progress Notes - Therapies (Notes from 10/18/17 through 10/21/17)     No notes of this type exist for this encounter.                                          INTERAGENCY TRANSFER FORM - LAB / IMAGING / EKG / EMG RESULTS   10/20/2017                       [address] [facility]: [phone]            Unresulted Labs (24h ago through future)    Start       Ordered    Pending  Basic metabolic panel  AM DRAW,   Routine      Pending         Lab Results - 3 Days      Blood Culture ONE site [[id number]]  Resulted: 10/21/17 1514, Result status: Preliminary result    Ordering provider: Nieves Longo MD  10/20/17 1938 Resulting lab: [facility]    Specimen Information    Type Source Collected On   Blood Arm,  Right 10/20/17 2022   Comment:  Right Arm          Components       Value Reference Range Flag Lab   Specimen Description Blood Right Arm      Culture Micro No growth after 15 hours   75            Parasite stain [[id number]]  Resulted: 10/21/17 1501, Result status: Preliminary result    Ordering provider: Broderick Kovacs MD  10/21/17 0036 Resulting lab: [facility]    Specimen Information    Type Source Collected On   Whole blood  10/21/17 0142          Components       Value Reference Range Flag Lab   Specimen Description Whole Blood      Parasite Stain No Trypanosoma species found   75   Parasite Stain Final report awaits Medical Director review.   75            Respiratory Virus Panel by PCR [[id number]] (Abnormal)  Resulted: 10/21/17 1400, Result status: Final result    Ordering provider: Kassi Longo MD  10/21/17 0127 Resulting lab: [facility]    Specimen Information    Type Source Collected On   Nasopharyngeal  10/21/17 0145          Components       Value Reference Range Flag Lab   Respiratory Virus Source Nasopharyngeal   75   Influenza A Negative NEG^Negative  75   Influenza A, H1 Negative NEG^Negative  75   Influenza A, H3 Negative NEG^Negative  75   Influenza A 2009 H1N1 Negative NEG^Negative  75   Influenza B Negative NEG^Negative  75   Respiratory Syncytial Virus A Negative NEG^Negative  75   Respiratory Syncytial Virus B Negative NEG^Negative  75   Parainfluenza Virus 1 Positive NEG^Negative A 75   Comment:         Critical Value/Significant Value called to and read back by  Yelena Maxwell RN @9809 on 10/21/17. .     Parainfluenza Virus 2 Negative NEG^Negative  75   Parainfluenza Virus 3 Negative NEG^Negative  75   Human Metapneumovirus Negative NEG^Negative  75   Human Rhinovirus Negative NEG^Negative  75   Adenovirus Species B/E Negative NEG^Negative  75   Adenovirus Species C Negative NEG^Negative  75   Respiratory Virus  Comment --   75   Comment:         The test detects Influenza A (H1 and H3), Influenza A 2009 H1N1, Influenza B,   Respiratory Syncytial Virus A, Respiratory Syncytial Virus B, Parainfluenza   Virus (1, 2 and 3), Human Metapneumovirus, Human Rhinovirus (HRV), Adenovirus   B/E and Adenovirus C.  The assay has received FDA approval for the testing of nasopharyngeal (NP)   swabs only. The Infectious Disease Diagnostic Laboratory at [facility], [place], has validated the performance   characteristics of the GenMark Respiratory Viral Panel for NP swabs, bronchial   alveolar lavage/wash, nasopharyngeal aspirate/wash and nasal wash.      Result:         The GenMark Respiratory Viral Panel (RVP) is a qualitative, multiplex, diagnostic test for   simultaneous detection and identification of multiple respiratory viral nucleic acids in   individuals exhibiting signs and symptoms of respiratory infection. It uses innovative   eSensor technology to provide sensitive and specific respiratory virus detection.              Basic metabolic panel [[id number]]  Resulted: 10/21/17 1314, Result status: Final result    Ordering provider: Jeevan Crow MD  10/21/17 1135 Resulting lab: [facility]    Specimen Information    Type Source Collected On   Blood  10/21/17 1208          Components       Value Reference Range Flag Lab   Sodium 139 133 - 144 mmol/L  51   Potassium 4.0 3.4 - 5.3 mmol/L  51   Chloride 108 94 - 109 mmol/L  51   Carbon Dioxide 24 20 - 32 mmol/L  51   Anion Gap 7 3 - 14 mmol/L  51   Glucose 96 70 - 99 mg/dL  51   Urea Nitrogen 17 7 - 30 mg/dL  51   Creatinine 1.23 0.66 - 1.25 mg/dL  51   GFR Estimate 63 >60 mL/min/1.7m2  51   Comment:  Non  GFR Calc   GFR Estimate If Black 76 >60 mL/min/1.7m2  51   Comment:  African American GFR Calc   Calcium 8.5 8.5 - 10.1 mg/dL  51            Magnesium [[id number]]  Resulted: 10/21/17 1314, Result  status: Final result    Ordering provider: Jeevan Crow MD  10/21/17 1135 Resulting lab: [facility]    Specimen Information    Type Source Collected On   Blood  10/21/17 1208          Components       Value Reference Range Flag Lab   Magnesium 1.9 1.6 - 2.3 mg/dL  51            Alkaline phosphatase [[id number]] (Abnormal)  Resulted: 10/21/17 1314, Result status: Final result    Ordering provider: Jeevan Crow MD  10/21/17 1135 Resulting lab: [facility]    Specimen Information    Type Source Collected On   Blood  10/21/17 1208          Components       Value Reference Range Flag Lab   Alkaline Phosphatase 160 40 - 150 U/L H 51            Tacrolimus level [[id number]]  Resulted: 10/21/17 1303, Result status: Final result    Ordering provider: Broderick Kovacs MD  10/21/17 0011 Resulting lab: [facility]    Specimen Information    Type Source Collected On   Blood  10/21/17 0604          Components       Value Reference Range Flag Lab   Tacrolimus Last Dose Not Provided   51   Tacrolimus Level 9.9 5.0 - 15.0 ug/L  51   Comment:         Tacrolimus Reference Range  Kidney Transplant  Pediatric                      ug/L    0-3 months post transplant   10-12    3-6 months post transplant   8-10    6-12 months post transplant  6-8    >12 months post transplant   4-7  Adult    0-6 months post transplant   8-10    6-12 months post transplant  6-8    >12 months post transplant   4-6    >5 years post transplant     3-5  Heart Transplant  Pediatric    0-12 months post transplant  10-15    >12 months post transplant   5-10  Adult    0-3 months post transplant   10-15    3-6 months post transplant   8-12    6-12 months post transplant  6-12    >12 months post transplant   6-10  Lung Transplant    0-12 months post transplant  10-15    >12 months post transplant   8-12  Liver Transplant  Pediatric    0-3 months post  transplant   10-15    3-6 months post transplant   8-10    >6 months post transplant    6-8  Adult    0-3 months post transplant   10-12    3-6 months post transplant   8-10    >6 months post transplant    6-8  Pancrea  s Transplant    0-6 months post transplant   8-10    >6 months post transplant    5-8  This test was developed and its performance characteristics determined by the   [facility],  Special Chemistry Laboratory. It has   not been cleared or approved by the FDA. The laboratory is regulated under   CLIA as qualified to perform high-complexity testing. This test is used for   clinical purposes. It should not be regarded as investigational or for   research.              CBC with platelets [[id number]] (Abnormal)  Resulted: 10/21/17 1253, Result status: Final result    Ordering provider: Jeevan Crow MD  10/21/17 1135 Resulting lab: [facility]    Specimen Information    Type Source Collected On   Blood  10/21/17 1208          Components       Value Reference Range Flag Lab   WBC 4.0 4.0 - 11.0 10e9/L  51   RBC Count 3.72 4.4 - 5.9 10e12/L L 51   Hemoglobin 10.8 13.3 - 17.7 g/dL L 51   Hematocrit 33.5 40.0 - 53.0 % L 51   MCV 90 78 - 100 fl  51   MCH 29.0 26.5 - 33.0 pg  51   MCHC 32.2 31.5 - 36.5 g/dL  51   RDW 13.3 10.0 - 15.0 %  51   Platelet Count 188 150 - 450 10e9/L  51            CMV DNA quantification [[id number]] (Abnormal)  Resulted: 10/21/17 0958, Result status: Final result    Ordering provider: Kassi Longo MD  10/21/17 0128 Resulting lab: [facility]    Specimen Information    Type Source Collected On   Blood  10/21/17 0203          Components       Value Reference Range Flag Lab   CMV DNA Quantitation Specimen PLASMA   51   CMV Quant IU/mL <137 CMVND^CMV DNA Not Detected [IU]/mL A 75   Comment:         CMV DNA detected, less than 137 IU/mL  Mutations within the highly conserved  regions of the viral genome covered by   the BENJA AmpliPrep/BENJA TaqMan CMV Test primers and/or probes have been   identified and may result in under-quantitation of or failure to detect the   virus.  Supplemental testing methods should be used for testing when this is   suspected.  The BENJA AmpliPrep/BENJA TaqMan CMV Test is an FDA-approved in vitro nucleic   acid amplification test for the quantitation of cytomegalovirus DNA in human   plasma (EDTA plasma) using the BENJA AmpliPrep Instrument for automated viral   nucleic acid extraction and the BENJA TaqMan Analyzer or BENJA TaqMan for   automated Real Time amplification and detection of the viral nucleic acid   target.  Titer results are reported in International Units/mL (IU/mL using 1st WHO   International standard for Human Cytomegalovirus for Nucleic Acid   Amplification based assays. The conversion factor between CMV DNA copis/mL (as   defined by t  he Roche BENJA TaqMan CMV test) and International Units is the   CMV DNA concentration in IU/mL x 1.1 copies/IU = CMV DNA in copies/mL.  This assay has received FDA approval for the testing of human plasma only. The   Infectious Disease Diagnostic Laboratory at the [facility], [place], has validated the performance characteristics of the   Roche CMV assay for plasma, bronchial alveolar lavage/wash and urine.     Log IU/mL of CMVQNT <2.1 <2.1 {Log_IU}/mL  75            Glucose by meter [[id number]]  Resulted: 10/21/17 0745, Result status: Final result    Ordering provider: Clare Sparks MD  10/21/17 0740 Resulting lab: POINT OF CARE TEST, GLUCOSE    Specimen Information    Type Source Collected On     10/21/17 0740          Components       Value Reference Range Flag Lab   Glucose 91 70 - 99 mg/dL  170            Procalcitonin [[id number]]  Resulted: 10/21/17 0333, Result status: Final result    Ordering provider: Kassi Longo MD  10/21/17 0127 Resulting lab:  [facility]    Specimen Information    Type Source Collected On   Blood  10/21/17 0203          Components       Value Reference Range Flag Lab   Procalcitonin 0.15 ng/ml  51   Comment:         0.05-0.24 ng/ml Low risk of systemic bacterial infection. Local bacterial   infection possible.  Recommendation: Assess other clinical features of   infection. Discourage antibiotics unless strong clinical suspicion for serious   infection.              NT proBNP inpatient and ED [[id number]]  Resulted: 10/21/17 0258, Result status: Final result    Ordering provider: Kassi Longo MD  10/21/17 0106 Resulting lab: [facility]    Specimen Information    Type Source Collected On   Blood  10/21/17 0203          Components       Value Reference Range Flag Lab   N-Terminal Pro BNP Inpatient 209 0 - 450 pg/mL  51   Comment:            Reference range shown and results flagged as abnormal are suggested inpatient   cut points for confirming diagnosis if CHF in an acute setting. Establishing a   baseline value for each individual patient is useful for follow-up. An   inpatient or emergency department NT-proPBNP <300 pg/mL effectively rules out   acute CHF, with 99% negative predictive value.  The outpatient non-acute reference range for ruling out CHF is:   0-125 pg/mL (age 18 to less than 75)   0-450 pg/mL (age 75 yrs and older)              EBV DNA PCR Quantitative Whole Blood [[id number]]  Resulted: 10/21/17 0216, Result status: In process    Ordering provider: Kassi Longo MD  10/21/17 0128 Resulting lab: [facility]    Specimen Information    Type Source Collected On   Blood  10/21/17 0203            Glucose by meter [[id number]]  Resulted: 10/21/17 0151, Result status: Final result    Ordering provider: Clare Sparks MD  10/21/17 0147 Resulting lab: POINT OF CARE TEST, GLUCOSE    Specimen Information    Type Source Collected On     10/21/17  0147          Components       Value Reference Range Flag Lab   Glucose 99 70 - 99 mg/dL  170            Hepatic panel [[id number]] (Abnormal)  Resulted: 10/21/17 0003, Result status: Final result    Ordering provider: Nieves Longo MD  10/20/17 2022 Resulting lab: [facility]    Specimen Information    Type Source Collected On     10/20/17 2022          Components       Value Reference Range Flag Lab   Bilirubin Direct 0.1 0.0 - 0.2 mg/dL  51   Bilirubin Total 0.3 0.2 - 1.3 mg/dL  51   Albumin 3.8 3.4 - 5.0 g/dL  51   Protein Total 7.6 6.8 - 8.8 g/dL  51   Alkaline Phosphatase 180 40 - 150 U/L H 51   ALT 43 0 - 70 U/L  51   AST 36 0 - 45 U/L  51            UA with Microscopic reflex to Culture [[id number]] (Abnormal)  Resulted: 10/20/17 2128, Result status: Final result    Ordering provider: Nieves Longo MD  10/20/17 1938 Resulting lab: [facility]    Specimen Information    Type Source Collected On   Midstream Urine Urine clean catch 10/20/17 2053          Components       Value Reference Range Flag Lab   Color Urine Yellow   51   Appearance Urine Clear   51   Glucose Urine Negative NEG^Negative mg/dL  51   Bilirubin Urine Negative NEG^Negative  51   Ketones Urine 5 NEG^Negative mg/dL A 51   Specific Gravity Urine 1.021 1.003 - 1.035  51   Blood Urine Negative NEG^Negative  51   pH Urine 5.0 5.0 - 7.0 pH  51   Protein Albumin Urine 30 NEG^Negative mg/dL A 51   Urobilinogen mg/dL Normal 0.0 - 2.0 mg/dL  51   Nitrite Urine Negative NEG^Negative  51   Leukocyte Esterase Urine Negative NEG^Negative  51   Source Midstream Urine   51   WBC Urine 1 0 - 2 /HPF  51   RBC Urine 3 0 - 2 /HPF H 51   Transitional Epi <1 0 - 1 /HPF  51   Mucous Urine Present NEG^Negative /LPF A 51   Hyaline Casts 33 0 - 2 /LPF H 51   Calcium Oxalate Few NEG^Negative /HPF A 51            Basic metabolic panel [[id number]] (Abnormal)  Resulted: 10/20/17 2104, Result  status: Final result    Ordering provider: Nieves Longo MD  10/20/17 1938 Resulting lab: [facility]    Specimen Information    Type Source Collected On   Blood  10/20/17 2022          Components       Value Reference Range Flag Lab   Sodium 138 133 - 144 mmol/L  51   Potassium 3.9 3.4 - 5.3 mmol/L  51   Chloride 107 94 - 109 mmol/L  51   Carbon Dioxide 23 20 - 32 mmol/L  51   Anion Gap 8 3 - 14 mmol/L  51   Glucose 110 70 - 99 mg/dL H 51   Urea Nitrogen 22 7 - 30 mg/dL  51   Creatinine 2.09 0.66 - 1.25 mg/dL H 51   GFR Estimate 34 >60 mL/min/1.7m2 L 51   Comment:  Non  GFR Calc   GFR Estimate If Black 41 >60 mL/min/1.7m2 L 51   Comment:  African American GFR Calc   Calcium 8.8 8.5 - 10.1 mg/dL  51            Influenza A/B antigen [[id number]]  Resulted: 10/20/17 2059, Result status: Final result    Ordering provider: Nieves Longo MD  10/20/17 1938 Resulting lab: [facility]    Specimen Information    Type Source Collected On   Nasopharyngeal  10/20/17 2024          Components       Value Reference Range Flag Lab   Influenza A/B Agn Specimen Nasopharyngeal   51   Influenza A Negative NEG^Negative  51   Influenza B Negative NEG^Negative  51   Comment:         Test results must be correlated with clinical data. If necessary, results   should be confirmed by a molecular assay or viral culture.              CBC with platelets differential [[id number]] (Abnormal)  Resulted: 10/20/17 2049, Result status: Final result    Ordering provider: Nieves Longo MD  10/20/17 1938 Resulting lab: [facility]    Specimen Information    Type Source Collected On   Blood  10/20/17 2022          Components       Value Reference Range Flag Lab   WBC 4.1 4.0 - 11.0 10e9/L  51   RBC Count 4.03 4.4 - 5.9 10e12/L L 51   Hemoglobin 11.7 13.3 - 17.7 g/dL L 51   Hematocrit 35.8 40.0 - 53.0 % L 51   MCV 89 78 -  100 fl  51   MCH 29.0 26.5 - 33.0 pg  51   MCHC 32.7 31.5 - 36.5 g/dL  51   RDW 13.3 10.0 - 15.0 %  51   Platelet Count 196 150 - 450 10e9/L  51   Diff Method Automated Method   51   % Neutrophils 48.3 %  51   % Lymphocytes 33.0 %  51   % Monocytes 17.0 %  51   % Eosinophils 0.5 %  51   % Basophils 0.2 %  51   % Immature Granulocytes 1.0 %  51   Nucleated RBCs 0 0 /100  51   Absolute Neutrophil 2.0 1.6 - 8.3 10e9/L  51   Absolute Lymphocytes 1.4 0.8 - 5.3 10e9/L  51   Absolute Monocytes 0.7 0.0 - 1.3 10e9/L  51   Absolute Eosinophils 0.0 0.0 - 0.7 10e9/L  51   Absolute Basophils 0.0 0.0 - 0.2 10e9/L  51   Abs Immature Granulocytes 0.0 0 - 0.4 10e9/L  51   Absolute Nucleated RBC 0.0   51            Testing Performed By     Lab - Abbreviation Name Director Address Valid Date Range    45 - [id number] [facility] Unknown Unknown 01/28/02 0000 - Present    51 - Unknown [facility] Unknown [address] [facility] [id number] 12/31/14 1010 - Present    75 - Unknown [facility] Unknown [address] [facility] [id number] 01/15/15 1019 - Present    170 - Unknown POINT OF CARE TEST, GLUCOSE Unknown Unknown 10/31/11 1114 - Present               Imaging Results - 3 Days      Chest XR,  PA & LAT [[id number]]  Resulted: 10/20/17 2021, Result status: Final result    Ordering provider: Nieves Longo MD  10/20/17 1938 Resulted by: Hipolito Kay MD Faizi, Nauroze, MD    Performed: 10/20/17 1948 - 10/20/17 2000 Resulting lab: RADIOLOGY RESULTS    Narrative:       Exam: XR CHEST 2 VW, 10/20/2017 8:00 PM    Indication: cough, fever, eval for infiltrate, heart tx patient    Comparison: X-ray chest dated 6/10/2017    Findings:     Frontal and lateral view of the chest. Unchanged median sternotomy  wires. The cardiac silhouette is within normal limits. No significant  pleural effusion or pneumothorax. No acute airspace opacity. No acute  osseous abnormality.  Visualized abdominal structures are unremarkable.      Impression:       Impression: No acute airspace opacity.     I have personally reviewed the examination and initial interpretation  and I agree with the findings.    HUMAIRA VERNON MD      Testing Performed By     Lab - Abbreviation Name Director Address Valid Date Range    104 - Rad Rslts RADIOLOGY RESULTS Unknown Unknown 02/16/05 1553 - Present            Encounter-Level Documents:     There are no encounter-level documents.      Order-Level Documents:     There are no order-level documents.

## 2017-10-20 NOTE — IP AVS SNAPSHOT
MRN:[id number]                      After Visit Summary   10/20/2017    Javi Bansal    MRN: [id number]           Thank you!     Thank you for choosing [facility] for your care. Our goal is always to provide you with excellent care. Hearing back from our patients is one way we can continue to improve our services. Please take a few minutes to complete the written survey that you may receive in the mail after you visit with us. Thank you!        Patient Information     Date Of Birth          1970        Designated Caregiver       Most Recent Value    Caregiver    Will someone help with your care after discharge? yes    Name of designated caregiver wife    Phone number of caregiver same as ivana    Caregiver address same as patient      About your hospital stay     You were admitted on:  N/A You last received care in the:  [facility]    You were discharged on:  October 21, 2017        Reason for your hospital stay       You were admitted for an upper respiratory tract infection due to the Parainfluenza virus, which causes a self-limiting illness. No changes to your medications were made during this hospitalization.                  Who to Call     For medical emergencies, please call 911.  For non-urgent questions about your medical care, please call your primary care provider or clinic, None          Attending Provider     Provider Specialty    Nieves Longo MD Emergency Medicine       Primary Care Provider    None Specified       When to contact your care team       Call your primary doctor if you have any of the following: if URI symptoms worsen, if you develop a fever, chills, shortness of breath, chest pain.                  After Care Instructions     Activity       Your activity upon discharge: activity as tolerated            Diet       Follow this diet upon discharge: Low glycemic index diet            Monitor and record       blood glucose 4  times a day, before meals and at bedtime                  Follow-up Appointments     Adult [facility] Follow-up and recommended labs and tests       Follow up with your transplant coordinator on 10/24/17 as previously scheduled for hospital follow-up.  No follow up labs or test are needed.     Appointments on [place] and/or [facility] (with [facility] or [facility] provider or service). Call [phone] if you haven't heard regarding these appointments within 7 days of discharge.                  Your next 10 appointments already scheduled     Oct 24, 2017  9:00 AM CDT   LAB with  LAB   [facility] ([facility])    [address] [place] [address]  [address] [facility] [id number] [phone]           Patient must bring picture ID. Patient should be prepared to give a urine specimen  Please do not eat 10-12 hours before your appointment if you are coming in fasting for labs on lipids, cholesterol, or glucose (sugar). Pregnant women should follow their Care Team instructions. Water with medications is okay. Do not drink coffee or other fluids. If you have concerns about taking  your medications, please ask at office or if scheduling via [employer], send a message by clicking on Secure Messaging, Message Your Care Team.            Oct 24, 2017  9:15 AM CDT   RETURN DIABETES with Alla Jerez PA-C   [facility] Endocrinology ([facility])    [address] [place] [id number] [phone]            Dec 14, 2017 10:30 AM CST   Procedure 1.5 hr with U2A ROOM 10   [address] [facility] ([facility])    [address] [facility] [address]               Dec 14, 2017 12:00 PM CST   Cath 90 Minute with UUHCVR4   [facility]    [address] [facility] [address] [phone]            Dec 19, 2017  9:00 AM CST   MR CARDIAC W CONTRAST  STRESS AND FLOW with UUMR4, UU CV MR NURSE, MG STRESS RM   [facility] [place] ([facility] [place])    [address] [facility] [address] [phone]           Take your medicines as usual, unless your doctor tells you not to.   If you take Viagra, Levitra or Cialis, stop taking it 48 hours before your test.   If you take Aggrenox or dipyridamole (Persantine, Permole), stop taking it 48 hours before your test.   If you take Theophylline or Aminophylline, stop taking it 12 hours before your test.   If you are diabetic and take oral hypoglycemics, do not take them on the day of your test.  The day before your exam, drink extra fluids at least six 8-ounce glasses (unless your doctor wants you to limit your fluids).  Stop all caffeine 12 hours before the test. This includes coffee, tea, soda, chocolate and certain medicines (such as Anacin, Excedrin and NoDoz). Also avoid decaf coffee and tea, as these contain small amounts of caffeine.  Do not eat or drink for 3 hours before your exam. You may drink water and take your morning medicines.  You may need a blood test (creatinine test) within 30 days of your exam. Follow your doctor s orders if this is arranged before your exam.  If you are very claustrophobic, please let you doctor know. You may get a sedative medicine from your doctor before you arrive. Bring the medicine to the exam. Do not take it at home. Arrive one hour early. Bring someone who can take you home after the test. Your medicine will make you sleepy. After the exam, you may not drive, take a bus or take a taxi by yourself.  The MRI machine uses a strong magnet. Please wear clothes without metal (snaps, zippers). A sweatsuit works well, or we may give you a hospital gown.  Please remove any body piercings and hair extensions before you arrive. You will remove watches, jewelry, hairpins, wallets, dentures, partial dental plates and hearing aids.  You may wear contact lenses, and you may be able to wear your rings. We have a safe place to keep your personal items, but it is safer to leave them at home.   **IMPORTANT** THE INSTRUCTIONS BELOW ARE ONLY FOR THOSE PATIENTS WHO HAVE BEEN TOLD THEY WILL RECEIVE SEDATION OR GENERAL ANESTHESIA DURING THEIR MRI PROCEDURE:  IF YOU WILL RECEIVE SEDATION (take medicine to help you relax during your exam):   You must get the medicine from your doctor before you arrive. Bring the medicine to the exam. Do not take it at home.   Arrive one hour early. Bring someone who can take you home after the test. Your medicine will make you sleepy. After the exam, you may not drive, take a bus or take a taxi by yourself.   No eating 8 hours before your exam. You may have clear liquids up until 4 hours before your exam. (Clear liquids include water, clear tea, black coffee and fruit juice without pulp.)  IF YOU WILL RECEIVE ANESTHESIA (be asleep for your exam):   Arrive 1 1/2 hours early. Bring someone who can take you home after the test. You may not drive, take a bus or take a taxi by yourself.   No eating 8 hours before your exam. You may have clear liquids up until 4 hours before your exam. (Clear liquids include water, clear tea, black coffee and fruit juice without pulp.)  If you have any questions, please contact your Imaging Department exam site.            Dec 19, 2017 11:00 AM CST   (Arrive by 10:45 AM)   HEART TRANSPLANT ANNUAL with Geena Mcclellan MD   [facility] ([facility])    [address] [place] [address]   [phone]            Dec 27, 2017  1:00 PM CST   (Arrive by 12:45 PM)   Transplant Skin Check with GARY Murillo MD   [facility] Dermatology ([facility])    [address] [place] [address]   [phone]            Jun 13, 2018 10:15 AM CDT   RETURN RETINA with Isabela Balbuena MD  "  Eye Clinic ([facility])    [address]  [address]  [address] Clin [address]  [facility] [address] [phone]              Pending Results     Date and Time Order Name Status Description    10/21/2017 0128 EBV DNA PCR Quantitative Whole Blood In process     10/21/2017 0036 Parasite stain Preliminary     10/20/2017 1938 Blood Culture ONE site Preliminary             Statement of Approval     Ordered          10/21/17 1522  I have reviewed and agree with all the recommendations and orders detailed in this document.  EFFECTIVE NOW     Approved and electronically signed by:  Michael Chan MD             Admission Information     Date & Time Department Dept. Phone    10/20/2017 [address] [facility] [phone]      Your Vitals Were     Blood Pressure Pulse Temperature Respirations Height Weight    103/74 98 98  F (36.7  C) (Oral) 18 1.65 m (5' 4.96\") 70.3 kg (154 lb 14.4 oz)    Pulse Oximetry BMI (Body Mass Index)                99% 25.81 kg/m2          [employer] Information     [employer] lets you send messages to your doctor, view your test results, renew your prescriptions, schedule appointments and more. To sign up, go to [email or web address] . Click on \"Log in\" on the left side of the screen, which will take you to the Welcome page. Then click on \"Sign up Now\" on the right side of the page.     You will be asked to enter the access code listed below, as well as some personal information. Please follow the directions to create your username and password.     Your access code is: [email or web address]  Expires: 2018  6:30 AM     Your access code will  in 90 days. If you need help or a new code, please call your [facility] clinic or [phone].        Care EveryWhere ID     This is your Care EveryWhere ID. This could be used by other organizations to access your [facility] medical records  [id number]        Equal Access to Services     JASWINDER KHAN AH: Aniya hicks " Solauraali, waaxda luqadaha, qaybta kaalmada sulema, talisha yapbridgett julio cesar. So [facility] [phone].    ATENCIÓN: Si jarrod rain, tiene a watt disposición servicios gratuitos de asistencia lingüística. Bridger al [phone].    We comply with applicable federal civil rights laws and [place] laws. We do not discriminate on the basis of race, color, national origin, age, disability, sex, sexual orientation, or gender identity.               Review of your medicines      START taking        Dose / Directions    fluticasone 50 MCG/ACT spray   Commonly known as:  FLONASE   Used for:  Influenza-like illness        Dose:  1 spray   Spray 1 spray into both nostrils daily   Quantity:  1 Bottle   Refills:  1         CONTINUE these medicines which have NOT CHANGED        Dose / Directions    aspirin 81 MG EC tablet   Used for:  Heart replaced by transplant (H)        Dose:  81 mg   Take 1 tablet (81 mg) by mouth daily   Quantity:  90 tablet   Refills:  3       benzoyl peroxide 5 % Lotn lotion   Used for:  Other acne        Apply topically At Bedtime   Quantity:  30 mL   Refills:  3       gabapentin 300 MG capsule   Commonly known as:  NEURONTIN        Dose:  300 mg   Take 1 capsule (300 mg) by mouth 3 times daily Begin by taking one capsule once per day, the next day you may take 1 capsule twice per day and on the 3rd day begin taking 1 capsule 3 times per day.   Quantity:  90 capsule   Refills:  1       ketoconazole 2 % shampoo   Commonly known as:  NIZORAL   Used for:  Seborrhea        Apply topically three times a week Alternate with Head and Shoulders.   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 (H)        Dose:  1 tablet   Take 1 tablet by mouth daily   Quantity:  30 each   Refills:  11       mycophenolate 250 MG capsule   Commonly known as:  GENERIC EQUIVALENT   Used for:  Heart replaced by transplant (H)         Dose:  1500 mg   Take 6 capsules (1,500 mg) by mouth 2 times daily   Quantity:  360 capsule   Refills:  11       pravastatin 20 MG tablet   Commonly known as:  PRAVACHOL   Used for:  Heart replaced by transplant (H)        Dose:  20 mg   Take 1 tablet (20 mg) by mouth every evening   Quantity:  30 tablet   Refills:  11       tacrolimus 1 MG capsule   Commonly known as:  GENERIC EQUIVALENT   Used for:  Heart replaced by transplant (H)        Dose:  2 mg   Take 2 capsules (2 mg) by mouth 2 times daily   Quantity:  120 capsule   Refills:  11            Where to get your medicines      These medications were sent to [facility] [place], [place] - [address] [facility]  [address] [facility] [address]     Phone:  [phone]     fluticasone 50 MCG/ACT spray                Protect others around you: Learn how to safely use, store and throw away your medicines at www.disposemymeds.org.             Medication List: This is a list of all your medications and when to take them. Check marks below indicate your daily home schedule. Keep this list as a reference.      Medications           Morning Afternoon Evening Bedtime As Needed    aspirin 81 MG EC tablet   Take 1 tablet (81 mg) by mouth daily   Last time this was given:  81 mg on 10/21/2017  9:10 AM                                benzoyl peroxide 5 % Lotn lotion   Apply topically At Bedtime                                fluticasone 50 MCG/ACT spray   Commonly known as:  FLONASE   Spray 1 spray into both nostrils daily   Last time this was given:  1 spray on 10/21/2017  9:08 AM                                gabapentin 300 MG capsule   Commonly known as:  NEURONTIN   Take 1 capsule (300 mg) by mouth 3 times daily Begin by taking one capsule once per day, the next day you may take 1 capsule twice per day and on the 3rd day begin taking 1 capsule 3 times per day.   Last time this was given:  100 mg on 10/21/2017  2:21 PM                                 ketoconazole 2 % shampoo   Commonly known as:  NIZORAL   Apply topically three times a week Alternate with Head and Shoulders.                                multivitamin, therapeutic with minerals Tabs tablet   Take 1 tablet by mouth daily   Last time this was given:  1 tablet on 10/21/2017  9:10 AM                                mycophenolate 250 MG capsule   Commonly known as:  GENERIC EQUIVALENT   Take 6 capsules (1,500 mg) by mouth 2 times daily   Last time this was given:  1,500 mg on 10/21/2017  9:10 AM                                pravastatin 20 MG tablet   Commonly known as:  PRAVACHOL   Take 1 tablet (20 mg) by mouth every evening                                tacrolimus 1 MG capsule   Commonly known as:  GENERIC EQUIVALENT   Take 2 capsules (2 mg) by mouth 2 times daily   Last time this was given:  2 mg on 10/21/2017  9:10 AM                                          More Information        La gripe  La gripe (en inglés,  the flu ) es walter infección que afecta las vías respiratorias (la boca, la nariz, los pulmones y los conductos que los comunican). A diferencia de un resfriado, la gripe puede ser muy grave para la cesar y llegar a producir neumonía, walter seria infección de los pulmones. En algunas personas, especialmente los adultos mayores, la gripe puede ser mortal.    Cuáles son los factores de riesgo de la gripe?  Aunque la gripe puede afectar a cualquiera, es más probable que contraigan la infección las personas que:     Los virus de la gripe se propagan a través del aire en las pequeñas gotas producidas cuando walter persona infectada tose, estornuda, se ríe o habla.      Tienen el sistema inmunológico debilitado.    Trabajan en el ámbito de la atención médica, donde podrían exponerse a los microbios de la gripe.    Viven o trabajan con alguien que tiene gripe.    No moran recibido walter vacuna antigripal (flu shot) anual.   Cómo se transmite la gripe?  La gripe es causada por unos microbios llamados   virus , que se propagan por el aire en pequeñas gotas cuando walter persona infectada tose, estornuda, se ríe o habla. Es posible que otra persona contraiga la infección si inhala estos microbios directamente; la infección se puede contraer también cuando se toca walter superficie donde se moran depositado las pequeñas gotas y luego se transfieren los microbios desde las salvador a los ojos, la nariz o la boca. Otra manera de exponerse a los microbios de la gripe es al tocar pañuelitos desechables usados, o al compartir utensilios, vasos o cepillos de dientes con walter persona infectada.   Cuáles son los síntomas de la gripe?  Los síntomas de la gripe tienden a aparecer con rapidez y pueden durar de unos días a varias semanas. Algunos de estos síntomas son:    Fiebre (generalmente, por encima de 101 F) y escalofríos    Dolor de garganta y de jinny    Tos seca    Goteo nasal    Cansancio y debilidad    Katia musculares     Factores que pueden empeorar la gripe  En algunas personas, la gripe puede ser muy grave. El riesgo de complicaciones es mayor para:    Niños menores de 5 años de edad.    Adultos mayores de 65 años de edad.    Personas que tienen walter enfermedad crónica, abril la diabetes o walter afección del corazón, los riñones o los pulmones.    Personas que viven en hogares de ancianos o centros de cuidado a tamera plazo.    Cuál es el tratamiento de la gripe?  La gripe suele mejorar por watt cuenta en aproximadamente 7 días. En algunos casos, watt PROVEEDOR DE ATENCIÓN MÉDICA podría recetar un medicamento antiviral que ayuda a acelerar la recuperación. Para que el medicamento surta efecto, es necesario tomarlo lo antes posible (lo ideal es dentro de las 48 horas) después de la aparición de los síntomas. Las personas que desarrollan neumonía u otra enfermedad grave quizás requieran cuidados en un hospital.  Alivio de los síntomas de la gripe    [place] abundantes líquidos abril agua, jugo y sopa caliente. Walter buena payal general es  que tome lo suficiente abril para producir watt cantidad normal de orina.    Descanse bastante.    Pregunte a watt proveedor de atención médica qué puede beth para la fiebre y el dolor.     Llame a watt proveedor de atención médica si la fiebre le sube por encima de 101 F (38.3 C) o si tiene mareos, aturdimiento o falta de aliento.  Wye medidas para proteger a los demás    Lávese las salvador a menudo, especialmente después de toser o estornudar; o darian, límpiese las salvador con un limpiador antiséptico de alcohol (que contenga al menos un 60 por ciento de alcohol).    Al toser o estornudar, cúbrase la boca y la nariz con un pañuelito desechable de papel; luego deseche el pañuelito y lávese las salvador. Si no tiene un pañuelito desechable, cúbrase la boca y la nariz con el ángulo interior del codo cuando tosa o estornude.    Permanezca en watt casa hasta por lo menos 24 horas después de que haya desparecido la fiebre o los escalofríos. Asegúrese de que la fiebre no esté escondida por medicametnos que reduzcan la fiebre.    No comparta comida, utensilios, vasos o cepillos de dientes con otras personas.    Pregúntele a watt proveedor de atención médica si las demás personas que viven en watt casa deben recibir medicamentos antivirales para tratar de evitar la infección.   Cómo puede prevenirse la gripe?    Walter de las mejores maneras de evitar la gripe es ponerse walter vacuna antigripal (flu shot) todos los años. Ya que los virus que causan la gripe cambian de un año para otro, los médicos recomiendan ponerse la vacuna antigripal todos los años apenas esté disponible. Es posible que la vacuna se administre por inyección O en forma de espray nasal. Watt proveedor de atención médica le indicará cuál de las vacunas es la más adecuada para watt mk. El espray nasal no se recomienda para la temporada de gripe [id number]. El CDC informa que esto se debe a que el espray nasal no parece dez protegido contra la gripe skye las últimas temporadas  de gripe. En el pasado, se usaba para personas entre los 2 y los 49 años.    Lávese las salvador a menudo. Se ha comprobado que el lavado de savlador frecuente ayuda a prevenir las infecciones.    Lleve consigo un limpiador antiséptico de alcohol para las salvador (que contenga al menos un 60 por ciento de alcohol) y úselo cuando no tenga acceso al agua y el jabón. Luego lávese las salvador en cuanto pueda.    Evite tocarse los ojos, la nariz y la boca.    En la casa y el trabajo, limpie los teléfonos, teclados de computadora y juguetes a menudo con pañuelitos desinfectantes.     Si es posible, evite el contacto estrecho con otras personas que tengan gripe o valerie síntomas.  Consejos para lavarse las salvador  Walter de las mejores maneras de prevenir muchas infecciones frecuentes es lavarse las salvador. Si usted está cuidando o visitando a walter persona con gripe, lávese las salvador cada vez que entre y salga de la habitación. Siga estos pasos:    Use agua tibia y mucho jabón.    Límpiese la mano completa, debajo de las uñas, entre los dedos y sobre las muñecas.    Lávese por lo menos skye 15 segundos.     Enjuáguese las salvador, dejando que el agua le corra de los dedos hacia abajo y no de las muñecas hacia arriba.    Séquese darian las salvador; use walter toalla de papel para cerrar la llave del agua y abrir la courtney.  Uso de limpiadores antisépticos de alcohol para las salvador  Los limpiadores antisépticos de alcohol también son walter buena elección; úselos cuando no tenga acceso al agua y el jabón. Siga estos pasos:    Apriete el frasco hasta colocarse aproximadamente walter cucharada de gel en la murphy de walter mano.    Frótese las salvador enérgicamente, limpiándose los dorsos, las kendy, entre los dedos y sobre las muñecas.    Frote hasta que el gel desaparezca y usted tenga las salvador completamente secas.     Prevención de la gripe en el ámbito de la atención médica  La gripe es walter preocupación especialmente importante para personas que están en  hospitales y centros de cuidados a tamera plazo. Para ayudar a prevenir la transmisión de la gripe, muchos hospitales y hogares de ancianos des las siguientes medidas:    Los proveedores de atención médica se lavan las salvador o se las limpian con un antiséptico de alcohol en gel antes y después de tratar a cada paciente.    Las personas con gripe tienen habitaciones y treva privados, o comparten walter habitación con otra persona que tiene la misma infección.    Es recomendable que los pacientes de alto riesgo que no tienen gripe reciban la vacuna antigripal y la antineumocócica.    A todos los profesionales de atención médica se les recomienda o requiere ponerse la vacuna antigripal.   Date Last Reviewed: 8/27/2014 2000-2017 [employer]. [address] [place] [id number]. Todos los derechos reservados. Esta información no pretende sustituir la atención médica profesional. Sólo watt médico puede diagnosticar y tratar un problema de cesar.

## 2017-10-20 NOTE — IP AVS SNAPSHOT
MRN:[id number]                      After Visit Summary   10/20/2017    Javi Bansal    MRN: [id number]           Thank you!     Thank you for choosing [facility] for your care. Our goal is always to provide you with excellent care. Hearing back from our patients is one way we can continue to improve our services. Please take a few minutes to complete the written survey that you may receive in the mail after you visit with us. Thank you!        Patient Information     Date Of Birth          1970        Designated Caregiver       Most Recent Value    Caregiver    Will someone help with your care after discharge? yes    Name of designated caregiver wife    Phone number of caregiver same as ivana    Caregiver address same as patient      About your hospital stay     You were admitted on:  N/A You last received care in the:  [address] [facility]    You were discharged on:  October 21, 2017        Reason for your hospital stay       You were admitted for an upper respiratory tract infection due to the Parainfluenza virus, which causes a self-limiting illness. No changes to your medications were made during this hospitalization.                  Who to Call     For medical emergencies, please call 911.  For non-urgent questions about your medical care, please call your primary care provider or clinic, None          Attending Provider     Provider Specialty    Nieves Longo MD Emergency Medicine       Primary Care Provider    None Specified       When to contact your care team       Call your primary doctor if you have any of the following: if URI symptoms worsen, if you develop a fever, chills, shortness of breath, chest pain.                  After Care Instructions     Activity       Your activity upon discharge: activity as tolerated            Diet       Follow this diet upon discharge: Low glycemic index diet            Monitor and record       blood glucose 4  times a day, before meals and at bedtime                  Follow-up Appointments     Adult [facility] Follow-up and recommended labs and tests       Follow up with your transplant coordinator on 10/24/17 as previously scheduled for hospital follow-up.  No follow up labs or test are needed.     Appointments on [place] and/or [facility] (with [facility] or [facility] provider or service). Call [phone] if you haven't heard regarding these appointments within 7 days of discharge.                  Your next 10 appointments already scheduled     Oct 24, 2017  9:00 AM CDT   LAB with  LAB   [facility] ([facility])    [address] [place] [address]  [address] [facility] [id number] [phone]           Patient must bring picture ID. Patient should be prepared to give a urine specimen  Please do not eat 10-12 hours before your appointment if you are coming in fasting for labs on lipids, cholesterol, or glucose (sugar). Pregnant women should follow their Care Team instructions. Water with medications is okay. Do not drink coffee or other fluids. If you have concerns about taking  your medications, please ask at office or if scheduling via [employer], send a message by clicking on Secure Messaging, Message Your Care Team.            Oct 24, 2017  9:15 AM CDT   RETURN DIABETES with Alla Jerez PA-C   [facility] ([facility])    [address] [place] [id number] [phone]            Dec 14, 2017 10:30 AM CST   Procedure 1.5 hr with U2A ROOM 10   [address] [facility] [place] ([facility])    [address] [facility] [address]               Dec 14, 2017 12:00 PM CST   Cath 90 Minute with UUHCVR4   [facility]    [address] [facility] [address] [phone]            Dec 19, 2017  9:00 AM CST   MR CARDIAC W CONTRAST  STRESS AND FLOW with UUMR4, UU CV MR NURSE, MG STRESS RM   [facility] [place] ([facility] [place])    [address] [facility] [address] [phone]           Take your medicines as usual, unless your doctor tells you not to.   If you take Viagra, Levitra or Cialis, stop taking it 48 hours before your test.   If you take Aggrenox or dipyridamole (Persantine, Permole), stop taking it 48 hours before your test.   If you take Theophylline or Aminophylline, stop taking it 12 hours before your test.   If you are diabetic and take oral hypoglycemics, do not take them on the day of your test.  The day before your exam, drink extra fluids at least six 8-ounce glasses (unless your doctor wants you to limit your fluids).  Stop all caffeine 12 hours before the test. This includes coffee, tea, soda, chocolate and certain medicines (such as Anacin, Excedrin and NoDoz). Also avoid decaf coffee and tea, as these contain small amounts of caffeine.  Do not eat or drink for 3 hours before your exam. You may drink water and take your morning medicines.  You may need a blood test (creatinine test) within 30 days of your exam. Follow your doctor s orders if this is arranged before your exam.  If you are very claustrophobic, please let you doctor know. You may get a sedative medicine from your doctor before you arrive. Bring the medicine to the exam. Do not take it at home. Arrive one hour early. Bring someone who can take you home after the test. Your medicine will make you sleepy. After the exam, you may not drive, take a bus or take a taxi by yourself.  The MRI machine uses a strong magnet. Please wear clothes without metal (snaps, zippers). A sweatsuit works well, or we may give you a hospital gown.  Please remove any body piercings and hair extensions before you arrive. You will remove watches, jewelry, hairpins, wallets, dentures, partial dental plates and hearing aids.  You may wear contact lenses, and you may be able to wear your rings. We have a safe place to keep your personal items, but it is safer to leave them at home.   **IMPORTANT** THE INSTRUCTIONS BELOW ARE ONLY FOR THOSE PATIENTS WHO HAVE BEEN TOLD THEY WILL RECEIVE SEDATION OR GENERAL ANESTHESIA DURING THEIR MRI PROCEDURE:  IF YOU WILL RECEIVE SEDATION (take medicine to help you relax during your exam):   You must get the medicine from your doctor before you arrive. Bring the medicine to the exam. Do not take it at home.   Arrive one hour early. Bring someone who can take you home after the test. Your medicine will make you sleepy. After the exam, you may not drive, take a bus or take a taxi by yourself.   No eating 8 hours before your exam. You may have clear liquids up until 4 hours before your exam. (Clear liquids include water, clear tea, black coffee and fruit juice without pulp.)  IF YOU WILL RECEIVE ANESTHESIA (be asleep for your exam):   Arrive 1 1/2 hours early. Bring someone who can take you home after the test. You may not drive, take a bus or take a taxi by yourself.   No eating 8 hours before your exam. You may have clear liquids up until 4 hours before your exam. (Clear liquids include water, clear tea, black coffee and fruit juice without pulp.)  If you have any questions, please contact your Imaging Department exam site.            Dec 19, 2017 11:00 AM CST   (Arrive by 10:45 AM)   HEART TRANSPLANT ANNUAL with Geena Mcclellan MD   [facility] ([facility])    [address] [place] [address]   [phone]            Dec 27, 2017  1:00 PM CST   (Arrive by 12:45 PM)   Transplant Skin Check with GARY Murillo MD   [facility] ([facility])    [address] [place] [address]   [phone]            Jun 13, 2018 10:15 AM CDT   RETURN RETINA with Isabela Balbuena MD  "  [facility] ([facility])    [address]  [address]  [address]  [facility] [address] [phone]              Pending Results     Date and Time Order Name Status Description    10/21/2017 0128 EBV DNA PCR Quantitative Whole Blood In process     10/21/2017 0036 Parasite stain Preliminary     10/20/2017 1938 Blood Culture ONE site Preliminary             Statement of Approval     Ordered          10/21/17 1522  I have reviewed and agree with all the recommendations and orders detailed in this document.  EFFECTIVE NOW     Approved and electronically signed by:  Michael Chan MD             Admission Information     Date & Time Department Dept. Phone    10/20/2017 [address] [facility] [phone]      Your Vitals Were     Blood Pressure Pulse Temperature Respirations Height Weight    103/74 98 98  F (36.7  C) (Oral) 18 1.65 m (5' 4.96\") 70.3 kg (154 lb 14.4 oz)    Pulse Oximetry BMI (Body Mass Index)                99% 25.81 kg/m2          [facility] Information     [employer] lets you send messages to your doctor, view your test results, renew your prescriptions, schedule appointments and more. To sign up, go to [email or web address] . Click on \"Log in\" on the left side of the screen, which will take you to the Welcome page. Then click on \"Sign up Now\" on the right side of the page.     You will be asked to enter the access code listed below, as well as some personal information. Please follow the directions to create your username and password.     Your access code is: [email or web address]  Expires: 2018  6:30 AM     Your access code will  in 90 days. If you need help or a new code, please call your [facility] clinic or [phone].        Care EveryWhere ID     This is your Care EveryWhere ID. This could be used by other organizations to access your [facility] medical records  [id number]        Equal Access to Services     JASWINDER KHAN AH: Aniya hicks " Solauraali, wazenda luqadaha, qaybta kaalmada sulema, talisha yapbridgett julio cesar. So [facility] [phone].    ATENCIÓN: Si jarrod rain, tiene a watt disposición servicios gratuitos de asistencia lingüística. Bridger al [phone].    We comply with applicable federal civil rights and [place] laws. We do not discriminate on the basis of race, color, national origin, age, disability, sex, sexual orientation or gender identity.                             Review of your medicines      START taking        Dose / Directions    fluticasone 50 MCG/ACT spray   Commonly known as:  FLONASE   Used for:  Influenza-like illness        Dose:  1 spray   Spray 1 spray into both nostrils daily   Quantity:  1 Bottle   Refills:  1         CONTINUE these medicines which have NOT CHANGED        Dose / Directions    aspirin 81 MG EC tablet   Used for:  Heart replaced by transplant (H)        Dose:  81 mg   Take 1 tablet (81 mg) by mouth daily   Quantity:  90 tablet   Refills:  3       benzoyl peroxide 5 % Lotn lotion   Used for:  Other acne        Apply topically At Bedtime   Quantity:  30 mL   Refills:  3       gabapentin 300 MG capsule   Commonly known as:  NEURONTIN        Dose:  300 mg   Take 1 capsule (300 mg) by mouth 3 times daily Begin by taking one capsule once per day, the next day you may take 1 capsule twice per day and on the 3rd day begin taking 1 capsule 3 times per day.   Quantity:  90 capsule   Refills:  1       ketoconazole 2 % shampoo   Commonly known as:  NIZORAL   Used for:  Seborrhea        Apply topically three times a week Alternate with Head and Shoulders.   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 (H)        Dose:  1 tablet   Take 1 tablet by mouth daily   Quantity:  30 each   Refills:  11       mycophenolate 250 MG capsule   Commonly known as:  GENERIC EQUIVALENT   Used for:  Heart replaced by transplant (H)         Dose:  1500 mg   Take 6 capsules (1,500 mg) by mouth 2 times daily   Quantity:  360 capsule   Refills:  11       pravastatin 20 MG tablet   Commonly known as:  PRAVACHOL   Used for:  Heart replaced by transplant (H)        Dose:  20 mg   Take 1 tablet (20 mg) by mouth every evening   Quantity:  30 tablet   Refills:  11       tacrolimus 1 MG capsule   Commonly known as:  GENERIC EQUIVALENT   Used for:  Heart replaced by transplant (H)        Dose:  2 mg   Take 2 capsules (2 mg) by mouth 2 times daily   Quantity:  120 capsule   Refills:  11            Where to get your medicines      These medications were sent to [facility] [place], [place] - [address] [facility]  [address] [facility] [address]     Phone:  [phone]     fluticasone 50 MCG/ACT spray                Protect others around you: Learn how to safely use, store and throw away your medicines at www.disposemymeds.org.             Medication List: This is a list of all your medications and when to take them. Check marks below indicate your daily home schedule. Keep this list as a reference.      Medications           Morning Afternoon Evening Bedtime As Needed    aspirin 81 MG EC tablet   Take 1 tablet (81 mg) by mouth daily   Last time this was given:  81 mg on 10/21/2017  9:10 AM                                benzoyl peroxide 5 % Lotn lotion   Apply topically At Bedtime                                fluticasone 50 MCG/ACT spray   Commonly known as:  FLONASE   Spray 1 spray into both nostrils daily   Last time this was given:  1 spray on 10/21/2017  9:08 AM                                gabapentin 300 MG capsule   Commonly known as:  NEURONTIN   Take 1 capsule (300 mg) by mouth 3 times daily Begin by taking one capsule once per day, the next day you may take 1 capsule twice per day and on the 3rd day begin taking 1 capsule 3 times per day.   Last time this was given:  100 mg on 10/21/2017  2:21 PM                                 ketoconazole 2 % shampoo   Commonly known as:  NIZORAL   Apply topically three times a week Alternate with Head and Shoulders.                                multivitamin, therapeutic with minerals Tabs tablet   Take 1 tablet by mouth daily   Last time this was given:  1 tablet on 10/21/2017  9:10 AM                                mycophenolate 250 MG capsule   Commonly known as:  GENERIC EQUIVALENT   Take 6 capsules (1,500 mg) by mouth 2 times daily   Last time this was given:  1,500 mg on 10/21/2017  9:10 AM                                pravastatin 20 MG tablet   Commonly known as:  PRAVACHOL   Take 1 tablet (20 mg) by mouth every evening                                tacrolimus 1 MG capsule   Commonly known as:  GENERIC EQUIVALENT   Take 2 capsules (2 mg) by mouth 2 times daily   Last time this was given:  2 mg on 10/21/2017  9:10 AM                                          More Information        La gripe  La gripe (en inglés,  the flu ) es walter infección que afecta las vías respiratorias (la boca, la nariz, los pulmones y los conductos que los comunican). A diferencia de un resfriado, la gripe puede ser muy grave para la cesar y llegar a producir neumonía, walter seria infección de los pulmones. En algunas personas, especialmente los adultos mayores, la gripe puede ser mortal.    Cuáles son los factores de riesgo de la gripe?  Aunque la gripe puede afectar a cualquiera, es más probable que contraigan la infección las personas que:     Los virus de la gripe se propagan a través del aire en las pequeñas gotas producidas cuando walter persona infectada tose, estornuda, se ríe o habla.      Tienen el sistema inmunológico debilitado.    Trabajan en el ámbito de la atención médica, donde podrían exponerse a los microbios de la gripe.    Viven o trabajan con alguien que tiene gripe.    No moran recibido walter vacuna antigripal (flu shot) anual.   Cómo se transmite la gripe?  La gripe es causada por unos microbios  llamados  virus , que se propagan por el aire en pequeñas gotas cuando walter persona infectada tose, estornuda, se ríe o habla. Es posible que otra persona contraiga la infección si inhala estos microbios directamente; la infección se puede contraer también cuando se toca walter superficie donde se moran depositado las pequeñas gotas y luego se transfieren los microbios desde las salvador a los ojos, la nariz o la boca. Otra manera de exponerse a los microbios de la gripe es al tocar pañuelitos desechables usados, o al compartir utensilios, vasos o cepillos de dientes con walter persona infectada.   Cuáles son los síntomas de la gripe?  Los síntomas de la gripe tienden a aparecer con rapidez y pueden durar de unos días a varias semanas. Algunos de estos síntomas son:    Fiebre (generalmente, por encima de 101 F) y escalofríos    Dolor de garganta y de jinny    Tos seca    Goteo nasal    Cansancio y debilidad    Katia musculares     Factores que pueden empeorar la gripe  En algunas personas, la gripe puede ser muy grave. El riesgo de complicaciones es mayor para:    Niños menores de 5 años de edad.    Adultos mayores de 65 años de edad.    Personas que tienen walter enfermedad crónica, abril la diabetes o walter afección del corazón, los riñones o los pulmones.    Personas que viven en hogares de ancianos o centros de cuidado a tamera plazo.    Cuál es el tratamiento de la gripe?  La gripe suele mejorar por watt cuenta en aproximadamente 7 días. En algunos casos, watt PROVEEDOR DE ATENCIÓN MÉDICA podría recetar un medicamento antiviral que ayuda a acelerar la recuperación. Para que el medicamento surta efecto, es necesario tomarlo lo antes posible (lo ideal es dentro de las 48 horas) después de la aparición de los síntomas. Las personas que desarrollan neumonía u otra enfermedad grave quizás requieran cuidados en un hospital.  Alivio de los síntomas de la gripe    [place] abundantes líquidos abril agua, jugo y sopa caliente. Walter buena payal  general es que tome lo suficiente abril para producir watt cantidad normal de orina.    Descanse bastante.    Pregunte a watt proveedor de atención médica qué puede beth para la fiebre y el dolor.     Llame a watt proveedor de atención médica si la fiebre le sube por encima de 101 F (38.3 C) o si tiene mareos, aturdimiento o falta de aliento.  [place] medidas para proteger a los demás    Lávese las salvador a menudo, especialmente después de toser o estornudar; o darian, límpiese las salvador con un limpiador antiséptico de alcohol (que contenga al menos un 60 por ciento de alcohol).    Al toser o estornudar, cúbrase la boca y la nariz con un pañuelito desechable de papel; luego deseche el pañuelito y lávese las salvador. Si no tiene un pañuelito desechable, cúbrase la boca y la nariz con el ángulo interior del codo cuando tosa o estornude.    Permanezca en watt casa hasta por lo menos 24 horas después de que haya desparecido la fiebre o los escalofríos. Asegúrese de que la fiebre no esté escondida por medicametnos que reduzcan la fiebre.    No comparta comida, utensilios, vasos o cepillos de dientes con otras personas.    Pregúntele a watt proveedor de atención médica si las demás personas que viven en watt casa deben recibir medicamentos antivirales para tratar de evitar la infección.   Cómo puede prevenirse la gripe?    Walter de las mejores maneras de evitar la gripe es ponerse walter vacuna antigripal (flu shot) todos los años. Ya que los virus que causan la gripe cambian de un año para otro, los médicos recomiendan ponerse la vacuna antigripal todos los años apenas esté disponible. Es posible que la vacuna se administre por inyección O en forma de espray nasal. Watt proveedor de atención médica le indicará cuál de las vacunas es la más adecuada para watt mk. El espray nasal no se recomienda para la temporada de gripe [id number]. El CDC informa que esto se debe a que el espray nasal no parece dez protegido contra la gripe skye las últimas  temporadas de gripe. En el pasado, se usaba para personas entre los 2 y los 49 años.    Lávese las salvador a menudo. Se ha comprobado que el lavado de salvador frecuente ayuda a prevenir las infecciones.    Lleve consigo un limpiador antiséptico de alcohol para las salvador (que contenga al menos un 60 por ciento de alcohol) y úselo cuando no tenga acceso al agua y el jabón. Luego lávese las salvador en cuanto pueda.    Evite tocarse los ojos, la nariz y la boca.    En la casa y el trabajo, limpie los teléfonos, teclados de computadora y juguetes a menudo con pañuelitos desinfectantes.     Si es posible, evite el contacto estrecho con otras personas que tengan gripe o valerie síntomas.  Consejos para lavarse las salvador  Walter de las mejores maneras de prevenir muchas infecciones frecuentes es lavarse las salvador. Si usted está cuidando o visitando a walter persona con gripe, lávese las salvador cada vez que entre y salga de la habitación. Siga estos pasos:    Use agua tibia y mucho jabón.    Límpiese la mano completa, debajo de las uñas, entre los dedos y sobre las muñecas.    Lávese por lo menos skye 15 segundos.     Enjuáguese las salvador, dejando que el agua le corra de los dedos hacia abajo y no de las muñecas hacia arriba.    Séquese darian las salvador; use walter toalla de papel para cerrar la llave del agua y abrir la courtney.  Uso de limpiadores antisépticos de alcohol para las salvador  Los limpiadores antisépticos de alcohol también son walter buena elección; úselos cuando no tenga acceso al agua y el jabón. Siga estos pasos:    Apriete el frasco hasta colocarse aproximadamente walter cucharada de gel en la murphy de walter mano.    Frótese las salvador enérgicamente, limpiándose los dorsos, las kendy, entre los dedos y sobre las muñecas.    Frote hasta que el gel desaparezca y usted tenga las salvador completamente secas.     Prevención de la gripe en el ámbito de la atención médica  La gripe es walter preocupación especialmente importante para personas que  están en hospitales y centros de cuidados a tamera plazo. Para ayudar a prevenir la transmisión de la gripe, muchos hospitales y hogares de ancianos des las siguientes medidas:    Los proveedores de atención médica se lavan las salvador o se las limpian con un antiséptico de alcohol en gel antes y después de tratar a cada paciente.    Las personas con gripe tienen habitaciones y treva privados, o comparten walter habitación con otra persona que tiene la misma infección.    Es recomendable que los pacientes de alto riesgo que no tienen gripe reciban la vacuna antigripal y la antineumocócica.    A todos los profesionales de atención médica se les recomienda o requiere ponerse la vacuna antigripal.   Date Last Reviewed: 8/27/2014 2000-2017 [employer]. [address] [place] [id number]. Todos los derechos reservados. Esta información no pretende sustituir la atención médica profesional. Sólo watt médico puede diagnosticar y tratar un problema de cesar.

## 2017-10-20 NOTE — IP AVS SNAPSHOT
` ` Patient Information     Patient Name Sex     Javi Rahman ([id number]) Male 1970       Room Bed    6505 [id number]      Patient Demographics     Address Phone    [address] [place] [address] [phone] (Home)  [phone] (Mobile) *Preferred*      Patient Ethnicity & Race     Ethnic Group Patient Race     /  Choose not to answer      Emergency Contact(s)     Name Relation Home Work Mobile    ROQUE LEGER Sister [phone]    AGUILAR,GRISELDA Sister [phone]    MARCELL LAMAS Friend [phone]      Documents on File        Status Date Received Description       Documents for the Patient    Consent for EHR Access Received 16     Insurance Card Received () 16 [employer] [place] [facility]    External Medication Information Consent       Patient ID Received 16     [facility] Specified Other       Consent for Services/Privacy Notice - Hospital/Clinic Received () 16     Privacy Notice - [place]       Consent for Services - [facility]       Consent for Services/Privacy Notice - Hospital/Clinic-[facility]       Consent for Services/Privacy Notice - Hospital/Clinic-[facility] Received () 16     Privacy Notice - [place] Received 16     Advance Directives and Living Will Not Received  Invalid Directive dated 16 received    HIM TYLER Authorization  16 DDS    HIM TYLER Authorization  16 DDS    HIM TYLER Authorization  17 [facility]    Consent for Services/Privacy Notice - Hospital/Clinic Received 17     Insurance Card Received () 17     Insurance Card Received 17 BCBS BLUE PLUS    HIM TYLER Authorization  10/05/17 SCIO    Care Everywhere Prospective Auth Received 10/20/17     CE Prospective Auth Received () 16 Authorization Document from Social Security Administration    System-Retrieved CE Auth Form Received () 16 External  Authorization Form from Social Security Administration    CE Prospective Auth Received () 16 Authorization Document from Social Security Administration    System-Retrieved CE Auth Form Received () 16 External Authorization Form from Social Security Administration    Insurance Card  (Deleted)         Documents for the Encounter    CMS IM for Patient Signature         Admission Information     Attending Provider Admitting Provider Admission Type Admission Date/Time     Clare Sparks MD Elective 10/20/17  1916    Discharge Date Hospital Service Auth/Cert Status Service Area     Cardiology Incomplete [facility]    Unit Room/Bed Admission Status       [facility] 6502/6502-01 Admission (Confirmed)       Admission     Complaint          Hospital Account     Name Acct ID Class Status Primary Coverage    Javi Rahman [employer] [id number] Emergency Open BLUE PLUS - BLUE PLUS MNCARE            Guarantor Account (for Hospital Account #[id number])     Name Relation to Pt Service Area Active? Acct Type    Javi Rahman [employer] Self FCS Yes Personal/Family    Address Phone          [address] [place] [address] [phone](H)              Coverage Information (for Hospital Account #[id number])     F/O Payor/Plan Precert #    BLUE PLUS/BLUE PLUS MNCARE     Subscriber Subscriber #    Javi Rahman Del Jesse [id number]    Address Phone    [address] [address]  [place] [address] [phone]

## 2017-10-21 ENCOUNTER — OFFICE VISIT (OUTPATIENT)
Dept: INTERPRETER SERVICES | Facility: CLINIC | Age: 47
End: 2017-10-21

## 2017-10-21 VITALS
HEIGHT: 65 IN | TEMPERATURE: 98 F | BODY MASS INDEX: 25.81 KG/M2 | OXYGEN SATURATION: 99 % | HEART RATE: 98 BPM | SYSTOLIC BLOOD PRESSURE: 103 MMHG | RESPIRATION RATE: 18 BRPM | DIASTOLIC BLOOD PRESSURE: 74 MMHG | WEIGHT: 154.9 LBS

## 2017-10-21 LAB
ALBUMIN SERPL-MCNC: 3.8 G/DL (ref 3.4–5)
ALP SERPL-CCNC: 160 U/L (ref 40–150)
ALP SERPL-CCNC: 180 U/L (ref 40–150)
ALT SERPL W P-5'-P-CCNC: 43 U/L (ref 0–70)
ANION GAP SERPL CALCULATED.3IONS-SCNC: 7 MMOL/L (ref 3–14)
AST SERPL W P-5'-P-CCNC: 36 U/L (ref 0–45)
BILIRUB DIRECT SERPL-MCNC: 0.1 MG/DL (ref 0–0.2)
BILIRUB SERPL-MCNC: 0.3 MG/DL (ref 0.2–1.3)
BUN SERPL-MCNC: 17 MG/DL (ref 7–30)
CALCIUM SERPL-MCNC: 8.5 MG/DL (ref 8.5–10.1)
CHLORIDE SERPL-SCNC: 108 MMOL/L (ref 94–109)
CMV DNA SPEC NAA+PROBE-ACNC: <137 [IU]/ML
CMV DNA SPEC NAA+PROBE-LOG#: <2.1 {LOG_IU}/ML
CO2 SERPL-SCNC: 24 MMOL/L (ref 20–32)
CREAT SERPL-MCNC: 1.23 MG/DL (ref 0.66–1.25)
ERYTHROCYTE [DISTWIDTH] IN BLOOD BY AUTOMATED COUNT: 13.3 % (ref 10–15)
FLUAV H1 2009 PAND RNA SPEC QL NAA+PROBE: NEGATIVE
FLUAV H1 RNA SPEC QL NAA+PROBE: NEGATIVE
FLUAV H3 RNA SPEC QL NAA+PROBE: NEGATIVE
FLUAV RNA SPEC QL NAA+PROBE: NEGATIVE
FLUBV RNA SPEC QL NAA+PROBE: NEGATIVE
GFR SERPL CREATININE-BSD FRML MDRD: 63 ML/MIN/1.7M2
GLUCOSE BLDC GLUCOMTR-MCNC: 91 MG/DL (ref 70–99)
GLUCOSE BLDC GLUCOMTR-MCNC: 99 MG/DL (ref 70–99)
GLUCOSE SERPL-MCNC: 96 MG/DL (ref 70–99)
HADV DNA SPEC QL NAA+PROBE: NEGATIVE
HADV DNA SPEC QL NAA+PROBE: NEGATIVE
HCT VFR BLD AUTO: 33.5 % (ref 40–53)
HGB BLD-MCNC: 10.8 G/DL (ref 13.3–17.7)
HMPV RNA SPEC QL NAA+PROBE: NEGATIVE
HPIV1 RNA SPEC QL NAA+PROBE: POSITIVE
HPIV2 RNA SPEC QL NAA+PROBE: NEGATIVE
HPIV3 RNA SPEC QL NAA+PROBE: NEGATIVE
MAGNESIUM SERPL-MCNC: 1.9 MG/DL (ref 1.6–2.3)
MCH RBC QN AUTO: 29 PG (ref 26.5–33)
MCHC RBC AUTO-ENTMCNC: 32.2 G/DL (ref 31.5–36.5)
MCV RBC AUTO: 90 FL (ref 78–100)
MICROBIOLOGIST REVIEW: ABNORMAL
NT-PROBNP SERPL-MCNC: 209 PG/ML (ref 0–450)
PLATELET # BLD AUTO: 188 10E9/L (ref 150–450)
POTASSIUM SERPL-SCNC: 4 MMOL/L (ref 3.4–5.3)
PROCALCITONIN SERPL-MCNC: 0.15 NG/ML
PROT SERPL-MCNC: 7.6 G/DL (ref 6.8–8.8)
RBC # BLD AUTO: 3.72 10E12/L (ref 4.4–5.9)
RHINOVIRUS RNA SPEC QL NAA+PROBE: NEGATIVE
RSV RNA SPEC QL NAA+PROBE: NEGATIVE
RSV RNA SPEC QL NAA+PROBE: NEGATIVE
SODIUM SERPL-SCNC: 139 MMOL/L (ref 133–144)
SPECIMEN SOURCE: ABNORMAL
SPECIMEN SOURCE: ABNORMAL
TACROLIMUS BLD-MCNC: 9.9 UG/L (ref 5–15)
TME LAST DOSE: NORMAL H
WBC # BLD AUTO: 4 10E9/L (ref 4–11)

## 2017-10-21 PROCEDURE — 36415 COLL VENOUS BLD VENIPUNCTURE: CPT | Performed by: STUDENT IN AN ORGANIZED HEALTH CARE EDUCATION/TRAINING PROGRAM

## 2017-10-21 PROCEDURE — 25000131 ZZH RX MED GY IP 250 OP 636 PS 637: Performed by: STUDENT IN AN ORGANIZED HEALTH CARE EDUCATION/TRAINING PROGRAM

## 2017-10-21 PROCEDURE — 84075 ASSAY ALKALINE PHOSPHATASE: CPT | Performed by: INTERNAL MEDICINE

## 2017-10-21 PROCEDURE — 87799 DETECT AGENT NOS DNA QUANT: CPT

## 2017-10-21 PROCEDURE — 25000132 ZZH RX MED GY IP 250 OP 250 PS 637: Performed by: STUDENT IN AN ORGANIZED HEALTH CARE EDUCATION/TRAINING PROGRAM

## 2017-10-21 PROCEDURE — 83880 ASSAY OF NATRIURETIC PEPTIDE: CPT

## 2017-10-21 PROCEDURE — 83735 ASSAY OF MAGNESIUM: CPT | Performed by: INTERNAL MEDICINE

## 2017-10-21 PROCEDURE — T1013 SIGN LANG/ORAL INTERPRETER: HCPCS | Mod: U3

## 2017-10-21 PROCEDURE — 99235 HOSP IP/OBS SAME DATE MOD 70: CPT | Mod: GC | Performed by: INTERNAL MEDICINE

## 2017-10-21 PROCEDURE — 87015 SPECIMEN INFECT AGNT CONCNTJ: CPT | Performed by: STUDENT IN AN ORGANIZED HEALTH CARE EDUCATION/TRAINING PROGRAM

## 2017-10-21 PROCEDURE — 25000132 ZZH RX MED GY IP 250 OP 250 PS 637: Performed by: INTERNAL MEDICINE

## 2017-10-21 PROCEDURE — 00000146 ZZHCL STATISTIC GLUCOSE BY METER IP

## 2017-10-21 PROCEDURE — 80048 BASIC METABOLIC PNL TOTAL CA: CPT | Performed by: INTERNAL MEDICINE

## 2017-10-21 PROCEDURE — 36415 COLL VENOUS BLD VENIPUNCTURE: CPT

## 2017-10-21 PROCEDURE — 87633 RESP VIRUS 12-25 TARGETS: CPT

## 2017-10-21 PROCEDURE — 80197 ASSAY OF TACROLIMUS: CPT | Performed by: STUDENT IN AN ORGANIZED HEALTH CARE EDUCATION/TRAINING PROGRAM

## 2017-10-21 PROCEDURE — 36415 COLL VENOUS BLD VENIPUNCTURE: CPT | Performed by: INTERNAL MEDICINE

## 2017-10-21 PROCEDURE — 85027 COMPLETE CBC AUTOMATED: CPT | Performed by: INTERNAL MEDICINE

## 2017-10-21 PROCEDURE — 25000128 H RX IP 250 OP 636: Performed by: STUDENT IN AN ORGANIZED HEALTH CARE EDUCATION/TRAINING PROGRAM

## 2017-10-21 PROCEDURE — 84145 PROCALCITONIN (PCT): CPT

## 2017-10-21 PROCEDURE — 87207 SMEAR SPECIAL STAIN: CPT | Performed by: STUDENT IN AN ORGANIZED HEALTH CARE EDUCATION/TRAINING PROGRAM

## 2017-10-21 RX ORDER — MULTIPLE VITAMINS W/ MINERALS TAB 9MG-400MCG
1 TAB ORAL DAILY
Status: DISCONTINUED | OUTPATIENT
Start: 2017-10-21 | End: 2017-10-21 | Stop reason: HOSPADM

## 2017-10-21 RX ORDER — GABAPENTIN 300 MG/1
300 CAPSULE ORAL 3 TIMES DAILY
Status: DISCONTINUED | OUTPATIENT
Start: 2017-10-21 | End: 2017-10-21

## 2017-10-21 RX ORDER — PRAVASTATIN SODIUM 20 MG
20 TABLET ORAL EVERY EVENING
Status: DISCONTINUED | OUTPATIENT
Start: 2017-10-21 | End: 2017-10-21 | Stop reason: HOSPADM

## 2017-10-21 RX ORDER — NICOTINE POLACRILEX 4 MG
15-30 LOZENGE BUCCAL
Status: DISCONTINUED | OUTPATIENT
Start: 2017-10-20 | End: 2017-10-21 | Stop reason: HOSPADM

## 2017-10-21 RX ORDER — OSELTAMIVIR PHOSPHATE 75 MG/1
75 CAPSULE ORAL 2 TIMES DAILY
Status: DISCONTINUED | OUTPATIENT
Start: 2017-10-21 | End: 2017-10-21

## 2017-10-21 RX ORDER — TACROLIMUS 1 MG/1
2 CAPSULE ORAL
Status: DISCONTINUED | OUTPATIENT
Start: 2017-10-21 | End: 2017-10-21 | Stop reason: HOSPADM

## 2017-10-21 RX ORDER — LORATADINE 10 MG/1
10 TABLET ORAL DAILY
Status: DISCONTINUED | OUTPATIENT
Start: 2017-10-21 | End: 2017-10-21 | Stop reason: HOSPADM

## 2017-10-21 RX ORDER — AMOXICILLIN 875 MG
875 TABLET ORAL EVERY 12 HOURS SCHEDULED
Status: DISCONTINUED | OUTPATIENT
Start: 2017-10-21 | End: 2017-10-21 | Stop reason: HOSPADM

## 2017-10-21 RX ORDER — ACETAMINOPHEN 325 MG/1
325-650 TABLET ORAL EVERY 4 HOURS PRN
Status: DISCONTINUED | OUTPATIENT
Start: 2017-10-21 | End: 2017-10-21 | Stop reason: HOSPADM

## 2017-10-21 RX ORDER — FLUTICASONE PROPIONATE 50 MCG
1 SPRAY, SUSPENSION (ML) NASAL DAILY
Status: DISCONTINUED | OUTPATIENT
Start: 2017-10-21 | End: 2017-10-21 | Stop reason: HOSPADM

## 2017-10-21 RX ORDER — GABAPENTIN 100 MG/1
100 CAPSULE ORAL 3 TIMES DAILY
Status: DISCONTINUED | OUTPATIENT
Start: 2017-10-21 | End: 2017-10-21 | Stop reason: HOSPADM

## 2017-10-21 RX ORDER — ACETAMINOPHEN 325 MG/1
325 TABLET ORAL EVERY 4 HOURS PRN
Status: DISCONTINUED | OUTPATIENT
Start: 2017-10-21 | End: 2017-10-21

## 2017-10-21 RX ORDER — ASPIRIN 81 MG/1
81 TABLET ORAL DAILY
Status: DISCONTINUED | OUTPATIENT
Start: 2017-10-21 | End: 2017-10-21 | Stop reason: HOSPADM

## 2017-10-21 RX ORDER — MYCOPHENOLATE MOFETIL 250 MG/1
1500 CAPSULE ORAL 2 TIMES DAILY
Status: DISCONTINUED | OUTPATIENT
Start: 2017-10-21 | End: 2017-10-21 | Stop reason: HOSPADM

## 2017-10-21 RX ORDER — OXYCODONE HYDROCHLORIDE 5 MG/1
5 TABLET ORAL ONCE
Status: COMPLETED | OUTPATIENT
Start: 2017-10-21 | End: 2017-10-21

## 2017-10-21 RX ORDER — DEXTROSE MONOHYDRATE 25 G/50ML
25-50 INJECTION, SOLUTION INTRAVENOUS
Status: DISCONTINUED | OUTPATIENT
Start: 2017-10-20 | End: 2017-10-21 | Stop reason: HOSPADM

## 2017-10-21 RX ORDER — FLUTICASONE PROPIONATE 50 MCG
1 SPRAY, SUSPENSION (ML) NASAL DAILY
Qty: 1 BOTTLE | Refills: 1 | Status: SHIPPED | OUTPATIENT
Start: 2017-10-21 | End: 2020-04-10

## 2017-10-21 RX ADMIN — SODIUM CHLORIDE, POTASSIUM CHLORIDE, SODIUM LACTATE AND CALCIUM CHLORIDE 1000 ML: 600; 310; 30; 20 INJECTION, SOLUTION INTRAVENOUS at 00:46

## 2017-10-21 RX ADMIN — GUAIFENESIN AND DEXTROMETHORPHAN HYDROBROMIDE 1 TABLET: 600; 30 TABLET, EXTENDED RELEASE ORAL at 09:32

## 2017-10-21 RX ADMIN — LORATADINE 10 MG: 10 TABLET ORAL at 01:44

## 2017-10-21 RX ADMIN — MULTIPLE VITAMINS W/ MINERALS TAB 1 TABLET: TAB at 09:10

## 2017-10-21 RX ADMIN — AMOXICILLIN 875 MG: 875 TABLET, FILM COATED ORAL at 13:30

## 2017-10-21 RX ADMIN — GABAPENTIN 100 MG: 100 CAPSULE ORAL at 09:10

## 2017-10-21 RX ADMIN — ACETAMINOPHEN 325 MG: 325 TABLET, FILM COATED ORAL at 01:44

## 2017-10-21 RX ADMIN — FLUTICASONE PROPIONATE 1 SPRAY: 50 SPRAY, METERED NASAL at 09:08

## 2017-10-21 RX ADMIN — OXYCODONE HYDROCHLORIDE 5 MG: 5 TABLET ORAL at 13:30

## 2017-10-21 RX ADMIN — ACETAMINOPHEN 325 MG: 325 TABLET, FILM COATED ORAL at 09:16

## 2017-10-21 RX ADMIN — GABAPENTIN 100 MG: 100 CAPSULE ORAL at 14:21

## 2017-10-21 RX ADMIN — MYCOPHENOLATE MOFETIL 1500 MG: 250 CAPSULE ORAL at 09:10

## 2017-10-21 RX ADMIN — ASPIRIN 81 MG: 81 TABLET, COATED ORAL at 09:10

## 2017-10-21 RX ADMIN — TACROLIMUS 2 MG: 1 CAPSULE ORAL at 09:10

## 2017-10-21 ASSESSMENT — PAIN DESCRIPTION - DESCRIPTORS
DESCRIPTORS: ACHING;HEADACHE
DESCRIPTORS: ACHING;HEADACHE

## 2017-10-21 NOTE — PLAN OF CARE
"Problem: Patient Care Overview  Goal: Plan of Care/Patient Progress Review  Outcome: No Change     D: Hx CM s/p OHT 12/2016. Admitted with cough, HA, muscle aches, concerning for flu.  I/A: VSSA, on room air. Monitor shows SR. LR running at 125 ml/hr (1000ml fluid bolus). Pt reports pain behind eyes, \"from the flu,\" relieved with prn tylenol. RSV swab sent. Up independently. Using urinal with adequate UO. Sleeping between cares.   P: Swiss speaking,  scheduled for 0830. Continue to monitor and notify Cards 2 with pertinent changes.       "

## 2017-10-21 NOTE — ED PROVIDER NOTES
History     Chief Complaint   Patient presents with     Cough     Headache     HPI  Javi Bansal is a 46 year old male who presents to the emergency department today for fever, cough, headache, muscle aches.  Patient has a history of cardiomyopathy and had a heart transplant in December 2016 and is thus immunosuppressed.  He noticed several symptoms all of which started yesterday.  He has had a subjective fever and chills but has not measured his temperature with thermometer.  He has had nasal congestion, sore throat, a cough productive of green sputum.  He feels a bit SOB.  No chest pain or abdominal pain.  No nausea or vomiting, he did have one loose stool earlier this morning.  No dysuria or hematuria.  No open wounds or skin rash.  He has had a headache as well as myalgias.  He received an influenza vaccination this week in clinic.    Past Medical History:   Diagnosis Date     Chagas cardiomyopathy      Chronic systolic heart failure (H)      Diabetes (H)      Dual ICD (implantable cardioverter-defibrillator) in place 10/20/2015     Essential hypertension      Gastroesophageal reflux disease      H/O gastric ulcer      Hypertension      Premature ventricular contractions      Presbyopia      Pterygium     ?? suspected , but unclear dx       Past Surgical History:   Procedure Laterality Date     CARDIAC SURGERY      AICD     TRANSPLANT HEART RECIPIENT N/A 12/12/2016    Procedure: TRANSPLANT HEART RECIPIENT;  Surgeon: Elo Madsen MD;  Location:  OR       Family History   Problem Relation Age of Onset     Brain Cancer Mother      Melanoma No family hx of      Skin Cancer No family hx of      Glaucoma No family hx of      Macular Degeneration No family hx of        Social History   Substance Use Topics     Smoking status: Never Smoker     Smokeless tobacco: Never Used     Alcohol use No         I have reviewed the Medications, Allergies, Past Medical and Surgical History, and Social  "History in the Epic system.    Review of Systems   Constitutional: Positive for chills and fever (subjective).   HENT: Positive for congestion, rhinorrhea, sinus pressure and sore throat.    Respiratory: Positive for cough and shortness of breath.    Cardiovascular: Negative for chest pain and palpitations.   Gastrointestinal: Positive for diarrhea. Negative for abdominal pain, nausea and vomiting.   Genitourinary: Negative for difficulty urinating and hematuria.   Musculoskeletal: Positive for arthralgias and myalgias. Negative for neck stiffness.   Skin: Negative for color change, rash and wound.   Neurological: Positive for headaches.   Psychiatric/Behavioral: Negative for confusion.   All other systems reviewed and are negative.      Physical Exam   BP: 90/67  Pulse: 99  Heart Rate: 87  Temp: 98.9  F (37.2  C)  Resp: 24  Height: 165 cm (5' 4.96\")  Weight: 72.6 kg (160 lb)  SpO2: 99 %      Physical Exam   Constitutional: He appears distressed.   Adult male, alert, cooperative, some minimal distress   HENT:   Head: Normocephalic and atraumatic.   Right Ear: External ear normal.   Left Ear: External ear normal.   Mouth/Throat: No oropharyngeal exudate.   Edematous nasal turbinates with clear discharge  Oropharynx slightly erythematous with no exudate, symmetric posterior oropharynx   Eyes: Pupils are equal, round, and reactive to light. No scleral icterus.   Cardiovascular: Normal rate, regular rhythm, normal heart sounds and intact distal pulses.    No murmur heard.  Pulmonary/Chest: Effort normal. No respiratory distress. He has no wheezes. He has no rales.   Some slight coarse breath sounds B, no rhonchi or wheezing   Abdominal: Soft. Bowel sounds are normal. He exhibits no distension. There is no tenderness. There is no rebound.   Musculoskeletal: He exhibits no edema or tenderness.   Skin: Skin is warm. No rash noted. He is not diaphoretic.   Nursing note and vitals reviewed.      ED Course     ED Course "     Procedures             Critical Care time:  none             Results for orders placed or performed during the hospital encounter of 10/20/17 (from the past 24 hour(s))   Chest XR,  PA & LAT    Narrative    Exam: XR CHEST 2 VW, 10/20/2017 8:00 PM    Indication: cough, fever, eval for infiltrate, heart tx patient    Comparison: X-ray chest dated 6/10/2017    Findings:     Frontal and lateral view of the chest. Unchanged median sternotomy  wires. The cardiac silhouette is within normal limits. No significant  pleural effusion or pneumothorax. No acute airspace opacity. No acute  osseous abnormality. Visualized abdominal structures are unremarkable.      Impression    Impression: No acute airspace opacity.     I have personally reviewed the examination and initial interpretation  and I agree with the findings.    HUMAIRA VERNON MD   CBC with platelets differential   Result Value Ref Range    WBC 4.1 4.0 - 11.0 10e9/L    RBC Count 4.03 (L) 4.4 - 5.9 10e12/L    Hemoglobin 11.7 (L) 13.3 - 17.7 g/dL    Hematocrit 35.8 (L) 40.0 - 53.0 %    MCV 89 78 - 100 fl    MCH 29.0 26.5 - 33.0 pg    MCHC 32.7 31.5 - 36.5 g/dL    RDW 13.3 10.0 - 15.0 %    Platelet Count 196 150 - 450 10e9/L    Diff Method Automated Method     % Neutrophils 48.3 %    % Lymphocytes 33.0 %    % Monocytes 17.0 %    % Eosinophils 0.5 %    % Basophils 0.2 %    % Immature Granulocytes 1.0 %    Nucleated RBCs 0 0 /100    Absolute Neutrophil 2.0 1.6 - 8.3 10e9/L    Absolute Lymphocytes 1.4 0.8 - 5.3 10e9/L    Absolute Monocytes 0.7 0.0 - 1.3 10e9/L    Absolute Eosinophils 0.0 0.0 - 0.7 10e9/L    Absolute Basophils 0.0 0.0 - 0.2 10e9/L    Abs Immature Granulocytes 0.0 0 - 0.4 10e9/L    Absolute Nucleated RBC 0.0    Basic metabolic panel   Result Value Ref Range    Sodium 138 133 - 144 mmol/L    Potassium 3.9 3.4 - 5.3 mmol/L    Chloride 107 94 - 109 mmol/L    Carbon Dioxide 23 20 - 32 mmol/L    Anion Gap 8 3 - 14 mmol/L    Glucose 110 (H) 70 - 99 mg/dL     Urea Nitrogen 22 7 - 30 mg/dL    Creatinine 2.09 (H) 0.66 - 1.25 mg/dL    GFR Estimate 34 (L) >60 mL/min/1.7m2    GFR Estimate If Black 41 (L) >60 mL/min/1.7m2    Calcium 8.8 8.5 - 10.1 mg/dL   Blood Culture ONE site   Result Value Ref Range    Specimen Description Blood Right Arm     Culture Micro PENDING    Hepatic panel   Result Value Ref Range    Bilirubin Direct 0.1 0.0 - 0.2 mg/dL    Bilirubin Total 0.3 0.2 - 1.3 mg/dL    Albumin 3.8 3.4 - 5.0 g/dL    Protein Total 7.6 6.8 - 8.8 g/dL    Alkaline Phosphatase 180 (H) 40 - 150 U/L    ALT 43 0 - 70 U/L    AST 36 0 - 45 U/L   Influenza A/B antigen   Result Value Ref Range    Influenza A/B Agn Specimen Nasopharyngeal     Influenza A Negative NEG^Negative    Influenza B Negative NEG^Negative   UA with Microscopic reflex to Culture   Result Value Ref Range    Color Urine Yellow     Appearance Urine Clear     Glucose Urine Negative NEG^Negative mg/dL    Bilirubin Urine Negative NEG^Negative    Ketones Urine 5 (A) NEG^Negative mg/dL    Specific Gravity Urine 1.021 1.003 - 1.035    Blood Urine Negative NEG^Negative    pH Urine 5.0 5.0 - 7.0 pH    Protein Albumin Urine 30 (A) NEG^Negative mg/dL    Urobilinogen mg/dL Normal 0.0 - 2.0 mg/dL    Nitrite Urine Negative NEG^Negative    Leukocyte Esterase Urine Negative NEG^Negative    Source Midstream Urine     WBC Urine 1 0 - 2 /HPF    RBC Urine 3 (H) 0 - 2 /HPF    Transitional Epi <1 0 - 1 /HPF    Mucous Urine Present (A) NEG^Negative /LPF    Hyaline Casts 33 (H) 0 - 2 /LPF    Calcium Oxalate Few (A) NEG^Negative /HPF              Assessments & Plan (with Medical Decision Making)   Patient presents with the above complaint.  He is immunosuppressive susceptible to multiple infections.  On initial evaluation a temperature of 98.9 .  He does not appear to be in any distress but has obvious nasal congestion and cough, he did show me that it is productive of green sputum.  Given he has conglomeration of symptoms including  subjective fever, chills, headache, myalgias, nasal congestion, sore throat, and cough, I highly suspect influenza.  He did receive influenza vaccine this week, however it has likely not had time to fully take effect.      We did establish IV access and we did a laboratory analysis.  IV fluids were given as well.  CBC shows a white count of 4.1, hemoglobin 11.7.  BMP demonstrated normal electrolytes, creatinine today is 2.09 which is an acute bump from his baseline, most recent creatinine was checked 9 days ago at which point it was 1.13, it is possible this is all pre-renal as he has not been drinking well as he has not been feeling well, however I still have concerns about this acute kidney injury. He is on immunosuppressant medications which can certainly cause issues with the kidney. Blood culture ×1 was obtained.  Influenza swab was negative. It is possible that this is a false negative, as clinically I do believe he has influenza and he is certainly susceptible to it as he is an immunosuppressed heart transplant patient. I would lean towards treating him. I did order a dose of Tamiflu for him.  UA was checked and this showed no evidence of infections, though he does have some casts. Chest x-ray was also done and was read by radiology as no acute airspace opacity.    I have spoken to Dr. Hamlin about this patient.  Dr. Hamlin agrees it is reasonable to admit him at least overnight for IV fluids and to ensure that his creatinine turns around and starts to trend in the right direction.  I would recommend that Tamiflu be continued.  The patient will be admitted to the Cardiology II service as soon as a bed is available.            This part of the document was transcribed by Bhargavi Strauss Medical Scribe.                                            I have reviewed the nursing notes.    I have reviewed the findings, diagnosis, plan and need for follow up with the patient.    Current Discharge Medication List           Final diagnoses:   Acute kidney injury (H)   Influenza-like illness   Heart transplanted (H)       10/20/2017   [facility], [place], EMERGENCY DEPARTMENT     Nieves Longo MD  10/21/17 0016

## 2017-10-21 NOTE — DISCHARGE SUMMARY
[facility] Cardiology 2 Discharge Summary    Javi Bansal MRN# [id number]   Age: 46 year old YOB: 1970     Date of Admission:  10/20/2017  Date of Discharge::  10/21/2017  Admitting Physician:  Clare Sparks MD  Discharge Physician:  Michael Chan MD     Home clinic: [facility]          Admission Diagnoses:   Heart transplanted (H) [Z94.1]  Acute kidney injury (H) [N17.9]  Influenza-like illness [R69]          Discharge Diagnosis:   Patient Active Problem List    Diagnosis Date Noted     Pneumonia 03/09/2017     Priority: Medium     Heart transplant graft rejection (H) 01/05/2017     Priority: Medium     7 DPT: 2R, focal + C4d/-C3d: treated w/IV pred (1000, 500. 500 mg)  14 DPT: 2R, neg/neg treated w/IV pred x 3 (1000mg) + thymo x 3  21 DPT: 1R, neg/neg       Ventricular tachycardia, non-sustained (H) 01/04/2017     Priority: Medium     Elevated liver enzymes 12/20/2016     Priority: Medium     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 12/19/2016     Priority: Medium     Need for prophylactic antibiotic 12/19/2016     Priority: Medium     Heart failure (H) 12/12/2016     Priority: Medium     Heart replaced by transplant (H) 12/12/2016     Priority: Medium     Heart transplant candidate 12/11/2016     Priority: Medium     ACP (advance care planning) 10/10/2016     Priority: Medium     Advance Care Planning 10/10/2016: Receipt of ACP document:  Received: invalid HCD document dated 8-4-16.  Document not previously scanned. Validation form completed indicating invalid document. Copy sent to client with information and facilitation resources. Validation form sent to be scanned as notation of invalid document received. Confirmed/documented designated decision maker(s).  Added by Namrata Neely RN Advance Care Planning Liaison with [facility]             Chest pain 07/11/2016     Priority: Medium     Chronic systolic heart  failure (H)      Priority: Medium     CHF (congestive heart failure) (H) 06/17/2016     Priority: Medium     Cardiac defibrillator in situ-Medtronic, dual chamber - Not Dependent 06/16/2016     Priority: Medium     Chagas cardiomyopathy 05/26/2016     Priority: Medium          Procedures:   No procedures performed during this admission          Medications Prior to Admission:     Prescriptions Prior to Admission   Medication Sig Dispense Refill Last Dose     tacrolimus (GENERIC EQUIVALENT) 1 MG capsule Take 2 capsules (2 mg) by mouth 2 times daily 120 capsule 11 10/20/2017 at Unknown time     gabapentin (NEURONTIN) 300 MG capsule Take 1 capsule (300 mg) by mouth 3 times daily Begin by taking one capsule once per day, the next day you may take 1 capsule twice per day and on the 3rd day begin taking 1 capsule 3 times per day. 90 capsule 1 10/20/2017 at Unknown time     benzoyl peroxide 5 % LOTN lotion Apply topically At Bedtime 30 mL 3 Past Week at Unknown time     ketoconazole (NIZORAL) 2 % shampoo Apply topically three times a week Alternate with Head and Shoulders. 120 mL 3 Past Week at Unknown time     pravastatin (PRAVACHOL) 20 MG tablet Take 1 tablet (20 mg) by mouth every evening 30 tablet 11 10/20/2017 at Unknown time     mycophenolate (CELLCEPT - GENERIC EQUIVALENT) 250 MG capsule Take 6 capsules (1,500 mg) by mouth 2 times daily 360 capsule 11 10/20/2017 at Unknown time     aspirin EC 81 MG EC tablet Take 1 tablet (81 mg) by mouth daily 90 tablet 3 10/20/2017 at Unknown time     multivitamin, therapeutic with minerals (THERA-VIT-M) TABS tablet Take 1 tablet by mouth daily 30 each 11 10/20/2017 at Unknown time             Discharge Medications:     Current Discharge Medication List   START this medication which has CHANGED  fluticasone (FLONASE) 50 MCG/ACT spray  Spray 1 spray into both nostrils daily       CONTINUE these medications which have NOT CHANGED    Details   tacrolimus (GENERIC EQUIVALENT) 1 MG  capsule Take 2 capsules (2 mg) by mouth 2 times daily  Qty: 120 capsule, Refills: 11    Comments: Dose increase  Associated Diagnoses: Heart replaced by transplant (H)      gabapentin (NEURONTIN) 300 MG capsule Take 1 capsule (300 mg) by mouth 3 times daily Begin by taking one capsule once per day, the next day you may take 1 capsule twice per day and on the 3rd day begin taking 1 capsule 3 times per day.  Qty: 90 capsule, Refills: 1      benzoyl peroxide 5 % LOTN lotion Apply topically At Bedtime  Qty: 30 mL, Refills: 3    Comments: Apply to forehead, back, and chest  Use 3 times per week  Associated Diagnoses: Other acne      ketoconazole (NIZORAL) 2 % shampoo Apply topically three times a week Alternate with Head and Shoulders.  Qty: 120 mL, Refills: 3    Associated Diagnoses: Seborrhea      pravastatin (PRAVACHOL) 20 MG tablet Take 1 tablet (20 mg) by mouth every evening  Qty: 30 tablet, Refills: 11    Associated Diagnoses: Heart replaced by transplant (H)      mycophenolate (CELLCEPT - GENERIC EQUIVALENT) 250 MG capsule Take 6 capsules (1,500 mg) by mouth 2 times daily  Qty: 360 capsule, Refills: 11    Associated Diagnoses: Heart replaced by transplant (H)      aspirin EC 81 MG EC tablet Take 1 tablet (81 mg) by mouth daily  Qty: 90 tablet, Refills: 3    Associated Diagnoses: Heart replaced by transplant (H)      multivitamin, therapeutic with minerals (THERA-VIT-M) TABS tablet Take 1 tablet by mouth daily  Qty: 30 each, Refills: 11    Associated Diagnoses: Heart replaced by transplant (H)                Consultations:   No consultations were requested during this admission.          Brief History of Illness (adapted from HPI):     Javi Bansal is a 46 year old male with history of  Chagas cardiomyopathy s/p OHT 12/12/16, course complicated by mild cellular rejection s/p steroids burst, thymo and change of immunosuppression, mild low-level CMV viremia, presenting with 3 days of fevers,  chills, stuffy/running nose with post nasal drip and cough.     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 Since then he's had increasing fevers/chills, worsening nasal congestion with with facial pressure, lots of mucous drainage with itchy throat. Says he feels like he has the flu. Has not been eating or drinking as much fluids and feels his urine is very dark. Had one loose stool this morning. He denies orthopnea, PND, lower extremity swelling. Denies chest pain, shortness of breath. Denies rashes or joint pains.          In the ED found to have a Cr of 2 and was given 1L of NS. Flu was negative but was started on oseltamivir.         Hospital Course:     Laboratory:  Respiratory Virus Panel by PCR:  Component Results      Component Value Flag Ref Range Units Status Collected Lab     Respiratory Virus Source Nasopharyngeal    Final 10/21/2017  1:45 AM 75     Influenza A Negative  NEG^Negative  Final 10/21/2017  1:45 AM 75     Influenza A, H1 Negative  NEG^Negative  Final 10/21/2017  1:45 AM 75     Influenza A, H3 Negative  NEG^Negative  Final 10/21/2017  1:45 AM 75     Influenza A 2009 H1N1 Negative  NEG^Negative  Final 10/21/2017  1:45 AM 75     Influenza B Negative  NEG^Negative  Final 10/21/2017  1:45 AM 75     Respiratory Syncytial Virus A Negative  NEG^Negative  Final 10/21/2017  1:45 AM 75     Respiratory Syncytial Virus B Negative  NEG^Negative  Final 10/21/2017  1:45 AM 75     Parainfluenza Virus 1 Positive (A) NEG^Negative  Final 10/21/2017  1:45 AM 75     Comment:     Critical Value/Significant Value called to and read back by   Yelena Maxwell, RN @5009 on 10/21/17. .        Parainfluenza Virus 2 Negative  NEG^Negative  Final 10/21/2017  1:45 AM 75     Parainfluenza Virus 3 Negative  NEG^Negative  Final 10/21/2017  1:45 AM 75     Human Metapneumovirus Negative  NEG^Negative  Final 10/21/2017  1:45 AM 75     Human  Rhinovirus Negative  NEG^Negative  Final 10/21/2017  1:45 AM 75     Adenovirus Species B/E Negative  NEG^Negative  Final 10/21/2017  1:45 AM 75     Adenovirus Species C Negative  NEG^Negative  Final 10/21/2017  1:45 AM 75     Respiratory Virus Comment     Final 10/21/2017  1:45 AM 75     Radiology:  CXR 10/20/17:  Impression: No acute airspace opacity.    Physical Exam on day of discharge:  General: Pleasant middle-aged man laying comfortably in bed in NAD  HEENT: AT/NC, anicteric sclerae, conjunctivae with mild injection, nasal erythema with clear mucous discharge, tenderness on palpation of right supraorbital sinus, MMM  Neck: Supple, no appreciable JVP  Cardiovascular: RRR, no m/r/g, normal S1 S2, 2+ peripheral pulses  Pulm: LCTAB, no w/r/r, normal respiratory effort  Abd: Soft, NTND, normal bowel sounds  Skin: Warm, well perfused, no open sores or wounds, no petechiae or ecchymoses, no diaphoresis  Extremities: No LE edema  Neuro: CN II-XII grossly intact    The patient's symptoms persisted during his hospitalization, though they were mild. Respiratory panel resulted positive for Parainfluenza. The patient felt some relief with Flonase. He also had an JOSE LUIS at admission, likely due to poor PO intake due to patient's symptoms. While here, the patient's fluid PO intake improved, and repeat BMP showed that his creatinine had almost normalized. Tacrolimus level was therapeutic. CMV viremia, though he has history of this. He was discharged in good condition.     #URI: Respiratory panel positive for Parainfluenza virus. Procal 0.15.  - Flonase for symptomatic treatment  - Parasite smear pending  - Quantiferon Gold pending  - EBV pending     #JOSE LUIS: Baseline Cr 1.13, was 2.09 on admission and downtrended to 1.23. Given 2 L NS during hospitalization, patient improved PO intake overnight. Tacrolimus level 9.9, goal 8-10.  - Continue to monitor     #Chronic Chagas heart disease now s/p OHT 12/12/16 c/b 2R rejection (last  biopsy 1R 9/22)  #CMV R+/D+, EBV R+/D+, HIV neg  #LTBI s/p INH therapy: Treated with benznidazole thearpy early 2017. Chagas PCRs trended by ID. Bedside echo demonstrating normal IVC, cardiac function similar to prior.   - Continue tacrolimus 2 mg PO BID  - Continue mycophenolate 1500 mg PO BID  - Continue ASA 81 mg PO QDaily  - Continue pravastatin 20 mg PO QDaily     #H/o low level CMV viremia: Recheck showed <137 quant IU/ml, consistent with previous history.  - Continue to monitor    #Steroid-induced DM2: Improved since off prednisone. No longer on anticlycemics. A1c July 7.5.  - Controlled by diet          Discharge Instructions and Follow-Up:   Discharge diet: Moderate consistent carbohydrate (4857-9646 aron / 4-6 CHO units per meal)   Discharge activity: Activity as tolerated   Discharge follow-up: Follow up with primary care provider on 10/24/17 as previously scheduled           Discharge Disposition:   Discharged to home     Michael Chan MD PhD  Internal Medicine PGY-1  Pager [phone]    Attestation:

## 2017-10-21 NOTE — PROGRESS NOTES
Admission 10/21/17 0010  -----------------------------------------------------------  Diagnosis: Cough, fever, HA, muscle aches.      Admitted from: [facility]  Via: cart  Accompanied by: wife  Belongings: in room with patient  Admission Profile: in progress  Teaching: orientation to unit, call don't fall, use of console, meal times, visiting hours, when to call for the RN (angina/sob/dizzyness, etc.), and enforced importance of safety   Access: R PIV   Telemetry:Placed on pt  Ht./Wt.: complete    VSSA, SR. Awaiting orders.

## 2017-10-21 NOTE — H&P
INTERNAL MEDICINE HISTORY & PHYSICAL   Javi Bansal ([id number]) admitted on 10/20/2017  Primary care provider: No primary care provider on file.         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 Since then he's had increasing fevers/chills, worsening nasal congestion with with facial pressure, lots of mucous drainage with itchy throat. Says he feels like he has the flu. Has not been eating or drinking as much fluids and feels his urine is very dark. Had one loose stool this morning. He denies orthopnea, PND, lower extremity swelling. Denies chest pain, shortness of breath. Denies rashes or joint pains.         In the ED found to have a Cr of 2 and was given 1L of NS. Flu was negative but was started on oseltamivir.           Past Medical History       Patient Active Problem List   Diagnosis     Chagas cardiomyopathy     Cardiac defibrillator in situ-Medtronic, dual chamber - Not Dependent     CHF (congestive heart failure) (H)     Chronic systolic heart failure (H)     Chest pain     ACP (advance care planning)     Heart transplant candidate     Heart failure (H)     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     Heart replaced by transplant (H)     Need for prophylactic antibiotic     Elevated liver enzymes     Ventricular tachycardia, non-sustained (H)     Heart transplant graft rejection (H)     Pneumonia           Past Surgical History       Past Surgical  History:   Procedure Laterality Date     CARDIAC SURGERY      AICD     TRANSPLANT HEART RECIPIENT N/A 12/12/2016    Procedure: TRANSPLANT HEART RECIPIENT;  Surgeon: Elo Madsen MD;  Location:  OR            Medications     No current facility-administered medications on file prior to encounter.   Current Outpatient Prescriptions on File Prior to Encounter:  tacrolimus (GENERIC EQUIVALENT) 1 MG capsule Take 2 capsules (2 mg) by mouth 2 times daily   gabapentin (NEURONTIN) 300 MG capsule Take 1 capsule (300 mg) by mouth 3 times daily Begin by taking one capsule once per day, the next day you may take 1 capsule twice per day and on the 3rd day begin taking 1 capsule 3 times per day.   benzoyl peroxide 5 % LOTN lotion Apply topically At Bedtime   ketoconazole (NIZORAL) 2 % shampoo Apply topically three times a week Alternate with Head and Shoulders.   pravastatin (PRAVACHOL) 20 MG tablet Take 1 tablet (20 mg) by mouth every evening   mycophenolate (CELLCEPT - GENERIC EQUIVALENT) 250 MG capsule Take 6 capsules (1,500 mg) by mouth 2 times daily   aspirin EC 81 MG EC tablet Take 1 tablet (81 mg) by mouth daily   multivitamin, therapeutic with minerals (THERA-VIT-M) TABS tablet Take 1 tablet by mouth daily            Allergies   No Known Allergies         Social History     Social History     Social History     Marital status:      Spouse name: N/A     Number of children: N/A     Years of education: N/A     Occupational History     Not on file.     Social History Main Topics     Smoking status: Never Smoker     Smokeless tobacco: Never Used     Alcohol use No     Drug use: No     Sexual activity: Not on file     Other Topics Concern     Not on file     Social History Narrative            Family History     Family History   Problem Relation Age of Onset     Brain Cancer Mother      Melanoma No family hx of      Skin Cancer No family hx of      Glaucoma No family hx of      Macular Degeneration No family hx  "of             Review of Systems     10 pt ROS negative except for as noted in HPI.          Vitals and Exam     Physical exam:  BP 96/68  Pulse 98  Temp 98.9  F (37.2  C) (Oral)  Resp 18  Ht 1.65 m (5' 4.96\")  Wt 72.6 kg (160 lb)  SpO2 96%  BMI 26.66 kg/m2  Wt Readings from Last 2 Encounters:   10/20/17 72.6 kg (160 lb)   10/04/17 71.5 kg (157 lb 9.6 oz)     No intake or output data in the 24 hours ending 10/20/17 2318    Physical Exam:   General: Pleasant male  HEENT: No Scleral icterus. NCAT, MMM. PERRL, EOMI. OP clear without exudate. Nasal erythema and clear drainage. Frontal/maxillary facial pressure   Cardiac: Normal rate, regular rhythm. No m/r/g. Normal S1, S2. No JVD  Pulm: CTAB, no wheezes, or crackles. Normal respiratory effort  Abd: Soft, non distended, non tender abdomen. No CVA tenderness.  Skin: No jaundice, No rash  Extremities: No LE edema  Joints: No inflammation noted on joints  Neuro: A&Ox3, no focal deficits         Labs   CBC  Recent Labs  Lab 10/20/17  2022   WBC 4.1   RBC 4.03*   HGB 11.7*   HCT 35.8*   MCV 89   MCH 29.0   MCHC 32.7   RDW 13.3          BMP  Recent Labs  Lab 10/20/17  2022      POTASSIUM 3.9   CHLORIDE 107   CO2 23   ANIONGAP 8   *   BUN 22   CR 2.09*   GFRESTIMATED 34*   GFRESTBLACK 41*   DAISY 8.8        INRNo lab results found in last 7 days.    Liver panelNo lab results found in last 7 days.    Urinalysis  Recent Labs   Lab Test  10/20/17   2053   COLOR  Yellow   APPEARANCE  Clear   URINEGLC  Negative   URINEBILI  Negative   URINEKETONE  5*   SG  1.021   UBLD  Negative   URINEPH  5.0   PROTEIN  30*   NITRITE  Negative   LEUKEST  Negative   RBCU  3*   WBCU  1       Cultures    Imaging/procedure results:  # CXR:  Impression: No acute airspace opacity.     9/22 Biopsy  HEART, ALLOGRAFT, ENDOMYOCARDIAL BIOPSY:   - ISHLT 2004 cellular grade: 1R (formerly 1B)        - Mild acute cellular rejection   - ISHLT 2004 antibody-mediated grade: pAMR 0        - " No histological features diagnostic of antibody-mediated   rejection        - No detectable capillary staining for C4d or C3d       ASSESSMENT & PLAN :  Assessment:  46 male w/ Chagas cardiomyopathy s/p OHT 12/12/16, course complicated by 2R cellular rejection s/p steroids burst, thymo and change of immunosuppression to tacro and MMF, h/o of steroid-induced DM2, recent herpes zoster infection, presenting with 3 days of worsening fevers, chills, facial pressure, likely consistent with URI, with associated JOSE LUIS.    URI   With 3 days of runny nose, subjective fevers, chills, facial pain/pressure. Flu negative. No white count. CMV was negative as recently as 10/11.  Treated for CAP in March with negative fungal workup at that time. Patient appears non-toxic and is hemodynamically stable.    -Continue Tamiflu for now in immunosuppressed patient  -Will add antihistamine/flonase for symptomatic treatment  -Parasite smear  -Respiratory Panel  -Procal  -Will check CMV, EBV, Quant Gold for thoroughness     JOSE LUIS   Baseline Cr 1.13. Currently 2.09. Could be prerenal in setting of poor PO intake. However, some RBCs in urine. Now s/p toradol in ED. Given 1L in ED.  -Will give additional 1L overnight  -tacro level in am    Chronic Chagas heart disease now s/p OHT 12/12/16 c/b 2R rejection (last biopsy 1R 9/22)  CMV R+/D+, EBV R+/D+, HIV neg  LTBI s/p INH therapy  Treated with benznidazole thearpy early 2017. Chagas PCRs trended by ID. Bedside echo demonstrating normal IVC, cardiac function similar to prior.   -Continue tacro 2mg BID, mycophenolate 1500mg BID  -Continue ASA/Statin  -Tacro level in AM (therapeutic goal 8-10)    H/o low level CMV viremia   Undetectable since 8/1. Last checked 10/11  -Recheck CMV    Steroid-induced DM2  Improved since off prednisone. No longer on anticlycemics. A1c July 7.5.  -Hypoglycemia protocol, [facility]        [place]  Internal Medicine, PGY2  [phone]      Patient will be seen and discussed  with primary team in am.

## 2017-10-21 NOTE — PLAN OF CARE
Problem: Patient Care Overview  Goal: Plan of Care/Patient Progress Review  Outcome: Adequate for Discharge Date Met:  10/21/17  Alert, oriented. VSS. Appetite good. Tacro level 9.9. Tylenol and Oxycodone given x1 for headache. Up ind. Danish speaking. BS 91 this am; no further BS checks per patient report and okay'd by team. Contact/Droplet for possible Influenza (confirmed with positive resp viral panel; para influenza Type 1). Voiding large amounts.      DISCHARGE   Discharged to: Home  Via: Automobile  Accompanied by: Family  Discharge Instructions: diet, activity, medications, follow up appointments, when to call the MD, and what to watchout for (i.e. s/s of infection, increasing SOB, palpitations, chest pain,)  Prescriptions: To be filled by discharge pharmacy per pt's request; medication list reviewed & sent with pt; patient accompanied by RN to discharge pharmacy for medication .  Follow Up Appointments: arranged; information given  Belongings: All sent with pt  IV: out  Telemetry: off  Pt exhibits understanding of above discharge instructions; all questions answered.  phone used for complete discharge.   Patient left unit 6c at 1645.      Yelena Maxwell RN

## 2017-10-21 NOTE — ED NOTES
"ED to Floor Handoff      S:  Javi Bansal is a 46 year old male who speaks Kyrgyz and lives with a spouse,  in a home  They arrived in the ED by car from home with a complaint of Cough and Headache    Initial vitals were:   BP: 90/67  Pulse: 99  Temp: 98.9  F (37.2  C)  Resp: 24  Height: 165 cm (5' 4.96\")  Weight: 72.6 kg (160 lb)  SpO2: 99 %  Allergies: No Known Allergies.  The meds given in the ED and their home medications are:   Current Facility-Administered Medications   Medication     oseltamivir (TAMIFLU) capsule 75 mg     Current Outpatient Prescriptions   Medication     tacrolimus (GENERIC EQUIVALENT) 1 MG capsule     gabapentin (NEURONTIN) 300 MG capsule     benzoyl peroxide 5 % LOTN lotion     ketoconazole (NIZORAL) 2 % shampoo     pravastatin (PRAVACHOL) 20 MG tablet     mycophenolate (CELLCEPT - GENERIC EQUIVALENT) 250 MG capsule     aspirin EC 81 MG EC tablet     multivitamin, therapeutic with minerals (THERA-VIT-M) TABS tablet     Social demographics are   Social History     Social History     Marital status:      Spouse name: N/A     Number of children: N/A     Years of education: N/A     Social History Main Topics     Smoking status: Never Smoker     Smokeless tobacco: Never Used     Alcohol use No     Drug use: No     Sexual activity: Not on file     Other Topics Concern     Not on file     Social History Narrative       B:   The patient has been ill for 2 day(s) and during this time the symptoms have increased.  In the ED was diagnosed with   Final diagnoses:   Acute kidney injury (H)   Influenza-like illness   Heart transplanted (H)    Infection/sepsis suspected:Yes Isolation type; Contact, Droplet   A:   In the ED these meds were given:   Medications   oseltamivir (TAMIFLU) capsule 75 mg (not administered)   0.9% sodium chloride BOLUS (0 mLs Intravenous Stopped 10/20/17 2126)   ketorolac (TORADOL) injection 30 mg (30 mg Intravenous Given 10/20/17 2024)   0.9% sodium " "chloride BOLUS (500 mLs Intravenous New Bag 10/20/17 2128)     Drips running?  No  Labs results   Labs Ordered and Resulted from Time of ED Arrival Up to the Time of Departure from the ED   CBC WITH PLATELETS DIFFERENTIAL - Abnormal; Notable for the following:        Result Value    RBC Count 4.03 (*)     Hemoglobin 11.7 (*)     Hematocrit 35.8 (*)     All other components within normal limits   BASIC METABOLIC PANEL - Abnormal; Notable for the following:     Glucose 110 (*)     Creatinine 2.09 (*)     GFR Estimate 34 (*)     GFR Estimate If Black 41 (*)     All other components within normal limits   ROUTINE UA WITH MICROSCOPIC REFLEX TO CULTURE - Abnormal; Notable for the following:     Ketones Urine 5 (*)     Protein Albumin Urine 30 (*)     RBC Urine 3 (*)     Mucous Urine Present (*)     Hyaline Casts 33 (*)     Calcium Oxalate Few (*)     All other components within normal limits   BLOOD CULTURE   INFLUENZA A/B ANTIGEN     Imaging Studies:   Recent Results (from the past 24 hour(s))   Chest XR,  PA & LAT    Narrative    Exam: XR CHEST 2 VW, 10/20/2017 8:00 PM    Indication: cough, fever, eval for infiltrate, heart tx patient    Comparison: X-ray chest dated 6/10/2017    Findings:     Frontal and lateral view of the chest. Unchanged median sternotomy  wires. The cardiac silhouette is within normal limits. No significant  pleural effusion or pneumothorax. No acute airspace opacity. No acute  osseous abnormality. Visualized abdominal structures are unremarkable.      Impression    Impression: No acute airspace opacity.     I have personally reviewed the examination and initial interpretation  and I agree with the findings.    HUMAIRA VERNON MD     Recent vital signs BP 96/68  Pulse 98  Temp 98.9  F (37.2  C) (Oral)  Resp 18  Ht 1.65 m (5' 4.96\")  Wt 72.6 kg (160 lb)  SpO2 99%  BMI 26.66 kg/m2  Cardiac Rhythm: ,      Abnormal labs/tests/findings requiring intervention:---  Pain control: pt had " none  Nausea control: pt had none  R:   Transfer assistance needed: Independent  Family present during ED course? No yes  Family currently present? Juan David  Pt needs tele? Yesno?  Code Status: Full Code  Tasks needing to be completed:---    Sondra mendoza-- [phone]  [phone] [facility]  [phone] [facility]

## 2017-10-22 LAB — LAB SCANNED RESULT: NORMAL

## 2017-10-23 ENCOUNTER — CARE COORDINATION (OUTPATIENT)
Dept: CARE COORDINATION | Facility: CLINIC | Age: 47
End: 2017-10-23

## 2017-10-23 LAB
PARASITE SPEC INSPECT: NORMAL
SPECIMEN SOURCE: NORMAL

## 2017-10-23 NOTE — PROGRESS NOTES
Patient has clinic visit within 24-48 hours of Discharge so no post DC follow up call is needed          Oct 24, 2017  9:15 AM CDT   RETURN DIABETES with Alla Jerez PA-C   [facility] Endocrinology ([facility] [place] [facility] [place])     [address] [place] [id number] [phone]

## 2017-10-24 ENCOUNTER — OFFICE VISIT (OUTPATIENT)
Dept: ENDOCRINOLOGY | Facility: CLINIC | Age: 47
End: 2017-10-24

## 2017-10-24 VITALS
HEIGHT: 65 IN | SYSTOLIC BLOOD PRESSURE: 105 MMHG | HEART RATE: 87 BPM | WEIGHT: 157 LBS | DIASTOLIC BLOOD PRESSURE: 73 MMHG | BODY MASS INDEX: 26.16 KG/M2

## 2017-10-24 DIAGNOSIS — T38.0X5A STEROID-INDUCED DIABETES MELLITUS (H): Primary | ICD-10-CM

## 2017-10-24 DIAGNOSIS — Z94.1 HEART REPLACED BY TRANSPLANT (H): ICD-10-CM

## 2017-10-24 DIAGNOSIS — E09.9 STEROID-INDUCED DIABETES MELLITUS (H): Primary | ICD-10-CM

## 2017-10-24 LAB
ANION GAP SERPL CALCULATED.3IONS-SCNC: 10 MMOL/L (ref 3–14)
BUN SERPL-MCNC: 27 MG/DL (ref 7–30)
CALCIUM SERPL-MCNC: 8.7 MG/DL (ref 8.5–10.1)
CHLORIDE SERPL-SCNC: 107 MMOL/L (ref 94–109)
CO2 SERPL-SCNC: 23 MMOL/L (ref 20–32)
CREAT SERPL-MCNC: 1.16 MG/DL (ref 0.66–1.25)
EBV DNA # SPEC NAA+PROBE: <500 {COPIES}/ML
EBV DNA SPEC NAA+PROBE-LOG#: <2.7 {LOG_COPIES}/ML
ERYTHROCYTE [DISTWIDTH] IN BLOOD BY AUTOMATED COUNT: 12.5 % (ref 10–15)
GFR SERPL CREATININE-BSD FRML MDRD: 67 ML/MIN/1.7M2
GLUCOSE SERPL-MCNC: 89 MG/DL (ref 70–99)
HBA1C MFR BLD: 5.4 % (ref 4.3–6)
HCT VFR BLD AUTO: 35.5 % (ref 40–53)
HGB BLD-MCNC: 11.6 G/DL (ref 13.3–17.7)
MCH RBC QN AUTO: 29.1 PG (ref 26.5–33)
MCHC RBC AUTO-ENTMCNC: 32.7 G/DL (ref 31.5–36.5)
MCV RBC AUTO: 89 FL (ref 78–100)
PLATELET # BLD AUTO: 259 10E9/L (ref 150–450)
POTASSIUM SERPL-SCNC: 3.8 MMOL/L (ref 3.4–5.3)
RBC # BLD AUTO: 3.98 10E12/L (ref 4.4–5.9)
SODIUM SERPL-SCNC: 140 MMOL/L (ref 133–144)
TACROLIMUS BLD-MCNC: 10.9 UG/L (ref 5–15)
TME LAST DOSE: NORMAL H
WBC # BLD AUTO: 3 10E9/L (ref 4–11)

## 2017-10-24 PROCEDURE — 80197 ASSAY OF TACROLIMUS: CPT | Performed by: INTERNAL MEDICINE

## 2017-10-24 RX ORDER — GUAIFENESIN/DEXTROMETHORPHAN 100-10MG/5
5 SYRUP ORAL EVERY 4 HOURS PRN
Qty: 560 ML | Refills: 1 | Status: SHIPPED | OUTPATIENT
Start: 2017-10-24 | End: 2020-04-10

## 2017-10-24 ASSESSMENT — PAIN SCALES - GENERAL: PAINLEVEL: NO PAIN (0)

## 2017-10-24 NOTE — NURSING NOTE
Chief Complaint   Patient presents with     RECHECK     F/U TYPE II DM     Kenya Bolanos, CMA  Endocrinology & Diabetes 3G    Capillary Fingerstick performed for an Hemoglobin A1C test

## 2017-10-24 NOTE — PATIENT INSTRUCTIONS
Es un gran gusto lucas le de nuevo.  Y con diabetes en remision!      Porfavor cheque watt azucar walter vez a la semana 1 o 2 horas despues de walter comida madeline.  Si sea alguna vez >180, laame en seguida!    Le veo en un anio!      My best wishes,    Alla Jerez PA-C, MPAS  [facility]  Diabetes, Endocrinology, and Metabolism  [phone] Appointments/Nurse  [phone] pager  [phone] nurse line  [phone] URGENTafter hours/weekend Endocrinologist on call

## 2017-10-24 NOTE — LETTER
10/24/2017       RE: Javi Bansal  [address] [place] [address]     Dear Colleague,    Thank you for referring your patient, Javi Bansal, to the [facility] ENDOCRINOLOGY at [facility]. Please see a copy of my visit note below.    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 There was of course concern about new onset type 1 diabetes, but he was jessica negative and has done well now without insulin.    Thus, his diabetes is presumed to be secondary to steroids and possibly  tacrolimus causing some insulin resistance. He is not known to have Chagas gastrointestinal disease. He has since been seen in ER with JOSE LUIS.     He had been taking Lantus 21 units daily an Novolog 5 units / meal but in weeks following discharge had steadily decreased his insulin does.  When I last saw him in June, he was having hypoglycemia on just 5 units daily of Lantus and a small dose on lunch time only Novolog.  We developed the following plan:    Advised to quit lunch time Novolog, check BG 2 H after lunch.  Advise if consistently >180.    If fasting blood glucose remain <120 in 1-2 weeks, also quit taking morning Lantus.  Please call in a couple of weeks to let me know how BG remain off insulin.      Continue checking fasting BG and BG 1-2 hours after  Eating larger meals at least twice weekly .  Call clinics if too high, >120 fasting or often >160 after meals.      In short, I haven;t heard from him but he reports that his BG were always <140 off insulin so he quit checking months ago.  It sounds like he was checking mostly fasting BG and rare qhs but no post prandial BG.      A1C today is 5.4.  No glucometer data is  available.      He is feeling well, back to work; happy!    He denies symptoms of high or low blood sugars apart from rare fatigue when increases his PA a lot.  No exertional symptoms at all.       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 He also has no past medical history of Malignant melanoma (H) or Skin disease.  Immunization History   Administered Date(s) Administered     HepB 08/04/2016, 09/15/2016, 02/09/2017     Influenza (IIV3) 10/05/2016     Influenza Vaccine IM 3yrs+ 4 Valent IIV4 11/01/2011, 03/21/2015, 10/04/2017     Pneumococcal (PCV 13) 08/04/2016     Pneumococcal 23 valent 03/21/2015     TDAP Vaccine (Boostrix) 08/04/2016       Javi's   Social History     Social History     Marital status:      Spouse name: N/A     Number of children: N/A     Years of education: N/A     Occupational History     Not on file.     Social History Main Topics     Smoking status: Never Smoker     Smokeless tobacco: Never Used     Alcohol use No     Drug use: No     Sexual activity: Not on file     Other Topics Concern     Not on file     Social History Narrative   He does not read or write and prefers oral instructions.      He describes some meals with large portions of carbohydrate.        Javi's family history includes Brain Cancer in his mother. There is no history of Melanoma, Skin Cancer, Glaucoma, or Macular Degeneration.    ROS:   Patient denies any fevers, chills or sweats as well as any changes in vision, problems with floaters or field cuts in vision, pain or problems with dentition, new or different headaches.  Patient denies symptoms of hypo and hyperglycemia except as above.   Patients denies marked fatigue,  "cough, shortness of breath, chest pain or pressure.  There has been no pain with or other changes in urination or  itching or pain in genital areas.  Patient denies any noted swelling in feet, ankles or otherwise, loss of sensation or pain  in feet or other areas.    Patient also denies current difficulties with depressed mood, anhedonia or worrying too much.        Exam:    /73  Pulse 87  Ht 1.651 m (5' 5\")  Wt 71.2 kg (157 lb)  BMI 26.13 kg/m2    General: Pleasant, well nourished and hydrated male in NAD. Seen with .    Psych:  Mood is \"good,\" affect is appropriate.  Thought form and content are fluid and coherent.    HEENT: Eyes and sclera are clear. Extraocular movements are grossly intact without proptosis.  Nares are patent, mucous membranes moist.  Neck: No masses or JVD are noted.    Resp: Easy and unlabored breathing.   Neuro: Alert and oriented, communicating clearly.   Ext: no swelling or edema  Data:      Last Basic Metabolic Panel:  Lab Results   Component Value Date     06/10/2017      Lab Results   Component Value Date    POTASSIUM 4.0 06/10/2017     Lab Results   Component Value Date    CHLORIDE 108 06/10/2017     Lab Results   Component Value Date    DAISY 8.8 06/10/2017     Lab Results   Component Value Date    CO2 23 06/10/2017     Lab Results   Component Value Date    BUN 24 06/10/2017     Lab Results   Component Value Date    CR 1.06 06/10/2017     Lab Results   Component Value Date    GLC 84 06/10/2017       GFR Estimate   Date Value Ref Range Status   10/24/2017 67 >60 mL/min/1.7m2 Final     Comment:     Non  GFR Calc   10/21/2017 63 >60 mL/min/1.7m2 Final     Comment:     Non  GFR Calc   10/20/2017 34 (L) >60 mL/min/1.7m2 Final     Comment:     Non  GFR Calc       Lab Results   Component Value Date    A1C 7.5 (H) 06/02/2017    A1C 11.6 (H) 04/25/2017    A1C 5.4 12/11/2016    A1C 5.8 06/18/2016    HEMOGLOBINA1 5.4 " 10/24/2017       Lab Results   Component Value Date    CPEPT 2.4 05/15/2017    GADAB  04/25/2017     <5.0  Reference range: 0.0 to 5.0  Unit: IU/mL  (Note)  INTERPRETIVE INFORMATION:  Glutamic Acid Decarboxylase  Antibody  A value greater than 5.0 IU/mL is considered positive for  Glutamic Acid Decarboxylase Antibody.  Performed by [facility],  [address] [place] [address] [phone]  [email or web address], Calvin Hernández MD, Lab. Director      ISCAB  05/15/2017     <1:4  Reference range: <1:4  (Note)  INTERPRETIVE INFORMATION: Islet Cell Ab, IgG  Islet cell antibodies (ICAs) are associated with type 1  diabetes (TID), an autoimmune endocrine disorder. ICAs may  be present years before the onset of clinical symptoms. To  calculate Juvenile Diabetes Foundation (JDF) units:  multiply the titer x 5 (1:8  8 x 5 = 40 JDF Units).  Test developed and characteristics determined by [facility]. See Compliance Statement A: [facility]/  Performed by [facility],  [address] [place] [address] [phone]  [email or web address] [facility], Calvin Hernández MD, Lab. Director       Cholesterol   Date Value Ref Range Status   06/02/2017 139 <200 mg/dL Final   01/13/2017 198 <200 mg/dL Final     HDL Cholesterol   Date Value Ref Range Status   06/02/2017 49 >39 mg/dL Final   01/13/2017 88 >39 mg/dL Final     LDL Cholesterol Calculated   Date Value Ref Range Status   06/02/2017 68 <100 mg/dL Final     Comment:     Desirable:       <100 mg/dl   01/13/2017 93 <100 mg/dL Final     Comment:     Desirable:       <100 mg/dl     Triglycerides   Date Value Ref Range Status   06/02/2017 109 <150 mg/dL Final   01/13/2017 82 <150 mg/dL Final     Comment:     Fasting specimen     No results found for: CHOLHDLRATIO    Most recent eye exam date: : 06/08/2017           Assessment/Plan:        Javi Bansal is a 46 year old male with 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  Encouraged him to limit excessive carbohydrates per meal and to check 1-2 hour post prandial when he does.  I urged him to contact the office if ever >180.  Otherwise, we agreed to RTC in one year. Prior to that visit I am encouraging him to collect 1-2 post prandial BG in the week prior to his appointment.     >15 minutes of >25 minute appointment spent in education as he had many questions about other medications he might take and how his situation differs from family members with type 2 DM.      Alla Jerez PA-C, MPAS  [facility]  Diabetes, Endocrinology, and Metabolism  [phone] Appointments/Nurse  [phone] pager  [phone] nurse line

## 2017-10-24 NOTE — MR AVS SNAPSHOT
After Visit Summary   10/24/2017    Javi Bansal    MRN: [id number]           Patient Information     Date Of Birth          1970        Visit Information        Provider Department      10/24/2017 9:15 AM Alla Jerez PA-C; MANINDER MARIN [facility]        Today's Diagnoses     Steroid-induced diabetes mellitus (H)    -  1      Care Instructions    Es un gran gusto lucas le de nuevo.  Y con diabetes en remision!      Porfavor cheque watt azucar walter vez a la semana 1 o 2 horas despues de walter comida madeline.  Si sea alguna vez >180, laame en seguida!    Le veo en un anio!      My best wishes,    Alla Jerez PA-C, [facility]  [facility]  Diabetes, Endocrinology, and Metabolism  [phone] Appointments/Nurse  [phone] pager  [phone] nurse line  [phone] URGENTafter hours/weekend Endocrinologist on call                Follow-ups after your visit        Follow-up notes from your care team     Return in about 1 year (around 10/24/2018).      Your next 10 appointments already scheduled     Dec 14, 2017 10:30 AM CST   Procedure 1.5 hr with U2A ROOM 10   Unit 2A [facility] [place] ([facility])    [address] [address]               Dec 14, 2017 12:00 PM CST   Cath 90 Minute with UUHCVR4   [facility],  Heart Cath Lab ([facility])    [address] [id number] [phone]            Dec 19, 2017  9:00 AM CST   MR CARDIAC W CONTRAST STRESS AND FLOW with UUMR4, UU CV MR NURSE, MG STRESS RM   [facility] [place], MRI ([facility])    [address] [address] [phone]           Take your medicines as usual, unless your doctor tells you not to.   If you take Viagra, Levitra or Cialis, stop taking it 48 hours before your test.   If you take Aggrenox  or dipyridamole (Persantine, Permole), stop taking it 48 hours before your test.   If you take Theophylline or Aminophylline, stop taking it 12 hours before your test.   If you are diabetic and take oral hypoglycemics, do not take them on the day of your test.  The day before your exam, drink extra fluids at least six 8-ounce glasses (unless your doctor wants you to limit your fluids).  Stop all caffeine 12 hours before the test. This includes coffee, tea, soda, chocolate and certain medicines (such as Anacin, Excedrin and NoDoz). Also avoid decaf coffee and tea, as these contain small amounts of caffeine.  Do not eat or drink for 3 hours before your exam. You may drink water and take your morning medicines.  You may need a blood test (creatinine test) within 30 days of your exam. Follow your doctor s orders if this is arranged before your exam.  If you are very claustrophobic, please let you doctor know. You may get a sedative medicine from your doctor before you arrive. Bring the medicine to the exam. Do not take it at home. Arrive one hour early. Bring someone who can take you home after the test. Your medicine will make you sleepy. After the exam, you may not drive, take a bus or take a taxi by yourself.  The MRI machine uses a strong magnet. Please wear clothes without metal (snaps, zippers). A sweatsuit works well, or we may give you a hospital gown.  Please remove any body piercings and hair extensions before you arrive. You will remove watches, jewelry, hairpins, wallets, dentures, partial dental plates and hearing aids. You may wear contact lenses, and you may be able to wear your rings. We have a safe place to keep your personal items, but it is safer to leave them at home.   **IMPORTANT** THE INSTRUCTIONS BELOW ARE ONLY FOR THOSE PATIENTS WHO HAVE BEEN TOLD THEY WILL RECEIVE SEDATION OR GENERAL ANESTHESIA DURING THEIR MRI PROCEDURE:  IF YOU WILL RECEIVE SEDATION (take medicine to help you relax during  your exam):   You must get the medicine from your doctor before you arrive. Bring the medicine to the exam. Do not take it at home.   Arrive one hour early. Bring someone who can take you home after the test. Your medicine will make you sleepy. After the exam, you may not drive, take a bus or take a taxi by yourself.   No eating 8 hours before your exam. You may have clear liquids up until 4 hours before your exam. (Clear liquids include water, clear tea, black coffee and fruit juice without pulp.)  IF YOU WILL RECEIVE ANESTHESIA (be asleep for your exam):   Arrive 1 1/2 hours early. Bring someone who can take you home after the test. You may not drive, take a bus or take a taxi by yourself.   No eating 8 hours before your exam. You may have clear liquids up until 4 hours before your exam. (Clear liquids include water, clear tea, black coffee and fruit juice without pulp.)  If you have any questions, please contact your Imaging Department exam site.            Dec 19, 2017 11:00 AM CST   (Arrive by 10:45 AM)   HEART TRANSPLANT ANNUAL with Geena Mcclellan MD   [facility] ([facility] [place])    [address] [place] [id number] [phone]            Dec 27, 2017  1:00 PM CST   (Arrive by 12:45 PM)   Transplant Skin Check with GARY Murillo MD   [facility] ([facility])    [address] [id number] [phone]            Jun 13, 2018 10:15 AM CDT   RETURN RETINA with Isabela Balbuena MD   [facility] ([facility])    [address]  [address]  [address] Clin [address]  [facility] [address] [phone]              Who to contact     Please call your clinic at [phone] to:    Ask questions about your health    Make or cancel appointments    Discuss your medicines    Learn about your test results    Speak to your doctor   If you have compliments or  "concerns about an experience at your clinic, or if you wish to file a complaint, please contact [facility] Patient Relations at [phone] or email us at [email or web address]         Additional Information About Your Visit        Flud Information     Flud is an electronic gateway that provides easy, online access to your medical records. With Flud, you can request a clinic appointment, read your test results, renew a prescription or communicate with your care team.     To sign up for Flud visit the website at [email or web address]   You will be asked to enter the access code listed below, as well as some personal information. Please follow the directions to create your username and password.     Your access code is: [email or web address]  Expires: 2018  6:30 AM     Your access code will  in 90 days. If you need help or a new code, please contact your [facility] or call [phone] for assistance.        Care EveryWhere ID     This is your Care EveryWhere ID. This could be used by other organizations to access your [place] medical records  [id number]        Your Vitals Were     Pulse Height BMI (Body Mass Index)             87 1.651 m (5' 5\") 26.13 kg/m2          Blood Pressure from Last 3 Encounters:   10/24/17 105/73   10/21/17 103/74   10/04/17 110/74    Weight from Last 3 Encounters:   10/24/17 71.2 kg (157 lb)   10/20/17 70.3 kg (154 lb 14.4 oz)   10/04/17 71.5 kg (157 lb 9.6 oz)              Today, you had the following     No orders found for display         Today's Medication Changes          These changes are accurate as of: 10/24/17 10:49 AM.  If you have any questions, ask your nurse or doctor.               Start taking these medicines.        Dose/Directions    guaiFENesin-dextromethorphan 100-10 MG/5ML syrup   Commonly known as:  ROBITUSSIN DM   Used for:  Steroid-induced diabetes mellitus (H)   Started by:  " Alla Jerez PA-C        Dose:  5 mL   Take 5 mLs by mouth every 4 hours as needed for cough   Quantity:  560 mL   Refills:  1            Where to get your medicines      These medications were sent to [place], [place] - [address]  [address], [facility] [address]    Hours:  TRANSPLANT PHONE NUMBER [phone] Phone:  [phone]     guaiFENesin-dextromethorphan 100-10 MG/5ML syrup                Primary Care Provider    None Specified       No primary provider on file.        Equal Access to Services     [facility]: Hadii aad ku hadasho Soomaali, waaxda luqadaha, qaybta kaalmada adeantonioyada, talisha garcia . So [facility] [phone].    ATENCIÓN: Si habla español, tiene a watt disposición servicios gratuitos de asistencia lingüística. Llame al [phone].    We comply with applicable federal civil rights laws and [place] laws. We do not discriminate on the basis of race, color, national origin, age, disability, sex, sexual orientation, or gender identity.            Thank you!     Thank you for choosing [facility]  for your care. Our goal is always to provide you with excellent care. Hearing back from our patients is one way we can continue to improve our services. Please take a few minutes to complete the written survey that you may receive in the mail after your visit with us. Thank you!             Your Updated Medication List - Protect others around you: Learn how to safely use, store and throw away your medicines at www.disposemymeds.org.          This list is accurate as of: 10/24/17 10:49 AM.  Always use your most recent med list.                   Brand Name Dispense Instructions for use Diagnosis    aspirin 81 MG EC tablet     90 tablet    Take 1 tablet (81 mg) by mouth daily    Heart replaced by transplant (H)       benzoyl peroxide 5 % Lotn lotion     30 mL    Apply topically At Bedtime     Other acne       fluticasone 50 MCG/ACT spray    FLONASE    1 Bottle    Spray 1 spray into both nostrils daily    Influenza-like illness       gabapentin 300 MG capsule    NEURONTIN    90 capsule    Take 1 capsule (300 mg) by mouth 3 times daily Begin by taking one capsule once per day, the next day you may take 1 capsule twice per day and on the 3rd day begin taking 1 capsule 3 times per day.        guaiFENesin-dextromethorphan 100-10 MG/5ML syrup    ROBITUSSIN DM    560 mL    Take 5 mLs by mouth every 4 hours as needed for cough    Steroid-induced diabetes mellitus (H)       ketoconazole 2 % shampoo    NIZORAL    120 mL    Apply topically three times a week Alternate with Head and Shoulders.    Seborrhea       multivitamin, therapeutic with minerals Tabs tablet     30 each    Take 1 tablet by mouth daily    Heart replaced by transplant (H)       mycophenolate 250 MG capsule    GENERIC EQUIVALENT    360 capsule    Take 6 capsules (1,500 mg) by mouth 2 times daily    Heart replaced by transplant (H)       pravastatin 20 MG tablet    PRAVACHOL    30 tablet    Take 1 tablet (20 mg) by mouth every evening    Heart replaced by transplant (H)       tacrolimus 1 MG capsule    GENERIC EQUIVALENT    120 capsule    Take 2 capsules (2 mg) by mouth 2 times daily    Heart replaced by transplant (H)

## 2017-10-26 LAB
BACTERIA SPEC CULT: NO GROWTH
SPECIMEN SOURCE: NORMAL

## 2017-10-27 ENCOUNTER — TELEPHONE (OUTPATIENT)
Dept: TRANSPLANT | Facility: CLINIC | Age: 47
End: 2017-10-27

## 2017-11-09 DIAGNOSIS — Z94.1 HEART REPLACED BY TRANSPLANT (H): Primary | ICD-10-CM

## 2017-11-20 NOTE — PROGRESS NOTES
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 There was of course concern about new onset type 1 diabetes, but he was jessica negative and has done well now without insulin.    Thus, his diabetes is presumed to be secondary to steroids and possibly  tacrolimus causing some insulin resistance. He is not known to have Chagas gastrointestinal disease. He has since been seen in ER with JOSE LUIS.     He had been taking Lantus 21 units daily an Novolog 5 units / meal but in weeks following discharge had steadily decreased his insulin does.  When I last saw him in June, he was having hypoglycemia on just 5 units daily of Lantus and a small dose on lunch time only Novolog.  We developed the following plan:    Advised to quit lunch time Novolog, check BG 2 H after lunch.  Advise if consistently >180.    If fasting blood glucose remain <120 in 1-2 weeks, also quit taking morning Lantus.  Please call in a couple of weeks to let me know how BG remain off insulin.      Continue checking fasting BG and BG 1-2 hours after  Eating larger meals at least twice weekly .  Call clinics if too high, >120 fasting or often >160 after meals.      In short, I haven;t heard from him but he reports that his BG were always <140 off insulin so he quit checking months ago.  It sounds like he was checking mostly fasting BG and rare qhs but no post prandial BG.      A1C today is 5.4.  No glucometer data is available.      He is feeling well, back to work; happy!    He denies symptoms of high or low blood sugars apart from rare fatigue when increases his PA a lot.  No exertional symptoms at all.       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 He also has no past medical history of Malignant melanoma (H) or Skin disease.  Immunization History   Administered Date(s) Administered     HepB 08/04/2016, 09/15/2016, 02/09/2017     Influenza (IIV3) 10/05/2016     Influenza Vaccine IM 3yrs+ 4 Valent IIV4 11/01/2011, 03/21/2015, 10/04/2017     Pneumococcal (PCV 13) 08/04/2016     Pneumococcal 23 valent 03/21/2015     TDAP Vaccine (Boostrix) 08/04/2016       Javi's   Social History     Social History     Marital status:      Spouse name: N/A     Number of children: N/A     Years of education: N/A     Occupational History     Not on file.     Social History Main Topics     Smoking status: Never Smoker     Smokeless tobacco: Never Used     Alcohol use No     Drug use: No     Sexual activity: Not on file     Other Topics Concern     Not on file     Social History Narrative   He does not read or write and prefers oral instructions.      He describes some meals with large portions of carbohydrate.        Javi's family history includes Brain Cancer in his mother. There is no history of Melanoma, Skin Cancer, Glaucoma, or Macular Degeneration.    ROS:   Patient denies any fevers, chills or sweats as well as any changes in vision, problems with floaters or field cuts in vision, pain or problems with dentition, new or different headaches.  Patient denies symptoms of hypo and hyperglycemia except as above.   Patients denies marked fatigue, cough, shortness of breath, chest pain or pressure.  There has been no pain with or other changes in urination or  itching or pain in genital areas.  Patient denies any noted swelling in feet, ankles or otherwise, loss of sensation or pain  in feet or other areas.    Patient also denies current difficulties with  "depressed mood, anhedonia or worrying too much.        Exam:    /73  Pulse 87  Ht 1.651 m (5' 5\")  Wt 71.2 kg (157 lb)  BMI 26.13 kg/m2    General: Pleasant, well nourished and hydrated male in NAD. Seen with .    Psych:  Mood is \"good,\" affect is appropriate.  Thought form and content are fluid and coherent.    HEENT: Eyes and sclera are clear. Extraocular movements are grossly intact without proptosis.  Nares are patent, mucous membranes moist.  Neck: No masses or JVD are noted.    Resp: Easy and unlabored breathing.   Neuro: Alert and oriented, communicating clearly.   Ext: no swelling or edema  Data:      Last Basic Metabolic Panel:  Lab Results   Component Value Date     06/10/2017      Lab Results   Component Value Date    POTASSIUM 4.0 06/10/2017     Lab Results   Component Value Date    CHLORIDE 108 06/10/2017     Lab Results   Component Value Date    DAISY 8.8 06/10/2017     Lab Results   Component Value Date    CO2 23 06/10/2017     Lab Results   Component Value Date    BUN 24 06/10/2017     Lab Results   Component Value Date    CR 1.06 06/10/2017     Lab Results   Component Value Date    GLC 84 06/10/2017       GFR Estimate   Date Value Ref Range Status   10/24/2017 67 >60 mL/min/1.7m2 Final     Comment:     Non  GFR Calc   10/21/2017 63 >60 mL/min/1.7m2 Final     Comment:     Non  GFR Calc   10/20/2017 34 (L) >60 mL/min/1.7m2 Final     Comment:     Non  GFR Calc       Lab Results   Component Value Date    A1C 7.5 (H) 06/02/2017    A1C 11.6 (H) 04/25/2017    A1C 5.4 12/11/2016    A1C 5.8 06/18/2016    HEMOGLOBINA1 5.4 10/24/2017       Lab Results   Component Value Date    CPEPT 2.4 05/15/2017    GADAB  04/25/2017     <5.0  Reference range: 0.0 to 5.0  Unit: IU/mL  (Note)  INTERPRETIVE INFORMATION:  Glutamic Acid Decarboxylase  Antibody  A value greater than 5.0 IU/mL is considered positive for  Glutamic Acid Decarboxylase " Antibody.  Performed by [facility],  [address] [facility] [address] [phone]  www.[employer], Calvin Hernández MD, Lab. Director      ISCAB  05/15/2017     <1:4  Reference range: <1:4  (Note)  INTERPRETIVE INFORMATION: Islet Cell Ab, IgG  Islet cell antibodies (ICAs) are associated with type 1  diabetes (TID), an autoimmune endocrine disorder. ICAs may  be present years before the onset of clinical symptoms. To  calculate Juvenile Diabetes Foundation (JDF) units:  multiply the titer x 5 (1:8  8 x 5 = 40 JDF Units).  Test developed and characteristics determined by [facility]. See Compliance Statement A: [employer]/  Performed by [facility],  [address] [facility] [address] [phone]  www.[email or web address], Calvin Hernández MD, Lab. Director       Cholesterol   Date Value Ref Range Status   06/02/2017 139 <200 mg/dL Final   01/13/2017 198 <200 mg/dL Final     HDL Cholesterol   Date Value Ref Range Status   06/02/2017 49 >39 mg/dL Final   01/13/2017 88 >39 mg/dL Final     LDL Cholesterol Calculated   Date Value Ref Range Status   06/02/2017 68 <100 mg/dL Final     Comment:     Desirable:       <100 mg/dl   01/13/2017 93 <100 mg/dL Final     Comment:     Desirable:       <100 mg/dl     Triglycerides   Date Value Ref Range Status   06/02/2017 109 <150 mg/dL Final   01/13/2017 82 <150 mg/dL Final     Comment:     Fasting specimen     No results found for: CHOLHDLRATIO    Most recent eye exam date: : 06/08/2017           Assessment/Plan:        Javi Bansal is a 46 year old male with 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  Encouraged him to limit excessive carbohydrates per meal and to check 1-2 hour post prandial when he does.  I urged him to contact the office if ever >180.  Otherwise, we agreed to RTC in one year. Prior to that visit I am encouraging him to collect 1-2 post prandial BG in the week prior to his appointment.     >15 minutes of >25 minute appointment spent in education as he had many questions about other medications he might take and how his situation differs from family members with type 2 DM.      Alla Jerez PA-C, MPAS  [facility]  Diabetes, Endocrinology, and Metabolism  [phone] Appointments/Nurse  [phone] pager  [phone] nurse line

## 2017-12-07 DIAGNOSIS — Z94.1 HEART REPLACED BY TRANSPLANT (H): Primary | ICD-10-CM

## 2017-12-08 DIAGNOSIS — Z94.1 HEART REPLACED BY TRANSPLANT (H): Primary | ICD-10-CM

## 2017-12-08 RX ORDER — LIDOCAINE 40 MG/G
CREAM TOPICAL
Status: CANCELLED | OUTPATIENT
Start: 2017-12-08

## 2017-12-08 RX ORDER — SODIUM CHLORIDE 9 MG/ML
INJECTION, SOLUTION INTRAVENOUS CONTINUOUS
Status: CANCELLED | OUTPATIENT
Start: 2017-12-08

## 2017-12-11 DIAGNOSIS — Z12.5 PROSTATE CANCER SCREENING: ICD-10-CM

## 2017-12-11 DIAGNOSIS — E78.5 HYPERLIPEMIA: ICD-10-CM

## 2017-12-11 DIAGNOSIS — Z94.1 HEART REPLACED BY TRANSPLANT (H): Primary | ICD-10-CM

## 2017-12-14 ENCOUNTER — RESULTS ONLY (OUTPATIENT)
Dept: OTHER | Facility: CLINIC | Age: 47
End: 2017-12-14

## 2017-12-14 ENCOUNTER — HOSPITAL ENCOUNTER (OUTPATIENT)
Dept: CARDIOLOGY | Facility: CLINIC | Age: 47
Discharge: HOME | End: 2017-12-14
Attending: INTERNAL MEDICINE | Admitting: INTERNAL MEDICINE
Payer: COMMERCIAL

## 2017-12-14 ENCOUNTER — HOSPITAL ENCOUNTER (OUTPATIENT)
Facility: CLINIC | Age: 47
Discharge: HOME | End: 2017-12-14
Attending: INTERNAL MEDICINE | Admitting: INTERNAL MEDICINE
Payer: COMMERCIAL

## 2017-12-14 ENCOUNTER — APPOINTMENT (OUTPATIENT)
Dept: MEDSURG UNIT | Facility: CLINIC | Age: 47
End: 2017-12-14
Payer: COMMERCIAL

## 2017-12-14 ENCOUNTER — APPOINTMENT (OUTPATIENT)
Dept: CARDIOLOGY | Facility: CLINIC | Age: 47
End: 2017-12-14
Attending: INTERNAL MEDICINE
Payer: COMMERCIAL

## 2017-12-14 ENCOUNTER — TELEPHONE (OUTPATIENT)
Dept: PHARMACY | Facility: CLINIC | Age: 47
End: 2017-12-14

## 2017-12-14 VITALS
SYSTOLIC BLOOD PRESSURE: 106 MMHG | BODY MASS INDEX: 25.12 KG/M2 | WEIGHT: 160.05 LBS | DIASTOLIC BLOOD PRESSURE: 75 MMHG | RESPIRATION RATE: 16 BRPM | TEMPERATURE: 97.8 F | HEART RATE: 94 BPM | HEIGHT: 67 IN | OXYGEN SATURATION: 99 %

## 2017-12-14 DIAGNOSIS — Z94.1 HEART REPLACED BY TRANSPLANT (H): ICD-10-CM

## 2017-12-14 DIAGNOSIS — Z12.5 PROSTATE CANCER SCREENING: ICD-10-CM

## 2017-12-14 DIAGNOSIS — E78.5 HYPERLIPEMIA: ICD-10-CM

## 2017-12-14 LAB
ALBUMIN SERPL-MCNC: 4.3 G/DL (ref 3.4–5)
ALP SERPL-CCNC: 119 U/L (ref 40–150)
ALT SERPL W P-5'-P-CCNC: 23 U/L (ref 0–70)
ANION GAP SERPL CALCULATED.3IONS-SCNC: 7 MMOL/L (ref 3–14)
AST SERPL W P-5'-P-CCNC: 22 U/L (ref 0–45)
BILIRUB SERPL-MCNC: 0.7 MG/DL (ref 0.2–1.3)
BUN SERPL-MCNC: 23 MG/DL (ref 7–30)
CALCIUM SERPL-MCNC: 9.1 MG/DL (ref 8.5–10.1)
CHLORIDE SERPL-SCNC: 110 MMOL/L (ref 94–109)
CHOLEST SERPL-MCNC: 176 MG/DL
CK SERPL-CCNC: 81 U/L (ref 30–300)
CO2 SERPL-SCNC: 25 MMOL/L (ref 20–32)
CREAT SERPL-MCNC: 1.16 MG/DL (ref 0.66–1.25)
ERYTHROCYTE [DISTWIDTH] IN BLOOD BY AUTOMATED COUNT: 13.7 % (ref 10–15)
GFR SERPL CREATININE-BSD FRML MDRD: 67 ML/MIN/1.7M2
GLUCOSE SERPL-MCNC: 86 MG/DL (ref 70–99)
HBV CORE AB SERPL QL IA: NONREACTIVE
HBV SURFACE AG SERPL QL IA: NONREACTIVE
HCT VFR BLD AUTO: 34.5 % (ref 40–53)
HCV AB SERPL QL IA: NONREACTIVE
HDLC SERPL-MCNC: 56 MG/DL
HGB BLD-MCNC: 10.8 G/DL (ref 13.3–17.7)
HIV 1+2 AB+HIV1 P24 AG SERPL QL IA: NONREACTIVE
KCT BLD-ACNC: 135 SEC (ref 105–167)
KCT BLD-ACNC: 245 SEC (ref 105–167)
LDLC SERPL CALC-MCNC: 108 MG/DL
MAGNESIUM SERPL-MCNC: 1.8 MG/DL (ref 1.6–2.3)
MCH RBC QN AUTO: 29.1 PG (ref 26.5–33)
MCHC RBC AUTO-ENTMCNC: 31.3 G/DL (ref 31.5–36.5)
MCV RBC AUTO: 93 FL (ref 78–100)
NONHDLC SERPL-MCNC: 120 MG/DL
PHOSPHATE SERPL-MCNC: 2.9 MG/DL (ref 2.5–4.5)
PLATELET # BLD AUTO: 175 10E9/L (ref 150–450)
POTASSIUM SERPL-SCNC: 3.8 MMOL/L (ref 3.4–5.3)
PROT SERPL-MCNC: 7.6 G/DL (ref 6.8–8.8)
PSA SERPL-ACNC: 0.48 UG/L (ref 0–4)
RBC # BLD AUTO: 3.71 10E12/L (ref 4.4–5.9)
SODIUM SERPL-SCNC: 142 MMOL/L (ref 133–144)
TACROLIMUS BLD-MCNC: 10.4 UG/L (ref 5–15)
TME LAST DOSE: NORMAL H
TRIGL SERPL-MCNC: 61 MG/DL
WBC # BLD AUTO: 3.7 10E9/L (ref 4–11)

## 2017-12-14 PROCEDURE — 93505 ENDOMYOCARDIAL BIOPSY: CPT | Mod: 26 | Performed by: INTERNAL MEDICINE

## 2017-12-14 PROCEDURE — 94621 CARDIOPULM EXERCISE TESTING: CPT | Mod: 26 | Performed by: INTERNAL MEDICINE

## 2017-12-14 PROCEDURE — 93010 ELECTROCARDIOGRAM REPORT: CPT | Performed by: INTERNAL MEDICINE

## 2017-12-14 PROCEDURE — 94621 CARDIOPULM EXERCISE TESTING: CPT | Performed by: INTERNAL MEDICINE

## 2017-12-14 RX ORDER — NALOXONE HYDROCHLORIDE 0.4 MG/ML
.2-.4 INJECTION, SOLUTION INTRAMUSCULAR; INTRAVENOUS; SUBCUTANEOUS
Status: DISCONTINUED | OUTPATIENT
Start: 2017-12-14 | End: 2017-12-14

## 2017-12-14 RX ORDER — ADENOSINE 3 MG/ML
12-12000 INJECTION, SOLUTION INTRAVENOUS
Status: DISCONTINUED | OUTPATIENT
Start: 2017-12-14 | End: 2017-12-14 | Stop reason: HOSPADM

## 2017-12-14 RX ORDER — NITROGLYCERIN 5 MG/ML
100-500 VIAL (ML) INTRAVENOUS
Status: DISCONTINUED | OUTPATIENT
Start: 2017-12-14 | End: 2017-12-14 | Stop reason: HOSPADM

## 2017-12-14 RX ORDER — DOPAMINE HYDROCHLORIDE 160 MG/100ML
2-20 INJECTION, SOLUTION INTRAVENOUS CONTINUOUS PRN
Status: DISCONTINUED | OUTPATIENT
Start: 2017-12-14 | End: 2017-12-14 | Stop reason: HOSPADM

## 2017-12-14 RX ORDER — NALOXONE HYDROCHLORIDE 0.4 MG/ML
.1-.4 INJECTION, SOLUTION INTRAMUSCULAR; INTRAVENOUS; SUBCUTANEOUS
Status: DISCONTINUED | OUTPATIENT
Start: 2017-12-14 | End: 2017-12-14

## 2017-12-14 RX ORDER — PROTAMINE SULFATE 10 MG/ML
1-5 INJECTION, SOLUTION INTRAVENOUS
Status: DISCONTINUED | OUTPATIENT
Start: 2017-12-14 | End: 2017-12-14 | Stop reason: HOSPADM

## 2017-12-14 RX ORDER — HYDRALAZINE HYDROCHLORIDE 20 MG/ML
10-20 INJECTION INTRAMUSCULAR; INTRAVENOUS
Status: DISCONTINUED | OUTPATIENT
Start: 2017-12-14 | End: 2017-12-14 | Stop reason: HOSPADM

## 2017-12-14 RX ORDER — NICARDIPINE HYDROCHLORIDE 2.5 MG/ML
100 INJECTION INTRAVENOUS
Status: DISCONTINUED | OUTPATIENT
Start: 2017-12-14 | End: 2017-12-14 | Stop reason: HOSPADM

## 2017-12-14 RX ORDER — SODIUM NITROPRUSSIDE 25 MG/ML
100-200 INJECTION INTRAVENOUS
Status: DISCONTINUED | OUTPATIENT
Start: 2017-12-14 | End: 2017-12-14 | Stop reason: HOSPADM

## 2017-12-14 RX ORDER — ATROPINE SULFATE 0.1 MG/ML
.5-1 INJECTION INTRAVENOUS
Status: DISCONTINUED | OUTPATIENT
Start: 2017-12-14 | End: 2017-12-14 | Stop reason: HOSPADM

## 2017-12-14 RX ORDER — ASPIRIN 81 MG/1
81-324 TABLET, CHEWABLE ORAL
Status: DISCONTINUED | OUTPATIENT
Start: 2017-12-14 | End: 2017-12-14 | Stop reason: HOSPADM

## 2017-12-14 RX ORDER — HEPARIN SODIUM 1000 [USP'U]/ML
1000-10000 INJECTION, SOLUTION INTRAVENOUS; SUBCUTANEOUS EVERY 5 MIN PRN
Status: DISCONTINUED | OUTPATIENT
Start: 2017-12-14 | End: 2017-12-14 | Stop reason: HOSPADM

## 2017-12-14 RX ORDER — NIFEDIPINE 10 MG/1
10 CAPSULE ORAL
Status: DISCONTINUED | OUTPATIENT
Start: 2017-12-14 | End: 2017-12-14 | Stop reason: HOSPADM

## 2017-12-14 RX ORDER — METOPROLOL TARTRATE 1 MG/ML
5 INJECTION, SOLUTION INTRAVENOUS EVERY 5 MIN PRN
Status: DISCONTINUED | OUTPATIENT
Start: 2017-12-14 | End: 2017-12-14 | Stop reason: HOSPADM

## 2017-12-14 RX ORDER — DEXTROSE MONOHYDRATE 25 G/50ML
12.5-5 INJECTION, SOLUTION INTRAVENOUS EVERY 30 MIN PRN
Status: DISCONTINUED | OUTPATIENT
Start: 2017-12-14 | End: 2017-12-14 | Stop reason: HOSPADM

## 2017-12-14 RX ORDER — ARGATROBAN 1 MG/ML
350 INJECTION, SOLUTION INTRAVENOUS
Status: DISCONTINUED | OUTPATIENT
Start: 2017-12-14 | End: 2017-12-14 | Stop reason: HOSPADM

## 2017-12-14 RX ORDER — LORAZEPAM 2 MG/ML
.5-2 INJECTION INTRAMUSCULAR EVERY 4 HOURS PRN
Status: DISCONTINUED | OUTPATIENT
Start: 2017-12-14 | End: 2017-12-14 | Stop reason: HOSPADM

## 2017-12-14 RX ORDER — ARGATROBAN 1 MG/ML
150 INJECTION, SOLUTION INTRAVENOUS
Status: DISCONTINUED | OUTPATIENT
Start: 2017-12-14 | End: 2017-12-14 | Stop reason: HOSPADM

## 2017-12-14 RX ORDER — POTASSIUM CHLORIDE 29.8 MG/ML
20 INJECTION INTRAVENOUS
Status: DISCONTINUED | OUTPATIENT
Start: 2017-12-14 | End: 2017-12-14 | Stop reason: HOSPADM

## 2017-12-14 RX ORDER — EPINEPHRINE 1 MG/ML
0.3 INJECTION, SOLUTION, CONCENTRATE INTRAVENOUS
Status: DISCONTINUED | OUTPATIENT
Start: 2017-12-14 | End: 2017-12-14 | Stop reason: HOSPADM

## 2017-12-14 RX ORDER — CLOPIDOGREL BISULFATE 75 MG/1
75 TABLET ORAL
Status: DISCONTINUED | OUTPATIENT
Start: 2017-12-14 | End: 2017-12-14 | Stop reason: HOSPADM

## 2017-12-14 RX ORDER — POTASSIUM CHLORIDE 7.45 MG/ML
10 INJECTION INTRAVENOUS
Status: DISCONTINUED | OUTPATIENT
Start: 2017-12-14 | End: 2017-12-14 | Stop reason: HOSPADM

## 2017-12-14 RX ORDER — FENTANYL CITRATE 50 UG/ML
25-50 INJECTION, SOLUTION INTRAMUSCULAR; INTRAVENOUS
Status: DISCONTINUED | OUTPATIENT
Start: 2017-12-14 | End: 2017-12-14 | Stop reason: HOSPADM

## 2017-12-14 RX ORDER — NALOXONE HYDROCHLORIDE 0.4 MG/ML
0.4 INJECTION, SOLUTION INTRAMUSCULAR; INTRAVENOUS; SUBCUTANEOUS EVERY 5 MIN PRN
Status: DISCONTINUED | OUTPATIENT
Start: 2017-12-14 | End: 2017-12-14 | Stop reason: HOSPADM

## 2017-12-14 RX ORDER — ASPIRIN 325 MG
325 TABLET ORAL
Status: COMPLETED | OUTPATIENT
Start: 2017-12-14 | End: 2017-12-14

## 2017-12-14 RX ORDER — PROMETHAZINE HYDROCHLORIDE 25 MG/ML
6.25-25 INJECTION, SOLUTION INTRAMUSCULAR; INTRAVENOUS EVERY 4 HOURS PRN
Status: DISCONTINUED | OUTPATIENT
Start: 2017-12-14 | End: 2017-12-14 | Stop reason: HOSPADM

## 2017-12-14 RX ORDER — FLUMAZENIL 0.1 MG/ML
0.2 INJECTION, SOLUTION INTRAVENOUS
Status: DISCONTINUED | OUTPATIENT
Start: 2017-12-14 | End: 2017-12-14 | Stop reason: HOSPADM

## 2017-12-14 RX ORDER — LIDOCAINE HYDROCHLORIDE 10 MG/ML
30 INJECTION, SOLUTION EPIDURAL; INFILTRATION; INTRACAUDAL; PERINEURAL
Status: DISCONTINUED | OUTPATIENT
Start: 2017-12-14 | End: 2017-12-14 | Stop reason: HOSPADM

## 2017-12-14 RX ORDER — DIPHENHYDRAMINE HYDROCHLORIDE 50 MG/ML
25-50 INJECTION INTRAMUSCULAR; INTRAVENOUS
Status: DISCONTINUED | OUTPATIENT
Start: 2017-12-14 | End: 2017-12-14 | Stop reason: HOSPADM

## 2017-12-14 RX ORDER — FENTANYL CITRATE 50 UG/ML
25-50 INJECTION, SOLUTION INTRAMUSCULAR; INTRAVENOUS
Status: DISCONTINUED | OUTPATIENT
Start: 2017-12-14 | End: 2017-12-14

## 2017-12-14 RX ORDER — NITROGLYCERIN 5 MG/ML
100-200 VIAL (ML) INTRAVENOUS
Status: DISCONTINUED | OUTPATIENT
Start: 2017-12-14 | End: 2017-12-14 | Stop reason: HOSPADM

## 2017-12-14 RX ORDER — NALOXONE HYDROCHLORIDE 0.4 MG/ML
.2-.4 INJECTION, SOLUTION INTRAMUSCULAR; INTRAVENOUS; SUBCUTANEOUS
Status: DISCONTINUED | OUTPATIENT
Start: 2017-12-14 | End: 2017-12-14 | Stop reason: HOSPADM

## 2017-12-14 RX ORDER — FUROSEMIDE 10 MG/ML
20-100 INJECTION INTRAMUSCULAR; INTRAVENOUS
Status: DISCONTINUED | OUTPATIENT
Start: 2017-12-14 | End: 2017-12-14 | Stop reason: HOSPADM

## 2017-12-14 RX ORDER — PROTAMINE SULFATE 10 MG/ML
1-5 INJECTION, SOLUTION INTRAVENOUS
Status: COMPLETED | OUTPATIENT
Start: 2017-12-14 | End: 2017-12-14

## 2017-12-14 RX ORDER — NALOXONE HYDROCHLORIDE 0.4 MG/ML
.1-.4 INJECTION, SOLUTION INTRAMUSCULAR; INTRAVENOUS; SUBCUTANEOUS
Status: DISCONTINUED | OUTPATIENT
Start: 2017-12-14 | End: 2017-12-14 | Stop reason: HOSPADM

## 2017-12-14 RX ORDER — PHENYLEPHRINE HCL IN 0.9% NACL 1 MG/10 ML
20-100 SYRINGE (ML) INTRAVENOUS
Status: DISCONTINUED | OUTPATIENT
Start: 2017-12-14 | End: 2017-12-14 | Stop reason: HOSPADM

## 2017-12-14 RX ORDER — ENALAPRILAT 1.25 MG/ML
1.25-2.5 INJECTION INTRAVENOUS
Status: DISCONTINUED | OUTPATIENT
Start: 2017-12-14 | End: 2017-12-14 | Stop reason: HOSPADM

## 2017-12-14 RX ORDER — PROTAMINE SULFATE 10 MG/ML
25-100 INJECTION, SOLUTION INTRAVENOUS EVERY 5 MIN PRN
Status: DISCONTINUED | OUTPATIENT
Start: 2017-12-14 | End: 2017-12-14 | Stop reason: HOSPADM

## 2017-12-14 RX ORDER — SODIUM CHLORIDE 9 MG/ML
INJECTION, SOLUTION INTRAVENOUS CONTINUOUS
Status: DISCONTINUED | OUTPATIENT
Start: 2017-12-14 | End: 2017-12-14 | Stop reason: HOSPADM

## 2017-12-14 RX ORDER — ASPIRIN 81 MG/1
81 TABLET ORAL DAILY
Status: DISCONTINUED | OUTPATIENT
Start: 2017-12-15 | End: 2017-12-14 | Stop reason: HOSPADM

## 2017-12-14 RX ORDER — ONDANSETRON 2 MG/ML
4 INJECTION INTRAMUSCULAR; INTRAVENOUS EVERY 4 HOURS PRN
Status: DISCONTINUED | OUTPATIENT
Start: 2017-12-14 | End: 2017-12-14 | Stop reason: HOSPADM

## 2017-12-14 RX ORDER — CLOPIDOGREL BISULFATE 75 MG/1
300-600 TABLET ORAL
Status: DISCONTINUED | OUTPATIENT
Start: 2017-12-14 | End: 2017-12-14 | Stop reason: HOSPADM

## 2017-12-14 RX ORDER — DOBUTAMINE HYDROCHLORIDE 200 MG/100ML
2-20 INJECTION INTRAVENOUS CONTINUOUS PRN
Status: DISCONTINUED | OUTPATIENT
Start: 2017-12-14 | End: 2017-12-14 | Stop reason: HOSPADM

## 2017-12-14 RX ORDER — LIDOCAINE 40 MG/G
CREAM TOPICAL
Status: DISCONTINUED | OUTPATIENT
Start: 2017-12-14 | End: 2017-12-14 | Stop reason: HOSPADM

## 2017-12-14 RX ORDER — LIDOCAINE 40 MG/G
CREAM TOPICAL
Status: DISCONTINUED | OUTPATIENT
Start: 2017-12-14 | End: 2017-12-14

## 2017-12-14 RX ORDER — ATROPINE SULFATE 0.1 MG/ML
0.5 INJECTION INTRAVENOUS EVERY 5 MIN PRN
Status: DISCONTINUED | OUTPATIENT
Start: 2017-12-14 | End: 2017-12-14 | Stop reason: HOSPADM

## 2017-12-14 RX ORDER — MAGNESIUM HYDROXIDE 1200 MG/15ML
1000 LIQUID ORAL CONTINUOUS PRN
Status: DISCONTINUED | OUTPATIENT
Start: 2017-12-14 | End: 2017-12-14 | Stop reason: HOSPADM

## 2017-12-14 RX ORDER — NITROGLYCERIN 0.4 MG/1
0.4 TABLET SUBLINGUAL EVERY 5 MIN PRN
Status: DISCONTINUED | OUTPATIENT
Start: 2017-12-14 | End: 2017-12-14 | Stop reason: HOSPADM

## 2017-12-14 RX ORDER — NITROGLYCERIN 20 MG/100ML
.07-2 INJECTION INTRAVENOUS CONTINUOUS PRN
Status: DISCONTINUED | OUTPATIENT
Start: 2017-12-14 | End: 2017-12-14 | Stop reason: HOSPADM

## 2017-12-14 RX ORDER — ASPIRIN 325 MG
325 TABLET ORAL
Status: DISCONTINUED | OUTPATIENT
Start: 2017-12-14 | End: 2017-12-14 | Stop reason: HOSPADM

## 2017-12-14 RX ORDER — METHYLPREDNISOLONE SODIUM SUCCINATE 125 MG/2ML
125 INJECTION, POWDER, LYOPHILIZED, FOR SOLUTION INTRAMUSCULAR; INTRAVENOUS
Status: DISCONTINUED | OUTPATIENT
Start: 2017-12-14 | End: 2017-12-14 | Stop reason: HOSPADM

## 2017-12-14 RX ORDER — EPTIFIBATIDE 2 MG/ML
180 INJECTION, SOLUTION INTRAVENOUS EVERY 10 MIN PRN
Status: DISCONTINUED | OUTPATIENT
Start: 2017-12-14 | End: 2017-12-14 | Stop reason: HOSPADM

## 2017-12-14 RX ORDER — ATROPINE SULFATE 0.1 MG/ML
0.5 INJECTION INTRAVENOUS EVERY 5 MIN PRN
Status: DISCONTINUED | OUTPATIENT
Start: 2017-12-14 | End: 2017-12-14

## 2017-12-14 RX ORDER — IOPAMIDOL 755 MG/ML
55 INJECTION, SOLUTION INTRAVASCULAR ONCE
Status: COMPLETED | OUTPATIENT
Start: 2017-12-14 | End: 2017-12-14

## 2017-12-14 RX ORDER — VERAPAMIL HYDROCHLORIDE 2.5 MG/ML
1-5 INJECTION, SOLUTION INTRAVENOUS
Status: DISCONTINUED | OUTPATIENT
Start: 2017-12-14 | End: 2017-12-14 | Stop reason: HOSPADM

## 2017-12-14 RX ORDER — ASPIRIN 325 MG
325 TABLET ORAL ONCE
Status: DISCONTINUED | OUTPATIENT
Start: 2017-12-14 | End: 2017-12-14 | Stop reason: HOSPADM

## 2017-12-14 RX ORDER — PRASUGREL 10 MG/1
10-60 TABLET, FILM COATED ORAL
Status: DISCONTINUED | OUTPATIENT
Start: 2017-12-14 | End: 2017-12-14 | Stop reason: HOSPADM

## 2017-12-14 RX ORDER — FLUMAZENIL 0.1 MG/ML
0.2 INJECTION, SOLUTION INTRAVENOUS
Status: DISCONTINUED | OUTPATIENT
Start: 2017-12-14 | End: 2017-12-14

## 2017-12-14 RX ADMIN — ASPIRIN 325 MG ORAL TABLET 325 MG: 325 PILL ORAL at 12:30

## 2017-12-14 RX ADMIN — PROTAMINE SULFATE 5 MG: 10 INJECTION, SOLUTION INTRAVENOUS at 13:37

## 2017-12-14 RX ADMIN — FENTANYL CITRATE 50 MCG: 50 INJECTION, SOLUTION INTRAMUSCULAR; INTRAVENOUS at 13:04

## 2017-12-14 RX ADMIN — HEPARIN SODIUM 7000 UNITS: 1000 INJECTION, SOLUTION INTRAVENOUS; SUBCUTANEOUS at 13:26

## 2017-12-14 RX ADMIN — HEPARIN SODIUM 1500 UNITS: 1000 INJECTION, SOLUTION INTRAVENOUS; SUBCUTANEOUS at 13:26

## 2017-12-14 RX ADMIN — NITROGLYCERIN 200 MCG: 5 INJECTION, SOLUTION INTRAVENOUS at 13:34

## 2017-12-14 RX ADMIN — IOPAMIDOL 55 ML: 755 INJECTION, SOLUTION INTRAVASCULAR at 13:45

## 2017-12-14 RX ADMIN — PROTAMINE SULFATE 45 MG: 10 INJECTION, SOLUTION INTRAVENOUS at 13:43

## 2017-12-14 RX ADMIN — SODIUM CHLORIDE: 9 INJECTION, SOLUTION INTRAVENOUS at 12:30

## 2017-12-14 RX ADMIN — HEPARIN SODIUM 500 UNITS: 1000 INJECTION, SOLUTION INTRAVENOUS; SUBCUTANEOUS at 13:27

## 2017-12-14 NOTE — DISCHARGE INSTRUCTIONS
Going Home after Coronary Angioplasty or Stent Placement        Name: Javi Bansal  Medical Record Number:  [id number]  Today's Date: December 14, 2017        For 24 hours:         Have an adult stay with you for 24 hours.         Relax and take it easy.         Drink plenty of fluids.         You may eat your normal diet, unless your doctor tells you otherwise.         Do NOT make any important or legal decisions.         Do NOT drive or operate machines at home or at work.         Do NOT drink alcohol.      Do NOT smoke.     Medicines:         If you have begun Plavix (clopidogrel), Effient (prasugrel), or Brilinta (ticagrelor), do not stop taking it until you talk to your heart doctor (cardiologist).         If you are on metformin (Glucophage), do not restart it until you have blood tests (within 2 to 3 days after discharge). When your doctor tells you it is safe, you may restart the metformin.         If you have stopped any other medicines, check with your nurse or provider about when to restart them.    Care of groin site:         Remove the Band-Aid after 24 hours. If there is minor oozing, apply another Band-aid and remove it after 12 hours.          Do NOT take a bath, or use a hot tub or pool for at least 3 days. You may shower.          It is normal to have a small bruise or lump at the site.         Do not scrub the site.         Do not use lotion or powder near the puncture site for 3 days.         For the first 2 days: Do not stoop or squat. When you cough, sneeze or move your bowels, hold your hand over the puncture site and press gently.         Do not lift more than 10 pounds for at least 3 to 5 days.         For 2 days, do NOT have sex or do any heavy exercise.     If you start bleeding from the site in your groin:  Lie down flat and press firmly on the site.  Call your physician immediately, or, come to the emergency room.      Call 911 right away if you have bleeding that is  heavy or does not stop.     Call your doctor if:         You have a large or growing hard lump around the site.         The site is red, swollen, hot or tender.         Blood or fluid is draining from the site.         You have chills or a fever greater than 101 F (38 C).         Your leg or arm turns bluish, feels numb or cool.         You have hives, a rash or unusual itching.     [facility] Heart at [place]:   [phone] (7 days a week)      Cardiology Fellow on call (24 hours per day) at [facility]:   [phone] (ask for Cardiology Fellow on call)

## 2017-12-14 NOTE — IP AVS SNAPSHOT
Unit 6D Observation [address] [address]    Phone:  [phone]    Fax:  [phone]                                       After Visit Summary   12/14/2017    Javi Bansal    MRN: [id number]           After Visit Summary Signature Page     I have received my discharge instructions, and my questions have been answered. I have discussed any challenges I see with this plan with the nurse or doctor.    ..........................................................................................................................................  Patient/Patient Representative Signature      ..........................................................................................................................................  Patient Representative Print Name and Relationship to Patient    ..................................................               ................................................  Date                                            Time    ..........................................................................................................................................  Reviewed by Signature/Title    ...................................................              ..............................................  Date                                                            Time

## 2017-12-14 NOTE — PROGRESS NOTES
Patient transferred to holding room for observation following coronary angiogram, RHC, endomyocardial biopsy.  6Fr arterial and 7Fr venous sheaths in place in right groin. Site is clean, dry, and intact. No evidence of bleeding, swelling, hematoma. Patient is alert and oriented; denies pain.  Vital signs remain stable. Report called to FLAVIA Marcelo RN.  Patient transferred to 6D with RN.   present upon transfer

## 2017-12-14 NOTE — H&P
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 He states he has been going back to work and has no limitations in activity.     Past Medical History:   Diagnosis Date     Chagas cardiomyopathy      Chronic systolic heart failure (H)      Diabetes (H)      Dual ICD (implantable cardioverter-defibrillator) in place 10/20/2015     Essential hypertension      Gastroesophageal reflux disease      H/O gastric ulcer      Hypertension      Premature ventricular contractions      Presbyopia      Pterygium     ?? suspected , but unclear dx       Allergies: No Known Allergies    Active Problems:    * No active hospital problems. *    Blood pressure 107/67, pulse 94, temperature 97.6  F (36.4  C), temperature source Oral, resp. rate 20, height 1.7 m (5' 6.93\"), weight 72.6 kg (160 lb 0.9 oz), SpO2 100 %.    ROS  Negative except as above    Physical Exam  Alert in no distress  JVD flat  RRR no murmur  CTA  Abdomen non tender non distended  Neuro grossly intact    Assessment & Plan    Proceed with angiogram RHC biopsy and IVUS    Rozina Martin  12/14/2017    I have seen and examined the patient and agree with the finding and plan.       Jamel Herndon MD  Cardiology-[facility]  [phone]    "

## 2017-12-14 NOTE — PROGRESS NOTES
1225  Prep is complete for procedure.  Labs are complete.  H&P needs update.  Consent is complete.  Denies any pain.

## 2017-12-14 NOTE — IP AVS SNAPSHOT
MRN:[id number]                      After Visit Summary   12/14/2017    Javi Bansal    MRN: [id number]           Thank you!     Thank you for choosing [place] for your care. Our goal is always to provide you with excellent care. Hearing back from our patients is one way we can continue to improve our services. Please take a few minutes to complete the written survey that you may receive in the mail after you visit with us. Thank you!        Patient Information     Date Of Birth          1970        About your hospital stay     You were admitted on:  December 14, 2017 You last received care in the:  Unit 6D Observation [facility]    You were discharged on:  December 14, 2017       Who to Call     For medical emergencies, please call 911.  For non-urgent questions about your medical care, please call your primary care provider or clinic, None          Attending Provider     Provider Specialty    Geena Mcclellan MD Cardiology    Jamel Herndon MD Cardiology       Primary Care Provider Fax #    Physician No Ref-Primary [phone]      After Care Instructions     Discharge Instructions - IF on Metformin (Glucophage or Glucovance) or Metformin containing medications       IF on Metformin (Glucophage or Glucovance) or Metformin containing medications , schedule a Basic Metabolic Panel at [facility] Heart or Primary Clinic in 48 - 72 hours post procedure and PRIOR TO resuming the Metformin or Metformin containing medications.  Hold Metformin (Glucophage or Glucovance) or Metformin containing medications until after the Basic Metabolic Panel on the 2nd or 3rd day following the procedure.  May resume after blood draw is complete.                  Your next 10 appointments already scheduled     Dec 19, 2017  8:00 AM CST   MR CARDIAC W CONTRAST STRESS AND FLOW with UBAL, UU CV MR NURSE   [facility]     [address] [address]   [phone]           Take your medicines as usual, unless your doctor tells you not to.   If you take Viagra, Levitra or Cialis, stop taking it 48 hours before your test.   If you take Aggrenox or dipyridamole (Persantine, Permole), stop taking it 48 hours before your test.   If you take Theophylline or Aminophylline, stop taking it 12 hours before your test.   If you are diabetic and take oral hypoglycemics, do not take them on the day of your test.  The day before your exam, drink extra fluids at least six 8-ounce glasses (unless your doctor wants you to limit your fluids).  Stop all caffeine 12 hours before the test. This includes coffee, tea, soda, chocolate and certain medicines (such as Anacin, Excedrin and NoDoz). Also avoid decaf coffee and tea, as these contain small amounts of caffeine.  Do not eat or drink for 3 hours before your exam. You may drink water and take your morning medicines.  You may need a blood test (creatinine test) within 30 days of your exam. Follow your doctor s orders if this is arranged before your exam.  If you are very claustrophobic, please let you doctor know. You may get a sedative medicine from your doctor before you arrive. Bring the medicine to the exam. Do not take it at home. Arrive one hour early. Bring someone who can take you home after the test. Your medicine will make you sleepy. After the exam, you may not drive, take a bus or take a taxi by yourself.  The MRI machine uses a strong magnet. Please wear clothes without metal (snaps, zippers). A sweatsuit works well, or we may give you a hospital gown.  Please remove any body piercings and hair extensions before you arrive. You will remove watches, jewelry, hairpins, wallets, dentures, partial dental plates and hearing aids. You may wear contact lenses, and you may be able to wear your rings. We have a safe place to keep your personal items, but it is safer to leave  them at home.   **IMPORTANT** THE INSTRUCTIONS BELOW ARE ONLY FOR THOSE PATIENTS WHO HAVE BEEN TOLD THEY WILL RECEIVE SEDATION OR GENERAL ANESTHESIA DURING THEIR MRI PROCEDURE:  IF YOU WILL RECEIVE SEDATION (take medicine to help you relax during your exam):   You must get the medicine from your doctor before you arrive. Bring the medicine to the exam. Do not take it at home.   Arrive one hour early. Bring someone who can take you home after the test. Your medicine will make you sleepy. After the exam, you may not drive, take a bus or take a taxi by yourself.   No eating 8 hours before your exam. You may have clear liquids up until 4 hours before your exam. (Clear liquids include water, clear tea, black coffee and fruit juice without pulp.)  IF YOU WILL RECEIVE ANESTHESIA (be asleep for your exam):   Arrive 1 1/2 hours early. Bring someone who can take you home after the test. You may not drive, take a bus or take a taxi by yourself.   No eating 8 hours before your exam. You may have clear liquids up until 4 hours before your exam. (Clear liquids include water, clear tea, black coffee and fruit juice without pulp.)  If you have any questions, please contact your Imaging Department exam site.            Dec 19, 2017  8:40 AM CST   XR CHEST 2 VIEWS with UUXR1   [facility] [place],  Radiology ([facility] [place])    [address] [id number] [phone]           Please bring a list of your current medicines to your exam. (Include vitamins, minerals and over-thecounter medicines.) Leave your valuables at home.  Tell your doctor if there is a chance you may be pregnant.  You do not need to do anything special for this exam.            Dec 19, 2017 10:45 AM CST   HEART TRANSPLANT ANNUAL with Geena Mcclellan MD   [facility] ([facility] [place])    [address]  [address] [facility] [address] [phone]             Dec 27, 2017  1:00 PM CST   (Arrive by 12:45 PM)   Transplant Skin Check with GARY Murillo MD   [facility] Dermatology ([facility])    [address]  3rd Floor  [place] [address] [phone]            Jun 13, 2018 10:15 AM CDT   RETURN RETINA with Isabela Balbuena MD   [facility] ([facility])    [address]  [address]  [address]th [address]  [place] [address] [phone]              Further instructions from your care team       Going Home after Coronary Angioplasty or Stent Placement        Name: Javi Bansal  Medical Record Number:  [id number]  Today's Date: December 14, 2017        For 24 hours:         Have an adult stay with you for 24 hours.         Relax and take it easy.         Drink plenty of fluids.         You may eat your normal diet, unless your doctor tells you otherwise.         Do NOT make any important or legal decisions.         Do NOT drive or operate machines at home or at work.         Do NOT drink alcohol.      Do NOT smoke.     Medicines:         If you have begun Plavix (clopidogrel), Effient (prasugrel), or Brilinta (ticagrelor), do not stop taking it until you talk to your heart doctor (cardiologist).         If you are on metformin (Glucophage), do not restart it until you have blood tests (within 2 to 3 days after discharge). When your doctor tells you it is safe, you may restart the metformin.         If you have stopped any other medicines, check with your nurse or provider about when to restart them.    Care of groin site:         Remove the Band-Aid after 24 hours. If there is minor oozing, apply another Band-aid and remove it after 12 hours.          Do NOT take a bath, or use a hot tub or pool for at least 3 days. You may shower.          It is normal to have a small bruise or lump at the site.         Do not scrub the site.         Do not use lotion or powder near  "the puncture site for 3 days.         For the first 2 days: Do not stoop or squat. When you cough, sneeze or move your bowels, hold your hand over the puncture site and press gently.         Do not lift more than 10 pounds for at least 3 to 5 days.         For 2 days, do NOT have sex or do any heavy exercise.     If you start bleeding from the site in your groin:  Lie down flat and press firmly on the site.  Call your physician immediately, or, come to the emergency room.      Call 911 right away if you have bleeding that is heavy or does not stop.     Call your doctor if:         You have a large or growing hard lump around the site.         The site is red, swollen, hot or tender.         Blood or fluid is draining from the site.         You have chills or a fever greater than 101 F (38 C).         Your leg or arm turns bluish, feels numb or cool.         You have hives, a rash or unusual itching.     [facility] at [place]:   [phone] (7 days a week)      Cardiology Fellow on call (24 hours per day) at [facility]:   [phone] (ask for Cardiology Fellow on call)          Pending Results     Date and Time Order Name Status Description    12/14/2017 1405 Surgical pathology exam In process     12/14/2017 1121 EKG 12-lead, tracing only Preliminary     12/14/2017 1052 ALLOMAP In process     12/14/2017 1047 IMMUKNOW IMMUNE CELL FUNCTION In process     12/14/2017 1047 PRA DONOR SPECIFIC ANTIBODY In process     12/14/2017 1047 EBV DNA PCR QUANTITATIVE WHOLE BLOOD In process     12/14/2017 1047 CMV DNA QUANTIFICATION In process             Admission Information     Date & Time Provider Department Dept. Phone    12/14/2017 Jamel Herndon MD Unit 6D Observation [facility] [phone]      Your Vitals Were     Blood Pressure Pulse Temperature Respirations Height Weight    106/75 (BP Location: Right arm) 94 97.8  F (36.6  C) (Oral) 16 1.7 m (5' 6.93\") 72.6 kg (160 lb 0.9 oz) " "   Pulse Oximetry BMI (Body Mass Index)                99% 25.12 kg/m2          [employer] Information     [employer] lets you send messages to your doctor, view your test results, renew your prescriptions, schedule appointments and more. To sign up, go to [email or web address] . Click on \"Log in\" on the left side of the screen, which will take you to the Welcome page. Then click on \"Sign up Now\" on the right side of the page.     You will be asked to enter the access code listed below, as well as some personal information. Please follow the directions to create your username and password.     Your access code is: [email or web address]  Expires: 2018  5:30 AM     Your access code will  in 90 days. If you need help or a new code, please call your [place] clinic or [phone].        Care EveryWhere ID     This is your Care EveryWhere ID. This could be used by other organizations to access your [place] medical records  [id number]        Equal Access to Services     JASWINDER KHAN : Hadsergio hicks Sovanessa, waaxda luqadaha, qaybta kaalmalukas leone, talisha garcia . [facility] [phone].    ATENCIÓN: Si habla español, tiene a watt disposición servicios gratuitos de asistencia lingüística. Llame al [phone].    We comply with applicable federal civil rights laws and [place] laws. We do not discriminate on the basis of race, color, national origin, age, disability, sex, sexual orientation, or gender identity.               Review of your medicines      CONTINUE these medicines which have NOT CHANGED        Dose / Directions    aspirin 81 MG EC tablet   Used for:  Heart replaced by transplant (H)        Dose:  81 mg   Take 1 tablet (81 mg) by mouth daily   Quantity:  90 tablet   Refills:  3       benzoyl peroxide 5 % Lotn lotion   Used for:  Other acne        Apply topically At Bedtime   Quantity:  30 mL   Refills:  3       fluticasone 50 MCG/ACT spray   Commonly known as:  FLONASE "   Used for:  Influenza-like illness        Dose:  1 spray   Spray 1 spray into both nostrils daily   Quantity:  1 Bottle   Refills:  1       gabapentin 300 MG capsule   Commonly known as:  NEURONTIN        Dose:  300 mg   Take 1 capsule (300 mg) by mouth 3 times daily Begin by taking one capsule once per day, the next day you may take 1 capsule twice per day and on the 3rd day begin taking 1 capsule 3 times per day.   Quantity:  90 capsule   Refills:  1       guaiFENesin-dextromethorphan 100-10 MG/5ML syrup   Commonly known as:  ROBITUSSIN DM   Used for:  Steroid-induced diabetes mellitus (H)        Dose:  5 mL   Take 5 mLs by mouth every 4 hours as needed for cough   Quantity:  560 mL   Refills:  1       ketoconazole 2 % shampoo   Commonly known as:  NIZORAL   Used for:  Seborrhea        Apply topically three times a week Alternate with Head and Shoulders.   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 (H)        Dose:  1 tablet   Take 1 tablet by mouth daily   Quantity:  30 each   Refills:  11       mycophenolate 250 MG capsule   Commonly known as:  GENERIC EQUIVALENT   Used for:  Heart replaced by transplant (H)        Dose:  1500 mg   Take 6 capsules (1,500 mg) by mouth 2 times daily   Quantity:  360 capsule   Refills:  11       pravastatin 20 MG tablet   Commonly known as:  PRAVACHOL   Used for:  Heart replaced by transplant (H)        Dose:  20 mg   Take 1 tablet (20 mg) by mouth every evening   Quantity:  30 tablet   Refills:  11       tacrolimus 1 MG capsule   Commonly known as:  GENERIC EQUIVALENT   Used for:  Heart replaced by transplant (H)        Dose:  2 mg   Take 2 capsules (2 mg) by mouth 2 times daily   Quantity:  120 capsule   Refills:  11                Protect others around you: Learn how to safely use, store and throw away your medicines at www.disposemymeds.org.             Medication List: This is a list of  all your medications and when to take them. Check marks below indicate your daily home schedule. Keep this list as a reference.      Medications           Morning Afternoon Evening Bedtime As Needed    aspirin 81 MG EC tablet   Take 1 tablet (81 mg) by mouth daily                                benzoyl peroxide 5 % Lotn lotion   Apply topically At Bedtime                                fluticasone 50 MCG/ACT spray   Commonly known as:  FLONASE   Spray 1 spray into both nostrils daily                                gabapentin 300 MG capsule   Commonly known as:  NEURONTIN   Take 1 capsule (300 mg) by mouth 3 times daily Begin by taking one capsule once per day, the next day you may take 1 capsule twice per day and on the 3rd day begin taking 1 capsule 3 times per day.                                guaiFENesin-dextromethorphan 100-10 MG/5ML syrup   Commonly known as:  ROBITUSSIN DM   Take 5 mLs by mouth every 4 hours as needed for cough                                ketoconazole 2 % shampoo   Commonly known as:  NIZORAL   Apply topically three times a week Alternate with Head and Shoulders.                                multivitamin, therapeutic with minerals Tabs tablet   Take 1 tablet by mouth daily                                mycophenolate 250 MG capsule   Commonly known as:  GENERIC EQUIVALENT   Take 6 capsules (1,500 mg) by mouth 2 times daily                                pravastatin 20 MG tablet   Commonly known as:  PRAVACHOL   Take 1 tablet (20 mg) by mouth every evening                                tacrolimus 1 MG capsule   Commonly known as:  GENERIC EQUIVALENT   Take 2 capsules (2 mg) by mouth 2 times daily

## 2017-12-14 NOTE — PROGRESS NOTES
Manual pressure held for 20 minutes to right groin site.  Sheath, size 6 fr,  pulled by Fozia HER RN.  Site CDI, no hematoma.  Stasis achieved at 1517. Off bedrest @ 1917.

## 2017-12-14 NOTE — LETTER
UNIT 6D OBSERVATION [facility]  [address] [address]  Phone: [phone]  Fax: [phone]    December 14, 2017        Javi Bansal  [address] [place] [address]          To whom it may concern:    RE: Javi Reyes was in my care on 12/14/17. Do to his condition, he will be unable, from a medical standpoint, to lift more than 10 pounds for one week.     Please contact me for questions or concerns.      Sincerely,    Kylie Pelaez, DO

## 2017-12-14 NOTE — PROCEDURES
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 He states he has been going back to work and has no limitations in activity.     DESCRIPTION:  1. Consent obtained with discussion of risks.  All questions were answered.  2. Sterile prep and procedure.  3. Location with Sheaths:   Rt Femoral Arterial  4 Fr 25 cm [long]  Rt Femoral Venous  7 Fr 25 cm [long]  4. Access: Local anesthetic with lidocaine.  A standard 18 guage needle with ultrasound guidance was used to establish vascular access using a modified Seldinger technique.  5. Diagnostic Catheters:   6Fr 3DRC  7Fr swan  6. Guiding Catheters:  6 Fr  XB LF 3.5 GC  6. Estimated blood loss: < 50 ml    MEDICATIONS:  The procedure was performed under conscious sedation for 31 minutes from 13:04 to 13:35.  The patient was assessed immediately before the first sedation medication was administered.  Midazolam no mg and Fentanyl 50 mcg were administered.  Heart rate, BP, respiration, oxygen saturation and patient responses were monitored throughout the procedure with the assistance of the RN under my supervision.  IV UFH and IC nitro were given  >> Antiplatelet Therapy: None    Procedures:    ENDOMYOCARDIAL BIOPSY:  1. Successful collection of 5 right ventricular endomyocardial biopsy samples using the Jawz.    HEMODYNAMICS:         CORONARY ANGIOGRAM:   1. Both coronary arteries arise from their respective cusps.  2. Dominance: Right  3. The LM is without angiographic evidence of disease.    4. The LAD gives rise to septal perforators and a large D1 and a small D2 and has no significant disease.    5. LCX gives rise to a small OM1 and OM2 vessels without significant stenosis  6. RCA (dominant) gives rise to PL branches and supplies PDA with no significant disease    INTRA-VASCULAR ULTRASOUND:  Adequate anticoagulation was was obtained with UFH.  A fred wire was positioned in the LM.  A Volcano Weogufka Eye catheter was advanced across the wire into the vessel.      The minimal lumen area of the LAD was 6.6 mm2.  The intima media thickness of the LAD was 0.2 mm.    Sheath Removal:  The femoral sheath was manually removed in the cardiac catheterization laboratory.    Contrast: Isovue, 55 ml     Fluoroscopy Time: 7.6 min    COMPLICATIONS:  1. None    SUMMARY:   >> Normal right and left sided filling pressures.  >> Normal PA pressures  >> Normal cardiac output, 6.8 L/min with index 3.8 L/min/m2  Normal coronary arteries      PLAN:   >> f/up biopsy results  >> Bedrest per protocol.  >> Continued medical management and lifestyle modification for cardiovascular risk factor optimization.   >>. Discharge today per protocol    The attending interventional cardiologist was present and supervised all critical aspects the procedure.    See CVIS report for final draft.    Rozina Martin   Cardiology Fellow    Dr. Herndon   Cardiology Staff

## 2017-12-15 LAB
CMV DNA SPEC NAA+PROBE-ACNC: <137 [IU]/ML
CMV DNA SPEC NAA+PROBE-LOG#: <2.1 {LOG_IU}/ML
COPATH REPORT: NORMAL
EBV DNA # SPEC NAA+PROBE: NORMAL {COPIES}/ML
EBV DNA SPEC NAA+PROBE-LOG#: NORMAL {LOG_COPIES}/ML
PRA DONOR SPECIFIC ABY: NORMAL
SPECIMEN SOURCE: ABNORMAL

## 2017-12-15 NOTE — PLAN OF CARE
Problem: Patient Care Overview  Goal: Plan of Care/Patient Progress Review  Outcome: No Change  Outpatient/Observation goals to be met before discharge home:  Patient is A & O x 4, cms intact.  Patient is on bedrest until 1917.  Right groin site is CDI, pedal pulses are 2+.  Patient has voided and eaten.  Discharge instructions reviewed with patient while  present.

## 2017-12-15 NOTE — DISCHARGE SUMMARY
Pt able to stand without any issues. Pt received his work note. Pt called his ride and they will meet him downstairs. Pt did void and eat prior to getting off bedrest. PIV discontinued before discharge. Pt had all belongings with him. CN updated.

## 2017-12-16 LAB — INTERPRETATION ECG - MUSE: NORMAL

## 2017-12-18 ENCOUNTER — PRE VISIT (OUTPATIENT)
Dept: TRANSPLANT | Facility: CLINIC | Age: 47
End: 2017-12-18

## 2017-12-18 LAB — LAB SCANNED RESULT: ABNORMAL

## 2017-12-18 NOTE — TELEPHONE ENCOUNTER
LM on patient's phone requesting call back to review recent results and confirm plan for additional test and clinic appointments.  Awaiting call back.

## 2017-12-19 ENCOUNTER — HOSPITAL ENCOUNTER (OUTPATIENT)
Dept: MRI IMAGING | Facility: CLINIC | Age: 47
Discharge: HOME | End: 2017-12-19
Attending: INTERNAL MEDICINE | Admitting: INTERNAL MEDICINE
Payer: COMMERCIAL

## 2017-12-19 ENCOUNTER — HOSPITAL ENCOUNTER (OUTPATIENT)
Dept: GENERAL RADIOLOGY | Facility: CLINIC | Age: 47
End: 2017-12-19
Attending: INTERNAL MEDICINE
Payer: COMMERCIAL

## 2017-12-19 ENCOUNTER — OFFICE VISIT (OUTPATIENT)
Dept: CARDIOLOGY | Facility: CLINIC | Age: 47
End: 2017-12-19
Attending: INTERNAL MEDICINE
Payer: COMMERCIAL

## 2017-12-19 VITALS
HEIGHT: 67 IN | HEART RATE: 90 BPM | OXYGEN SATURATION: 100 % | DIASTOLIC BLOOD PRESSURE: 75 MMHG | BODY MASS INDEX: 24.08 KG/M2 | WEIGHT: 153.4 LBS | SYSTOLIC BLOOD PRESSURE: 113 MMHG

## 2017-12-19 VITALS
DIASTOLIC BLOOD PRESSURE: 74 MMHG | OXYGEN SATURATION: 100 % | SYSTOLIC BLOOD PRESSURE: 114 MMHG | RESPIRATION RATE: 16 BRPM

## 2017-12-19 DIAGNOSIS — Z94.1 HEART REPLACED BY TRANSPLANT (H): ICD-10-CM

## 2017-12-19 PROCEDURE — 93005 ELECTROCARDIOGRAM TRACING: CPT

## 2017-12-19 PROCEDURE — 40000065 ZZH STATISTIC EKG NON-CHARGEABLE

## 2017-12-19 PROCEDURE — 93018 CV STRESS TEST I&R ONLY: CPT | Performed by: INTERNAL MEDICINE

## 2017-12-19 PROCEDURE — 99204 OFFICE O/P NEW MOD 45 MIN: CPT | Mod: ZP | Performed by: INTERNAL MEDICINE

## 2017-12-19 PROCEDURE — 93017 CV STRESS TEST TRACING ONLY: CPT

## 2017-12-19 PROCEDURE — A9585 GADOBUTROL INJECTION: HCPCS | Performed by: INTERNAL MEDICINE

## 2017-12-19 PROCEDURE — 93016 CV STRESS TEST SUPVJ ONLY: CPT | Performed by: INTERNAL MEDICINE

## 2017-12-19 PROCEDURE — 99212 OFFICE O/P EST SF 10 MIN: CPT | Mod: ZF

## 2017-12-19 PROCEDURE — 75563 CARD MRI W/STRESS IMG & DYE: CPT | Mod: 26 | Performed by: INTERNAL MEDICINE

## 2017-12-19 PROCEDURE — 25000128 H RX IP 250 OP 636: Performed by: INTERNAL MEDICINE

## 2017-12-19 PROCEDURE — 93010 ELECTROCARDIOGRAM REPORT: CPT | Mod: 76 | Performed by: INTERNAL MEDICINE

## 2017-12-19 PROCEDURE — 75563 CARD MRI W/STRESS IMG & DYE: CPT

## 2017-12-19 PROCEDURE — 71020 XR CHEST 2 VW: CPT

## 2017-12-19 RX ORDER — ACYCLOVIR 200 MG/1
0-1 CAPSULE ORAL
Status: DISCONTINUED | OUTPATIENT
Start: 2017-12-19 | End: 2017-12-20 | Stop reason: HOSPADM

## 2017-12-19 RX ORDER — TACROLIMUS 1 MG/1
CAPSULE ORAL
Qty: 90 CAPSULE | Refills: 11 | Status: SHIPPED | OUTPATIENT
Start: 2017-12-19 | End: 2018-04-10

## 2017-12-19 RX ORDER — GADOBUTROL 604.72 MG/ML
10 INJECTION INTRAVENOUS ONCE
Status: COMPLETED | OUTPATIENT
Start: 2017-12-19 | End: 2017-12-19

## 2017-12-19 RX ORDER — REGADENOSON 0.08 MG/ML
0.4 INJECTION, SOLUTION INTRAVENOUS ONCE
Status: COMPLETED | OUTPATIENT
Start: 2017-12-19 | End: 2017-12-19

## 2017-12-19 RX ORDER — DIAZEPAM 5 MG
5 TABLET ORAL EVERY 30 MIN PRN
Status: DISCONTINUED | OUTPATIENT
Start: 2017-12-19 | End: 2017-12-20 | Stop reason: HOSPADM

## 2017-12-19 RX ORDER — ALBUTEROL SULFATE 90 UG/1
2 AEROSOL, METERED RESPIRATORY (INHALATION) EVERY 5 MIN PRN
Status: DISCONTINUED | OUTPATIENT
Start: 2017-12-19 | End: 2017-12-20 | Stop reason: HOSPADM

## 2017-12-19 RX ORDER — AMINOPHYLLINE 25 MG/ML
100 INJECTION, SOLUTION INTRAVENOUS ONCE
Status: COMPLETED | OUTPATIENT
Start: 2017-12-19 | End: 2017-12-19

## 2017-12-19 RX ADMIN — REGADENOSON 0.4 MG: 0.08 INJECTION, SOLUTION INTRAVENOUS at 09:55

## 2017-12-19 RX ADMIN — GADOBUTROL 10 ML: 604.72 INJECTION INTRAVENOUS at 09:50

## 2017-12-19 RX ADMIN — AMINOPHYLLINE 100 MG: 25 INJECTION, SOLUTION INTRAVENOUS at 09:56

## 2017-12-19 ASSESSMENT — PAIN SCALES - GENERAL: PAINLEVEL: NO PAIN (0)

## 2017-12-19 NOTE — LETTER
12/19/2017      RE: Javi Bansal  [address] [place] [address]       Dear Colleague,    Thank you for the opportunity to participate in the care of your patient, Javi Bansal, at the [facility] at [facility]. Please see a copy of my visit note below.    ADULT HEART TRANSPLANT CLINIC  December 19, 2017     HPI:   Mr. Javi Bansal is a 46yr old male with a history of Chagas cardiomyopathy (diagnosed in 2010, treated with nifurtimox for 3 months) and biventricular systolic heart failure (LVEF 15-20% since 2015, on home milrinone since 8/2016), now s/p OHT 12/12/16, who presents to clinic for routine surveillance. He is accompanied by a .     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 We are following monthly Chagas labs. He is due to see ID in October.     He was started on azathioprine postoperatively due to reports of better outcomes.  His initial biopsy on 12/19/16 showed 2R ACR, which was treated with IV steroids.  Repeat biopsy 12/27/16 showed 2R/3A ACR, which was treated with IV steroids and thymoglobulin.  He had another episode of 2R ACR 2/10/17, which was treated with steroids.  His biopsies have since been negative for significant rejection.  Baseline coronary angiogram showed no evidence of CAV.  His graft function remained stable throughout his rejection episodes, and continues to remain stable (cardiac MRI in June with EF 57%).  He has no DSAs.    Since last visit, he continues to report that he is doing well. He denies any issues with his heart. He reports no LE edema, PND,  "orthopnea, dyspnea on exertion. He has lost some weight with his continued activity. 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 He denies cough, fever, chills. He denies chest pain, dyspnea, fatigue, and lightheadedness exertion. Patient denies fever, chills, oral lesions, headache, palpitations, SOB, SEPULVEDA, PND, orthopnea, nausea, vomiting, diarrhea, or LE edema.     PAST MEDICAL HISTORY:  Past Medical History:   Diagnosis Date     Chagas cardiomyopathy      Chronic systolic heart failure (H)      Diabetes (H)      Dual ICD (implantable cardioverter-defibrillator) in place 10/20/2015     Essential hypertension      Gastroesophageal reflux disease      H/O gastric ulcer      Hypertension      Premature ventricular contractions      Presbyopia      Pterygium     ?? suspected , but unclear dx       FAMILY HISTORY:  Family History   Problem Relation Age of Onset     Brain Cancer Mother      Melanoma No family hx of      Skin Cancer No family hx of      Glaucoma No family hx of      Macular Degeneration No family hx of        SOCIAL HISTORY:  Social History     Social History     Marital status:      Spouse name: N/A     Number of children: N/A     Years of education: N/A     Social History Main Topics     Smoking status: Never Smoker     Smokeless tobacco: Never Used     Alcohol use No     Drug use: No     Sexual activity: Not on file     Other Topics Concern     Not on file       CURRENT MEDICATIONS:    Current Outpatient Prescriptions on File Prior to Visit:  guaiFENesin-dextromethorphan (ROBITUSSIN DM) 100-10 MG/5ML syrup Take 5 mLs by mouth every 4 hours as needed for cough   fluticasone (FLONASE) 50 MCG/ACT spray Spray 1 spray into both nostrils daily   tacrolimus (GENERIC EQUIVALENT) 1 MG capsule Take 2 capsules (2 mg) by mouth 2 times daily   gabapentin (NEURONTIN) 300 MG capsule Take 1 capsule (300 mg) by mouth 3 times daily Begin " "by taking one capsule once per day, the next day you may take 1 capsule twice per day and on the 3rd day begin taking 1 capsule 3 times per day.   benzoyl peroxide 5 % LOTN lotion Apply topically At Bedtime   ketoconazole (NIZORAL) 2 % shampoo Apply topically three times a week Alternate with Head and Shoulders.   pravastatin (PRAVACHOL) 20 MG tablet Take 1 tablet (20 mg) by mouth every evening   mycophenolate (CELLCEPT - GENERIC EQUIVALENT) 250 MG capsule Take 6 capsules (1,500 mg) by mouth 2 times daily   aspirin EC 81 MG EC tablet Take 1 tablet (81 mg) by mouth daily   multivitamin, therapeutic with minerals (THERA-VIT-M) TABS tablet Take 1 tablet by mouth daily     No current facility-administered medications on file prior to visit.     ROS:  CONSTITUTIONAL: Denies fever, chills, fatigue, or weight fluctuations.   HEENT: Denies headache, vision changes, and changes in speech.   CV: see hpi   PULMONARY: see hpi   GI:Denies nausea, vomiting, diarrhea, and abdominal pain. Bowel movements are regular.   : Denies urinary alterations, dysuria, urinary frequency, hematuria, and abnormal drainage.   EXT: Denies lower extremity edema or color changes.   SKIN: Resolved lesions on thigh, no other rash.   MUSCULOSKELETAL: Denies upper or lower extremity weakness and pain.   NEUROLOGIC: Denies lightheadedness, dizziness, seizures, or upper or lower extremity paresthesia.     EXAM:  /75 (BP Location: Left arm, Cuff Size: Adult Regular)  Pulse 90  Ht 1.69 m (5' 6.53\")  Wt 69.6 kg (153 lb 6.4 oz)  SpO2 100%  BMI 24.36 kg/m2    GENERAL: Appears comfortable, in no acute distress.   HEENT: Eye symmetrical, no discharge or icterus bilaterally. Mucous membranes moist and without lesions.  CV: RRR, +S1S2, no murmur, rub, or gallop. JVP not appreciable at 45 degrees.   RESPIRATORY: Respirations regular, even, and unlabored. Lungs CTA throughout.   GI: Soft and non distended with normoactive bowel sounds present in all " quadrants. No tenderness, rebound, guarding. No hepatomegaly.   EXTREMITIES: No peripheral edema. 2+ bilateral pedal pulses.   NEUROLOGIC: Alert and oriented x 3. No focal deficits.   MUSCULOSKELETAL: No joint swelling or tenderness.   SKIN: No jaundice. No rashes or lesions. Completely healed vesicles/scarring over left anterior and lateral thigh.     Labs - reviewed with patient in clinic today:  CBC RESULTS:  Lab Results   Component Value Date    WBC 3.7 (L) 12/14/2017    RBC 3.71 (L) 12/14/2017    HGB 10.8 (L) 12/14/2017    HCT 34.5 (L) 12/14/2017    MCV 93 12/14/2017    MCH 29.1 12/14/2017    MCHC 31.3 (L) 12/14/2017    RDW 13.7 12/14/2017     12/14/2017       CMP RESULTS:  Lab Results   Component Value Date     12/14/2017    POTASSIUM 3.8 12/14/2017    CHLORIDE 110 (H) 12/14/2017    CO2 25 12/14/2017    ANIONGAP 7 12/14/2017    GLC 86 12/14/2017    BUN 23 12/14/2017    CR 1.16 12/14/2017    GFRESTIMATED 67 12/14/2017    GFRESTBLACK 81 12/14/2017    DAISY 9.1 12/14/2017    BILITOTAL 0.7 12/14/2017    ALBUMIN 4.3 12/14/2017    ALKPHOS 119 12/14/2017    ALT 23 12/14/2017    AST 22 12/14/2017        INR RESULTS:  Lab Results   Component Value Date    INR 1.13 06/10/2017       LIPID RESULTS:  Lab Results   Component Value Date    CHOL 176 12/14/2017    HDL 56 12/14/2017     (H) 12/14/2017    TRIG 61 12/14/2017       IMMUNOSUPPRESSANT LEVELS:  Lab Results   Component Value Date    TACROL 10.4 12/14/2017    DOSTAC Not Provided 12/14/2017       No components found for: CK  Lab Results   Component Value Date    MAG 1.8 12/14/2017     Lab Results   Component Value Date    A1C 7.5 (H) 06/02/2017     Lab Results   Component Value Date    PHOS 2.9 12/14/2017     Lab Results   Component Value Date    NTBNP 1944 (H) 06/29/2016     Lab Results   Component Value Date    SAITESTMET SA HI 06/02/2017    SAICELL Class I 06/02/2017    [id number] None 06/02/2017    [id number] None 06/02/2017    SAIREPCOM  06/02/2017       Test performed by modified procedure. Serum heat inactivated. High-risk,   mfi >3,000. Mod-risk, mfi 500-3,000.       Lab Results   Component Value Date    SAIITESTME SA HI 06/02/2017    SAIICELL Class II 06/02/2017    [id number] None 06/02/2017    [id number] None 06/02/2017    SAIIREPCOM  06/02/2017      Test performed by modified procedure. Serum heat inactivated. High-risk,   mfi >3,000. Mod-risk, mfi 500-3,000.       Lab Results   Component Value Date    CSPEC Plasma, EDTA anticoagulant 12/14/2017       Immuknow - 12/14/17: 140  Tacrolimus - 12/14/17: 10.4   EBV and CMV: negative      Diagnostic Studies:  Right Heart Cath and Left Heart Cath - December 14, 2017          Coronary Angiogram  1. Both coronary arteries arise from their respective cusps.  2. Dominance: Right  3. The LM is without angiographic evidence of disease.   4. The LAD gives rise to septal perforators and a large D1 and a small D2 and has no significant disease.    5. LCX gives rise to a small OM1 and OM2 vessels without significant stenosis  6. RCA (dominant) gives rise to PL branches and supplies PDA with no significant disease     IVUS:  Adequate anticoagulation was was obtained with UFH.  A fred wire was positioned in the LM.  A [place] [place] [employer] catheter was advanced across the wire into the vessel.    The minimal lumen area of the LAD was 6.6 mm2.  The intima media thickness of the LAD was 0.2 mm.     Cardiac MRI 12/19/17:  Comparison CMR: 06/01/2017  1. The LV is normal in cavity size and wall thickness. The global systolic function is normal. The LVEF is 57%. There are no regional wall motion abnormalities.   2. The RV is normal in cavity size. The global systolic function is mildly reduced. The RVEF is 48%.   3. There is bi-atrial enlargement secondary to heart transplantation.  4. There is no significant valvular disease.   5. Late gadolinium enhancement imaging shows no MI, fibrosis or infiltrative disease.   6. There is no  pericardial effusion or thickening.  7.  There is no intracardiac thrombus.  CONCLUSIONS: No evidence of inducible ischemia.  Normal LV function and mildly reduced RV function.  No  significant change from CMR on 6/1/2017.    Cardiac MRI 6/1/17:  1.  Normal left ventricular size and systolic function with a  calculated ejection fraction of  57 %.  2.  Normal right ventricular size and mildly reduced systolic function  with a calculated ejection fraction of 46%.   3.  On delayed enhancement imaging, there is no abnormal late  gadolinium enhancement.   4.  Compared with the prior study dated 1/5/2017, inferolateral scar  is no longer seen.    TTE 3/15/17:  Global and regional left ventricular function is normal with an EF of 60-65%.  Global right ventricular function is normal.  The inferior vena cava is normal. Estimated mean right atrial pressure is <3 mmHg.  No pericardial effusion is present.  This study was compared with the study from 2/14/2017.     12/14/2017 biospy   FINAL DIAGNOSIS:   HEART, ALLOGRAFT, ENDOMYOCARDIAL BIOPSY:   - ISHLT 2004 cellular grade: 0R        - No histological evidence of acute cellular rejection   - ISHLT 2013 antibody-mediated grade: pAMR 0        - No histological features diagnostic of antibody-mediated   rejection        - No detectable capillary staining for C4d or C3d   - See microscopic description     Assessment/Plan:    felt weak allMrBelia Shetty is a 46 year old male who is s/p orthotopic heart transplant 12/2016 for Chagas cardiomyopathy. Post-op course complicated by rejection with atrial and ventricular arrhythmias, respiratory failure and JOSE LUIS, which resolved with diuresis, surgical blood loss anemia, right pleural effusion, requiring pigtail placement, and Chagas reactivation 12/26, treated with benznidasole. More recently, he has done remarkably well without recent rejection or evidence of graft dysfunction. He is back to work. Recent episode of shingles treated  with 7 days of Valtrex, this appears to have resolved.        # Status post OHT on 12/2016, history of Chagas cardiomyopathy  # Chronic immunosuppression  * Rejection history: 2R on first tx with steroids, repeat bx 2R/3A on second tx with steroids +thymo, 2R in February treated with steroids. Given his immuknow is consistent with over immunosuppression, the fact that we need to ensure that his Chagas does not reactivate, and his recent shingles having decreasing his Prograf goal to 6-8.     Immunosuppression:  - CNI: tac 2/1 mg. Trough level goal now 6-8.   - Antiproliferative: Mycophenolate 1500 mg bid    Prophylaxis:  - CAV: ASA 81 mg and pravastatin 10 mg    Serostatus: CMV: D+/R+. EBV: D+/R+  Intolerance to medications: none    # Recent shingles, resolved    # Chagas, reactivated post-transplant - now again cleared and off therapy   Surveillance labs per transplant ID-He has no systemic symptoms consistent with this    #  Health Care Maintenance  - Immunizations: up to date, recommend flu shot   - Dermatology: seen July 2017    Follow up:  Next biopsy: per protocol   Next angiogram: 12/2018   Next stress test: per protocol   Next transplant clinic visit - 4 months     Geena VALLES, DYLON ROCHE, BONIFACIO AUSTIN

## 2017-12-19 NOTE — PROGRESS NOTES
Pt here for cardiac MRI stress.   present.  Lung sounds clear.  No caffeine in last 12 hours.  Pre EKG done.  Pt given lexiscan over 15 seconds followed by a 5 cc saline flush.  Pt given 100 mg IV aminophylline a few minutes later per protocol.  Post EKG done when MRI completed.  Pt discharged to [facility] waiting room.

## 2017-12-19 NOTE — MR AVS SNAPSHOT
After Visit Summary   12/19/2017    Javi Bansal    MRN: [id number]           Patient Information     Date Of Birth          1970        Visit Information        Provider Department      12/19/2017 10:45 AM Geena Mcclellan MD; [facility] [employer]        Today's Diagnoses     Heart replaced by transplant (H)          Care Instructions    YOu were seen in clinic for routine post heart transplant evaluation. Available test results indicate good heart and kidney function. Your blood counts and cholesterol also appear normal.    Medication changes:  Decrease Prograf (tacrolimus) to 2 mg in the morning and 1 mg in the evening.    Follow up:  Please recheck 12 hour Prograf (tacrolimus) level in 1 to 2 weeks. Coordinator will schedule and update you with when/where this test we will be drawn.  Coordinator will update you with pending test results.  Coordinator will assist with routine Dermatology evaluation.  Plan to return for routine clinic visit and bx.            Follow-ups after your visit        Your next 10 appointments already scheduled     Dec 27, 2017  1:00 PM CST   (Arrive by 12:45 PM)   Transplant Skin Check with H Ross Murillo MD   [facility] ([facility])    [address]  [address] [facility] [address]   [phone]            Jun 13, 2018 10:15 AM CDT   RETURN RETINA with Isabela Balbuena MD   [facility] ([facility])    [address]  [address]  [address]  [facility] [address]   [phone]              Future tests that were ordered for you today     Open Standing Orders        Priority Remaining Interval Expires Ordered    Oxygen: Nasal cannula Routine 43940/10071 CONTINUOUS  12/19/2017          Open Future Orders        Priority Expected Expires Ordered    MR Cardiac w Constrast and Stress Routine  6/1/2018 6/1/2017            Who  "to contact     If you have questions or need follow up information about today's clinic visit or your schedule please contact [facility] directly at [phone].  Normal or non-critical lab and imaging results will be communicated to you by MyChart, letter or phone within 4 business days after the clinic has received the results. If you do not hear from us within 7 days, please contact the clinic through MyChart or phone. If you have a critical or abnormal lab result, we will notify you by phone as soon as possible.  Submit refill requests through [employer] or call your pharmacy and they will forward the refill request to us. Please allow 3 business days for your refill to be completed.          Additional Information About Your Visit        [employer] Mobile Information     [employer] lets you send messages to your doctor, view your test results, renew your prescriptions, schedule appointments and more. To sign up, go to [email or web address] . Click on \"Log in\" on the left side of the screen, which will take you to the Welcome page. Then click on \"Sign up Now\" on the right side of the page.     You will be asked to enter the access code listed below, as well as some personal information. Please follow the directions to create your username and password.     Your access code is: [email or web address]  Expires: 2018  5:30 AM     Your access code will  in 90 days. If you need help or a new code, please call your [place] clinic or [phone].        Care EveryWhere ID     This is your Care EveryWhere ID. This could be used by other organizations to access your [place] medical records  [id number]        Your Vitals Were     Pulse Height Pulse Oximetry BMI (Body Mass Index)          90 1.69 m (5' 6.53\") 100% 24.36 kg/m2         Blood Pressure from Last 3 Encounters:   17 113/75   17 114/74   17 106/75    Weight from Last 3 Encounters:   17 69.6 kg (153 lb 6.4 oz)   17 72.6 kg (160 lb " 0.9 oz)   10/24/17 71.2 kg (157 lb)              Today, you had the following     No orders found for display         Today's Medication Changes          These changes are accurate as of: 12/19/17 12:25 PM.  If you have any questions, ask your nurse or doctor.               These medicines have changed or have updated prescriptions.        Dose/Directions    tacrolimus 1 MG capsule   Commonly known as:  GENERIC EQUIVALENT   This may have changed:    - how much to take  - how to take this  - when to take this  - additional instructions   Used for:  Heart replaced by transplant (H)   Changed by:  Geena Mcclellan MD        Take 2 mg in the morning; take 1 mg in the evening.   Quantity:  90 capsule   Refills:  11            Where to get your medicines      These medications were sent to [facility] - [place], [place] - [address]  [address] [place] [address]    Hours:  Mon-Fri 8:30am-5:00pm Toll Free [phone] Phone:  [phone]     tacrolimus 1 MG capsule                Primary Care Provider Fax #    Physician No Ref-Primary [phone]       No address on file        Equal Access to Services     [facility]: Hadsergio hicks Sovanessa, wazenda luellis, qacarlos enriqueta kaalkori leone, talisha garcia . So [facility] [phone].    ATENCIÓN: Si habla español, tiene a watt disposición servicios gratuitos de asistencia lingüística. Llame al [phone].    We comply with applicable federal civil rights laws and [place] laws. We do not discriminate on the basis of race, color, national origin, age, disability, sex, sexual orientation, or gender identity.            Thank you!     Thank you for choosing [facility]  for your care. Our goal is always to provide you with excellent care. Hearing back from our patients is one way we can continue to improve our services. Please take a few minutes to complete the written survey that you  may receive in the mail after your visit with us. Thank you!             Your Updated Medication List - Protect others around you: Learn how to safely use, store and throw away your medicines at www.disposemymeds.org.          This list is accurate as of: 12/19/17 12:25 PM.  Always use your most recent med list.                   Brand Name Dispense Instructions for use Diagnosis    aspirin 81 MG EC tablet     90 tablet    Take 1 tablet (81 mg) by mouth daily    Heart replaced by transplant (H)       benzoyl peroxide 5 % Lotn lotion     30 mL    Apply topically At Bedtime    Other acne       fluticasone 50 MCG/ACT spray    FLONASE    1 Bottle    Spray 1 spray into both nostrils daily    Influenza-like illness       gabapentin 300 MG capsule    NEURONTIN    90 capsule    Take 1 capsule (300 mg) by mouth 3 times daily Begin by taking one capsule once per day, the next day you may take 1 capsule twice per day and on the 3rd day begin taking 1 capsule 3 times per day.        guaiFENesin-dextromethorphan 100-10 MG/5ML syrup    ROBITUSSIN DM    560 mL    Take 5 mLs by mouth every 4 hours as needed for cough    Steroid-induced diabetes mellitus (H)       ketoconazole 2 % shampoo    NIZORAL    120 mL    Apply topically three times a week Alternate with Head and Shoulders.    Seborrhea       multivitamin, therapeutic with minerals Tabs tablet     30 each    Take 1 tablet by mouth daily    Heart replaced by transplant (H)       mycophenolate 250 MG capsule    GENERIC EQUIVALENT    360 capsule    Take 6 capsules (1,500 mg) by mouth 2 times daily    Heart replaced by transplant (H)       pravastatin 20 MG tablet    PRAVACHOL    30 tablet    Take 1 tablet (20 mg) by mouth every evening    Heart replaced by transplant (H)       tacrolimus 1 MG capsule    GENERIC EQUIVALENT    90 capsule    Take 2 mg in the morning; take 1 mg in the evening.    Heart replaced by transplant (H)

## 2017-12-19 NOTE — NURSING NOTE
Chief Complaint   Patient presents with     Follow Up For     heart tx annual     Vitals were taken and medications were reconciled.   Ruy Patiño MA  11:21 AM

## 2017-12-19 NOTE — PATIENT INSTRUCTIONS
YOu were seen in clinic for routine post heart transplant evaluation. Available test results indicate good heart and kidney function. Your blood counts and cholesterol also appear normal.    Medication changes:  Decrease Prograf (tacrolimus) to 2 mg in the morning and 1 mg in the evening.    Follow up:  Please recheck 12 hour Prograf (tacrolimus) level in 1 to 2 weeks. Coordinator will schedule and update you with when/where this test we will be drawn.  Coordinator will update you with pending test results.  Coordinator will assist with routine Dermatology evaluation.  Plan to return for routine clinic visit and bx.

## 2017-12-19 NOTE — PROGRESS NOTES
"ADULT HEART TRANSPLANT CLINIC  December 19, 2017     HPI:   Mr. Javi Bansal is a 46yr old male with a history of Chagas cardiomyopathy (diagnosed in 2010, treated with nifurtimox for 3 months) and biventricular systolic heart failure (LVEF 15-20% since 2015, on home milrinone since 8/2016), now s/p OHT 12/12/16, who presents to clinic for routine surveillance. He is accompanied by a .     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 We are following monthly Chagas labs. He is due to see ID in October.     He was started on azathioprine postoperatively due to reports of better outcomes.  His initial biopsy on 12/19/16 showed 2R ACR, which was treated with IV steroids.  Repeat biopsy 12/27/16 showed 2R/3A ACR, which was treated with IV steroids and thymoglobulin.  He had another episode of 2R ACR 2/10/17, which was treated with steroids.  His biopsies have since been negative for significant rejection.  Baseline coronary angiogram showed no evidence of CAV.  His graft function remained stable throughout his rejection episodes, and continues to remain stable (cardiac MRI in June with EF 57%).  He has no DSAs.    Since last visit, he continues to report that he is doing well. He denies any issues with his heart. He reports no LE edema, PND, orthopnea, dyspnea on exertion. He has lost some weight with his continued activity. 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 He denies cough, fever, chills. He denies chest pain, dyspnea, fatigue, and lightheadedness exertion. Patient denies fever, chills, oral lesions, " headache, palpitations, SOB, SEPULVEDA, PND, orthopnea, nausea, vomiting, diarrhea, or LE edema.     PAST MEDICAL HISTORY:  Past Medical History:   Diagnosis Date     Chagas cardiomyopathy      Chronic systolic heart failure (H)      Diabetes (H)      Dual ICD (implantable cardioverter-defibrillator) in place 10/20/2015     Essential hypertension      Gastroesophageal reflux disease      H/O gastric ulcer      Hypertension      Premature ventricular contractions      Presbyopia      Pterygium     ?? suspected , but unclear dx       FAMILY HISTORY:  Family History   Problem Relation Age of Onset     Brain Cancer Mother      Melanoma No family hx of      Skin Cancer No family hx of      Glaucoma No family hx of      Macular Degeneration No family hx of        SOCIAL HISTORY:  Social History     Social History     Marital status:      Spouse name: N/A     Number of children: N/A     Years of education: N/A     Social History Main Topics     Smoking status: Never Smoker     Smokeless tobacco: Never Used     Alcohol use No     Drug use: No     Sexual activity: Not on file     Other Topics Concern     Not on file       CURRENT MEDICATIONS:    Current Outpatient Prescriptions on File Prior to Visit:  guaiFENesin-dextromethorphan (ROBITUSSIN DM) 100-10 MG/5ML syrup Take 5 mLs by mouth every 4 hours as needed for cough   fluticasone (FLONASE) 50 MCG/ACT spray Spray 1 spray into both nostrils daily   tacrolimus (GENERIC EQUIVALENT) 1 MG capsule Take 2 capsules (2 mg) by mouth 2 times daily   gabapentin (NEURONTIN) 300 MG capsule Take 1 capsule (300 mg) by mouth 3 times daily Begin by taking one capsule once per day, the next day you may take 1 capsule twice per day and on the 3rd day begin taking 1 capsule 3 times per day.   benzoyl peroxide 5 % LOTN lotion Apply topically At Bedtime   ketoconazole (NIZORAL) 2 % shampoo Apply topically three times a week Alternate with Head and Shoulders.   pravastatin (PRAVACHOL) 20 MG  "tablet Take 1 tablet (20 mg) by mouth every evening   mycophenolate (CELLCEPT - GENERIC EQUIVALENT) 250 MG capsule Take 6 capsules (1,500 mg) by mouth 2 times daily   aspirin EC 81 MG EC tablet Take 1 tablet (81 mg) by mouth daily   multivitamin, therapeutic with minerals (THERA-VIT-M) TABS tablet Take 1 tablet by mouth daily     No current facility-administered medications on file prior to visit.     ROS:  CONSTITUTIONAL: Denies fever, chills, fatigue, or weight fluctuations.   HEENT: Denies headache, vision changes, and changes in speech.   CV: see hpi   PULMONARY: see hpi   GI:Denies nausea, vomiting, diarrhea, and abdominal pain. Bowel movements are regular.   : Denies urinary alterations, dysuria, urinary frequency, hematuria, and abnormal drainage.   EXT: Denies lower extremity edema or color changes.   SKIN: Resolved lesions on thigh, no other rash.   MUSCULOSKELETAL: Denies upper or lower extremity weakness and pain.   NEUROLOGIC: Denies lightheadedness, dizziness, seizures, or upper or lower extremity paresthesia.     EXAM:  /75 (BP Location: Left arm, Cuff Size: Adult Regular)  Pulse 90  Ht 1.69 m (5' 6.53\")  Wt 69.6 kg (153 lb 6.4 oz)  SpO2 100%  BMI 24.36 kg/m2    GENERAL: Appears comfortable, in no acute distress.   HEENT: Eye symmetrical, no discharge or icterus bilaterally. Mucous membranes moist and without lesions.  CV: RRR, +S1S2, no murmur, rub, or gallop. JVP not appreciable at 45 degrees.   RESPIRATORY: Respirations regular, even, and unlabored. Lungs CTA throughout.   GI: Soft and non distended with normoactive bowel sounds present in all quadrants. No tenderness, rebound, guarding. No hepatomegaly.   EXTREMITIES: No peripheral edema. 2+ bilateral pedal pulses.   NEUROLOGIC: Alert and oriented x 3. No focal deficits.   MUSCULOSKELETAL: No joint swelling or tenderness.   SKIN: No jaundice. No rashes or lesions. Completely healed vesicles/scarring over left anterior and lateral " thigh.     Labs - reviewed with patient in clinic today:  CBC RESULTS:  Lab Results   Component Value Date    WBC 3.7 (L) 12/14/2017    RBC 3.71 (L) 12/14/2017    HGB 10.8 (L) 12/14/2017    HCT 34.5 (L) 12/14/2017    MCV 93 12/14/2017    MCH 29.1 12/14/2017    MCHC 31.3 (L) 12/14/2017    RDW 13.7 12/14/2017     12/14/2017       CMP RESULTS:  Lab Results   Component Value Date     12/14/2017    POTASSIUM 3.8 12/14/2017    CHLORIDE 110 (H) 12/14/2017    CO2 25 12/14/2017    ANIONGAP 7 12/14/2017    GLC 86 12/14/2017    BUN 23 12/14/2017    CR 1.16 12/14/2017    GFRESTIMATED 67 12/14/2017    GFRESTBLACK 81 12/14/2017    DAISY 9.1 12/14/2017    BILITOTAL 0.7 12/14/2017    ALBUMIN 4.3 12/14/2017    ALKPHOS 119 12/14/2017    ALT 23 12/14/2017    AST 22 12/14/2017        INR RESULTS:  Lab Results   Component Value Date    INR 1.13 06/10/2017       LIPID RESULTS:  Lab Results   Component Value Date    CHOL 176 12/14/2017    HDL 56 12/14/2017     (H) 12/14/2017    TRIG 61 12/14/2017       IMMUNOSUPPRESSANT LEVELS:  Lab Results   Component Value Date    TACROL 10.4 12/14/2017    DOSTAC Not Provided 12/14/2017       No components found for: CK  Lab Results   Component Value Date    MAG 1.8 12/14/2017     Lab Results   Component Value Date    A1C 7.5 (H) 06/02/2017     Lab Results   Component Value Date    PHOS 2.9 12/14/2017     Lab Results   Component Value Date    NTBNP 1945 (H) 06/29/2016     Lab Results   Component Value Date    SAITESTMET [facility] 06/02/2017    SAICELL Class I 06/02/2017    [id number] None 06/02/2017    [id number] None 06/02/2017    SAIREPCOM  06/02/2017      Test performed by modified procedure. Serum heat inactivated. High-risk,   mfi >3,000. Mod-risk, mfi 500-3,000.       Lab Results   Component Value Date    SAIITESTME [facility] 06/02/2017    SAIICELL Class II 06/02/2017    [id number] None 06/02/2017    [id number] None 06/02/2017    SAIIREPCOM  06/02/2017      Test performed by modified  procedure. Serum heat inactivated. High-risk,   mfi >3,000. Mod-risk, mfi 500-3,000.       Lab Results   Component Value Date    CSPEC Plasma, EDTA anticoagulant 12/14/2017       Immuknow - 12/14/17: 140  Tacrolimus - 12/14/17: 10.4   EBV and CMV: negative      Diagnostic Studies:  Right Heart Cath and Left Heart Cath - December 14, 2017          Coronary Angiogram  1. Both coronary arteries arise from their respective cusps.  2. Dominance: Right  3. The LM is without angiographic evidence of disease.   4. The LAD gives rise to septal perforators and a large D1 and a small D2 and has no significant disease.    5. LCX gives rise to a small OM1 and OM2 vessels without significant stenosis  6. RCA (dominant) gives rise to PL branches and supplies PDA with no significant disease     IVUS:  Adequate anticoagulation was was obtained with UFH.  A fred wire was positioned in the LM.  A [place] [facility] catheter was advanced across the wire into the vessel.    The minimal lumen area of the LAD was 6.6 mm2.  The intima media thickness of the LAD was 0.2 mm.     Cardiac MRI 12/19/17:  Comparison CMR: 06/01/2017  1. The LV is normal in cavity size and wall thickness. The global systolic function is normal. The LVEF is 57%. There are no regional wall motion abnormalities.   2. The RV is normal in cavity size. The global systolic function is mildly reduced. The RVEF is 48%.   3. There is bi-atrial enlargement secondary to heart transplantation.  4. There is no significant valvular disease.   5. Late gadolinium enhancement imaging shows no MI, fibrosis or infiltrative disease.   6. There is no pericardial effusion or thickening.  7.  There is no intracardiac thrombus.  CONCLUSIONS: No evidence of inducible ischemia.  Normal LV function and mildly reduced RV function.  No  significant change from CMR on 6/1/2017.    Cardiac MRI 6/1/17:  1.  Normal left ventricular size and systolic function with a  calculated ejection fraction of   57 %.  2.  Normal right ventricular size and mildly reduced systolic function  with a calculated ejection fraction of 46%.   3.  On delayed enhancement imaging, there is no abnormal late  gadolinium enhancement.   4.  Compared with the prior study dated 1/5/2017, inferolateral scar  is no longer seen.    TTE 3/15/17:  Global and regional left ventricular function is normal with an EF of 60-65%.  Global right ventricular function is normal.  The inferior vena cava is normal. Estimated mean right atrial pressure is <3 mmHg.  No pericardial effusion is present.  This study was compared with the study from 2/14/2017.     12/14/2017 biospy   FINAL DIAGNOSIS:   HEART, ALLOGRAFT, ENDOMYOCARDIAL BIOPSY:   - ISHLT 2004 cellular grade: 0R        - No histological evidence of acute cellular rejection   - ISHLT 2013 antibody-mediated grade: pAMR 0        - No histological features diagnostic of antibody-mediated   rejection        - No detectable capillary staining for C4d or C3d   - See microscopic description     Assessment/Plan:    felt weak allMrBelia Shetty is a 46 year old male who is s/p orthotopic heart transplant 12/2016 for Chagas cardiomyopathy. Post-op course complicated by rejection with atrial and ventricular arrhythmias, respiratory failure and JOSE LUIS, which resolved with diuresis, surgical blood loss anemia, right pleural effusion, requiring pigtail placement, and Chagas reactivation 12/26, treated with benznidasole. More recently, he has done remarkably well without recent rejection or evidence of graft dysfunction. He is back to work. Recent episode of shingles treated with 7 days of Valtrex, this appears to have resolved.        # Status post OHT on 12/2016, history of Chagas cardiomyopathy  # Chronic immunosuppression  * Rejection history: 2R on first tx with steroids, repeat bx 2R/3A on second tx with steroids +thymo, 2R in February treated with steroids. Given his immuknow is consistent with over  immunosuppression, the fact that we need to ensure that his Chagas does not reactivate, and his recent shingles having decreasing his Prograf goal to 6-8.     Immunosuppression:  - CNI: tac 2/1 mg. Trough level goal now 6-8.   - Antiproliferative: Mycophenolate 1500 mg bid    Prophylaxis:  - CAV: ASA 81 mg and pravastatin 10 mg    Serostatus: CMV: D+/R+. EBV: D+/R+  Intolerance to medications: none    # Recent shingles, resolved    # Chagas, reactivated post-transplant - now again cleared and off therapy   Surveillance labs per transplant ID-He has no systemic symptoms consistent with this    #  Health Care Maintenance  - Immunizations: up to date, recommend flu shot   - Dermatology: seen July 2017    Follow up:  Next biopsy: per protocol   Next angiogram: 12/2018   Next stress test: per protocol   Next transplant clinic visit - 4 months     DYLON Morrison, BONIFACIO AUSTIN

## 2017-12-20 LAB
INTERPRETATION ECG - MUSE: NORMAL
INTERPRETATION ECG - MUSE: NORMAL

## 2017-12-27 ENCOUNTER — OFFICE VISIT (OUTPATIENT)
Dept: DERMATOLOGY | Facility: CLINIC | Age: 47
End: 2017-12-27
Payer: COMMERCIAL

## 2017-12-27 VITALS — HEART RATE: 96 BPM | SYSTOLIC BLOOD PRESSURE: 136 MMHG | DIASTOLIC BLOOD PRESSURE: 88 MMHG

## 2017-12-27 DIAGNOSIS — Z12.83 SCREENING FOR SKIN CANCER: ICD-10-CM

## 2017-12-27 DIAGNOSIS — Z94.89 TRANSPLANT RECIPIENT: Primary | ICD-10-CM

## 2017-12-27 ASSESSMENT — PAIN SCALES - GENERAL: PAINLEVEL: NO PAIN (0)

## 2017-12-27 NOTE — LETTER
12/27/2017       RE: Javi Bansal  [address] [place] [address]     Dear Colleague,    Thank you for referring your patient, Javi Bansal, to the [facility] DERMATOLOGY at [facility]. Please see a copy of my visit note below.    [facility] Dermatology Note      Dermatology Problem List:  1.Steroid-induced acne - resolved  2. Multiple clinically benign nevi  3. History of heart transplant on Tacrolimus, requiring yearly transplant skin check    Encounter Date: Dec 27, 2017    CC:   Chief Complaint   Patient presents with     Skin Check     Javi is here today for a yearly skin check. No areas of concern.      History of Present Illness:  Mr. Javi Bansal is a 47 year old male who has a [facility] Chagas cardiomyopathy s/p cardiac transplantation 12/12/16, complicated by 2R rejection, who is here for transplant skin check. The patient is Mohawk speaking and an  was used. He was last seen 07/2017. He has not been using prescribed BP 5% lotion from last visit as it has not be bothering him.     Past Medical History:   Patient Active Problem List   Diagnosis     Chagas cardiomyopathy     Cardiac defibrillator in situ-[employer], dual chamber - Not Dependent     CHF (congestive heart failure) (H)     Chronic systolic heart failure (H)     Chest pain     ACP (advance care planning)     Heart transplant candidate     Heart failure (H)     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     Heart replaced by transplant (H)     Need for prophylactic antibiotic     Elevated liver enzymes     Ventricular tachycardia, non-sustained (H)     Heart transplant graft rejection (H)     Pneumonia     Status post coronary angiogram     Past Medical History:   Diagnosis Date     Chagas cardiomyopathy      Chronic systolic heart failure (H)      Diabetes (H)      Dual ICD (implantable  cardioverter-defibrillator) in place 10/20/2015     Essential hypertension      Gastroesophageal reflux disease      H/O gastric ulcer      Hypertension      Premature ventricular contractions      Presbyopia      Pterygium     ?? suspected , but unclear dx     Past Surgical History:   Procedure Laterality Date     CARDIAC SURGERY      AICD     TRANSPLANT HEART RECIPIENT N/A 12/12/2016    Procedure: TRANSPLANT HEART RECIPIENT;  Surgeon: Elo Madsen MD;  Location:  OR       Social History:  The patient is working for the last 2 months at a [employer]. The patient denies use of tanning beds.    Family History:  There is no family history of skin cancer. and There is no family history of melanoma.    Medications:  Current Outpatient Prescriptions   Medication Sig Dispense Refill     tacrolimus (GENERIC EQUIVALENT) 1 MG capsule Take 2 mg in the morning; take 1 mg in the evening. 90 capsule 11     ketoconazole (NIZORAL) 2 % shampoo Apply topically three times a week Alternate with Head and Shoulders. 120 mL 3     pravastatin (PRAVACHOL) 20 MG tablet Take 1 tablet (20 mg) by mouth every evening 30 tablet 11     mycophenolate (CELLCEPT - GENERIC EQUIVALENT) 250 MG capsule Take 6 capsules (1,500 mg) by mouth 2 times daily 360 capsule 11     aspirin EC 81 MG EC tablet Take 1 tablet (81 mg) by mouth daily 90 tablet 3     multivitamin, therapeutic with minerals (THERA-VIT-M) TABS tablet Take 1 tablet by mouth daily 30 each 11     guaiFENesin-dextromethorphan (ROBITUSSIN DM) 100-10 MG/5ML syrup Take 5 mLs by mouth every 4 hours as needed for cough (Patient not taking: Reported on 12/19/2017) 560 mL 1     fluticasone (FLONASE) 50 MCG/ACT spray Spray 1 spray into both nostrils daily (Patient not taking: Reported on 12/19/2017) 1 Bottle 1     gabapentin (NEURONTIN) 300 MG capsule Take 1 capsule (300 mg) by mouth 3 times daily Begin by taking one capsule once per day, the next day you may take 1 capsule twice per day and on  the 3rd day begin taking 1 capsule 3 times per day. (Patient not taking: Reported on 12/19/2017) 90 capsule 1     benzoyl peroxide 5 % LOTN lotion Apply topically At Bedtime (Patient not taking: Reported on 12/19/2017) 30 mL 3        No Known Allergies      Review of Systems:  -As per HPI  -Constitutional: The patient denies fatigue, fevers, chills, unintended weight loss, and night sweats.  -HEENT: Patient denies nonhealing oral sores.  -Skin: As above in HPI. No additional skin concerns.    Physical exam:  Vitals: /88 (Cuff Size: Adult Regular)  Pulse 96  GEN: This is a well developed, well-nourished male in no acute distress, in a pleasant mood.    SKIN: Total skin excluding the undergarment areas was performed. The exam included the head/face, neck, both arms, chest, back, abdomen, both legs, digits and/or nails.   -There are compound nevi to L upper back and L upper chest.   Multiple regular brown pigmented macules and papules are identified on the R forehead above eyebrow and L temple.   Scattered telangiectasias over the chest and nose  -Hypopigmented macular patch on mid back in dermatomal distribution   -No other lesions of concern on areas examined.     Impression/Plan:  1. Hx of heart transplant, on tacrolimus    No concerning lesions found. Patient to report changes.     Yearly skin checks    2. Steroid-induced acne, resolved - Noted at last visit; characteristic eruption of acne over chest, back, and forehead, in the setting of recent steroid use. Prescribed benzoyl peroxide 5% lotion qhs 3x/day but not using as lesions went away.    3. Multiple clinically benign nevi on the back, face     ABCDs of melanoma were discussed and self skin checks were advised. Sun precaution was advised including the use of sun screens of SPF 30 or higher, sun protective clothing, and avoidance of tanning beds.    Follow-up in 1 year, earlier for new or changing lesions.     Dr. Murillo staffed the patient.    Staff  Involved:  Resident(Manjula Srivastava)/Staff(as above)    I talked with and examined Javi Burr Jesse Iglesia Shetty and I agree with the assessment and the plan. ANABELLA Murillo MD.    Again, thank you for allowing me to participate in the care of your patient.    Sincerely,  GARY Murillo MD

## 2017-12-27 NOTE — NURSING NOTE
Dermatology Rooming Note    Javi Bansal's goals for this visit include:   Chief Complaint   Patient presents with     Skin Check     Javi is here today for a yearly skin check. No areas of concern.      Dulce Galan MA

## 2017-12-27 NOTE — PROGRESS NOTES
[facility] Dermatology Note      Dermatology Problem List:  1.Steroid-induced acne - resolved  2. Multiple clinically benign nevi  3. History of heart transplant on Tacrolimus, requiring yearly transplant skin check    Encounter Date: Dec 27, 2017    CC:   Chief Complaint   Patient presents with     Skin Check     Javi is here today for a yearly skin check. No areas of concern.      History of Present Illness:  Mr. 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 The patient is Maltese speaking and an  was used. He was last seen 07/2017. He has not been using prescribed BP 5% lotion from last visit as it has not be bothering him.     Past Medical History:   Patient Active Problem List   Diagnosis     Chagas cardiomyopathy     Cardiac defibrillator in situ-Medtronic, dual chamber - Not Dependent     CHF (congestive heart failure) (H)     Chronic systolic heart failure (H)     Chest pain     ACP (advance care planning)     Heart transplant candidate     Heart failure (H)     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     Heart replaced by transplant (H)     Need for prophylactic antibiotic     Elevated liver enzymes     Ventricular tachycardia, non-sustained (H)     Heart transplant graft rejection (H)     Pneumonia     Status post coronary angiogram     Past Medical History:   Diagnosis Date     Chagas cardiomyopathy      Chronic systolic heart failure (H)      Diabetes (H)      Dual ICD (implantable cardioverter-defibrillator) in place 10/20/2015     Essential hypertension      Gastroesophageal reflux disease      H/O gastric ulcer      Hypertension      Premature ventricular contractions      Presbyopia      Pterygium     ?? suspected , but unclear dx     Past Surgical History:   Procedure Laterality Date     CARDIAC  SURGERY      AICD     TRANSPLANT HEART RECIPIENT N/A 12/12/2016    Procedure: TRANSPLANT HEART RECIPIENT;  Surgeon: Elo Madsen MD;  Location:  OR       Social History:  The patient is working for the last 2 months at a [employer]. The patient denies use of tanning beds.    Family History:  There is no family history of skin cancer. and There is no family history of melanoma.    Medications:  Current Outpatient Prescriptions   Medication Sig Dispense Refill     tacrolimus (GENERIC EQUIVALENT) 1 MG capsule Take 2 mg in the morning; take 1 mg in the evening. 90 capsule 11     ketoconazole (NIZORAL) 2 % shampoo Apply topically three times a week Alternate with Head and Shoulders. 120 mL 3     pravastatin (PRAVACHOL) 20 MG tablet Take 1 tablet (20 mg) by mouth every evening 30 tablet 11     mycophenolate (CELLCEPT - GENERIC EQUIVALENT) 250 MG capsule Take 6 capsules (1,500 mg) by mouth 2 times daily 360 capsule 11     aspirin EC 81 MG EC tablet Take 1 tablet (81 mg) by mouth daily 90 tablet 3     multivitamin, therapeutic with minerals (THERA-VIT-M) TABS tablet Take 1 tablet by mouth daily 30 each 11     guaiFENesin-dextromethorphan (ROBITUSSIN DM) 100-10 MG/5ML syrup Take 5 mLs by mouth every 4 hours as needed for cough (Patient not taking: Reported on 12/19/2017) 560 mL 1     fluticasone (FLONASE) 50 MCG/ACT spray Spray 1 spray into both nostrils daily (Patient not taking: Reported on 12/19/2017) 1 Bottle 1     gabapentin (NEURONTIN) 300 MG capsule Take 1 capsule (300 mg) by mouth 3 times daily Begin by taking one capsule once per day, the next day you may take 1 capsule twice per day and on the 3rd day begin taking 1 capsule 3 times per day. (Patient not taking: Reported on 12/19/2017) 90 capsule 1     benzoyl peroxide 5 % LOTN lotion Apply topically At Bedtime (Patient not taking: Reported on 12/19/2017) 30 mL 3        No Known Allergies      Review of Systems:  -As per HPI  -Constitutional: The patient denies  fatigue, fevers, chills, unintended weight loss, and night sweats.  -HEENT: Patient denies nonhealing oral sores.  -Skin: As above in HPI. No additional skin concerns.    Physical exam:  Vitals: /88 (Cuff Size: Adult Regular)  Pulse 96  GEN: This is a well developed, well-nourished male in no acute distress, in a pleasant mood.    SKIN: Total skin excluding the undergarment areas was performed. The exam included the head/face, neck, both arms, chest, back, abdomen, both legs, digits and/or nails.   -There are compound nevi to L upper back and L upper chest.   Multiple regular brown pigmented macules and papules are identified on the R forehead above eyebrow and L temple.   Scattered telangiectasias over the chest and nose  -Hypopigmented macular patch on mid back in dermatomal distribution   -No other lesions of concern on areas examined.     Impression/Plan:  1. Hx of heart transplant, on tacrolimus    No concerning lesions found. Patient to report changes.     Yearly skin checks    2. Steroid-induced acne, resolved - Noted at last visit; characteristic eruption of acne over chest, back, and forehead, in the setting of recent steroid use. Prescribed benzoyl peroxide 5% lotion qhs 3x/day but not using as lesions went away.    3. Multiple clinically benign nevi on the back, face     ABCDs of melanoma were discussed and self skin checks were advised. Sun precaution was advised including the use of sun screens of SPF 30 or higher, sun protective clothing, and avoidance of tanning beds.    Follow-up in 1 year, earlier for new or changing lesions.     Dr. Murillo staffed the patient.    Staff Involved:  Resident(Manjula Srivastava)/Staff(as above)    I talked with and examined Javi Burr Jesse Iglesia Shetty and I agree with the assessment and the plan. ANABELLA Murillo MD.

## 2017-12-27 NOTE — MR AVS SNAPSHOT
After Visit Summary   2017    Javi Bansal    MRN: [id number]           Patient Information     Date Of Birth          1970        Visit Information        Provider Department      2017 12:45 PM GARY Murillo MD; MANINDER MARIN [facility] Dermatology        Today's Diagnoses     Transplant recipient    -  1    Screening for skin cancer           Follow-ups after your visit        Follow-up notes from your care team     Return in about 1 year (around 2018).      Your next 10 appointments already scheduled     2018 10:15 AM CDT   RETURN RETINA with Isabela Balbuena MD   Eye Clinic ([facility])    [address]  [address] [facility] [address]  [address]  [facility] [address] [phone]              Who to contact     Please call your clinic at [phone] to:    Ask questions about your health    Make or cancel appointments    Discuss your medicines    Learn about your test results    Speak to your doctor   If you have compliments or concerns about an experience at your clinic, or if you wish to file a complaint, please contact [facility] Patient Relations at [phone] or email us at [email or web address]         Additional Information About Your Visit        MyChart Information     GiveSurancet is an electronic gateway that provides easy, online access to your medical records. With GAMEVIL, you can request a clinic appointment, read your test results, renew a prescription or communicate with your care team.     To sign up for GiveSurancet visit the website at [email or web address]   You will be asked to enter the access code listed below, as well as some personal information. Please follow the directions to create your username and password.     Your access code is: [email or web address]  Expires: 2018  5:30 AM     Your access code will  in 90 days. If you need help  or a new code, please contact your [facility] or call [phone] for assistance.        Care EveryWhere ID     This is your Care EveryWhere ID. This could be used by other organizations to access your [facility] medical records  [id number]        Your Vitals Were     Pulse                   96            Blood Pressure from Last 3 Encounters:   12/27/17 136/88   12/19/17 113/75   12/19/17 114/74    Weight from Last 3 Encounters:   12/19/17 69.6 kg (153 lb 6.4 oz)   12/14/17 72.6 kg (160 lb 0.9 oz)   10/24/17 71.2 kg (157 lb)              Today, you had the following     No orders found for display       Primary Care Provider Fax #    Physician No Ref-Primary [phone]       No address on file        Equal Access to Services     JASWINDER KHAN : Aniya Vuong, waluis luqadaha, qaybta kaalmada sulema, talisha garcia . [facility] [phone].    ATENCIÓN: Si habla español, tiene a watt disposición servicios gratuitos de asistencia lingüística. Llame al [phone].    We comply with applicable federal civil rights laws and [place] laws. We do not discriminate on the basis of race, color, national origin, age, disability, sex, sexual orientation, or gender identity.            Thank you!     Thank you for choosing [facility] DERMATOLOGY  for your care. Our goal is always to provide you with excellent care. Hearing back from our patients is one way we can continue to improve our services. Please take a few minutes to complete the written survey that you may receive in the mail after your visit with us. Thank you!             Your Updated Medication List - Protect others around you: Learn how to safely use, store and throw away your medicines at www.disposemymeds.org.          This list is accurate as of: 12/27/17 11:59 PM.  Always use your most recent med list.                   Brand Name Dispense Instructions for use Diagnosis    aspirin 81 MG  EC tablet     90 tablet    Take 1 tablet (81 mg) by mouth daily    Heart replaced by transplant (H)       benzoyl peroxide 5 % Lotn lotion     30 mL    Apply topically At Bedtime    Other acne       fluticasone 50 MCG/ACT spray    FLONASE    1 Bottle    Spray 1 spray into both nostrils daily    Influenza-like illness       gabapentin 300 MG capsule    NEURONTIN    90 capsule    Take 1 capsule (300 mg) by mouth 3 times daily Begin by taking one capsule once per day, the next day you may take 1 capsule twice per day and on the 3rd day begin taking 1 capsule 3 times per day.        guaiFENesin-dextromethorphan 100-10 MG/5ML syrup    ROBITUSSIN DM    560 mL    Take 5 mLs by mouth every 4 hours as needed for cough    Steroid-induced diabetes mellitus (H)       ketoconazole 2 % shampoo    NIZORAL    120 mL    Apply topically three times a week Alternate with Head and Shoulders.    Seborrhea       multivitamin, therapeutic with minerals Tabs tablet     30 each    Take 1 tablet by mouth daily    Heart replaced by transplant (H)       mycophenolate 250 MG capsule    GENERIC EQUIVALENT    360 capsule    Take 6 capsules (1,500 mg) by mouth 2 times daily    Heart replaced by transplant (H)       pravastatin 20 MG tablet    PRAVACHOL    30 tablet    Take 1 tablet (20 mg) by mouth every evening    Heart replaced by transplant (H)       tacrolimus 1 MG capsule    GENERIC EQUIVALENT    90 capsule    Take 2 mg in the morning; take 1 mg in the evening.    Heart replaced by transplant (H)

## 2018-01-17 DIAGNOSIS — Z94.1 HEART REPLACED BY TRANSPLANT (H): ICD-10-CM

## 2018-01-17 LAB — ALLOMAP: NORMAL

## 2018-02-02 ENCOUNTER — TELEPHONE (OUTPATIENT)
Dept: TRANSPLANT | Facility: CLINIC | Age: 48
End: 2018-02-02

## 2018-02-02 NOTE — TELEPHONE ENCOUNTER
February 2, 2018: I left a message (with the help of a ) for the patient to call back and schedule his April and August heart transplant appointments.    Carola Chacon  Post-Heart Transplant   [phone]

## 2018-02-02 NOTE — LETTER
March 19, 2018    Coordinator: Manoj Hannah, TOBY Caldwell Iglesia Ramirezr Schedule revised on 3/19/2018   MRN:  [id number]  Physician: Geena Mcclellan MD  Transplant Date: 12/12/2016    Visit  Number Time Post  Transplant Procedures to Expect this Visit Visit Date   Visit #14 Month 16 Extended labs, heart biopsy & clinic appt with MD/NP. Tuesday, 4/10/18     Appointment with Dr. Balbuena, Ophthalmology Wednesday, 6/13/18   Visit #15 Month 20 Labs, heart biopsy & clinic appt with MD/NP. Friday, 8/17/18   Visit #16 Month 24 2-day visit: Extended labs, X-rays, DEXA, ECHO/MRI, Angiogram/RHC/IVUS/Biopsy, CPX, EKG.  Clinic appt with MD/NP. [facility], [facility]. Week of 12/10/18   Visit #17 Month 30 Extended labs, heart biopsy & clinic appt with MD/NP. Week of 6/10/19   * Labs, including Prograf level, will be drawn at the [address] [place] [address] lab prior to the biopsy unless indicated.  * Please take medication evening before to ensure 12-hour trough the next AM. (i.e. Labs at 8:30 AM, take medication the evening before at 8:30 PM.)  * Do not take medication in AM until after blood drawn.    Tuesday  4/10/18    Month 16 9:00 am Fasting Labs  (nothing to eat or drink after midnight) [facility]  2nd [facility]    9:30 am Heart biopsy  (Fast for three hours before testing) [facility]  2nd floor    1:30 pm Either walk or take the shuttle to the [facility]    2:00 pm Appointment with Mary Huffman NP [facility]  3rd floor     Wednesday 6/13/18 10:15 A Appointment with Dr. Balbuena, Ophthalmology [address], 9th Floor  [facility]  [address]     Friday 8/17/18    Month 20 9:30 am Blood Labs [facility]  2nd floor    10:00 am Heart biopsy  (Fast for three hours before testing) [address]    2:00 pm Either walk or take the shuttle to the [facility]    2:30 pm  Appointment with Vanessa Hu, NP [facility]  3rd floor

## 2018-02-22 DIAGNOSIS — Z94.1 HEART REPLACED BY TRANSPLANT (H): ICD-10-CM

## 2018-02-22 RX ORDER — MYCOPHENOLATE MOFETIL 250 MG/1
1500 CAPSULE ORAL 2 TIMES DAILY
Qty: 360 CAPSULE | Refills: 11 | Status: SHIPPED | OUTPATIENT
Start: 2018-02-22 | End: 2018-12-07

## 2018-02-22 RX ORDER — PRAVASTATIN SODIUM 20 MG
20 TABLET ORAL EVERY EVENING
Qty: 30 TABLET | Refills: 11 | Status: SHIPPED | OUTPATIENT
Start: 2018-02-22 | End: 2018-04-10 | Stop reason: ALTCHOICE

## 2018-02-22 NOTE — TELEPHONE ENCOUNTER
Drug: Mycophenolate  Last Fill Date: 1/22/2018  Quantity: 360          Drug: Pravastatin  Last Fill Date: 1/22/2018  Quantity: 30

## 2018-03-02 NOTE — TELEPHONE ENCOUNTER
March 2, 2018: I left a second message (with the help of a ) for the pt to schedule his heart transplant appointments in April and August.      Carola Chacon  Post-Heart Transplant   [phone]

## 2018-03-05 DIAGNOSIS — Z94.1 HEART REPLACED BY TRANSPLANT (H): Primary | ICD-10-CM

## 2018-04-01 DIAGNOSIS — E78.5 HYPERLIPEMIA: ICD-10-CM

## 2018-04-01 DIAGNOSIS — Z94.1 HEART REPLACED BY TRANSPLANT (H): Primary | ICD-10-CM

## 2018-04-01 RX ORDER — LIDOCAINE 40 MG/G
CREAM TOPICAL
Status: CANCELLED | OUTPATIENT
Start: 2018-04-01

## 2018-04-10 ENCOUNTER — PRE VISIT (OUTPATIENT)
Dept: TRANSPLANT | Facility: CLINIC | Age: 48
End: 2018-04-10

## 2018-04-10 ENCOUNTER — OFFICE VISIT (OUTPATIENT)
Dept: CARDIOLOGY | Facility: CLINIC | Age: 48
End: 2018-04-10
Attending: NURSE PRACTITIONER
Payer: COMMERCIAL

## 2018-04-10 ENCOUNTER — APPOINTMENT (OUTPATIENT)
Dept: MEDSURG UNIT | Facility: CLINIC | Age: 48
End: 2018-04-10
Attending: INTERNAL MEDICINE
Payer: COMMERCIAL

## 2018-04-10 ENCOUNTER — HOSPITAL ENCOUNTER (OUTPATIENT)
Facility: CLINIC | Age: 48
Discharge: HOME | End: 2018-04-10
Attending: INTERNAL MEDICINE | Admitting: INTERNAL MEDICINE
Payer: COMMERCIAL

## 2018-04-10 ENCOUNTER — APPOINTMENT (OUTPATIENT)
Dept: CARDIOLOGY | Facility: CLINIC | Age: 48
End: 2018-04-10
Attending: INTERNAL MEDICINE
Payer: COMMERCIAL

## 2018-04-10 VITALS
DIASTOLIC BLOOD PRESSURE: 79 MMHG | TEMPERATURE: 97.4 F | SYSTOLIC BLOOD PRESSURE: 120 MMHG | RESPIRATION RATE: 20 BRPM | OXYGEN SATURATION: 98 % | HEART RATE: 87 BPM

## 2018-04-10 VITALS
HEIGHT: 67 IN | SYSTOLIC BLOOD PRESSURE: 108 MMHG | OXYGEN SATURATION: 99 % | BODY MASS INDEX: 24.6 KG/M2 | HEART RATE: 88 BPM | WEIGHT: 156.7 LBS | DIASTOLIC BLOOD PRESSURE: 72 MMHG

## 2018-04-10 DIAGNOSIS — Z94.1 HEART REPLACED BY TRANSPLANT (H): ICD-10-CM

## 2018-04-10 DIAGNOSIS — E78.5 HYPERLIPEMIA: ICD-10-CM

## 2018-04-10 LAB
ALBUMIN SERPL-MCNC: 4.4 G/DL (ref 3.4–5)
ALP SERPL-CCNC: 118 U/L (ref 40–150)
ALT SERPL W P-5'-P-CCNC: 25 U/L (ref 0–70)
ANION GAP SERPL CALCULATED.3IONS-SCNC: 7 MMOL/L (ref 3–14)
AST SERPL W P-5'-P-CCNC: 18 U/L (ref 0–45)
BILIRUB SERPL-MCNC: 0.6 MG/DL (ref 0.2–1.3)
BUN SERPL-MCNC: 18 MG/DL (ref 7–30)
CALCIUM SERPL-MCNC: 9.2 MG/DL (ref 8.5–10.1)
CHLORIDE SERPL-SCNC: 110 MMOL/L (ref 94–109)
CHOLEST SERPL-MCNC: 210 MG/DL
CO2 SERPL-SCNC: 24 MMOL/L (ref 20–32)
CREAT SERPL-MCNC: 0.92 MG/DL (ref 0.66–1.25)
ERYTHROCYTE [DISTWIDTH] IN BLOOD BY AUTOMATED COUNT: 13.2 % (ref 10–15)
GFR SERPL CREATININE-BSD FRML MDRD: 88 ML/MIN/1.7M2
GLUCOSE SERPL-MCNC: 86 MG/DL (ref 70–99)
HCT VFR BLD AUTO: 42.6 % (ref 40–53)
HDLC SERPL-MCNC: 55 MG/DL
HGB BLD-MCNC: 13.6 G/DL (ref 13.3–17.7)
LDLC SERPL CALC-MCNC: 139 MG/DL
MCH RBC QN AUTO: 29.4 PG (ref 26.5–33)
MCHC RBC AUTO-ENTMCNC: 31.9 G/DL (ref 31.5–36.5)
MCV RBC AUTO: 92 FL (ref 78–100)
NONHDLC SERPL-MCNC: 155 MG/DL
PLATELET # BLD AUTO: 204 10E9/L (ref 150–450)
POTASSIUM SERPL-SCNC: 4.1 MMOL/L (ref 3.4–5.3)
PROT SERPL-MCNC: 7.9 G/DL (ref 6.8–8.8)
RBC # BLD AUTO: 4.62 10E12/L (ref 4.4–5.9)
SODIUM SERPL-SCNC: 141 MMOL/L (ref 133–144)
TACROLIMUS BLD-MCNC: 4.3 UG/L (ref 5–15)
TME LAST DOSE: ABNORMAL H
TRIGL SERPL-MCNC: 81 MG/DL
WBC # BLD AUTO: 4.2 10E9/L (ref 4–11)

## 2018-04-10 PROCEDURE — 40000166 ZZH STATISTIC PP CARE STAGE 1

## 2018-04-10 PROCEDURE — 93505 ENDOMYOCARDIAL BIOPSY: CPT | Mod: 26 | Performed by: INTERNAL MEDICINE

## 2018-04-10 PROCEDURE — 80197 ASSAY OF TACROLIMUS: CPT | Performed by: INTERNAL MEDICINE

## 2018-04-10 PROCEDURE — 85027 COMPLETE CBC AUTOMATED: CPT | Performed by: INTERNAL MEDICINE

## 2018-04-10 PROCEDURE — 27210982 ZZH KIT RT HC TOTES DISP CR7

## 2018-04-10 PROCEDURE — G0463 HOSPITAL OUTPT CLINIC VISIT: HCPCS | Mod: 25,ZF

## 2018-04-10 PROCEDURE — 80061 LIPID PANEL: CPT | Performed by: INTERNAL MEDICINE

## 2018-04-10 PROCEDURE — 88346 IMFLUOR 1ST 1ANTB STAIN PX: CPT | Performed by: NURSE PRACTITIONER

## 2018-04-10 PROCEDURE — 25000125 ZZHC RX 250: Performed by: INTERNAL MEDICINE

## 2018-04-10 PROCEDURE — 99214 OFFICE O/P EST MOD 30 MIN: CPT | Mod: 24 | Performed by: NURSE PRACTITIONER

## 2018-04-10 PROCEDURE — 80053 COMPREHEN METABOLIC PANEL: CPT | Performed by: INTERNAL MEDICINE

## 2018-04-10 PROCEDURE — T1013 SIGN LANG/ORAL INTERPRETER: HCPCS | Mod: U3

## 2018-04-10 PROCEDURE — 88350 IMFLUOR EA ADDL 1ANTB STN PX: CPT | Performed by: NURSE PRACTITIONER

## 2018-04-10 PROCEDURE — 88307 TISSUE EXAM BY PATHOLOGIST: CPT | Performed by: NURSE PRACTITIONER

## 2018-04-10 PROCEDURE — 27211047 ZZH FORCEP BIOPSY CR10

## 2018-04-10 PROCEDURE — 27210787 ZZH MANIFOLD CR2

## 2018-04-10 PROCEDURE — 93505 ENDOMYOCARDIAL BIOPSY: CPT

## 2018-04-10 PROCEDURE — 27210807 ZZH SHEATH CR6

## 2018-04-10 PROCEDURE — 36415 COLL VENOUS BLD VENIPUNCTURE: CPT | Performed by: INTERNAL MEDICINE

## 2018-04-10 RX ORDER — LIDOCAINE 40 MG/G
CREAM TOPICAL
Status: COMPLETED | OUTPATIENT
Start: 2018-04-10 | End: 2018-04-10

## 2018-04-10 RX ORDER — ROSUVASTATIN CALCIUM 10 MG/1
10 TABLET, COATED ORAL DAILY
Qty: 90 TABLET | Refills: 3 | Status: SHIPPED | OUTPATIENT
Start: 2018-04-10 | End: 2018-12-07

## 2018-04-10 RX ORDER — TACROLIMUS 1 MG/1
2 CAPSULE ORAL 2 TIMES DAILY
Qty: 120 CAPSULE | Refills: 11 | Status: SHIPPED | OUTPATIENT
Start: 2018-04-10 | End: 2019-04-22

## 2018-04-10 RX ADMIN — LIDOCAINE: 40 CREAM TOPICAL at 09:43

## 2018-04-10 ASSESSMENT — PAIN SCALES - GENERAL: PAINLEVEL: NO PAIN (0)

## 2018-04-10 NOTE — NURSING NOTE
Patient seen in clinic for routine 16 month post heart transplant evaluation.  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 No heavy lifting involved with this job.  Patient denies any SOB, pain, swelling, weight gain or limitation.  Weight stable at ~159 lbs.   Patient does express concern regarding his residency status in this country. He states he received an 18 month extension to remain in this country in January 2018, but states he will be unable to remain in the country after this time nor extend his stay further.  He states he is very anxious that if deported to [facility], he would not survive long because he would not be able to obtain his needed medication.  Coordinator will contact the transplant SW to request assistance with this matter.  Available results appear to show normal/stable renal function and blood counts.  Patient's FK level appears below goal and cholesterol appears elevated. Medication changes below.    Medication changes:  Pravastatin stopped, transitioned to 10 mg Crestor (rosuvostatin) daily.  FK increased to 2 mg BID      Follow up:  Coordinator will update you with pending test results and if any changes are needed.  Coordinator will request SW contact patient re residency status  Patient to recheck a  12 hour FK level on Friday, April 20 in the [facility] lab at 4:00 PM  AVS given to patient, instructions confirmed.     used throughout this clinic visit.

## 2018-04-10 NOTE — NURSING NOTE
Chief Complaint   Patient presents with     Follow Up For     16 months post heart tx     Vitals were taken and medications were reconciled.  Oren Krishnamurthy, GABRIELLE  12:35 PM

## 2018-04-10 NOTE — PATIENT INSTRUCTIONS
You were seen in clinic for routine 16 month post transplant evaluation. Available results appear to show normal kidney function and your blood counts are normal and stable    Medication changes  Finish your supply of  Pravastatin, then STOP taking this medication.  START taking 10 mg Crestor (rosuvostatin) daily when you are done with Pravastatin.  INCREASE tacrolimus to 2 mg in the morning and 2 mg in the evening.    Follow up:  Please recheck another 12 hour tacrolimus level in the [facility] on Friday, 4/20 at 4:00 PM  Coordinator will update you with pending test results and if any changes are needed.  Coordinator will contact Transplant Social Work regarding questions about your residency status.  Your next routine post heart transplant appointments are scheduled for August 17, 2018.  Please contact transplant coordinator with any questions/concerns.

## 2018-04-10 NOTE — LETTER
4/10/2018      RE: Javi Bansal  [address] [place] [address]       Dear Colleague,    Thank you for the opportunity to participate in the care of your patient, Javi Bansal, at the [facility] HEART [facility] at [facility]. Please see a copy of my visit note below.    ADULT HEART TRANSPLANT CLINIC  April 10, 2018    HPI:   Mr. Javi Bansal is a 47yr old male with a history of Chagas cardiomyopathy (diagnosed in 2010, treated with nifurtimox for 3 months) and biventricular systolic heart failure (LVEF 15-20% since 2015, on home milrinone since 8/2016), now s/p OHT 12/12/16, who presents to clinic for routine surveillance.  He is accompanied by a .     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       He was started on azathioprine postoperatively due to reports of better outcomes.  His initial biopsy on 12/19/16 showed 2R ACR, which was treated with IV steroids.  Repeat biopsy 12/27/16 showed 2R/3A ACR, which was treated with IV steroids and thymoglobulin.  He had another episode of 2R ACR 2/10/17, which was treated with steroids.      His biopsies have since remained negative for significant rejection.  His graft function remained stable throughout his rejection episodes, and continues to remain stable with LVEF 57% and RVEF 48% per cMRI 12/2017.  Coronary angiogram 12/2017 showed no angiographic evidence of obstructive CAV, and RHC showed normal biventricular filling pressures with RA 3, PCW 10, CI 3.8.    Since his last visit, he states that he feels great.  He has been working and remains active with no  exertional concerns.  He exercises regularly, and notes that his weight and BP remain stable.  He has a good appetite, and has been watching what he eats.  He otherwise denies chest pain, palpitations, shortness of breath, dizziness, headaches, fevers, chills, cough, sore throat, nausea, vomiting, diarrhea, and fluid retention.    Serostatus:  CMV D+/R+  EBV D+/R+     Immunosuppression:  MMF  Tacrolimus    Patient Active Problem List    Diagnosis Date Noted     Status post coronary angiogram 12/14/2017     Priority: Medium     Pneumonia 03/09/2017     Priority: Medium     Heart transplant graft rejection (H) 01/05/2017     Priority: Medium     7 DPT: 2R, focal + C4d/-C3d: treated w/IV pred (1000, 500. 500 mg)  14 DPT: 2R, neg/neg treated w/IV pred x 3 (1000mg) + thymo x 3  21 DPT: 1R, neg/neg       Ventricular tachycardia, non-sustained (H) 01/04/2017     Priority: Medium     Elevated liver enzymes 12/20/2016     Priority: Medium     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 12/19/2016     Priority: Medium     Need for prophylactic antibiotic 12/19/2016     Priority: Medium     Heart failure (H) 12/12/2016     Priority: Medium     Heart replaced by transplant (H) 12/12/2016     Priority: Medium     Heart transplant candidate 12/11/2016     Priority: Medium     ACP (advance care planning) 10/10/2016     Priority: Medium     Advance Care Planning 10/10/2016: Receipt of ACP document:  Received: invalid HCD document dated 8-4-16.  Document not previously scanned. Validation form completed indicating invalid document. Copy sent to client with information and facilitation resources. Validation form sent to be scanned as notation of invalid document received. Confirmed/documented designated decision maker(s).  Added by Namrata Neely RN Advance Care Planning Liaison with [employer]             Chest pain 07/11/2016     Priority: Medium     Chronic systolic heart failure (H)      Priority:  Medium     CHF (congestive heart failure) (H) 06/17/2016     Priority: Medium     Cardiac defibrillator in situ-[employer]tronic, dual chamber - Not Dependent 06/16/2016     Priority: Medium     Chagas cardiomyopathy 05/26/2016     Priority: Medium       PAST MEDICAL HISTORY:  Past Medical History:   Diagnosis Date     Chagas cardiomyopathy      Chronic systolic heart failure (H)      Diabetes (H)     Steroid induced; 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 suspected , but unclear dx       CURRENT MEDICATIONS:  Prescription Medications as of 4/10/2018             pravastatin (PRAVACHOL) 20 MG tablet Take 1 tablet (20 mg) by mouth every evening    mycophenolate (GENERIC EQUIVALENT) 250 MG capsule Take 6 capsules (1,500 mg) by mouth 2 times daily    aspirin 81 MG EC tablet Take 1 tablet (81 mg) by mouth daily    tacrolimus (GENERIC EQUIVALENT) 1 MG capsule Take 2 mg in the morning; take 1 mg in the evening.    benzoyl peroxide 5 % LOTN lotion Apply topically At Bedtime    ketoconazole (NIZORAL) 2 % shampoo Apply topically three times a week Alternate with Head and Shoulders.    multivitamin, therapeutic with minerals (THERA-VIT-M) TABS tablet Take 1 tablet by mouth daily    guaiFENesin-dextromethorphan (ROBITUSSIN DM) 100-10 MG/5ML syrup Take 5 mLs by mouth every 4 hours as needed for cough    fluticasone (FLONASE) 50 MCG/ACT spray Spray 1 spray into both nostrils daily      Facility Administered Medications as of 4/10/2018             lidocaine (LMX4) kit Apply topically once as needed for mild pain          ROS:   Constitutional: No fever, chills, or sweats. No weight gain/loss.   ENT: No visual disturbance, ear ache, epistaxis, sore throat.   Allergies/Immunologic:  "Negative.   Respiratory: No cough, hemoptysis.   Cardiovascular: As per HPI.   GI: No nausea, vomiting, hematemesis, melena, or hematochezia.   : No urinary frequency, dysuria, or hematuria.   Integument: Negative.   Psychiatric: Negative.   Neuro: Negative.   Endocrinology: Negative.   Musculoskeletal: Negative    Exam:  /72 (BP Location: Right arm, Patient Position: Chair, Cuff Size: Adult Regular)  Pulse 88  Ht 1.69 m (5' 6.53\")  Wt 71.1 kg (156 lb 11.2 oz)  SpO2 99%  BMI 24.89 kg/m2  In general, the patient is a pleasant male in no apparent distress.    HEENT: NC/AT. PERRLA. EOMI.  Sclerae white, not injected.    Neck:  No adenopathy, No thyromegaly.    COR: No jugular venous distention when sitting upright.  RRR.  Normal S1 S2 splits physiologically.  No murmur, rub click, or gallop.    Lungs:  CTA. No rhonchi.    Abdomen: soft, nontender, nondistended.  No organomegaly.  Extremities:  No clubbing, cyanosis, or LE edema.    Neuro: Alert & Oriented x 3, grossly non focal.    Labs:  CBC RESULTS:   Lab Results   Component Value Date    WBC 4.2 04/10/2018    RBC 4.62 04/10/2018    HGB 13.6 04/10/2018    HCT 42.6 04/10/2018    MCV 92 04/10/2018    MCH 29.4 04/10/2018    MCHC 31.9 04/10/2018    RDW 13.2 04/10/2018     04/10/2018       BMP RESULTS:  Lab Results   Component Value Date     04/10/2018    POTASSIUM 4.1 04/10/2018    CHLORIDE 110 (H) 04/10/2018    CO2 24 04/10/2018    ANIONGAP 7 04/10/2018    GLC 86 04/10/2018    BUN 18 04/10/2018    CR 0.92 04/10/2018    GFRESTIMATED 88 04/10/2018    GFRESTBLACK >90 04/10/2018    DAISY 9.2 04/10/2018      LIPID RESULTS:  Lab Results   Component Value Date    CHOL 210 (H) 04/10/2018    HDL 55 04/10/2018     (H) 04/10/2018    TRIG 81 04/10/2018    NHDL 155 (H) 04/10/2018       IMMUNOSUPPRESSANT LEVELS  Lab Results   Component Value Date    TACROL 10.4 12/14/2017    DOSTAC Not Provided 12/14/2017       No components found for: CK  Lab Results "   Component Value Date    MAG 1.8 12/14/2017     Lab Results   Component Value Date    A1C 7.5 (H) 06/02/2017     Lab Results   Component Value Date    PHOS 2.9 12/14/2017     Lab Results   Component Value Date    NTBNPI 209 10/21/2017     Lab Results   Component Value Date    SAITESTMET [facility] 12/14/2017    SAICELL Class I 12/14/2017    [id number] None 12/14/2017    [id number] None 12/14/2017    SAIREPCOM  12/14/2017     Test performed by modified procedure. Serum heat inactivated and tested by   a modified ([place]) protocol including fetal calf serum addition.   High-risk, mfi >3,000. Mod-risk, mfi 500-3,000.        Lab Results   Component Value Date    SAIITESTME [facility] 12/14/2017    SAIICELL Class II 12/14/2017    [id number] None 12/14/2017    [id number] None 12/14/2017    SAIIREPCOM  12/14/2017     Test performed by modified procedure. Serum heat inactivated and tested by   a modified ([place]) protocol including fetal calf serum addition.   High-risk, mfi >3,000. Mod-risk, mfi 500-3,000.        Lab Results   Component Value Date    CSPEC Plasma, EDTA anticoagulant 12/14/2017       Diagnostic Studies:  cMRI:  12/2017  1. The LV is normal in cavity size and wall thickness. The global systolic function is normal. The LVEF is 57%. There are no regional wall motion abnormalities.  2. The RV is normal in cavity size. The global systolic function is mildly reduced. The RVEF is 48%.   3. There is bi-atrial enlargement secondary to heart transplantation.  4. There is no significant valvular disease.   5. Late gadolinium enhancement imaging shows no MI, fibrosis or infiltrative disease.   6. There is no pericardial effusion or thickening.  7.  There is no intracardiac thrombus.    RHC:  12/2017        Coronary angiogram:  12/2017  1. Both coronary arteries arise from their respective cusps.  2. Dominance: Right  3. The LM is without angiographic evidence of disease.   4. The LAD gives rise to septal perforators and a large  D1 and a small D2 and has no significant disease.    5. LCX gives rise to a small OM1 and OM2 vessels without significant stenosis  6. RCA (dominant) gives rise to PL branches and supplies PDA with no significant disease    Adequate anticoagulation was was obtained with UFH.  A fred wire was positioned in the LM.  A [place] [facility] catheter was advanced across the wire into the vessel.       The minimal lumen area of the LAD was 6.6 mm2.  The intima media thickness of the LAD was 0.2 mm.    Assessment and Plan:  Mr. Javi Bansal is a 47yr old male with a history of Chagas cardiomyopathy (diagnosed in 2010, treated with nifurtimox for 3 months) and biventricular systolic heart failure (LVEF 15-20% since 2015, on home milrinone since 8/2016), now s/p OHT 12/12/16, who presents to clinic for routine surveillance.  He is accompanied by a .    NICM, s/p OHT 12/12/16  His postoperative course was complicated by rejection; respiratory failure and JOSE LUIS, which resolved with diuresis; surgical blood loss anemia, s/p 1u PRBCs 12/17/16; right pleural effusion, requiring pigtail placement 12/17/16; atrial and ventricular tachyarrhythmias; and Chagas reactivation 12/26, treated with benznidasole per Transplant ID.       He was started on azathioprine postoperatively due to reports of better outcomes.  His initial biopsy on 12/19/16 showed 2R ACR, which was treated with IV steroids.  Repeat biopsy 12/27/16 showed 2R/3A ACR, which was treated with IV steroids and thymoglobulin.  He had another episode of 2R ACR 2/10/17, which was treated with steroids.      His biopsies have since remained negative for significant rejection.  His graft function remained stable throughout his rejection episodes, and continues to remain stable with LVEF 57% and RVEF 48% per cMRI 12/2017.  Coronary angiogram 12/2017 showed no angiographic evidence of obstructive CAV, and RHC showed normal biventricular filling  pressures with RA 3, PCW 10, CI 3.8.    Mr. Iglesia Shetty continues to do well following his transplant.  His weight, volume status, and BP remain stable.  Labs today show stable electrolytes, kidney function, liver function, and blood counts.  His cholesterol panel showed elevated total cholesterol and LDL, so will d/c pravastatin and start crestor 10mg daily.  Plan to repeat FLP in 4-6 months.    Change in immunosuppression: yes  Reason for Change: adjust tacro dosage to keep level within goal range  Other Changes:    - d/c pravastatin   - start crestor 10mg daily   - repeat FLP in 4-6 months  Follow-Up: per protocol    Next Biopsy: per protocol  Next Angiogram: 12/2018  Next Angiogram with IVUS: yes  Next Stress Test: per protocol      Low-level CMV Viremia  Levels remain low, plans to treat if escalates beyond 1500IU per ID.  Last levels checked 12/2017 were <137.       Chagas   Chagas reactivated following his transplant (12/26/16).  He was treated with Benznidazole for 60 days, and Transplant ID noted that his heart has not yet been reinfected at this time.  Chagas PCRs have remained negative.   Transplant ID remains available if needed.      Latent TB  Treated with INH, per Transplant ID.    The above was reviewed with Mr. Iglesia Shetty via an .  He verbalized understanding and will call with further questions/concerns.    Mary Huffman, DNP, APRN, FNP-BC  Advanced Heart Failure Nurse Practitioner  [facility]  [phone]      CC  Patient Care Team:  No Ref-Primary, Physician as PCP - General  Broderick Gao MD, MD as MD (Cardiology)  Shauna Cuellar MD as MD (Infectious Diseases)  Marino Woo MD as MD (Dermatology)  Bryant Bartholomew MD as MD (Family Medicine - Sports Medicine)  Isabela Balbuena MD (Ophthalmology)  SELF, REFERRED

## 2018-04-10 NOTE — MR AVS SNAPSHOT
After Visit Summary   4/10/2018    Javi Bansal    MRN: [id number]           Patient Information     Date Of Birth          1970        Visit Information        Provider Department      4/10/2018 12:30 PM Loreta Red; Mary Huffman NP [facility]        Today's Diagnoses     Heart replaced by transplant (H)          Care Instructions    You were seen in clinic for routine 16 month post transplant evaluation. Available results appear to show normal kidney function and your blood counts are normal and stable    Medication changes  Finish your supply of  Pravastatin, then STOP taking this medication.  START taking 10 mg Crestor (rosuvostatin) daily when you are done with Pravastatin.  INCREASE tacrolimus to 2 mg in the morning and 2 mg in the evening.    Follow up:  Please recheck another 12 hour tacrolimus level in the [facility] on Friday, 4/20 at 4:00 PM  Coordinator will update you with pending test results and if any changes are needed.  Coordinator will contact Transplant Social Work regarding questions about your residency status.  Your next routine post heart transplant appointments are scheduled for August 17, 2018.  Please contact transplant coordinator with any questions/concerns.          Follow-ups after your visit        Your next 10 appointments already scheduled     Apr 20, 2018  4:00 PM CDT   LAB with  LAB    Health Lab ([facility])    [address] [place] [address] [phone]           Please do not eat 10-12 hours before your appointment if you are coming in fasting for labs on lipids, cholesterol, or glucose (sugar). This does not apply to pregnant women. Water, hot tea and black coffee (with nothing added) are okay. Do not drink other fluids, diet soda or chew gum.            Jun 13, 2018 10:15 AM CDT   RETURN RETINA with Isabela Balbuena MD   Eye Clinic ([facility])    [place] [address]  [address]  [facility] [address]   [phone]            Aug 17, 2018  9:30 AM CDT   LAB with UU LAB GOLD WAITING   [facility])    [address] [address]              Please do not eat 10-12 hours before your appointment if you are coming in fasting for labs on lipids, cholesterol, or glucose (sugar). This does not apply to pregnant women. Water, hot tea and black coffee (with nothing added) are okay. Do not drink other fluids, diet soda or chew gum.            Aug 17, 2018 10:00 AM CDT   Procedure - 2.5 hour with U2A ROOM 17   [address] [facility] ([facility])    [address] [address]               Aug 17, 2018 11:00 AM CDT   Heart Cath Heart Biopsy with [id number]   [facility] ([facility])    [address] [id number] [phone]            Aug 17, 2018  2:30 PM CDT   (Arrive by 2:15 PM)   RETURN HEART TRANSPLANT with SAMM Alfaro CNP   [facility] ([facility])    [address]  [address]  [facility] [id number] [phone]              Who to contact     If you have questions or need follow up information about today's clinic visit or your schedule please contact [facility] directly at [phone].  Normal or non-critical lab and imaging results will be communicated to you by MyChart, letter or phone within 4 business days after the clinic has received the results. If you do not hear from us within 7 days, please contact the clinic through MyChart or phone. If you have a critical or abnormal lab result, we will notify you by phone as soon as possible.  Submit refill requests through [email or web address] or call your pharmacy and they will forward the refill request  "to us. Please allow 3 business days for your refill to be completed.          Additional Information About Your Visit        [employer] Information     [employer] lets you send messages to your doctor, view your test results, renew your prescriptions, schedule appointments and more. To sign up, go to [email or web address] . Click on \"Log in\" on the left side of the screen, which will take you to the Welcome page. Then click on \"Sign up Now\" on the right side of the page.     You will be asked to enter the access code listed below, as well as some personal information. Please follow the directions to create your username and password.     Your access code is: [email or web address]  Expires: 6/3/2018 10:21 PM     Your access code will  in 90 days. If you need help or a new code, please call your [place] clinic or [phone].        Care EveryWhere ID     This is your Care EveryWhere ID. This could be used by other organizations to access your [place] medical records  [id number]        Your Vitals Were     Pulse Height Pulse Oximetry BMI (Body Mass Index)          88 1.69 m (5' 6.53\") 99% 24.89 kg/m2         Blood Pressure from Last 3 Encounters:   04/10/18 108/72   04/10/18 120/79   17 136/88    Weight from Last 3 Encounters:   04/10/18 71.1 kg (156 lb 11.2 oz)   17 69.6 kg (153 lb 6.4 oz)   17 72.6 kg (160 lb 0.9 oz)              We Performed the Following     Surgical pathology exam          Today's Medication Changes          These changes are accurate as of 4/10/18  2:03 PM.  If you have any questions, ask your nurse or doctor.               Start taking these medicines.        Dose/Directions    rosuvastatin 10 MG tablet   Commonly known as:  CRESTOR   Used for:  Heart replaced by transplant (H)        Dose:  10 mg   Take 1 tablet (10 mg) by mouth daily   Quantity:  90 tablet   Refills:  3         These medicines have changed or have updated prescriptions.        Dose/Directions    tacrolimus 1 " MG capsule   Commonly known as:  GENERIC EQUIVALENT   This may have changed:    - how much to take  - how to take this  - when to take this  - additional instructions   Used for:  Heart replaced by transplant (H)        Dose:  2 mg   Take 2 capsules (2 mg) by mouth 2 times daily   Quantity:  120 capsule   Refills:  11         Stop taking these medicines if you haven't already. Please contact your care team if you have questions.     pravastatin 20 MG tablet   Commonly known as:  PRAVACHOL                Where to get your medicines      These medications were sent to [facility] - [place], [place] - [address]  [address] [place] [address]    Hours:  Mon-Fri 8:30am-5:00pm Toll Free [phone] Phone:  [phone]     rosuvastatin 10 MG tablet    tacrolimus 1 MG capsule                Primary Care Provider Fax #    Physician No Ref-Primary [phone]       No address on file        Equal Access to Services     TYRON [facility] Hospital[facility]ERT[facility] : Hadii bharati sortoo Sovanessa, waaxda luqjuan, qaybta kaalmada adenicole, talisha garcia . So [facility] [phone].    ATENCIÓN: Si habla español, tiene a watt disposición servicios gratuitos de asistencia lingüística. Bridger al [phone].    We comply with applicable federal civil rights laws and [place] laws. We do not discriminate on the basis of race, color, national origin, age, disability, sex, sexual orientation, or gender identity.            Thank you!     Thank you for choosing [facility]  for your care. Our goal is always to provide you with excellent care. Hearing back from our patients is one way we can continue to improve our services. Please take a few minutes to complete the written survey that you may receive in the mail after your visit with us. Thank you!             Your Updated Medication List - Protect others around you: Learn how to safely use, store and throw away your medicines at  www.disposemymeds.org.          This list is accurate as of 4/10/18  2:03 PM.  Always use your most recent med list.                   Brand Name Dispense Instructions for use Diagnosis    aspirin 81 MG EC tablet     90 tablet    Take 1 tablet (81 mg) by mouth daily    Heart replaced by transplant (H)       benzoyl peroxide 5 % Lotn lotion     30 mL    Apply topically At Bedtime    Other acne       fluticasone 50 MCG/ACT spray    FLONASE    1 Bottle    Spray 1 spray into both nostrils daily    Influenza-like illness       guaiFENesin-dextromethorphan 100-10 MG/5ML syrup    ROBITUSSIN DM    560 mL    Take 5 mLs by mouth every 4 hours as needed for cough    Steroid-induced diabetes mellitus (H)       ketoconazole 2 % shampoo    NIZORAL    120 mL    Apply topically three times a week Alternate with Head and Shoulders.    Seborrhea       multivitamin, therapeutic with minerals Tabs tablet     30 each    Take 1 tablet by mouth daily    Heart replaced by transplant (H)       mycophenolate 250 MG capsule    GENERIC EQUIVALENT    360 capsule    Take 6 capsules (1,500 mg) by mouth 2 times daily    Heart replaced by transplant (H)       rosuvastatin 10 MG tablet    CRESTOR    90 tablet    Take 1 tablet (10 mg) by mouth daily    Heart replaced by transplant (H)       tacrolimus 1 MG capsule    GENERIC EQUIVALENT    120 capsule    Take 2 capsules (2 mg) by mouth 2 times daily    Heart replaced by transplant (H)

## 2018-04-10 NOTE — DISCHARGE INSTRUCTIONS
[facility]                        Interventional Cardiology  Discharge Instructions   Post Right Heart Cath      AFTER YOU GO HOME:    DO drink plenty of fluids    DO resume your regular diet and medications unless otherwise instructed by your Primary Physician    Do Not scrub the procedure site vigorously    No lotion or powder to the puncture site for 3 days    CALL YOUR PRIMARY PHYSICIAN IF: You may resume all normal activity.  Monitor neck site for bleeding, swelling, or voice changes. If you notice bleeding or swelling immediately apply pressure to the site and call number below to speak with Cardiology Fellow.  If you experience any changes in your breathing you should call your doctor immediately or come to the closest Emergency Department.  Do not drive yourself.    ADDITIONAL INSTRUCTIONS: Medications: You are to resume all home medications including anticoagulation therapy unless otherwise advised by your primary cardiologist or nurse coordinator.    Follow Up: Per your primary cardiology team    If you have any questions or concerns regarding your procedure site please call [phone] at anytime and ask for Cardiology Fellow on call.  They are available 24 hours a day.  You may also contact the Cardiology Clinic after hours number at [phone].                                                       Telephone Numbers [phone] Monday-Friday 8:00 am to 4:30 pm    [phone] After 4:30 pm Monday-Friday, Weekends & Holidays  Ask for Interventional Cardiologist on call. Someone is on call 24 hours/day   [facility] toll free number [phone] Monday-Friday 8:00 am to 4:30 pm   [facility] Emergency Dept [phone]

## 2018-04-10 NOTE — IP AVS SNAPSHOT
MRN:[id number]                      After Visit Summary   4/10/2018    Javi Bansal    MRN: [id number]           Visit Information        Department      4/10/2018  9:14 AM [address] [facility]          Review of your medicines      UNREVIEWED medicines. 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)        Dose:  81 mg   Take 1 tablet (81 mg) by mouth daily   Quantity:  90 tablet   Refills:  3       benzoyl peroxide 5 % Lotn lotion   Used for:  Other acne        Apply topically At Bedtime   Quantity:  30 mL   Refills:  3       fluticasone 50 MCG/ACT spray   Commonly known as:  FLONASE   Used for:  Influenza-like illness        Dose:  1 spray   Spray 1 spray into both nostrils daily   Quantity:  1 Bottle   Refills:  1       gabapentin 300 MG capsule   Commonly known as:  NEURONTIN        Dose:  300 mg   Take 1 capsule (300 mg) by mouth 3 times daily Begin by taking one capsule once per day, the next day you may take 1 capsule twice per day and on the 3rd day begin taking 1 capsule 3 times per day.   Quantity:  90 capsule   Refills:  1       guaiFENesin-dextromethorphan 100-10 MG/5ML syrup   Commonly known as:  ROBITUSSIN DM   Used for:  Steroid-induced diabetes mellitus (H)        Dose:  5 mL   Take 5 mLs by mouth every 4 hours as needed for cough   Quantity:  560 mL   Refills:  1       ketoconazole 2 % shampoo   Commonly known as:  NIZORAL   Used for:  Seborrhea        Apply topically three times a week Alternate with Head and Shoulders.   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   Quantity:  90 capsule   Refills:  11                Protect others around you: Learn how to safely use, store and throw away your medicines at www.disposemymeds.org.         Follow-ups after your visit        Your next 10 appointments already scheduled     Apr 10, 2018 10:30 AM CDT   Heart Cath Heart Biopsy with UUHCVR3   [facility] ([facility], [facility])    [address] [facility] [address]   [phone]            Apr 10, 2018  1:45 PM CDT   RETURN HEART TRANSPLANT with Mary Huffman NP   [facility] Heart [facility] ([facility])    [address] [place] [address]  [address]  [facility] [address]   [phone]            Jun 13, 2018 10:15 AM CDT   RETURN RETINA with Isabela Balbuena MD   [facility] ([facility])    [address]  [address] [address]  [facility] [address]   [phone]            Aug 17, 2018  9:30 AM CDT   LAB with UU LAB GOLD WAITING   [facility] ([facility], [facility])    [address] [place] [id number]              Please do not eat 10-12 hours before your appointment if you are coming in fasting for labs on lipids, cholesterol, or glucose (sugar). This does not apply to pregnant women. Water, hot tea and black coffee (with nothing added) are okay. Do not drink other fluids, diet soda or chew gum.            Aug 17, 2018  10:00 AM CDT   Procedure - 2.5 hour with U2A ROOM 17   [address] [facility] ([facility], [facility])    [address] [facility] [address]               Aug 17, 2018 11:00 AM CDT   Heart Cath Heart Biopsy with UUHCVR4   [facility]  [facility] ([facility])    [address] [facility] [address] [phone]            Aug 17, 2018  2:30 PM CDT   (Arrive by 2:15 PM)   RETURN HEART TRANSPLANT with SAMM Alfaro [facility] Heart [facility] ([facility])    [address]  [address]  [facility] [address] [phone]               Care Instructions        Further instructions from your care team       [facility]                        Interventional Cardiology  Discharge Instructions   Post Right Heart Cath      AFTER YOU GO HOME:    DO drink plenty of fluids    DO resume your regular diet and medications unless otherwise instructed by your Primary Physician    Do Not scrub the procedure site vigorously    No lotion or powder to the puncture site for 3 days    CALL YOUR PRIMARY PHYSICIAN IF: You may resume all normal activity.  Monitor neck site for bleeding, swelling, or voice changes. If you notice bleeding or swelling immediately apply pressure to the site and call number below to speak with Cardiology Fellow.  If you experience any changes in your breathing you should call your doctor immediately or come to the closest Emergency Department.  Do not drive yourself.    ADDITIONAL INSTRUCTIONS: Medications: You are to resume all home medications including anticoagulation therapy unless otherwise advised by your primary cardiologist or nurse coordinator.    Follow Up: Per your primary cardiology team    If you have any questions or concerns regarding your procedure site please call [phone] at anytime and ask for Cardiology Fellow on call.  They are available 24  "hours a day.  You may also contact the Cardiology Clinic after hours number at [phone].                                                       Telephone Numbers [phone] Monday-Friday 8:00 am to 4:30 pm    [phone] After 4:30 pm Monday-Friday, Weekends & Holidays  Ask for Interventional Cardiologist on call. Someone is on call 24 hours/day   [facility] toll free number [phone] Monday-Friday 8:00 am to 4:30 pm   [facility] Emergency Dept [phone]                    Additional Information About Your Visit        [employer] Information     [employer] lets you send messages to your doctor, view your test results, renew your prescriptions, schedule appointments and more. To sign up, go to [email or web address] . Click on \"Log in\" on the left side of the screen, which will take you to the Welcome page. Then click on \"Sign up Now\" on the right side of the page.     You will be asked to enter the access code listed below, as well as some personal information. Please follow the directions to create your username and password.     Your access code is: [email or web address]  Expires: 6/3/2018 10:21 PM     Your access code will  in 90 days. If you need help or a new code, please call your [facility] clinic or [phone].        Care EveryWhere ID     This is your Care EveryWhere ID. This could be used by other organizations to access your [facility] medical records  [id number]         Primary Care Provider Fax #    Physician No Ref-Primary [phone]      Equal Access to Services     [facility]: Hadii bharati hicks Sovanessa, waaxda luqadaha, qaybta kaalmalukas leone, talisha garcia . So [facility] [phone].    ATENCIÓN: Si habla español, tiene a watt disposición servicios gratuitos de asistencia lingüística. Llame al [phone].    We comply with applicable federal civil rights laws and [place] laws. We do not discriminate on the basis of race, color, national origin, age, " disability, sex, sexual orientation, or gender identity.            Thank you!     Thank you for choosing [facility] for your care. Our goal is always to provide you with excellent care. Hearing back from our patients is one way we can continue to improve our services. Please take a few minutes to complete the written survey that you may receive in the mail after you visit with us. Thank you!             Medication List: This is a list of all your medications and when to take them. Check marks below indicate your daily home schedule. Keep this list as a reference.      Medications           Morning Afternoon Evening Bedtime As Needed    aspirin 81 MG EC tablet   Take 1 tablet (81 mg) by mouth daily                                benzoyl peroxide 5 % Lotn lotion   Apply topically At Bedtime                                fluticasone 50 MCG/ACT spray   Commonly known as:  FLONASE   Spray 1 spray into both nostrils daily                                gabapentin 300 MG capsule   Commonly known as:  NEURONTIN   Take 1 capsule (300 mg) by mouth 3 times daily Begin by taking one capsule once per day, the next day you may take 1 capsule twice per day and on the 3rd day begin taking 1 capsule 3 times per day.                                guaiFENesin-dextromethorphan 100-10 MG/5ML syrup   Commonly known as:  ROBITUSSIN DM   Take 5 mLs by mouth every 4 hours as needed for cough                                ketoconazole 2 % shampoo   Commonly known as:  NIZORAL   Apply topically three times a week Alternate with Head and Shoulders.                                multivitamin, therapeutic with minerals Tabs tablet   Take 1 tablet by mouth daily                                mycophenolate 250 MG capsule   Commonly known as:  GENERIC EQUIVALENT   Take 6 capsules (1,500 mg) by mouth 2 times daily                                pravastatin 20 MG tablet   Commonly known as:  PRAVACHOL   Take 1 tablet (20 mg) by mouth every  evening                                tacrolimus 1 MG capsule   Commonly known as:  GENERIC EQUIVALENT   Take 2 mg in the morning; take 1 mg in the evening.

## 2018-04-10 NOTE — PROGRESS NOTES
Discharge instructions given and pt voiced understanding. No scripts needed from pharmacy. Right neck biopsy site is soft and flat. No hematoma. Up walking in room. Ate well for lunch. Urinating adequate amounts. No complaint of discomfort. Adequate for discharge. Discharged to home.

## 2018-04-10 NOTE — IP AVS SNAPSHOT
[address] [address]                                       After Visit Summary   4/10/2018    Javi Bansal    MRN: [id number]           After Visit Summary Signature Page     I have received my discharge instructions, and my questions have been answered. I have discussed any challenges I see with this plan with the nurse or doctor.    ..........................................................................................................................................  Patient/Patient Representative Signature      ..........................................................................................................................................  Patient Representative Print Name and Relationship to Patient    ..................................................               ................................................  Date                                            Time    ..........................................................................................................................................  Reviewed by Signature/Title    ...................................................              ..............................................  Date                                                            Time

## 2018-04-10 NOTE — PROGRESS NOTES
Returned to unit following RHC. Rt neck dressing is dry and intact. No sedation. Alert and orient. No complaint of nausea or discomfort. Respiratory status stable. Vital signs within normal limits.

## 2018-04-10 NOTE — PROCEDURES
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     Access: 7 Fr sheath in RIJ obtained without complications using micropuncture access, ultrasound, and fluoroscopic guidance  A 5.5fr argon JAWZ bioptome was introduced into the RV midseptum and a total of 5 endomyocardial biopsy pieces were obtained. The samples were sent for pathology.   Pre CVP 1-2  Post CVP 2    Conclusions: Endomyocardial Biopsy - results pending     Recommendations: Await biopsy results. Discharge to transplant clinic.     Jacinto Reis MD  Advanced Heart Failure Cardiologist

## 2018-04-11 ENCOUNTER — TELEPHONE (OUTPATIENT)
Dept: TRANSPLANT | Facility: CLINIC | Age: 48
End: 2018-04-11

## 2018-04-11 LAB — COPATH REPORT: NORMAL

## 2018-04-11 NOTE — TELEPHONE ENCOUNTER
Called patient to review recent heart bx: grade 0R, neg/neg.  Reminded patient to have 12 hour FK level on 4/20 at 4:00 in the [facility].  Confirmed having messaged SW regarding patient's residency status. Patient verbalizes understanding plan of care and agrees to follow.   services used throughout this phone call.

## 2018-04-12 LAB — ALLOMAP: NORMAL

## 2018-04-20 DIAGNOSIS — B57.2 CHAGAS CARDIOMYOPATHY: ICD-10-CM

## 2018-04-20 DIAGNOSIS — B25.8 OTHER CYTOMEGALOVIRAL DISEASES (H): ICD-10-CM

## 2018-04-20 LAB — MISCELLANEOUS TEST: NORMAL

## 2018-04-22 LAB
LOCATION PERFORMED: NORMAL
NORMAL RANGE FOR SEND OUTS MISC TEST: NORMAL
RESULT: NORMAL
SEND OUTS MISC TEST CODE: NORMAL
SEND OUTS MISC TEST SPECIMEN: NORMAL
TEST NAME: NORMAL

## 2018-04-26 ENCOUNTER — TELEPHONE (OUTPATIENT)
Dept: TRANSPLANT | Facility: CLINIC | Age: 48
End: 2018-04-26

## 2018-04-26 NOTE — TELEPHONE ENCOUNTER
After being contacted by [facility], GIULIANA on patient's cell phone requesting call back.  It appears the patient may be out of some of his medications soon and the [facility] pharmacy has been unable to contact him.     Awaiting call back.  [facility] patient line: [phone].

## 2018-04-27 ENCOUNTER — APPOINTMENT (OUTPATIENT)
Dept: LAB | Facility: CLINIC | Age: 48
End: 2018-04-27
Payer: COMMERCIAL

## 2018-04-27 LAB — T CRUZI AB SER DONR QL: ABNORMAL

## 2018-05-01 ENCOUNTER — TELEPHONE (OUTPATIENT)
Dept: TRANSPLANT | Facility: CLINIC | Age: 48
End: 2018-05-01

## 2018-05-01 DIAGNOSIS — Z94.1 HEART REPLACED BY TRANSPLANT (H): Primary | ICD-10-CM

## 2018-05-01 NOTE — TELEPHONE ENCOUNTER
Called patient to confirm that he is still taking/needing calcium supplement. Patient confirms having contacted [facility] to request refill: 1 tablet daily.  Patient reports he is feeling well and ask if there has been any progress regarding assistance with patient's residency status.  Confirmed that I have spoken with SW and will trying to find the best resource to assist with his residency questions.  Patient verbalizes understanding plan of care and agrees to follow.

## 2018-05-11 LAB — LAB SCANNED RESULT: ABNORMAL

## 2018-06-13 ENCOUNTER — OFFICE VISIT (OUTPATIENT)
Dept: OPHTHALMOLOGY | Facility: CLINIC | Age: 48
End: 2018-06-13
Attending: OPHTHALMOLOGY
Payer: COMMERCIAL

## 2018-06-13 DIAGNOSIS — Z13.5 SCREENING FOR DIABETIC RETINOPATHY: Primary | ICD-10-CM

## 2018-06-13 DIAGNOSIS — H11.043 STATIONARY PERIPHERAL PTERYGIUM OF BOTH EYES: ICD-10-CM

## 2018-06-13 PROCEDURE — 92015 DETERMINE REFRACTIVE STATE: CPT | Mod: GY,ZF

## 2018-06-13 PROCEDURE — T1013 SIGN LANG/ORAL INTERPRETER: HCPCS | Mod: U3,ZF | Performed by: OPHTHALMOLOGY

## 2018-06-13 PROCEDURE — G0463 HOSPITAL OUTPT CLINIC VISIT: HCPCS | Mod: ZF

## 2018-06-13 ASSESSMENT — SLIT LAMP EXAM - LIDS
COMMENTS: NORMAL
COMMENTS: NORMAL

## 2018-06-13 ASSESSMENT — REFRACTION_MANIFEST
OS_CYLINDER: +0.50
OS_SPHERE: PLANO
OS_ADD: +1.75
OS_AXIS: 045
OD_SPHERE: PLANO
OD_ADD: +1.75
OD_AXIS: 125
OD_CYLINDER: +0.50

## 2018-06-13 ASSESSMENT — VISUAL ACUITY
OS_SC: 20/25
OD_SC: 20/25
OS_SC+: -2
METHOD: SNELLEN - LINEAR

## 2018-06-13 ASSESSMENT — CUP TO DISC RATIO
OS_RATIO: 0.2
OD_RATIO: 0.2

## 2018-06-13 ASSESSMENT — TONOMETRY
OS_IOP_MMHG: 22
OD_IOP_MMHG: 24
IOP_METHOD: TONOPEN

## 2018-06-13 ASSESSMENT — CONF VISUAL FIELD
OS_NORMAL: 1
OD_NORMAL: 1
METHOD: COUNTING FINGERS

## 2018-06-13 ASSESSMENT — EXTERNAL EXAM - LEFT EYE: OS_EXAM: NORMAL

## 2018-06-13 ASSESSMENT — EXTERNAL EXAM - RIGHT EYE: OD_EXAM: NORMAL

## 2018-06-13 NOTE — NURSING NOTE
Chief Complaints and History of Present Illnesses   Patient presents with     Eye Exam For Diabetes     HPI    Affected eye(s):  Both   Symptoms:        Frequency:  Constant       Do you have eye pain now?:  No      Comments:  States that the near va is poor.  Dist is good  No F&F   +stinging   BS does not check and states that he no longer has DM  Lab Results       Component                Value               Date                       A1C                      7.5                 06/02/2017                 A1C                      11.6                04/25/2017                 A1C                      5.4                 12/11/2016                 A1C                      5.8                 06/18/2016        Cecilia Quintanilla COT 10:32 AM June 13, 2018

## 2018-06-13 NOTE — PROGRESS NOTES
I have confirmed the patient's and reviewed Past Medical History, Past Surgical History, Social History, Family History, Problem List, Medication List and agree with Tech note.    CC: blurry vision both eyes     HPI: Diabetes mellitus  Transient after heart transplant per patient      Assessment/plan:   1.  Diabetes mellitus no nonproliferative diabetic retinopathy     Blood glucose control   Last HbA1C is 5.4   Monitor     2.  Pterygium both eyes    Defer surgery to avoid recurrence, not covering pupil    Sunglasses when outside    3.  Presbyopia   Over the counter reading glasses     RTC one year with Anibal Smith MD PhD.  Professor & Chair

## 2018-06-13 NOTE — MR AVS SNAPSHOT
After Visit Summary   6/13/2018    Javi Bansal    MRN: [id number]           Patient Information     Date Of Birth          1970        Visit Information        Provider Department      6/13/2018 10:00 AM Isabela Balbuena MD; [facility] LANGUAGE SERVICES [facility]        Today's Diagnoses     Screening for diabetic retinopathy    -  1    Stationary peripheral pterygium of both eyes          Care Instructions    Future Appointments  Date Time Provider Department Center   8/17/2018 9:30 AM UU LAB GOLD WAITING U[facility]OPLB [place] [facility]   8/17/2018 10:00 AM U2A ROOM 17 U[facility] [place] [facility]   8/17/2018 11:00 AM [facility] [place] [facility]   8/17/2018 2:30 PM Vanessa Hu, APRN [facility]   6/20/2019 2:00 PM Anibal Cox MD [facility] CLIN               Follow-ups after your visit        Follow-up notes from your care team     Return in about 1 year (around 6/13/2019) for pterygium ou .      Your next 10 appointments already scheduled     Aug 17, 2018  9:30 AM CDT   LAB with UU LAB GOLD WAITING   [facility]    [address] [facility] [address]              Please do not eat 10-12 hours before your appointment if you are coming in fasting for labs on lipids, cholesterol, or glucose (sugar). This does not apply to pregnant women. Water, hot tea and black coffee (with nothing added) are okay. Do not drink other fluids, diet soda or chew gum.            Aug 17, 2018 10:00 AM CDT   Procedure - 2.5 hour with U2A ROOM 17   Unit 2A [facility] ([facility])    [address] [facility] [address]               Aug 17, 2018 11:00 AM CDT   Heart Cath Heart Biopsy with UUHCVR4   [facility]  [facility] ([facility])    [address] [address] [phone]            Aug 17, 2018  2:30  PM CDT   (Arrive by 2:15 PM)   RETURN HEART TRANSPLANT with SAMM Alfaro [facility] ([facility] [place])    [address] [facility]  [address] [place] [address] [phone]              Who to contact     Please call your clinic at [phone] to:    Ask questions about your health    Make or cancel appointments    Discuss your medicines    Learn about your test results    Speak to your doctor            Additional Information About Your Visit        MyChart Information     easy2comply ([employer]) is an electronic gateway that provides easy, online access to your medical records. With easy2comply ([employer]), you can request a clinic appointment, read your test results, renew a prescription or communicate with your care team.     To sign up for easy2comply ([employer]) visit the website at [email or web address]   You will be asked to enter the access code listed below, as well as some personal information. Please follow the directions to create your username and password.     Your access code is: [email or web address]  Expires: 9/10/2018  6:30 AM     Your access code will  in 90 days. If you need help or a new code, please contact your [facility] or call [phone] for assistance.        Care EveryWhere ID     This is your Care EveryWhere ID. This could be used by other organizations to access your [place] medical records  [id number]         Blood Pressure from Last 3 Encounters:   04/10/18 108/72   04/10/18 120/79   17 136/88    Weight from Last 3 Encounters:   04/10/18 71.1 kg (156 lb 11.2 oz)   17 69.6 kg (153 lb 6.4 oz)   17 72.6 kg (160 lb 0.9 oz)              Today, you had the following     No orders found for display       Primary Care Provider Fax #    Physician No Ref-Primary [phone]       No address on file        Equal Access to Services     JASWINDER KHAN : Aniya Vuong, brenda humphreys, talisha rodriguez  bartolosamanthabridgett stacyantonio rommelsekou latabbybridgett ah. So [facility] [phone].    ATENCIÓN: Si jarrod rain, tiene a watt disposición servicios gratuitos de asistencia lingüística. Bridger al [phone].    We comply with applicable federal civil rights laws and [place] laws. We do not discriminate on the basis of race, color, national origin, age, disability, sex, sexual orientation, or gender identity.            Thank you!     Thank you for choosing EYE CLINIC  for your care. Our goal is always to provide you with excellent care. Hearing back from our patients is one way we can continue to improve our services. Please take a few minutes to complete the written survey that you may receive in the mail after your visit with us. Thank you!             Your Updated Medication List - Protect others around you: Learn how to safely use, store and throw away your medicines at www.disposemymeds.org.          This list is accurate as of 6/13/18 11:36 AM.  Always use your most recent med list.                   Brand Name Dispense Instructions for use Diagnosis    aspirin 81 MG EC tablet     90 tablet    Take 1 tablet (81 mg) by mouth daily    Heart replaced by transplant (H)       benzoyl peroxide 5 % Lotn lotion     30 mL    Apply topically At Bedtime    Other acne       calcium carbonate-vitamin D 500-400 MG-UNIT Tabs per tablet     90 tablet    Take 1 tablet by mouth daily    Heart replaced by transplant (H)       fluticasone 50 MCG/ACT spray    FLONASE    1 Bottle    Spray 1 spray into both nostrils daily    Influenza-like illness       guaiFENesin-dextromethorphan 100-10 MG/5ML syrup    ROBITUSSIN DM    560 mL    Take 5 mLs by mouth every 4 hours as needed for cough    Steroid-induced diabetes mellitus (H)       ketoconazole 2 % shampoo    NIZORAL    120 mL    Apply topically three times a week Alternate with Head and Shoulders.    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

## 2018-06-15 ENCOUNTER — ALLIED HEALTH/NURSE VISIT (OUTPATIENT)
Dept: TRANSPLANT | Facility: CLINIC | Age: 48
End: 2018-06-15
Attending: INTERNAL MEDICINE
Payer: COMMERCIAL

## 2018-06-15 DIAGNOSIS — I50.82 BIVENTRICULAR HEART FAILURE (H): Primary | ICD-10-CM

## 2018-06-15 PROCEDURE — T1013 SIGN LANG/ORAL INTERPRETER: HCPCS | Mod: U3,ZF

## 2018-06-15 PROCEDURE — 40000724 ZZH UMP OPEN ENCOUNTER >70 DAYS

## 2018-06-29 ENCOUNTER — APPOINTMENT (OUTPATIENT)
Dept: TRANSPLANT | Facility: CLINIC | Age: 48
End: 2018-06-29
Attending: INTERNAL MEDICINE
Payer: COMMERCIAL

## 2018-06-29 PROCEDURE — T1013 SIGN LANG/ORAL INTERPRETER: HCPCS | Mod: U3,ZF

## 2018-07-02 ENCOUNTER — ALLIED HEALTH/NURSE VISIT (OUTPATIENT)
Dept: TRANSPLANT | Facility: CLINIC | Age: 48
End: 2018-07-02
Attending: INTERNAL MEDICINE
Payer: MEDICAID

## 2018-07-02 DIAGNOSIS — Z94.1 HEART REPLACED BY TRANSPLANT (H): Primary | ICD-10-CM

## 2018-07-02 PROCEDURE — T1013 SIGN LANG/ORAL INTERPRETER: HCPCS | Mod: U3,ZF

## 2018-07-02 NOTE — MR AVS SNAPSHOT
After Visit Summary   7/2/2018    Javi Bansal    MRN: [id number]           Patient Information     Date Of Birth          1970        Visit Information        Provider Department      7/2/2018 2:15 PM Malena Bishop LICSW; [facility] LANGUAGE SERVICES [facility] Solid Organ Transplant        Today's Diagnoses     Heart replaced by transplant (H)    -  1       Follow-ups after your visit        Your next 10 appointments already scheduled     Aug 17, 2018  9:30 AM CDT   LAB with UU LAB GOLD WAITING   [facility]    [address] [facility] [address]              Please do not eat 10-12 hours before your appointment if you are coming in fasting for labs on lipids, cholesterol, or glucose (sugar). This does not apply to pregnant women. Water, hot tea and black coffee (with nothing added) are okay. Do not drink other fluids, diet soda or chew gum.            Aug 17, 2018 10:00 AM CDT   Procedure - 2.5 hour with U2A ROOM 17   Unit 2A [facility] ([facility])    [address] [facility] [address]               Aug 17, 2018 11:00 AM CDT   Heart Cath Heart Biopsy with [facility]CVR4   [facility]    [address] [facility] [address] [phone]            Aug 17, 2018  2:30 PM CDT   (Arrive by 2:15 PM)   RETURN HEART TRANSPLANT with SAMM Alfaro CNP   [facility] ([facility])    [address]  [address]  [facility] [address] [phone]            Jun 20, 2019  2:00 PM CDT   NEW GENERAL with Anibal Cox MD   Eye Clinic ([facility])    [facility]  [address] [place] [address]  9th Fl Clin 9a  [facility] [id number] [phone]              Who to contact     If you have questions or need follow up information  "about today's clinic visit or your schedule please contact [facility] SOLID ORGAN TRANSPLANT directly at [phone].  Normal or non-critical lab and imaging results will be communicated to you by [employer], letter or phone within 4 business days after the clinic has received the results. If you do not hear from us within 7 days, please contact the clinic through [employer] or phone. If you have a critical or abnormal lab result, we will notify you by phone as soon as possible.  Submit refill requests through [employer] or call your pharmacy and they will forward the refill request to us. Please allow 3 business days for your refill to be completed.          Additional Information About Your Visit        Audi[employer] Information     [employer] lets you send messages to your doctor, view your test results, renew your prescriptions, schedule appointments and more. To sign up, go to [email or web address] . Click on \"Log in\" on the left side of the screen, which will take you to the Welcome page. Then click on \"Sign up Now\" on the right side of the page.     You will be asked to enter the access code listed below, as well as some personal information. Please follow the directions to create your username and password.     Your access code is: [email or web address]  Expires: 9/10/2018  6:30 AM     Your access code will  in 90 days. If you need help or a new code, please call your [place] clinic or [phone].        Care EveryWhere ID     This is your Care EveryWhere ID. This could be used by other organizations to access your [place] medical records  [id number]         Blood Pressure from Last 3 Encounters:   04/10/18 108/72   04/10/18 120/79   17 136/88    Weight from Last 3 Encounters:   04/10/18 71.1 kg (156 lb 11.2 oz)   17 69.6 kg (153 lb 6.4 oz)   17 72.6 kg (160 lb 0.9 oz)              Today, you had the following     No orders found for display       Primary Care Provider Fax #    Physician No Ref-Primary " [phone]       No address on file        Equal Access to Services     ANSONTYRON ERIN : Hadii aad ku hadninoska Bautistaali, osmanlukas curryanaha, david bonghillarylukas herreralorlukas, waxyasmine angel luis carneymadinaenrico harrell. So [facility] [phone].    ATENCIÓN: Si habla español, tiene a watt disposición servicios gratuitos de asistencia lingüística. Llame al [phone].    We comply with applicable federal civil rights laws and [place] laws. We do not discriminate on the basis of race, color, national origin, age, disability, sex, sexual orientation, or gender identity.            Thank you!     Thank you for choosing [facility] SOLID ORGAN TRANSPLANT  for your care. Our goal is always to provide you with excellent care. Hearing back from our patients is one way we can continue to improve our services. Please take a few minutes to complete the written survey that you may receive in the mail after your visit with us. Thank you!             Your Updated Medication List - Protect others around you: Learn how to safely use, store and throw away your medicines at www.disposemymeds.org.          This list is accurate as of 7/2/18  5:01 PM.  Always use your most recent med list.                   Brand Name Dispense Instructions for use Diagnosis    aspirin 81 MG EC tablet     90 tablet    Take 1 tablet (81 mg) by mouth daily    Heart replaced by transplant (H)       benzoyl peroxide 5 % Lotn lotion     30 mL    Apply topically At Bedtime    Other acne       calcium carbonate-vitamin D 500-400 MG-UNIT Tabs per tablet     90 tablet    Take 1 tablet by mouth daily    Heart replaced by transplant (H)       fluticasone 50 MCG/ACT spray    FLONASE    1 Bottle    Spray 1 spray into both nostrils daily    Influenza-like illness       guaiFENesin-dextromethorphan 100-10 MG/5ML syrup    ROBITUSSIN DM    560 mL    Take 5 mLs by mouth every 4 hours as needed for cough    Steroid-induced diabetes mellitus (H)       ketoconazole 2 % shampoo    NIZORAL     120 mL    Apply topically three times a week Alternate with Head and Shoulders.    Seborrhea       multivitamin, therapeutic with minerals Tabs tablet     30 each    Take 1 tablet by mouth daily    Heart replaced by transplant (H)       mycophenolate 250 MG capsule    GENERIC EQUIVALENT    360 capsule    Take 6 capsules (1,500 mg) by mouth 2 times daily    Heart replaced by transplant (H)       rosuvastatin 10 MG tablet    CRESTOR    90 tablet    Take 1 tablet (10 mg) by mouth daily    Heart replaced by transplant (H)       tacrolimus 1 MG capsule    GENERIC EQUIVALENT    120 capsule    Take 2 capsules (2 mg) by mouth 2 times daily    Heart replaced by transplant (H)

## 2018-07-02 NOTE — PROGRESS NOTES
"Transplant Social Work Services Progress Note      Date of Initial Social Work Evaluation: 2016  Collaborated with: Javi with     Data: Met with Javi due to questions on [place] (termed July 1[place] and his Immigration questions   Intervention: called [place], we were able to get both his and his wife's MA active at this time. They still need him to bring in copies of 1. Proof of his new address, 2. Copy of his work permit/ employment authorization card, 3. Verification of Income. She was able to look in the federal database and reports that Javi was deemed disabled in 2016, but is not receiving money d/t \"Misc Suspense\" She wasn't sure what that means, but was able to confirm that he'll be receiving Medicare on September 1, 2018. She recommends calling Senior Linkage Line to ask about options for supplements for non-citizens.   Called Immigration ([phone]). They report that currently it is taking 9-10 months wait time from the date that all of the paperwork was sent in to Immigration. Javi reports that he sent it into them 2-3 months ago. The Immigration employee reports that if Javi has his receipt from completing his fingerprints, his passport, and 2 passport style pictures to the local immigration office and they can then give him a hard copy of \"Temporary Evidence\" that would work for him to work and/or travel. We attempted to make an appointment online, but there were currently no appointments available. BERTHA gave instructions to Javi to go online and try again tomorrow.   Provided him with the phone number to [employer] [phone]. They have Hebrew speaking employees who would also be able to help with the [place] questions.     Assessment: Javi was very grateful for the assistance. He reports that he has someone who can help him make the appointment online. He reports that he can get the information to [facility].   Education provided by BERTHA: Forms needed to go to [facility] to keep insurance " active, process to get proof of Work Permit,   Plan:      Follow up Plan: SW will continue to be available

## 2018-07-16 ENCOUNTER — ALLIED HEALTH/NURSE VISIT (OUTPATIENT)
Dept: TRANSPLANT | Facility: CLINIC | Age: 48
End: 2018-07-16
Attending: INTERNAL MEDICINE
Payer: MEDICAID

## 2018-07-16 DIAGNOSIS — Z94.1 HEART REPLACED BY TRANSPLANT (H): Primary | ICD-10-CM

## 2018-07-16 PROCEDURE — T1013 SIGN LANG/ORAL INTERPRETER: HCPCS | Mod: U3,ZF

## 2018-07-16 NOTE — PROGRESS NOTES
Transplant Social Work Services Progress Note      Collaborated with: Javi with , [place] ([phone])    Data: Met with Javi to assist with [place] and [place] benefits  Intervention: Called [facility] ([phone]). Faxed statement of new address, Work Visa (Employment Authorization), and check stub from work to [employer] ([phone]). Per worker at [facility] she reports that Javi is eligible for straight [place] due to him being in [place] for more than 5 years (he's been here close to 20). She wasn't able to explain why he wasn't eligible for straight MA prior to now. Attempted to call Medicare Rights office; they close at 3pm. SW called Senior Linkage Line earlier in the day. Was informed that since he isn't a citizen or on a green card he wasn't eligible for Medicare or a supplement.   Assessment: Javi reports that he is going to work with a  for Immigration (Broderick Zaidi [phone] at the Law office of [facility]). He was thankful for the help with [place].   Education provided by BERTHA: [place] vs [place] vs [place] and how they all do or don't work together  Plan:    Follow up Plan: Plan to meet again on 8/1 at 2pm.

## 2018-07-16 NOTE — MR AVS SNAPSHOT
After Visit Summary   7/16/2018    Javi Bansal    MRN: [id number]           Patient Information     Date Of Birth          1970        Visit Information        Provider Department      7/16/2018 2:45 PM Saima Elmore; Malena Bishop LICSW [facility] Solid Organ Transplant        Today's Diagnoses     Heart replaced by transplant (H)    -  1       Follow-ups after your visit        Your next 10 appointments already scheduled     Aug 17, 2018  9:30 AM CDT   LAB with UU LAB GOLD WAITING   [facility]    [address] [facility] [address]              Please do not eat 10-12 hours before your appointment if you are coming in fasting for labs on lipids, cholesterol, or glucose (sugar). This does not apply to pregnant women. Water, hot tea and black coffee (with nothing added) are okay. Do not drink other fluids, diet soda or chew gum.            Aug 17, 2018 10:00 AM CDT   Procedure - 2.5 hour with U2A ROOM 17   Unit 2A [facility] ([facility])    [address] [facility] [address]               Aug 17, 2018 11:00 AM CDT   Heart Cath Heart Biopsy with UUHCVR4   [facility]    [address] [facility] [address] [phone]            Aug 17, 2018  2:30 PM CDT   (Arrive by 2:15 PM)   RETURN HEART TRANSPLANT with SAMM Alfaro CNP   [facility] ([facility])    [address] [facility]  [address]  [facility] [address] [phone]            Jun 20, 2019  2:00 PM CDT   NEW GENERAL with Anibal Cox MD   Eye Clinic ([facility])    [facility]  [address] [place] [address]  9th Fl Clin 9a  [facility] [id number] [phone]              Who to contact     If you have questions or need follow up information  about today's clinic visit or your schedule please contact [facility] SOLID ORGAN TRANSPLANT directly at [phone].  Normal or non-critical lab and imaging results will be communicated to you by MyChart, letter or phone within 4 business days after the clinic has received the results. If you do not hear from us within 7 days, please contact the clinic through MyChart or phone. If you have a critical or abnormal lab result, we will notify you by phone as soon as possible.  Submit refill requests through [employer] or call your pharmacy and they will forward the refill request to us. Please allow 3 business days for your refill to be completed.          Additional Information About Your Visit        Care EveryWhere ID     This is your Care EveryWhere ID. This could be used by other organizations to access your [place] medical records  [id number]         Blood Pressure from Last 3 Encounters:   04/10/18 108/72   04/10/18 120/79   12/27/17 136/88    Weight from Last 3 Encounters:   04/10/18 71.1 kg (156 lb 11.2 oz)   12/19/17 69.6 kg (153 lb 6.4 oz)   12/14/17 72.6 kg (160 lb 0.9 oz)              Today, you had the following     No orders found for display       Primary Care Provider Fax #    Physician No Ref-Primary [phone]       No address on file        Equal Access to Services     JASWINDER KHAN : Hadii bharati sortoo Solauraali, waaxda luqadaha, qaybta kaalmada adenicole, talisha garcia . So [facility] [phone].    ATENCIÓN: Si habla español, tiene a watt disposición servicios gratuitos de asistencia lingüística. Llame al [phone].    We comply with applicable federal civil rights laws and [place] laws. We do not discriminate on the basis of race, color, national origin, age, disability, sex, sexual orientation, or gender identity.            Thank you!     Thank you for choosing [facility] SOLID ORGAN TRANSPLANT  for your care. Our goal is always to provide you with excellent care.  Hearing back from our patients is one way we can continue to improve our services. Please take a few minutes to complete the written survey that you may receive in the mail after your visit with us. Thank you!             Your Updated Medication List - Protect others around you: Learn how to safely use, store and throw away your medicines at www.disposemymeds.org.          This list is accurate as of 7/16/18  4:21 PM.  Always use your most recent med list.                   Brand Name Dispense Instructions for use Diagnosis    aspirin 81 MG EC tablet     90 tablet    Take 1 tablet (81 mg) by mouth daily    Heart replaced by transplant (H)       benzoyl peroxide 5 % Lotn lotion     30 mL    Apply topically At Bedtime    Other acne       calcium carbonate-vitamin D 500-400 MG-UNIT Tabs per tablet     90 tablet    Take 1 tablet by mouth daily    Heart replaced by transplant (H)       fluticasone 50 MCG/ACT spray    FLONASE    1 Bottle    Spray 1 spray into both nostrils daily    Influenza-like illness       guaiFENesin-dextromethorphan 100-10 MG/5ML syrup    ROBITUSSIN DM    560 mL    Take 5 mLs by mouth every 4 hours as needed for cough    Steroid-induced diabetes mellitus (H)       ketoconazole 2 % shampoo    NIZORAL    120 mL    Apply topically three times a week Alternate with Head and Shoulders.    Seborrhea       multivitamin, therapeutic with minerals Tabs tablet     30 each    Take 1 tablet by mouth daily    Heart replaced by transplant (H)       mycophenolate 250 MG capsule    GENERIC EQUIVALENT    360 capsule    Take 6 capsules (1,500 mg) by mouth 2 times daily    Heart replaced by transplant (H)       rosuvastatin 10 MG tablet    CRESTOR    90 tablet    Take 1 tablet (10 mg) by mouth daily    Heart replaced by transplant (H)       tacrolimus 1 MG capsule    GENERIC EQUIVALENT    120 capsule    Take 2 capsules (2 mg) by mouth 2 times daily    Heart replaced by transplant (H)

## 2018-08-01 ENCOUNTER — APPOINTMENT (OUTPATIENT)
Dept: TRANSPLANT | Facility: CLINIC | Age: 48
End: 2018-08-01
Attending: INTERNAL MEDICINE
Payer: MEDICAID

## 2018-08-01 PROCEDURE — T1013 SIGN LANG/ORAL INTERPRETER: HCPCS | Mod: U3,ZF

## 2018-08-13 ENCOUNTER — ALLIED HEALTH/NURSE VISIT (OUTPATIENT)
Dept: TRANSPLANT | Facility: CLINIC | Age: 48
End: 2018-08-13
Attending: INTERNAL MEDICINE
Payer: MEDICAID

## 2018-08-13 NOTE — PROGRESS NOTES
Transplant Social Work Services Progress Note      Date of Initial Social Work Evaluation: ***  Collaborated with: ***    Data: ***  Intervention: ***  Assessment: ***  Education provided by BERTHA: ***  Plan:    Discharge Plans in Progress: ***    Barriers to d/c plan: ***    Follow up Plan: ***

## 2018-08-14 ENCOUNTER — PRE VISIT (OUTPATIENT)
Dept: TRANSPLANT | Facility: CLINIC | Age: 48
End: 2018-08-14

## 2018-08-14 DIAGNOSIS — Z94.1 HEART REPLACED BY TRANSPLANT (H): Primary | ICD-10-CM

## 2018-08-15 DIAGNOSIS — Z94.1 HEART REPLACED BY TRANSPLANT (H): ICD-10-CM

## 2018-08-17 ENCOUNTER — APPOINTMENT (OUTPATIENT)
Dept: LAB | Facility: CLINIC | Age: 48
End: 2018-08-17
Attending: NURSE PRACTITIONER
Payer: MEDICAID

## 2018-08-17 ENCOUNTER — TELEPHONE (OUTPATIENT)
Dept: TRANSPLANT | Facility: CLINIC | Age: 48
End: 2018-08-17

## 2018-08-17 ENCOUNTER — APPOINTMENT (OUTPATIENT)
Dept: MEDSURG UNIT | Facility: CLINIC | Age: 48
End: 2018-08-17
Attending: NURSE PRACTITIONER
Payer: MEDICAID

## 2018-08-17 ENCOUNTER — APPOINTMENT (OUTPATIENT)
Dept: CARDIOLOGY | Facility: CLINIC | Age: 48
End: 2018-08-17
Attending: INTERNAL MEDICINE
Payer: MEDICAID

## 2018-08-17 ENCOUNTER — HOSPITAL ENCOUNTER (OUTPATIENT)
Facility: CLINIC | Age: 48
Discharge: HOME | End: 2018-08-17
Attending: INTERNAL MEDICINE | Admitting: INTERNAL MEDICINE
Payer: MEDICAID

## 2018-08-17 ENCOUNTER — OFFICE VISIT (OUTPATIENT)
Dept: CARDIOLOGY | Facility: CLINIC | Age: 48
End: 2018-08-17
Attending: NURSE PRACTITIONER
Payer: MEDICAID

## 2018-08-17 VITALS
DIASTOLIC BLOOD PRESSURE: 80 MMHG | HEIGHT: 67 IN | HEART RATE: 89 BPM | WEIGHT: 156.2 LBS | BODY MASS INDEX: 24.52 KG/M2 | SYSTOLIC BLOOD PRESSURE: 136 MMHG | OXYGEN SATURATION: 98 %

## 2018-08-17 VITALS
SYSTOLIC BLOOD PRESSURE: 128 MMHG | OXYGEN SATURATION: 99 % | HEART RATE: 87 BPM | DIASTOLIC BLOOD PRESSURE: 79 MMHG | RESPIRATION RATE: 18 BRPM

## 2018-08-17 DIAGNOSIS — B57.2 CHAGAS DISEASE: ICD-10-CM

## 2018-08-17 DIAGNOSIS — D84.9 IMMUNOSUPPRESSION (H): ICD-10-CM

## 2018-08-17 DIAGNOSIS — Z94.1 HEART REPLACED BY TRANSPLANT (H): ICD-10-CM

## 2018-08-17 DIAGNOSIS — Z94.1 STATUS POST HEART TRANSPLANT (H): Primary | ICD-10-CM

## 2018-08-17 DIAGNOSIS — B02.9 HERPES ZOSTER WITHOUT COMPLICATION: ICD-10-CM

## 2018-08-17 LAB
ANION GAP SERPL CALCULATED.3IONS-SCNC: 6 MMOL/L (ref 3–14)
BUN SERPL-MCNC: 19 MG/DL (ref 7–30)
CALCIUM SERPL-MCNC: 9.6 MG/DL (ref 8.5–10.1)
CHLORIDE SERPL-SCNC: 107 MMOL/L (ref 94–109)
CO2 SERPL-SCNC: 29 MMOL/L (ref 20–32)
CREAT SERPL-MCNC: 1.1 MG/DL (ref 0.66–1.25)
ERYTHROCYTE [DISTWIDTH] IN BLOOD BY AUTOMATED COUNT: 13.2 % (ref 10–15)
GFR SERPL CREATININE-BSD FRML MDRD: 71 ML/MIN/1.7M2
GLUCOSE SERPL-MCNC: 86 MG/DL (ref 70–99)
HCT VFR BLD AUTO: 41.2 % (ref 40–53)
HGB BLD-MCNC: 13.6 G/DL (ref 13.3–17.7)
MAGNESIUM SERPL-MCNC: 2.2 MG/DL (ref 1.6–2.3)
MCH RBC QN AUTO: 29.6 PG (ref 26.5–33)
MCHC RBC AUTO-ENTMCNC: 33 G/DL (ref 31.5–36.5)
MCV RBC AUTO: 90 FL (ref 78–100)
PHOSPHATE SERPL-MCNC: 2.3 MG/DL (ref 2.5–4.5)
PLATELET # BLD AUTO: 196 10E9/L (ref 150–450)
POTASSIUM SERPL-SCNC: 4.3 MMOL/L (ref 3.4–5.3)
RBC # BLD AUTO: 4.59 10E12/L (ref 4.4–5.9)
SODIUM SERPL-SCNC: 142 MMOL/L (ref 133–144)
TACROLIMUS BLD-MCNC: 18.1 UG/L (ref 5–15)
TME LAST DOSE: ABNORMAL H
WBC # BLD AUTO: 6.6 10E9/L (ref 4–11)

## 2018-08-17 PROCEDURE — 80197 ASSAY OF TACROLIMUS: CPT | Performed by: NURSE PRACTITIONER

## 2018-08-17 PROCEDURE — 40000166 ZZH STATISTIC PP CARE STAGE 1

## 2018-08-17 PROCEDURE — 36415 COLL VENOUS BLD VENIPUNCTURE: CPT | Performed by: NURSE PRACTITIONER

## 2018-08-17 PROCEDURE — 80048 BASIC METABOLIC PNL TOTAL CA: CPT | Performed by: NURSE PRACTITIONER

## 2018-08-17 PROCEDURE — 25000125 ZZHC RX 250: Performed by: INTERNAL MEDICINE

## 2018-08-17 PROCEDURE — 88346 IMFLUOR 1ST 1ANTB STAIN PX: CPT | Performed by: INTERNAL MEDICINE

## 2018-08-17 PROCEDURE — C1893 INTRO/SHEATH, FIXED,NON-PEEL: HCPCS

## 2018-08-17 PROCEDURE — 83735 ASSAY OF MAGNESIUM: CPT | Performed by: NURSE PRACTITIONER

## 2018-08-17 PROCEDURE — 85027 COMPLETE CBC AUTOMATED: CPT | Performed by: NURSE PRACTITIONER

## 2018-08-17 PROCEDURE — G0463 HOSPITAL OUTPT CLINIC VISIT: HCPCS | Mod: 25

## 2018-08-17 PROCEDURE — 88350 IMFLUOR EA ADDL 1ANTB STN PX: CPT | Performed by: INTERNAL MEDICINE

## 2018-08-17 PROCEDURE — 93505 ENDOMYOCARDIAL BIOPSY: CPT | Mod: 26 | Performed by: INTERNAL MEDICINE

## 2018-08-17 PROCEDURE — 99213 OFFICE O/P EST LOW 20 MIN: CPT | Mod: ZP | Performed by: NURSE PRACTITIONER

## 2018-08-17 PROCEDURE — 80061 LIPID PANEL: CPT | Performed by: NURSE PRACTITIONER

## 2018-08-17 PROCEDURE — 93505 ENDOMYOCARDIAL BIOPSY: CPT

## 2018-08-17 PROCEDURE — 27210982 ZZH KIT RT HC TOTES DISP CR7

## 2018-08-17 PROCEDURE — 88307 TISSUE EXAM BY PATHOLOGIST: CPT | Performed by: INTERNAL MEDICINE

## 2018-08-17 PROCEDURE — 84100 ASSAY OF PHOSPHORUS: CPT | Performed by: NURSE PRACTITIONER

## 2018-08-17 PROCEDURE — T1013 SIGN LANG/ORAL INTERPRETER: HCPCS | Mod: U3,ZF

## 2018-08-17 PROCEDURE — 27211047 ZZH FORCEP BIOPSY CR10

## 2018-08-17 RX ORDER — PRAVASTATIN SODIUM 20 MG
TABLET ORAL
COMMUNITY
Start: 2018-07-24 | End: 2019-02-04

## 2018-08-17 RX ORDER — LIDOCAINE 40 MG/G
CREAM TOPICAL
Status: COMPLETED | OUTPATIENT
Start: 2018-08-17 | End: 2018-08-17

## 2018-08-17 RX ADMIN — LIDOCAINE: 40 CREAM TOPICAL at 10:49

## 2018-08-17 ASSESSMENT — PAIN SCALES - GENERAL: PAINLEVEL: NO PAIN (0)

## 2018-08-17 NOTE — IP AVS SNAPSHOT
[address] [address]                                       After Visit Summary   8/17/2018    Javi Bansal    MRN: [id number]           After Visit Summary Signature Page     I have received my discharge instructions, and my questions have been answered. I have discussed any challenges I see with this plan with the nurse or doctor.    ..........................................................................................................................................  Patient/Patient Representative Signature      ..........................................................................................................................................  Patient Representative Print Name and Relationship to Patient    ..................................................               ................................................  Date                                            Time    ..........................................................................................................................................  Reviewed by Signature/Title    ...................................................              ..............................................  Date                                                            Time

## 2018-08-17 NOTE — NURSING NOTE
Patient presents for 20 month follow up.  present. No complaints verbalized. Pt states he is doing very well. Insurance problems are his only complaint. Medicare (over 64 yo or with disability) vs Medicaid (through state and is available for those that can't get insurance otherwise) clarified. Walks on treadmill every evening for 30 minutes. Shingles has resolved on left leg/hip. Expecting a baby girl, wife is 6 months pregnant, so he prefers follow up to be completed early December. Tacro level 18.1, pt took tacro prior to lab appointment so we will repeat level on Thursday. Tacro through discussed in great detail. Pt is unable to get here for 7 am labs, so prefers to switch tacro med administration to 330 and 1530. Pt will start this and have labs drawn next Thursday at 330 pm. Pt verbalized an understanding of not taking afternoon dose of tacro until labs drawn. RN coordinator will call patient will all pending labs. Pt verbalized an understanding with the plan of care.     Please call with any questions or concerns.      Repeat Tacro (prograf) level. You may do this at a local lab. Please do not take your Tacro medication prior to having labs drawn. Please adjust your dosing to 330 am and 330 pm to accommodate your lab appt at 330 pm.     I will call you with all of the pending lab results and possible instructions.     Follow up with post heart transplant team this December (preferrably early before baby is born). We will also be scheduling an angiogram most likely the day before your appointment.

## 2018-08-17 NOTE — LETTER
8/17/2018      RE: Javi Bansal  [address]  [place] [address]       Dear Colleague,    Thank you for the opportunity to participate in the care of your patient, Javi Bansal, at the [facility] HEART [facility]. Please see a copy of my visit note below.    ADULT HEART TRANSPLANT CLINIC    HPI:   Javi Bansal is a 47 yr old male with a history of Chagas cardiomyopathy (diagnosed in 2010, treated with nifurtimox for 3 months) and biventricular systolic heart failure (LVEF 15-20% since 2015, on home milrinone since 8/2016), now s/p OHT 12/12/16, who presents to clinic for routine surveillance. He is accompanied by a . 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 Monthly Chagas labs through 12/2017 were negative. He was started on azathioprine postoperatively due to reports of better outcomes. His initial biopsy on 12/19/16 showed 2R ACR, which was treated with IV steroids. Repeat biopsy 12/27/16 showed 2R/3A ACR, which was treated with IV steroids and thymoglobulin. He had another episode of 2R ACR 2/10/17, which was treated with steroids. His biopsies have since been negative for significant rejection. last fall, he was seen in the ED and treated for shingles w/Valtrex which resolved quickly. He also had some low level CMV and EBV but has been asymptomatic. Baseline coronary angiogram showed no evidence of CAV. His graft function remained stable throughout his rejection episodes. He has no DSAs. Last RHC showed normal filling pressures and index.     Since last visit, patient  "reports feeling \"great\". His wife is pregnant with their first child and due on his transplant anniversary. He continues to be active at work and also exercises 30 min/day on a treadmill. Patient denies chest pain, dyspnea, fatigue, and lightheadedness exertion. Patient denies fever, chills, oral lesions, headache, palpitations, SOB, SEPULVEDA, PND, orthopnea, nausea, vomiting, diarrhea, or LE edema.     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 suspected , but unclear dx       FAMILY HISTORY:  Family History   Problem Relation Age of Onset     Brain Cancer Mother      Melanoma No family hx of      Skin Cancer No family hx of      Glaucoma No family hx of      Macular Degeneration No family hx of        SOCIAL HISTORY:  Social History     Social History     Marital status:      Spouse name: N/A     Number of children: N/A     Years of education: N/A     Social History Main Topics     Smoking status: Never Smoker     Smokeless tobacco: Never Used     Alcohol use No     Drug use: No     Sexual activity: Not on file     Other Topics Concern     Not on file     Social History Narrative       CURRENT MEDICATIONS:    Current Facility-Administered Medications on File Prior to Visit:  [COMPLETED] lidocaine (LMX4) cream     Current Outpatient Prescriptions on File Prior to Visit:  aspirin 81 MG EC tablet Take 1 tablet (81 mg) by mouth daily   benzoyl peroxide 5 % LOTN lotion Apply topically At Bedtime   calcium carbonate-vitamin D 500-400 MG-UNIT TABS per tablet Take 1 tablet by mouth daily   fluticasone " "(FLONASE) 50 MCG/ACT spray Spray 1 spray into both nostrils daily   guaiFENesin-dextromethorphan (ROBITUSSIN DM) 100-10 MG/5ML syrup Take 5 mLs by mouth every 4 hours as needed for cough   ketoconazole (NIZORAL) 2 % shampoo Apply topically three times a week Alternate with Head and Shoulders.   multivitamin, therapeutic with minerals (THERA-VIT-M) TABS tablet Take 1 tablet by mouth daily   mycophenolate (GENERIC EQUIVALENT) 250 MG capsule Take 6 capsules (1,500 mg) by mouth 2 times daily   rosuvastatin (CRESTOR) 10 MG tablet Take 1 tablet (10 mg) by mouth daily   tacrolimus (GENERIC EQUIVALENT) 1 MG capsule Take 2 capsules (2 mg) by mouth 2 times daily       ROS:  CONSTITUTIONAL: Denies fever, chills, fatigue, or weight fluctuations.   HEENT: Denies headache, vision changes, and changes in speech.   CV: see hpi   PULMONARY: see hpi   GI:Denies nausea, vomiting, diarrhea, and abdominal pain. Bowel movements are regular.   : Denies urinary alterations, dysuria, urinary frequency, hematuria, and abnormal drainage.   EXT: Denies lower extremity edema or color changes.   SKIN: Resolved lesions on thigh, no other rash.   MUSCULOSKELETAL: Denies upper or lower extremity weakness and pain.   NEUROLOGIC: Denies lightheadedness, dizziness, seizures, or upper or lower extremity paresthesia.     EXAM:  /80 (BP Location: Left arm, Cuff Size: Adult Regular)  Pulse 89  Ht 1.69 m (5' 6.53\")  Wt 70.9 kg (156 lb 3.2 oz)  SpO2 98%  BMI 24.81 kg/m2    GENERAL: Appears comfortable, in no acute distress.   HEENT: Eye symmetrical, no discharge or icterus bilaterally. Mucous membranes moist and without lesions.  CV: RRR, +S1S2, no murmur, rub, or gallop. JVP not appreciable at 45 degrees.   RESPIRATORY: Respirations regular, even, and unlabored. Lungs CTA throughout.   GI: Soft and non distended with normoactive bowel sounds present in all quadrants. No tenderness, rebound, guarding. No hepatomegaly.   EXTREMITIES: No " peripheral edema. 2+ bilateral pedal pulses.   NEUROLOGIC: Alert and oriented x 3. No focal deficits.   MUSCULOSKELETAL: No joint swelling or tenderness.   SKIN: No jaundice. No rashes or lesions.     Labs - reviewed with patient in clinic today:  CBC RESULTS:  Lab Results   Component Value Date    WBC 6.6 08/17/2018    RBC 4.59 08/17/2018    HGB 13.6 08/17/2018    HCT 41.2 08/17/2018    MCV 90 08/17/2018    MCH 29.6 08/17/2018    MCHC 33.0 08/17/2018    RDW 13.2 08/17/2018     08/17/2018       CMP RESULTS:  Lab Results   Component Value Date     08/17/2018    POTASSIUM 4.3 08/17/2018    CHLORIDE 107 08/17/2018    CO2 29 08/17/2018    ANIONGAP 6 08/17/2018    GLC 86 08/17/2018    BUN 19 08/17/2018    CR 1.10 08/17/2018    GFRESTIMATED 71 08/17/2018    GFRESTBLACK 86 08/17/2018    DAISY 9.6 08/17/2018    BILITOTAL 0.6 04/10/2018    ALBUMIN 4.4 04/10/2018    ALKPHOS 118 04/10/2018    ALT 25 04/10/2018    AST 18 04/10/2018        INR RESULTS:  Lab Results   Component Value Date    INR 1.13 06/10/2017       LIPID RESULTS:  Lab Results   Component Value Date    CHOL 210 (H) 04/10/2018    HDL 55 04/10/2018     (H) 04/10/2018    TRIG 81 04/10/2018       IMMUNOSUPPRESSANT LEVELS:  Lab Results   Component Value Date    TACROL 4.3 (L) 04/10/2018    DOSTAC Blood 04/10/2018       No components found for: CK  Lab Results   Component Value Date    MAG 2.2 08/17/2018     Lab Results   Component Value Date    A1C 7.5 (H) 06/02/2017     Lab Results   Component Value Date    PHOS 2.3 (L) 08/17/2018     Lab Results   Component Value Date    NTBNP 1944 (H) 06/29/2016     Lab Results   Component Value Date    SAITESTMET SA FCS 12/14/2017    SAICELL Class I 12/14/2017    [id number] None 12/14/2017    [id number] None 12/14/2017    SAIREPCOM  12/14/2017     Test performed by modified procedure. Serum heat inactivated and tested by   a modified ([place]) protocol including fetal calf serum addition.   High-risk, mfi >3,000.  Mod-risk, mfi 500-3,000.        Lab Results   Component Value Date    SAIITESTME SA FCS 12/14/2017    SAIICELL Class II 12/14/2017    [id number] None 12/14/2017    [id number] None 12/14/2017    SAIIREPCOM  12/14/2017     Test performed by modified procedure. Serum heat inactivated and tested by   a modified ([place]) protocol including fetal calf serum addition.   High-risk, mfi >3,000. Mod-risk, mfi 500-3,000.        Lab Results   Component Value Date    [facility] Plasma 04/20/2018       Diagnostic Studies:  cMRI 12/2017  1. The LV is normal in cavity size and wall thickness. The global systolic function is normal. The LVEF is 57%. There are no regional wall motion abnormalities.  2. The RV is normal in cavity size. The global systolic function is mildly reduced. The RVEF is 48%.   3. There is bi-atrial enlargement secondary to heart transplantation.  4. There is no significant valvular disease.   5. Late gadolinium enhancement imaging shows no MI, fibrosis or infiltrative disease.   6. There is no pericardial effusion or thickening.  7.  There is no intracardiac thrombus.     [facility] 12/2017         Coronary angiogram 12/2017  1. Both coronary arteries arise from their respective cusps.  2. Dominance: Right  3. The LM is without angiographic evidence of disease.   4. The LAD gives rise to septal perforators and a large D1 and a small D2 and has no significant disease.    5. LCX gives rise to a small OM1 and OM2 vessels without significant stenosis  6. RCA (dominant) gives rise to PL branches and supplies PDA with no significant disease  The minimal lumen area of the LAD was 6.6 mm2.  The intima media thickness of the LAD was 0.2 mm.    Assessment/Plan:  Mr. Iglesia Shetty is a 47 year old male who is s/p orthotopic heart transplant 12/2016 for Chagas cardiomyopathy. Post-op course complicated by rejection with atrial and ventricular arrhythmias, respiratory failure and JOSE LUIS, which resolved with diuresis, surgical blood loss  anemia, right pleural effusion, requiring pigtail placement, and Chagas reactivation, treated with benznidasole, later - shingles treated with Valtrex, and low level CMV. More recently, he has done remarkably well without recent rejection or evidence of graft dysfunction. No changes today. Will repeat trough next week as today was not a trough.     # Status post OHT on 12/2016, history of Chagas cardiomyopathy  # Chronic immunosuppression  * Rejection history: 2R on first tx with steroids, repeat bx 2R/3A on second tx with steroids +thymo, 2R in February treated with steroids  * Recent immunosuppression changes: none  * Intolerance to medications: none    Immunosuppression:  - Tacrolimus, trough level goal 8-10. Patient had taken med this morning so trough inaccurate.   - Cellcept 1500 mg bid    Prophylaxis:  - CAV: ASA 81 mg and rosuvastatin 10 mg, repeat lipid panel pending  - Bones: calcium and vitamind D    Serostatus: CMV: D+/R+. EBV: D+/R+    # Low-level CMV viremia: Levels remain low, plans to treat if escalates beyond 1500IU per ID.  Last level 12/2017 <137.  # Hx shingles: treated with Valtrex  # Hx Chagas with reactivation: Chagas reactivated following his transplant (12/26/16). He was treated with Benznidazole for 60 days, and Transplant ID noted that his heart has not yet been reinfected at this time. Chagas PCRs have remained negative, Transplant ID remains available if needed.  # Latent TB: treated with INH per ID    # Health Care Maintenance  - Immunizations: up to date, recommend flu shot in the fall   - Dermatology: seen 12/2017  - Colonoscopy: n/a        25 minutes spent face-to-face with patient, >50% in counseling and/or coordination of care as described above      Rosibel Hu DNP, NP-C  8/17/2018    CC  DYLON VALLES, BONIFACIO AUSTIN

## 2018-08-17 NOTE — PROGRESS NOTES
D: Pt arrived in cath lab for right heart catheterization and endomyocardial tissue biopsy  4 pieces for sent for biopsy.  I: Right heart catheterization and endomyocardial biopsy completed per MD.  7fr sheath removed from RIJ site, manual pressure applied until hemostasis achieved.  A: RIJ site clean, dry and intact. Soft, no hematoma.  P: Pt to transfer to  for discharge.      Pt educated on watching neck site for bleeding, hematoma, pressure on airway and voice changes.      Report called.

## 2018-08-17 NOTE — TELEPHONE ENCOUNTER
Transplant Social Work Services Phone Call      Data: SW received phone call from Javi and from  specialty pharmacy. SW informed that [place]. Also learned through conversations that his Medicare is now closed. Javi is due to have medication delivered today, but currently has no insurance.   Intervention: Spoke with  Specialty Pharmacy to approve sending 1 month supply to Javi today. Asked them to put it on an account while waiting for [place] [facility] to become active again or SW would approve medications for transplant fund to assist as a back up. Also discussed that he needs to get [place] turned back [place].  Assessment: Javi said that he was working with someone (Geneva Garcia [phone]) at [facility] who said he needs a letter stating that his Medicare is now not active. He is in clinic and will sign a release of information so SW can call her to clarify.    Education provided by BERTHA: [place], pharmacy  Plan: SW will wait for release of information to call [facility]. BERTHA will continue to follow for support as needed.    Pager [phone]

## 2018-08-17 NOTE — IP AVS SNAPSHOT
MRN:[id number]                      After Visit Summary   8/17/2018    Javi Bansal    MRN: [id number]           Visit Information        Department      8/17/2018 10:05 AM [address] [facility]          Review of your medicines      UNREVIEWED medicines. 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)        Dose:  81 mg   Take 1 tablet (81 mg) by mouth daily   Quantity:  90 tablet   Refills:  3       benzoyl peroxide 5 % Lotn lotion   Used for:  Other acne        Apply topically At Bedtime   Quantity:  30 mL   Refills:  3       calcium carbonate-vitamin D 500-400 MG-UNIT Tabs per tablet   Used for:  Heart replaced by transplant (H)        Dose:  1 tablet   Take 1 tablet by mouth daily   Quantity:  90 tablet   Refills:  3       fluticasone 50 MCG/ACT spray   Commonly known as:  FLONASE   Used for:  Influenza-like illness        Dose:  1 spray   Spray 1 spray into both nostrils daily   Quantity:  1 Bottle   Refills:  1       guaiFENesin-dextromethorphan 100-10 MG/5ML syrup   Commonly known as:  ROBITUSSIN DM   Used for:  Steroid-induced diabetes mellitus (H)        Dose:  5 mL   Take 5 mLs by mouth every 4 hours as needed for cough   Quantity:  560 mL   Refills:  1       ketoconazole 2 % shampoo   Commonly known as:  NIZORAL   Used for:  Seborrhea        Apply topically three times a week Alternate with Head and Shoulders.   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 (H)        Dose:  1 tablet   Take 1 tablet by mouth daily   Quantity:  30 each   Refills:  11       mycophenolate 250 MG capsule   Commonly known as:  GENERIC EQUIVALENT   Used for:  Heart replaced by transplant (H)        Dose:  1500 mg   Take 6 capsules (1,500 mg) by mouth 2 times daily   Quantity:  360 capsule   Refills:  11        rosuvastatin 10 MG tablet   Commonly known as:  CRESTOR   Used for:  Heart replaced by transplant (H)        Dose:  10 mg   Take 1 tablet (10 mg) by mouth daily   Quantity:  90 tablet   Refills:  3       tacrolimus 1 MG capsule   Commonly known as:  GENERIC EQUIVALENT   Used for:  Heart replaced by transplant (H)        Dose:  2 mg   Take 2 capsules (2 mg) by mouth 2 times daily   Quantity:  120 capsule   Refills:  11                Protect others around you: Learn how to safely use, store and throw away your medicines at www.disposemymeds.org.         Follow-ups after your visit        Your next 10 appointments already scheduled     Aug 17, 2018 11:00 AM CDT   Heart [id number] ([facility] [place])    [address] [address] [phone]            Aug 17, 2018  2:15 PM CDT   RETURN HEART TRANSPLANT with SAMM Alfaro [facility] Heart [facility] ([facility])    [address]  [address] [place] [address] [phone]            Jun 20, 2019  2:00 PM CDT   NEW GENERAL with Anibal Cox MD   Eye Clinic ([facility])    [address]  [address]  [facility] [address]   [phone]               Care Instructions        Further instructions from your care team       [facility]                        Interventional Cardiology  Discharge Instructions   Post Right Heart Cath      AFTER YOU GO HOME:    DO drink plenty of fluids    DO resume your regular diet and medications unless otherwise instructed by your Primary Physician    Do Not scrub the procedure site vigorously    No lotion or powder to the puncture site for 3 days    CALL YOUR PRIMARY PHYSICIAN IF: You may resume all normal activity.  Monitor neck site for bleeding, swelling, or voice changes. If you notice bleeding or swelling  immediately apply pressure to the site and call number below to speak with Cardiology Fellow.  If you experience any changes in your breathing you should call your doctor immediately or come to the closest Emergency Department.  Do not drive yourself.    ADDITIONAL INSTRUCTIONS: Medications: You are to resume all home medications including anticoagulation therapy unless otherwise advised by your primary cardiologist or nurse coordinator.    Follow Up: Per your primary cardiology team    If you have any questions or concerns regarding your procedure site please call [phone] at anytime and ask for Cardiology Fellow on call.  They are available 24 hours a day.  You may also contact the Cardiology Clinic after hours number at [phone].                                                       Telephone Numbers [phone] Monday-Friday 8:00 am to 4:30 pm    [phone] After 4:30 pm Monday-Friday, Weekends & Holidays  Ask for Interventional Cardiologist on call. Someone is on call 24 hours/day   [facility] toll free number [phone] Monday-Friday 8:00 am to 4:30 pm   [facility] Emergency Dept [phone]                    Additional Information About Your Visit        Care EveryWhere ID     This is your Care EveryWhere ID. This could be used by other organizations to access your [place] medical records  [id number]         Primary Care Provider Fax #    Physician No Ref-Primary [phone]      Equal Access to Services     JASWINDER KHAN : Aniya Vuong, waaxda luqadaha, qaybta kaalmada sulema, talisha harrell. So [facility] [phone].    ATENCIÓN: Si habla español, tiene a watt disposición servicios gratuitos de asistencia lingüística. Llame al [phone].    We comply with applicable federal civil rights laws and [place] laws. We do not discriminate on the basis of race, color, national origin, age, disability, sex, sexual orientation, or gender  identity.            Thank you!     Thank you for choosing [facility] for your care. Our goal is always to provide you with excellent care. Hearing back from our patients is one way we can continue to improve our services. Please take a few minutes to complete the written survey that you may receive in the mail after you visit with us. Thank you!             Medication List: This is a list of all your medications and when to take them. Check marks below indicate your daily home schedule. Keep this list as a reference.      Medications           Morning Afternoon Evening Bedtime As Needed    aspirin 81 MG EC tablet   Take 1 tablet (81 mg) by mouth daily                                benzoyl peroxide 5 % Lotn lotion   Apply topically At Bedtime                                calcium carbonate-vitamin D 500-400 MG-UNIT Tabs per tablet   Take 1 tablet by mouth daily                                fluticasone 50 MCG/ACT spray   Commonly known as:  FLONASE   Spray 1 spray into both nostrils daily                                guaiFENesin-dextromethorphan 100-10 MG/5ML syrup   Commonly known as:  ROBITUSSIN DM   Take 5 mLs by mouth every 4 hours as needed for cough                                ketoconazole 2 % shampoo   Commonly known as:  NIZORAL   Apply topically three times a week Alternate with Head and Shoulders.                                multivitamin, therapeutic with minerals Tabs tablet   Take 1 tablet by mouth daily                                mycophenolate 250 MG capsule   Commonly known as:  GENERIC EQUIVALENT   Take 6 capsules (1,500 mg) by mouth 2 times daily                                rosuvastatin 10 MG tablet   Commonly known as:  CRESTOR   Take 1 tablet (10 mg) by mouth daily                                tacrolimus 1 MG capsule   Commonly known as:  GENERIC EQUIVALENT   Take 2 capsules (2 mg) by mouth 2 times daily

## 2018-08-17 NOTE — NURSING NOTE
Chief Complaint   Patient presents with     Follow Up For     Heart TX FU 20mo     Vitals were taken and medications were reconciled.     Ruy Patiño, GABRIELLE  2:45 PM

## 2018-08-17 NOTE — PROCEDURES
PRELIMINARY CARDIAC CATH REPORT:     PROCEDURES PERFORMED:   Endomyocardial Biopsy    PHYSICIANS:  Attending Physician: Jamel Herndon MD  Interventional Cardiology Fellow: None  Cardiology Fellow: Thiago Barrera MD    INDICATION:  Javi Bansal is a 47 year old male with OHT (12/2016) c/b hx rejection who presents on an elective basis. The clinical presentation was Post Cardiac Transplant.    The indication for the procedure was Cardiac Transplant Protocol.      DESCRIPTION:  1. Consent obtained with discussion of risks.  All questions were answered.  2. Sterile prep and procedure.  3. Location with Sheaths:   Rt IJ  7 Fr 25 cm [long] daog  4. Access: Local anesthetic with lidocaine.  A standard 18 guage needle with ultrasound guidance was used to establish vascular access using a modified Seldinger technique.    MEDICATIONS:  The procedure was performed under no conscious sedation.  Heart rate, BP, respiration, oxygen saturation and patient responses were monitored throughout the procedure with the assistance of the RN under my supervision.    Procedures:    ENDOMYOCARDIAL BIOPSY:  1. Successful collection of 4 right ventricular endomyocardial biopsy samples using the Jawz.      Sheath Removal:  The RIJ sheath was manually removed in the cardiac catheterization laboratory.    Contrast: Isovue, 0 ml     Fluoroscopy Time: 2.6 min    COMPLICATIONS:  1. None    SUMMARY:    Successful collection of 4 right ventricular endomyocardial biopsy samples    PLAN:   >> Biopsy sent to pathology.  >> Bedrest per protocol.  >>. Discharge today per protocol    The attending interventional cardiologist was present and supervised all critical aspects the procedure.    See CVIS report for final draft.    Thiago Barrera MD   Cardiology Fellow    Jamel Herndon MD   Cardiology Staff          I was present for the entire procedure.     Jamel Herndon M.D.  Interventional Cardiology  [facility]  [place]  Pager: [phone]

## 2018-08-17 NOTE — PATIENT INSTRUCTIONS
Please call with any questions or concerns.      Repeat Tacro (prograf) level. You may do this at a local lab. Please do not take your Tacro medication prior to having labs drawn. Please adjust your dosing to 330/330 to work with your work schedule.     I will call you with all of the pending lab results and possible instructions.     Follow up with post heart transplant team this December (preferrably early before baby is born). We will also be scheduling an angiogram most likely the day before your appointment.         Yanelis Araiza RN BSN   Post Heart Transplant Nurse Coordinator  [facility]  Questions: [phone]

## 2018-08-17 NOTE — DISCHARGE INSTRUCTIONS
[facility]                        Interventional Cardiology  Discharge Instructions   Post Right Heart Cath      AFTER YOU GO HOME:    DO drink plenty of fluids    DO resume your regular diet and medications unless otherwise instructed by your Primary Physician    Do Not scrub the procedure site vigorously    No lotion or powder to the puncture site for 3 days    CALL YOUR PRIMARY PHYSICIAN IF: You may resume all normal activity.  Monitor neck site for bleeding, swelling, or voice changes. If you notice bleeding or swelling immediately apply pressure to the site and call number below to speak with Cardiology Fellow.  If you experience any changes in your breathing you should call your doctor immediately or come to the closest Emergency Department.  Do not drive yourself.    ADDITIONAL INSTRUCTIONS: Medications: You are to resume all home medications including anticoagulation therapy unless otherwise advised by your primary cardiologist or nurse coordinator.    Follow Up: Per your primary cardiology team    If you have any questions or concerns regarding your procedure site please call [phone] at anytime and ask for Cardiology Fellow on call.  They are available 24 hours a day.  You may also contact the Cardiology Clinic after hours number at [phone].                                                       Telephone Numbers [phone] Monday-Friday 8:00 am to 4:30 pm    [phone] After 4:30 pm Monday-Friday, Weekends & Holidays  Ask for Interventional Cardiologist on call. Someone is on call 24 hours/day   [facility] toll free number [phone] Monday-Friday 8:00 am to 4:30 pm   [facility] Emergency Dept [phone]

## 2018-08-17 NOTE — PROGRESS NOTES
Patient returned to the unit, R neck site is dry and intact. Denies pain, Discharge instructions were reviewed with him via . Left unit ambulatory to home.

## 2018-08-17 NOTE — PROGRESS NOTES
"ADULT HEART TRANSPLANT CLINIC    HPI:   Javi Bansal is a 47 yr old male with a history of Chagas cardiomyopathy (diagnosed in 2010, treated with nifurtimox for 3 months) and biventricular systolic heart failure (LVEF 15-20% since 2015, on home milrinone since 8/2016), now s/p OHT 12/12/16, who presents to clinic for routine surveillance. He is accompanied by a . 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 Monthly Chagas labs through 12/2017 were negative. He was started on azathioprine postoperatively due to reports of better outcomes. His initial biopsy on 12/19/16 showed 2R ACR, which was treated with IV steroids. Repeat biopsy 12/27/16 showed 2R/3A ACR, which was treated with IV steroids and thymoglobulin. He had another episode of 2R ACR 2/10/17, which was treated with steroids. His biopsies have since been negative for significant rejection. last fall, he was seen in the ED and treated for shingles w/Valtrex which resolved quickly. He also had some low level CMV and EBV but has been asymptomatic. Baseline coronary angiogram showed no evidence of CAV. His graft function remained stable throughout his rejection episodes. He has no DSAs. Last RHC showed normal filling pressures and index.     Since last visit, patient reports feeling \"great\". His wife is pregnant with their first child and due on his transplant anniversary. He continues to be active at work and also exercises 30 min/day on a treadmill. Patient denies chest pain, dyspnea, fatigue, and lightheadedness exertion. Patient denies fever, chills, oral lesions, headache, palpitations, SOB, SEPULVEDA, PND, orthopnea, " nausea, vomiting, diarrhea, or LE edema.     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 suspected , but unclear dx       FAMILY HISTORY:  Family History   Problem Relation Age of Onset     Brain Cancer Mother      Melanoma No family hx of      Skin Cancer No family hx of      Glaucoma No family hx of      Macular Degeneration No family hx of        SOCIAL HISTORY:  Social History     Social History     Marital status:      Spouse name: N/A     Number of children: N/A     Years of education: N/A     Social History Main Topics     Smoking status: Never Smoker     Smokeless tobacco: Never Used     Alcohol use No     Drug use: No     Sexual activity: Not on file     Other Topics Concern     Not on file     Social History Narrative       CURRENT MEDICATIONS:    Current Facility-Administered Medications on File Prior to Visit:  [COMPLETED] lidocaine (LMX4) cream     Current Outpatient Prescriptions on File Prior to Visit:  aspirin 81 MG EC tablet Take 1 tablet (81 mg) by mouth daily   benzoyl peroxide 5 % LOTN lotion Apply topically At Bedtime   calcium carbonate-vitamin D 500-400 MG-UNIT TABS per tablet Take 1 tablet by mouth daily   fluticasone (FLONASE) 50 MCG/ACT spray Spray 1 spray into both nostrils daily   guaiFENesin-dextromethorphan (ROBITUSSIN DM) 100-10 MG/5ML syrup Take 5 mLs by mouth every 4 hours as needed for cough   ketoconazole (NIZORAL) 2 % shampoo Apply topically three times a week Alternate with Head and Shoulders.   multivitamin, therapeutic with minerals (THERA-VIT-M) TABS  "tablet Take 1 tablet by mouth daily   mycophenolate (GENERIC EQUIVALENT) 250 MG capsule Take 6 capsules (1,500 mg) by mouth 2 times daily   rosuvastatin (CRESTOR) 10 MG tablet Take 1 tablet (10 mg) by mouth daily   tacrolimus (GENERIC EQUIVALENT) 1 MG capsule Take 2 capsules (2 mg) by mouth 2 times daily       ROS:  CONSTITUTIONAL: Denies fever, chills, fatigue, or weight fluctuations.   HEENT: Denies headache, vision changes, and changes in speech.   CV: see hpi   PULMONARY: see hpi   GI:Denies nausea, vomiting, diarrhea, and abdominal pain. Bowel movements are regular.   : Denies urinary alterations, dysuria, urinary frequency, hematuria, and abnormal drainage.   EXT: Denies lower extremity edema or color changes.   SKIN: Resolved lesions on thigh, no other rash.   MUSCULOSKELETAL: Denies upper or lower extremity weakness and pain.   NEUROLOGIC: Denies lightheadedness, dizziness, seizures, or upper or lower extremity paresthesia.     EXAM:  /80 (BP Location: Left arm, Cuff Size: Adult Regular)  Pulse 89  Ht 1.69 m (5' 6.53\")  Wt 70.9 kg (156 lb 3.2 oz)  SpO2 98%  BMI 24.81 kg/m2    GENERAL: Appears comfortable, in no acute distress.   HEENT: Eye symmetrical, no discharge or icterus bilaterally. Mucous membranes moist and without lesions.  CV: RRR, +S1S2, no murmur, rub, or gallop. JVP not appreciable at 45 degrees.   RESPIRATORY: Respirations regular, even, and unlabored. Lungs CTA throughout.   GI: Soft and non distended with normoactive bowel sounds present in all quadrants. No tenderness, rebound, guarding. No hepatomegaly.   EXTREMITIES: No peripheral edema. 2+ bilateral pedal pulses.   NEUROLOGIC: Alert and oriented x 3. No focal deficits.   MUSCULOSKELETAL: No joint swelling or tenderness.   SKIN: No jaundice. No rashes or lesions.     Labs - reviewed with patient in clinic today:  CBC RESULTS:  Lab Results   Component Value Date    WBC 6.6 08/17/2018    RBC 4.59 08/17/2018    HGB 13.6 " 08/17/2018    HCT 41.2 08/17/2018    MCV 90 08/17/2018    MCH 29.6 08/17/2018    MCHC 33.0 08/17/2018    RDW 13.2 08/17/2018     08/17/2018       CMP RESULTS:  Lab Results   Component Value Date     08/17/2018    POTASSIUM 4.3 08/17/2018    CHLORIDE 107 08/17/2018    CO2 29 08/17/2018    ANIONGAP 6 08/17/2018    GLC 86 08/17/2018    BUN 19 08/17/2018    CR 1.10 08/17/2018    GFRESTIMATED 71 08/17/2018    GFRESTBLACK 86 08/17/2018    DAISY 9.6 08/17/2018    BILITOTAL 0.6 04/10/2018    ALBUMIN 4.4 04/10/2018    ALKPHOS 118 04/10/2018    ALT 25 04/10/2018    AST 18 04/10/2018        INR RESULTS:  Lab Results   Component Value Date    INR 1.13 06/10/2017       LIPID RESULTS:  Lab Results   Component Value Date    CHOL 210 (H) 04/10/2018    HDL 55 04/10/2018     (H) 04/10/2018    TRIG 81 04/10/2018       IMMUNOSUPPRESSANT LEVELS:  Lab Results   Component Value Date    TACROL 4.3 (L) 04/10/2018    DOSTAC Blood 04/10/2018       No components found for: CK  Lab Results   Component Value Date    MAG 2.2 08/17/2018     Lab Results   Component Value Date    A1C 7.5 (H) 06/02/2017     Lab Results   Component Value Date    PHOS 2.3 (L) 08/17/2018     Lab Results   Component Value Date    NTBNP 1945 (H) 06/29/2016     Lab Results   Component Value Date    SAITESTMET [facility] 12/14/2017    SAICELL Class I 12/14/2017    [id number] None 12/14/2017    [id number] None 12/14/2017    SAIREPCOM  12/14/2017     Test performed by modified procedure. Serum heat inactivated and tested by   a modified ([place]) protocol including fetal calf serum addition.   High-risk, mfi >3,000. Mod-risk, mfi 500-3,000.        Lab Results   Component Value Date    SAIITESTME [facility] 12/14/2017    SAIICELL Class II 12/14/2017    [id number] None 12/14/2017    [id number] None 12/14/2017    SAIIREPCOM  12/14/2017     Test performed by modified procedure. Serum heat inactivated and tested by   a modified ([place]) protocol including fetal calf serum  addition.   High-risk, mfi >3,000. Mod-risk, mfi 500-3,000.        Lab Results   Component Value Date    CSPEC Plasma 04/20/2018       Diagnostic Studies:  cMRI 12/2017  1. The LV is normal in cavity size and wall thickness. The global systolic function is normal. The LVEF is 57%. There are no regional wall motion abnormalities.  2. The RV is normal in cavity size. The global systolic function is mildly reduced. The RVEF is 48%.   3. There is bi-atrial enlargement secondary to heart transplantation.  4. There is no significant valvular disease.   5. Late gadolinium enhancement imaging shows no MI, fibrosis or infiltrative disease.   6. There is no pericardial effusion or thickening.  7.  There is no intracardiac thrombus.     [facility] 12/2017         Coronary angiogram 12/2017  1. Both coronary arteries arise from their respective cusps.  2. Dominance: Right  3. The LM is without angiographic evidence of disease.   4. The LAD gives rise to septal perforators and a large D1 and a small D2 and has no significant disease.    5. LCX gives rise to a small OM1 and OM2 vessels without significant stenosis  6. RCA (dominant) gives rise to PL branches and supplies PDA with no significant disease  The minimal lumen area of the LAD was 6.6 mm2.  The intima media thickness of the LAD was 0.2 mm.    Assessment/Plan:  Mr. Iglesia Shetty is a 47 year old male who is s/p orthotopic heart transplant 12/2016 for Chagas cardiomyopathy. Post-op course complicated by rejection with atrial and ventricular arrhythmias, respiratory failure and JOSE LUIS, which resolved with diuresis, surgical blood loss anemia, right pleural effusion, requiring pigtail placement, and Chagas reactivation, treated with benznidasole, later - shingles treated with Valtrex, and low level CMV. More recently, he has done remarkably well without recent rejection or evidence of graft dysfunction. No changes today. Will repeat trough next week as today was not a trough.     #  Status post OHT on 12/2016, history of Chagas cardiomyopathy  # Chronic immunosuppression  * Rejection history: 2R on first tx with steroids, repeat bx 2R/3A on second tx with steroids +thymo, 2R in February treated with steroids  * Recent immunosuppression changes: none  * Intolerance to medications: none    Immunosuppression:  - Tacrolimus, trough level goal 8-10. Patient had taken med this morning so trough inaccurate.   - Cellcept 1500 mg bid    Prophylaxis:  - CAV: ASA 81 mg and rosuvastatin 10 mg, repeat lipid panel pending  - Bones: calcium and vitamind D    Serostatus: CMV: D+/R+. EBV: D+/R+    # Low-level CMV viremia: Levels remain low, plans to treat if escalates beyond 1500IU per ID.  Last level 12/2017 <137.  # Hx shingles: treated with Valtrex  # Hx Chagas with reactivation: Chagas reactivated following his transplant (12/26/16). He was treated with Benznidazole for 60 days, and Transplant ID noted that his heart has not yet been reinfected at this time. Chagas PCRs have remained negative, Transplant ID remains available if needed.  # Latent TB: treated with INH per ID    # Health Care Maintenance  - Immunizations: up to date, recommend flu shot in the fall   - Dermatology: seen 12/2017  - Colonoscopy: n/a        25 minutes spent face-to-face with patient, >50% in counseling and/or coordination of care as described above      Rosibel Hu DNP, NP-C  8/17/2018            CC  DYLON VALLES, BONIFACIO AUSTIN

## 2018-08-17 NOTE — MR AVS SNAPSHOT
After Visit Summary   8/17/2018    Javi Bansal    MRN: [id number]           Patient Information     Date Of Birth          1970        Visit Information        Provider Department      8/17/2018 2:15 PM Patricio Ramírez Megan T, APRN [facility]        Today's Diagnoses     Status post heart transplant (H)    -  1    Immunosuppression (H)        Herpes zoster without complication        Chagas disease          Care Instructions    Please call with any questions or concerns.      Repeat Tacro (prograf) level. You may do this at a local lab. Please do not take your Tacro medication the morning you have labs drawn. Please adjust your evening dose 12 hours prior to your lab appointment.     I will call you with all of the pending lab results and possible instructions.     Follow up with post heart transplant team this December (preferrably early before baby is born). We will also be scheduling an angiogram most likely the day before your appointment.         Yanelis Araiza RN BSN   Post Heart Transplant Nurse Coordinator  [facility]  Questions: [phone]            Follow-ups after your visit        Your next 10 appointments already scheduled     Jun 20, 2019  2:00 PM CDT   NEW GENERAL with Anibal Cox MD   Eye Clinic ([facility])    [address] [facility] [address] [place] [id number]   [phone]              Future tests that were ordered for you today     Open Future Orders        Priority Expected Expires Ordered    Tacrolimus level Routine 8/24/2018 9/16/2018 8/17/2018    Lipid panel reflex to direct LDL Fasting Add-On 8/17/2018 8/31/2018 8/17/2018            Who to contact     If you have questions or need follow up information about today's clinic visit or your schedule please contact [facility] directly at [phone].  Normal or non-critical lab and imaging  "results will be communicated to you by MyChart, letter or phone within 4 business days after the clinic has received the results. If you do not hear from us within 7 days, please contact the clinic through MyChart or phone. If you have a critical or abnormal lab result, we will notify you by phone as soon as possible.  Submit refill requests through [facility] or call your pharmacy and they will forward the refill request to us. Please allow 3 business days for your refill to be completed.          Additional Information About Your Visit        Care EveryWhere ID     This is your Care EveryWhere ID. This could be used by other organizations to access your [place] medical records  [id number]        Your Vitals Were     Pulse Height Pulse Oximetry BMI (Body Mass Index)          89 1.69 m (5' 6.53\") 98% 24.81 kg/m2         Blood Pressure from Last 3 Encounters:   08/17/18 128/79   08/17/18 136/80   04/10/18 108/72    Weight from Last 3 Encounters:   08/17/18 70.9 kg (156 lb 3.2 oz)   04/10/18 71.1 kg (156 lb 11.2 oz)   12/19/17 69.6 kg (153 lb 6.4 oz)                 Today's Medication Changes      Notice     This visit is during an admission. Changes to the med list made in this visit will be reflected in the After Visit Summary of the admission.             Primary Care Provider Fax #    Physician No Ref-Primary [phone]       No address on file        Equal Access to Services     JASWINDER KHAN : Hadii bharati Vuong, waaxda lujamesadaha, qaybta kaalmada sulema, talisha garcia . So [facility] [phone].    ATENCIÓN: Si habla español, tiene a watt disposición servicios gratuitos de asistencia lingüística. Llame al [phone].    We comply with applicable federal civil rights laws and [place] laws. We do not discriminate on the basis of race, color, national origin, age, disability, sex, sexual orientation, or gender identity.            Thank you!     Thank you for choosing [facility] " HEART CARE  for your care. Our goal is always to provide you with excellent care. Hearing back from our patients is one way we can continue to improve our services. Please take a few minutes to complete the written survey that you may receive in the mail after your visit with us. Thank you!             Your Updated Medication List - Protect others around you: Learn how to safely use, store and throw away your medicines at www.disposemymeds.org.      Notice     This visit is during an admission. Changes to the med list made in this visit will be reflected in the After Visit Summary of the admission.

## 2018-08-18 LAB
CHOLEST SERPL-MCNC: 191 MG/DL
CMV DNA SPEC NAA+PROBE-ACNC: <137 [IU]/ML
CMV DNA SPEC NAA+PROBE-LOG#: <2.1 {LOG_IU}/ML
HDLC SERPL-MCNC: 54 MG/DL
LDLC SERPL CALC-MCNC: 118 MG/DL
NONHDLC SERPL-MCNC: 137 MG/DL
SPECIMEN SOURCE: ABNORMAL
TRIGL SERPL-MCNC: 92 MG/DL

## 2018-08-20 LAB — COPATH REPORT: NORMAL

## 2018-08-21 DIAGNOSIS — Z94.1 HEART REPLACED BY TRANSPLANT (H): Primary | ICD-10-CM

## 2018-08-23 DIAGNOSIS — Z94.1 STATUS POST HEART TRANSPLANT (H): ICD-10-CM

## 2018-08-23 LAB
CHOLEST SERPL-MCNC: 164 MG/DL
HDLC SERPL-MCNC: 43 MG/DL
LDLC SERPL CALC-MCNC: 90 MG/DL
NONHDLC SERPL-MCNC: 121 MG/DL
TRIGL SERPL-MCNC: 155 MG/DL

## 2018-08-23 PROCEDURE — 80197 ASSAY OF TACROLIMUS: CPT | Performed by: NURSE PRACTITIONER

## 2018-08-24 ENCOUNTER — TELEPHONE (OUTPATIENT)
Dept: CARDIOLOGY | Facility: CLINIC | Age: 48
End: 2018-08-24

## 2018-08-24 LAB
TACROLIMUS BLD-MCNC: 7.5 UG/L (ref 5–15)
TME LAST DOSE: NORMAL H

## 2018-08-24 NOTE — TELEPHONE ENCOUNTER
tacro level 7.5. Current dose 2mg/2mg. Range 8-10. No changes necessary at this time. All other lab results discussed, pt verbalized an understanding with plan of care.

## 2018-09-03 NOTE — ADDENDUM NOTE
Encounter addended by: Jamel Herndon MD on: 9/2/2018  9:38 PM<BR>     Actions taken: Sign clinical note

## 2018-09-07 ENCOUNTER — TELEPHONE (OUTPATIENT)
Dept: TRANSPLANT | Facility: CLINIC | Age: 48
End: 2018-09-07

## 2018-09-07 NOTE — LETTER
Coordinator: TOBY Dhaliwal Iglesia Ramirezr Schedule revised on 9/19/2018   MRN:  [id number]  Physician: Geena Mcclellan MD  Transplant Date: 12/12/2016    Visit  Number Time Post  Transplant Procedures to Expect this Visit Visit Date   Visit #16 Month 24 2-day visit: Extended labs, X-rays, DEXA, ECHO/MRI, Angiogram/RHC/IVUS/Biopsy, CPX, EKG.  Clinic appt with MD/NP. [facility], [facility]. Thursday, 12/6/18  & Friday, 12/7/18   Visit #17 Month 30 Extended labs, heart biopsy & clinic appt with MD/NP. Week of 6/10/19   * Labs, including Prograf level, will be drawn at the [address] [place] [address] lab prior to the biopsy unless indicated.  * Please take medication evening before to ensure 12-hour trough the next AM. (i.e. Labs at 8:30 AM, take medication the evening before at 8:30 PM.)  * Do not take medication in AM until after blood drawn.    Thursday 12/6/18    Month 24  (Day 1 of 2) 9:00 am Fasting Labs and [facility] Blood Draw  (nothing to eat or drink after midnight) [address]    9:30 am Angiogram/RHC/IVUS/Biopsy  (Do not eat or drink anything after midnight; be sure to arrange a ) [address]     Friday 12/7/18    Month 24  (Day 2 of 2) 9:30 am Cardiac MRI  (No caffeine for 12 hours before testing; fast for 3 hours before MRI) [address]    11:15 am Chest X-Ray [facility]  2nd [facility]    12:00 pm Either walk or take the shuttle to the [facility]    12:30 pm Appointment with Dr. Mcclellan, your cardiologist [facility]  3rd floor

## 2018-09-19 NOTE — TELEPHONE ENCOUNTER
September 7, 2018: I left a message (with the help of an ) to let Javi know that I'm mailing paperwork for the December 6 and December 7, 2018 appointments.     Carola Chacon  Post-Heart Transplant   [phone]

## 2018-12-05 ENCOUNTER — TELEPHONE (OUTPATIENT)
Dept: CARDIOLOGY | Facility: CLINIC | Age: 48
End: 2018-12-05

## 2018-12-05 ENCOUNTER — PRE VISIT (OUTPATIENT)
Dept: CARDIOLOGY | Facility: CLINIC | Age: 48
End: 2018-12-05

## 2018-12-05 DIAGNOSIS — Z94.1 TRANSPLANTED HEART (H): Primary | ICD-10-CM

## 2018-12-05 RX ORDER — SODIUM CHLORIDE 9 MG/ML
INJECTION, SOLUTION INTRAVENOUS CONTINUOUS
Status: CANCELLED | OUTPATIENT
Start: 2018-12-05

## 2018-12-05 RX ORDER — LIDOCAINE 40 MG/G
CREAM TOPICAL
Status: CANCELLED | OUTPATIENT
Start: 2018-12-05

## 2018-12-05 NOTE — TELEPHONE ENCOUNTER
used to communicate instructions for tomorrow's procedure. 12 hour tacrolimus instructions also discussed. Pt to be npo for Coronary angiogram at 930. All questions answered. Pt verbalized an understanding.

## 2018-12-06 ENCOUNTER — APPOINTMENT (OUTPATIENT)
Dept: MEDSURG UNIT | Facility: CLINIC | Age: 48
End: 2018-12-06
Attending: INTERNAL MEDICINE
Payer: MEDICAID

## 2018-12-06 ENCOUNTER — APPOINTMENT (OUTPATIENT)
Dept: CARDIOLOGY | Facility: CLINIC | Age: 48
End: 2018-12-06
Attending: INTERNAL MEDICINE
Payer: MEDICAID

## 2018-12-06 ENCOUNTER — HOSPITAL ENCOUNTER (OUTPATIENT)
Facility: CLINIC | Age: 48
Discharge: HOME | End: 2018-12-06
Attending: INTERNAL MEDICINE | Admitting: INTERNAL MEDICINE
Payer: MEDICAID

## 2018-12-06 VITALS
OXYGEN SATURATION: 99 % | WEIGHT: 158 LBS | BODY MASS INDEX: 25.09 KG/M2 | HEART RATE: 78 BPM | DIASTOLIC BLOOD PRESSURE: 86 MMHG | TEMPERATURE: 97.9 F | SYSTOLIC BLOOD PRESSURE: 131 MMHG | RESPIRATION RATE: 18 BRPM

## 2018-12-06 DIAGNOSIS — Z94.1 HEART REPLACED BY TRANSPLANT (H): ICD-10-CM

## 2018-12-06 DIAGNOSIS — Z94.1 TRANSPLANTED HEART (H): ICD-10-CM

## 2018-12-06 LAB
ALBUMIN SERPL-MCNC: 4.5 G/DL (ref 3.4–5)
ALP SERPL-CCNC: 83 U/L (ref 40–150)
ALT SERPL W P-5'-P-CCNC: 29 U/L (ref 0–70)
ANION GAP SERPL CALCULATED.3IONS-SCNC: 7 MMOL/L (ref 3–14)
AST SERPL W P-5'-P-CCNC: 24 U/L (ref 0–45)
BILIRUB SERPL-MCNC: 0.6 MG/DL (ref 0.2–1.3)
BUN SERPL-MCNC: 26 MG/DL (ref 7–30)
CALCIUM SERPL-MCNC: 9.2 MG/DL (ref 8.5–10.1)
CHLORIDE SERPL-SCNC: 106 MMOL/L (ref 94–109)
CK SERPL-CCNC: 125 U/L (ref 30–300)
CO2 SERPL-SCNC: 26 MMOL/L (ref 20–32)
CREAT SERPL-MCNC: 1.16 MG/DL (ref 0.66–1.25)
ERYTHROCYTE [DISTWIDTH] IN BLOOD BY AUTOMATED COUNT: 12.5 % (ref 10–15)
GFR SERPL CREATININE-BSD FRML MDRD: 67 ML/MIN/1.7M2
GLUCOSE SERPL-MCNC: 79 MG/DL (ref 70–99)
HCT VFR BLD AUTO: 42.1 % (ref 40–53)
HGB BLD-MCNC: 13.5 G/DL (ref 13.3–17.7)
KCT BLD-ACNC: 339 SEC (ref 75–150)
MAGNESIUM SERPL-MCNC: 2.1 MG/DL (ref 1.6–2.3)
MCH RBC QN AUTO: 30.1 PG (ref 26.5–33)
MCHC RBC AUTO-ENTMCNC: 32.1 G/DL (ref 31.5–36.5)
MCV RBC AUTO: 94 FL (ref 78–100)
PHOSPHATE SERPL-MCNC: 3 MG/DL (ref 2.5–4.5)
PLATELET # BLD AUTO: 192 10E9/L (ref 150–450)
POTASSIUM SERPL-SCNC: 4.2 MMOL/L (ref 3.4–5.3)
PROT SERPL-MCNC: 7.9 G/DL (ref 6.8–8.8)
RBC # BLD AUTO: 4.49 10E12/L (ref 4.4–5.9)
SODIUM SERPL-SCNC: 140 MMOL/L (ref 133–144)
TACROLIMUS BLD-MCNC: 7.9 UG/L (ref 5–15)
TME LAST DOSE: NORMAL H
WBC # BLD AUTO: 7.2 10E9/L (ref 4–11)

## 2018-12-06 PROCEDURE — 85347 COAGULATION TIME ACTIVATED: CPT

## 2018-12-06 PROCEDURE — 88350 IMFLUOR EA ADDL 1ANTB STN PX: CPT | Performed by: INTERNAL MEDICINE

## 2018-12-06 PROCEDURE — 40000172 ZZH STATISTIC PROCEDURE PREP ONLY

## 2018-12-06 PROCEDURE — C1769 GUIDE WIRE: HCPCS

## 2018-12-06 PROCEDURE — 85027 COMPLETE CBC AUTOMATED: CPT | Performed by: INTERNAL MEDICINE

## 2018-12-06 PROCEDURE — 86832 HLA CLASS I HIGH DEFIN QUAL: CPT | Performed by: INTERNAL MEDICINE

## 2018-12-06 PROCEDURE — 93505 ENDOMYOCARDIAL BIOPSY: CPT | Mod: 26 | Performed by: INTERNAL MEDICINE

## 2018-12-06 PROCEDURE — 88346 IMFLUOR 1ST 1ANTB STAIN PX: CPT | Performed by: INTERNAL MEDICINE

## 2018-12-06 PROCEDURE — C1893 INTRO/SHEATH, FIXED,NON-PEEL: HCPCS

## 2018-12-06 PROCEDURE — 83735 ASSAY OF MAGNESIUM: CPT | Performed by: INTERNAL MEDICINE

## 2018-12-06 PROCEDURE — 27210856 ZZH ACCESS HEART CATH CR2

## 2018-12-06 PROCEDURE — 82550 ASSAY OF CK (CPK): CPT | Performed by: INTERNAL MEDICINE

## 2018-12-06 PROCEDURE — T1013 SIGN LANG/ORAL INTERPRETER: HCPCS | Mod: U3

## 2018-12-06 PROCEDURE — 93505 ENDOMYOCARDIAL BIOPSY: CPT

## 2018-12-06 PROCEDURE — 84100 ASSAY OF PHOSPHORUS: CPT | Performed by: INTERNAL MEDICINE

## 2018-12-06 PROCEDURE — 93005 ELECTROCARDIOGRAM TRACING: CPT

## 2018-12-06 PROCEDURE — 93456 R HRT CORONARY ARTERY ANGIO: CPT

## 2018-12-06 PROCEDURE — 40000065 ZZH STATISTIC EKG NON-CHARGEABLE

## 2018-12-06 PROCEDURE — 88307 TISSUE EXAM BY PATHOLOGIST: CPT | Performed by: INTERNAL MEDICINE

## 2018-12-06 PROCEDURE — C1894 INTRO/SHEATH, NON-LASER: HCPCS

## 2018-12-06 PROCEDURE — 99152 MOD SED SAME PHYS/QHP 5/>YRS: CPT | Performed by: INTERNAL MEDICINE

## 2018-12-06 PROCEDURE — 87799 DETECT AGENT NOS DNA QUANT: CPT | Performed by: INTERNAL MEDICINE

## 2018-12-06 PROCEDURE — 86833 HLA CLASS II HIGH DEFIN QUAL: CPT | Performed by: INTERNAL MEDICINE

## 2018-12-06 PROCEDURE — 80053 COMPREHEN METABOLIC PANEL: CPT | Performed by: INTERNAL MEDICINE

## 2018-12-06 PROCEDURE — C1753 CATH, INTRAVAS ULTRASOUND: HCPCS

## 2018-12-06 PROCEDURE — 27210893 ZZH CATH CR5

## 2018-12-06 PROCEDURE — 27210843 ZZH DEVICE COMPRESSION CR12

## 2018-12-06 PROCEDURE — 27211047 ZZH FORCEP BIOPSY CR10

## 2018-12-06 PROCEDURE — 27210946 ZZH KIT HC TOTES DISP CR8

## 2018-12-06 PROCEDURE — 25000125 ZZHC RX 250: Performed by: PHYSICIAN ASSISTANT

## 2018-12-06 PROCEDURE — 27210787 ZZH MANIFOLD CR2

## 2018-12-06 PROCEDURE — 93010 ELECTROCARDIOGRAM REPORT: CPT | Performed by: INTERNAL MEDICINE

## 2018-12-06 PROCEDURE — 86352 CELL FUNCTION ASSAY W/STIM: CPT | Performed by: INTERNAL MEDICINE

## 2018-12-06 PROCEDURE — 99153 MOD SED SAME PHYS/QHP EA: CPT

## 2018-12-06 PROCEDURE — 92978 ENDOLUMINL IVUS OCT C 1ST: CPT

## 2018-12-06 PROCEDURE — 25000128 H RX IP 250 OP 636: Performed by: INTERNAL MEDICINE

## 2018-12-06 PROCEDURE — C1887 CATHETER, GUIDING: HCPCS

## 2018-12-06 PROCEDURE — 99152 MOD SED SAME PHYS/QHP 5/>YRS: CPT

## 2018-12-06 PROCEDURE — 80197 ASSAY OF TACROLIMUS: CPT | Performed by: INTERNAL MEDICINE

## 2018-12-06 PROCEDURE — 25000128 H RX IP 250 OP 636: Performed by: PHYSICIAN ASSISTANT

## 2018-12-06 PROCEDURE — 27211181 ZZH BALLOON TIP PRESSURE CR5

## 2018-12-06 PROCEDURE — 27211089 ZZH KIT ACIST INJECTOR CR3

## 2018-12-06 PROCEDURE — 36415 COLL VENOUS BLD VENIPUNCTURE: CPT | Performed by: INTERNAL MEDICINE

## 2018-12-06 RX ORDER — NICARDIPINE HYDROCHLORIDE 2.5 MG/ML
100-300 INJECTION INTRAVENOUS
Status: DISCONTINUED | OUTPATIENT
Start: 2018-12-06 | End: 2018-12-06 | Stop reason: HOSPADM

## 2018-12-06 RX ORDER — EPTIFIBATIDE 2 MG/ML
180 INJECTION, SOLUTION INTRAVENOUS EVERY 10 MIN PRN
Status: DISCONTINUED | OUTPATIENT
Start: 2018-12-06 | End: 2018-12-06 | Stop reason: HOSPADM

## 2018-12-06 RX ORDER — LIDOCAINE 40 MG/G
CREAM TOPICAL
Status: DISCONTINUED | OUTPATIENT
Start: 2018-12-06 | End: 2018-12-06 | Stop reason: HOSPADM

## 2018-12-06 RX ORDER — ATROPINE SULFATE 0.1 MG/ML
0.5 INJECTION INTRAVENOUS EVERY 5 MIN PRN
Status: DISCONTINUED | OUTPATIENT
Start: 2018-12-06 | End: 2018-12-06 | Stop reason: HOSPADM

## 2018-12-06 RX ORDER — PROTAMINE SULFATE 10 MG/ML
1-5 INJECTION, SOLUTION INTRAVENOUS
Status: DISCONTINUED | OUTPATIENT
Start: 2018-12-06 | End: 2018-12-06 | Stop reason: HOSPADM

## 2018-12-06 RX ORDER — NITROGLYCERIN 5 MG/ML
100-500 VIAL (ML) INTRAVENOUS
Status: COMPLETED | OUTPATIENT
Start: 2018-12-06 | End: 2018-12-06

## 2018-12-06 RX ORDER — POTASSIUM CHLORIDE 7.45 MG/ML
10 INJECTION INTRAVENOUS
Status: DISCONTINUED | OUTPATIENT
Start: 2018-12-06 | End: 2018-12-06 | Stop reason: HOSPADM

## 2018-12-06 RX ORDER — CLOPIDOGREL BISULFATE 75 MG/1
75 TABLET ORAL
Status: DISCONTINUED | OUTPATIENT
Start: 2018-12-06 | End: 2018-12-06 | Stop reason: HOSPADM

## 2018-12-06 RX ORDER — FENTANYL CITRATE 50 UG/ML
25-50 INJECTION, SOLUTION INTRAMUSCULAR; INTRAVENOUS
Status: DISCONTINUED | OUTPATIENT
Start: 2018-12-06 | End: 2018-12-06 | Stop reason: HOSPADM

## 2018-12-06 RX ORDER — FUROSEMIDE 10 MG/ML
20-100 INJECTION INTRAMUSCULAR; INTRAVENOUS
Status: DISCONTINUED | OUTPATIENT
Start: 2018-12-06 | End: 2018-12-06 | Stop reason: HOSPADM

## 2018-12-06 RX ORDER — SODIUM NITROPRUSSIDE 25 MG/ML
100-200 INJECTION INTRAVENOUS
Status: DISCONTINUED | OUTPATIENT
Start: 2018-12-06 | End: 2018-12-06 | Stop reason: HOSPADM

## 2018-12-06 RX ORDER — DOBUTAMINE HYDROCHLORIDE 200 MG/100ML
2-20 INJECTION INTRAVENOUS CONTINUOUS PRN
Status: DISCONTINUED | OUTPATIENT
Start: 2018-12-06 | End: 2018-12-06 | Stop reason: HOSPADM

## 2018-12-06 RX ORDER — HEPARIN SODIUM 1000 [USP'U]/ML
1000-10000 INJECTION, SOLUTION INTRAVENOUS; SUBCUTANEOUS EVERY 5 MIN PRN
Status: DISCONTINUED | OUTPATIENT
Start: 2018-12-06 | End: 2018-12-06 | Stop reason: HOSPADM

## 2018-12-06 RX ORDER — DIPHENHYDRAMINE HYDROCHLORIDE 50 MG/ML
25-50 INJECTION INTRAMUSCULAR; INTRAVENOUS
Status: DISCONTINUED | OUTPATIENT
Start: 2018-12-06 | End: 2018-12-06 | Stop reason: HOSPADM

## 2018-12-06 RX ORDER — ONDANSETRON 2 MG/ML
4 INJECTION INTRAMUSCULAR; INTRAVENOUS EVERY 4 HOURS PRN
Status: DISCONTINUED | OUTPATIENT
Start: 2018-12-06 | End: 2018-12-06 | Stop reason: HOSPADM

## 2018-12-06 RX ORDER — NITROGLYCERIN 20 MG/100ML
.07-2 INJECTION INTRAVENOUS CONTINUOUS PRN
Status: DISCONTINUED | OUTPATIENT
Start: 2018-12-06 | End: 2018-12-06 | Stop reason: HOSPADM

## 2018-12-06 RX ORDER — PRASUGREL 10 MG/1
10-60 TABLET, FILM COATED ORAL
Status: DISCONTINUED | OUTPATIENT
Start: 2018-12-06 | End: 2018-12-06 | Stop reason: HOSPADM

## 2018-12-06 RX ORDER — HYDRALAZINE HYDROCHLORIDE 20 MG/ML
10-20 INJECTION INTRAMUSCULAR; INTRAVENOUS
Status: DISCONTINUED | OUTPATIENT
Start: 2018-12-06 | End: 2018-12-06 | Stop reason: HOSPADM

## 2018-12-06 RX ORDER — ARGATROBAN 1 MG/ML
150 INJECTION, SOLUTION INTRAVENOUS
Status: DISCONTINUED | OUTPATIENT
Start: 2018-12-06 | End: 2018-12-06 | Stop reason: HOSPADM

## 2018-12-06 RX ORDER — FLUMAZENIL 0.1 MG/ML
0.2 INJECTION, SOLUTION INTRAVENOUS
Status: DISCONTINUED | OUTPATIENT
Start: 2018-12-06 | End: 2018-12-06 | Stop reason: HOSPADM

## 2018-12-06 RX ORDER — CLOPIDOGREL BISULFATE 75 MG/1
300-600 TABLET ORAL
Status: DISCONTINUED | OUTPATIENT
Start: 2018-12-06 | End: 2018-12-06 | Stop reason: HOSPADM

## 2018-12-06 RX ORDER — ATROPINE SULFATE 0.1 MG/ML
.5-1 INJECTION INTRAVENOUS
Status: DISCONTINUED | OUTPATIENT
Start: 2018-12-06 | End: 2018-12-06 | Stop reason: HOSPADM

## 2018-12-06 RX ORDER — VERAPAMIL HYDROCHLORIDE 2.5 MG/ML
1-5 INJECTION, SOLUTION INTRAVENOUS
Status: COMPLETED | OUTPATIENT
Start: 2018-12-06 | End: 2018-12-06

## 2018-12-06 RX ORDER — LORAZEPAM 2 MG/ML
.5-2 INJECTION INTRAMUSCULAR EVERY 4 HOURS PRN
Status: DISCONTINUED | OUTPATIENT
Start: 2018-12-06 | End: 2018-12-06 | Stop reason: HOSPADM

## 2018-12-06 RX ORDER — ASPIRIN 81 MG/1
81-324 TABLET, CHEWABLE ORAL
Status: DISCONTINUED | OUTPATIENT
Start: 2018-12-06 | End: 2018-12-06 | Stop reason: HOSPADM

## 2018-12-06 RX ORDER — IOPAMIDOL 755 MG/ML
75 INJECTION, SOLUTION INTRAVASCULAR ONCE
Status: COMPLETED | OUTPATIENT
Start: 2018-12-06 | End: 2018-12-06

## 2018-12-06 RX ORDER — EPINEPHRINE 1 MG/ML
0.3 INJECTION, SOLUTION, CONCENTRATE INTRAVENOUS
Status: DISCONTINUED | OUTPATIENT
Start: 2018-12-06 | End: 2018-12-06 | Stop reason: HOSPADM

## 2018-12-06 RX ORDER — TIROFIBAN HYDROCHLORIDE 50 UG/ML
0.07 INJECTION INTRAVENOUS CONTINUOUS PRN
Status: DISCONTINUED | OUTPATIENT
Start: 2018-12-06 | End: 2018-12-06 | Stop reason: HOSPADM

## 2018-12-06 RX ORDER — NALOXONE HYDROCHLORIDE 0.4 MG/ML
.1-.4 INJECTION, SOLUTION INTRAMUSCULAR; INTRAVENOUS; SUBCUTANEOUS
Status: DISCONTINUED | OUTPATIENT
Start: 2018-12-06 | End: 2018-12-06 | Stop reason: HOSPADM

## 2018-12-06 RX ORDER — NIFEDIPINE 10 MG/1
10 CAPSULE ORAL
Status: DISCONTINUED | OUTPATIENT
Start: 2018-12-06 | End: 2018-12-06 | Stop reason: HOSPADM

## 2018-12-06 RX ORDER — NITROGLYCERIN 5 MG/ML
100-200 VIAL (ML) INTRAVENOUS
Status: DISCONTINUED | OUTPATIENT
Start: 2018-12-06 | End: 2018-12-06 | Stop reason: HOSPADM

## 2018-12-06 RX ORDER — ASPIRIN 325 MG
325 TABLET ORAL
Status: DISCONTINUED | OUTPATIENT
Start: 2018-12-06 | End: 2018-12-06 | Stop reason: HOSPADM

## 2018-12-06 RX ORDER — DEXTROSE MONOHYDRATE 25 G/50ML
12.5-5 INJECTION, SOLUTION INTRAVENOUS EVERY 30 MIN PRN
Status: DISCONTINUED | OUTPATIENT
Start: 2018-12-06 | End: 2018-12-06 | Stop reason: HOSPADM

## 2018-12-06 RX ORDER — DOPAMINE HYDROCHLORIDE 160 MG/100ML
2-20 INJECTION, SOLUTION INTRAVENOUS CONTINUOUS PRN
Status: DISCONTINUED | OUTPATIENT
Start: 2018-12-06 | End: 2018-12-06 | Stop reason: HOSPADM

## 2018-12-06 RX ORDER — METHYLPREDNISOLONE SODIUM SUCCINATE 125 MG/2ML
125 INJECTION, POWDER, LYOPHILIZED, FOR SOLUTION INTRAMUSCULAR; INTRAVENOUS
Status: DISCONTINUED | OUTPATIENT
Start: 2018-12-06 | End: 2018-12-06 | Stop reason: HOSPADM

## 2018-12-06 RX ORDER — PHENYLEPHRINE HCL IN 0.9% NACL 1 MG/10 ML
20-100 SYRINGE (ML) INTRAVENOUS
Status: DISCONTINUED | OUTPATIENT
Start: 2018-12-06 | End: 2018-12-06 | Stop reason: HOSPADM

## 2018-12-06 RX ORDER — ARGATROBAN 1 MG/ML
350 INJECTION, SOLUTION INTRAVENOUS
Status: DISCONTINUED | OUTPATIENT
Start: 2018-12-06 | End: 2018-12-06 | Stop reason: HOSPADM

## 2018-12-06 RX ORDER — NALOXONE HYDROCHLORIDE 0.4 MG/ML
.2-.4 INJECTION, SOLUTION INTRAMUSCULAR; INTRAVENOUS; SUBCUTANEOUS
Status: DISCONTINUED | OUTPATIENT
Start: 2018-12-06 | End: 2018-12-06 | Stop reason: HOSPADM

## 2018-12-06 RX ORDER — SODIUM CHLORIDE 9 MG/ML
INJECTION, SOLUTION INTRAVENOUS CONTINUOUS
Status: DISCONTINUED | OUTPATIENT
Start: 2018-12-06 | End: 2018-12-06 | Stop reason: HOSPADM

## 2018-12-06 RX ORDER — NITROGLYCERIN 0.4 MG/1
0.4 TABLET SUBLINGUAL EVERY 5 MIN PRN
Status: DISCONTINUED | OUTPATIENT
Start: 2018-12-06 | End: 2018-12-06 | Stop reason: HOSPADM

## 2018-12-06 RX ORDER — SODIUM CHLORIDE 9 MG/ML
INJECTION, SOLUTION INTRAVENOUS CONTINUOUS
Status: CANCELLED | OUTPATIENT
Start: 2018-12-06

## 2018-12-06 RX ORDER — PROTAMINE SULFATE 10 MG/ML
25-100 INJECTION, SOLUTION INTRAVENOUS EVERY 5 MIN PRN
Status: DISCONTINUED | OUTPATIENT
Start: 2018-12-06 | End: 2018-12-06 | Stop reason: HOSPADM

## 2018-12-06 RX ORDER — ADENOSINE 3 MG/ML
12-12000 INJECTION, SOLUTION INTRAVENOUS
Status: DISCONTINUED | OUTPATIENT
Start: 2018-12-06 | End: 2018-12-06 | Stop reason: HOSPADM

## 2018-12-06 RX ORDER — ENALAPRILAT 1.25 MG/ML
1.25-2.5 INJECTION INTRAVENOUS
Status: DISCONTINUED | OUTPATIENT
Start: 2018-12-06 | End: 2018-12-06 | Stop reason: HOSPADM

## 2018-12-06 RX ORDER — POTASSIUM CHLORIDE 29.8 MG/ML
20 INJECTION INTRAVENOUS
Status: DISCONTINUED | OUTPATIENT
Start: 2018-12-06 | End: 2018-12-06 | Stop reason: HOSPADM

## 2018-12-06 RX ORDER — METOPROLOL TARTRATE 1 MG/ML
5 INJECTION, SOLUTION INTRAVENOUS EVERY 5 MIN PRN
Status: DISCONTINUED | OUTPATIENT
Start: 2018-12-06 | End: 2018-12-06 | Stop reason: HOSPADM

## 2018-12-06 RX ORDER — NALOXONE HYDROCHLORIDE 0.4 MG/ML
0.4 INJECTION, SOLUTION INTRAMUSCULAR; INTRAVENOUS; SUBCUTANEOUS EVERY 5 MIN PRN
Status: DISCONTINUED | OUTPATIENT
Start: 2018-12-06 | End: 2018-12-06 | Stop reason: HOSPADM

## 2018-12-06 RX ORDER — EPTIFIBATIDE 2 MG/ML
1 INJECTION, SOLUTION INTRAVENOUS CONTINUOUS PRN
Status: DISCONTINUED | OUTPATIENT
Start: 2018-12-06 | End: 2018-12-06 | Stop reason: HOSPADM

## 2018-12-06 RX ORDER — LIDOCAINE HYDROCHLORIDE 10 MG/ML
30 INJECTION, SOLUTION EPIDURAL; INFILTRATION; INTRACAUDAL; PERINEURAL
Status: DISCONTINUED | OUTPATIENT
Start: 2018-12-06 | End: 2018-12-06 | Stop reason: HOSPADM

## 2018-12-06 RX ADMIN — FENTANYL CITRATE 50 MCG: 50 INJECTION, SOLUTION INTRAMUSCULAR; INTRAVENOUS at 12:10

## 2018-12-06 RX ADMIN — NITROGLYCERIN 200 MCG: 5 INJECTION, SOLUTION INTRAVENOUS at 12:28

## 2018-12-06 RX ADMIN — FENTANYL CITRATE 25 MCG: 50 INJECTION, SOLUTION INTRAMUSCULAR; INTRAVENOUS at 12:48

## 2018-12-06 RX ADMIN — IOPAMIDOL 75 ML: 755 INJECTION, SOLUTION INTRAVASCULAR at 13:00

## 2018-12-06 RX ADMIN — MIDAZOLAM 1 MG: 1 INJECTION INTRAMUSCULAR; INTRAVENOUS at 12:10

## 2018-12-06 RX ADMIN — SODIUM CHLORIDE: 9 INJECTION, SOLUTION INTRAVENOUS at 10:48

## 2018-12-06 RX ADMIN — MIDAZOLAM 1 MG: 1 INJECTION INTRAMUSCULAR; INTRAVENOUS at 12:20

## 2018-12-06 RX ADMIN — VERAPAMIL HYDROCHLORIDE 2.5 MG: 2.5 INJECTION, SOLUTION INTRAVENOUS at 12:28

## 2018-12-06 RX ADMIN — NITROGLYCERIN 200 MCG: 5 INJECTION, SOLUTION INTRAVENOUS at 12:47

## 2018-12-06 RX ADMIN — FENTANYL CITRATE 25 MCG: 50 INJECTION, SOLUTION INTRAMUSCULAR; INTRAVENOUS at 12:27

## 2018-12-06 NOTE — H&P
[facility]      CSI History and Physicial  Javi Bansal MRN: [id number]  1970  Date of Admission:12/6/2018  Primary care provider: No Ref-Primary, Physician         Chief Complaint:   S/p heart transplant         History of Present Illness:   Javi Shetty is a 47 year old male past medical history significant for OHT in 2016 who presents for planned intravascular ultrasound, right heart catheterization, heart biopsy, and coronary angiogram. Since last visit in the cardiology clinic in August, patient reports no major changes in health history, denies hospitalizations or new medications.    Patient feels well today, denies headache, vision change, shortness of breath, chest pain, or nausea.           Review of Systems:    10 point review of systems negative except for stated above in HPI.          Past Medical History:   Medical History reviewed.   Past Medical History:   Diagnosis Date     Chagas cardiomyopathy      Chronic systolic heart failure (H)      Diabetes (H)     Steroid induced; 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 suspected , but unclear dx             Past Surgical History:   Surgical History reviewed.   Past Surgical History:   Procedure Laterality Date     CARDIAC SURGERY      AICD     TRANSPLANT HEART RECIPIENT N/A 12/12/2016    Procedure: TRANSPLANT HEART RECIPIENT;  Surgeon: Elo Madsen MD;  Location:  OR             Social History:   Social History reviewed.  Social History   Substance Use Topics     Smoking status: Never Smoker     Smokeless tobacco: Never Used     Alcohol use 0.0 oz/week     0 Standard drinks or equivalent per week      Comment: one beer every 20 days             Family  History:   Family History reviewed.   Family History   Problem Relation Age of Onset     Brain Cancer Mother      Melanoma No family hx of      Skin Cancer No family hx of      Glaucoma No family hx of      Macular Degeneration No family hx of              Allergies:   No Known Allergies          Medications:   Medications Reviewed.   Current Facility-Administered Medications   Medication     0.9% sodium chloride BOLUS     abciximab (REOPRO) 9 mg in D5W 250 mL infusion     abciximab (REOPRO) 9 mg in D5W 250 mL infusion     abciximab (REOPRO) injection 0.25 mg/kg (Dosing Weight)     abciximab (REOPRO) injection 0.25 mg/kg (Dosing Weight)     adenosine (ADENOCARD) injection 12-12,000 mcg     adenosine (diagnostic) (ADENOSCAN) IV infusion - for Cath Lab (FFR)     amiodarone (NEXTERONE) injection 150-300 mg     argatroban (ACOVA) bolus from infusion pump 150 mcg/kg (Dosing Weight)     argatroban (ACOVA) bolus from infusion pump 350 mcg/kg (Dosing Weight)     argatroban (ACOVA) infusion 250 mg/250 mL     aspirin (ASA) chewable tablet  mg     aspirin (ASA) EC tablet 325 mg     aspirin (ASA) tablet 325 mg     atropine injection 0.5-1 mg     bivalirudin (ANGIOMAX) 250 mg in sodium chloride 0.9 % 50 mL infusion     bivalirudin (ANGIOMAX) bolus from infusion pump 0.75 mg/kg (Dosing Weight)     clopidogrel (PLAVIX) tablet 300-600 mg     clopidogrel (PLAVIX) tablet 75 mg     dextrose 50 % injection 12.5-50 mL     diphenhydrAMINE (BENADRYL) injection 25-50 mg     DOBUTamine 500 mg in dextrose 5% 250 mL (adult std conc) premix     DOPamine 400 mg in dextrose 5% 250 mL (adult std) - premix     enalaprilat (VASOTEC) injection 1.25-2.5 mg     EPINEPHrine PF (ADRENALIN) injection 0.3 mg     EPINEPHrine PF (ADRENALIN) injection 0.3 mg     EPINEPHrine PF (ADRENALIN) injection 1 mg     epoprostenol (FLOLAN) 0.2 mg in glycine diluent 100 mL infusion     eptifibatide (INTEGRILIN) 2 mg/mL loading dose     eptifibatide (INTEGRILIN)  200 mg/100 mL infusion     fentaNYL (PF) (SUBLIMAZE) injection 25-50 mcg     flumazenil (ROMAZICON) injection 0.2 mg     furosemide (LASIX) injection  mg     heparin (porcine) injection 1,000-10,000 Units     heparin infusion 25,000 units in 0.45% NaCl 250 mL     hydrALAZINE (APRESOLINE) injection 10-20 mg     lidocaine (LMX4) cream     lidocaine (LMX4) cream     lidocaine (PF) (XYLOCAINE) 1 % injection 30 mL     lidocaine 1 % 1 mL     LORazepam (ATIVAN) injection 0.5-2 mg     methylPREDNISolone sodium succinate (solu-MEDROL) injection 125 mg     metoprolol (LOPRESSOR) injection 5 mg     midazolam (VERSED) injection 0.5-2 mg     naloxone (NARCAN) injection 0.4 mg     niCARdipine (CARDENE) injection 100-300 mcg     niCARdipine (CARDENE) injection 100-300 mcg     NIFEdipine (PROCARDIA) capsule 10 mg     nitroGLYcerin (NITROSTAT) sublingual tablet 0.4 mg     nitroGLYcerin 50 mg in D5W 250 mL (adult std) infusion     nitroGLYcerin injection 100-200 mcg     nitroGLYcerin injection 100-500 mcg     nitroPRUsside (NIPRIDE) 50 mg, sodium thiosulfate 500 mg in D5W 125 mL IV infusion (conc: 0.4mg/mL)     nitroPRUsside (NIPRIDE) injection 100-200 mcg     ondansetron (ZOFRAN) injection 4 mg     Patient is NOT allergic to contrast dye OR was given pre-procedure oral steroids for treatment     phenylephrine (ABAD-SYNEPHRINE) injection  mcg     potassium chloride 10 mEq in 100 mL sterile water intermittent infusion (premix)     potassium chloride 20 mEq in 50 mL intermittent infusion     prasugrel (EFFIENT) tablet 10-60 mg     protamine injection 1-5 mg     protamine injection  mg     reason aspirin not prescribed (intentional)     sodium bicarbonate 8.4 % injection 50 mEq     sodium chloride (PF) 0.9% PF flush 3 mL     sodium chloride (PF) 0.9% PF flush 3 mL     sodium chloride 0.9% infusion     ticagrelor (BRILINTA) tablet  mg     tirofiban (AGGRASTAT) 12.5 mg/250 mL infusion     tirofiban (AGGRASTAT) 12.5  mg/250 mL infusion     tirofiban (AGGRASTAT) bolus from infusion pump 25 mcg/kg (Dosing Weight)     tirofiban (AGGRASTAT) injection 25 mcg/kg (Dosing Weight)     tirofiban (AGGRASTAT) injection 25 mcg/kg (Dosing Weight)     vasopressin (PITRESSIN) injection 40 Units     verapamil (ISOPTIN) injection 1-5 mg             Physical Exam:   Vitals were reviewed.  Blood pressure 112/76, pulse 88, temperature 97.6  F (36.4  C), temperature source Oral, resp. rate 17, weight 71.7 kg (158 lb), SpO2 100 %.    General: AAOx3, NAD, pleasant  Skin: Not jaundiced, no rash, no ecchymoses  HEENT: MMM, EOM intact  CV: RRR, normal S1S2, no murmur, clicks, rubs  Resp: Clear to auscultation bilaterally, no wheezes, rhonchi  Abd: Soft, non-tender, BS+, no masses appreciated  Extremities: warm and well perfused, palpable radial and pedal pulses bilaterally, no edema  Neuro: No lateralizing symptoms or focal neurologic deficits        Labs:   Routine Labs:  Lab Results   Component Value Date    TROPI  06/10/2017     <0.015  The 99th percentile for upper reference range is 0.045 ug/L.  Troponin values in   the range of 0.045 - 0.120 ug/L may be associated with risks of adverse   clinical events.      TROPI  03/09/2017     <0.015  The 99th percentile for upper reference range is 0.045 ug/L.  Troponin values in   the range of 0.045 - 0.120 ug/L may be associated with risks of adverse   clinical events.      TROPI 0.096 (H) 01/05/2017    TROPI 0.123 (HH) 12/30/2016    TROPI 0.163 (HH) 12/29/2016    TROPONIN 0.01 03/09/2017     CMP  Recent Labs  Lab 12/06/18  1004      POTASSIUM 4.2   CHLORIDE 106   CO2 26   ANIONGAP 7   GLC 79   BUN 26   CR 1.16   GFRESTIMATED 67   GFRESTBLACK 81   DAISY 9.2   MAG 2.1   PHOS 3.0   PROTTOTAL 7.9   ALBUMIN 4.5   BILITOTAL 0.6   ALKPHOS 83   AST 24   ALT 29     CBC  Recent Labs  Lab 12/06/18  1004   WBC 7.2   RBC 4.49   HGB 13.5   HCT 42.1   MCV 94   MCH 30.1   MCHC 32.1   RDW 12.5        INRNo lab  results found in last 7 days.        Diagnostics:    Sinus rhythm, incomplete right bundle branch block. Unchanged from previous      Assessment and Plan:     Javi Shetty is a 47 year old male past medical history significant for OHT in 2016 who presents for planned intravascular ultrasound, right heart catheterization, heart biopsy, and coronary angiogram. Patient may proceed with planned procedures as above.    Hipolito Neely PA-C  [facility] Cardiology Team      ATTENDING NOTE:  Patient has been seen and evaluated by me on 12/06/2018. I have reviewed the H&P.  Please refer to it for additional details.  I have reviewed today's vital signs, medications, labs, and imaging results.  I have reviewed and edited, as necessary, the history, review of systems, physical examination, and assessment and plan.  The patient will undergo RHC, coroonary angiogram, biopsy, and IVUS.    Dante Way MD     Cardiovascular Division

## 2018-12-06 NOTE — PROGRESS NOTES
AOX4,VSS, TR band in place. 1cc of air remaining. Denies any pain. Tolerating regular diet. Voiding. Due to ambulate. Able to make needs known. Will continue to monitor

## 2018-12-06 NOTE — PROGRESS NOTES
Discharge instructions reviewed, understood, and signed by patient. VSS, PIV removed,  medications reviewed and understood, patient has all belongings. Patient  Awaiting family member.

## 2018-12-06 NOTE — IP AVS SNAPSHOT
MRN:[id number]                      After Visit Summary   12/6/2018    Javi Bansal    MRN: [id number]           Thank you!     Thank you for choosing [facility] for your care. Our goal is always to provide you with excellent care. Hearing back from our patients is one way we can continue to improve our services. Please take a few minutes to complete the written survey that you may receive in the mail after you visit with us. Thank you!        Patient Information     Date Of Birth          1970        About your hospital stay     You were admitted on:  December 6, 2018 You last received care in the:  Unit 6D Observation [facility]    You were discharged on:  December 6, 2018       Who to Call     For medical emergencies, please call 911.  For non-urgent questions about your medical care, please call your primary care provider or clinic, None          Attending Provider     Provider Specialty    Geena Mcclellan MD Cardiology    Way, Dante Da Silva MD Internal Medicine - Interventional Cardiology       Primary Care Provider Fax #    Physician No Ref-Primary [phone]      After Care Instructions     Discharge Instructions - IF on Metformin (Glucophage or Glucovance) or Metformin containing medications       IF on Metformin (Glucophage or Glucovance) or Metformin containing medications , schedule a Basic Metabolic Panel at [facility] or Primary Clinic in 48 - 72 hours post procedure and PRIOR TO resuming the Metformin or Metformin containing medications.  Hold Metformin (Glucophage or Glucovance) or Metformin containing medications until after the Basic Metabolic Panel on the 2nd or 3rd day following the procedure.  May resume after blood draw is complete.                  Your next 10 appointments already scheduled     Dec 07, 2018  9:30 AM CST   MR CARDIAC W CONTRAST STRESS AND FLOW with JAYLIN KABA CV MR NURSE   [facility]  [place], [facility])    [address] [address] [phone]           How do I prepare for my exam? (Food and drink instructions) The day before your exam, drink extra fluids at least six 8-ounce glasses (unless your doctor wants you to limit your fluids). Stop all caffeine 12 hours before the test. This includes coffee, tea, soda, chocolate and certain medicines (such as Anacin, Excedrin and NoDoz). Also avoid decaf coffee and tea, as these contain small amounts of caffeine.  Do not eat or drink for 3 hours before your exam. You may drink water and take your morning medicines.  **If you will be receiving sedation or general anesthesia, please see special notes below.**  How do I prepare for my exam? (Other instructions) Take your medicines as usual, unless your doctor tells you not to. If you take Viagra, Levitra or Cialis, stop taking it 48 hours before your test. If you take Aggrenox or dipyridamole (Persantine, Permole), stop taking it 48 hours before your test. If you take Theophylline or Aminophylline, stop taking it 12 hours before your test. If you are diabetic and take oral hypoglycemics, do not take them on the day of your test.  You may need a blood test (creatinine test) within 30 days of your exam. Follow your doctor s orders if this is arranged before your exam.  If you are very claustrophobic, please let you doctor know. You may get a sedative medicine from your doctor before you arrive. Bring the medicine to the exam. Do not take it at home. Arrive one hour early. Bring someone who can take you home after the test. Your medicine will make you sleepy. After the exam, you may not drive, take a bus or take a taxi by yourself.  **If you will be receiving sedation or general anesthesia, please see special notes below.**  What should I wear: The MRI machine uses a strong magnet. Please wear clothes without metal (snaps, zippers). A sweatsuit works well, or we may give  you a hospital gown. Please remove any body piercings and hair extensions before you arrive. You will remove watches, jewelry, hairpins, wallets, dentures, partial dental plates and hearing aids. You may wear contact lenses, and you may be able to wear your rings. We have a safe place to keep your personal items, but it is safer to leave them at home.  How long does the exam take: Most tests take 30 to 60 minutes.  HOWEVER, IF YOUR DOCTOR PRESCRIBES ANESTHESIA please plan on spending four to five hours in the recovery room.  What should I bring:  Bring a list of your current medicines to your exam (including vitamins, minerals and over-the-counter drugs).  Do I need a : **If you will be receiving sedation or general anesthesia, please see special notes below.**  What should I do after the exam: No Restrictions, You may resume normal activities.  What is this test: MRI (magnetic resonance imaging) uses a strong magnet and radio waves to look inside the body. An MRA (magnetic resonance angiogram) does the same thing, but it lets us look at your blood vessels. A computer turns the radio waves into pictures showing cross sections of the body, much like slices of bread. This helps us see any problems more clearly. You may receive fluid (called  contrast ) before or during your scan. The fluid helps us see the pictures better. We give the fluid through an IV (small needle in your arm).  Who should I call with questions: If you have any questions, please contact your Imaging Department exam site. Directions, parking instructions, and other information is available on our website, [email or web address].  How do I prepare if I m having sedation or anesthesia? **IMPORTANT** THE INSTRUCTIONS BELOW ARE ONLY FOR THOSE PATIENTS WHO HAVE BEEN TOLD THEY WILL RECEIVE SEDATION OR GENERAL ANESTHESIA DURING THEIR MRI PROCEDURE:  IF YOU WILL RECEIVE SEDATION (take medicine to help you relax during your exam): You must get the  medicine from your doctor before you arrive. Bring the medicine to the exam. Do not take it at home. Arrive one hour early. Bring someone who can take you home after the test. Your medicine will make you sleepy. After the exam, you may not drive, take a bus or take a taxi by yourself. No eating 8 hours before your exam. You may have clear liquids up until 4 hours before your exam. (Clear liquids include water, clear tea, black coffee and fruit juice without pulp.)  IF YOU WILL RECEIVE ANESTHESIA (be asleep for your exam): Arrive 1 1/2 hours early. Bring someone who can take you home after the test. You may not drive, take a bus or take a taxi by yourself. No eating 8 hours before your exam. You may have clear liquids up until 4 hours before your exam. (Clear liquids include water, clear tea, black coffee and fruit juice without pulp.            Dec 07, 2018 11:15 AM CST   XR CHEST 2 VIEWS with UUXR3   [facility], [place],  Radiology ([facility], [facility])    [address] [address] [phone]           How do I prepare for my exam? (Food and drink instructions) No Food and Drink Restrictions.  How do I prepare for my exam? (Other instructions) You do not need to do anything special for this exam.  What should I wear: Wear comfortable clothes.  How long does the exam take: Most scans take less than 5 minutes.  What should I bring: Bring a list of your medicines, including vitamins, minerals and over-the-counter drugs. It is safest to leave personal items at home.  Do I need a :  No  is needed.  What do I need to tell my doctor: Tell your doctor if there s any chance you are pregnant.  What should I do after the exam: No restrictions, You may resume normal activities.  What is this test: An image of a specific body part shown in shades of black and white.  Who should I call with questions: If you have any questions, please call the Imaging  Department where you will have your exam. Directions, parking instructions, and other information is available on our website, [email or web address].            Dec 07, 2018 12:15 PM CST   HEART TRANSPLANT ANNUAL with Geena Mcclellan MD   [facility] ([facility])    [address]  [address]  [place] [address]   [phone]            Jun 20, 2019  2:00 PM CDT   NEW GENERAL with Anibal Cox MD   [facility] ([facility])    [address]  [address] [address]  [place] [address]   [phone]              Further instructions from your care team         Going Home after an Angioplasty or Stent Placement (Cardiac)  ______________________________________________    Patient Name: Javi Herrerauilar  Date of Procedure: December 6, 2018      After you go home:    Have an adult stay with you for 24 hours.    Drink plenty of fluids.    You may eat your normal diet, unless your doctor tells you otherwise.    For 24 hours:    Relax and take it easy.    Do NOT smoke.    Do NOT make any important or legal decisions.    Do NOT drive or operate machines at home or at work.    Do NOT drink alcohol.    Remove the Band-Aid after 24 hours. If there is minor oozing, apply another Band-aid and remove it after 12 hours.    For 2 days, do NOT have sex or do any heavy exercise.    Do NOT take a bath, or use a hot tub or pool for at least 3 days. You may shower.        Care of wrist or arm site  It is normal to have soreness at the puncture site and mild tingling in your hand for up to 3 days.    For 2 days, do not use your hand or arm to support your weight (such as rising from a chair) or bend your wrist (such as lifting a garage door).    For 2 days, do not lift more than 5 pounds or exercise your arm (tennis, golf or bowling).    If you start bleeding from the site in your arm:    Sit down and press firmly on the site  with your fingers for 10 minutes. Call your doctor as soon as you can.    If the bleeding stops, sit still and keep your wrist straight for 2 hours.    Medicines    If you have started taking Plavix or Effient, do not stop taking it until you talk to your heart doctor (cardiologist).    If you are on metformin (Glucophage), do not restart it until you have blood tests (within 2 to 3 days after discharge). When your doctor tells you it is safe, you may restart the metformin.    If you have stopped any other medicines, check with your nurse or provider about when to restart them.    Call 911 right away if you have bleeding that is heavy or does not stop.    Call your doctor if:    You have a large or growing hard lump around the site.    The site is red, swollen, hot or tender.    Blood or fluid is draining from the site.    You have chills or a fever greater than 101 F (38 C).    Your leg or arm feels numb or cool.    You have hives, a rash or unusual itching.      [facility] [place]:  [phone] (7 days a week)      We will contact you tomorrow for follow-up. Number where we can reach you:       Pending Results     Date and Time Order Name Status Description    12/6/2018 1321 Surgical pathology exam In process     12/6/2018 1012 EKG 12-lead, tracing only Preliminary     12/6/2018 0946 PRA Donor Specific Antibody In process     12/6/2018 0946 ImmuKnow Immune Cell Function In process     12/6/2018 0946 EBV DNA PCR Quantitative Whole Blood In process     12/6/2018 0946 CMV DNA quantification In process             Admission Information     Date & Time Provider Department Dept. Phone    12/6/2018 Dante Way MD Unit 6D Observation [facility] [phone]      Your Vitals Were     Blood Pressure Pulse Temperature Respirations Weight Pulse Oximetry    131/86 78 97.9  F (36.6  C) (Oral) 18 71.7 kg (158 lb) 99%    BMI (Body Mass Index)                   25.09 kg/m2          "  MyChart Information     [facility] lets you send messages to your doctor, view your test results, renew your prescriptions, schedule appointments and more. To sign up, go to [email or web address] . Click on \"Log in\" on the left side of the screen, which will take you to the Welcome page. Then click on \"Sign up Now\" on the right side of the page.     You will be asked to enter the access code listed below, as well as some personal information. Please follow the directions to create your username and password.     Your access code is: [email or web address]  Expires: 2019  6:30 AM     Your access code will  in 90 days. If you need help or a new code, please call your [place] clinic or [phone].        Care EveryWhere ID     This is your Care EveryWhere ID. This could be used by other organizations to access your [facility] medical records  [id number]        Equal Access to Services     JASWINDER KHAN : Aniya Vuong, wazenda petr, qaybta kaalmalukas leone, talisha garcia . So [facility] [phone].    ATENCIÓN: Si jarrod rain, tiene a watt disposición servicios gratuitos de asistencia lingüística. Llame al [phone].    We comply with applicable federal civil rights laws and [place] laws. We do not discriminate on the basis of race, color, national origin, age, disability, sex, sexual orientation, or gender identity.               Review of your medicines      UNREVIEWED medicines. Ask your doctor about these medicines        Dose / Directions    aspirin 81 MG EC tablet   Commonly known as:  ASA   Used for:  Heart replaced by transplant (H)        Dose:  81 mg   Take 1 tablet (81 mg) by mouth daily   Quantity:  90 tablet   Refills:  3       benzoyl peroxide 5 % Lotn lotion   Used for:  Other acne        Apply topically At Bedtime   Quantity:  30 mL   Refills:  3       calcium carbonate-vitamin D 500-400 MG-UNIT tablet   Commonly known as:  OS-DAISY   Used for:  Heart " replaced by transplant (H)        Dose:  1 tablet   Take 1 tablet by mouth daily   Quantity:  90 tablet   Refills:  3       fluticasone 50 MCG/ACT nasal spray   Commonly known as:  FLONASE   Used for:  Influenza-like illness        Dose:  1 spray   Spray 1 spray into both nostrils daily   Quantity:  1 Bottle   Refills:  1       guaiFENesin-dextromethorphan 100-10 MG/5ML syrup   Commonly known as:  ROBITUSSIN DM   Used for:  Steroid-induced diabetes mellitus (H)        Dose:  5 mL   Take 5 mLs by mouth every 4 hours as needed for cough   Quantity:  560 mL   Refills:  1       ketoconazole 2 % external shampoo   Commonly known as:  NIZORAL   Used for:  Seborrhea        Apply topically three times a week Alternate with Head and Shoulders.   Quantity:  120 mL   Refills:  3       multivitamin w/minerals tablet   Used for:  Heart replaced by transplant (H)        Dose:  1 tablet   Take 1 tablet by mouth daily   Quantity:  30 each   Refills:  11       mycophenolate 250 MG capsule   Commonly known as:  GENERIC EQUIVALENT   Used for:  Heart replaced by transplant (H)        Dose:  1500 mg   Take 6 capsules (1,500 mg) by mouth 2 times daily   Quantity:  360 capsule   Refills:  11       pravastatin 20 MG tablet   Commonly known as:  PRAVACHOL        Refills:  0       rosuvastatin 10 MG tablet   Commonly known as:  CRESTOR   Used for:  Heart replaced by transplant (H)        Dose:  10 mg   Take 1 tablet (10 mg) by mouth daily   Quantity:  90 tablet   Refills:  3       tacrolimus 1 MG capsule   Commonly known as:  GENERIC EQUIVALENT   Used for:  Heart replaced by transplant (H)        Dose:  2 mg   Take 2 capsules (2 mg) by mouth 2 times daily   Quantity:  120 capsule   Refills:  11                Protect others around you: Learn how to safely use, store and throw away your medicines at www.disposemymeds.org.             Medication List: This is a list of all your medications and when to take them. Check marks below indicate your  daily home schedule. Keep this list as a reference.      Medications           Morning Afternoon Evening Bedtime As Needed    aspirin 81 MG EC tablet   Commonly known as:  ASA   Take 1 tablet (81 mg) by mouth daily                                benzoyl peroxide 5 % Lotn lotion   Apply topically At Bedtime                                calcium carbonate-vitamin D 500-400 MG-UNIT tablet   Commonly known as:  OS-DAISY   Take 1 tablet by mouth daily                                fluticasone 50 MCG/ACT nasal spray   Commonly known as:  FLONASE   Spray 1 spray into both nostrils daily                                guaiFENesin-dextromethorphan 100-10 MG/5ML syrup   Commonly known as:  ROBITUSSIN DM   Take 5 mLs by mouth every 4 hours as needed for cough                                ketoconazole 2 % external shampoo   Commonly known as:  NIZORAL   Apply topically three times a week Alternate with Head and Shoulders.                                multivitamin w/minerals tablet   Take 1 tablet by mouth daily                                mycophenolate 250 MG capsule   Commonly known as:  GENERIC EQUIVALENT   Take 6 capsules (1,500 mg) by mouth 2 times daily                                pravastatin 20 MG tablet   Commonly known as:  PRAVACHOL                                rosuvastatin 10 MG tablet   Commonly known as:  CRESTOR   Take 1 tablet (10 mg) by mouth daily                                tacrolimus 1 MG capsule   Commonly known as:  GENERIC EQUIVALENT   Take 2 capsules (2 mg) by mouth 2 times daily

## 2018-12-06 NOTE — PROGRESS NOTES
[facility]    To Whom it May Concern:    Javi Burr Jesse Iglesia Shetty was admitted to the [facility] for a procedure on 12/6/18. He can return to work on 12/10, but should not do any lifting at work on 12/10 and 12/11/18.      Jennifer Escobar MD  12/06/2018

## 2018-12-06 NOTE — PROCEDURES
FINAL CARDIAC CATHETERIZATION REPORT    PROCEDURES PERFORMED:   Selective left and right coronary angiography  Intravascular Ultrasound (IVUS)  Endomyocardial biopsy  Right heart catheterization    PHYSICIANS:  Attending Physician: Dante Way MD  Cardiology Fellow: Milton Roberts MD    INDICATION:  Javi Bansal is a 47 year old male with history significant for Chagas cardiomyopathy s/p OHTx 2016, presenting for elective transplant surveillance procedures (RHC, angiogram, IVUS, biopsy).    DESCRIPTION:  1. Consent obtained with discussion of risks.  All questions were answered.  2. Sterile prep and procedure.  3. Location with Sheaths: 7 Fr RIJ, 6 Fr RRA  4. Access: Local anesthetic with lidocaine.  A standard 18 guage needle (for RIJ) and micropuncture 21 gauge needle (for RRA) were used with ultrasound guidance to establish vascular access using a modified Seldinger technique.  5. Diagnostic Catheters: 5 Fr Rafal (RCA), 6 Fr Ikari 3.5 LF GC (LMCA)  6. Guiding Catheters: 6 Fr Ikari 3.5 LF GC  7. Estimated blood loss: < 25 ml    MEDICATIONS:  The procedure was performed under conscious sedation for 55 minutes.  The patient was assessed immediately before the first sedation medication was administered.  Midazolam 2 mg and Fentanyl 100 mcg were administered.  Heart rate, BP, respiration, oxygen saturation and patient responses were monitored throughout the procedure with the assistance of the RN under my supervision.  >> IA Verapamil and IA NTG were administered to prophylax against radial artery spasm.  >> IV UFH 7,000 U was administered to achieve anticoagulation.  >> Antiplatelet Therapy: ASA 81 mg     ENDOMYOCARDIAL BIOPSY:  1. Successful collection of 4 right ventricular endomyocardial biopsy samples using the Jawz.     CORONARY ANGIOGRAM:   1. Both coronary arteries arise from their respective cusps.  2. Dominance: Right  3. The LM is fairly short and is without angiographic evidence of  disease.   4. LAD: Type 4 [supplies the apex and >25% of the inferior wall (wrap around)]. The LAD gives rise to septal perforators and one major diagonal artery which is small in caliber. Proximal LAD has no significant disease. Mid LAD has minor luminal changes. 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 There is also evidence of myocardial briding in mid-LAD. Apical LAD is moderate in caliber and free of significant disease as it wraps around the apex to help supply the inferior wall.  5. LCX is large and gives rise to a high moderate to large OM1 and moderate to large OM2 vessels. There is no significant angiographic evidence of disease.   6. RCA gives rise to PL branches and supplies PDA. There is no significant disease.    INTRA-VASCULAR ULTRASOUND:  Adequate anticoagulation was was obtained with UFH.  A BMW wire was positioned in the apical LAD.  A [facility] [place] [facility] catheter was advanced across the wire into the vessel.    There was no significant intimal thickening (LAST < 0.2 mm)    HEMODYNAMICS:  HR 80  BP 82/58/62  RA 5/5/4  RV 28/5  PA 28/8/17  PA sat 73.1%  PCW 13/8/8  Hgb 12.4 g/dL  Donnell CO (CI): 5.4 (3.0)  TD CO (CI): 3.5 (1.9)  PVR 1.67    Sheath Removal:  - The RRA and RIJ sheaths were removed in the cath lab.    Contrast: Isovue, 75 ml     Fluoroscopy Time: 7.9 min    COMPLICATIONS:  1. None    SUMMARY:   - normal biventricular filling pressures  - normal PA pressure and PVR  - normal cardiac output  - no evidence of significant vasculopathy on angiography nor IVUS  - successful collection of 4 endomyocardial biopsy samples, results pending    PLAN:   - no changes to medication  - follow-up with Dr. Mcclellan tomorrow as scheduled    The attending interventional cardiologist was present and supervised all critical aspects the procedure.    Findings discussed with patient, family, and Dr. Mcclellan.    See CVIS report for final draft.    Milton Roberts MD  Cardiology  Fellow      Staff Cardiologist: I supervised the cardiology fellow and reviewed the hemodynamic, coronary angiography, and IVUS findings with the fellow at the completion of the procedure.   I performed the endomyocardial biopsy.  I agree with the documentation above.    Dante Way MD

## 2018-12-06 NOTE — DISCHARGE INSTRUCTIONS
Going Home after an Angioplasty or Stent Placement (Cardiac)  ______________________________________________    Patient Name: Javi Bansal  Date of Procedure: December 6, 2018      After you go home:    Have an adult stay with you for 24 hours.    Drink plenty of fluids.    You may eat your normal diet, unless your doctor tells you otherwise.    For 24 hours:    Relax and take it easy.    Do NOT smoke.    Do NOT make any important or legal decisions.    Do NOT drive or operate machines at home or at work.    Do NOT drink alcohol.    Remove the Band-Aid after 24 hours. If there is minor oozing, apply another Band-aid and remove it after 12 hours.    For 2 days, do NOT have sex or do any heavy exercise.    Do NOT take a bath, or use a hot tub or pool for at least 3 days. You may shower.        Care of wrist or arm site  It is normal to have soreness at the puncture site and mild tingling in your hand for up to 3 days.    For 2 days, do not use your hand or arm to support your weight (such as rising from a chair) or bend your wrist (such as lifting a garage door).    For 2 days, do not lift more than 5 pounds or exercise your arm (tennis, golf or bowling).    If you start bleeding from the site in your arm:    Sit down and press firmly on the site with your fingers for 10 minutes. Call your doctor as soon as you can.    If the bleeding stops, sit still and keep your wrist straight for 2 hours.    Medicines    If you have started taking Plavix or Effient, do not stop taking it until you talk to your heart doctor (cardiologist).    If you are on metformin (Glucophage), do not restart it until you have blood tests (within 2 to 3 days after discharge). When your doctor tells you it is safe, you may restart the metformin.    If you have stopped any other medicines, check with your nurse or provider about when to restart them.    Call 911 right away if you have bleeding that is heavy or does not  stop.    Call your doctor if:    You have a large or growing hard lump around the site.    The site is red, swollen, hot or tender.    Blood or fluid is draining from the site.    You have chills or a fever greater than 101 F (38 C).    Your leg or arm feels numb or cool.    You have hives, a rash or unusual itching.      [facility] [place]:  [phone] (7 days a week)      We will contact you tomorrow for follow-up. Number where we can reach you:

## 2018-12-06 NOTE — IP AVS SNAPSHOT
Unit [address]D Observation [address] [address]    Phone:  [phone]    Fax:  [phone]                                       After Visit Summary   12/6/2018    Javi Bansal    MRN: [id number]           After Visit Summary Signature Page     I have received my discharge instructions, and my questions have been answered. I have discussed any challenges I see with this plan with the nurse or doctor.    ..........................................................................................................................................  Patient/Patient Representative Signature      ..........................................................................................................................................  Patient Representative Print Name and Relationship to Patient    ..................................................               ................................................  Date                                   Time    ..........................................................................................................................................  Reviewed by Signature/Title    ...................................................              ..............................................  Date                                               Time          22EPIC Rev 08/18

## 2018-12-06 NOTE — PROGRESS NOTES
Pt arrived to unit with rt radial tr band with 5 ccs of air, slight bleeding noted, provider notified. Put an additional 3 ccs of air into tr band with no further bleeding noted. Provider moved deflate time to 3:30 with 1 ml of air every 5 minutes. Will continue to monitor.

## 2018-12-06 NOTE — PROGRESS NOTES
Met with patient (wi interpretor) to discuss risks and benefits of coronary angiogram, intravascular ultrasound, right heart catheterization, and heart biopsy. Risks including, but not limited to, bleeding, arrhythmia, and infection were discussed. Consent form completed, signed, and awaiting physician co-signature.      Hipolito Neely PA-C  [facility] Cardiology Team

## 2018-12-07 ENCOUNTER — INTERPRETER (OUTPATIENT)
Dept: INTERPRETER SERVICES | Facility: CLINIC | Age: 48
End: 2018-12-07

## 2018-12-07 ENCOUNTER — HOSPITAL ENCOUNTER (OUTPATIENT)
Dept: GENERAL RADIOLOGY | Facility: CLINIC | Age: 48
Discharge: HOME | End: 2018-12-07
Attending: INTERNAL MEDICINE | Admitting: INTERNAL MEDICINE
Payer: MEDICAID

## 2018-12-07 ENCOUNTER — OFFICE VISIT (OUTPATIENT)
Dept: CARDIOLOGY | Facility: CLINIC | Age: 48
End: 2018-12-07
Attending: INTERNAL MEDICINE
Payer: MEDICAID

## 2018-12-07 VITALS
SYSTOLIC BLOOD PRESSURE: 110 MMHG | DIASTOLIC BLOOD PRESSURE: 76 MMHG | BODY MASS INDEX: 24.56 KG/M2 | OXYGEN SATURATION: 99 % | HEART RATE: 86 BPM | WEIGHT: 156.5 LBS | HEIGHT: 67 IN

## 2018-12-07 DIAGNOSIS — Z94.1 HEART REPLACED BY TRANSPLANT (H): Primary | ICD-10-CM

## 2018-12-07 DIAGNOSIS — Z94.1 HEART REPLACED BY TRANSPLANT (H): ICD-10-CM

## 2018-12-07 LAB
COPATH REPORT: NORMAL
DONOR IDENTIFICATION: NORMAL
DSA COMMENTS: NORMAL
DSA PRESENT: NO
DSA TEST METHOD: NORMAL
EBV DNA # SPEC NAA+PROBE: NORMAL {COPIES}/ML
EBV DNA SPEC NAA+PROBE-LOG#: NORMAL {LOG_COPIES}/ML
INTERPRETATION ECG - MUSE: NORMAL
ORGAN: NORMAL
SA1 CELL: NORMAL
SA1 COMMENTS: NORMAL
SA1 HI RISK ABY: NORMAL
SA1 MOD RISK ABY: NORMAL
SA1 TEST METHOD: NORMAL
SA2 CELL: NORMAL
SA2 COMMENTS: NORMAL
SA2 HI RISK ABY UA: NORMAL
SA2 MOD RISK ABY: NORMAL
SA2 TEST METHOD: NORMAL
UNACCEPTABLE ANTIGEN: NORMAL
UNOS CPRA: 0

## 2018-12-07 PROCEDURE — G0463 HOSPITAL OUTPT CLINIC VISIT: HCPCS | Mod: 25,ZF

## 2018-12-07 PROCEDURE — T1013 SIGN LANG/ORAL INTERPRETER: HCPCS | Mod: U3

## 2018-12-07 PROCEDURE — 99214 OFFICE O/P EST MOD 30 MIN: CPT | Mod: 25 | Performed by: INTERNAL MEDICINE

## 2018-12-07 PROCEDURE — 71046 X-RAY EXAM CHEST 2 VIEWS: CPT

## 2018-12-07 RX ORDER — MYCOPHENOLATE MOFETIL 250 MG/1
1250 CAPSULE ORAL 2 TIMES DAILY
Qty: 360 CAPSULE | Refills: 11 | Status: SHIPPED | OUTPATIENT
Start: 2018-12-07 | End: 2019-12-17

## 2018-12-07 ASSESSMENT — PAIN SCALES - GENERAL: PAINLEVEL: NO PAIN (0)

## 2018-12-07 NOTE — LETTER
12/7/2018      RE: Javi Bansal  [address]  [place] [address]       Dear Colleague,    Thank you for the opportunity to participate in the care of your patient, Javi Bansal, at the [facility]. Please see a copy of my visit note below.    [facility]      December 7, 2018    HPI:   Mr. Javi Bansal is a 47yr old male with a history of Chagas cardiomyopathy (diagnosed in 2010, treated with nifurtimox for 3 months) and biventricular systolic heart failure (LVEF 15-20% since 2015, on home milrinone since 8/2016), now s/p OHT 12/12/16, who presents to clinic for routine surveillance. He is accompanied by a .     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      He was started on azathioprine postoperatively due to reports of better outcomes.  His initial biopsy on 12/19/16 showed 2R ACR, which was treated with IV steroids.  Repeat biopsy 12/27/16 showed 2R/3A ACR, which was treated with IV steroids and thymoglobulin.  He had another episode of 2R ACR 2/10/17, which was treated with steroids.  His biopsies have since been negative for significant rejection.  Baseline coronary angiogram showed no evidence of CAV.  His graft function remained stable throughout his rejection episodes, and continues to remain stable on all subsequent tests.     Since last visit, he continues to report that he is doing well. He denies any issues with his heart. He reports no LE edema, PND, orthopnea, dyspnea on  exertion. He is feeling better than he has in years.     No fever or chills or weight loss.    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 suspected , but unclear dx       FAMILY HISTORY:  Family History   Problem Relation Age of Onset     Brain Cancer Mother      Melanoma No family hx of      Skin Cancer No family hx of      Glaucoma No family hx of      Macular Degeneration No family hx of        SOCIAL HISTORY:  Social History     Social History     Marital status:      Spouse name: N/A     Number of children: N/A     Years of education: N/A     Social History Main Topics     Smoking status: Never Smoker     Smokeless tobacco: Never Used     Alcohol use No     Drug use: No     Sexual activity: Not on file     Other Topics Concern     Not on file       CURRENT MEDICATIONS:  Prescription Medications as of 12/7/2018             aspirin 81 MG EC tablet Take 1 tablet (81 mg) by mouth daily    calcium carbonate-vitamin D 500-400 MG-UNIT TABS per tablet Take 1 tablet by mouth daily    fluticasone (FLONASE) 50 MCG/ACT spray Spray 1 spray into both nostrils daily    guaiFENesin-dextromethorphan (ROBITUSSIN DM) 100-10 MG/5ML syrup Take 5 mLs by mouth every 4 hours as needed for cough    ketoconazole (NIZORAL) 2 % shampoo Apply topically three times a week Alternate with Head and Shoulders.    multivitamin, therapeutic with minerals (THERA-VIT-M) TABS tablet Take 1 tablet by mouth daily    mycophenolate (GENERIC EQUIVALENT) 250 MG capsule Take 6 capsules (1,500 mg) by mouth 2 times daily     "pravastatin (PRAVACHOL) 20 MG tablet     rosuvastatin (CRESTOR) 10 MG tablet Take 1 tablet (10 mg) by mouth daily    tacrolimus (GENERIC EQUIVALENT) 1 MG capsule Take 2 capsules (2 mg) by mouth 2 times daily    benzoyl peroxide 5 % LOTN lotion Apply topically At Bedtime        ROS:  CONSTITUTIONAL: Denies fever, chills, fatigue, or weight fluctuations.   HEENT: Denies headache, vision changes, and changes in speech.   CV: see hpi   PULMONARY: see hpi   GI:Denies nausea, vomiting, diarrhea, and abdominal pain. Bowel movements are regular.   : Denies urinary alterations, dysuria, urinary frequency, hematuria, and abnormal drainage.   EXT: Denies lower extremity edema or color changes.   SKIN: Resolved lesions on thigh, no other rash.   MUSCULOSKELETAL: Denies upper or lower extremity weakness and pain.   NEUROLOGIC: Denies lightheadedness, dizziness, seizures, or upper or lower extremity paresthesia.     EXAM:  /76 (BP Location: Left arm, Patient Position: Chair, Cuff Size: Adult Regular)  Pulse 86  Ht 1.69 m (5' 6.53\")  Wt 71 kg (156 lb 8 oz)  SpO2 99%  BMI 24.86 kg/m2  GENERAL: Appears comfortable, in no acute distress.   HEENT: Eye symmetrical, no discharge or icterus bilaterally. Mucous membranes moist and without lesions.  CV: RRR, +S1S2, no murmur, rub, or gallop. JVP not appreciable at 45 degrees.   RESPIRATORY: Respirations regular, even, and unlabored. Lungs CTA throughout.   GI: Soft and non distended with normoactive bowel sounds present in all quadrants. No tenderness, rebound, guarding. No hepatomegaly.   EXTREMITIES: No peripheral edema. 2+ bilateral pedal pulses.   NEUROLOGIC: Alert and oriented x 3. No focal deficits.   MUSCULOSKELETAL: No joint swelling or tenderness.   SKIN: No jaundice. No rashes or lesions. Completely healed vesicles/scarring over left anterior and lateral thigh.     Labs - reviewed with patient in clinic today:  CBC RESULTS:  Lab Results   Component Value Date    WBC " 7.2 12/06/2018    RBC 4.49 12/06/2018    HGB 13.5 12/06/2018    HCT 42.1 12/06/2018    MCV 94 12/06/2018    MCH 30.1 12/06/2018    MCHC 32.1 12/06/2018    RDW 12.5 12/06/2018     12/06/2018       CMP RESULTS:  Lab Results   Component Value Date     12/06/2018    POTASSIUM 4.2 12/06/2018    CHLORIDE 106 12/06/2018    CO2 26 12/06/2018    ANIONGAP 7 12/06/2018    GLC 79 12/06/2018    BUN 26 12/06/2018    CR 1.16 12/06/2018    GFRESTIMATED 67 12/06/2018    GFRESTBLACK 81 12/06/2018    DAISY 9.2 12/06/2018    BILITOTAL 0.6 12/06/2018    ALBUMIN 4.5 12/06/2018    ALKPHOS 83 12/06/2018    ALT 29 12/06/2018    AST 24 12/06/2018        INR RESULTS:  Lab Results   Component Value Date    INR 1.13 06/10/2017       LIPID RESULTS:  Lab Results   Component Value Date    CHOL 164 08/23/2018    HDL 43 08/23/2018    LDL 90 08/23/2018    TRIG 155 (H) 08/23/2018       IMMUNOSUPPRESSANT LEVELS:  Lab Results   Component Value Date    TACROL 7.9 12/06/2018    DOSTAC Not Provided 12/06/2018       No components found for: CK  Lab Results   Component Value Date    MAG 2.1 12/06/2018     Lab Results   Component Value Date    A1C 7.5 (H) 06/02/2017     Lab Results   Component Value Date    PHOS 3.0 12/06/2018     Lab Results   Component Value Date    NTBNP 1944 (H) 06/29/2016     Lab Results   Component Value Date    SAITESTMET [facility] 12/14/2017    SAICELL Class I 12/14/2017    [id number] None 12/14/2017    [id number] None 12/14/2017    SAIREPCOM  12/14/2017     Test performed by modified procedure. Serum heat inactivated and tested by   a modified ([place]) protocol including fetal calf serum addition.   High-risk, mfi >3,000. Mod-risk, mfi 500-3,000.        Lab Results   Component Value Date    SAIITESTME [facility] 12/14/2017    SAIICELL Class II 12/14/2017    [id number] None 12/14/2017    [id number] None 12/14/2017    SAIIREPCOM  12/14/2017     Test performed by modified procedure. Serum heat inactivated and tested by   a modified  ([place]) protocol including fetal calf serum addition.   High-risk, mfi >3,000. Mod-risk, mfi 500-3,000.        Lab Results   Component Value Date    CSPEC Plasma, EDTA anticoagulant 08/17/2018     Immuknow - 12/14/17: 140  Tacrolimus - 12/14/17: 10.4   EBV and CMV: negative    DESCRIPTION:  1. Consent obtained with discussion of risks.  All questions were answered.  2. Sterile prep and procedure.  3. Location with Sheaths: 7 Fr RIJ, 6 Fr RRA  4. Access: Local anesthetic with lidocaine.  A standard 18 guage needle (for RIJ) and micropuncture 21 gauge needle (for RRA) were used with ultrasound guidance to establish vascular access using a modified Seldinger technique.  5. Diagnostic Catheters: 5 Fr Rafal (RCA), 6 Fr Ikari 3.5 LF GC (LMCA)  6. Guiding Catheters: 6 Fr Ikari 3.5 LF GC  7. Estimated blood loss: < 25 ml     MEDICATIONS:  The procedure was performed under conscious sedation for 55 minutes.  The patient was assessed immediately before the first sedation medication was administered.  Midazolam 2 mg and Fentanyl 100 mcg were administered.  Heart rate, BP, respiration, oxygen saturation and patient responses were monitored throughout the procedure with the assistance of the RN under my supervision.  >> IA Verapamil and IA NTG were administered to prophylax against radial artery spasm.  >> IV UFH 7,000 U was administered to achieve anticoagulation.  >> Antiplatelet Therapy: ASA 81 mg      ENDOMYOCARDIAL BIOPSY:  1. Successful collection of 4 right ventricular endomyocardial biopsy samples using the Jawz.      CORONARY ANGIOGRAM:   1. Both coronary arteries arise from their respective cusps.  2. Dominance: Right  3. The LM is fairly short and is without angiographic evidence of disease.   4. LAD: Type 4 [supplies the apex and >25% of the inferior wall (wrap around)]. The LAD gives rise to septal perforators and one major diagonal artery which is small in caliber. Proximal LAD has no significant disease. Mid LAD  has minor luminal changes. 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 There is also evidence of myocardial briding in mid-LAD. Apical LAD is moderate in caliber and free of significant disease as it wraps around the apex to help supply the inferior wall.  5. LCX is large and gives rise to a high moderate to large OM1 and moderate to large OM2 vessels. There is no significant angiographic evidence of disease.   6. RCA gives rise to PL branches and supplies PDA. There is no significant disease.     INTRA-VASCULAR ULTRASOUND:  Adequate anticoagulation was was obtained with UFH.  A BMW wire was positioned in the apical LAD.  A QoL Meds Eye catheter was advanced across the wire into the vessel.    There was no significant intimal thickening (LAST < 0.2 mm)     HEMODYNAMICS:  HR 80  BP 82/58/62  RA 5/5/4  RV 28/5  PA 28/8/17  PA sat 73.1%  PCW 13/8/8  Hgb 12.4 g/dL  Donnell CO (CI): 5.4 (3.0)  TD CO (CI): 3.5 (1.9)  PVR 1.67       Coronary Angiogram  12/6/2018   1. Both coronary arteries arise from their respective cusps.  2. Dominance: Right  3. The LM is without angiographic evidence of disease.   4. The LAD gives rise to septal perforators and a large D1 and a small D2 and has no significant disease.    5. LCX gives rise to a small OM1 and OM2 vessels without significant stenosis  6. RCA (dominant) gives rise to PL branches and supplies PDA with no significant disease      Last Cardiac MRI 12/19/17:  1. The LV is normal in cavity size and wall thickness. The global systolic function is normal. The LVEF is 57%. There are no regional wall motion abnormalities.   2. The RV is normal in cavity size. The global systolic function is mildly reduced. The RVEF is 48%.   3. There is bi-atrial enlargement secondary to heart transplantation.  4. There is no significant valvular disease.   5. Late gadolinium enhancement imaging shows no MI, fibrosis or infiltrative disease.   6. There is no  pericardial effusion or thickening.  7.  There is no intracardiac thrombus.  CONCLUSIONS: No evidence of inducible ischemia.  Normal LV function and mildly reduced RV function.  No  significant change from CMR on 6/1/2017.      ECG sinus rhythm, no ST T wave changes     Assessment/Plan:   Mr. Iglesia Shetty is a 46 year old male who is s/p orthotopic heart transplant 12/2016 for chagas cardiomyopathy. Post-op course complicated by rejection with atrial and ventricular arrhythmias, respiratory failure and JOSE LUIS, which resolved with diuresis, surgical blood loss anemia, right pleural effusion, requiring pigtail placement, and Chagas reactivation 12/26, treated with benznidasole. More recently, he has done remarkably well without recent rejection or evidence of graft dysfunction. He is back to work and overall doing incredibly well.     # Status post OHT on 12/2016, history of Chagas cardiomyopathy  # Chronic immunosuppression  * Rejection history: 2R on first tx with steroids, repeat bx 2R/3A on second tx with steroids +thymo, 2R in February treated with steroids.     Immunosuppression:  - CNI: Trough level goal now 6-8.   - Antiproliferative: reducing mycophenolate 1250 mg bid  given recent immuknows are consistent immunosuppression,    Prophylaxis:  - CAV: ASA 81 mg and pravastatin 10 mg    Serostatus: CMV: D+/R+. EBV: D+/R+  Intolerance to medications: none    # Chagas, reactivated post-transplant - now again cleared and off therapy   Surveillance labs per transplant ID-He has no systemic symptoms consistent with this    #  Health Care Maintenance  - Immunizations: up to date, recommend flu shot   - Dermatology: up to date     Follow up:  Next biopsy: per protocol   Next angiogram: 12/2019  Next stress test: per protocol   Next transplant clinic visit - per protocol     We will reschedule MRI for next month.       MD CHIN Hoskins, DYLON ROCHE, BONIFACIO AUSTIN

## 2018-12-07 NOTE — NURSING NOTE
Transplant Coordinator Note    Reason for visit: 2nd annual post heart transplant  Coordinator: Present   Caregiver:      Health concerns addressed today:  1. Walking on treadmill daily  2. No complaints verbalized    Immunosuppressants:  tacro 2 mg bid. Goal 8-10. Level 7.9 no changes  Mycophenolate 1500 bid. Change     Routine screenings:    Derm:   Dental:  Colonoscopy:  Breast/Prostate:  Eye:   Flu/Pneumonia:     Labs:   immuknow      RHC, CORS, CXR, EKG, and HBX reviewed with patient  Medication record reviewed and reconciled  Questions and concerns addressed  Pt verbalized an understanding of plan of care.     Patient Instructions  1. Change mycophenolate (cellcept) to 1250 mg bid  2. We will reschedule cardiac MRI in 1-2 months from now. Please no alcohol or caffeine prior to the MRI and no eating 3 hours prior.     Next transplant clinic appointment: 6 month follow up   Next lab draw: 6 months  Coordinator will call with all pending results.     Please call transplant coordinator with any questions:    Yanelis Araiza RN BSN   Post Heart Transplant Nurse Coordinator  [facility]  Questions: [phone]

## 2018-12-07 NOTE — PROGRESS NOTES
ADULT HEART TRANSPLANT CLINIC      December 7, 2018    HPI:   Mr. Javi Bansal is a 47yr old male with a history of Chagas cardiomyopathy (diagnosed in 2010, treated with nifurtimox for 3 months) and biventricular systolic heart failure (LVEF 15-20% since 2015, on home milrinone since 8/2016), now s/p OHT 12/12/16, who presents to clinic for routine surveillance. He is accompanied by a .     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      He was started on azathioprine postoperatively due to reports of better outcomes.  His initial biopsy on 12/19/16 showed 2R ACR, which was treated with IV steroids.  Repeat biopsy 12/27/16 showed 2R/3A ACR, which was treated with IV steroids and thymoglobulin.  He had another episode of 2R ACR 2/10/17, which was treated with steroids.  His biopsies have since been negative for significant rejection.  Baseline coronary angiogram showed no evidence of CAV.  His graft function remained stable throughout his rejection episodes, and continues to remain stable on all subsequent tests.     Since last visit, he continues to report that he is doing well. He denies any issues with his heart. He reports no LE edema, PND, orthopnea, dyspnea on exertion. He is feeling better than he has in years.     No fever or chills or weight loss.    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 suspected , but unclear dx       FAMILY HISTORY:  Family History   Problem Relation Age of Onset     Brain Cancer Mother      Melanoma No family hx of      Skin Cancer No family hx of      Glaucoma No family hx of      Macular Degeneration No family hx of        SOCIAL HISTORY:  Social History     Social History     Marital status:      Spouse name: N/A     Number of children: N/A     Years of education: N/A     Social History Main Topics     Smoking status: Never Smoker     Smokeless tobacco: Never Used     Alcohol use No     Drug use: No     Sexual activity: Not on file     Other Topics Concern     Not on file       CURRENT MEDICATIONS:  Prescription Medications as of 12/7/2018             aspirin 81 MG EC tablet Take 1 tablet (81 mg) by mouth daily    calcium carbonate-vitamin D 500-400 MG-UNIT TABS per tablet Take 1 tablet by mouth daily    fluticasone (FLONASE) 50 MCG/ACT spray Spray 1 spray into both nostrils daily    guaiFENesin-dextromethorphan (ROBITUSSIN DM) 100-10 MG/5ML syrup Take 5 mLs by mouth every 4 hours as needed for cough    ketoconazole (NIZORAL) 2 % shampoo Apply topically three times a week Alternate with Head and Shoulders.    multivitamin, therapeutic with minerals (THERA-VIT-M) TABS tablet Take 1 tablet by mouth daily    mycophenolate (GENERIC EQUIVALENT) 250 MG capsule Take 6 capsules (1,500 mg) by mouth 2 times daily    pravastatin (PRAVACHOL) 20 MG tablet     rosuvastatin (CRESTOR) 10 MG tablet Take 1 tablet (10 mg) by mouth daily    tacrolimus (GENERIC EQUIVALENT) 1 MG capsule Take 2 capsules (2 mg) by mouth 2 times daily    benzoyl peroxide 5 % LOTN lotion Apply topically At Bedtime        ROS:  CONSTITUTIONAL: Denies fever, chills, fatigue, or weight fluctuations.  "  HEENT: Denies headache, vision changes, and changes in speech.   CV: see hpi   PULMONARY: see hpi   GI:Denies nausea, vomiting, diarrhea, and abdominal pain. Bowel movements are regular.   : Denies urinary alterations, dysuria, urinary frequency, hematuria, and abnormal drainage.   EXT: Denies lower extremity edema or color changes.   SKIN: Resolved lesions on thigh, no other rash.   MUSCULOSKELETAL: Denies upper or lower extremity weakness and pain.   NEUROLOGIC: Denies lightheadedness, dizziness, seizures, or upper or lower extremity paresthesia.     EXAM:  /76 (BP Location: Left arm, Patient Position: Chair, Cuff Size: Adult Regular)  Pulse 86  Ht 1.69 m (5' 6.53\")  Wt 71 kg (156 lb 8 oz)  SpO2 99%  BMI 24.86 kg/m2  GENERAL: Appears comfortable, in no acute distress.   HEENT: Eye symmetrical, no discharge or icterus bilaterally. Mucous membranes moist and without lesions.  CV: RRR, +S1S2, no murmur, rub, or gallop. JVP not appreciable at 45 degrees.   RESPIRATORY: Respirations regular, even, and unlabored. Lungs CTA throughout.   GI: Soft and non distended with normoactive bowel sounds present in all quadrants. No tenderness, rebound, guarding. No hepatomegaly.   EXTREMITIES: No peripheral edema. 2+ bilateral pedal pulses.   NEUROLOGIC: Alert and oriented x 3. No focal deficits.   MUSCULOSKELETAL: No joint swelling or tenderness.   SKIN: No jaundice. No rashes or lesions. Completely healed vesicles/scarring over left anterior and lateral thigh.     Labs - reviewed with patient in clinic today:  CBC RESULTS:  Lab Results   Component Value Date    WBC 7.2 12/06/2018    RBC 4.49 12/06/2018    HGB 13.5 12/06/2018    HCT 42.1 12/06/2018    MCV 94 12/06/2018    MCH 30.1 12/06/2018    MCHC 32.1 12/06/2018    RDW 12.5 12/06/2018     12/06/2018       CMP RESULTS:  Lab Results   Component Value Date     12/06/2018    POTASSIUM 4.2 12/06/2018    CHLORIDE 106 12/06/2018    CO2 26 12/06/2018    " ANIONGAP 7 12/06/2018    GLC 79 12/06/2018    BUN 26 12/06/2018    CR 1.16 12/06/2018    GFRESTIMATED 67 12/06/2018    GFRESTBLACK 81 12/06/2018    DAISY 9.2 12/06/2018    BILITOTAL 0.6 12/06/2018    ALBUMIN 4.5 12/06/2018    ALKPHOS 83 12/06/2018    ALT 29 12/06/2018    AST 24 12/06/2018        INR RESULTS:  Lab Results   Component Value Date    INR 1.13 06/10/2017       LIPID RESULTS:  Lab Results   Component Value Date    CHOL 164 08/23/2018    HDL 43 08/23/2018    LDL 90 08/23/2018    TRIG 155 (H) 08/23/2018       IMMUNOSUPPRESSANT LEVELS:  Lab Results   Component Value Date    TACROL 7.9 12/06/2018    DOSTAC Not Provided 12/06/2018       No components found for: CK  Lab Results   Component Value Date    MAG 2.1 12/06/2018     Lab Results   Component Value Date    A1C 7.5 (H) 06/02/2017     Lab Results   Component Value Date    PHOS 3.0 12/06/2018     Lab Results   Component Value Date    NTBNP 1945 (H) 06/29/2016     Lab Results   Component Value Date    SAITESTMET [facility] 12/14/2017    SAICELL Class I 12/14/2017    [id number] None 12/14/2017    [id number] None 12/14/2017    SAIREPCOM  12/14/2017     Test performed by modified procedure. Serum heat inactivated and tested by   a modified ([place]) protocol including fetal calf serum addition.   High-risk, mfi >3,000. Mod-risk, mfi 500-3,000.        Lab Results   Component Value Date    SAIITESTME [facility] 12/14/2017    SAIICELL Class II 12/14/2017    [id number] None 12/14/2017    [id number] None 12/14/2017    SAIIREPCOM  12/14/2017     Test performed by modified procedure. Serum heat inactivated and tested by   a modified ([place]) protocol including fetal calf serum addition.   High-risk, mfi >3,000. Mod-risk, mfi 500-3,000.        Lab Results   Component Value Date    CSPEC Plasma, EDTA anticoagulant 08/17/2018     Immuknow - 12/14/17: 140  Tacrolimus - 12/14/17: 10.4   EBV and CMV: negative    DESCRIPTION:  1. Consent obtained with discussion of risks.  All questions  were answered.  2. Sterile prep and procedure.  3. Location with Sheaths: 7 Fr RIJ, 6 Fr RRA  4. Access: Local anesthetic with lidocaine.  A standard 18 guage needle (for RIJ) and micropuncture 21 gauge needle (for RRA) were used with ultrasound guidance to establish vascular access using a modified Seldinger technique.  5. Diagnostic Catheters: 5 Fr Rafal (RCA), 6 Fr Ikari 3.5 LF GC (LMCA)  6. Guiding Catheters: 6 Fr Ikari 3.5 LF GC  7. Estimated blood loss: < 25 ml     MEDICATIONS:  The procedure was performed under conscious sedation for 55 minutes.  The patient was assessed immediately before the first sedation medication was administered.  Midazolam 2 mg and Fentanyl 100 mcg were administered.  Heart rate, BP, respiration, oxygen saturation and patient responses were monitored throughout the procedure with the assistance of the RN under my supervision.  >> IA Verapamil and IA NTG were administered to prophylax against radial artery spasm.  >> IV UFH 7,000 U was administered to achieve anticoagulation.  >> Antiplatelet Therapy: ASA 81 mg      ENDOMYOCARDIAL BIOPSY:  1. Successful collection of 4 right ventricular endomyocardial biopsy samples using the Jawz.      CORONARY ANGIOGRAM:   1. Both coronary arteries arise from their respective cusps.  2. Dominance: Right  3. The LM is fairly short and is without angiographic evidence of disease.   4. LAD: Type 4 [supplies the apex and >25% of the inferior wall (wrap around)]. The LAD gives rise to septal perforators and one major diagonal artery which is small in caliber. Proximal LAD has no significant disease. Mid LAD has minor luminal changes. 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 There is also evidence of myocardial briding in mid-LAD. Apical LAD is moderate in caliber and free of significant disease as it wraps around the apex to help supply the inferior wall.  5. LCX is large and gives rise to a high moderate to large OM1 and  moderate to large OM2 vessels. There is no significant angiographic evidence of disease.   6. RCA gives rise to PL branches and supplies PDA. There is no significant disease.     INTRA-VASCULAR ULTRASOUND:  Adequate anticoagulation was was obtained with UFH.  A BMW wire was positioned in the apical LAD.  A [place] [facility] catheter was advanced across the wire into the vessel.    There was no significant intimal thickening (LAST < 0.2 mm)     HEMODYNAMICS:  HR 80  BP 82/58/62  RA 5/5/4  RV 28/5  PA 28/8/17  PA sat 73.1%  PCW 13/8/8  Hgb 12.4 g/dL  Donnell CO (CI): 5.4 (3.0)  TD CO (CI): 3.5 (1.9)  PVR 1.67       Coronary Angiogram  12/6/2018   1. Both coronary arteries arise from their respective cusps.  2. Dominance: Right  3. The LM is without angiographic evidence of disease.   4. The LAD gives rise to septal perforators and a large D1 and a small D2 and has no significant disease.    5. LCX gives rise to a small OM1 and OM2 vessels without significant stenosis  6. RCA (dominant) gives rise to PL branches and supplies PDA with no significant disease      Last Cardiac MRI 12/19/17:  1. The LV is normal in cavity size and wall thickness. The global systolic function is normal. The LVEF is 57%. There are no regional wall motion abnormalities.   2. The RV is normal in cavity size. The global systolic function is mildly reduced. The RVEF is 48%.   3. There is bi-atrial enlargement secondary to heart transplantation.  4. There is no significant valvular disease.   5. Late gadolinium enhancement imaging shows no MI, fibrosis or infiltrative disease.   6. There is no pericardial effusion or thickening.  7.  There is no intracardiac thrombus.  CONCLUSIONS: No evidence of inducible ischemia.  Normal LV function and mildly reduced RV function.  No  significant change from CMR on 6/1/2017.      ECG sinus rhythm, no ST T wave changes     Assessment/Plan:   Mr. Iglesia Shetty is a 46 year old male who is s/p orthotopic heart  transplant 12/2016 for chagas cardiomyopathy. Post-op course complicated by rejection with atrial and ventricular arrhythmias, respiratory failure and JOSE LUIS, which resolved with diuresis, surgical blood loss anemia, right pleural effusion, requiring pigtail placement, and Chagas reactivation 12/26, treated with benznidasole. More recently, he has done remarkably well without recent rejection or evidence of graft dysfunction. He is back to work and overall doing incredibly well.     # Status post OHT on 12/2016, history of Chagas cardiomyopathy  # Chronic immunosuppression  * Rejection history: 2R on first tx with steroids, repeat bx 2R/3A on second tx with steroids +thymo, 2R in February treated with steroids.     Immunosuppression:  - CNI: Trough level goal now 6-8.   - Antiproliferative: reducing mycophenolate 1250 mg bid  given recent immuknows are consistent immunosuppression,    Prophylaxis:  - CAV: ASA 81 mg and pravastatin 10 mg    Serostatus: CMV: D+/R+. EBV: D+/R+  Intolerance to medications: none    # Chagas, reactivated post-transplant - now again cleared and off therapy   Surveillance labs per transplant ID-He has no systemic symptoms consistent with this    #  Health Care Maintenance  - Immunizations: up to date, recommend flu shot   - Dermatology: up to date     Follow up:  Next biopsy: per protocol   Next angiogram: 12/2019  Next stress test: per protocol   Next transplant clinic visit - per protocol     We will reschedule MRI for next month.         MD CHIN Hoskins JAMES PAUL GREEN, JAIME SUSAN

## 2018-12-07 NOTE — MR AVS SNAPSHOT
After Visit Summary   12/7/2018    Javi Bansal    MRN: [id number]           Patient Information     Date Of Birth          1970        Visit Information        Provider Department      12/7/2018 11:15 AM Christina Beasley; Geena Mcclellan MD [facility]        Today's Diagnoses     Heart replaced by transplant (H)    -  1      Care Instructions    Patient Instructions  1. Change mycophenolate (cellcept) to 1250 mg bid  2. We will reschedule cardiac MRI in 1-2 months from now. Please no alcohol or caffeine prior to the MRI and no eating 3 hours prior.     Next transplant clinic appointment: 6 month follow up   Next lab draw: 6 months  Coordinator will call with all pending results.     Please call transplant coordinator with any questions:    Yanelis Araiza RN BSN   Post Heart Transplant Nurse Coordinator  [facility]  Questions: [phone]              Follow-ups after your visit        Your next 10 appointments already scheduled     Jun 20, 2019  2:00 PM CDT   NEW GENERAL with Anibal Cox MD   Eye Clinic ([facility])    [address] [id number] [phone]              Who to contact     If you have questions or need follow up information about today's clinic visit or your schedule please contact [facility] directly at [phone].  Normal or non-critical lab and imaging results will be communicated to you by MyChart, letter or phone within 4 business days after the clinic has received the results. If you do not hear from us within 7 days, please contact the clinic through MyChart or phone. If you have a critical or abnormal lab result, we will notify you by phone as soon as possible.  Submit refill requests through [employer] or call your pharmacy and they will forward the refill request to us. Please allow 3 business days for your refill to be  "completed.          Additional Information About Your Visit        [employer] Software Information     [employer] lets you send messages to your doctor, view your test results, renew your prescriptions, schedule appointments and more. To sign up, go to [email or web address] . Click on \"Log in\" on the left side of the screen, which will take you to the Welcome page. Then click on \"Sign up Now\" on the right side of the page.     You will be asked to enter the access code listed below, as well as some personal information. Please follow the directions to create your username and password.     Your access code is: [email or web address]  Expires: 2019  6:30 AM     Your access code will  in 90 days. If you need help or a new code, please call your [place] clinic or [phone].        Care EveryWhere ID     This is your Care EveryWhere ID. This could be used by other organizations to access your [place] medical records  [id number]        Your Vitals Were     Pulse Height Pulse Oximetry BMI (Body Mass Index)          86 1.69 m (5' 6.53\") 99% 24.86 kg/m2         Blood Pressure from Last 3 Encounters:   18 110/76   18 131/86   18 136/80    Weight from Last 3 Encounters:   18 71 kg (156 lb 8 oz)   18 71.7 kg (158 lb)   18 70.9 kg (156 lb 3.2 oz)              Today, you had the following     No orders found for display         Today's Medication Changes          These changes are accurate as of 18  1:06 PM.  If you have any questions, ask your nurse or doctor.               Stop taking these medicines if you haven't already. Please contact your care team if you have questions.     rosuvastatin 10 MG tablet   Commonly known as:  CRESTOR   Stopped by:  Geena Mcclellan MD                    Primary Care Provider Fax #    Physician No Ref-Primary [phone]       No address on file        Equal Access to Services     JASWINDER KHAN AH: Hadii brenda Hernandez, david " talisha moranantonio garcia ah. [facility] [phone].    ATENCIÓN: Si jarrod rain, tiene a watt disposición servicios gratuitos de asistencia lingüística. Bridger al [phone].    We comply with applicable federal civil rights laws and [place] laws. We do not discriminate on the basis of race, color, national origin, age, disability, sex, sexual orientation, or gender identity.            Thank you!     Thank you for choosing [facility]  for your care. Our goal is always to provide you with excellent care. Hearing back from our patients is one way we can continue to improve our services. Please take a few minutes to complete the written survey that you may receive in the mail after your visit with us. Thank you!             Your Updated Medication List - Protect others around you: Learn how to safely use, store and throw away your medicines at www.disposemymeds.org.          This list is accurate as of 12/7/18  1:06 PM.  Always use your most recent med list.                   Brand Name Dispense Instructions for use Diagnosis    aspirin 81 MG EC tablet    ASA    90 tablet    Take 1 tablet (81 mg) by mouth daily    Heart replaced by transplant (H)       benzoyl peroxide 5 % Lotn lotion     30 mL    Apply topically At Bedtime    Other acne       calcium carbonate-vitamin D 500-400 MG-UNIT tablet    OS-DAISY    90 tablet    Take 1 tablet by mouth daily    Heart replaced by transplant (H)       fluticasone 50 MCG/ACT nasal spray    FLONASE    1 Bottle    Spray 1 spray into both nostrils daily    Influenza-like illness       guaiFENesin-dextromethorphan 100-10 MG/5ML syrup    ROBITUSSIN DM    560 mL    Take 5 mLs by mouth every 4 hours as needed for cough    Steroid-induced diabetes mellitus (H)       ketoconazole 2 % external shampoo    NIZORAL    120 mL    Apply topically three times a week Alternate with Head and Shoulders.    Seborrhea       multivitamin w/minerals tablet      30 each    Take 1 tablet by mouth daily    Heart replaced by transplant (H)       mycophenolate 250 MG capsule    GENERIC EQUIVALENT    360 capsule    Take 6 capsules (1,500 mg) by mouth 2 times daily    Heart replaced by transplant (H)       pravastatin 20 MG tablet    PRAVACHOL          tacrolimus 1 MG capsule    GENERIC EQUIVALENT    120 capsule    Take 2 capsules (2 mg) by mouth 2 times daily    Heart replaced by transplant (H)

## 2018-12-07 NOTE — PATIENT INSTRUCTIONS
Patient Instructions  1. Change mycophenolate (cellcept) to 1250 mg bid  2. We will reschedule cardiac MRI in 1-2 months from now. Please no alcohol or caffeine prior to the MRI and no eating 3 hours prior.     Next transplant clinic appointment: 6 month follow up   Next lab draw: 6 months  Coordinator will call with all pending results.     Please call transplant coordinator with any questions:    Yanelis Araiza RN BSN   Post Heart Transplant Nurse Coordinator  [facility]  Questions: [phone]

## 2018-12-07 NOTE — NURSING NOTE
Chief Complaint   Patient presents with     Follow Up For     2nd annual post heart transplant. Cors, RHC, Hbx, and labs 12/6. Stress MRI, CXR, clinic 12/7     Vitals were taken and medications were reconciled.   Emmie Boudreaux MA    11:47 AM

## 2018-12-10 LAB — LAB SCANNED RESULT: NORMAL

## 2018-12-12 LAB
SA2 CELL: NORMAL
SA2 COMMENTS: NORMAL
SA2 HI RISK ABY UA: NORMAL
SA2 MOD RISK ABY: NORMAL
SA2 TEST METHOD: NORMAL

## 2018-12-13 ENCOUNTER — TELEPHONE (OUTPATIENT)
Dept: CARDIOLOGY | Facility: CLINIC | Age: 48
End: 2018-12-13

## 2018-12-13 NOTE — TELEPHONE ENCOUNTER
[facility] Call Center    Phone Message    May a detailed message be left on voicemail: yes    Reason for Call: Medication Question or concern regarding medication   Prescription Clarification  Name of Medication: Mycophenolate  Prescribing Provider: Dr. Mcclellan   Pharmacy: [facility]   What on the order needs clarification? There has been a change with his insurance.  Social security was covering a portion and it has now been switched over to the [facility].  However, patient is running low on medication.  He will be out by Saturday.  Seeking assistance of the clinic.  Pharmacy has been attempting to run the prescription through for a Friday delivery.          Action Taken: Message routed to:  [facility] ([facility] cardiology

## 2018-12-13 NOTE — TELEPHONE ENCOUNTER
-- Message is from the [facility]--    Provider paged via [employer] Documentation - The below message was copied and pasted from a [employer] page:    12/12/2018 8:02:18 PM   [facility]   Edinson Velez 0\" tooltip-popup-delay=\"500\" sfibakt-bnnyqh-jg-body=\"true\" uib-tooltip=\"\" tooltip-placement=\"auto top\" tooltip-enable=\"false\" psx-ellipsis=\"\"Oscar Dawson   Secure Text   [phone] ACC NON-URGENT CALLER NAME: BIRDIE  CALLER TITLE: NURSE LOCATION NAME: [facility], PATIENT FLOOR: 3RD RE: STEFF VENTURA PATIENT 1964 PATIENT HAS BEEN TRANSFERRED OUT, NURSE WOULD LIKE TO CONFIRM. PLEASE CALL TO DISCUSS. DEVP* FORMID:[id number]       During return call, confirmed upcoming appointments and informed him to expect a call from [facility] regarding setting up home visits.  Patient verbalizes understanding plan of care and agrees to follow.

## 2019-01-02 ENCOUNTER — TELEPHONE (OUTPATIENT)
Dept: TRANSPLANT | Facility: CLINIC | Age: 49
End: 2019-01-02

## 2019-01-03 DIAGNOSIS — Z94.1 HEART REPLACED BY TRANSPLANT (H): ICD-10-CM

## 2019-02-04 DIAGNOSIS — Z94.1 HEART REPLACED BY TRANSPLANT (H): Primary | ICD-10-CM

## 2019-02-07 RX ORDER — PRAVASTATIN SODIUM 20 MG
TABLET ORAL
Qty: 30 TABLET | Refills: 11 | Status: SHIPPED | OUTPATIENT
Start: 2019-02-07 | End: 2020-02-17

## 2019-02-07 NOTE — TELEPHONE ENCOUNTER
Patient requesting refill for pravastatin. Per notes patient was previously supposed to transition off of pravastatin and start atorvastatin. Relayed to Dr. Francis lama for patient to continue on pravastatin. Med refill sent.

## 2019-02-21 ENCOUNTER — TELEPHONE (OUTPATIENT)
Dept: CARDIOLOGY | Facility: CLINIC | Age: 49
End: 2019-02-21

## 2019-02-21 NOTE — TELEPHONE ENCOUNTER
Hello,    Patient is wondering if he can get assistance with seeing if he qualifies for the manufacture drug programs as patient does not have active insurance at this time. Their Medicaid has been inactive since August 2018.  Patient has an outstanding Pharmacy bill of $8,300 for his immunos and states they cannot afford to continue billing the cash price amounts as Medicaid typically only back dates to 3 months prior and he is not getting any where with his county .    We can assist patient with seeing if they qualify for the [place] for all cashed out med claims from 08/2018 to Present, but going forward patient is wondering if they qualify for the manufacture drug program for Cellcept and Prograf.    Please reach out to patient.      Thank you.    Miya Menjivar  Patient   [facility]   [address] [place] [id number]   [email or web address]  [email or web address]  Direct Line: [phone]  Direct Fax:[phone]

## 2019-03-04 NOTE — TELEPHONE ENCOUNTER
Update --    Patient is very concerned as they state that [place] were previously on [place]. When he was working with BERTHA Edgar and when speaking with the [facility], they were assured that the patient should qualify [place].   As of 3/1/19, patient states that their Medicaid was denied. Patient has tried leaving multiple messages for a return call from  but has not heard back.    Please reach out to patient with any information or assistance for patient.    Thank you.    Miya Menjivar  Patient   [facility]   [address] [place] [address]   [email or web address]  [email or web address]  Direct Line: [phone]  Direct Fax:[phone]

## 2019-03-25 DIAGNOSIS — Z94.1 HEART REPLACED BY TRANSPLANT (H): Primary | ICD-10-CM

## 2019-04-19 DIAGNOSIS — Z94.1 HEART REPLACED BY TRANSPLANT (H): ICD-10-CM

## 2019-04-22 ENCOUNTER — TELEPHONE (OUTPATIENT)
Dept: TRANSPLANT | Facility: CLINIC | Age: 49
End: 2019-04-22

## 2019-04-22 RX ORDER — TACROLIMUS 1 MG/1
2 CAPSULE ORAL 2 TIMES DAILY
Qty: 120 CAPSULE | Refills: 11 | Status: SHIPPED | OUTPATIENT
Start: 2019-04-22 | End: 2019-04-25

## 2019-04-22 NOTE — TELEPHONE ENCOUNTER
From  transplant pharmacy:  [4/22/2019 2:31 PM] Marino Lynch:   mmf = $71.18 same as last time and $12 cheaper than it was in October.  tacro = $34.74 same as last time.  But he has not been paying us and owes $1146.16.  The  is working on getting him enrolled in a cellcept program.

## 2019-04-22 NOTE — TELEPHONE ENCOUNTER
Returned pt call regarding lack of medication and lack of insurance. Pt ran out of IS medications and did not take any 4/21. Contacting SW. Assured pt that I will call him back today.

## 2019-04-23 ENCOUNTER — TELEPHONE (OUTPATIENT)
Dept: TRANSPLANT | Facility: CLINIC | Age: 49
End: 2019-04-23

## 2019-04-23 ENCOUNTER — OFFICE VISIT (OUTPATIENT)
Dept: INTERPRETER SERVICES | Facility: CLINIC | Age: 49
End: 2019-04-23
Payer: MEDICAID

## 2019-04-23 NOTE — TELEPHONE ENCOUNTER
GIULIANA using  asking pt to call when he receives his medication delivery from [facility]. Expected delivery today. Dr. Mcclellan might want pt to take an extra or a higher dose as pt missed his IS meds 4/21 and 4/22, as well as morning 4/23.

## 2019-04-25 ENCOUNTER — TELEPHONE (OUTPATIENT)
Dept: TRANSPLANT | Facility: CLINIC | Age: 49
End: 2019-04-25

## 2019-04-25 DIAGNOSIS — Z94.1 HEART REPLACED BY TRANSPLANT (H): Primary | ICD-10-CM

## 2019-04-25 RX ORDER — TACROLIMUS 1 MG/1
2 CAPSULE ORAL 2 TIMES DAILY
Qty: 360 CAPSULE | Refills: 3 | Status: SHIPPED | OUTPATIENT
Start: 2019-04-25 | End: 2020-04-17

## 2019-04-25 NOTE — TELEPHONE ENCOUNTER
Called patient with interpretor services-Kazakh. LM for him to call back regarding immunosuppression meds and plan per Dr. Mcclellan.      Needs appointment with NP next week with labs and Echo. Orders placed and sent to .

## 2019-04-25 NOTE — TELEPHONE ENCOUNTER
Called patient with interpretor services-Ukrainian regarding immunosuppression meds and plan per Dr. Mcclellan.      Patient reports that he was out of both MMF and Tacrolimus but has received meds and is back on track. Confirmed that he is taking MMF 1250 mg bid and Tacro 2 mg bid. Patient missed 2 and 1/2 days of meds.     Instructed patient to take tacro 4 mg tonight, then 3mg bid x3 days, then return to 2mg bid, repeat drug level 5/1 and then in 1-2 weeks.     Patient scheduled 5/1 with NP (labs and echo). Reminded patient that a tacro level will be drawn and will need 12-hour trough.    Patient worried about financial coverage but reports that the finance department has been working with him.     Patient verbalized understanding and next steps.

## 2019-05-01 ENCOUNTER — OFFICE VISIT (OUTPATIENT)
Dept: CARDIOLOGY | Facility: CLINIC | Age: 49
End: 2019-05-01
Attending: NURSE PRACTITIONER
Payer: MEDICAID

## 2019-05-01 ENCOUNTER — ANCILLARY PROCEDURE (OUTPATIENT)
Dept: CARDIOLOGY | Facility: CLINIC | Age: 49
End: 2019-05-01
Attending: INTERNAL MEDICINE
Payer: MEDICAID

## 2019-05-01 VITALS — SYSTOLIC BLOOD PRESSURE: 120 MMHG | DIASTOLIC BLOOD PRESSURE: 82 MMHG

## 2019-05-01 VITALS
OXYGEN SATURATION: 100 % | WEIGHT: 159.6 LBS | HEART RATE: 78 BPM | HEIGHT: 67 IN | SYSTOLIC BLOOD PRESSURE: 118 MMHG | BODY MASS INDEX: 25.05 KG/M2 | DIASTOLIC BLOOD PRESSURE: 83 MMHG

## 2019-05-01 DIAGNOSIS — Z94.1 HEART REPLACED BY TRANSPLANT (H): ICD-10-CM

## 2019-05-01 DIAGNOSIS — D84.9 IMMUNOSUPPRESSION (H): ICD-10-CM

## 2019-05-01 DIAGNOSIS — B57.2 CHAGAS DISEASE: ICD-10-CM

## 2019-05-01 DIAGNOSIS — Z94.1 HEART REPLACED BY TRANSPLANT (H): Primary | ICD-10-CM

## 2019-05-01 LAB
ANION GAP SERPL CALCULATED.3IONS-SCNC: 5 MMOL/L (ref 3–14)
BUN SERPL-MCNC: 21 MG/DL (ref 7–30)
CALCIUM SERPL-MCNC: 9.8 MG/DL (ref 8.5–10.1)
CHLORIDE SERPL-SCNC: 108 MMOL/L (ref 94–109)
CO2 SERPL-SCNC: 26 MMOL/L (ref 20–32)
CREAT SERPL-MCNC: 1.12 MG/DL (ref 0.66–1.25)
ERYTHROCYTE [DISTWIDTH] IN BLOOD BY AUTOMATED COUNT: 13 % (ref 10–15)
GFR SERPL CREATININE-BSD FRML MDRD: 77 ML/MIN/{1.73_M2}
GLUCOSE SERPL-MCNC: 89 MG/DL (ref 70–99)
HCT VFR BLD AUTO: 40.5 % (ref 40–53)
HGB BLD-MCNC: 13.3 G/DL (ref 13.3–17.7)
MAGNESIUM SERPL-MCNC: 2.1 MG/DL (ref 1.6–2.3)
MCH RBC QN AUTO: 30.1 PG (ref 26.5–33)
MCHC RBC AUTO-ENTMCNC: 32.8 G/DL (ref 31.5–36.5)
MCV RBC AUTO: 92 FL (ref 78–100)
PHOSPHATE SERPL-MCNC: 2.6 MG/DL (ref 2.5–4.5)
PLATELET # BLD AUTO: 180 10E9/L (ref 150–450)
POTASSIUM SERPL-SCNC: 4.3 MMOL/L (ref 3.4–5.3)
RBC # BLD AUTO: 4.42 10E12/L (ref 4.4–5.9)
SODIUM SERPL-SCNC: 139 MMOL/L (ref 133–144)
TACROLIMUS BLD-MCNC: 8 UG/L (ref 5–15)
TME LAST DOSE: NORMAL H
WBC # BLD AUTO: 7.5 10E9/L (ref 4–11)

## 2019-05-01 PROCEDURE — 80048 BASIC METABOLIC PNL TOTAL CA: CPT | Performed by: NURSE PRACTITIONER

## 2019-05-01 PROCEDURE — G0463 HOSPITAL OUTPT CLINIC VISIT: HCPCS

## 2019-05-01 PROCEDURE — 83735 ASSAY OF MAGNESIUM: CPT | Performed by: NURSE PRACTITIONER

## 2019-05-01 PROCEDURE — 85027 COMPLETE CBC AUTOMATED: CPT | Performed by: NURSE PRACTITIONER

## 2019-05-01 PROCEDURE — 80197 ASSAY OF TACROLIMUS: CPT | Performed by: NURSE PRACTITIONER

## 2019-05-01 PROCEDURE — 36415 COLL VENOUS BLD VENIPUNCTURE: CPT | Performed by: NURSE PRACTITIONER

## 2019-05-01 PROCEDURE — 99214 OFFICE O/P EST MOD 30 MIN: CPT | Mod: ZP | Performed by: NURSE PRACTITIONER

## 2019-05-01 PROCEDURE — 84100 ASSAY OF PHOSPHORUS: CPT | Performed by: NURSE PRACTITIONER

## 2019-05-01 ASSESSMENT — PAIN SCALES - GENERAL: PAINLEVEL: NO PAIN (0)

## 2019-05-01 ASSESSMENT — MIFFLIN-ST. JEOR: SCORE: 1540.18

## 2019-05-01 NOTE — NURSING NOTE
Chief Complaint   Patient presents with     Follow Up     return heart tx     Vitals were taken and medications were reconciled.   Emmie Boudreaux MA    8:45 AM

## 2019-05-01 NOTE — PATIENT INSTRUCTIONS
1. We will coordinate with Pharmacy Advocate,  and Financial Counselor regarding your insurance. If you don't hear from someone by the end of the week please call us and we will assist you.    Return to clinic 6/13 for 30-month visit.     Your Transplant Coordinator will call with pending results.  Please call your Transplant Coordinator with questions/concerns [phone].

## 2019-05-01 NOTE — NURSING NOTE
"Patient seen in clinic for f/u with NP after missed immunosuppression meds. Irish interpretor assisted at visit today. Resulted labs reviewed. Tacro level pending, confirmed 12-hour trough and current dose 2 mg bid. Echo reviewed. VSS. Today /83 and HR 78. Denies pain. Denies chest pain, shortness of breath, fatigue, fever, night sweats. Weight stable. Pt. reports that he is staying active on \"walking machine\" walking and jogging. Pt. feeling well and with no complaints today regarding his health. Patient concerned about financial status, pending insurance/benefits, work status/disability and immigration status, message routed to  to touch base with patient regarding next steps and pending applications. RTC 6/13 for routine 30-month visit. Txp. Coordinator to call with pending results. Patient s questions/concerns addressed. AVS printed. Patient verbalized understanding and next steps.      Patient instructions:  1. We will coordinate with Pharmacy Advocate,  and Financial Counselor regarding your insurance. If you don't hear from someone by the end of the week please call us and we will assist you.  Return to clinic 6/13 for 30-month visit.   Your Transplant Coordinator will call with pending results.  Please call your Transplant Coordinator with questions/concerns [phone].    "

## 2019-05-01 NOTE — LETTER
5/1/2019      RE: Javi Bansal  [address]  [place] [address]       Dear Colleague,    Thank you for the opportunity to participate in the care of your patient, Javi Bansal, at the [facility]. Please see a copy of my visit note below.    AD[facility] TRANSPLANT [facility]IC    HPI:   Javi Bansal is a 48 yr old male with a history of Chagas cardiomyopathy (diagnosed in 2010, treated with nifurtimox) and resulting biventricular systolic heart failure (LVEF 15-20% since 2015, on home milrinone since 8/2016), now s/p OHT 12/12/16, who presents to clinic after missing IS medications. He is accompanied by a . 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 Monthly Chagas labs through 12/2017 were negative. He was started on azathioprine postoperatively due to reports of better outcomes. His initial biopsy on 12/19/16 showed 2R ACR, which was treated with IV steroids. Repeat biopsy 12/27/16 showed 2R/3A ACR, which was treated with IV steroids and thymoglobulin. He had another episode of 2R ACR 2/10/17, which was treated with steroids. His biopsies have since been negative for significant rejection and graft function has remained normal. Other transplant-related issues include: shingles treated with Valtrex, low level CMV and EBV but has been asymptomatic. Last coronary angiogram showed no evidence of coronary allograft vasculopathy (has LAD myocardial bridge). He has no DSAs. Last RHC showed normal filling pressures and cardiac output.     Since last visit, patient has been not been working and therefore having issues with insurance  coverage. Pharmacy advocate and SW have been helping him with this. He applied for disability about 1 month ago but told it could take 3 months. He did miss two days of tac and MMF due to meds not being sent on time then issue with filling. Presents today after echocardiogram to screen for rejection. Echo today shows normal graft function and wall thickness. Patient continues to use treadmill 30 min/day without exertional symptoms. He has no LE edema, orthopnea, PND, presyncope, syncope. Patient denies fever, chills, oral lesions, rashes, headache, palpitations, orthopnea, nausea, vomiting, diarrhea.     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 suspected , but unclear dx       FAMILY HISTORY:  Family History   Problem Relation Age of Onset     Brain Cancer Mother      Melanoma No family hx of      Skin Cancer No family hx of      Glaucoma No family hx of      Macular Degeneration No family hx of        SOCIAL HISTORY:  Social History     Marital status:      Social History Main Topics     Smoking status: Never Smoker     Smokeless tobacco: Never Used     Alcohol use No     Drug use: No     Sexual activity: Not on file     CURRENT MEDICATIONS:    Current Outpatient Medications on File Prior to Visit:  aspirin 81 MG EC tablet Take 1 tablet (81 mg) by mouth daily   benzoyl peroxide 5 % LOTN lotion Apply topically At Bedtime   calcium carbonate-vitamin D (OS-DAISY) 500-400 MG-UNIT tablet Take 1 tablet by mouth daily   mycophenolate (GENERIC EQUIVALENT) 250 MG capsule Take 5 capsules (1,250 mg) by  "mouth 2 times daily   pravastatin (PRAVACHOL) 20 MG tablet TAKE ONE TABLET BY MOUTH EVERY EVENING   tacrolimus (GENERIC EQUIVALENT) 1 MG capsule Take 2 capsules (2 mg) by mouth 2 times daily   fluticasone (FLONASE) 50 MCG/ACT spray Spray 1 spray into both nostrils daily (Patient not taking: Reported on 5/1/2019)   guaiFENesin-dextromethorphan (ROBITUSSIN DM) 100-10 MG/5ML syrup Take 5 mLs by mouth every 4 hours as needed for cough (Patient not taking: Reported on 5/1/2019)   ketoconazole (NIZORAL) 2 % shampoo Apply topically three times a week Alternate with Head and Shoulders. (Patient not taking: Reported on 5/1/2019)   multivitamin, therapeutic with minerals (THERA-VIT-M) TABS tablet Take 1 tablet by mouth daily (Patient not taking: Reported on 5/1/2019)     No current facility-administered medications on file prior to visit.     ROS:  CONSTITUTIONAL: Denies fever, chills, fatigue, or weight fluctuations.   HEENT: Denies headache, vision changes, and changes in speech.   CV: see hpi   PULMONARY: see hpi   GI:Denies nausea, vomiting, diarrhea, and abdominal pain. Bowel movements are regular.   : Denies urinary alterations, dysuria, urinary frequency, hematuria, and abnormal drainage.   EXT: Denies lower extremity edema or color changes.   SKIN: Resolved lesions on thigh, no other rash.   MUSCULOSKELETAL: Denies upper or lower extremity weakness and pain.   NEUROLOGIC: Denies lightheadedness, dizziness, seizures, or upper or lower extremity paresthesia.     EXAM:  /83 (BP Location: Left arm, Patient Position: Chair, Cuff Size: Adult Regular)   Pulse 78   Ht 1.69 m (5' 6.54\")   Wt 72.4 kg (159 lb 9.6 oz)   SpO2 100%   BMI 25.35 kg/m       GENERAL: Appears comfortable, in no acute distress.   HEENT: Eye symmetrical, no discharge or icterus bilaterally. Mucous membranes moist and without lesions.  CV: RRR, +S1S2, no murmur, rub, or gallop. JVP not appreciable at 45 degrees.   RESPIRATORY: Respirations " regular, even, and unlabored. Lungs CTA throughout.   GI: Soft and non distended with normoactive bowel sounds present in all quadrants. No tenderness, rebound, guarding. No hepatomegaly.   EXTREMITIES: No peripheral edema. 2+ bilateral pedal pulses.   NEUROLOGIC: Alert and oriented x 3. No focal deficits.   MUSCULOSKELETAL: No joint swelling or tenderness.   SKIN: No jaundice. No rashes or lesions.     Labs - reviewed with patient in clinic today:  CBC RESULTS:  Lab Results   Component Value Date    WBC 7.5 05/01/2019    RBC 4.42 05/01/2019    HGB 13.3 05/01/2019    HCT 40.5 05/01/2019    MCV 92 05/01/2019    MCH 30.1 05/01/2019    MCHC 32.8 05/01/2019    RDW 13.0 05/01/2019     05/01/2019       CMP RESULTS:  Lab Results   Component Value Date     05/01/2019    POTASSIUM 4.3 05/01/2019    CHLORIDE 108 05/01/2019    CO2 26 05/01/2019    ANIONGAP 5 05/01/2019    GLC 89 05/01/2019    BUN 21 05/01/2019    CR 1.12 05/01/2019    GFRESTIMATED 77 05/01/2019    GFRESTBLACK 89 05/01/2019    DAISY 9.8 05/01/2019    BILITOTAL 0.6 12/06/2018    ALBUMIN 4.5 12/06/2018    ALKPHOS 83 12/06/2018    ALT 29 12/06/2018    AST 24 12/06/2018        INR RESULTS:  Lab Results   Component Value Date    INR 1.13 06/10/2017       LIPID RESULTS:  Lab Results   Component Value Date    CHOL 164 08/23/2018    HDL 43 08/23/2018    LDL 90 08/23/2018    TRIG 155 (H) 08/23/2018       IMMUNOSUPPRESSANT LEVELS:  Lab Results   Component Value Date    TACROL 7.9 12/06/2018    DOSTAC Not Provided 12/06/2018       No components found for: CK  Lab Results   Component Value Date    MAG 2.1 05/01/2019     Lab Results   Component Value Date    A1C 7.5 (H) 06/02/2017     Lab Results   Component Value Date    PHOS 2.6 05/01/2019     Lab Results   Component Value Date    NTBNP 1,944 (H) 06/29/2016     Lab Results   Component Value Date    SA[place]STMET SA FCS 12/06/2018    SAICELL Class I 12/06/2018    [id number] None 12/06/2018    [id number] None  12/06/2018    SAIREPCOM  12/06/2018      Test performed by modified procedure. Serum heat inactivated and tested   by a modified ([place]) protocol including fetal calf serum addition.   High-risk, mfi >3,000. Mod-risk, mfi 500-3,000.       Lab Results   Component Value Date    SAIITESTME SA FCS 12/06/2018    SAIICELL Class II 12/06/2018    [id number] None 12/06/2018    [id number] None 12/06/2018    SAIIREPCOM  12/06/2018      Test performed by modified procedure. Serum heat inactivated and tested   by a modified ([place]) protocol including fetal calf serum addition.   High-risk, mfi >3,000. Mod-risk, mfi 500-3,000.       Lab Results   Component Value Date    [facility] Plasma 12/06/2018       Diagnostic Studies:  cMRI 12/2017  1. The LV is normal in cavity size and wall thickness. The global systolic function is normal. The LVEF is 57%. There are no regional wall motion abnormalities.  2. The RV is normal in cavity size. The global systolic function is mildly reduced. The RVEF is 48%.   3. There is bi-atrial enlargement secondary to heart transplantation.  4. There is no significant valvular disease.   5. Late gadolinium enhancement imaging shows no MI, fibrosis or infiltrative disease.   6. There is no pericardial effusion or thickening.  7.  There is no intracardiac thrombus.     RH 12/201 8       Coronary angiogram 12/2018      TTE today  Biventricular function, chamber size, wall motion, and wall thickness are  normal.The LVEF is 55-60%.  No significant valvular abnormalities were noted.  Compared with 3/15/17: There has been no significant change.    Assessment/Plan:  Mr. Iglesia Shetty is a 48 year old male who is s/p orthotopic heart transplant 12/2016 for Chagas cardiomyopathy. Post-op course complicated by rejection with atrial and ventricular arrhythmias, respiratory failure and JOSE LUIS, which resolved with diuresis, surgical blood loss anemia, right pleural effusion, requiring pigtail placement, and Chagas  reactivation, treated with benznidasole, later - shingles treated with Valtrex, and low level CMV which has cleared. He presents today after missed IS doses - no symptoms concerning for graft dysfunction and his echo today is normal and unchanged from previous.     # Status post OHT on 12/2016, history of Chagas cardiomyopathy  # Recent missed IS medications  # Chronic immunosuppression  * Rejection history: 2R on first tx with steroids, repeat bx 2R/3A on second tx with steroids +thymo, later 2R treated with steroids  * Recent immunosuppression changes: MMF decreased due to low Immuknow  * Intolerance to medications: none  * Last biopsy: 12/2018 NER    Immunosuppression:  - Tacrolimus, trough level goal 8-10, pending today.  - Cellcept 1250 mg bid    Prophylaxis:  - CAV: ASA 81 mg and pravastatin 20 mg   - Osteoporosis: calcium and vitamind D    Serostatus: CMV: D+/R+. EBV: D+/R+    # Financial and insurance issues: will reach out again to pharmacy advocate and Julianne (his new assigned SW) to see what options we have for drug coverage  # Low-level CMV viremia, resolved: Last level 12/2018 undetected.  # Hx shingles: treated with Valtrex  # Hx Chagas with reactivation: Chagas reactivated following his transplant (12/26/16). He was treated with Benznidazole for 60 days, and Transplant ID noted that his heart has not yet been reinfected at this time. Chagas PCRs have remained negative, Transplant ID remains available if needed.  # Latent TB: treated with INH per ID        25 minutes spent face-to-face with patient, >50% in counseling and/or coordination of care as described above      Rosibel Hu DNP, NP-C  5/1/2019        DYLON SHINE, BONIFACIO AUSTIN

## 2019-05-01 NOTE — PROGRESS NOTES
ADULT HEART TRANSPLANT [facility]IC    HPI:   Javi Bansal is a 48 yr old male with a history of Chagas cardiomyopathy (diagnosed in 2010, treated with nifurtimox) and resulting biventricular systolic heart failure (LVEF 15-20% since 2015, on home milrinone since 8/2016), now s/p OHT 12/12/16, who presents to clinic after missing IS medications. He is accompanied by a . 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 Monthly Chagas labs through 12/2017 were negative. He was started on azathioprine postoperatively due to reports of better outcomes. His initial biopsy on 12/19/16 showed 2R ACR, which was treated with IV steroids. Repeat biopsy 12/27/16 showed 2R/3A ACR, which was treated with IV steroids and thymoglobulin. He had another episode of 2R ACR 2/10/17, which was treated with steroids. His biopsies have since been negative for significant rejection and graft function has remained normal. Other transplant-related issues include: shingles treated with Valtrex, low level CMV and EBV but has been asymptomatic. Last coronary angiogram showed no evidence of coronary allograft vasculopathy (has LAD myocardial bridge). He has no DSAs. Last RHC showed normal filling pressures and cardiac output.     Since last visit, patient has been not been working and therefore having issues with insurance coverage. Pharmacy advocate and SW have been helping him with this. He applied for disability about 1 month ago but told it could take 3 months. He did miss two days of tac and MMF due to meds not being sent on time then issue with filling. Presents today after echocardiogram to screen for rejection. Echo today shows normal graft function and wall  thickness. Patient continues to use treadmill 30 min/day without exertional symptoms. He has no LE edema, orthopnea, PND, presyncope, syncope. Patient denies fever, chills, oral lesions, rashes, headache, palpitations, orthopnea, nausea, vomiting, diarrhea.     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 suspected , but unclear dx       FAMILY HISTORY:  Family History   Problem Relation Age of Onset     Brain Cancer Mother      Melanoma No family hx of      Skin Cancer No family hx of      Glaucoma No family hx of      Macular Degeneration No family hx of        SOCIAL HISTORY:  Social History     Marital status:      Social History Main Topics     Smoking status: Never Smoker     Smokeless tobacco: Never Used     Alcohol use No     Drug use: No     Sexual activity: Not on file     CURRENT MEDICATIONS:    Current Outpatient Medications on File Prior to Visit:  aspirin 81 MG EC tablet Take 1 tablet (81 mg) by mouth daily   benzoyl peroxide 5 % LOTN lotion Apply topically At Bedtime   calcium carbonate-vitamin D (OS-DAISY) 500-400 MG-UNIT tablet Take 1 tablet by mouth daily   mycophenolate (GENERIC EQUIVALENT) 250 MG capsule Take 5 capsules (1,250 mg) by mouth 2 times daily   pravastatin (PRAVACHOL) 20 MG tablet TAKE ONE TABLET BY MOUTH EVERY EVENING   tacrolimus (GENERIC EQUIVALENT) 1 MG capsule Take 2 capsules (2 mg) by mouth 2 times daily   fluticasone (FLONASE) 50 MCG/ACT spray Spray 1 spray into both nostrils daily (Patient not taking: Reported on 5/1/2019)   guaiFENesin-dextromethorphan  "(ROBITUSSIN DM) 100-10 MG/5ML syrup Take 5 mLs by mouth every 4 hours as needed for cough (Patient not taking: Reported on 5/1/2019)   ketoconazole (NIZORAL) 2 % shampoo Apply topically three times a week Alternate with Head and Shoulders. (Patient not taking: Reported on 5/1/2019)   multivitamin, therapeutic with minerals (THERA-VIT-M) TABS tablet Take 1 tablet by mouth daily (Patient not taking: Reported on 5/1/2019)     No current facility-administered medications on file prior to visit.     ROS:  CONSTITUTIONAL: Denies fever, chills, fatigue, or weight fluctuations.   HEENT: Denies headache, vision changes, and changes in speech.   CV: see hpi   PULMONARY: see hpi   GI:Denies nausea, vomiting, diarrhea, and abdominal pain. Bowel movements are regular.   : Denies urinary alterations, dysuria, urinary frequency, hematuria, and abnormal drainage.   EXT: Denies lower extremity edema or color changes.   SKIN: Resolved lesions on thigh, no other rash.   MUSCULOSKELETAL: Denies upper or lower extremity weakness and pain.   NEUROLOGIC: Denies lightheadedness, dizziness, seizures, or upper or lower extremity paresthesia.     EXAM:  /83 (BP Location: Left arm, Patient Position: Chair, Cuff Size: Adult Regular)   Pulse 78   Ht 1.69 m (5' 6.54\")   Wt 72.4 kg (159 lb 9.6 oz)   SpO2 100%   BMI 25.35 kg/m      GENERAL: Appears comfortable, in no acute distress.   HEENT: Eye symmetrical, no discharge or icterus bilaterally. Mucous membranes moist and without lesions.  CV: RRR, +S1S2, no murmur, rub, or gallop. JVP not appreciable at 45 degrees.   RESPIRATORY: Respirations regular, even, and unlabored. Lungs CTA throughout.   GI: Soft and non distended with normoactive bowel sounds present in all quadrants. No tenderness, rebound, guarding. No hepatomegaly.   EXTREMITIES: No peripheral edema. 2+ bilateral pedal pulses.   NEUROLOGIC: Alert and oriented x 3. No focal deficits.   MUSCULOSKELETAL: No joint swelling or " tenderness.   SKIN: No jaundice. No rashes or lesions.     Labs - reviewed with patient in clinic today:  CBC RESULTS:  Lab Results   Component Value Date    WBC 7.5 05/01/2019    RBC 4.42 05/01/2019    HGB 13.3 05/01/2019    HCT 40.5 05/01/2019    MCV 92 05/01/2019    MCH 30.1 05/01/2019    MCHC 32.8 05/01/2019    RDW 13.0 05/01/2019     05/01/2019       CMP RESULTS:  Lab Results   Component Value Date     05/01/2019    POTASSIUM 4.3 05/01/2019    CHLORIDE 108 05/01/2019    CO2 26 05/01/2019    ANIONGAP 5 05/01/2019    GLC 89 05/01/2019    BUN 21 05/01/2019    CR 1.12 05/01/2019    GFRESTIMATED 77 05/01/2019    GFRESTBLACK 89 05/01/2019    DAISY 9.8 05/01/2019    BILITOTAL 0.6 12/06/2018    ALBUMIN 4.5 12/06/2018    ALKPHOS 83 12/06/2018    ALT 29 12/06/2018    AST 24 12/06/2018        INR RESULTS:  Lab Results   Component Value Date    INR 1.13 06/10/2017       LIPID RESULTS:  Lab Results   Component Value Date    CHOL 164 08/23/2018    HDL 43 08/23/2018    LDL 90 08/23/2018    TRIG 155 (H) 08/23/2018       IMMUNOSUPPRESSANT LEVELS:  Lab Results   Component Value Date    TACROL 7.9 12/06/2018    DOSTAC Not Provided 12/06/2018       No components found for: CK  Lab Results   Component Value Date    MAG 2.1 05/01/2019     Lab Results   Component Value Date    A1C 7.5 (H) 06/02/2017     Lab Results   Component Value Date    PHOS 2.6 05/01/2019     Lab Results   Component Value Date    NTBNP 1,944 (H) 06/29/2016     Lab Results   Component Value Date    [place] SA FCS 12/06/2018    SAICELL Class I 12/06/2018    QY2OEOPHT None 12/06/2018    DA8YIYPWTK None 12/06/2018    SAIREPCOM  12/06/2018      Test performed by modified procedure. Serum heat inactivated and tested   by a modified ([place]) protocol including fetal calf serum addition.   High-risk, mfi >3,000. Mod-risk, mfi 500-3,000.       Lab Results   Component Value Date    SAIITESTME SA FCS 12/06/2018    SAIICELL Class II 12/06/2018    [id number] None  12/06/2018    [id number] None 12/06/2018    SAIIREPCOM  12/06/2018      Test performed by modified procedure. Serum heat inactivated and tested   by a modified ([place]) protocol including fetal calf serum addition.   High-risk, mfi >3,000. Mod-risk, mfi 500-3,000.       Lab Results   Component Value Date    CSPEC Plasma 12/06/2018       Diagnostic Studies:  cMRI 12/2017  1. The LV is normal in cavity size and wall thickness. The global systolic function is normal. The LVEF is 57%. There are no regional wall motion abnormalities.  2. The RV is normal in cavity size. The global systolic function is mildly reduced. The RVEF is 48%.   3. There is bi-atrial enlargement secondary to heart transplantation.  4. There is no significant valvular disease.   5. Late gadolinium enhancement imaging shows no MI, fibrosis or infiltrative disease.   6. There is no pericardial effusion or thickening.  7.  There is no intracardiac thrombus.     RHC 12/2018       Coronary angiogram 12/2018      TTE today  Biventricular function, chamber size, wall motion, and wall thickness are  normal.The LVEF is 55-60%.  No significant valvular abnormalities were noted.  Compared with 3/15/17: There has been no significant change.    Assessment/Plan:  Mr. Iglesia Shetty is a 48 year old male who is s/p orthotopic heart transplant 12/2016 for Chagas cardiomyopathy. Post-op course complicated by rejection with atrial and ventricular arrhythmias, respiratory failure and JOSE LUIS, which resolved with diuresis, surgical blood loss anemia, right pleural effusion, requiring pigtail placement, and Chagas reactivation, treated with benznidasole, later - shingles treated with Valtrex, and low level CMV which has cleared. He presents today after missed IS doses - no symptoms concerning for graft dysfunction and his echo today is normal and unchanged from previous.     # Status post OHT on 12/2016, history of Chagas cardiomyopathy  # Recent missed IS medications  #  Chronic immunosuppression  * Rejection history: 2R on first tx with steroids, repeat bx 2R/3A on second tx with steroids +thymo, later 2R treated with steroids  * Recent immunosuppression changes: MMF decreased due to low Immuknow  * Intolerance to medications: none  * Last biopsy: 12/2018 NER    Immunosuppression:  - Tacrolimus, trough level goal 8-10, pending today.  - Cellcept 1250 mg bid    Prophylaxis:  - CAV: ASA 81 mg and pravastatin 20 mg   - Osteoporosis: calcium and vitamind D    Serostatus: CMV: D+/R+. EBV: D+/R+    # Financial and insurance issues: will reach out again to pharmacy advocate and Julianne (his new assigned SW) to see what options we have for drug coverage  # Low-level CMV viremia, resolved: Last level 12/2018 undetected.  # Hx shingles: treated with Valtrex  # Hx Chagas with reactivation: Chagas reactivated following his transplant (12/26/16). He was treated with Benznidazole for 60 days, and Transplant ID noted that his heart has not yet been reinfected at this time. Chagas PCRs have remained negative, Transplant ID remains available if needed.  # Latent TB: treated with INH per ID        25 minutes spent face-to-face with patient, >50% in counseling and/or coordination of care as described above      Rosbiel Hu, ESTEFANÍA, NP-C  5/1/2019              DYLON SHINE, BONIFACIO AUSTIN

## 2019-05-02 ENCOUNTER — TELEPHONE (OUTPATIENT)
Dept: TRANSPLANT | Facility: CLINIC | Age: 49
End: 2019-05-02

## 2019-05-02 NOTE — TELEPHONE ENCOUNTER
Called patient with [facility].Tacro level 8.0 (goal 8-10) Already confirmed 12-hour trough and current dose in clinic visit. Continue current dose 2 mg bid. Patient verbalized understanding.

## 2019-06-06 ENCOUNTER — PRE VISIT (OUTPATIENT)
Dept: TRANSPLANT | Facility: CLINIC | Age: 49
End: 2019-06-06

## 2019-06-11 ENCOUNTER — TELEPHONE (OUTPATIENT)
Dept: TRANSPLANT | Facility: CLINIC | Age: 49
End: 2019-06-11

## 2019-06-11 DIAGNOSIS — Z94.1 HEART REPLACED BY TRANSPLANT (H): ICD-10-CM

## 2019-06-11 NOTE — TELEPHONE ENCOUNTER
Called patient with Interpretor services to discuss plan, per Dr Mcclellan. D/t lack of insurance coverage, plan to reschedule upcoming 6/13 30-month f/u appointment and testing. Patient reporting that he feels fine but continues to be stressed about legal status, disability, insurance, not being able to work etc.     Patient will be out of tacrolimus in 2-days and MMF in 1-week. Patient reports that pharm not sending out unless he pays out of pocket costs. High importance message sent to  for possible funding to help pay for meds.     Advised patient to call Primary Transplant Coordinator tomorrow AM for update. Instructions given to patient to call  and asked to be connected to transplant office. All phone#s given to patient.     Re-stressed the importance that patient absolutely cannot stop taking meds and he needs to call the Txp Office right away if he is running out.     Patient verbalized understanding.    Patient reports that he has 3-more months on work visa although he is unable to work because disability paperwork still pending. He received a letter from the [facility] that states: denial for insurance coverage, MD visits will not be covered and if medically necessary he should be seen in the the emergency room.     All notes to be relayed to BERTHA, Primary Transplant Coordinator and Dr. Mcclellan.

## 2019-06-12 ENCOUNTER — TELEPHONE (OUTPATIENT)
Dept: TRANSPLANT | Facility: CLINIC | Age: 49
End: 2019-06-12

## 2019-06-12 NOTE — TELEPHONE ENCOUNTER
SW, julianne, paged to discuss updates on patient's financial/insurance coverage. At this point, she states, there is nothing more we can do. Pt doesn't qualify for any further financial assistance given his immigration status. Julianne was going to discuss with another SW to make sure we are covering all of our bases. Will update patient once I hear back from Julianne.

## 2019-06-17 ENCOUNTER — TELEPHONE (OUTPATIENT)
Dept: TRANSPLANT | Facility: CLINIC | Age: 49
End: 2019-06-17

## 2019-06-17 ENCOUNTER — TELEPHONE (OUTPATIENT)
Dept: CARE COORDINATION | Facility: CLINIC | Age: 49
End: 2019-06-17

## 2019-06-17 ENCOUNTER — CARE COORDINATION (OUTPATIENT)
Dept: TRANSPLANT | Facility: CLINIC | Age: 49
End: 2019-06-17

## 2019-06-17 NOTE — TELEPHONE ENCOUNTER
Pt called using an  to discuss any changes with legal status, disability, insurance, and medications. Patient states nothing has changed and he will be out of medications in roughly 4 days.     After discussing in great detail with BERTHA Whitaker, we do not have any further medication funding options available. Pt will have to pay out of pocket for his medications. When this was relayed to patient, patient stated he has been out of work since March and he will not be able to afford to pay for his medicatons out of pocket.     RN reinforced to present to ER for any medical emergencies. Message sent to Dr. Mcclellan to see if there are any further recommendations.

## 2019-06-17 NOTE — TELEPHONE ENCOUNTER
Transplant  following for ongoing insurance/financial concerns regarding routine follow-up and pt obtaining medications. Financial Counselor's have been working extensively to get pt medical insurance. Pt does not qualify for [facility] due to immigration status. Pt does qualify for Emergency Medical Assistance w/ a Care Plan. Emergency Medical Assistance only covers Emergency Room visits and inpatient care, but not outpatient routine medical care. Financial Counselor has been working at creating a care plan for pt to have coverage for services beyond emergency room care. Writer contacted pt today using Latvian phone . Pt reports he cannot afford the $118 for his medications this month as he is not working. Unfortunately,  department has utilized all financial resources to assist pt with medications; a $500 brian was applied in May to cover expenses. Pt is stressed regarding his immigration status.. Writer provided pt with immigration legal services. For pt's area, the [employer] [facility] [place], has specific [place]ian speaking intake hours ([phone]) and [facility] Services ([phone]) have  s available. Pt was appreciative of the resources.     Writer is communicating with FC if medications can be covered under EMA care plan.

## 2019-06-21 ENCOUNTER — TELEPHONE (OUTPATIENT)
Dept: CARE COORDINATION | Facility: CLINIC | Age: 49
End: 2019-06-21

## 2019-06-21 NOTE — TELEPHONE ENCOUNTER
Heart Transplant  contacted Dr. Mcclellan to look into pt's medication situation as it was anticipated that pt would be out of his transplant medications. Writer spoke to Oren ([phone]) in Specialty Pharmacy and confirmed that 30 days of medications were mailed to pt yesterday.     Pt currently does not have medication coverage. Financial Counselor submitted EMA Care Plan on Tuesday. We are anticipating that pt's heart transplant medications will be able to be covered under this care plan, but will take a few weeks to get confirmation if care plan approved.

## 2019-06-24 ENCOUNTER — TELEPHONE (OUTPATIENT)
Dept: CARE COORDINATION | Facility: CLINIC | Age: 49
End: 2019-06-24

## 2019-06-24 NOTE — TELEPHONE ENCOUNTER
Transplant  continuing to follow. Writer called pt using  telephone service. Pt confirmed that he received his transplant medications last week. Pt reports that his family gave him the $118 to get his medications.     Pt reports that he tried calling the immigration legal services writer provided last week and reports he could not get through to the [employer] [place] ([phone]). Pt read back phone number he was calling and it was wrong. Writer provided correct number and pt verbalized correct number back. Pt reports that he did call [facility] Legal Services and was given another number to call ([phone]) and still needs to call.     Writer provided information to pt on EMA coverage and application for a Care Plan that was submitted to DHS last week.

## 2019-07-19 ENCOUNTER — TELEPHONE (OUTPATIENT)
Dept: CARE COORDINATION | Facility: CLINIC | Age: 49
End: 2019-07-19

## 2019-07-19 NOTE — TELEPHONE ENCOUNTER
Transplant  received call from pt. Writer returned call with . Pt reports that he will run out of his medication on Sunday. Reviewed with pt that financial counselor was working on Emergency Medical Assistance (EMA) application with a care plan. EMA portion but did not approve the care plan that would have covered the transplant medications. EMA only covers emergency room care and not routine follow-up or medications.     Pt acknowledges that he has reached out to 's, but has not gotten very far. Pt's immigration status is the biggest barrier to pt getting medical insurance.     Pt reports that he will ask his family to financially assist him in getting his medications. Pt has already used all of the funds available through the [employer] foundation ($500). We have not utilized [employer] as this issue is going to be ongoing issue with no solution as pt cannot obtain insurance under current immigration status.

## 2019-08-20 DIAGNOSIS — Z94.1 HEART REPLACED BY TRANSPLANT (H): ICD-10-CM

## 2019-12-06 NOTE — H&P
Internal Medicine History and Physical  Date of service: 3/9/2017  Attending physician: Dr. 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    # Fever, productive cough, and sore throat  The most likely diagnosis is hospital acquired pneumonia, however, the differential is broad given his immunosuppression, history of Chagas, and latent TB.   - Continue vancomycin and zosyn (started in ED)- may narrow pending on sputum, blood cx  - Blood cultures, sputum cultures, respiratory viral panel   - ID consult given immunosuppression, latent TB, and h/o Chagas    # Non-ischemic cardiomyopathy secondary to Chagas s/p heart transplant 16  - Continue prednisone 10mg  - Continue tacrolimis 1.5mg bid  - Continue mycophenolate 1500 mg bid  - Can hold bactrim ppx while on vancomycin and zosyn  - Continue valganciclovir 90mg   - Cards aware of admission, appreciate any recs    # Latent Tuberculosis  - Continue isoniazid 300mg qday  - Per ID, does not need TB precuations    # Chronic conditions  - Hyperlipidemia: Hold pravastatin  - Constipation: Continue home senna-docusate  - Continue home calcium/vit D/multivitamin  - GERD: continue home pantoprazole     FEN:  -- IVF: s/p 1 NS bolus  -- Labs: RVP, tacrolimus level, blood culture, sputum culture  -- Diet: Regular diet    PPX:   -- VTE: Mechanical   -- Droplet precautions     Code: Full    Dispo: Pending clinical improvement    Plan d/w Dr. Hudson, who is in agreement. Please, see staff addendum  "for changes/additions to the plan.    Jennifer Medina MD  Internal Medicine/Pediatrics  P: [phone]          History of Present Illness:   Javi Bansal is a 46 year old male with PMHx of NICM 2/2 Chagas disease s/p cardiac transplantation on 12/12/2016, latent TB on isoniazid, and HTN  presenting with fever, cough, sore throat, headache and shortness of breath for 2 days.  The symptoms came on suddenly at 2 am. He has been coughing up yellow-clear sputum. He denies hemoptysis. He also complains of post-tussive emesis, but denies nausea. He also denies diarrhea, constipation, and difficulties with bowel/bladder function. He endorses myalgias that are worse in his legs. Javi reports that he has been well since his hospital admission for his transplant. This is his first illness since then.     He denies any sick contacts. He denies contact with anyone with tuberculosis. He was diagnosed with latent TB before his transplant and has been taking isoniazid since 9/17/2016. The plan is for a full 9 month course.     Javi was diagnosed with Chagas disease in 2011 and received 3 months of treatment with nifurtimox. He believes that he likely got Chagas disease as a child in [facility]. After diagnosis, patient developed worsening heart failure with LVEF 15-20% afer 2015. He had a transplant 12/12/2016.    Transplant details:   Date: 12/12/2016   History of rejection: 2 R 12/27/2016  History of opportunistic infection: none   Transplant medication intolerances: none, although had rejection on azathioprine   CMV R+/D+, EBV R+/D+, HIV negative, HSV 1/2 R+/-, D unknown, donor negative for Heb B and C     Per cardiology note from 01/2017, \"his transplant hospitalization was complicated by 2R rejection as well as ventricular tachycardia requiring treatment with steroids and eventually thromboglobulin. He has had some evidence of Chagas reactivation and is now on benznidazole 150 mg PO BID which is provided " "by the investigational pharmacy. He will be on this for months. He initially had high filling pressures postoperatively, but these normalized with further diuresis and treatment of rejection. \"          Review of Symptoms:     Positive for myalgias, cough, sore throat, headache, decreased appetite, chest wall pain while coughing  Negative for diarrhea, constipation, hemoptysis, neck pain, lower extremity swelling          Past Medical History:     Past Medical History   Diagnosis Date     Chagas cardiomyopathy      Chronic systolic heart failure (H)      Dual ICD (implantable cardioverter-defibrillator) in place 10/20/2015     Essential hypertension      Gastroesophageal reflux disease      H/O gastric ulcer      Hypertension      Premature ventricular contractions      Presbyopia      Pterygium      ?? suspected , but unclear dx       Past Surgical History   Procedure Laterality Date     Cardiac surgery       AICD     Transplant heart recipient N/A 12/12/2016     Procedure: TRANSPLANT HEART RECIPIENT;  Surgeon: Elo Madsen MD;  Location:  OR            Allergies:     No Known Allergies          Outpatient Medications:       No current facility-administered medications on file prior to encounter.   Current Outpatient Prescriptions on File Prior to Encounter:  tacrolimus (PROGRAF - GENERIC EQUIVALENT) 0.5 MG capsule TAke 1.5 mg in the AM; take 1 mg in the PM   tacrolimus (PROGRAF - GENERIC EQUIVALENT) 1 MG capsule Take 1.5 mg in the AM; take 1 mg in the PM   predniSONE (DELTASONE) 5 MG tablet Take 2 tablets (10 mg) by mouth 2 times daily   order for DME Equipment being ordered: Splint - Cubital tunnel splint for ulnar neuropathy   isoniazid (NYDRAZID) 300 MG tablet Take 1 tablet (300 mg) by mouth daily   nystatin (MYCOSTATIN) 976572 UNIT/ML suspension Take 10 mLs (1,000,000 Units) by mouth 4 times daily   pravastatin (PRAVACHOL) 20 MG tablet Take 1 tablet (20 mg) by mouth every evening "   sulfamethoxazole-trimethoprim (BACTRIM DS/SEPTRA DS) 800-160 MG per tablet Take 1 tablet by mouth twice a week On  and    mycophenolate (CELLCEPT - GENERIC EQUIVALENT) 250 MG capsule Take 6 capsules (1,500 mg) by mouth 2 times daily   aspirin EC 81 MG EC tablet Take 1 tablet (81 mg) by mouth daily   valGANciclovir (VALCYTE) 450 MG tablet Take 2 tablets (900 mg) by mouth daily   senna-docusate (SENOKOT-S;PERICOLACE) 8.6-50 MG per tablet Take 2 tablets by mouth nightly as needed for constipation (If no stools during the day)   calcium carb 1250 mg, 500 mg [place],/vitamin D 200 units (OSCAL WITH D) 500-200 MG-UNIT per tablet Take 1 tablet by mouth 2 times daily (with meals)   multivitamin, therapeutic with minerals (THERA-VIT-M) TABS tablet Take 1 tablet by mouth daily   pantoprazole (PROTONIX) 40 MG EC tablet Take 1 tablet (40 mg) by mouth every morning   ketoconazole (NIZORAL) 2 % shampoo Apply topically three times a week Alternate with Head and Shoulders.             Family History:     Brother  at age 20 from Chagas          Social History:   Tobacco: Never  EtOH: Denies  Illicit substances: Kirit Caldwell moved from [facility] to [place] at age 18. Patient lives with his wife. They lost 2 children back in [facility]. He has not worked since he developed worsening heart failure. He reports that he is not cleared for work yet after his heart transplant. He denies any recent sexual activity and hasn't been sexually active since before his heart transplant.          Physical Exam:   /69  Temp 102.3  F (39.1  C) (Oral)  Resp (!) 41  SpO2 94%  Temp (24hrs), Av.4  F (38.6  C), Min:100.5  F (38.1  C), Max:102.3  F (39.1  C)    General: Uncomfortable-appearing, febrile, alert and oriented.  HEENT: Conjunctiva erythematous, EOMI, dry mucous membranes, oropharynx mildly erythema w/ discharge; no cervical lymphadenopathy or tenderness.  CV: Distant heart sounds. RRR, normal S1/S2, no S3/S4  appreciated, no m/r/g. No JVD.  Lungs: Bibasilar crackles, worse on right side.   Abd: Soft, non-tender, non-distended. Normoactive bowel sounds, no HSM or masses palpable  Ext: No lower extremity edema.   Skin: Warm and erythematous skin. Diaphoretic.  Neuro: Alert and oriented x3, CN II-XII intact and symmetric.           Data:   Pertinent labs (all laboratory studies reviewed by me):  WBC 11.5  ALT 29  AST 13  LA 1.7  Trop neg  INR 1.12  NT Pro BNP 1316  Tacrolimus level pending    Imaging (all imaging studies reviewed by me):    CT Chest 3/9/17  IMPRESSION:   1. Extensive bilateral nodular groundglass opacities suspicious for  atypical or typical infection.  2. No evidence for pulmonary embolism.  3. Postsurgical changes of heart transplant. No evidence for sternal  dehiscence or periincisional infection.    CXR 3/9/17  IMPRESSION:   Retrocardiac pulmonary opacities could represent atelectasis or  infection. Upright PA and lateral radiographs of the chest could be  obtained if clinically indicated.    Limited Bedside Cardiac Ultrasound in ED 3/9/17  Indication: Shortness of Breath.  Parasternal long axis, parasternal short axis, apical 4 chamber and subcostal views were acquired.   Image quality was satisfactory.  Findings:    Global left ventricular function appears intact.  Chambers do not appear dilated.  There is no evidence of free fluid within the pericardium.     Procedures: None   Airway patent

## 2019-12-09 ENCOUNTER — TELEPHONE (OUTPATIENT)
Dept: CARE COORDINATION | Facility: CLINIC | Age: 49
End: 2019-12-09

## 2019-12-09 DIAGNOSIS — Z94.1 TRANSPLANTED HEART (H): Primary | ICD-10-CM

## 2019-12-10 ENCOUNTER — HOSPITAL ENCOUNTER (OUTPATIENT)
Facility: CLINIC | Age: 49
End: 2019-12-10
Attending: INTERNAL MEDICINE | Admitting: INTERNAL MEDICINE

## 2019-12-10 ENCOUNTER — TELEPHONE (OUTPATIENT)
Dept: CARDIOLOGY | Facility: CLINIC | Age: 49
End: 2019-12-10

## 2019-12-10 DIAGNOSIS — Z94.1 TRANSPLANTED HEART (H): ICD-10-CM

## 2019-12-10 NOTE — TELEPHONE ENCOUNTER
Pt called using a . Pt states he has a cold in which he saw his family doctor for yesterday and they prescribed amoxicillin for 10 days. Pt doesn't know if he has a fever, he doesn't have a thermometer to check his temp. No diarrhea, no vomiting. He is coughing up yellow sputum. Pt encouraged to continuing taking antibiotic, to get plenty of rest, drink plenty of fluids, and to call if he doesn't have improvement within a couple days. Pt due for his annual appointments. Message sent to schedulers on 12/9 to get patient scheduled. RN work on scheduling today.

## 2019-12-16 ENCOUNTER — DOCUMENTATION ONLY (OUTPATIENT)
Dept: CARE COORDINATION | Facility: CLINIC | Age: 49
End: 2019-12-16

## 2019-12-16 ENCOUNTER — TELEPHONE (OUTPATIENT)
Dept: CARDIOLOGY | Facility: CLINIC | Age: 49
End: 2019-12-16

## 2019-12-16 NOTE — TELEPHONE ENCOUNTER
Call placed. Inquiring about infection/cold. Also, reviewed upcoming clinic appointments. No answer. VM left.

## 2019-12-17 DIAGNOSIS — Z94.1 HEART REPLACED BY TRANSPLANT (H): ICD-10-CM

## 2019-12-17 RX ORDER — MYCOPHENOLATE MOFETIL 250 MG/1
1250 CAPSULE ORAL 2 TIMES DAILY
Qty: 360 CAPSULE | Refills: 11 | Status: SHIPPED | OUTPATIENT
Start: 2019-12-17 | End: 2020-12-31

## 2019-12-20 ENCOUNTER — TELEPHONE (OUTPATIENT)
Dept: TRANSPLANT | Facility: CLINIC | Age: 49
End: 2019-12-20

## 2019-12-20 DIAGNOSIS — Z12.5 PROSTATE CANCER SCREENING: ICD-10-CM

## 2019-12-20 DIAGNOSIS — Z13.29 THYROID DISORDER SCREENING: ICD-10-CM

## 2019-12-20 DIAGNOSIS — Z94.1 TRANSPLANTED HEART (H): Primary | ICD-10-CM

## 2019-12-20 DIAGNOSIS — Z13.220 LIPID SCREENING: ICD-10-CM

## 2019-12-20 DIAGNOSIS — E11.9 DIABETES MELLITUS (H): ICD-10-CM

## 2019-12-20 RX ORDER — SODIUM CHLORIDE 9 MG/ML
INJECTION, SOLUTION INTRAVENOUS CONTINUOUS
Status: CANCELLED | OUTPATIENT
Start: 2019-12-20

## 2019-12-20 RX ORDER — LIDOCAINE 40 MG/G
CREAM TOPICAL
Status: CANCELLED | OUTPATIENT
Start: 2019-12-20

## 2019-12-24 ENCOUNTER — TELEPHONE (OUTPATIENT)
Dept: PHARMACY | Facility: CLINIC | Age: 49
End: 2019-12-24

## 2019-12-24 NOTE — TELEPHONE ENCOUNTER
Clinical Pharmacy Consult:                                                      Transplant Specific: 3 Year Post Transplant Call  Date of Transplant: 12/12/2016  Type of Transplant: heart  First Transplant: yes  History of rejection: no    Immunosuppression Regimen   TAC 2mg qAM & 2qPM and MMF 1250mg qAM & 1250qPM  Immunosuppressant Levels:  Therapeutic  Patient specific goal: 8-10  Pt adherent to lab draws: yes  Scr:   Lab Results   Component Value Date    CR 1.12 05/01/2019     Side effects: no side effects    Prophylactic Medications  Thrombosis Prevention: Aspirin 81 mg PO daily  Scheduled Discontinue Date: lifelong    Blood Pressure Management  Frequency of home Blood Pressure checks: Never  Most recent home BP: 118/83 at last OV 05/01/2019  Patient Blood pressure goal: <140/90  Patient blood pressure at goal:  yes  Hospitalizations/ER visits since last assessment: 0      Med rec/DUR performed: yes  Med Rec Discrepancies: no    Javi is doing well.  He takes his meds everyday at 8a/8pm and does not miss any doses.  He has not ordered his pravastatin since October.  He is having trouble affording his medications and so has cut many of them out.  I let him speak to our patient  to see if there is any assistance.  Patient does not have insurance.      Britany Aleman [facility]

## 2020-01-01 NOTE — TELEPHONE ENCOUNTER
With  assistance, called patient to review recent results and confirm upcoming appointments on Friday, 3/10/17.  Patient states he woke up this morning with an itchy throat, cough and headache. He denies SOB, fever, N/V.  Patient states headache improved after taking medicine (?) and throat is somewhat less sore after drinking tea with honey.  Encouraged patient to continue hydration with warm fluids and to contact coordinator with any new symptoms.   Patient confirms recent FK level (18.9) was a 12 hour trough. Instructed patient to decrease PM dose (only) by 05 mg; new dose: 1.5 mg in the AM, 1 mg in the PM.  Confirmed 12 hour FK level will be drawn on 3/10 at 0715; instructed patient to hold AM FK dose on Friday until after his blood is drawn.  Patient verbalizes understanding plan of care and agrees to follow.     Celina Friedman(Resident)

## 2020-02-14 DIAGNOSIS — Z94.1 HEART REPLACED BY TRANSPLANT (H): ICD-10-CM

## 2020-02-17 RX ORDER — PRAVASTATIN SODIUM 20 MG
TABLET ORAL
Qty: 30 TABLET | Refills: 11 | Status: SHIPPED | OUTPATIENT
Start: 2020-02-17 | End: 2021-03-01

## 2020-04-02 ENCOUNTER — TRANSFERRED RECORDS (OUTPATIENT)
Dept: HEALTH INFORMATION MANAGEMENT | Facility: CLINIC | Age: 50
End: 2020-04-02

## 2020-04-06 ENCOUNTER — APPOINTMENT (OUTPATIENT)
Dept: INTERPRETER SERVICES | Facility: CLINIC | Age: 50
End: 2020-04-06
Payer: MEDICAID

## 2020-04-06 ENCOUNTER — TELEPHONE (OUTPATIENT)
Dept: TRANSPLANT | Facility: CLINIC | Age: 50
End: 2020-04-06

## 2020-04-06 NOTE — TELEPHONE ENCOUNTER
RN was notified that Pt talked to SW re: recent visit to PMD for c/o fever and cough. They tested him for covid-19 and he is waiting for his results    Pt called using an  at [phone]. Pt states he continues to have labile fevers and a cough. He is still waiting for covid-19 results. They said it would take 5-7 days and today is day 5. Pt states he is quarantining and will notify me once his results are back.     How to reach his primary coordinator reviewed. Pt also given the Bengali interpreters phone number to call when getting in touch with me.     Pt states he is taking tylenol for his fever and drinking a lot of fluids. Last labs from 5/2019, as patient has had issues with insurance.     Pt encouraged to call with questions and concerns.

## 2020-04-08 ENCOUNTER — APPOINTMENT (OUTPATIENT)
Dept: INTERPRETER SERVICES | Facility: CLINIC | Age: 50
End: 2020-04-08
Payer: MEDICAID

## 2020-04-08 ENCOUNTER — TELEPHONE (OUTPATIENT)
Dept: TRANSPLANT | Facility: CLINIC | Age: 50
End: 2020-04-08

## 2020-04-08 NOTE — TELEPHONE ENCOUNTER
Pt called using a  to check in and see how he's been doing since being testing for covid-19 on 4/2. Pt states he still feels like he is feverish. He doesn't have a home thermometer so he doesn't know how high his temp has been running. He continue to remain quarantined. No covid results back yet. He called clinic yesterday for an update on 4/7, but no results in. RN coordinator encouraged patient to call once he hears results. Pt verbalized understanding.

## 2020-04-10 ENCOUNTER — TELEPHONE (OUTPATIENT)
Dept: TRANSPLANT | Facility: CLINIC | Age: 50
End: 2020-04-10

## 2020-04-10 ENCOUNTER — APPOINTMENT (OUTPATIENT)
Dept: GENERAL RADIOLOGY | Facility: CLINIC | Age: 50
End: 2020-04-10
Attending: FAMILY MEDICINE
Payer: MEDICAID

## 2020-04-10 ENCOUNTER — HOSPITAL ENCOUNTER (EMERGENCY)
Facility: CLINIC | Age: 50
Discharge: STILL ADMITTED OR ADMINISTRATIVE | End: 2020-04-10
Attending: FAMILY MEDICINE | Admitting: FAMILY MEDICINE
Payer: MEDICAID

## 2020-04-10 VITALS
BODY MASS INDEX: 24.78 KG/M2 | RESPIRATION RATE: 24 BRPM | HEART RATE: 86 BPM | SYSTOLIC BLOOD PRESSURE: 106 MMHG | WEIGHT: 156 LBS | TEMPERATURE: 98.9 F | OXYGEN SATURATION: 99 % | DIASTOLIC BLOOD PRESSURE: 73 MMHG

## 2020-04-10 DIAGNOSIS — R06.02 SOB (SHORTNESS OF BREATH): ICD-10-CM

## 2020-04-10 DIAGNOSIS — U07.1 COVID-19 VIRUS INFECTION: ICD-10-CM

## 2020-04-10 DIAGNOSIS — Z94.1 STATUS POST HEART TRANSPLANTATION (H): ICD-10-CM

## 2020-04-10 DIAGNOSIS — R05.9 COUGH: ICD-10-CM

## 2020-04-10 LAB
ALBUMIN SERPL-MCNC: 3.3 G/DL (ref 3.4–5)
ALP SERPL-CCNC: 84 U/L (ref 40–150)
ALT SERPL W P-5'-P-CCNC: 31 U/L (ref 0–70)
ANION GAP SERPL CALCULATED.3IONS-SCNC: 6 MMOL/L (ref 3–14)
APTT PPP: 45 SEC (ref 22–37)
AST SERPL W P-5'-P-CCNC: 38 U/L (ref 0–45)
BASOPHILS # BLD AUTO: 0 10E9/L (ref 0–0.2)
BASOPHILS NFR BLD AUTO: 0.2 %
BILIRUB SERPL-MCNC: 0.4 MG/DL (ref 0.2–1.3)
BUN SERPL-MCNC: 14 MG/DL (ref 7–30)
CALCIUM SERPL-MCNC: 8.6 MG/DL (ref 8.5–10.1)
CHLORIDE SERPL-SCNC: 107 MMOL/L (ref 94–109)
CO2 SERPL-SCNC: 24 MMOL/L (ref 20–32)
CREAT SERPL-MCNC: 1.25 MG/DL (ref 0.66–1.25)
CRP SERPL-MCNC: 120 MG/L (ref 0–8)
D DIMER PPP FEU-MCNC: 0.6 UG/ML FEU (ref 0–0.5)
DIFFERENTIAL METHOD BLD: ABNORMAL
EOSINOPHIL # BLD AUTO: 0 10E9/L (ref 0–0.7)
EOSINOPHIL NFR BLD AUTO: 0 %
ERYTHROCYTE [DISTWIDTH] IN BLOOD BY AUTOMATED COUNT: 12.5 % (ref 10–15)
ERYTHROCYTE [SEDIMENTATION RATE] IN BLOOD BY WESTERGREN METHOD: 29 MM/H (ref 0–20)
GFR SERPL CREATININE-BSD FRML MDRD: 67 ML/MIN/{1.73_M2}
GLUCOSE SERPL-MCNC: 99 MG/DL (ref 70–99)
HCT VFR BLD AUTO: 37.6 % (ref 40–53)
HGB BLD-MCNC: 11.9 G/DL (ref 13.3–17.7)
IMM GRANULOCYTES # BLD: 0.1 10E9/L (ref 0–0.4)
IMM GRANULOCYTES NFR BLD: 1 %
INR PPP: 1.08 (ref 0.86–1.14)
INTERPRETATION ECG - MUSE: NORMAL
LYMPHOCYTES # BLD AUTO: 1.1 10E9/L (ref 0.8–5.3)
LYMPHOCYTES NFR BLD AUTO: 17.6 %
MCH RBC QN AUTO: 29.7 PG (ref 26.5–33)
MCHC RBC AUTO-ENTMCNC: 31.6 G/DL (ref 31.5–36.5)
MCV RBC AUTO: 94 FL (ref 78–100)
MONOCYTES # BLD AUTO: 0.6 10E9/L (ref 0–1.3)
MONOCYTES NFR BLD AUTO: 10.6 %
NEUTROPHILS # BLD AUTO: 4.2 10E9/L (ref 1.6–8.3)
NEUTROPHILS NFR BLD AUTO: 70.6 %
NRBC # BLD AUTO: 0 10*3/UL
NRBC BLD AUTO-RTO: 0 /100
NT-PROBNP SERPL-MCNC: 313 PG/ML (ref 0–900)
PLATELET # BLD AUTO: 164 10E9/L (ref 150–450)
POTASSIUM SERPL-SCNC: 4 MMOL/L (ref 3.4–5.3)
PROCALCITONIN SERPL-MCNC: 0.06 NG/ML
PROT SERPL-MCNC: 7 G/DL (ref 6.8–8.8)
RBC # BLD AUTO: 4.01 10E12/L (ref 4.4–5.9)
SODIUM SERPL-SCNC: 137 MMOL/L (ref 133–144)
TROPONIN I SERPL-MCNC: <0.015 UG/L (ref 0–0.04)
WBC # BLD AUTO: 6 10E9/L (ref 4–11)

## 2020-04-10 PROCEDURE — 25800030 ZZH RX IP 258 OP 636: Performed by: FAMILY MEDICINE

## 2020-04-10 PROCEDURE — 96367 TX/PROPH/DG ADDL SEQ IV INF: CPT | Performed by: FAMILY MEDICINE

## 2020-04-10 PROCEDURE — 80053 COMPREHEN METABOLIC PANEL: CPT | Performed by: FAMILY MEDICINE

## 2020-04-10 PROCEDURE — 93005 ELECTROCARDIOGRAM TRACING: CPT | Performed by: FAMILY MEDICINE

## 2020-04-10 PROCEDURE — 85652 RBC SED RATE AUTOMATED: CPT | Performed by: FAMILY MEDICINE

## 2020-04-10 PROCEDURE — 85025 COMPLETE CBC W/AUTO DIFF WBC: CPT | Performed by: FAMILY MEDICINE

## 2020-04-10 PROCEDURE — A9270 NON-COVERED ITEM OR SERVICE: HCPCS | Mod: GY | Performed by: FAMILY MEDICINE

## 2020-04-10 PROCEDURE — 85730 THROMBOPLASTIN TIME PARTIAL: CPT | Performed by: FAMILY MEDICINE

## 2020-04-10 PROCEDURE — 25000132 ZZH RX MED GY IP 250 OP 250 PS 637: Mod: GY | Performed by: FAMILY MEDICINE

## 2020-04-10 PROCEDURE — 93010 ELECTROCARDIOGRAM REPORT: CPT | Mod: Z6 | Performed by: FAMILY MEDICINE

## 2020-04-10 PROCEDURE — 85610 PROTHROMBIN TIME: CPT | Performed by: FAMILY MEDICINE

## 2020-04-10 PROCEDURE — 96365 THER/PROPH/DIAG IV INF INIT: CPT | Performed by: FAMILY MEDICINE

## 2020-04-10 PROCEDURE — 25000128 H RX IP 250 OP 636: Performed by: FAMILY MEDICINE

## 2020-04-10 PROCEDURE — 99285 EMERGENCY DEPT VISIT HI MDM: CPT | Mod: 25 | Performed by: FAMILY MEDICINE

## 2020-04-10 PROCEDURE — 94640 AIRWAY INHALATION TREATMENT: CPT | Performed by: FAMILY MEDICINE

## 2020-04-10 PROCEDURE — 71045 X-RAY EXAM CHEST 1 VIEW: CPT

## 2020-04-10 PROCEDURE — 85379 FIBRIN DEGRADATION QUANT: CPT | Performed by: FAMILY MEDICINE

## 2020-04-10 PROCEDURE — 83880 ASSAY OF NATRIURETIC PEPTIDE: CPT | Performed by: FAMILY MEDICINE

## 2020-04-10 PROCEDURE — 86140 C-REACTIVE PROTEIN: CPT | Performed by: FAMILY MEDICINE

## 2020-04-10 PROCEDURE — 84484 ASSAY OF TROPONIN QUANT: CPT | Performed by: FAMILY MEDICINE

## 2020-04-10 PROCEDURE — 84145 PROCALCITONIN (PCT): CPT | Performed by: FAMILY MEDICINE

## 2020-04-10 RX ORDER — CEFTRIAXONE 1 G/1
1 INJECTION, POWDER, FOR SOLUTION INTRAMUSCULAR; INTRAVENOUS ONCE
Status: COMPLETED | OUTPATIENT
Start: 2020-04-10 | End: 2020-04-10

## 2020-04-10 RX ORDER — ALBUTEROL SULFATE 90 UG/1
2 AEROSOL, METERED RESPIRATORY (INHALATION) ONCE
Status: COMPLETED | OUTPATIENT
Start: 2020-04-10 | End: 2020-04-10

## 2020-04-10 RX ORDER — LIDOCAINE 40 MG/G
CREAM TOPICAL
Status: DISCONTINUED | OUTPATIENT
Start: 2020-04-10 | End: 2020-04-10 | Stop reason: HOSPADM

## 2020-04-10 RX ADMIN — CEFTRIAXONE 1 G: 1 INJECTION, POWDER, FOR SOLUTION INTRAMUSCULAR; INTRAVENOUS at 11:55

## 2020-04-10 RX ADMIN — AZITHROMYCIN MONOHYDRATE 500 MG: 500 INJECTION, POWDER, LYOPHILIZED, FOR SOLUTION INTRAVENOUS at 12:20

## 2020-04-10 RX ADMIN — ALBUTEROL SULFATE 2 PUFF: 90 AEROSOL, METERED RESPIRATORY (INHALATION) at 10:05

## 2020-04-10 ASSESSMENT — ENCOUNTER SYMPTOMS
MYALGIAS: 0
CONFUSION: 0
HEADACHES: 1
TROUBLE SWALLOWING: 0
DECREASED CONCENTRATION: 0
NAUSEA: 0
CHILLS: 1
FLANK PAIN: 0
SHORTNESS OF BREATH: 1
APPETITE CHANGE: 0
DYSURIA: 0
JOINT SWELLING: 0
VOICE CHANGE: 0
DIARRHEA: 1
VOMITING: 0
SORE THROAT: 0
BLOOD IN STOOL: 0
PALPITATIONS: 0
COUGH: 1
ACTIVITY CHANGE: 1
FEVER: 1
WHEEZING: 0
ABDOMINAL PAIN: 0
DYSPHORIC MOOD: 0
NECK PAIN: 0
FATIGUE: 1
HALLUCINATIONS: 0

## 2020-04-10 NOTE — ED TRIAGE NOTES
Pt BIBA from home with c/o cough and SOB. Pt was informed of positive covid19 results today and MD instructed pt to come to ED due to Heart transplant in 2016. Pt also reports loose stools. A+O, VSS, afebrile in triage. Pt reports taking tylenol this morning.

## 2020-04-10 NOTE — ED NOTES
[facility] [place]   ED Nurse to Floor Handoff     Javi Bansal is a 49 year old male who speaks Portuguese and lives with family members,  in a home  They arrived in the ED by ambulance from home    ED Chief Complaint: Cough    ED Dx;   Final diagnoses:   COVID-19 virus infection   Cough   SOB (shortness of breath)   Status post heart transplantation (H)         Needed?: Yes    Allergies: No Known Allergies.  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 suspected , but unclear dx      Baseline Mental status: WDL  Current Mental Status changes: at basesline    Infection present or suspected this encounter: yes respiratory  Sepsis suspected: No  Isolation type: Contact, Droplet     Activity level - Baseline/Home:  Independent  Activity Level - Current:   Stand with Assist    Bariatric equipment needed?: No    In the ED these meds were given:   Medications   lidocaine 1 % 0.1-1 mL (has no administration in time range)   lidocaine (LMX4) cream (has no administration in time range)   sodium chloride (PF) 0.9% PF flush 3 mL (has no administration in time range)   sodium chloride (PF) 0.9% PF flush 3 mL (3 mLs Intracatheter Given 4/10/20 1004)   cefTRIAXone (ROCEPHIN) 1 g vial to attach to  mL bag for ADULTS or NS 50 mL bag for PEDS (1 g Intravenous New Bag 4/10/20 1155)   azithromycin (ZITHROMAX) 500 mg in sodium chloride 0.9 % 250 mL intermittent infusion (has no administration in time range)   albuterol (PROAIR HFA/PROVENTIL HFA/VENTOLIN HFA) 108  (90 Base) MCG/ACT inhaler 2 puff (2 puffs Inhalation Given 4/10/20 1005)       Drips running?  No    Home pump  No    Current LDAs  Peripheral IV 04/10/20 Left Upper forearm (Active)   Site Assessment WDL 04/10/20 1001   Line Status Saline locked 04/10/20 1001   Phlebitis Scale 0-->no symptoms 04/10/20 1001   Extravasation? No 04/10/20 1001   Dressing Intervention New dressing  04/10/20 1001   Number of days: 0       Arterial Sheath  (Active)   Number of days: 848       Venous Sheath (Active)   Number of days: 848       Right Groin Interventional Procedure Access (Active)   Number of days: 848       Right Radial Interventional Procedure Access (Active)   Number of days: 491       Wound 12/13/16 Left Heel Suspected pressure ulcer (Active)   Number of days: 1214       Rash Bilateral lower leg (Active)   Number of days:        Incision/Surgical Site 12/12/16 Anterior Chest (Active)   Number of days: 1215       Incision/Surgical Site 12/20/16 Left;Midline;Right Abdomen (Active)   Number of days: 1207       Incision/Surgical Site 01/04/17 Left;Upper Chest (Active)   Number of days: 1192       Labs results:   Labs Ordered and Resulted from Time of ED Arrival Up to the Time of Departure from the ED   CBC WITH PLATELETS DIFFERENTIAL - Abnormal; Notable for the following components:       Result Value    RBC Count 4.01 (*)     Hemoglobin 11.9 (*)     Hematocrit 37.6 (*)     All other components within normal limits   PARTIAL THROMBOPLASTIN TIME - Abnormal; Notable for the following components:    PTT 45 (*)     All other components within normal limits   COMPREHENSIVE METABOLIC PANEL - Abnormal; Notable for the following components:    Albumin 3.3 (*)     All other components within normal limits   CRP INFLAMMATION - Abnormal; Notable for the following components:    CRP Inflammation 120.0 (*)     All other components within normal limits   INR   NT PROBNP INPATIENT   TROPONIN I   ERYTHROCYTE SEDIMENTATION RATE AUTO    PROCALCITONIN   PULSE OXIMETRY NURSING   CARDIAC CONTINUOUS MONITORING   PERIPHERAL IV CATHETER       Imaging Studies:   Recent Results (from the past 24 hour(s))   XR Chest Port 1 View    Narrative    XR CHEST PORT 1 VW  4/10/2020 10:19 AM      HISTORY: covid + sob and cough and heart tx    COMPARISON: Chest radiograph from 12/7/2018    FINDINGS: AP view of the chest. Postsurgical changes of heart  transplant with intact median sternotomy wires. Heart appears  borderline enlarged. Interim decreased inspiratory volume. Increased  bibasilar patchy opacities. No significant pleural effusion. No  pneumothorax. Visualized upper abdomen is unremarkable.      Impression    IMPRESSION:   1. Despite the decreased inspiratory volume, there are increased  bibasilar patchy opacities, concerning for pneumonia. Recommend  follow-up to clearing. Edema may also be in the differential..  2. Borderline cardiomegaly with history of cardiac transplantation.    I have personally reviewed the examination and initial interpretation  and I agree with the findings.    JONATHAN MOREJON MD       Recent vital signs:   /79   Pulse 75   Temp 98.9  F (37.2  C) (Oral)   Resp 26   Wt 70.8 kg (156 lb)   SpO2 100%   BMI 24.78 kg/m      Charlene Coma Scale Score: 15 (04/10/20 0944)       Cardiac Rhythm: Normal Sinus  Pt needs tele? See epic orders  Skin/wound Issues: None    Code Status: Full Code    Pain control: good    Nausea control: good    Abnormal labs/tests/findings requiring intervention: see epic    Family present during ED course? No   Family Comments/Social Situation comments: n/a    Tasks needing completion: None    Janell Ortiz RN  asc -- [facility] ED  [phone] [place] [facility]  [phone] [facility]

## 2020-04-10 NOTE — ED NOTES
To [place] with [facility]  EMTALA form signed, copy with pt, original to medical records.

## 2020-04-10 NOTE — PHARMACY-ADMISSION MEDICATION HISTORY
Admission medication history interview status for the 4/10/2020 admission is complete. See Epic admission navigator for allergy information, pharmacy, prior to admission medications and immunization status.     Medication history interview sources:  [facility] fill history, chart review, patient    Changes made to PTA medication list (reason)  Added: None  Deleted: Fluticasone nasal spray - patient no longer using      Ketoconazole shampoo - therapy complete  Changed: None    Additional medication history information (including reliability of information, actions taken by pharmacist):  -Per chart review, patient has had trouble affording his medications so has stopped taking several in the last few months  -He is currently taking mycophenolate 1250mg BID and tacrolimus 2mg BID    Prior to Admission medications    Medication Sig Last Dose Taking? Auth Provider   aspirin (ASA) 81 MG EC tablet Take 1 tablet (81 mg) by mouth daily 4/9/2020 at Unknown time Yes Geena Mcclellan MD   calcium carbonate-vitamin D (OS-DAISY) 500-400 MG-UNIT tablet Take 1 tablet by mouth daily Past Month at Unknown time Yes Geena Mcclellan MD   mycophenolate (GENERIC EQUIVALENT) 250 MG capsule Take 5 capsules (1,250 mg) by mouth 2 times daily 4/9/2020 at Unknown time Yes Geena Mcclellan MD   pravastatin (PRAVACHOL) 20 MG tablet TAKE ONE TABLET BY MOUTH EVERY EVENING 4/9/2020 at Unknown time Yes Geena Mcclellan MD   tacrolimus (GENERIC EQUIVALENT) 1 MG capsule Take 2 capsules (2 mg) by mouth 2 times daily 4/9/2020 at Unknown time Yes Geena Mcclellan MD     Medication history completed by:   Melissa Cha, Pharm D  April 10, 2020

## 2020-04-10 NOTE — ED PROVIDER NOTES
ED Provider Note  [facility]      History     Chief Complaint   Patient presents with     Cough     HPI  Javi Bansal is a 49 year old male who presents emergency room by ambulance for cough shortness of breath.  Patient history of heart transplant in December 2016 (hx of Chagas) and is followed by Dr. Mcclellan.  Patient describes 6 days of cough fever headaches and 4 days of diarrhea.  Patient notes persistent headaches cough is increased with some shortness of breath the last couple days.  Patient noted to be tested a few days ago by his primary physician and is noted today to be COVID-19+ per testing results.  Patient was notified with the shortness of breath transferred to the ER by ambulance.  Patient as noted denies acute abdominal pain nausea vomiting has had fevers every day with this cough noted worsening the last few days along with this headache.  Some shortness of breath is noted chest pain only with coughing.Fevers noted by patient daily also.  No reports of blood in stool.  Some mild generalized achiness otherwise still eating okay without vomiting.  Patient is able take his transplant medications.    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 suspected , but unclear dx     Past Surgical History:   Procedure Laterality Date     CARDIAC SURGERY      AICD     TRANSPLANT HEART RECIPIENT N/A 12/12/2016    Procedure: TRANSPLANT HEART RECIPIENT;  Surgeon: Elo Madsen MD;  Location:  OR     aspirin (ASA) 81 MG EC tablet  calcium  carbonate-vitamin D (OS-DAISY) 500-400 MG-UNIT tablet  mycophenolate (GENERIC EQUIVALENT) 250 MG capsule  pravastatin (PRAVACHOL) 20 MG tablet  tacrolimus (GENERIC EQUIVALENT) 1 MG capsule      No Known Allergies  Past medical history, past surgical history, medications, and allergies were reviewed with the patient. Additional pertinent items: None    Family History  Family History   Problem Relation Age of Onset     Brain Cancer Mother      Melanoma No family hx of      Skin Cancer No family hx of      Glaucoma No family hx of      Macular Degeneration No family hx of      Family history was reviewed with the patient. Additional pertinent items: None    Social History  Social History     Tobacco Use     Smoking status: Never Smoker     Smokeless tobacco: Never Used   Substance Use Topics     Alcohol use: Yes     Alcohol/week: 0.0 standard drinks     Comment: one beer every 20 days     Drug use: No      Social history was reviewed with the patient. Additional pertinent items: None    Review of Systems   Constitutional: Positive for activity change, chills, fatigue and fever. Negative for appetite change.   HENT: Negative for postnasal drip, sore throat, trouble swallowing and voice change.    Eyes: Negative for visual disturbance.   Respiratory: Positive for cough and shortness of breath. Negative for wheezing.    Cardiovascular: Positive for chest pain (w coughing only). Negative for palpitations and leg swelling.   Gastrointestinal: Positive for diarrhea. Negative for abdominal pain, blood in stool, nausea and vomiting.   Genitourinary: Negative for dysuria and flank pain.   Musculoskeletal: Negative for gait problem, joint swelling, myalgias and neck pain.   Neurological: Positive for headaches. Negative for syncope.   Psychiatric/Behavioral: Negative for confusion, decreased concentration, dysphoric mood and hallucinations.   All other systems reviewed and are negative.    A complete review of systems was  performed with pertinent positives and negatives noted in the HPI, and all other systems negative.    Physical Exam   BP: 115/78  Pulse: 78  Heart Rate: 82  Temp: 98.9  F (37.2  C)  Resp: 16  Weight: 70.8 kg (156 lb)  SpO2: 100 %  Physical Exam  Vitals signs and nursing note reviewed.   Constitutional:       General: He is in acute distress.      Appearance: He is well-developed. He is ill-appearing. He is not toxic-appearing or diaphoretic.      Comments: Patient does not appear to be toxic.  Is wearing a mask brought in by ambulance was able to walk sitting in a chair currently with .  Intermittent cough noted.  Vital signs noted prior to be stable   HENT:      Head: Normocephalic and atraumatic.   Eyes:      General: No scleral icterus.     Extraocular Movements: Extraocular movements intact.      Conjunctiva/sclera: Conjunctivae normal.      Pupils: Pupils are equal, round, and reactive to light.   Neck:      Musculoskeletal: Normal range of motion.   Cardiovascular:      Rate and Rhythm: Normal rate and regular rhythm.   Pulmonary:      Effort: Respiratory distress present.      Comments: Intermittent cough noted  Abdominal:      Tenderness: There is no guarding.   Musculoskeletal:         General: No deformity.   Skin:     General: Skin is warm and dry.      Capillary Refill: Capillary refill takes less than 2 seconds.      Coloration: Skin is not jaundiced or pale.      Findings: No rash.   Neurological:      General: No focal deficit present.      Mental Status: He is alert and oriented to person, place, and time. Mental status is at baseline.   Psychiatric:      Comments: Flat but appropriate         ED Course      Procedures         In the emergency room patient was placed and negative airflow room following aerosolized airborne contact precautions.  All staff encounters with full PPE.  Patient does have a mask which he has been placed by EMS prior to arrival.  Intermittent cough noted.  Patient  otherwise able to sit in chair as noted.   services were used also throughout the ER course.  IV established labs drawn.  Portable chest x-ray done revealing bibasilar infiltrative process concerning for infection.  Other labs stable troponin BNP.  D-dimer 0.6.  Patient started on ceftriaxone and Zithromax treating for potential community-acquired pneumonia.  Discussed case with Dr. Mcclellan along with Dr. Dias with cardiology transplant service.  Agree at this point patient would be a candidate for transfer to [place] directly.  Talk to Dr. Chavez also who coordinates this.  We needed to obtain documentation in the medical records.  The clinic where this procedure was done was able to fax copies of the test results and these were passed on to Dr. Chavez         where he was able make a copy placed in the patient's chart.  At this point findings do show positive for COVID- 19.  Patient received Ventolin metered-dose inhaler 2 puffs also is stable here in the ER.  As noted discussed with cardiology and the admitting [place] excepting physician Dr. Chavez.  Patient this point will be transferred to [place] for ongoing management of acute pelvic infection with bilateral bibasilar infiltrates concerning for community-acquired pneumonia versus COVID related.         EKG Interpretation:      Interpreted by Marino Frazier MD  Time reviewed: 955  Symptoms at time of EKG: cough and sob   Rhythm: normal sinus   Rate: normal  Axis: normal  Ectopy: none  Conduction: normal  ST Segments/ T Waves: No ST-T wave changes  Q Waves: none  Comparison to prior: No old EKG available    Clinical Impression: normal EKG                      Results for orders placed or performed during the hospital encounter of 04/10/20   XR Chest Port 1 View     Status: None    Narrative    XR CHEST PORT 1 VW  4/10/2020 10:19 AM      HISTORY: covid + sob and cough and heart tx    COMPARISON: Chest radiograph from 12/7/2018    FINDINGS: AP  view of the chest. Postsurgical changes of heart  transplant with intact median sternotomy wires. Heart appears  borderline enlarged. Interim decreased inspiratory volume. Increased  bibasilar patchy opacities. No significant pleural effusion. No  pneumothorax. Visualized upper abdomen is unremarkable.      Impression    IMPRESSION:   1. Despite the decreased inspiratory volume, there are increased  bibasilar patchy opacities, concerning for pneumonia. Recommend  follow-up to clearing. Edema may also be in the differential..  2. Borderline cardiomegaly with history of cardiac transplantation.    I have personally reviewed the examination and initial interpretation  and I agree with the findings.    JONATHAN MOREJON MD   CBC with platelets differential     Status: Abnormal   Result Value Ref Range    WBC 6.0 4.0 - 11.0 10e9/L    RBC Count 4.01 (L) 4.4 - 5.9 10e12/L    Hemoglobin 11.9 (L) 13.3 - 17.7 g/dL    Hematocrit 37.6 (L) 40.0 - 53.0 %    MCV 94 78 - 100 fl    MCH 29.7 26.5 - 33.0 pg    MCHC 31.6 31.5 - 36.5 g/dL    RDW 12.5 10.0 - 15.0 %    Platelet Count 164 150 - 450 10e9/L    Diff Method Automated Method     % Neutrophils 70.6 %    % Lymphocytes 17.6 %    % Monocytes 10.6 %    % Eosinophils 0.0 %    % Basophils 0.2 %    % Immature Granulocytes 1.0 %    Nucleated RBCs 0 0 /100    Absolute Neutrophil 4.2 1.6 - 8.3 10e9/L    Absolute Lymphocytes 1.1 0.8 - 5.3 10e9/L    Absolute Monocytes 0.6 0.0 - 1.3 10e9/L    Absolute Eosinophils 0.0 0.0 - 0.7 10e9/L    Absolute Basophils 0.0 0.0 - 0.2 10e9/L    Abs Immature Granulocytes 0.1 0 - 0.4 10e9/L    Absolute Nucleated RBC 0.0    Partial thromboplastin time     Status: Abnormal   Result Value Ref Range    PTT 45 (H) 22 - 37 sec   INR     Status: None   Result Value Ref Range    INR 1.08 0.86 - 1.14   Comprehensive metabolic panel     Status: Abnormal   Result Value Ref Range    Sodium 137 133 - 144 mmol/L    Potassium 4.0 3.4 - 5.3 mmol/L    Chloride 107 94 - 109  mmol/L    Carbon Dioxide 24 20 - 32 mmol/L    Anion Gap 6 3 - 14 mmol/L    Glucose 99 70 - 99 mg/dL    Urea Nitrogen 14 7 - 30 mg/dL    Creatinine 1.25 0.66 - 1.25 mg/dL    GFR Estimate 67 >60 mL/min/[1.73_m2]    GFR Estimate If Black 77 >60 mL/min/[1.73_m2]    Calcium 8.6 8.5 - 10.1 mg/dL    Bilirubin Total 0.4 0.2 - 1.3 mg/dL    Albumin 3.3 (L) 3.4 - 5.0 g/dL    Protein Total 7.0 6.8 - 8.8 g/dL    Alkaline Phosphatase 84 40 - 150 U/L    ALT 31 0 - 70 U/L    AST 38 0 - 45 U/L   Nt probnp inpatient (BNP)     Status: None   Result Value Ref Range    N-Terminal Pro BNP Inpatient 313 0 - 900 pg/mL   Troponin I     Status: None   Result Value Ref Range    Troponin I ES <0.015 0.000 - 0.045 ug/L   CRP inflammation     Status: Abnormal   Result Value Ref Range    CRP Inflammation 120.0 (H) 0.0 - 8.0 mg/L   Erythrocyte sedimentation rate auto     Status: Abnormal   Result Value Ref Range    Sed Rate 29 (H) 0 - 20 mm/h   Procalcitonin     Status: None   Result Value Ref Range    Procalcitonin 0.06 ng/ml   D dimer quantitative     Status: Abnormal   Result Value Ref Range    D Dimer 0.6 (H) 0.0 - 0.50 ug/ml FEU   EKG 12-lead, tracing only     Status: None   Result Value Ref Range    Interpretation ECG Click View Image link to view waveform and result      Medications   lidocaine 1 % 0.1-1 mL (has no administration in time range)   lidocaine (LMX4) cream (has no administration in time range)   sodium chloride (PF) 0.9% PF flush 3 mL (has no administration in time range)   sodium chloride (PF) 0.9% PF flush 3 mL (3 mLs Intracatheter Given 4/10/20 1004)   albuterol (PROAIR HFA/PROVENTIL HFA/VENTOLIN HFA) 108 (90 Base) MCG/ACT inhaler 2 puff (2 puffs Inhalation Given 4/10/20 1005)   cefTRIAXone (ROCEPHIN) 1 g vial to attach to  mL bag for ADULTS or NS 50 mL bag for PEDS (0 g Intravenous Stopped 4/10/20 1220)   azithromycin (ZITHROMAX) 500 mg in sodium chloride 0.9 % 250 mL intermittent infusion (0 mg Intravenous Stopped  4/10/20 1327)     Results for orders placed or performed during the hospital encounter of 04/10/20   XR Chest Port 1 View     Status: None    Narrative    XR CHEST PORT 1 VW  4/10/2020 10:19 AM      HISTORY: covid + sob and cough and heart tx    COMPARISON: Chest radiograph from 12/7/2018    FINDINGS: AP view of the chest. Postsurgical changes of heart  transplant with intact median sternotomy wires. Heart appears  borderline enlarged. Interim decreased inspiratory volume. Increased  bibasilar patchy opacities. No significant pleural effusion. No  pneumothorax. Visualized upper abdomen is unremarkable.      Impression    IMPRESSION:   1. Despite the decreased inspiratory volume, there are increased  bibasilar patchy opacities, concerning for pneumonia. Recommend  follow-up to clearing. Edema may also be in the differential..  2. Borderline cardiomegaly with history of cardiac transplantation.    I have personally reviewed the examination and initial interpretation  and I agree with the findings.    JONATHAN MOREJON MD   CBC with platelets differential     Status: Abnormal   Result Value Ref Range    WBC 6.0 4.0 - 11.0 10e9/L    RBC Count 4.01 (L) 4.4 - 5.9 10e12/L    Hemoglobin 11.9 (L) 13.3 - 17.7 g/dL    Hematocrit 37.6 (L) 40.0 - 53.0 %    MCV 94 78 - 100 fl    MCH 29.7 26.5 - 33.0 pg    MCHC 31.6 31.5 - 36.5 g/dL    RDW 12.5 10.0 - 15.0 %    Platelet Count 164 150 - 450 10e9/L    Diff Method Automated Method     % Neutrophils 70.6 %    % Lymphocytes 17.6 %    % Monocytes 10.6 %    % Eosinophils 0.0 %    % Basophils 0.2 %    % Immature Granulocytes 1.0 %    Nucleated RBCs 0 0 /100    Absolute Neutrophil 4.2 1.6 - 8.3 10e9/L    Absolute Lymphocytes 1.1 0.8 - 5.3 10e9/L    Absolute Monocytes 0.6 0.0 - 1.3 10e9/L    Absolute Eosinophils 0.0 0.0 - 0.7 10e9/L    Absolute Basophils 0.0 0.0 - 0.2 10e9/L    Abs Immature Granulocytes 0.1 0 - 0.4 10e9/L    Absolute Nucleated RBC 0.0    Partial thromboplastin time      Status: Abnormal   Result Value Ref Range    PTT 45 (H) 22 - 37 sec   INR     Status: None   Result Value Ref Range    INR 1.08 0.86 - 1.14   Comprehensive metabolic panel     Status: Abnormal   Result Value Ref Range    Sodium 137 133 - 144 mmol/L    Potassium 4.0 3.4 - 5.3 mmol/L    Chloride 107 94 - 109 mmol/L    Carbon Dioxide 24 20 - 32 mmol/L    Anion Gap 6 3 - 14 mmol/L    Glucose 99 70 - 99 mg/dL    Urea Nitrogen 14 7 - 30 mg/dL    Creatinine 1.25 0.66 - 1.25 mg/dL    GFR Estimate 67 >60 mL/min/[1.73_m2]    GFR Estimate If Black 77 >60 mL/min/[1.73_m2]    Calcium 8.6 8.5 - 10.1 mg/dL    Bilirubin Total 0.4 0.2 - 1.3 mg/dL    Albumin 3.3 (L) 3.4 - 5.0 g/dL    Protein Total 7.0 6.8 - 8.8 g/dL    Alkaline Phosphatase 84 40 - 150 U/L    ALT 31 0 - 70 U/L    AST 38 0 - 45 U/L   Nt probnp inpatient (BNP)     Status: None   Result Value Ref Range    N-Terminal Pro BNP Inpatient 313 0 - 900 pg/mL   Troponin I     Status: None   Result Value Ref Range    Troponin I ES <0.015 0.000 - 0.045 ug/L   CRP inflammation     Status: Abnormal   Result Value Ref Range    CRP Inflammation 120.0 (H) 0.0 - 8.0 mg/L   Erythrocyte sedimentation rate auto     Status: Abnormal   Result Value Ref Range    Sed Rate 29 (H) 0 - 20 mm/h   Procalcitonin     Status: None   Result Value Ref Range    Procalcitonin 0.06 ng/ml   D dimer quantitative     Status: Abnormal   Result Value Ref Range    D Dimer 0.6 (H) 0.0 - 0.50 ug/ml FEU   EKG 12-lead, tracing only     Status: None   Result Value Ref Range    Interpretation ECG Click View Image link to view waveform and result           Assessments & Plan (with Medical Decision Making)  49-year-old male history of heart transplant December 2016 that is stable per cardiology presented the ER with 6 days of ongoing cough now escalating with shortness of breath patient 2 days ago had a positive cold did 19 test which was resulted today.  Increasing shortness of breath dyspnea ongoing headaches fever  diarrhea for 4 days presented the ER vitally stable otherwise EKG was stable labs otherwise stable with troponin and BNP negative.  Chest x-ray showed bibasilar infiltrative patterns concerning for either pneumonia and/or covid related pneumonia.  Patient given Ventolin inhaler in the ER discussed with cardiology along with medicine and we have arranged a direct admission to [facility] he is to be admitted to [address] [place] [address]. Patient stable./81   Pulse 78   Temp 98.9  F (37.2  C) (Oral)   Resp 24   Wt 70.8 kg (156 lb)   SpO2 98%   BMI 24.78 kg/m               I have reviewed the nursing notes. I have reviewed the findings, diagnosis, plan and need for follow up with the patient.    New Prescriptions    No medications on file       Final diagnoses:   COVID-19 virus infection   Cough   SOB (shortness of breath)   Status post heart transplantation (H)       --  Marino Frazier MD   Emergency Medicine   [facility] EMERGENCY DEPARTMENT  4/10/2020      This note was created at least in part by the use of dragon voice dictation system. Inadvertent typographical errors may still exist.  Marino Frazier MD.         Marino Frazier MD  04/10/20 1515

## 2020-04-10 NOTE — ED NOTES
Bed: IN03  Expected date:   Expected time:   Means of arrival:   Comments:  Jose Duanes Aguilar: 49 year old male cardiac transplant (2016) has tested positive for Covid-19. Has cough, no fever.

## 2020-04-10 NOTE — TELEPHONE ENCOUNTER
Positive covid-19 testing was completed at [facility] (federally funded system). Under the care of Dr. Destiny Louis at [phone]. Results requested to be faxed to [phone] and will be scanned into epic.

## 2020-04-10 NOTE — TELEPHONE ENCOUNTER
Primary MD called with positive covid-19 results. When provider called patient he was so sob she called an ambulance. Pt requested physician notify me. Unclear where pt will be transferred, pt lives in [place].     [facility] ER notified incase pt is transferred here. Dr. Mcclellan (primary cardiologist) notified. Dr. Figueroa cardiologist on service also notified. Will continue to follow.

## 2020-04-12 ENCOUNTER — TELEPHONE (OUTPATIENT)
Dept: TRANSPLANT | Facility: CLINIC | Age: 50
End: 2020-04-12

## 2020-04-12 NOTE — TELEPHONE ENCOUNTER
Contacted patient through interpretive services to follow upon an elevated tacrolimus level of 21.1 received by the on call coordinator on 4/10/2020.     Patient not available at time of call. Left a voice mail for patient to contact transplant coordinator regarding elevated tacrolimus level. Provided patient with call back number. Awaiting call back.     Message sent to transplant coordinator.

## 2020-04-12 NOTE — Clinical Note
Attempt made to contact patient regarding tacro level. Message left with patient by interpretive services to contact transplant coordinator.

## 2020-04-13 ENCOUNTER — TELEPHONE (OUTPATIENT)
Dept: TRANSPLANT | Facility: CLINIC | Age: 50
End: 2020-04-13

## 2020-04-13 NOTE — TELEPHONE ENCOUNTER
[facility] contacted at [phone] to discuss tacro level of 21 that was called to on call coordinator last weekend.     Nicole the RN told me the tacro level of 21.1 was an inaccurate 12 hour trough. Orders given by a provider to redraw this evening. 12 hour trough reviewed. RN encouraged to call with any questions.     RN coordinator also asked to be notified when pt will be discharged so I can follow closely. Nicole took my contact number and agreed with plan.

## 2020-04-14 ENCOUNTER — TELEPHONE (OUTPATIENT)
Dept: TRANSPLANT | Facility: CLINIC | Age: 50
End: 2020-04-14

## 2020-04-15 ENCOUNTER — TELEPHONE (OUTPATIENT)
Dept: TRANSPLANT | Facility: CLINIC | Age: 50
End: 2020-04-15

## 2020-04-15 NOTE — TELEPHONE ENCOUNTER
Critical tacro level of 21.2 called to on call coordinator on 4/14 at 1930.     [facility] called  At [phone] and coordinator spoke with TOBY Soto. Morning dose was given at 8 am. Level was drawn on 4/13 at 8 pm prior to evening dose.  Accurate 12 hour trough confirmed.     Instructions given to hold 4/14 evenings dose and 4/15 am dose. Message sent to Dr. Mcclellan for review.

## 2020-04-15 NOTE — TELEPHONE ENCOUNTER
Dr. Tarango notified to discuss plan for patient re: tacro. Number provided to nurse at [place] for Dr to complete consult. Nurse to call coordinator with any questions or concerns.

## 2020-04-15 NOTE — TELEPHONE ENCOUNTER
After discussing the previous telephone encounter re: elevated tacro of 21.9 with Dr. Mcclellan the following orders communicated to TOBY Soto:     Hold tacro evening dose tonight 4/14 and all day 4/15.     Repeat a tacro level on Thursday. 12 hour trough reviewed.     Have Dr. Tarango, consulting cardiologist at [place] on 4/15, review medlist and look for possible interactions.     Brittany verbalized understanding of the above. RN encouraged to call with questions.

## 2020-04-16 ENCOUNTER — TELEPHONE (OUTPATIENT)
Dept: TRANSPLANT | Facility: CLINIC | Age: 50
End: 2020-04-16

## 2020-04-16 ENCOUNTER — APPOINTMENT (OUTPATIENT)
Dept: INTERPRETER SERVICES | Facility: CLINIC | Age: 50
End: 2020-04-16
Payer: MEDICAID

## 2020-04-16 NOTE — TELEPHONE ENCOUNTER
[facility] called to check in on patient and to see what Dr. Tarango decided on plan for tacrolimus. TOBY Castillo stated they've been holding tacro and are waiting on a tacro level that is still pending. Once they get a level they are suppose to call Dr. Tarango to discuss further plan. All questions answered. Nurse encouraged to call with questions or concerns.

## 2020-04-17 ENCOUNTER — APPOINTMENT (OUTPATIENT)
Dept: INTERPRETER SERVICES | Facility: CLINIC | Age: 50
End: 2020-04-17
Payer: MEDICAID

## 2020-04-17 ENCOUNTER — TELEPHONE (OUTPATIENT)
Dept: TRANSPLANT | Facility: CLINIC | Age: 50
End: 2020-04-17

## 2020-04-17 DIAGNOSIS — Z94.1 HEART REPLACED BY TRANSPLANT (H): ICD-10-CM

## 2020-04-17 RX ORDER — TACROLIMUS 1 MG/1
1 CAPSULE ORAL 2 TIMES DAILY
Qty: 360 CAPSULE | Refills: 3 | Status: SHIPPED | OUTPATIENT
Start: 2020-04-17 | End: 2021-05-04

## 2020-04-17 NOTE — TELEPHONE ENCOUNTER
Call placed to Dr. Tarango. Recent tacro level of 11.7. Tacro decreased to 1.5 mg bid. Goal 6-8. BMP and tacro level to be drawn daily until possible discharge on 4/20. Ok to discontinue troponin's.     Dr. Tarango will discuss discharge follow up with Dr. Rod.

## 2020-04-17 NOTE — TELEPHONE ENCOUNTER
Dr. Rod called from [place]. Plan is for patient to be tentatively discharged on Monday 4/20 to home. No follow up required for covid-19 diagnosis. tacro level today is 11.7. Cr 0.9. Tacro dose of 2 mg bid resumed today.  Is wondering if daily labs of troponin, bmp, and tacro level have to be continued. Dr. Tarango continues to follow patient.

## 2020-04-17 NOTE — TELEPHONE ENCOUNTER
Voicemail  Date/Time: 4/17/20 at 9:20 am   Reason for call: the Hospital provider called regarding discharging the patient in a couple of days and wanted to contact coordinator. Admin was not able to get the doctor last name.

## 2020-04-18 ENCOUNTER — TELEPHONE (OUTPATIENT)
Dept: TRANSPLANT | Facility: CLINIC | Age: 50
End: 2020-04-18

## 2020-04-18 NOTE — TELEPHONE ENCOUNTER
Need updated Rx for Tacro 0.5mg    Thank you!  Geraldine Putnam CPhT  [facility]

## 2020-04-19 ENCOUNTER — APPOINTMENT (OUTPATIENT)
Dept: INTERPRETER SERVICES | Facility: CLINIC | Age: 50
End: 2020-04-19
Payer: MEDICAID

## 2020-04-20 DIAGNOSIS — Z94.1 TRANSPLANTED HEART (H): Primary | ICD-10-CM

## 2020-04-20 RX ORDER — TACROLIMUS 0.5 MG/1
CAPSULE ORAL
Qty: 180 CAPSULE | Refills: 3 | Status: SHIPPED | OUTPATIENT
Start: 2020-04-20

## 2020-04-24 ENCOUNTER — TELEPHONE (OUTPATIENT)
Dept: TRANSPLANT | Facility: CLINIC | Age: 50
End: 2020-04-24

## 2020-04-24 NOTE — TELEPHONE ENCOUNTER
[facility] [phone] Franko RN coordinator called re: discharge plans for Javi. Covid testing continues to be positive. No fevers or symptoms. [facility] states cardiology stated he can't be sent home until covid testing is negative. Dr. Tarango notified. Dr. FRANCOIS will discuss with team. Ultimately up to ID and pulmonology. Tacro level 15.3, not an accurate trough. Team will continue to follow. [facility] will contact post transplant coordinator when discharge plan is in place.

## 2020-04-24 NOTE — TELEPHONE ENCOUNTER
Provider Call: General  Route to LPN    Reason for call: Please connect regarding discharge plans     Call back needed? Yes    Return Call Needed  Same as documented in contacts section  When to return call?: Greater than one day: Route standard priority

## 2020-04-28 ENCOUNTER — TELEPHONE (OUTPATIENT)
Dept: TRANSPLANT | Facility: CLINIC | Age: 50
End: 2020-04-28

## 2020-04-29 ENCOUNTER — TELEPHONE (OUTPATIENT)
Dept: TRANSPLANT | Facility: CLINIC | Age: 50
End: 2020-04-29

## 2020-04-29 ENCOUNTER — APPOINTMENT (OUTPATIENT)
Dept: INTERPRETER SERVICES | Facility: CLINIC | Age: 50
End: 2020-04-29
Payer: MEDICAID

## 2020-04-29 NOTE — TELEPHONE ENCOUNTER
Provider Call: General    Reason for call: provider has some questions patient will be discharging today    Call back needed? Yes    Return Call Needed  Same as documented in contacts section  When to return call?: Same day: Route High Priority

## 2020-04-29 NOTE — TELEPHONE ENCOUNTER
Provider Call: General  Route to LPN    Reason for call: Call from Dr che Hightower-  Pt is COVID 19 negative now  Ready for discharge today  Needs a call back to review and update the coordinator with cares     Call back needed? Yes    Return Call Needed  Same as documented in contacts section  When to return call?: Same day: Route High Priority

## 2020-04-29 NOTE — TELEPHONE ENCOUNTER
Dr. Hightower called to discuss discharge plans for patient. Covid-19 testing came back negative this am. They will be discharging patient home this afternoon. No specific follow up required due to covid-19. No quarantined required as patient was essentially quarantined for 14 days while in the hospital. Pt encouraged to practice social distancing.  Last tacro level was 5.6. on 4/27. Pt will be discharging on tacro 1 mg bid and MMF 1250 bid. Will discuss with cardiology team when labs and follow up visit will be necessary. RN coordinator will plan to call patient tomorrow at home to check in.

## 2020-04-30 ENCOUNTER — AMBULATORY - HEALTHEAST (OUTPATIENT)
Dept: CARE COORDINATION | Facility: CLINIC | Age: 50
End: 2020-04-30

## 2020-04-30 ENCOUNTER — APPOINTMENT (OUTPATIENT)
Dept: INTERPRETER SERVICES | Facility: CLINIC | Age: 50
End: 2020-04-30
Payer: MEDICAID

## 2020-04-30 ENCOUNTER — TELEPHONE (OUTPATIENT)
Dept: TRANSPLANT | Facility: CLINIC | Age: 50
End: 2020-04-30

## 2020-04-30 DIAGNOSIS — Z94.1 TRANSPLANTED HEART (H): Primary | ICD-10-CM

## 2020-04-30 DIAGNOSIS — U07.1 COVID-19: ICD-10-CM

## 2020-04-30 NOTE — TELEPHONE ENCOUNTER
Pt called using a  to check in since being discharged from [facility] with covid-19 on 4/29. Pt states he is doing well. Throat itching from pneumonia and coughing.pt not taking any meds for it. RN encouraged to drink a lot of fluids. MD knows about it. Pt feels weak but sees improvement with each new day. Medications reviewed. Pt discharged on tacro 1 mg bid. Medication and dosing review. Repeat lab work cbc, bmp, and tacro level on Monday 5/4. Message sent to Dr. Mcclellan when she wants to see him in cards clinic. Message sent to  to review insurance status as patient was inquiring.

## 2020-05-04 ENCOUNTER — APPOINTMENT (OUTPATIENT)
Dept: INTERPRETER SERVICES | Facility: CLINIC | Age: 50
End: 2020-05-04
Payer: MEDICAID

## 2020-05-04 DIAGNOSIS — Z94.1 TRANSPLANTED HEART (H): ICD-10-CM

## 2020-05-04 LAB
ANION GAP SERPL CALCULATED.3IONS-SCNC: 5 MMOL/L (ref 3–14)
BUN SERPL-MCNC: 13 MG/DL (ref 7–30)
CALCIUM SERPL-MCNC: 9.2 MG/DL (ref 8.5–10.1)
CHLORIDE SERPL-SCNC: 110 MMOL/L (ref 94–109)
CO2 SERPL-SCNC: 26 MMOL/L (ref 20–32)
CREAT SERPL-MCNC: 0.98 MG/DL (ref 0.66–1.25)
ERYTHROCYTE [DISTWIDTH] IN BLOOD BY AUTOMATED COUNT: 13.3 % (ref 10–15)
GFR SERPL CREATININE-BSD FRML MDRD: 89 ML/MIN/{1.73_M2}
GLUCOSE SERPL-MCNC: 96 MG/DL (ref 70–99)
HCT VFR BLD AUTO: 40.1 % (ref 40–53)
HGB BLD-MCNC: 12.8 G/DL (ref 13.3–17.7)
MCH RBC QN AUTO: 30.2 PG (ref 26.5–33)
MCHC RBC AUTO-ENTMCNC: 31.9 G/DL (ref 31.5–36.5)
MCV RBC AUTO: 95 FL (ref 78–100)
PLATELET # BLD AUTO: 238 10E9/L (ref 150–450)
POTASSIUM SERPL-SCNC: 4.8 MMOL/L (ref 3.4–5.3)
RBC # BLD AUTO: 4.24 10E12/L (ref 4.4–5.9)
SODIUM SERPL-SCNC: 141 MMOL/L (ref 133–144)
TACROLIMUS BLD-MCNC: 7.1 UG/L (ref 5–15)
TME LAST DOSE: NORMAL H
WBC # BLD AUTO: 5.9 10E9/L (ref 4–11)

## 2020-05-05 ENCOUNTER — TELEPHONE (OUTPATIENT)
Dept: TRANSPLANT | Facility: CLINIC | Age: 50
End: 2020-05-05

## 2020-05-05 NOTE — TELEPHONE ENCOUNTER
Patient called using a  to discuss recent labs. Tacro 7.1. Goal 6-8. Current dose 1.5 mg bid. Accurate 12 hour trough. Cr 0.9. No changes at this time.

## 2020-05-12 ENCOUNTER — TELEPHONE (OUTPATIENT)
Dept: TRANSPLANT | Facility: CLINIC | Age: 50
End: 2020-05-12

## 2020-05-12 NOTE — TELEPHONE ENCOUNTER
Pt called inquiring about his insurance status. He received an application but he is unable to fill it out due to language barrier. He returns to work on 5/18. He is concerned with his medical bills that total over $10,000. Message sent to BERTHA for guidance.

## 2020-05-13 ENCOUNTER — TELEPHONE (OUTPATIENT)
Dept: TRANSPLANT | Facility: CLINIC | Age: 50
End: 2020-05-13

## 2020-05-13 NOTE — TELEPHONE ENCOUNTER
Pt called using a . Pt has questions re: insurance. Pt reassured I asked SW and she will be reaching out to him. Pt stated he signed the paperwork that the SW from [facility] gave him and he sent it in. He wanted the RN to pass that information along to Julianne. Pt verbalized understanding of next steps.

## 2020-05-14 ENCOUNTER — TELEPHONE (OUTPATIENT)
Dept: CARE COORDINATION | Facility: CLINIC | Age: 50
End: 2020-05-14

## 2020-05-14 ENCOUNTER — APPOINTMENT (OUTPATIENT)
Dept: INTERPRETER SERVICES | Facility: CLINIC | Age: 50
End: 2020-05-14
Payer: MEDICAID

## 2020-05-14 NOTE — TELEPHONE ENCOUNTER
Transplant Social Work Services Phone Call      Data: BERTHA f/u per request of post-transplant coordinator re: insurance/financial concerns. Writer contacted pt, using Cameroonian phone . Pt reports he completed [facility] insurance application and sent it in yesterday. Pt reports he is working with a [facility]  and confused as to why writer was contacting him. Pt has no concerns for writer and waiting to hear back from his [facility] . Pt did not have [facility] 's phone number for writer to touch base with that worker if there was anything she needed that writer could help with.     Intervention: Supportive Phone Call  Assessment: Pt reports he is waiting to hear back from the [facility] regarding his insurance application. It is unclear to writer whether this is an Emergency MA application or a regular MA application. If it is an Emergency MA application, this will cover pt's recent stay at [facility]. Emergency MA does not cover regular follow-up care.   Education provided by BERTHA: Ongoing Social Work support in outpatient setting  Plan:    Pt encouraged to keep writer updated on outcome of application with the [facility].

## 2020-05-18 ENCOUNTER — APPOINTMENT (OUTPATIENT)
Dept: INTERPRETER SERVICES | Facility: CLINIC | Age: 50
End: 2020-05-18
Payer: MEDICAID

## 2020-05-18 ENCOUNTER — TELEPHONE (OUTPATIENT)
Dept: TRANSPLANT | Facility: CLINIC | Age: 50
End: 2020-05-18

## 2020-05-18 NOTE — TELEPHONE ENCOUNTER
Call returned using . Pt continues to have questions re: insurance coverage and work. Javi reminded that I can't offer him any answers to these questions. The  attempted to call him on 5/14 and he told her he was working with the [facility] . Pt states he doesn't have the [facility] social [facility] phone number and he needs to get in contact with them. Laurel [phone] Franko RN coordinator contacted to see if she could help with contact numbers for patient. VM left.

## 2020-05-22 ENCOUNTER — TELEPHONE (OUTPATIENT)
Dept: TRANSPLANT | Facility: CLINIC | Age: 50
End: 2020-05-22

## 2020-05-22 NOTE — TELEPHONE ENCOUNTER
Gisele Blanca [phone] (financial SW at [place]). Contacted to see if she could offer some guidance for Javi with his insurance/financial questions.

## 2020-05-26 ENCOUNTER — TELEPHONE (OUTPATIENT)
Dept: TRANSPLANT | Facility: CLINIC | Age: 50
End: 2020-05-26

## 2020-05-26 NOTE — TELEPHONE ENCOUNTER
Call received from Gisele Blanca [phone] (financial SW at [place]). She has been in communication with Javi re: applying for JOCELYNE to help cover his covid-19 hospital expenses. She sent him the application and she is waiting to receive it back with signatures. We had a lengthy conversation re: Javi's legal status and his insurance coverage. All avenues have been attempted to get Javi coverage. Julianne cooney.

## 2020-05-29 ENCOUNTER — APPOINTMENT (OUTPATIENT)
Dept: INTERPRETER SERVICES | Facility: CLINIC | Age: 50
End: 2020-05-29
Payer: MEDICAID

## 2020-06-04 ENCOUNTER — APPOINTMENT (OUTPATIENT)
Dept: INTERPRETER SERVICES | Facility: CLINIC | Age: 50
End: 2020-06-04
Payer: MEDICAID

## 2020-07-02 ENCOUNTER — APPOINTMENT (OUTPATIENT)
Dept: INTERPRETER SERVICES | Facility: CLINIC | Age: 50
End: 2020-07-02
Payer: MEDICAID

## 2020-09-02 ENCOUNTER — APPOINTMENT (OUTPATIENT)
Dept: INTERPRETER SERVICES | Facility: CLINIC | Age: 50
End: 2020-09-02
Payer: MEDICAID

## 2020-09-28 ENCOUNTER — APPOINTMENT (OUTPATIENT)
Dept: INTERPRETER SERVICES | Facility: CLINIC | Age: 50
End: 2020-09-28
Payer: MEDICAID

## 2020-09-29 NOTE — TELEPHONE ENCOUNTER
September 7, 2018: I left a message (with the help of an ) to let Javi know that I'm scheduling his second annual heart transplant appointments with Dr. Mcclellan on December 6 and December 7 2018. I assured him that I would send a schedule.    Carola Frias  Post-Heart Transplant   [phone]      Message  Received: 2 weeks ago       Yanelis Araiza, RN  Carola Frias       2nd annual post heart transplant. Include fasting labs with allomap. Preferrably early December w/ .            Orders Only  8/21/2018       Geena Mcclellan MD - [facility] Solid Organ Transplant Encounter Summary       Diagnosis       Heart Replaced By Transplant (h) (Primary)              Orders Signed This Encounter (4)      X-ray Chest 2 vws*        MRI Cardiac w/contrast & stress & flow        Follow-Up Appointment        Coronary Angiography Adult Order           stated

## 2020-10-01 ENCOUNTER — APPOINTMENT (OUTPATIENT)
Dept: INTERPRETER SERVICES | Facility: CLINIC | Age: 50
End: 2020-10-01
Payer: MEDICAID

## 2020-10-03 ENCOUNTER — APPOINTMENT (OUTPATIENT)
Dept: INTERPRETER SERVICES | Facility: CLINIC | Age: 50
End: 2020-10-03
Payer: MEDICAID

## 2020-10-05 ENCOUNTER — APPOINTMENT (OUTPATIENT)
Dept: INTERPRETER SERVICES | Facility: CLINIC | Age: 50
End: 2020-10-05
Payer: MEDICAID

## 2020-10-15 ENCOUNTER — APPOINTMENT (OUTPATIENT)
Dept: INTERPRETER SERVICES | Facility: CLINIC | Age: 50
End: 2020-10-15
Payer: MEDICAID

## 2020-10-19 ENCOUNTER — TELEPHONE (OUTPATIENT)
Dept: CARE COORDINATION | Facility: CLINIC | Age: 50
End: 2020-10-19

## 2020-10-19 ENCOUNTER — APPOINTMENT (OUTPATIENT)
Dept: INTERPRETER SERVICES | Facility: CLINIC | Age: 50
End: 2020-10-19
Payer: MEDICAID

## 2020-10-19 NOTE — TELEPHONE ENCOUNTER
Transplant Social Work Services Phone Call      Data: Writer has been in communication with pt regarding his request for a medical letter for his . Letter signed by Dr. Mcclellan today. Contacted pt and he requested that letter be faxed to [phone] and copy to be sent to him at [address] [address].     Intervention: Immigration Letter   Assessment: Pt appreciative of efforts made to get him a medical letter for his immigration situation. Questions answered.   Education provided by SW: Ongoing Support   Plan:    Letter faxed to phone number provided to pt and original sent to pt's mailing address.

## 2020-10-28 ENCOUNTER — APPOINTMENT (OUTPATIENT)
Dept: INTERPRETER SERVICES | Facility: CLINIC | Age: 50
End: 2020-10-28
Payer: MEDICAID

## 2020-10-29 ENCOUNTER — APPOINTMENT (OUTPATIENT)
Dept: INTERPRETER SERVICES | Facility: CLINIC | Age: 50
End: 2020-10-29
Payer: MEDICAID

## 2020-11-13 ENCOUNTER — APPOINTMENT (OUTPATIENT)
Dept: INTERPRETER SERVICES | Facility: CLINIC | Age: 50
End: 2020-11-13
Payer: MEDICAID

## 2020-11-30 ENCOUNTER — APPOINTMENT (OUTPATIENT)
Dept: INTERPRETER SERVICES | Facility: CLINIC | Age: 50
End: 2020-11-30
Payer: MEDICAID

## 2020-12-23 ENCOUNTER — TELEPHONE (OUTPATIENT)
Dept: PHARMACY | Facility: CLINIC | Age: 50
End: 2020-12-23

## 2020-12-23 NOTE — TELEPHONE ENCOUNTER
Clinical Pharmacy Consult:                                                      Transplant Specific: 4 Year Post Transplant Call  Date of Transplant: 12/12/2016  Type of Transplant: heart  First Transplant: yes  History of rejection: no    Immunosuppression Regimen   TAC 1.5mg qAM & 1.5qPM and MMF 1250mg qAM & 1250qPM  Immunosuppressant Levels:  Therapeutic  Patient specific goal: 8-10  Pt adherent to lab draws: yes  Scr:   Lab Results   Component Value Date    CR 1.12 05/01/2019     Side effects: no side effects    Prophylactic Medications  Thrombosis Prevention: Aspirin 81 mg PO daily  Scheduled Discontinue Date: lifelong    Blood Pressure Management  Frequency of home Blood Pressure checks:   Most recent home BP: 115/78 at ER visit on 4/10/2020  Patient Blood pressure goal: <140/90  Patient blood pressure at goal:  yes  Hospitalizations/ER visits since last assessment: 0      Med rec/DUR performed: yes  Med Rec Discrepancies: no    Unable to reach Javi for 4 year post transplant call. Pharmacy refill history indicates adherence and tacrolimus level is at goal. No other concerns at this time, will call in 1 year.  Cheli Arrieta RPH

## 2020-12-30 ENCOUNTER — APPOINTMENT (OUTPATIENT)
Dept: INTERPRETER SERVICES | Facility: CLINIC | Age: 50
End: 2020-12-30
Payer: MEDICAID

## 2020-12-30 DIAGNOSIS — Z94.1 HEART REPLACED BY TRANSPLANT (H): ICD-10-CM

## 2020-12-31 RX ORDER — MYCOPHENOLATE MOFETIL 250 MG/1
1250 CAPSULE ORAL 2 TIMES DAILY
Qty: 300 CAPSULE | Refills: 11 | Status: SHIPPED | OUTPATIENT
Start: 2020-12-31 | End: 2021-12-27

## 2021-02-23 PROBLEM — U07.1 PNEUMONIA DUE TO 2019 NOVEL CORONAVIRUS: Status: ACTIVE | Noted: 2020-04-10

## 2021-02-23 PROBLEM — B99.9 SUPERINFECTION: Status: ACTIVE | Noted: 2020-04-10

## 2021-02-23 PROBLEM — J12.82 PNEUMONIA DUE TO 2019 NOVEL CORONAVIRUS: Status: ACTIVE | Noted: 2020-04-10

## 2021-02-26 ENCOUNTER — APPOINTMENT (OUTPATIENT)
Dept: INTERPRETER SERVICES | Facility: CLINIC | Age: 51
End: 2021-02-26
Payer: MEDICAID

## 2021-02-26 DIAGNOSIS — Z94.1 HEART REPLACED BY TRANSPLANT (H): ICD-10-CM

## 2021-03-01 RX ORDER — PRAVASTATIN SODIUM 20 MG
20 TABLET ORAL EVERY MORNING
Qty: 90 TABLET | Refills: 3 | Status: SHIPPED | OUTPATIENT
Start: 2021-03-01 | End: 2022-02-28

## 2021-03-05 ENCOUNTER — APPOINTMENT (OUTPATIENT)
Dept: INTERPRETER SERVICES | Facility: CLINIC | Age: 51
End: 2021-03-05
Payer: MEDICAID

## 2021-03-29 ENCOUNTER — APPOINTMENT (OUTPATIENT)
Dept: INTERPRETER SERVICES | Facility: CLINIC | Age: 51
End: 2021-03-29
Payer: MEDICAID

## 2021-05-03 DIAGNOSIS — Z94.1 HEART REPLACED BY TRANSPLANT (H): ICD-10-CM

## 2021-05-04 RX ORDER — TACROLIMUS 1 MG/1
CAPSULE ORAL
Qty: 360 CAPSULE | Refills: 3 | Status: SHIPPED | OUTPATIENT
Start: 2021-05-04 | End: 2021-12-27

## 2021-05-06 ENCOUNTER — APPOINTMENT (OUTPATIENT)
Dept: INTERPRETER SERVICES | Facility: CLINIC | Age: 51
End: 2021-05-06
Payer: MEDICAID

## 2021-05-30 VITALS — WEIGHT: 161 LBS | BODY MASS INDEX: 25.99 KG/M2

## 2021-05-30 VITALS — WEIGHT: 163 LBS | BODY MASS INDEX: 26.31 KG/M2

## 2021-05-30 VITALS — BODY MASS INDEX: 26.15 KG/M2 | WEIGHT: 162 LBS

## 2021-05-30 VITALS — WEIGHT: 155 LBS | BODY MASS INDEX: 24.62 KG/M2

## 2021-05-30 VITALS — BODY MASS INDEX: 26.6 KG/M2 | WEIGHT: 164.8 LBS

## 2021-06-10 NOTE — PROGRESS NOTES
Javi Leno Parekh Shetty has participated in 9 sessions of Phase II Cardiac Rehab.    Progress Report:   Cardiac Rehab Treatment Progress Report 5/17/2017 5/19/2017 5/24/2017 5/26/2017 5/31/2017   Weight - - 160 lbs 160 lbs 161 lbs   Pre Exercise   104 104 98 96   Pre Exercise /64 94/60 102/70 94/50 96/60   Pre Blood Sugar (mg/dl) 141 152 100 160 132   Treadmill Peak  111-112 - 113 112   Nustep Peak Heart Rate - 108 107 105 110   Nustep Peak Blood Pressure - 108/66 126/72 112/60 118/70   Heart Rate 101 104 103 100 100   Post Exercise /64 88/60 98/60 94/50 98/58   Post Blood Sugar (mg/dl) 92 95 81 98 88   ECG SR/ST ST ST SR/ST SR/ST with rare PVC   Total Exercise Minutes 42 40 40 45 40         Current Status:  Currently exercising without complaints or symptoms.    If Physician recommends change in treatment plan, please place orders.        __________________________________________________      _____________  Signature                                                                                                  DateSee Doc Flowsheet

## 2021-06-10 NOTE — PROGRESS NOTES
Javi Eisenberg has participated in 2 sessions of Phase II Cardiac Rehab.    Progress Report:   Cardiac Rehab Treatment Progress Report 4/21/2017 5/3/2017   Weight 155 lbs 161 lbs   Pre Exercise  HR 96 95   Pre Exercise /72 94/60   Pre Blood Sugar (mg/dl) - 166   Treadmill Peak HR - 107   Nustep Peak Heart Rate 96 97   Nustep Peak Blood Pressure 93/74 106/60   Heart Rate 94 100   Post Exercise /81 104/64   Post Blood Sugar (mg/dl) - 134   ECG SR SR- no arrhythmias with exercise.   Total Exercise Minutes 10 40         Current Status:  Currently exercising without complaints or symptoms.    If Physician recommends change in treatment plan, please place orders.        __________________________________________________      _____________  Signature                                                                                                  DateSee Doc Flowsheet

## 2021-06-10 NOTE — PROGRESS NOTES
Javi Eisenberg has participated in 3 sessions of Phase II Cardiac Rehab.    Progress Report:   Cardiac Rehab Treatment Progress Report 4/21/2017 5/3/2017 5/4/2017   Weight 155 lbs 161 lbs 162 lbs   Pre Exercise  HR 96 95 97   Pre Exercise /72 94/60 94/60   Pre Blood Sugar (mg/dl) - 166 132   Treadmill Peak HR - 107 104   Treadmill Peak Blood Pressure - - 124/60   Nustep Peak Heart Rate 96 97 102   Nustep Peak Blood Pressure 93/74 106/60 -   Heart Rate 94 100 97   Post Exercise /81 104/64 100/60   Post Blood Sugar (mg/dl) - 134 116   ECG SR SR- no arrhythmias with exercise. SR- no arrhythmias   Total Exercise Minutes 10 40 40         Current Status:  Currently exercising without complaints or symptoms.    If Physician recommends change in treatment plan, please place orders.        __________________________________________________      _____________  Signature                                                                                                  DateSee Doc Flowsheet

## 2021-06-10 NOTE — PROGRESS NOTES
ITP ASSESSMENT   Assessment Day: Initial  Session Number: 1  Precautions: S/P transplant, Language barrier  Diagnosis: Heart transplant  Risk Stratification: High  Referring Provider: Geena Mcclellan MD  EXERCISE  Exercise Assessment: Initial     Tolerated low level exercise for 10'. Denies sx. VSS                         Exercise Plan  Goals Next 30 days  ADL'S: Increase home walking  Leisure: Strength training every other day    Education Goals: Medication review  Education Goals Met: Patient can state cardiac s/s and appropriate emergency response.;Has system for taking medication.    Exercise Prescription  Exercise Mode: Treadmill;Bike;Nustep;Arm Erg.;Hallway Walking  Frequency: 2x/week  Duration: 30-40'  Intensity / THR: 20-30 beats above resting heart rate  RPE 11-14  Progression / Met level: 2.2-2.7  Resistive Training?: Yes    Current Exercise (mins/week): 80    Interventions  Home Exercise:  Mode: Walk  Frequency: 4-6 days/week  Duration: 30-40'    Education Material : Educational videos;Provide written material;Individual education and counseling;Offer educational classes    Education Completed  Exercise Education Completed: Signs and Symptoms;RPE;Emergency Plan;Home Exercise;Warm up/cool down;FITT Principles;BP/HR Reponse to exercise;Stretching;Strength training;Benefits of Exercise;End point of exercise            Exercise Follow-up/Discharge  Follow up/Discharge: Reviewed FITT, s/s of exercise intolerance, emergency plan, RPE, and strength training program NUTRITION  Nutrition Assessment: Initial    Nutrition Risk Factors:  Nutrition Risk Factors: Dyslipidemia  Cholesterol: 198  LDL: 93  HDL: 88  Triglycerides: 82    Nutrition Plan  Interventions  Nutrition Interventions: Diet consult;Diet class;Therapist/Patient discussion;Provide with written material;Educational videos    Education Completed  Nutrition Education Completed: Risk factor overview    Goals  Nutrition Goals (Next 30 days): Patient  "will follow a low sodium diet;Patient will follow a low saturated fat diet;Patient knows appropriate portion size    Goals Met  Nutrition Goals Met: Completed Nutritional Risk Screen;Provided Rate your Plate Survey;Reviewed Dietitian schedule    Height, Weight, and  BMI  Weight: 155 lb (70.3 kg)  Height: 5' 7\" (1.702 m)  BMI: 24.27    Nutrition Follow-up  Follow-up/Discharge: Pt states he sees a dietician at the heart clinic, denies diet consult at this time. Will follow up       Other Risk Factors  Other Risk Factor Assessment: Initial    HTN Risk Factor: Hypertension    Pre Exercise BP: 104/72  Post Exercise BP: 119/81    Hypertension Plan  Goals  HTN Goals: Follow low sodium diet    Goals Met  HTN Goals Met: Take medication as prescribed;Exercises regularly    HTN Interventions  HTN Interventions: Diet consult;Therapist/patient discussion;Provide written material;Offer educational videos;Offer educational classes    HTN Education Completed  HTN Education Completed: Risk factor overview    Tobacco Risk Factor: NA    Risk Factor Follow-up   Follow-up/Discharge: Reviewed risk factor management. Monitor BPs PSYCHOSOCIAL  Psychosocial Assessment: Initial     Psychosocial Risk Factor: NA    Psychosocial Plan  Interventions  If RACQUEL-D > 15 send letter to MD  Interventions: Offer Social Service consult;Offer Spiritual Care consult;Offer educational videos and classes;Provide written material;Individual education and counseling    Education Completed  Education Completed: Effects of stress on body;S/S of depression    Goals  Goals (Next 30 days): Patient demonstrates understanding of stress, no goals identified for the next 30 days    Goals Met  Goals Met: Identified Support system    Psychosocial Follow-up  Follow-up/Discharge: Denies stress or psychosocial needs at this time. Reviewed effects of stress on the heart and s/s of depression       Patient involved in Goal setting?: Yes    Signature: " _____________________________________________________________    Date: __________________    Time: __________________

## 2021-06-10 NOTE — PROGRESS NOTES
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, Chronic systolic heart failure  Procedure: Heart transplant  Date of Onset: 12/12/16  ICD/Pacemaker: No Parameters: NA  Post-op Complications: respiratory failure, acute blood loss anemia, JOSE LUIS, 2R rejection, reactivation of Chagas dz, community-acquired pneumonia  ECG History: SR/ST with PVCs, Junctional, WAP, A-tach, symptomatic NSVT EF%:55-60  Past Medical History: latent TB, GERD, HTN, presbyopia    Physical Assessment  Precautions/ Physical Limitations: numbness in R wrist  Oxygen: No  O2 Sats:  Lung Sounds: Clear Edema: 0  Incisions: sternal incision well healed  Sleeping Pattern: good   Appetite: good   Nutrition Risk Screen: no risk at this time - declines diet consult, states he sees dietician at the heart clinic    Pain  Location:  Characteristics:  Intensity: (0-10 scale) 0  Current Pain Management:   Intervention:   Response:     Psychosocial/ Emotional Health  1. In the past 12 months, have you been in a relationship where you have been abused physically, emotionally, sexually or financially? No  notified: NA  2. Who do you turn to for emotional support?: Family  3. Do you have cultural or spiritual needs? No  4. Have there been any major life changes in the past 12 months? Heart transplant    Referral Information  Primary Physician: No Primary Care Provider  Cardiologist: Bryant Bartholomew  Surgeon: Elo Lantigua    Home exercise/Equipment: 5lb free weights    Patient's long-term goal(s): Return to work    1. Living Accommodations: Home Steps: Yes      Support people at home: Sister and sister's boyfriend   2. Marital Status: single  3. Family is able to assist with cares      [place]/Community involvement: \"no\"  4. Recreation/Hobbies: walking, watching tv          "

## 2021-06-11 NOTE — PROGRESS NOTES
ITP ASSESSMENT   Assessment Day: 60 Day  Session Number: 13  Precautions: S/P Heart transplant  Diagnosis: Heart transplant  Risk Stratification: High  Referring Provider: Geena Mcclellan  EXERCISE  Exercise Assessment: Reassessment   Pt is exercising at cardiac rehab for 40-50 min without cardiac sx's. Met levels are 3.5-4.3                         Exercise Plan  Goals Next 30 days  ADL'S: Increase home walking to 30 min 4-5 x a week  Work: RTW- [employer]    Education Goals: Medication review;Has system for taking medication.;Patient can state cardiac s/s and appropriate emergency response.;All goals in this section met  Education Goals Met: Patient can state cardiac s/s and appropriate emergency response.;Has system for taking medication.;Medication review.                        Goals Met  30 day ADL'S goals met: Pt is walking for 20 min --5 days a week  30 day Work goals met: Planning on RTW- FT at the end of this month- Goal not met yet  30 Day Progression: Continuing to progress- new goals set      Exercise Prescription  Exercise Mode: Treadmill;Bike;Nustep;Arm Erg.;Hallway Walking  Frequency: 2 x a week  Duration: 30-40 min  Intensity / THR: 20-30 beats above resting heart rate  RPE 11-14  Progression / Met level: 3.5-4.5  Resistive Training?: Yes    Current Exercise (mins/week): 180    Interventions  Home Exercise:  Mode: Walking  Frequency: 4-5 x a week  Duration: 30-40 min    Education Material : Educational videos;Provide written material;Individual education and counseling;Offer educational classes    Education Completed  Exercise Education Completed: Signs and Symptoms;RPE;Emergency Plan;Home Exercise;Warm up/cool down;FITT Principles;BP/HR Reponse to exercise;Stretching;Strength training;Benefits of Exercise;End point of exercise;Medication review;Cardiac Anatomy            Exercise Follow-up/Discharge  Follow up/Discharge: Reviewed home walking program NUTRITION  Nutrition Assessment:  "Reassessment    Nutrition Risk Factors:  Nutrition Risk Factors: Diabetes;Dyslipidemia  HbA1c: 11.6 (has not had checked since this reading)  Monitors blood sugar at home: Yes  Frequency: 3 x a day  Cholesterol: 198  LDL: 93  HDL: 88  Triglycerides: 82    Nutrition Plan  Interventions  Nutrition Interventions: Diet consult;Diet class;Therapist/Patient discussion;Educational videos;Provide with written material    Education Completed  Nutrition Education Completed: Risk factor overview    Goals  Nutrition Goals (Next 30 days): Patient will follow a low sodium diet;Patient will follow a low saturated fat diet    Goals Met  Nutrition Goals Met: Completed Nutritional Risk Screen;Provided Rate your Plate Survey;Reviewed Dietitian schedule    Height, Weight, and  BMI  Weight: 161 lb (73 kg)  Height: 5' 7\" (1.702 m)  BMI: 25.21    Nutrition Follow-up  Follow-up/Discharge: Pt reports that he follows with his diabetic dietician. Blood sugars drop with exercise- reviewed exercise precautions       Other Risk Factors  Other Risk Factor Assessment: Reassessment    HTN Risk Factor: NA    Pre Exercise BP: 90/52  Post Exercise BP: 92/58    Hypertension Plan  Goals  HTN Goals: Patient demonstrates understanding of HTN, no goals identified for the next 30 days    Goals Met  HTN Goals Met: Follow low sodium diet;Take medication as prescribed;Exercises regularly    HTN Interventions  HTN Interventions: Therapist/patient discussion;Offer educational classes    HTN Education Completed  HTN Education Completed: Low sodium diet;Medication review;Risk factor overview    Tobacco Risk Factor: NA      Risk Factor Follow-up   Follow-up/Discharge: reviewed low sodium/fat diet. He reports that \"he tries\"   PSYCHOSOCIAL  Psychosocial Assessment: Reassessment     Psychosocial Risk Factor: NA    Psychosocial Plan  Interventions  Interventions: Offer Social Service consult;Offer Spiritual Care consult;Offer educational videos and classes;Provide " "written material;Individual education and counseling    Education Completed  Education Completed: Effects of stress on body;S/S of depression;Relaxation/Coping Techniques    Goals  Goals (Next 30 days): Identified Support system;Identify stressors;Improvement in [employer] score;Practicing stress management skills    Goals Met  Goals Met: Identified Support system;Oriented to stress management classes    Psychosocial Follow-up  Follow-up/Discharge: Pt denies any stress- \"I am good\"           Patient involved in Goal setting?: Yes    Signature: _____________________________________________________________    Date: __________________    Time: __________________See Doc Flowsheet  "

## 2021-06-11 NOTE — PROGRESS NOTES
ITP ASSESSMENT   Assessment Day: 90 DayDischarge Note  Session Number: 17  Precautions: s/p Heart Transplant  Diagnosis: Heart transplant  Risk Stratification: High  Referring Provide: Dr. Geena Mcclellan  EXERCISE  Exercise Assessment: Discharge     Tolerates 40-45' of ex. At 3.4-4.8 Mets                         Exercise Plan  Goals Next 30 days  ADL'S: Walk 5-7 days per week;    No Data Recorded  Work: Resuming work 7/17/17    Education Goals: All goals in this section met  Education Goals Met: Patient can state cardiac s/s and appropriate emergency response.;Has system for taking medication.;Medication review.                        Goals Met  60 day ADL'S goals met: walks 4-5 days per week  No Data Recorded  60 day Work goals met: Planning on resuming work 7/17/17  60 Day Progression: Reports he has resumed  all tasks at home.      30 day ADL'S goals met: Pt is walking for 20 min --5 days a week  No Data Recorded  30 day Work goals met: Planning on RTW- FT at the end of this month- Goal not met yet  30 Day Progression: Continuing to progress- new goals set      Exercise Prescription  Exercise Mode: Treadmill;Bike;Nustep;Arm Erg.;Hallway Walking  Frequency: 2 x a week  Duration: 30-40 min  Intensity / THR: 20-30 beats above resting heart rate  RPE 11-14  Progression / Met level: 3.5-4.5  Resistive Training?: Yes    Current Exercise (mins/week): 200    Interventions  Home Exercise:  Mode: Walk  Frequency: 4-5 x week  Duration: 30-40'    Education Material : Educational videos;Provide written material;Individual education and counseling;Offer educational classes    Education Completed  Exercise Education Completed: Cardiac Anatomy;Signs and Symptoms;Medication review;RPE;Emergency Plan;Home Exercise;Warm up/cool down;FITT Principles;BP/HR Reponse to exercise;Strength training;Benefits of Exercise;End point of exercise            Exercise Follow-up/Discharge  Follow up/Discharge: Reviewd HP,  SX of intolerance and  "emergency plan.   NUTRITION  Nutrition Assessment: Discharge    Nutrition Risk Factors:  Nutrition Risk Factors: Diabetes;Dyslipidemia  Monitors blood sugar at home: Yes  Frequency: 3 x a day    Nutrition Plan  Interventions  Nutrition Interventions: Diet consult;Diet class;Therapist/Patient discussion;Educational videos;Provide with written material  No Data Recorded    Education Completed  Nutrition Education Completed: Risk factor overview    Goals  Nutrition Goals (Next 30 days): Patient will follow a low sodium diet;Patient will follow a low saturated fat diet    Goals Met  Nutrition Goals Met: Completed Nutritional Risk Screen;Provided Rate your Plate Survey;Reviewed Dietitian schedule    Height, Weight, and  BMI  Weight: 161 lb (73 kg)  Height: 5' 7\" (1.702 m)  BMI: 25.21    Nutrition Follow-up  Follow-up/Discharge: Pt reports that he follows with his diabetic dietician. Blood sugars drop with exercise- reviewed exercise precautions       Other Risk Factors  Other Risk Factor Assessment: Discharge    HTN Risk Factor: Hypertension    Pre Exercise BP: 100/58  Post Exercise BP: 90/58    Hypertension Plan  Goals  HTN Goals: Patient demonstrates understanding of HTN, no goals identified for the next 30 days    Goals Met  HTN Goals Met: Follow low sodium diet;Take medication as prescribed;Exercises regularly    HTN Interventions  HTN Interventions: Therapist/patient discussion;Offer educational classes    HTN Education Completed  HTN Education Completed: Low sodium diet;Medication review;Risk factor overview    Tobacco Risk Factor: NA    No Data Recorded       PSYCHOSOCIAL  Psychosocial Assessment: Discharge         Psychosocial Risk Factor: NA    Psychosocial Plan  Interventions  Interventions: Offer Social Service consult;Offer Spiritual Care consult;Offer educational videos and classes;Provide written material;Individual education and counseling    Education Completed  Education Completed: Effects of stress on " body;S/S of depression;Relaxation/Coping Techniques    Goals  Goals (Next 30 days): Identified Support system;Identify stressors;Improvement in [facility] score;Practicing stress management skills    Goals Met  Goals Met: Identified Support system;Oriented to stress management classes    Psychosocial Follow-up  Follow-up/Discharge: Denies stress.              Pt completed 17 Phase 2  Sessions.  Requested to be done with rehab.  Plans to resume work 7/17/17.  Reports he has resumed   All tasks at home, as to prior to surgery.  Reviewed HP, SX of intolerance,  Emergency plan.  Goals met.  Signature: _____________________________________________________________    Date: __________________    Time: _________________

## 2021-06-29 ENCOUNTER — APPOINTMENT (OUTPATIENT)
Dept: INTERPRETER SERVICES | Facility: CLINIC | Age: 51
End: 2021-06-29
Payer: MEDICAID

## 2021-12-20 ENCOUNTER — TELEPHONE (OUTPATIENT)
Dept: TRANSPLANT | Facility: CLINIC | Age: 51
End: 2021-12-20
Payer: MEDICAID

## 2021-12-20 ENCOUNTER — TELEPHONE (OUTPATIENT)
Dept: PHARMACY | Facility: CLINIC | Age: 51
End: 2021-12-20
Payer: MEDICAID

## 2021-12-20 NOTE — TELEPHONE ENCOUNTER
Pt called to discuss follow up and if he is taking meds correctly. No answer. VM left requesting a call back.

## 2021-12-20 NOTE — TELEPHONE ENCOUNTER
Clinical Pharmacy Consult:                                                      Transplant Specific: 5 Year Post Transplant Call  could not contact  Date of Transplant: 12/12/2016  Type of Transplant: heart  First Transplant: yes  History of rejection: no    Immunosuppression Regimen   TAC 1.5mg qAM & 1.5qPM and MMF 1250mg qAM & 1250qPM  Immunosuppressant Levels:  Therapeutic  Patient specific goal: 8-10  Pt adherent to lab draws: yes  Scr:   Lab Results   Component Value Date    CR 1.12 05/01/2019     Side effects: no side effects    Prophylactic Medications  Thrombosis Prevention: Aspirin 81 mg PO daily  Scheduled Discontinue Date: lifelong    Blood Pressure Management  Frequency of home Blood Pressure checks:   Most recent home BP: 115/78 at ER visit on 4/10/2020  Patient Blood pressure goal: <140/90  Patient blood pressure at goal:  yes  Hospitalizations/ER visits since last assessment: 0      Med rec/DUR performed: yes  Med Rec Discrepancies: no    Unable to reach Javi for 5 year post transplant call. Pharmacy refill history indicates adherence and tacrolimus level is at goal. Has not filled tacro 0.5mg with us in over 1 year.    Marino Lynch [facility]  Specialty Pharmacist [phone]

## 2021-12-21 ENCOUNTER — TELEPHONE (OUTPATIENT)
Dept: TRANSPLANT | Facility: CLINIC | Age: 51
End: 2021-12-21
Payer: MEDICAID

## 2021-12-27 ENCOUNTER — APPOINTMENT (OUTPATIENT)
Dept: INTERPRETER SERVICES | Facility: CLINIC | Age: 51
End: 2021-12-27
Payer: MEDICAID

## 2021-12-27 DIAGNOSIS — Z94.1 HEART REPLACED BY TRANSPLANT (H): ICD-10-CM

## 2021-12-27 RX ORDER — MYCOPHENOLATE MOFETIL 250 MG/1
1250 CAPSULE ORAL 2 TIMES DAILY
Qty: 900 CAPSULE | Refills: 11 | Status: SHIPPED | OUTPATIENT
Start: 2021-12-27 | End: 2023-01-10

## 2021-12-27 RX ORDER — TACROLIMUS 1 MG/1
1 CAPSULE ORAL 2 TIMES DAILY
Qty: 90 CAPSULE | Refills: 3 | Status: SHIPPED | OUTPATIENT
Start: 2021-12-27 | End: 2022-07-26

## 2022-02-23 ENCOUNTER — APPOINTMENT (OUTPATIENT)
Dept: INTERPRETER SERVICES | Facility: CLINIC | Age: 52
End: 2022-02-23
Payer: MEDICAID

## 2022-02-23 DIAGNOSIS — Z94.1 HEART REPLACED BY TRANSPLANT (H): ICD-10-CM

## 2022-02-24 ENCOUNTER — APPOINTMENT (OUTPATIENT)
Dept: INTERPRETER SERVICES | Facility: CLINIC | Age: 52
End: 2022-02-24
Payer: MEDICAID

## 2022-02-28 RX ORDER — PRAVASTATIN SODIUM 20 MG
TABLET ORAL
Qty: 90 TABLET | Refills: 3 | Status: SHIPPED | OUTPATIENT
Start: 2022-02-28 | End: 2023-01-10

## 2022-07-23 DIAGNOSIS — Z94.1 HEART REPLACED BY TRANSPLANT (H): ICD-10-CM

## 2022-07-26 RX ORDER — TACROLIMUS 1 MG/1
CAPSULE ORAL
Qty: 90 CAPSULE | Refills: 3 | Status: SHIPPED | OUTPATIENT
Start: 2022-07-26 | End: 2023-01-10

## 2022-10-13 ENCOUNTER — TELEPHONE (OUTPATIENT)
Dept: TRANSPLANT | Facility: CLINIC | Age: 52
End: 2022-10-13

## 2023-01-06 DIAGNOSIS — Z94.1 HEART REPLACED BY TRANSPLANT (H): ICD-10-CM

## 2023-01-10 ENCOUNTER — TELEPHONE (OUTPATIENT)
Dept: TRANSPLANT | Facility: CLINIC | Age: 53
End: 2023-01-10

## 2023-01-10 DIAGNOSIS — Z94.1 TRANSPLANTED HEART (H): Primary | ICD-10-CM

## 2023-01-10 RX ORDER — PRAVASTATIN SODIUM 20 MG
TABLET ORAL
Qty: 90 TABLET | Refills: 3 | Status: SHIPPED | OUTPATIENT
Start: 2023-01-10 | End: 2023-04-11

## 2023-01-10 RX ORDER — TACROLIMUS 1 MG/1
CAPSULE ORAL
Qty: 90 CAPSULE | Refills: 3 | Status: SHIPPED | OUTPATIENT
Start: 2023-01-10 | End: 2023-06-29

## 2023-01-10 RX ORDER — MYCOPHENOLATE MOFETIL 250 MG/1
1250 CAPSULE ORAL 2 TIMES DAILY
Qty: 900 CAPSULE | Refills: 11 | Status: SHIPPED | OUTPATIENT
Start: 2023-01-10 | End: 2023-04-19

## 2023-01-10 NOTE — TELEPHONE ENCOUNTER
Using a , I called Javi to inform him that he needs to be seen by a cardiology provider in order for us to continue to refill his medications.     Pt states he is doing fine with no complaints verbalized.     Pt is taking meds as prescribed. Writer explained he hasn't had a drug level checked since 2020 so it is very important he follow up with labs and other testing.     Orders in and pt requesting to be called after 3 pm to schedule.     Pt verbalized understanding and agrees with the plan.

## 2023-04-06 DIAGNOSIS — Z94.1 TRANSPLANTED HEART (H): Primary | ICD-10-CM

## 2023-04-06 DIAGNOSIS — Z13.220 LIPID SCREENING: ICD-10-CM

## 2023-04-06 DIAGNOSIS — Z12.5 PROSTATE CANCER SCREENING: ICD-10-CM

## 2023-04-06 DIAGNOSIS — E11.9 DIABETES MELLITUS (H): ICD-10-CM

## 2023-04-06 DIAGNOSIS — Z13.29 THYROID DISORDER SCREENING: ICD-10-CM

## 2023-04-06 RX ORDER — ASPIRIN 81 MG/1
243 TABLET, CHEWABLE ORAL ONCE
Status: CANCELLED | OUTPATIENT
Start: 2023-04-06

## 2023-04-06 RX ORDER — LIDOCAINE 40 MG/G
CREAM TOPICAL
Status: CANCELLED | OUTPATIENT
Start: 2023-04-06

## 2023-04-06 RX ORDER — SODIUM CHLORIDE 9 MG/ML
INJECTION, SOLUTION INTRAVENOUS CONTINUOUS
Status: CANCELLED | OUTPATIENT
Start: 2023-04-06

## 2023-04-06 RX ORDER — ASPIRIN 325 MG
325 TABLET ORAL ONCE
Status: CANCELLED | OUTPATIENT
Start: 2023-04-06 | End: 2023-04-06

## 2023-04-06 NOTE — PROGRESS NOTES
Pt called using a  to discuss his upcoming appointments and instructions. No answer. VM left requesting a callback.

## 2023-04-10 ENCOUNTER — TELEPHONE (OUTPATIENT)
Dept: CARDIOLOGY | Facility: CLINIC | Age: 53
End: 2023-04-10
Payer: MEDICAID

## 2023-04-10 NOTE — TELEPHONE ENCOUNTER
Call complete via  for pre procedure reminder and updated visitor policy.  Patient aware of need for  and that someone needs to stay with them after.

## 2023-04-11 ENCOUNTER — HOSPITAL ENCOUNTER (OUTPATIENT)
Dept: GENERAL RADIOLOGY | Facility: CLINIC | Age: 53
Discharge: HOME | End: 2023-04-11
Attending: INTERNAL MEDICINE
Payer: MEDICAID

## 2023-04-11 ENCOUNTER — OFFICE VISIT (OUTPATIENT)
Dept: CARDIOLOGY | Facility: CLINIC | Age: 53
End: 2023-04-11
Attending: INTERNAL MEDICINE
Payer: MEDICAID

## 2023-04-11 ENCOUNTER — APPOINTMENT (OUTPATIENT)
Dept: MEDSURG UNIT | Facility: CLINIC | Age: 53
End: 2023-04-11
Attending: INTERNAL MEDICINE
Payer: MEDICAID

## 2023-04-11 ENCOUNTER — HOSPITAL ENCOUNTER (OUTPATIENT)
Facility: CLINIC | Age: 53
Discharge: HOME | End: 2023-04-11
Attending: INTERNAL MEDICINE | Admitting: INTERNAL MEDICINE
Payer: MEDICAID

## 2023-04-11 ENCOUNTER — LAB (OUTPATIENT)
Dept: LAB | Facility: CLINIC | Age: 53
End: 2023-04-11
Attending: INTERNAL MEDICINE
Payer: MEDICAID

## 2023-04-11 ENCOUNTER — HOSPITAL ENCOUNTER (OUTPATIENT)
Dept: CARDIOLOGY | Facility: CLINIC | Age: 53
Discharge: HOME | End: 2023-04-11
Attending: INTERNAL MEDICINE
Payer: MEDICAID

## 2023-04-11 VITALS
DIASTOLIC BLOOD PRESSURE: 70 MMHG | WEIGHT: 160.3 LBS | SYSTOLIC BLOOD PRESSURE: 122 MMHG | HEART RATE: 85 BPM | BODY MASS INDEX: 25.11 KG/M2 | OXYGEN SATURATION: 100 %

## 2023-04-11 VITALS
DIASTOLIC BLOOD PRESSURE: 79 MMHG | OXYGEN SATURATION: 98 % | BODY MASS INDEX: 24.72 KG/M2 | WEIGHT: 157.5 LBS | HEIGHT: 67 IN | HEART RATE: 75 BPM | TEMPERATURE: 97.7 F | SYSTOLIC BLOOD PRESSURE: 121 MMHG | RESPIRATION RATE: 16 BRPM

## 2023-04-11 DIAGNOSIS — Z94.1 TRANSPLANTED HEART (H): ICD-10-CM

## 2023-04-11 DIAGNOSIS — Z94.1 HEART REPLACED BY TRANSPLANT (H): ICD-10-CM

## 2023-04-11 DIAGNOSIS — Z13.29 THYROID DISORDER SCREENING: ICD-10-CM

## 2023-04-11 DIAGNOSIS — Z12.5 PROSTATE CANCER SCREENING: ICD-10-CM

## 2023-04-11 DIAGNOSIS — Z13.220 LIPID SCREENING: ICD-10-CM

## 2023-04-11 DIAGNOSIS — E11.9 DIABETES MELLITUS (H): ICD-10-CM

## 2023-04-11 PROBLEM — Z98.890 STATUS POST CORONARY ANGIOGRAM: Status: ACTIVE | Noted: 2017-12-14

## 2023-04-11 LAB
ACT BLD: 190 SECONDS (ref 74–150)
ACT BLD: 237 SECONDS (ref 74–150)
ACT BLD: 237 SECONDS (ref 74–150)
ALBUMIN SERPL BCG-MCNC: 4.8 G/DL (ref 3.5–5.2)
ALP SERPL-CCNC: 101 U/L (ref 40–129)
ALT SERPL W P-5'-P-CCNC: 35 U/L (ref 10–50)
ANION GAP SERPL CALCULATED.3IONS-SCNC: 10 MMOL/L (ref 7–15)
AST SERPL W P-5'-P-CCNC: 31 U/L (ref 10–50)
BASOPHILS # BLD AUTO: 0 10E3/UL (ref 0–0.2)
BASOPHILS NFR BLD AUTO: 1 %
BILIRUB SERPL-MCNC: 0.7 MG/DL
BUN SERPL-MCNC: 13.6 MG/DL (ref 6–20)
CALCIUM SERPL-MCNC: 9.9 MG/DL (ref 8.6–10)
CHLORIDE SERPL-SCNC: 104 MMOL/L (ref 98–107)
CHOLEST SERPL-MCNC: 178 MG/DL
CK SERPL-CCNC: 115 U/L (ref 39–308)
CREAT SERPL-MCNC: 0.91 MG/DL (ref 0.67–1.17)
DEPRECATED HCO3 PLAS-SCNC: 27 MMOL/L (ref 22–29)
EOSINOPHIL # BLD AUTO: 0.2 10E3/UL (ref 0–0.7)
EOSINOPHIL NFR BLD AUTO: 2 %
ERYTHROCYTE [DISTWIDTH] IN BLOOD BY AUTOMATED COUNT: 13 % (ref 10–15)
GFR SERPL CREATININE-BSD FRML MDRD: >90 ML/MIN/1.73M2
GLUCOSE SERPL-MCNC: 100 MG/DL (ref 70–99)
HBA1C MFR BLD: 5.9 %
HCT VFR BLD AUTO: 44.8 % (ref 40–53)
HDLC SERPL-MCNC: 49 MG/DL
HGB BLD-MCNC: 13.1 G/DL (ref 13.3–17.7)
HGB BLD-MCNC: 14.7 G/DL (ref 13.3–17.7)
IMM GRANULOCYTES # BLD: 0 10E3/UL
IMM GRANULOCYTES NFR BLD: 0 %
LDLC SERPL CALC-MCNC: 107 MG/DL
LVEF ECHO: NORMAL
LYMPHOCYTES # BLD AUTO: 1.4 10E3/UL (ref 0.8–5.3)
LYMPHOCYTES NFR BLD AUTO: 18 %
MAGNESIUM SERPL-MCNC: 2.1 MG/DL (ref 1.7–2.3)
MCH RBC QN AUTO: 29.5 PG (ref 26.5–33)
MCHC RBC AUTO-ENTMCNC: 32.8 G/DL (ref 31.5–36.5)
MCV RBC AUTO: 90 FL (ref 78–100)
MONOCYTES # BLD AUTO: 0.7 10E3/UL (ref 0–1.3)
MONOCYTES NFR BLD AUTO: 9 %
NEUTROPHILS # BLD AUTO: 5.5 10E3/UL (ref 1.6–8.3)
NEUTROPHILS NFR BLD AUTO: 70 %
NONHDLC SERPL-MCNC: 129 MG/DL
NRBC # BLD AUTO: 0 10E3/UL
NRBC BLD AUTO-RTO: 0 /100
OXYHGB MFR BLDV: 68 % (ref 92–100)
PHOSPHATE SERPL-MCNC: 2.3 MG/DL (ref 2.5–4.5)
PLATELET # BLD AUTO: 205 10E3/UL (ref 150–450)
POTASSIUM SERPL-SCNC: 4.3 MMOL/L (ref 3.4–5.3)
PROT SERPL-MCNC: 8.3 G/DL (ref 6.4–8.3)
PSA SERPL DL<=0.01 NG/ML-MCNC: 0.6 NG/ML (ref 0–3.5)
RBC # BLD AUTO: 4.99 10E6/UL (ref 4.4–5.9)
SODIUM SERPL-SCNC: 141 MMOL/L (ref 136–145)
TACROLIMUS BLD-MCNC: 4.8 UG/L (ref 5–15)
TME LAST DOSE: ABNORMAL H
TME LAST DOSE: ABNORMAL H
TRIGL SERPL-MCNC: 111 MG/DL
TSH SERPL DL<=0.005 MIU/L-ACNC: 1.38 UIU/ML (ref 0.3–4.2)
WBC # BLD AUTO: 7.9 10E3/UL (ref 4–11)

## 2023-04-11 PROCEDURE — 85347 COAGULATION TIME ACTIVATED: CPT | Mod: 91

## 2023-04-11 PROCEDURE — 99215 OFFICE O/P EST HI 40 MIN: CPT | Mod: 25 | Performed by: NURSE PRACTITIONER

## 2023-04-11 PROCEDURE — 99152 MOD SED SAME PHYS/QHP 5/>YRS: CPT | Performed by: INTERNAL MEDICINE

## 2023-04-11 PROCEDURE — 999N000134 HC STATISTIC PP CARE STAGE 3: Performed by: INTERNAL MEDICINE

## 2023-04-11 PROCEDURE — 36415 COLL VENOUS BLD VENIPUNCTURE: CPT | Performed by: PATHOLOGY

## 2023-04-11 PROCEDURE — 80050 GENERAL HEALTH PANEL: CPT | Performed by: PATHOLOGY

## 2023-04-11 PROCEDURE — 999N000054 HC STATISTIC EKG NON-CHARGEABLE

## 2023-04-11 PROCEDURE — 86352 CELL FUNCTION ASSAY W/STIM: CPT | Performed by: INTERNAL MEDICINE

## 2023-04-11 PROCEDURE — 93005 ELECTROCARDIOGRAM TRACING: CPT

## 2023-04-11 PROCEDURE — 93306 TTE W/DOPPLER COMPLETE: CPT

## 2023-04-11 PROCEDURE — 999N000142 HC STATISTIC PROCEDURE PREP ONLY: Performed by: INTERNAL MEDICINE

## 2023-04-11 PROCEDURE — G0103 PSA SCREENING: HCPCS | Performed by: PATHOLOGY

## 2023-04-11 PROCEDURE — 71046 X-RAY EXAM CHEST 2 VIEWS: CPT | Mod: 26 | Performed by: RADIOLOGY

## 2023-04-11 PROCEDURE — 83735 ASSAY OF MAGNESIUM: CPT | Performed by: PATHOLOGY

## 2023-04-11 PROCEDURE — 71046 X-RAY EXAM CHEST 2 VIEWS: CPT

## 2023-04-11 PROCEDURE — C1769 GUIDE WIRE: HCPCS | Performed by: INTERNAL MEDICINE

## 2023-04-11 PROCEDURE — 250N000009 HC RX 250: Performed by: INTERNAL MEDICINE

## 2023-04-11 PROCEDURE — 250N000011 HC RX IP 250 OP 636: Performed by: INTERNAL MEDICINE

## 2023-04-11 PROCEDURE — 93456 R HRT CORONARY ARTERY ANGIO: CPT | Performed by: INTERNAL MEDICINE

## 2023-04-11 PROCEDURE — 86832 HLA CLASS I HIGH DEFIN QUAL: CPT | Performed by: INTERNAL MEDICINE

## 2023-04-11 PROCEDURE — 272N000001 HC OR GENERAL SUPPLY STERILE: Performed by: INTERNAL MEDICINE

## 2023-04-11 PROCEDURE — 93306 TTE W/DOPPLER COMPLETE: CPT | Mod: 26 | Performed by: INTERNAL MEDICINE

## 2023-04-11 PROCEDURE — 84100 ASSAY OF PHOSPHORUS: CPT | Performed by: PATHOLOGY

## 2023-04-11 PROCEDURE — 250N000013 HC RX MED GY IP 250 OP 250 PS 637: Performed by: INTERNAL MEDICINE

## 2023-04-11 PROCEDURE — 93010 ELECTROCARDIOGRAM REPORT: CPT | Performed by: INTERNAL MEDICINE

## 2023-04-11 PROCEDURE — 258N000003 HC RX IP 258 OP 636: Performed by: INTERNAL MEDICINE

## 2023-04-11 PROCEDURE — C1887 CATHETER, GUIDING: HCPCS | Performed by: INTERNAL MEDICINE

## 2023-04-11 PROCEDURE — 93456 R HRT CORONARY ARTERY ANGIO: CPT | Mod: 26 | Performed by: INTERNAL MEDICINE

## 2023-04-11 PROCEDURE — 99152 MOD SED SAME PHYS/QHP 5/>YRS: CPT | Mod: GC | Performed by: INTERNAL MEDICINE

## 2023-04-11 PROCEDURE — 86833 HLA CLASS II HIGH DEFIN QUAL: CPT | Performed by: INTERNAL MEDICINE

## 2023-04-11 PROCEDURE — 80197 ASSAY OF TACROLIMUS: CPT | Performed by: INTERNAL MEDICINE

## 2023-04-11 PROCEDURE — G0463 HOSPITAL OUTPT CLINIC VISIT: HCPCS | Mod: 25 | Performed by: NURSE PRACTITIONER

## 2023-04-11 PROCEDURE — 82550 ASSAY OF CK (CPK): CPT | Performed by: PATHOLOGY

## 2023-04-11 PROCEDURE — 99153 MOD SED SAME PHYS/QHP EA: CPT | Performed by: INTERNAL MEDICINE

## 2023-04-11 PROCEDURE — 83036 HEMOGLOBIN GLYCOSYLATED A1C: CPT | Performed by: INTERNAL MEDICINE

## 2023-04-11 PROCEDURE — 86828 HLA CLASS I&II ANTIBODY QUAL: CPT | Performed by: INTERNAL MEDICINE

## 2023-04-11 PROCEDURE — 87799 DETECT AGENT NOS DNA QUANT: CPT | Performed by: INTERNAL MEDICINE

## 2023-04-11 PROCEDURE — C1894 INTRO/SHEATH, NON-LASER: HCPCS | Performed by: INTERNAL MEDICINE

## 2023-04-11 PROCEDURE — 85347 COAGULATION TIME ACTIVATED: CPT

## 2023-04-11 PROCEDURE — 80061 LIPID PANEL: CPT | Performed by: PATHOLOGY

## 2023-04-11 PROCEDURE — 85018 HEMOGLOBIN: CPT

## 2023-04-11 PROCEDURE — 82810 BLOOD GASES O2 SAT ONLY: CPT

## 2023-04-11 RX ORDER — ATROPINE SULFATE 0.1 MG/ML
0.5 INJECTION INTRAVENOUS
Status: DISCONTINUED | OUTPATIENT
Start: 2023-04-11 | End: 2023-04-11 | Stop reason: HOSPADM

## 2023-04-11 RX ORDER — B-COMPLEX WITH VITAMIN C
500 TABLET ORAL DAILY
Qty: 90 TABLET | Refills: 3 | Status: ON HOLD | OUTPATIENT
Start: 2023-04-11 | End: 2023-04-11

## 2023-04-11 RX ORDER — FENTANYL CITRATE 50 UG/ML
25 INJECTION, SOLUTION INTRAMUSCULAR; INTRAVENOUS
Status: DISCONTINUED | OUTPATIENT
Start: 2023-04-11 | End: 2023-04-11 | Stop reason: HOSPADM

## 2023-04-11 RX ORDER — ASPIRIN 325 MG
325 TABLET ORAL ONCE
Status: COMPLETED | OUTPATIENT
Start: 2023-04-11 | End: 2023-04-11

## 2023-04-11 RX ORDER — NALOXONE HYDROCHLORIDE 0.4 MG/ML
0.4 INJECTION, SOLUTION INTRAMUSCULAR; INTRAVENOUS; SUBCUTANEOUS
Status: DISCONTINUED | OUTPATIENT
Start: 2023-04-11 | End: 2023-04-11 | Stop reason: HOSPADM

## 2023-04-11 RX ORDER — NALOXONE HYDROCHLORIDE 0.4 MG/ML
0.2 INJECTION, SOLUTION INTRAMUSCULAR; INTRAVENOUS; SUBCUTANEOUS
Status: DISCONTINUED | OUTPATIENT
Start: 2023-04-11 | End: 2023-04-11 | Stop reason: HOSPADM

## 2023-04-11 RX ORDER — ROSUVASTATIN CALCIUM 10 MG/1
10 TABLET, COATED ORAL DAILY
Qty: 90 TABLET | Refills: 3 | Status: SHIPPED | OUTPATIENT
Start: 2023-04-11 | End: 2024-03-01

## 2023-04-11 RX ORDER — ASPIRIN 81 MG/1
243 TABLET, CHEWABLE ORAL ONCE
Status: COMPLETED | OUTPATIENT
Start: 2023-04-11 | End: 2023-04-11

## 2023-04-11 RX ORDER — FLUMAZENIL 0.1 MG/ML
0.2 INJECTION, SOLUTION INTRAVENOUS
Status: DISCONTINUED | OUTPATIENT
Start: 2023-04-11 | End: 2023-04-11 | Stop reason: HOSPADM

## 2023-04-11 RX ORDER — LIDOCAINE 40 MG/G
CREAM TOPICAL
Status: DISCONTINUED | OUTPATIENT
Start: 2023-04-11 | End: 2023-04-11 | Stop reason: HOSPADM

## 2023-04-11 RX ORDER — OXYCODONE HYDROCHLORIDE 10 MG/1
10 TABLET ORAL EVERY 4 HOURS PRN
Status: DISCONTINUED | OUTPATIENT
Start: 2023-04-11 | End: 2023-04-11 | Stop reason: HOSPADM

## 2023-04-11 RX ORDER — HEPARIN SODIUM 1000 [USP'U]/ML
INJECTION, SOLUTION INTRAVENOUS; SUBCUTANEOUS
Status: DISCONTINUED | OUTPATIENT
Start: 2023-04-11 | End: 2023-04-11 | Stop reason: HOSPADM

## 2023-04-11 RX ORDER — FENTANYL CITRATE 50 UG/ML
INJECTION, SOLUTION INTRAMUSCULAR; INTRAVENOUS
Status: DISCONTINUED | OUTPATIENT
Start: 2023-04-11 | End: 2023-04-11 | Stop reason: HOSPADM

## 2023-04-11 RX ORDER — IOPAMIDOL 755 MG/ML
INJECTION, SOLUTION INTRAVASCULAR
Status: DISCONTINUED | OUTPATIENT
Start: 2023-04-11 | End: 2023-04-11 | Stop reason: HOSPADM

## 2023-04-11 RX ORDER — SODIUM CHLORIDE 9 MG/ML
INJECTION, SOLUTION INTRAVENOUS CONTINUOUS
Status: DISCONTINUED | OUTPATIENT
Start: 2023-04-11 | End: 2023-04-11 | Stop reason: HOSPADM

## 2023-04-11 RX ORDER — OXYCODONE HYDROCHLORIDE 5 MG/1
5 TABLET ORAL EVERY 4 HOURS PRN
Status: DISCONTINUED | OUTPATIENT
Start: 2023-04-11 | End: 2023-04-11 | Stop reason: HOSPADM

## 2023-04-11 RX ADMIN — SODIUM CHLORIDE: 9 INJECTION, SOLUTION INTRAVENOUS at 10:47

## 2023-04-11 RX ADMIN — ASPIRIN 81 MG 243 MG: 81 TABLET ORAL at 11:16

## 2023-04-11 RX ADMIN — LIDOCAINE: 40 CREAM TOPICAL at 11:16

## 2023-04-11 ASSESSMENT — ACTIVITIES OF DAILY LIVING (ADL)
ADLS_ACUITY_SCORE: 35

## 2023-04-11 ASSESSMENT — PAIN SCALES - GENERAL: PAINLEVEL: NO PAIN (0)

## 2023-04-11 NOTE — NURSING NOTE
Transplant Coordinator Note    Reason for visit: 6th annual   Coordinator: Present       Health concerns addressed today:  1. He took tacro last night at 730 pm. Then after blood draw.   2.   3.       Immunosuppressants:  MMF 1250  Tac goal 6-8. Current dose 1 mg bid.       Routine screenings:    Derm:   Dental: Due, pt states no insurance.   Colonoscopy:  Prostate:  Eye: Due, pt states no insurance.   Flu/Pneumonia:   Covid:       Resulted labs reviewed with patient  Medication record reviewed and reconciled  Questions and concerns addressed  Pt verbalized an understanding of plan of care.     Patient Instructions  1. Please see a eye doctor regarding your vision.   2. I will have  contact you to see if there is anything we can do for insurance coverage.   3. Stop pravastatin and start rosuvastatin (crestor) instead.   4. Increase Mycophenolate to 6 capsules (1500 mg) in am and 6 capsules (1500 mg) in pm.   5. Restart calcium back up.   6. I will follow up with you once we have results from today.     Next transplant clinic appointment:  Next annual  Next lab draw: To be determined, depending on today's results.   Coordinator will call with all pending results.     Please call transplant coordinator with any questions:    Yanelis Araiza RN BSN   Post Heart Transplant Nurse Coordinator  [facility]  Questions: [phone]

## 2023-04-11 NOTE — PATIENT INSTRUCTIONS
Patient Instructions  1. Please see a eye doctor regarding your vision.   2. I will have  contact you to see if there is anything we can do for insurance coverage.   3. Stop pravastatin and start rosuvastatin (crestor) instead.   4. Increase Mycophenolate to 6 capsules (1500 mg) in am and 6 capsules (1500 mg) in pm.   5. Restart calcium back up.   6. I will follow up with you once we have results from today.     Next transplant clinic appointment:  Next annual  Next lab draw: To be determined, depending on today's results.   Coordinator will call with all pending results.     Please call transplant coordinator with any questions:    Yanelis Araiza RN BSN   Post Heart Transplant Nurse Coordinator  [facility]  Questions: [phone]   - Continue home alprazolam 0.25mg BID  - ISTOP Reference #: [id number]

## 2023-04-11 NOTE — NURSING NOTE
Chief Complaint   Patient presents with     Follow Up     Return Heart Transplant     Vitals were taken and medications reconciled.    JUNIE Hodge  8:51 AM      Chonodrocutaneous Helical Advancement Flap Text: The defect edges were debeveled with a #15 scalpel blade.  Given the location of the defect and the proximity to free margins a chondrocutaneous helical advancement flap was deemed most appropriate.  Using a sterile surgical marker, the appropriate advancement flap was drawn incorporating the defect and placing the expected incisions within the relaxed skin tension lines where possible.    The area thus outlined was incised deep to adipose tissue with a #15 scalpel blade.  The skin margins were undermined to an appropriate distance in all directions utilizing iris scissors.

## 2023-04-11 NOTE — DISCHARGE INSTRUCTIONS
Going Home after a Coronary Angiogram and Right Heart Cath  ______________________________________________    After you go home:  Have an adult stay with you for 24 hours.  Drink plenty of fluids.  You may eat your normal diet, unless your doctor tells you otherwise.  For 24 hours:  Relax and take it easy.  Do NOT smoke.  Do NOT make any important or legal decisions.  Do NOT drive or operate machines at home or at work.  Do NOT drink alcohol.  Remove the Band-Aid after 24 hours. If there is minor oozing, apply another Band-aid and remove it after 12 hours.  For 2 days, do NOT have sex or do any heavy exercise.  Do NOT take a bath, or use a hot tub or pool for at least 3 days. You may shower.    Care of wrist or arm site  It is normal to have soreness at the puncture site and mild tingling in your hand for up to 3 days.  For 2 days, do not use your hand or arm to support your weight (such as rising from a chair) or bend your wrist (such as lifting a garage door).  For 2 days, do not lift more than 5 pounds or exercise your arm (tennis, golf or bowling).    If you start bleeding from the site in your arm:  Sit down and press firmly on the site with your fingers for 10 minutes. Call your doctor as soon as you can.  If the bleeding stops, sit still and keep your wrist straight for 2 hours.    Care of neck site  Monitor neck site for bleeding, swelling, or voice changes. If you notice bleeding or swelling immediately apply pressure to the site and call number below to speak with Cardiology Fellow.  If you experience any changes in your breathing you should call your doctor immediately or come to the closest Emergency Department.  Do not drive yourself.    Medicines  If you have started taking Plavix or Effient, do not stop taking it until you talk to your heart doctor (cardiologist).  If you are on metformin (Glucophage), do not restart it until you have blood tests (within 2 to 3 days after discharge). When your  doctor tells you it is safe, you may restart the metformin.  If you have stopped any other medicines, check with your nurse or provider about when to restart them.    Call 911 right away if you have bleeding that is heavy or does not stop.    Call your doctor if:  You have a large or growing hard lump around the site.  The site is red, swollen, hot or tender.  Blood or fluid is draining from the site.  You have chills or a fever greater than 101 F (38 C).  Your leg or arm feels numb or cool.  You have hives, a rash or unusual itching.    [facility]:  [phone] (7 days a week)

## 2023-04-11 NOTE — PROGRESS NOTES
D/I/A:  Patient is tolerating liquids and foods, ambulating, urinating, puncture sites are stable (no bleeding and no hematoma) and patient has a  - nephew.  A+O x4 and making needs known.  CCL access sites C/D/I; no bleeding or hematoma; CMS intact.  VSSA.  SR on monitor.  IV access removed.  Education completed and outlined in AVS or handout: medications reviewed with patient.  Questions answered prior to discharge.  Belongings returned to patient at discharge.    P: Discharged to self care.  Patient to follow up with appts as per discharge instruction.

## 2023-04-11 NOTE — Clinical Note
----- Message from Manpreet Pierre sent at 6/15/2022 11:59 AM CDT -----  Regarding: wants to know where you will send pain medication refill, call pt   Contact: pt   wants to know where you will send pain medication refill, call pt      LCA Cine(s)  injected utilizing power injector system. Multiple times in multiple views

## 2023-04-11 NOTE — PROGRESS NOTES
Pt arrived for RHC and CORS. VSS on RA, denies pain. Lidocaine to RIJ site, covered with a   Tegaderm. Groin area prepped. Pedal pulses marked. Consent needs to be signed. H&P current. 243 mg of ASA given this AM. PIV infusing NS @ 150 mL/hr. Labs resulted from clinic apt this AM; paging [phone] in regards to pt's Phos being 2.3.  present at the bedside. Tigre Dolan available post procedure (number on front of pt's chart).

## 2023-04-11 NOTE — PROGRESS NOTES
7 Fr Venous internal jugular sheath pulled by TOBY Lim. Patient tolerated well. Hemostasis Achieved 1614.  Will continue to monitor

## 2023-04-11 NOTE — Clinical Note
Potential access sites were evaluated for patency using ultrasound.   The right radial artery and right jugular vein were selected. Access was obtained under with Sonosite and Fluoroscopic guidance using a micropuncture 21 gauge needle and 18G with direct visualization of needle entry.

## 2023-04-11 NOTE — LETTER
April 11, 2023      Javi Bansal  [address]    [place] [address]        To Whom It May Concern:    Javi Bansal was seen in our cathlab today. He may return to work with the following: limited to light duty - lifting no greater than 5 pounds with his right arm due to bleeding risk for a total of 7 days. If this restriction cannot be respected, then he should resume work in 7 days.       Sincerely,      Dr Jon Reid  Interventional cardiology fellow,  [facility].

## 2023-04-11 NOTE — PROGRESS NOTES
D/I/A: Pt roomed on 3C in [address].  Arrived via litter and accompanied by cath lab RN on monitor.  VSSA.  Rhythm upon arrival SR on monitor.  Denies pain or sob.  Reviewed activity restrictions and when to notify RN, ie-changes to breathing or increased chest pressure or chest pain.  CCL access:  Right TR band on with 12 ml of air, deflation to start at 1525.  7 Fr sheath remains in RIJ.  Site WDL, no bleeding or hematoma.   P: Continue to monitor status.  Discharge to home once meeting criteria.

## 2023-04-11 NOTE — Clinical Note
dry, intact, no bleeding and no hematoma. RIJ 7Fr- removed, pressure held to hemostasis, primapore.   RRA 6Fr- removed, TR band applied, 12cc air instilled, armboard.

## 2023-04-11 NOTE — PROGRESS NOTES
ADULT HEART TRANSPLANT CLINIC  April 11, 2023    HPI:   Mr. Javi Bansal is a 52yr old male with a history of Chagas cardiomyopathy (diagnosed in 2010, treated with nifurtimox) and biventricular systolic heart failure (diagnosed 2015, on home milrinone) s/p OHT 12/12/16 who presents to clinic for routine surveillance.  A professional  was available over the phone.     His postoperative course was complicated by rejection; respiratory failure and JOSE LUIS, which resolved with diuresis; right pleural effusion, requiring pigtail placement 12/17/16; atrial and ventricular tachyarrhythmias; and Chagas reactivation 12/26/16, treated with benznidasole per Transplant ID.  He has since had low-level EBV and CMV viremia.    Rejection history:  Initial biopsy 12/2016 showed 2R ACR, which was treated with IV steroids.  Subsequent biopsies have remained negative for rejection.  AlloMap scores:  < 35 (last 4/2018)  DSAs:  none (last 12/2018)  Coronary angio/Ischemic eval:  Last coronary angio 12/2018 showed normal coronary arteries with no angiographic evidence of CAV per IVUS.  Last RHC:  12/2018 showed normal biventricular filling pressures with RA 4, mPA 17, PCW 8, and CI 2.0.  Echo/cMRI:  Last TTE 5/2019 showed stable graft function, with LVEF 55-60% and nnormal RV size/function.    He has been struggling with health insurance coverage, so has not been seen in Transplant Clinic for nearly 4 years.    Since his last visit, he states that he has been doing well.  He remains active, with no exertional concerns.  His breathing hs been well, and denies SOB.  He is sleeping well, in a bed with 1 pillow, and denies PND and orthopnea.  His appetite has been well, and he is eating regularly without nausea, vomiting, diarrhea, and constipation.  He is not weighing himself regularly, but notes that his weight remains stable when checked.  He denies fluid retention.  He is not checking BPs regularly.  He  otherwise denies chest pain, palpitations, dizziness, falls, headaches, acute vision changes, fevers, chills, cough, sore throat, and signs of bleeding.      Serostatus:  CMV D+/R+  EBV D+/R+    Patient Active Problem List    Diagnosis Date Noted     Pneumonia due to 2019 novel coronavirus 04/10/2020     Priority: Medium     Admit 4/10/2020-4/29/2020 for COVID19 pna and bacterial pna; rx 02, azithromycin and ceftriaxone       superinfection, respiratory 04/10/2020     Priority: Medium     Concurrent bacterial superinfection with COVID19; rx abx, O2.       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 500 mg)  14 DPT: 2R, neg/neg treated w/IV pred x 3 (1000mg) + thymo x 3  21 DPT: 1R, neg/neg       Ventricular tachycardia, non-sustained (H) 01/04/2017     Priority: Medium     Elevated liver enzymes 12/20/2016     Priority: Medium     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 12/19/2016     Priority: Medium     Need for prophylactic antibiotic 12/19/2016     Priority: Medium     Heart failure (H) 12/12/2016     Priority: Medium     Heart replaced by transplant (H) 12/12/2016     Priority: Medium     ACP (advance care planning) 10/10/2016     Priority: Medium     Advance Care Planning 10/10/2016: Receipt of ACP document:  Received: invalid HCD document dated 8-4-16.  Document not previously scanned. Validation form completed indicating invalid document. Copy sent to client with information and facilitation resources. Validation form sent to be scanned as notation of invalid document received. Confirmed/documented designated decision maker(s).  Added by Namrata Neely RN Advance Care  Planning Liaison with [employer]             Chest pain 07/11/2016     Priority: Medium     Chronic systolic heart failure (H)      Priority: Medium     CHF (congestive heart failure) (H) 06/17/2016     Priority: Medium     Chagas cardiomyopathy 05/26/2016     Priority: Medium       PAST MEDICAL HISTORY:  Past Medical History:   Diagnosis Date     Chagas cardiomyopathy      Chronic systolic heart failure (H)      Diabetes (H)     Steroid induced; 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 suspected , but unclear dx     superinfection, respiratory 04/10/2020    Concurrent bacterial superinfection with COVID19; rx abx, O2.       CURRENT MEDICATIONS:  Prescription Medications as of 4/11/2023       Rx Number Disp Refills Start End Last Dispensed Date Next Fill Date Owning Pharmacy    aspirin (ASA) 81 MG EC tablet  90 tablet 3 8/21/2019    [facility] [place] [address]    Sig: Take 1 tablet (81 mg) by mouth daily    Class: E-Prescribe    Route: Oral    calcium carbonate-vitamin D (OS-DAISY) 500-400 MG-UNIT tablet  90 tablet 3 1/4/2019    [facility] [place] [address] [address]    Sig: Take 1 tablet by mouth daily    Class: E-Prescribe    Route: Oral    mycophenolate (GENERIC EQUIVALENT) 250 MG capsule  900 capsule 11 1/10/2023    [facility] [place] [address] [address]    Sig: Take 5 capsules (1,250 mg) by mouth 2 times daily    Class: E-Prescribe    Route: Oral    pravastatin (PRAVACHOL) 20 MG tablet  90  tablet 3 1/10/2023    [facility] [place] [address]    Sig: TAKE ONE TABLET BY MOUTH EVERY MORNING    Class: E-Prescribe    tacrolimus (GENERIC EQUIVALENT) 0.5 MG capsule  180 capsule 3 4/20/2020    [facility] [place] [address]    Sig: Take one capsule (0.5 mg) with one 1 mg capsule to total 1.5 mg twice daily.    Class: E-Prescribe    Notes to Pharmacy: TXP DT 12/12/2016 (Heart) TXP Dischg DT 1/11/2017 DX Heart replaced by transplant Z94.1 [facility] ([place], [place])    tacrolimus (GENERIC EQUIVALENT) 1 MG capsule  90 capsule 3 1/10/2023    [facility] [place] [address]    Sig: TAKE ONE CAPSULE BY MOUTH TWICE A DAY    Class: E-Prescribe          ROS:   Constitutional: No fever, chills, or sweats. Stable weight.   ENT: No visual disturbance, ear ache, epistaxis, sore throat.   Allergies/Immunologic: Negative.   Respiratory: No cough, hemoptysis.   Cardiovascular: As per HPI.   GI: No nausea, vomiting, hematemesis, melena, or hematochezia.   : No urinary frequency, dysuria, or hematuria.   Integument: Negative.   Psychiatric: Negative.   Neuro: Negative.   Endocrinology: Negative.   Musculoskeletal: Negative.    Exam:  /70 (BP Location: Right arm, Patient Position: Chair, Cuff Size: Adult Regular)   Pulse 85   Wt 72.7 kg (160 lb 4.8 oz)   SpO2 100%   BMI 25.11 kg/m    In general, the patient is a pleasant male in no apparent distress.    HEENT: NC/AT. PERRLA. EOMI.  Sclerae white, not injected.    Neck:  No adenopathy, No thyromegaly.    COR: No jugular venous distention when sitting upright.  RRR.  Normal S1 S2 splits physiologically.  No murmur, rub click, or gallop.    Lungs:  CTA. No rhonchi.    Abdomen: soft, nontender, nondistended.  No organomegaly.  Extremities:  No clubbing, cyanosis, or LE edema.    Neuro: Alert & Oriented x  3, grossly non focal.  Integument: no open lesions, rashes, or jaundice.    Labs:  CBC RESULTS:   Lab Results   Component Value Date    WBC 5.9 05/04/2020    RBC 4.24 (L) 05/04/2020    HGB 12.8 (L) 05/04/2020    HCT 40.1 05/04/2020    MCV 95 05/04/2020    MCH 30.2 05/04/2020    MCHC 31.9 05/04/2020    RDW 13.3 05/04/2020     05/04/2020       BMP RESULTS:  Lab Results   Component Value Date     05/04/2020    POTASSIUM 4.8 05/04/2020    CHLORIDE 110 (H) 05/04/2020    CO2 26 05/04/2020    ANIONGAP 5 05/04/2020    GLC 96 05/04/2020    BUN 13 05/04/2020    CR 0.98 05/04/2020    GFRESTIMATED 89 05/04/2020    GFRESTBLACK >90 05/04/2020    DAISY 9.2 05/04/2020      LIPID RESULTS:  Lab Results   Component Value Date    CHOL 164 08/23/2018    HDL 43 08/23/2018    LDL 90 08/23/2018    TRIG 155 (H) 08/23/2018    NHDL 121 08/23/2018       IMMUNOSUPPRESSANT LEVELS  Lab Results   Component Value Date    TACROL 7.1 05/04/2020    DOSTAC 5/3/2020 20:00 05/04/2020       No components found for: CK  Lab Results   Component Value Date    MAG 1.8 04/13/2020    MAG 2.1 05/01/2019     Lab Results   Component Value Date    A1C 7.5 (H) 06/02/2017     Lab Results   Component Value Date    PHOS 3.5 04/13/2020    PHOS 2.6 05/01/2019     Lab Results   Component Value Date    NTBNPI 313 04/10/2020     Lab Results   Component Value Date    SAITESTMET [facility] 12/06/2018    SAICELL Class I 12/06/2018    [id number] None 12/06/2018    [id number] None 12/06/2018    SAIREPCOM  12/06/2018      Test performed by modified procedure. Serum heat inactivated and tested   by a modified (Alpha) protocol including fetal calf serum addition.   High-risk, mfi >3,000. Mod-risk, mfi 500-3,000.       Lab Results   Component Value Date    SAIITESTME [facility] 12/06/2018    SAIICELL Class II 12/06/2018    [id number] None 12/06/2018    [id number] None 12/06/2018    SAIIREPCOM  12/06/2018      Test performed by modified procedure. Serum heat inactivated and tested    by a modified (Jeannette) protocol including fetal calf serum addition.   High-risk, mfi >3,000. Mod-risk, mfi 500-3,000.       Lab Results   Component Value Date    CSPEC Plasma 12/06/2018       Assessment and Plan:  Mr. Javi Bansal is a 52yr old male with a history of Chagas cardiomyopathy (diagnosed in 2010, treated with nifurtimox) and biventricular systolic heart failure (diagnosed 2015, on home milrinone) s/p OHT 12/12/16 who presents to clinic for routine surveillance.  A professional  was available over the phone.    Labs today show stable electrolytes, renal function, liver function, and blood counts.  FLP shows .  PSA 0.6, TSH 1.38.      He will have a CXR, echo, coronary angio, and RHC following this appt.    Mr. Iglesia Munroe appears well.  His BPs are controlled.  His weight trend appears stable, and he is euvolemic on exam.    As his LDL is elevated, will stop pravastatin and start rosuvastatin 10mg daily.    When he had COVID in 2020, MMF was decreased to 1250mg BID.  Advised that he increase MMF back to 1500mg BID.    He will follow-up in clinic per protocol, or sooner should new concerns arise.      NICM, s/p OHT 12/12/16   His postoperative course was complicated by rejection; respiratory failure and JOSE LUIS, which resolved with diuresis; right pleural effusion, requiring pigtail placement 12/17/16; atrial and ventricular tachyarrhythmias; and Chagas reactivation 12/26/16, treated with benznidasole per Transplant ID.  He has since had low-level EBV and CMV viremia.    Rejection history:  Initial biopsy 12/2016 showed 2R ACR, which was treated with IV steroids.  Subsequent biopsies have remained negative for rejection.  AlloMap scores:  < 35 (last 4/2018)  DSAs:  none (last 12/2018)  Coronary angio/Ischemic eval:  Last coronary angio 12/2018 showed normal coronary arteries with no angiographic evidence of CAV per IVUS.  Last RHC:  12/2018 showed normal biventricular  filling pressures with RA 4, mPA 17, PCW 8, and CI 2.0.  Echo/cMRI:  Last TTE 5/2019 showed stable graft function, with LVEF 55-60% and nnormal RV size/function.    Serostatus:  - CMV D+/R+  - EBV D+/R+    Immunosuppression:  - increase MMF back to 1500mg twice daily (see above)  - tacro, goal level 6-8.  Level today in process --> will reflect a 12 hour trough.    PPx:  - CAV:  Aspirin 81mg daily.  Stopping pravastatin and starting rosuvastatin 10mg daily ().  - GI:  n/a  - Osteoporosis:  Advised restarting ca/vitamin D, Rx resent today    Graft function:  - BPs:  Controlled, not on antihypertensives  - fluid status:  Euvolemic, not on diuretics    Health maintenance:  - eye exam overdue, no insurance coverage  - dental exam overdue, no insurance coverage --> note broken tooth, has had molars removed, needs cleaning  --> providing SW contact information, discussed that several health maintenance items need to be adressed      Low-level CMV viremia, resolved  Low-level CMV titers, resolved per last check 12/2018.  Chagas   Chagas reactivated following his transplant (12/26/16).  He was treated with Benznidazole for 60 days, with subsequent Chagas PCRs negative.  Transplant ID remains available if needed.  Latent TB  Treated with INH, per Transplant ID.  h/o shingles  Since transplant, treated with Valtrex.        The above was reviewed with  Iglesia Shetty, who verbalized understanding and will call with further questions/concerns.    50 minutes spent with patient, along with preparing for visit, reviewing follow up, and completing documentation.      Mary Huffman, DNP, FNP-BC  Advanced Heart Failure Nurse Practitioner  [facility]  Patient Care Team:  No Ref-Primary, Physician as PCP - General  Broderick Gao MD, MD as MD (Cardiology)  Shauna Cuellar MD as MD (Infectious Diseases)  Marino Woo MD as MD (Dermatology)  Bryant Bartholomew MD as MD (Family Medicine -  Sports Medicine)  Isabela Balbuena MD (Ophthalmology)  Yanelis Araiza RN as Transplant Coordinator  [facility]

## 2023-04-11 NOTE — LETTER
4/11/2023      RE: Javi Bansal  [address]  [address]  [place] [address]       Dear Colleague,    Thank you for the opportunity to participate in the care of your patient, Javi Bansal, at the [facility] [place] at [facility]. Please see a copy of my visit note below.    ADULT HEART TRANSPLANT CLINIC  April 11, 2023    HPI:   Mr. Javi Bansal is a 52yr old male with a history of Chagas cardiomyopathy (diagnosed in 2010, treated with nifurtimox) and biventricular systolic heart failure (diagnosed 2015, on home milrinone) s/p OHT 12/12/16 who presents to clinic for routine surveillance.  A professional  was available over the phone.     His postoperative course was complicated by rejection; respiratory failure and JOSE LUIS, which resolved with diuresis; right pleural effusion, requiring pigtail placement 12/17/16; atrial and ventricular tachyarrhythmias; and Chagas reactivation 12/26/16, treated with benznidasole per Transplant ID.  He has since had low-level EBV and CMV viremia.    Rejection history:  Initial biopsy 12/2016 showed 2R ACR, which was treated with IV steroids.  Subsequent biopsies have remained negative for rejection.  AlloMap scores:  < 35 (last 4/2018)  DSAs:  none (last 12/2018)  Coronary angio/Ischemic eval:  Last coronary angio 12/2018 showed normal coronary arteries with no angiographic evidence of CAV per IVUS.  Last RHC:  12/2018 showed normal biventricular filling pressures with RA 4, mPA 17, PCW 8, and CI 2.0.  Echo/cMRI:  Last TTE 5/2019 showed stable graft function, with LVEF 55-60% and nnormal RV size/function.    He has been struggling with health insurance coverage, so has not been seen in Transplant Clinic for nearly 4 years.    Since his last visit, he states that he has been doing well.  He remains active, with no exertional concerns.  His breathing  hs been well, and denies SOB.  He is sleeping well, in a bed with 1 pillow, and denies PND and orthopnea.  His appetite has been well, and he is eating regularly without nausea, vomiting, diarrhea, and constipation.  He is not weighing himself regularly, but notes that his weight remains stable when checked.  He denies fluid retention.  He is not checking BPs regularly.  He otherwise denies chest pain, palpitations, dizziness, falls, headaches, acute vision changes, fevers, chills, cough, sore throat, and signs of bleeding.      Serostatus:  CMV D+/R+  EBV D+/R+    Patient Active Problem List    Diagnosis Date Noted    Pneumonia due to 2019 novel coronavirus 04/10/2020     Priority: Medium     Admit 4/10/2020-4/29/2020 for COVID19 pna and bacterial pna; rx 02, azithromycin and ceftriaxone      superinfection, respiratory 04/10/2020     Priority: Medium     Concurrent bacterial superinfection with COVID19; rx abx, O2.      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 500 mg)  14 DPT: 2R, neg/neg treated w/IV pred x 3 (1000mg) + thymo x 3  21 DPT: 1R, neg/neg      Ventricular tachycardia, non-sustained (H) 01/04/2017     Priority: Medium    Elevated liver enzymes 12/20/2016     Priority: Medium    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 12/19/2016     Priority: Medium    Need for prophylactic antibiotic 12/19/2016     Priority: Medium    Heart failure (H) 12/12/2016     Priority: Medium    Heart replaced by transplant (H) 12/12/2016     Priority: Medium    ACP (advance care planning) 10/10/2016     Priority: Medium     Advance Care Planning 10/10/2016: Receipt of ACP document:   Received: invalid [facility] document dated 8-4-16.  Document not previously scanned. Validation form completed indicating invalid document. Copy sent to client with information and facilitation resources. Validation form sent to be scanned as notation of invalid document received. Confirmed/documented designated decision maker(s).  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 suspected , but unclear dx    superinfection, respiratory 04/10/2020    Concurrent bacterial superinfection with COVID19; rx abx, O2.       CURRENT MEDICATIONS:  Prescription Medications as of 4/11/2023         Rx Number Disp Refills Start End Last Dispensed Date Next Fill Date Owning Pharmacy    aspirin (ASA) 81 MG EC tablet  90 tablet 3 8/21/2019    [facility] [place] [address]    Sig: Take 1 tablet (81 mg) by mouth daily    Class: E-Prescribe    Route: Oral    calcium carbonate-vitamin D (OS-DAISY) 500-400 MG-UNIT tablet  90 tablet 3 1/4/2019    [facility] [place] [address]    Sig: Take 1  tablet by mouth daily    Class: E-Prescribe    Route: Oral    mycophenolate (GENERIC EQUIVALENT) 250 MG capsule  900 capsule 11 1/10/2023    [facility] [place] [address]    Sig: Take 5 capsules (1,250 mg) by mouth 2 times daily    Class: E-Prescribe    Route: Oral    pravastatin (PRAVACHOL) 20 MG tablet  90 tablet 3 1/10/2023    [facility] [place] [address]    Sig: TAKE ONE TABLET BY MOUTH EVERY MORNING    Class: E-Prescribe    tacrolimus (GENERIC EQUIVALENT) 0.5 MG capsule  180 capsule 3 4/20/2020    [facility] [place] [address]    Sig: Take one capsule (0.5 mg) with one 1 mg capsule to total 1.5 mg twice daily.    Class: E-Prescribe    Notes to Pharmacy: TXP DT 12/12/2016 (Heart) TXP Dischg DT 1/11/2017 DX Heart replaced by transplant Z94.1 [facility] ([place], [place])    tacrolimus (GENERIC EQUIVALENT) 1 MG capsule  90 capsule 3 1/10/2023    [facility] [place] [address]    Sig: TAKE ONE CAPSULE BY MOUTH TWICE A DAY    Class: E-Prescribe            ROS:   Constitutional: No fever, chills, or sweats. Stable weight.   ENT: No visual disturbance, ear ache, epistaxis, sore throat.   Allergies/Immunologic: Negative.   Respiratory: No cough, hemoptysis.   Cardiovascular: As per HPI.   GI: No nausea, vomiting, hematemesis, melena, or hematochezia.   : No urinary frequency, dysuria, or hematuria.   Integument: Negative.   Psychiatric: Negative.   Neuro: Negative.   Endocrinology: Negative.   Musculoskeletal: Negative.    Exam:  /70 (BP Location: Right arm, Patient Position: Chair, Cuff Size: Adult Regular)   Pulse 85   Wt 72.7 kg (160 lb 4.8 oz)   SpO2 100%   BMI 25.11 kg/m    In general, the patient is a pleasant male in no apparent distress.    HEENT: NC/AT. PERRLA. EOMI.  Sclerae white, not  injected.    Neck:  No adenopathy, No thyromegaly.    COR: No jugular venous distention when sitting upright.  RRR.  Normal S1 S2 splits physiologically.  No murmur, rub click, or gallop.    Lungs:  CTA. No rhonchi.    Abdomen: soft, nontender, nondistended.  No organomegaly.  Extremities:  No clubbing, cyanosis, or LE edema.    Neuro: Alert & Oriented x 3, grossly non focal.  Integument: no open lesions, rashes, or jaundice.    Labs:  CBC RESULTS:   Lab Results   Component Value Date    WBC 5.9 05/04/2020    RBC 4.24 (L) 05/04/2020    HGB 12.8 (L) 05/04/2020    HCT 40.1 05/04/2020    MCV 95 05/04/2020    MCH 30.2 05/04/2020    MCHC 31.9 05/04/2020    RDW 13.3 05/04/2020     05/04/2020       BMP RESULTS:  Lab Results   Component Value Date     05/04/2020    POTASSIUM 4.8 05/04/2020    CHLORIDE 110 (H) 05/04/2020    CO2 26 05/04/2020    ANIONGAP 5 05/04/2020    GLC 96 05/04/2020    BUN 13 05/04/2020    CR 0.98 05/04/2020    GFRESTIMATED 89 05/04/2020    GFRESTBLACK >90 05/04/2020    DAISY 9.2 05/04/2020      LIPID RESULTS:  Lab Results   Component Value Date    CHOL 164 08/23/2018    HDL 43 08/23/2018    LDL 90 08/23/2018    TRIG 155 (H) 08/23/2018    NHDL 121 08/23/2018       IMMUNOSUPPRESSANT LEVELS  Lab Results   Component Value Date    TACROL 7.1 05/04/2020    DOSTAC 5/3/2020 20:00 05/04/2020       No components found for: CK  Lab Results   Component Value Date    MAG 1.8 04/13/2020    MAG 2.1 05/01/2019     Lab Results   Component Value Date    A1C 7.5 (H) 06/02/2017     Lab Results   Component Value Date    PHOS 3.5 04/13/2020    PHOS 2.6 05/01/2019     Lab Results   Component Value Date    NTBNPI 313 04/10/2020     Lab Results   Component Value Date    SAITESTMET SA FCS 12/06/2018    SAICELL Class I 12/06/2018    [id number] None 12/06/2018    [id number] None 12/06/2018    SAIREPCOM  12/06/2018      Test performed by modified procedure. Serum heat inactivated and tested   by a modified ([place])  protocol including fetal calf serum addition.   High-risk, mfi >3,000. Mod-risk, mfi 500-3,000.       Lab Results   Component Value Date    SAIITESTME SA FCS 12/06/2018    SAIICELL Class II 12/06/2018    [id number] None 12/06/2018    [id number] None 12/06/2018    SAIIREPCOM  12/06/2018      Test performed by modified procedure. Serum heat inactivated and tested   by a modified ([place]) protocol including fetal calf serum addition.   High-risk, mfi >3,000. Mod-risk, mfi 500-3,000.       Lab Results   Component Value Date    CSPEC Plasma 12/06/2018       Assessment and Plan:  Mr. Javi Bansal is a 52yr old male with a history of Chagas cardiomyopathy (diagnosed in 2010, treated with nifurtimox) and biventricular systolic heart failure (diagnosed 2015, on home milrinone) s/p OHT 12/12/16 who presents to clinic for routine surveillance.  A professional  was available over the phone.    Labs today show stable electrolytes, renal function, liver function, and blood counts.  FLP shows .  PSA 0.6, TSH 1.38.      He will have a CXR, echo, coronary angio, and RHC following this appt.    Mr. Iglesia Munroe appears well.  His BPs are controlled.  His weight trend appears stable, and he is euvolemic on exam.    As his LDL is elevated, will stop pravastatin and start rosuvastatin 10mg daily.    When he had COVID in 2020, MMF was decreased to 1250mg BID.  Advised that he increase MMF back to 1500mg BID.    He will follow-up in clinic per protocol, or sooner should new concerns arise.      ANITA, s/p OHT 12/12/16   His postoperative course was complicated by rejection; respiratory failure and JOSE LUIS, which resolved with diuresis; right pleural effusion, requiring pigtail placement 12/17/16; atrial and ventricular tachyarrhythmias; and Chagas reactivation 12/26/16, treated with benznidasole per Transplant ID.  He has since had low-level EBV and CMV viremia.    Rejection history:  Initial biopsy 12/2016  showed 2R ACR, which was treated with IV steroids.  Subsequent biopsies have remained negative for rejection.  AlloMap scores:  < 35 (last 4/2018)  DSAs:  none (last 12/2018)  Coronary angio/Ischemic eval:  Last coronary angio 12/2018 showed normal coronary arteries with no angiographic evidence of CAV per IVUS.  Last RHC:  12/2018 showed normal biventricular filling pressures with RA 4, mPA 17, PCW 8, and CI 2.0.  Echo/cMRI:  Last TTE 5/2019 showed stable graft function, with LVEF 55-60% and nnormal RV size/function.    Serostatus:  - CMV D+/R+  - EBV D+/R+    Immunosuppression:  - increase MMF back to 1500mg twice daily (see above)  - tacro, goal level 6-8.  Level today in process --> will reflect a 12 hour trough.    PPx:  - CAV:  Aspirin 81mg daily.  Stopping pravastatin and starting rosuvastatin 10mg daily ().  - GI:  n/a  - Osteoporosis:  Advised restarting ca/vitamin D, Rx resent today    Graft function:  - BPs:  Controlled, not on antihypertensives  - fluid status:  Euvolemic, not on diuretics    Health maintenance:  - eye exam overdue, no insurance coverage  - dental exam overdue, no insurance coverage --> note broken tooth, has had molars removed, needs cleaning  --> providing SW contact information, discussed that several health maintenance items need to be adressed      Low-level CMV viremia, resolved  Low-level CMV titers, resolved per last check 12/2018.  Chagas   Chagas reactivated following his transplant (12/26/16).  He was treated with Benznidazole for 60 days, with subsequent Chagas PCRs negative.  Transplant ID remains available if needed.  Latent TB  Treated with INH, per Transplant ID.  h/o shingles  Since transplant, treated with Valtrex.        The above was reviewed with Mr. Iglesia Shetty, who verbalized understanding and will call with further questions/concerns.    50 minutes spent with patient, along with preparing for visit, reviewing follow up, and completing  documentation.      Mary Huffman, ESTEFANÍA, FNP-BC  Advanced Heart Failure Nurse Practitioner  [facility]  Patient Care Team:  No Ref-Primary, Physician as PCP - General  Broderick Gao MD, MD as MD (Cardiology)  Shauna Cuellar MD as MD (Infectious Diseases)  Marino Woo MD as MD (Dermatology)  Bryant Bartholomew MD as MD (Family Medicine - Sports Medicine)  Isabela Balbuena MD (Ophthalmology)  Yanelis Araiza RN as Transplant Coordinator  [facility]

## 2023-04-12 LAB
ATRIAL RATE - MUSE: 82 BPM
CMV DNA SPEC NAA+PROBE-ACNC: NOT DETECTED IU/ML
DIASTOLIC BLOOD PRESSURE - MUSE: NORMAL MMHG
EBV DNA # SPEC NAA+PROBE: <500 COPIES/ML
EBV DNA SPEC NAA+PROBE-LOG#: <2.7 {LOG_COPIES}/ML
INTERPRETATION ECG - MUSE: NORMAL
P AXIS - MUSE: 60 DEGREES
PR INTERVAL - MUSE: 172 MS
QRS DURATION - MUSE: 114 MS
QT - MUSE: 392 MS
QTC - MUSE: 457 MS
R AXIS - MUSE: 76 DEGREES
SYSTOLIC BLOOD PRESSURE - MUSE: NORMAL MMHG
T AXIS - MUSE: 58 DEGREES
VENTRICULAR RATE- MUSE: 82 BPM

## 2023-04-14 LAB
DONOR IDENTIFICATION: NORMAL
DSA COMMENTS: NORMAL
DSA PRESENT: NO
DSA TEST METHOD: NORMAL
IMMUKNOW IMMUNE CELL FUNCTION: 424 NG/ML
ORGAN: NORMAL
SA 1 CELL: NORMAL
SA 1 TEST METHOD: NORMAL
SA 2 CELL: NORMAL
SA 2 TEST METHOD: NORMAL
SA1 HI RISK ABY: NORMAL
SA1 MOD RISK ABY: NORMAL
SA2 HI RISK ABY: NORMAL
SA2 MOD RISK ABY: NORMAL
SCR 1 TEST METHOD: NORMAL
SCR1 CELL: NORMAL
SCR1 RESULT: NORMAL
SCR2 CELL: NORMAL
SCR2 RESULT: NORMAL
SCR2 TEST METHOD: NORMAL
UNACCEPTABLE ANTIGENS: NORMAL
UNOS CPRA: 0
ZZZSA 1  COMMENTS: NORMAL
ZZZSA 2 COMMENTS: NORMAL
ZZZSCR1 COMMENTS: NORMAL
ZZZSCR2 COMMENTS: NORMAL

## 2023-04-19 DIAGNOSIS — Z94.1 HEART REPLACED BY TRANSPLANT (H): ICD-10-CM

## 2023-04-19 RX ORDER — MYCOPHENOLATE MOFETIL 250 MG/1
1500 CAPSULE ORAL 2 TIMES DAILY
Qty: 900 CAPSULE | Refills: 11
Start: 2023-04-19 | End: 2023-06-29

## 2023-05-05 ENCOUNTER — APPOINTMENT (OUTPATIENT)
Dept: GENERAL RADIOLOGY | Facility: CLINIC | Age: 53
End: 2023-05-05
Attending: EMERGENCY MEDICINE
Payer: MEDICAID

## 2023-05-05 ENCOUNTER — HOSPITAL ENCOUNTER (EMERGENCY)
Facility: CLINIC | Age: 53
Discharge: HOME | End: 2023-05-05
Attending: EMERGENCY MEDICINE | Admitting: EMERGENCY MEDICINE
Payer: MEDICAID

## 2023-05-05 VITALS
RESPIRATION RATE: 17 BRPM | SYSTOLIC BLOOD PRESSURE: 106 MMHG | HEIGHT: 67 IN | BODY MASS INDEX: 25.11 KG/M2 | DIASTOLIC BLOOD PRESSURE: 68 MMHG | OXYGEN SATURATION: 98 % | HEART RATE: 93 BPM | TEMPERATURE: 99.3 F | WEIGHT: 160 LBS

## 2023-05-05 DIAGNOSIS — J10.1 INFLUENZA A: ICD-10-CM

## 2023-05-05 LAB
ALBUMIN SERPL BCG-MCNC: 4.9 G/DL (ref 3.5–5.2)
ALP SERPL-CCNC: 93 U/L (ref 40–129)
ALT SERPL W P-5'-P-CCNC: 26 U/L (ref 10–50)
ANION GAP SERPL CALCULATED.3IONS-SCNC: 14 MMOL/L (ref 7–15)
AST SERPL W P-5'-P-CCNC: 39 U/L (ref 10–50)
BASOPHILS # BLD AUTO: 0 10E3/UL (ref 0–0.2)
BASOPHILS NFR BLD AUTO: 0 %
BILIRUB SERPL-MCNC: 0.5 MG/DL
BUN SERPL-MCNC: 14.6 MG/DL (ref 6–20)
CALCIUM SERPL-MCNC: 9.7 MG/DL (ref 8.6–10)
CHLORIDE SERPL-SCNC: 103 MMOL/L (ref 98–107)
CREAT SERPL-MCNC: 1.02 MG/DL (ref 0.67–1.17)
DEPRECATED HCO3 PLAS-SCNC: 20 MMOL/L (ref 22–29)
EOSINOPHIL # BLD AUTO: 0.1 10E3/UL (ref 0–0.7)
EOSINOPHIL NFR BLD AUTO: 1 %
ERYTHROCYTE [DISTWIDTH] IN BLOOD BY AUTOMATED COUNT: 13 % (ref 10–15)
FLUAV RNA SPEC QL NAA+PROBE: POSITIVE
FLUBV RNA RESP QL NAA+PROBE: NEGATIVE
GFR SERPL CREATININE-BSD FRML MDRD: 88 ML/MIN/1.73M2
GLUCOSE SERPL-MCNC: 100 MG/DL (ref 70–99)
GROUP A STREP BY PCR: NOT DETECTED
HCT VFR BLD AUTO: 45.8 % (ref 40–53)
HGB BLD-MCNC: 15.1 G/DL (ref 13.3–17.7)
IMM GRANULOCYTES # BLD: 0.1 10E3/UL
IMM GRANULOCYTES NFR BLD: 1 %
LYMPHOCYTES # BLD AUTO: 0.8 10E3/UL (ref 0.8–5.3)
LYMPHOCYTES NFR BLD AUTO: 8 %
MCH RBC QN AUTO: 29.9 PG (ref 26.5–33)
MCHC RBC AUTO-ENTMCNC: 33 G/DL (ref 31.5–36.5)
MCV RBC AUTO: 91 FL (ref 78–100)
MONOCYTES # BLD AUTO: 0.8 10E3/UL (ref 0–1.3)
MONOCYTES NFR BLD AUTO: 8 %
NEUTROPHILS # BLD AUTO: 8.1 10E3/UL (ref 1.6–8.3)
NEUTROPHILS NFR BLD AUTO: 82 %
NRBC # BLD AUTO: 0 10E3/UL
NRBC BLD AUTO-RTO: 0 /100
PLATELET # BLD AUTO: 222 10E3/UL (ref 150–450)
POTASSIUM SERPL-SCNC: 4.3 MMOL/L (ref 3.4–5.3)
PROT SERPL-MCNC: 8.7 G/DL (ref 6.4–8.3)
RBC # BLD AUTO: 5.05 10E6/UL (ref 4.4–5.9)
RSV RNA SPEC NAA+PROBE: NEGATIVE
SARS-COV-2 RNA RESP QL NAA+PROBE: NEGATIVE
SODIUM SERPL-SCNC: 137 MMOL/L (ref 136–145)
WBC # BLD AUTO: 9.9 10E3/UL (ref 4–11)

## 2023-05-05 PROCEDURE — 71046 X-RAY EXAM CHEST 2 VIEWS: CPT

## 2023-05-05 PROCEDURE — 99284 EMERGENCY DEPT VISIT MOD MDM: CPT | Mod: CS | Performed by: EMERGENCY MEDICINE

## 2023-05-05 PROCEDURE — 80053 COMPREHEN METABOLIC PANEL: CPT | Performed by: EMERGENCY MEDICINE

## 2023-05-05 PROCEDURE — C9803 HOPD COVID-19 SPEC COLLECT: HCPCS | Performed by: EMERGENCY MEDICINE

## 2023-05-05 PROCEDURE — 99284 EMERGENCY DEPT VISIT MOD MDM: CPT | Mod: 25,CS | Performed by: EMERGENCY MEDICINE

## 2023-05-05 PROCEDURE — 71046 X-RAY EXAM CHEST 2 VIEWS: CPT | Mod: 26 | Performed by: RADIOLOGY

## 2023-05-05 PROCEDURE — 85014 HEMATOCRIT: CPT | Performed by: EMERGENCY MEDICINE

## 2023-05-05 PROCEDURE — 87637 SARSCOV2&INF A&B&RSV AMP PRB: CPT | Mod: 59 | Performed by: EMERGENCY MEDICINE

## 2023-05-05 PROCEDURE — 87637 SARSCOV2&INF A&B&RSV AMP PRB: CPT | Performed by: EMERGENCY MEDICINE

## 2023-05-05 PROCEDURE — 36415 COLL VENOUS BLD VENIPUNCTURE: CPT | Performed by: EMERGENCY MEDICINE

## 2023-05-05 PROCEDURE — 87651 STREP A DNA AMP PROBE: CPT | Performed by: EMERGENCY MEDICINE

## 2023-05-05 RX ORDER — OSELTAMIVIR PHOSPHATE 75 MG/1
75 CAPSULE ORAL 2 TIMES DAILY
Qty: 10 CAPSULE | Refills: 0 | Status: SHIPPED | OUTPATIENT
Start: 2023-05-05 | End: 2023-05-10

## 2023-05-05 ASSESSMENT — ACTIVITIES OF DAILY LIVING (ADL)
ADLS_ACUITY_SCORE: 33
ADLS_ACUITY_SCORE: 35

## 2023-05-05 NOTE — LETTER
[facility] EMERGENCY DEPARTMENT  [address] [address]  [phone]      May 5, 2023    Javi Bansal  [address]    [place] [address]  [phone] (home)     : 1970      To Whom it may concern:    Javi Parekh Diogenes was seen in our Emergency Department today, May 5, 2023. Please excuse him from work until Thursday May 11.    Sincerely,    Qing Thomas MD

## 2023-05-05 NOTE — ED TRIAGE NOTES
Pt walk in from home with C/O productive cough, HA and flu like symptoms, worsening yesterday with subjective fevers unrelieved by home meds.  Denies CP, dizziness, N/V/D.  VSS, pt alert x 4 with mildly elevated oral temp 99..9F     Triage Assessment     Row Name 05/05/23 0617       Triage Assessment (Adult)    Airway WDL WDL       Respiratory WDL    Respiratory WDL WDL       Skin Circulation/Temperature WDL    Skin Circulation/Temperature WDL WDL       Cardiac WDL    Cardiac WDL WDL       Peripheral/Neurovascular WDL    Peripheral Neurovascular WDL WDL       Cognitive/Neuro/Behavioral WDL    Cognitive/Neuro/Behavioral WDL WDL

## 2023-05-05 NOTE — ED PROVIDER NOTES
ED Provider Note  [facility]      History     Chief Complaint   Patient presents with     Flu Symptoms     HPI  Javi Bansal is a 52 year old male with a history of Chagas cardiomyopathy (diagnosed in 2010, treated with nifurtimox) and biventricular systolic heart failure (diagnosed 2015, on home milrinone) s/p OHT 12/12/16, who presents with primary complaint of cough, subjective fevers, sore throat, headache.  He states that he began having what he thought was a cold about a week ago.  He took some over-the-counter cold medication, but yesterday things started to get worse.  He started having subjective fevers, sputum became more copious and yellow, he started developing headaches-especially when he coughs a lot.  No vomiting or diarrhea.  No other complaints.  No recent travel outside of [place].  He is compliant with his transplant medications.  No notable shortness of breath.    Past Medical History  Past Medical History:   Diagnosis Date     Chagas cardiomyopathy      Chronic systolic heart failure (H)      Diabetes (H)     Steroid induced; 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 suspected , but unclear dx     superinfection, respiratory 04/10/2020    Concurrent bacterial superinfection with COVID19; rx abx, O2.     Past Surgical History:   Procedure Laterality Date     CARDIAC SURGERY      AICD     CV CORONARY ANGIOGRAM N/A 4/11/2023    Procedure: Coronary Angiogram;  Surgeon: Ameya Estes MD;  Location:  HEART CARDIAC CATH LAB     CV RIGHT  "HEART CATH MEASUREMENTS RECORDED N/A 4/11/2023    Procedure: Right Heart Catheterization;  Surgeon: Ameya Estes MD;  Location:  HEART CARDIAC CATH LAB     TRANSPLANT HEART RECIPIENT N/A 12/12/2016    Procedure: TRANSPLANT HEART RECIPIENT;  Surgeon: Elo Madsen MD;  Location:  OR     aspirin (ASA) 81 MG EC tablet  mycophenolate (GENERIC EQUIVALENT) 250 MG capsule  rosuvastatin (CRESTOR) 10 MG tablet  tacrolimus (GENERIC EQUIVALENT) 0.5 MG capsule  tacrolimus (GENERIC EQUIVALENT) 1 MG capsule      Allergies   Allergen Reactions     Rocephin [Ceftriaxone] Other (See Comments)     Interacts w/Tracolimus levels per cardiologist      Zithromax [Azithromycin] Other (See Comments)     Interacts w/Tracolimus levels per cardiologist      Family History  Family History   Problem Relation Age of Onset     Brain Cancer Mother      Melanoma No family hx of      Skin Cancer No family hx of      Glaucoma No family hx of      Macular Degeneration No family hx of      Social History   Social History     Tobacco Use     Smoking status: Never     Passive exposure: Current     Smokeless tobacco: Never   Substance Use Topics     Alcohol use: Yes     Alcohol/week: 0.0 standard drinks of alcohol     Comment: one beer every 20 days     Drug use: No         A medically appropriate review of systems was performed with pertinent positives and negatives noted in the HPI, and all other systems negative.    Physical Exam   BP: 103/68  Pulse: 98  Temp: 99.9  F (37.7  C)  Resp: 20  Height: 169 cm (5' 6.54\")  Weight: 72.6 kg (160 lb)  SpO2: 98 %  Physical Exam  Constitutional:       General: He is not in acute distress.     Appearance: Normal appearance. He is not toxic-appearing.   HENT:      Head: Atraumatic.      Nose: No congestion.      Mouth/Throat:      Pharynx: No oropharyngeal exudate or posterior oropharyngeal erythema.   Eyes:      General: No scleral icterus.     Conjunctiva/sclera: Conjunctivae normal. "   Cardiovascular:      Rate and Rhythm: Normal rate and regular rhythm.   Pulmonary:      Effort: Pulmonary effort is normal. No respiratory distress.      Breath sounds: Normal breath sounds.   Abdominal:      General: Abdomen is flat.      Palpations: Abdomen is soft.      Tenderness: There is no abdominal tenderness.   Musculoskeletal:         General: No deformity.      Cervical back: Neck supple. No tenderness.   Lymphadenopathy:      Cervical: No cervical adenopathy.   Skin:     General: Skin is warm.      Capillary Refill: Capillary refill takes less than 2 seconds.   Neurological:      Mental Status: He is alert.           ED Course, Procedures, & Data      Procedures                     Results for orders placed or performed during the hospital encounter of 05/05/23   Chest XR,  PA & LAT     Status: None    Narrative    EXAM: XR CHEST 2 VIEWS  LOCATION: [facility]  DATE/TIME: 5/5/2023 6:55 AM CDT    INDICATION: cough  COMPARISON: 04/11/2023      Impression    IMPRESSION: The heart is unchanged in size and contour, postsurgical changes from prior cardiac surgery are again noted. The lungs are clear bilaterally.   CBC with platelets and differential     Status: None   Result Value Ref Range    WBC Count 9.9 4.0 - 11.0 10e3/uL    RBC Count 5.05 4.40 - 5.90 10e6/uL    Hemoglobin 15.1 13.3 - 17.7 g/dL    Hematocrit 45.8 40.0 - 53.0 %    MCV 91 78 - 100 fL    MCH 29.9 26.5 - 33.0 pg    MCHC 33.0 31.5 - 36.5 g/dL    RDW 13.0 10.0 - 15.0 %    Platelet Count 222 150 - 450 10e3/uL    % Neutrophils 82 %    % Lymphocytes 8 %    % Monocytes 8 %    % Eosinophils 1 %    % Basophils 0 %    % Immature Granulocytes 1 %    NRBCs per 100 WBC 0 <1 /100    Absolute Neutrophils 8.1 1.6 - 8.3 10e3/uL    Absolute Lymphocytes 0.8 0.8 - 5.3 10e3/uL    Absolute Monocytes 0.8 0.0 - 1.3 10e3/uL    Absolute Eosinophils 0.1 0.0 - 0.7 10e3/uL    Absolute Basophils 0.0 0.0 - 0.2 10e3/uL     Absolute Immature Granulocytes 0.1 <=0.4 10e3/uL    Absolute NRBCs 0.0 10e3/uL   CBC with platelets differential     Status: None    Narrative    The following orders were created for panel order CBC with platelets differential.  Procedure                               Abnormality         Status                     ---------                               -----------         ------                     CBC with platelets and d...[[id number]]                      Final result                 Please view results for these tests on the individual orders.     Medications - No data to display  Labs Ordered and Resulted from Time of ED Arrival to Time of ED Departure   CBC WITH PLATELETS AND DIFFERENTIAL       Result Value    WBC Count 9.9      RBC Count 5.05      Hemoglobin 15.1      Hematocrit 45.8      MCV 91      MCH 29.9      MCHC 33.0      RDW 13.0      Platelet Count 222      % Neutrophils 82      % Lymphocytes 8      % Monocytes 8      % Eosinophils 1      % Basophils 0      % Immature Granulocytes 1      NRBCs per 100 WBC 0      Absolute Neutrophils 8.1      Absolute Lymphocytes 0.8      Absolute Monocytes 0.8      Absolute Eosinophils 0.1      Absolute Basophils 0.0      Absolute Immature Granulocytes 0.1      Absolute NRBCs 0.0     COMPREHENSIVE METABOLIC PANEL   INFLUENZA A/B, RSV, & SARS-COV2 PCR   GROUP A STREPTOCOCCUS PCR THROAT SWAB     Chest XR,  PA & LAT   Final Result   IMPRESSION: The heart is unchanged in size and contour, postsurgical changes from prior cardiac surgery are again noted. The lungs are clear bilaterally.             Critical care was not performed.     Medical Decision Making  The patient's presentation was of moderate complexity (an acute illness with systemic symptoms).    The patient's evaluation involved:  review of external note(s) from 1 sources (previous note)  ordering and/or review of 3+ test(s) in this encounter (see separate area of note for details)  review of 3+ test result(s)  ordered prior to this encounter (previous results)    The patient's management necessitated further care after sign-out to Dr Thomas (see their note for further management).      Assessment & Plan    Patient presents with what seems like initially viral URI, now reports some subjective fevers, increase in yellow sputum, headache, sore throat.  We will obtain basic labs, chest x-ray, COVID, influenza, strep test.  He will be signed out at shift change pending the above.  If everything is unremarkable, would consider discussing the case with transplant infectious disease.    Dictation Disclaimer: Some of this Note has been completed with voice-recognition dictation software. Although errors are generally corrected real-time, there is the potential for a rare error to be present in the completed chart.      I have reviewed the nursing notes. I have reviewed the findings, diagnosis, plan and need for follow up with the patient.    New Prescriptions    No medications on file       Final diagnoses:   None         [facility] EMERGENCY DEPARTMENT  5/5/2023     Gisele Clark MD  05/05/23 0737

## 2023-05-05 NOTE — DISCHARGE INSTRUCTIONS
Your laboratory tests show that you have influenza.  Take Tamiflu as directed.  You may also take acetaminophen 1000 mg every 8 hours as needed for fevers and muscle aches.  I have placed a referral to help you obtain primary care follow-up.  Follow-up with your regular doctor for reevaluation within 1 week.  Return to the emergency department for worsening symptoms, worsening fever, shortness of breath or other concerns.

## 2023-05-05 NOTE — ED NOTES
Sign out note: Javi Bansal is a 52 year old male was signed out to me by Dr. Clark at 0715 am.  Please see initial dictation for full details and history.  Patient has history of Chagas cardiomyopathy (diagnosed in 2010, treated with nifurtimox) and biventricular systolic heart failure (diagnosed 2015, on home milrinone) s/p OHT 12/12/16, who presents with primary complaint of cough, subjective fevers, sore throat, headache.  His cold symptoms began about a week ago and have since worsened.  Plan at time of sign out is obtain laboratory studies, chest x-ray, and speak with transplant ID.         Course in the ED:  Laboratory studies show an unremarkable CBC, with a WBC of 9.9.  Comprehensive metabolic panel shows an elevated total protein of 8.7 and a slightly low carbon dioxide of 20 and is otherwise unremarkable.  Rapid strep is negative.  COVID-19 is negative.  Influenza A is positive.  Chest x-ray shows IMPRESSION: The heart is unchanged in size and contour, postsurgical changes from prior cardiac surgery are again noted. The lungs are clear bilaterally.    I spoke with Dr. Merchant of transplant infectious disease.  He recommends discharging the patient on Tamiflu despite the fact that the patient's symptoms have been ongoing for a week.  He notes that the patient is not hypoxic, not on oxygen and has unremarkable vital signs.  He should follow-up with his regular doctor next week for reevaluation.  I discussed all these recommendations with the patient with a .  The patient states he feels able to be discharged home.  I have placed a referral for primary care follow-up and he should see someone in follow-up next week.  I prescribed Tamiflu.  I gave him a work letter excusing him from work until May 11.  I recommend taking acetaminophen for symptomatic relief.  I also discussed reasons to return to the emergency department.  Clinical impression: Influenza A      This note  was created in part by the use of Dragon voice recognition dictation system. Inadvertent grammatical errors and typographical errors may still exist.  MD William Moser Alda L, MD  05/05/23 [id number]

## 2023-06-29 DIAGNOSIS — Z94.1 HEART REPLACED BY TRANSPLANT (H): ICD-10-CM

## 2023-06-29 RX ORDER — MYCOPHENOLATE MOFETIL 250 MG/1
1500 CAPSULE ORAL 2 TIMES DAILY
Qty: 360 CAPSULE | Refills: 11 | Status: SHIPPED | OUTPATIENT
Start: 2023-06-29 | End: 2024-06-12

## 2023-06-29 RX ORDER — TACROLIMUS 1 MG/1
1 CAPSULE ORAL 2 TIMES DAILY
Qty: 180 CAPSULE | Refills: 3 | Status: SHIPPED | OUTPATIENT
Start: 2023-06-29 | End: 2024-07-02

## 2023-07-23 NOTE — PATIENT INSTRUCTIONS
Future Appointments  Date Time Provider Department Center   8/17/2018 9:30 AM UU LAB GOLD WAITING [facility]   8/17/2018 10:00 AM U2A ROOM 17 [facility] [facility]   8/17/2018 11:00 AM [id number] [facility]   8/17/2018 2:30 PM Vanessa Hu APRN [facility]   6/20/2019 2:00 PM Anibal Cox MD [facility]        Hospital Progress Note     Patient: Micah Wilhelm Date: 7/23/2023   YOB: 1975 Admission Date: 7/19/2023   MRN: [id number] Attending: Mina Ya DO     Chief Complaint: swollen    SUBJECTIVE  Micah Wilhelm is a 48 year old male who was admitted on 7/19/2023 for CHF/cellulitis.  The patient had no acute overnight events. Making urine. Less swollen. Less SOB.     OBJECTIVE  Scheduled Medications furosemide, 40 mg, BID  acetaZOLAMIDE, 250 mg, Daily  isosorbide mononitrate, 30 mg, Daily  nystatin, , TID  atorvastatin, 40 mg, Nightly  hydrALAZINE, 100 mg, TID  metoPROLOL succinate, 150 mg, Daily  ceFAZolin, 2,000 mg, 3 times per day  Potassium Standard Replacement Protocol (Levels 3.5 and lower), , See Admin Instructions  Potassium Replacement (Levels 3.6 - 4), , See Admin Instructions  Phosphorus Standard Replacement Protocol, , See Admin Instructions  Magnesium Standard Replacement Protocol, , See Admin Instructions  sodium chloride (PF), 2 mL, 2 times per day  allopurinol, 100 mg, Daily  lisinopril, 40 mg, Daily  pantoprazole, 40 mg, QAM AC  rivaroxaban, 20 mg, Daily with dinner  triamcinolone, 1 application., BID      Continuous Infusions   Current Facility-Administered Medications   Medication Dose Route Frequency Provider Last Rate Last Admin       PHYSICAL EXAM  Vital signs:    Visit Vitals  /67   Pulse 60   Temp 98.2 °F (36.8 °C) (Oral)   Resp 18   Ht 6' (1.829 m)   Wt 123.7 kg (272 lb 9.6 oz)   SpO2 96%   BMI 36.97 kg/m²       General:  Alert and cooperative, male.   Cardiovascular:  RRR, no murmur, +B/L LE edema  Respiratory:  Coarse breath sounds bilaterally, no increased work of breathing  Gastrointestinal:  +BS, soft, NT/ND  Skin:  Erythema with venous stasis changes    LAB RESULTS  Recent Labs   Lab 07/23/23  0557 07/22/23  0543   WBC 5.7 6.5   HCT 40.8 39.4   HGB 11.8* 11.6*    147     Recent Labs   Lab 07/22/23  0543 07/21/23  0600   SODIUM 136 139   POTASSIUM 3.7 3.7    CHLORIDE 97 96*   CO2 39* 40*   GLUCOSE 106* 104*   BUN 22* 13   CREATININE 0.58* 0.44*       IMAGING  Reviewed    ASSESSMENT/PLAN  Micah Wilhelm is a 48 year old male who was admitted on 7/19/2023:        #Acute on chronic diastolic heart failure  # acute on chronic Chronic hypoxemic respiratory failure due to heart failure  #Chronic respiratory alkalosis  #Bilateral lymphedema  #Obstructive sleep apnea  #Morbid obesity  #Sepsis due to bilateral lower extremity cellulitis  Patient states he was compliant with Lasix PTA.  He states he was attempting to be compliant with fluid and sodium restriction.  Home care agency confirms that he was not compliant at all.  - IV  lasix  - IV Cefazolin  - Diamox complete  - Cardiology     #Hx of DVT/paroxysmal atrial fibrillation, rate controlled  - Xarelto  - BB     #Hyperlipidemia  - Statin     #Deep vein thrombosis prophylaxis  Xarelto     Dispo: DC tomorrow.  Will need to be seen by wound care for legs before discharge    Mina Ya, DO  Hospitalist

## 2023-08-01 ENCOUNTER — APPOINTMENT (OUTPATIENT)
Dept: INTERPRETER SERVICES | Facility: CLINIC | Age: 53
End: 2023-08-01
Payer: MEDICAID

## 2023-09-12 ENCOUNTER — TELEPHONE (OUTPATIENT)
Dept: TRANSPLANT | Facility: CLINIC | Age: 53
End: 2023-09-12

## 2023-09-12 DIAGNOSIS — R00.1 SLOW HEART RATE: Primary | ICD-10-CM

## 2023-09-12 DIAGNOSIS — Z12.83 SKIN CANCER SCREENING: ICD-10-CM

## 2023-09-12 DIAGNOSIS — R00.1 SLOW HEART RATE: ICD-10-CM

## 2023-09-12 PROCEDURE — 93000 ELECTROCARDIOGRAM COMPLETE: CPT | Performed by: INTERNAL MEDICINE

## 2023-09-12 NOTE — TELEPHONE ENCOUNTER
Pt reports three episodes of fast HR about 22 days ago, then as of late, he feels his heart rate has slowed. Pt also reports shortness of breath but denies any lightheadedness or dizziness.     Pt is does not know how to check his pulse/HR and does not have a BP cuff or pulse oximeter at home.     Pt's annual testing earlier this year was WNL; HR 80s per EKG.     Pt also requests to see dermatology for some new face moles; last visit was 2017 per pt's chart.     EKG scheduled for today. Will put in referral and schedule dermatology.     Pt verbalized understanding of all information.

## 2023-09-12 NOTE — TELEPHONE ENCOUNTER
Patient Call: General    Reason for call: patient would like a return call with  today if possible.    Call back needed? Yes    Return Call Needed  Same as documented in contacts section  When to return call?: Same day: Route High Priority

## 2023-09-12 NOTE — TELEPHONE ENCOUNTER
EKG with HR in the 70s and RBBB. Pt relieved to know HR is WNL and does not want any further work up. Instructed pt to contact coordinator if symptoms of SOB persist or any new changes.     Dermatology scheduled for 3/21/24 at 1pm.  sent a message to the care team to see if pt can get in sooner d/t new mole on his face and pt is a transplant recipient. Pt is also on a waitlist for cancellations.     Pt verbalized understanding.

## 2023-09-13 LAB
ATRIAL RATE - MUSE: 76 BPM
DIASTOLIC BLOOD PRESSURE - MUSE: NORMAL MMHG
INTERPRETATION ECG - MUSE: NORMAL
P AXIS - MUSE: 78 DEGREES
PR INTERVAL - MUSE: 158 MS
QRS DURATION - MUSE: 128 MS
QT - MUSE: 396 MS
QTC - MUSE: 445 MS
R AXIS - MUSE: 71 DEGREES
SYSTOLIC BLOOD PRESSURE - MUSE: NORMAL MMHG
T AXIS - MUSE: 55 DEGREES
VENTRICULAR RATE- MUSE: 76 BPM

## 2023-09-20 ENCOUNTER — TELEPHONE (OUTPATIENT)
Dept: DERMATOLOGY | Facility: CLINIC | Age: 53
End: 2023-09-20
Payer: MEDICAID

## 2023-09-20 NOTE — TELEPHONE ENCOUNTER
Per clinic, okay to reschedule patient for with Dr. Woo ([facility]) or Dr. Barroso ([facility]) Lvm, inactive MyChart 1st attempt

## 2023-09-27 ENCOUNTER — TELEPHONE (OUTPATIENT)
Dept: DERMATOLOGY | Facility: CLINIC | Age: 53
End: 2023-09-27
Payer: MEDICAID

## 2023-09-27 NOTE — TELEPHONE ENCOUNTER
Left Voicemail (2nd Attempt) for the patient to call back and schedule the following:    Appointment type: [facility] HFU  Provider: Dr. Woo  Return date: 1st available  Specialty phone number: [phone]

## 2023-10-27 ENCOUNTER — APPOINTMENT (OUTPATIENT)
Dept: INTERPRETER SERVICES | Facility: CLINIC | Age: 53
End: 2023-10-27
Payer: MEDICAID

## 2023-11-29 ENCOUNTER — APPOINTMENT (OUTPATIENT)
Dept: INTERPRETER SERVICES | Facility: CLINIC | Age: 53
End: 2023-11-29
Payer: MEDICAID

## 2023-12-13 ENCOUNTER — APPOINTMENT (OUTPATIENT)
Dept: INTERPRETER SERVICES | Facility: CLINIC | Age: 53
End: 2023-12-13
Payer: MEDICAID

## 2024-01-30 ENCOUNTER — APPOINTMENT (OUTPATIENT)
Dept: INTERPRETER SERVICES | Facility: CLINIC | Age: 54
End: 2024-01-30
Payer: MEDICAID

## 2024-02-01 ENCOUNTER — TELEPHONE (OUTPATIENT)
Dept: CARDIOLOGY | Facility: CLINIC | Age: 54
End: 2024-02-01

## 2024-02-01 NOTE — TELEPHONE ENCOUNTER
Hello pt is requesting the oyster shell and I do not see this on the med list can we please get this sent over please and thank you

## 2024-02-27 ENCOUNTER — APPOINTMENT (OUTPATIENT)
Dept: INTERPRETER SERVICES | Facility: CLINIC | Age: 54
End: 2024-02-27
Payer: MEDICAID

## 2024-02-28 DIAGNOSIS — Z94.1 HEART REPLACED BY TRANSPLANT (H): ICD-10-CM

## 2024-02-28 DIAGNOSIS — Z94.1 TRANSPLANTED HEART (H): ICD-10-CM

## 2024-03-01 RX ORDER — B-COMPLEX WITH VITAMIN C
500 TABLET ORAL DAILY
Qty: 90 TABLET | Refills: 3 | Status: SHIPPED | OUTPATIENT
Start: 2024-03-01

## 2024-03-01 RX ORDER — ROSUVASTATIN CALCIUM 10 MG/1
10 TABLET, COATED ORAL DAILY
Qty: 90 TABLET | Refills: 3 | Status: SHIPPED | OUTPATIENT
Start: 2024-03-01

## 2024-03-19 ENCOUNTER — APPOINTMENT (OUTPATIENT)
Dept: INTERPRETER SERVICES | Facility: CLINIC | Age: 54
End: 2024-03-19
Payer: MEDICAID

## 2024-03-21 ENCOUNTER — OFFICE VISIT (OUTPATIENT)
Dept: DERMATOLOGY | Facility: CLINIC | Age: 54
End: 2024-03-21
Attending: INTERNAL MEDICINE
Payer: MEDICAID

## 2024-03-21 DIAGNOSIS — B07.8 OTHER VIRAL WARTS: ICD-10-CM

## 2024-03-21 DIAGNOSIS — D84.9 IMMUNOSUPPRESSION (H): ICD-10-CM

## 2024-03-21 DIAGNOSIS — L82.0 INFLAMED SEBORRHEIC KERATOSIS: ICD-10-CM

## 2024-03-21 DIAGNOSIS — L82.1 SEBORRHEIC KERATOSIS: ICD-10-CM

## 2024-03-21 DIAGNOSIS — D22.9 MULTIPLE MELANOCYTIC NEVI: ICD-10-CM

## 2024-03-21 DIAGNOSIS — L81.4 SOLAR LENTIGO: ICD-10-CM

## 2024-03-21 DIAGNOSIS — Z94.1 HISTORY OF HEART TRANSPLANT (H): Primary | ICD-10-CM

## 2024-03-21 PROCEDURE — 17110 DESTRUCTION B9 LES UP TO 14: CPT | Performed by: DERMATOLOGY

## 2024-03-21 PROCEDURE — 99203 OFFICE O/P NEW LOW 30 MIN: CPT | Mod: 25 | Performed by: DERMATOLOGY

## 2024-03-21 ASSESSMENT — PAIN SCALES - GENERAL: PAINLEVEL: NO PAIN (0)

## 2024-03-21 NOTE — NURSING NOTE
Dermatology Rooming Note    Javi Bansal's goals for this visit include:   Chief Complaint   Patient presents with    Skin Check     Javi is here today for a skin check and he is concerned about a lesion on the right temple      Emmie SUAREZ CMA

## 2024-03-21 NOTE — PROGRESS NOTES
[facility] Dermatology Note  Encounter Date: Mar 21, 2024  Office Visit     Dermatology Problem List:  History of heart transplant on Tacrolimus, requiring yearly transplant skin check    ____________________________________________    Assessment & Plan:     Verruca vulgaris on the right hand x 3  - Cryotherapy performed today (see procedure note below).  Benign, reassurance given.     2.   Seborrheic keratosis, inflamed, right temple x 2   - Cryotherapy performed today (see procedure note below).  Benign, reassurance given.     3.  Reassurance provided for benign lesions not treated today including solar lentigines, seborrheic keratoses, and banal-appearing melanocytic nevi.    4.   History of organ transplant. Patient aware that he is at higher risk for cutaneous malignancy given history of transplant. Monitor for changing or symptomatic lesions. Continue frequent skin checks and photoprotection.      Procedures Performed:     Cryotherapy procedure note: After verbal consent and discussion of risks and benefits including but no limited to dyspigmentation/scar, blister, and pain, 5 lesions was(were) treated with 1-2mm freeze border for 2 cycles with liquid nitrogen. Post cryotherapy instructions were provided.       Follow-up: 1 year(s) in-person, or earlier for new or changing lesions    Staff and Scribe:     Provider Disclosure:   The documentation recorded by the scribe accurately reflects the services I personally performed and the decisions made by me.    Abraham Elaine MD   of Dermatology  Department of Dermatology  [facility]     Scribe Disclosure:   I, QAMAR DUVAL, am serving as a scribe; to document services personally performed by Abraham Elaine MD -based on data collection and the provider's statements to me.     ____________________________________________    CC: Skin Check (Javi is here today for a skin check and he is  concerned about a lesion on the right temple )    HPI:  Mr. Javi Bansal is a(n) 53 year old male who presents today as a return patient for skin cancer screening. He notes a pruritic lesion on the forehead. He denies any pain or burning in the area.   Patient is otherwise feeling well, without additional skin concerns.    Labs Reviewed:  N/A    Physical Exam:  Vitals: There were no vitals taken for this visit.  SKIN: Full skin, which includes the head/face, both arms, chest, back, abdomen,both legs, genitalia and/or groin buttocks, digits and/or nails, was examined.  - Norris level IV   - Multiple regular brown pigmented macules and papules are identified on the head/neck, trunk, extremities.   - Scattered brown macules on sun exposed areas.  - There are waxy stuck on tan to brown papules on the head/neck, trunk, extremities.  - 3 verrucous papules on the right hand  - 2 stuck on erythematous papules on the right temple  - No other lesions of concern on areas examined.     Medications:  Current Outpatient Medications   Medication    aspirin (ASA) 81 MG EC tablet    Calcium Carbonate-Vitamin D (OYSTER SHELL CALCIUM/D) 500-5 MG-MCG TABS    mycophenolate (GENERIC EQUIVALENT) 250 MG capsule    rosuvastatin (CRESTOR) 10 MG tablet    tacrolimus (GENERIC EQUIVALENT) 1 MG capsule    tacrolimus (GENERIC EQUIVALENT) 0.5 MG capsule     No current facility-administered medications for this visit.      Past Medical History:   Patient Active Problem List   Diagnosis    Chagas cardiomyopathy    CHF (congestive heart failure) (H)    Chronic systolic heart failure (H)    Chest pain    ACP (advance care planning)    Heart failure (H)    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    Heart replaced by transplant (H)    Need for prophylactic antibiotic    Elevated liver enzymes    Ventricular tachycardia, non-sustained (H)    Heart transplant graft rejection (H)    Pneumonia    Status post  coronary angiogram    Transplanted heart (H)    Pneumonia due to 2019 novel coronavirus    superinfection, respiratory     Past Medical History:   Diagnosis Date    Chagas cardiomyopathy     Chronic systolic heart failure (H)     Diabetes (H)     Steroid induced; 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 suspected , but unclear dx    superinfection, respiratory 04/10/2020    Concurrent bacterial superinfection with COVID19; rx abx, O2.        CC Geena Mcclellan MD  [address] [place] [phone] on close of this encounter.

## 2024-03-21 NOTE — LETTER
3/21/2024       RE: Javi Bansal  [address]  [address]  [place] [address]     Dear Colleague,    Thank you for referring your patient, Javi Bansal, to the [facility] [place] at [facility]. Please see a copy of my visit note below.    [facility] Dermatology Note  Encounter Date: Mar 21, 2024  Office Visit     Dermatology Problem List:  History of heart transplant on Tacrolimus, requiring yearly transplant skin check    ____________________________________________    Assessment & Plan:     Verruca vulgaris on the right hand x 3  - Cryotherapy performed today (see procedure note below).  Benign, reassurance given.     2.   Seborrheic keratosis, inflamed, right temple x 2   - Cryotherapy performed today (see procedure note below).  Benign, reassurance given.     3.  Reassurance provided for benign lesions not treated today including solar lentigines, seborrheic keratoses, and banal-appearing melanocytic nevi.    4.   History of organ transplant. Patient aware that he is at higher risk for cutaneous malignancy given history of transplant. Monitor for changing or symptomatic lesions. Continue frequent skin checks and photoprotection.      Procedures Performed:     Cryotherapy procedure note: After verbal consent and discussion of risks and benefits including but no limited to dyspigmentation/scar, blister, and pain, 5 lesions was(were) treated with 1-2mm freeze border for 2 cycles with liquid nitrogen. Post cryotherapy instructions were provided.       Follow-up: 1 year(s) in-person, or earlier for new or changing lesions    Staff and Scribe:     Provider Disclosure:   The documentation recorded by the scribe accurately reflects the services I personally performed and the decisions made by me.    Abraham Elaine MD   of Dermatology  Department of Dermatology   [facility]     Scribe Disclosure:   I, QAMAR SIMIN, am serving as a scribe; to document services personally performed by Abraham Elaine MD -based on data collection and the provider's statements to me.     ____________________________________________    CC: Skin Check (Javi is here today for a skin check and he is concerned about a lesion on the right temple )    HPI:  Mr. Javi Bansal is a(n) 53 year old male who presents today as a return patient for skin cancer screening. He notes a pruritic lesion on the forehead. He denies any pain or burning in the area.   Patient is otherwise feeling well, without additional skin concerns.    Labs Reviewed:  N/A    Physical Exam:  Vitals: There were no vitals taken for this visit.  SKIN: Full skin, which includes the head/face, both arms, chest, back, abdomen,both legs, genitalia and/or groin buttocks, digits and/or nails, was examined.  - Norris level IV   - Multiple regular brown pigmented macules and papules are identified on the head/neck, trunk, extremities.   - Scattered brown macules on sun exposed areas.  - There are waxy stuck on tan to brown papules on the head/neck, trunk, extremities.  - 3 verrucous papules on the right hand  - 2 stuck on erythematous papules on the right temple  - No other lesions of concern on areas examined.     Medications:  Current Outpatient Medications   Medication    aspirin (ASA) 81 MG EC tablet    Calcium Carbonate-Vitamin D (OYSTER SHELL CALCIUM/D) 500-5 MG-MCG TABS    mycophenolate (GENERIC EQUIVALENT) 250 MG capsule    rosuvastatin (CRESTOR) 10 MG tablet    tacrolimus (GENERIC EQUIVALENT) 1 MG capsule    tacrolimus (GENERIC EQUIVALENT) 0.5 MG capsule     No current facility-administered medications for this visit.      Past Medical History:   Patient Active Problem List   Diagnosis    Chagas cardiomyopathy    CHF (congestive heart failure) (H)    Chronic systolic heart  failure (H)    Chest pain    ACP (advance care planning)    Heart failure (H)    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    Heart replaced by transplant (H)    Need for prophylactic antibiotic    Elevated liver enzymes    Ventricular tachycardia, non-sustained (H)    Heart transplant graft rejection (H)    Pneumonia    Status post coronary angiogram    Transplanted heart (H)    Pneumonia due to 2019 novel coronavirus    superinfection, respiratory     Past Medical History:   Diagnosis Date    Chagas cardiomyopathy     Chronic systolic heart failure (H)     Diabetes (H)     Steroid induced; 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 suspected , but unclear dx    superinfection, respiratory 04/10/2020    Concurrent bacterial superinfection with COVID19; rx abx, O2.        CC Geena Mcclellan MD  [address] [place] [phone] on close of this encounter.

## 2024-04-08 ENCOUNTER — TELEPHONE (OUTPATIENT)
Dept: TRANSPLANT | Facility: CLINIC | Age: 54
End: 2024-04-08
Payer: MEDICAID

## 2024-04-08 DIAGNOSIS — Z94.1 TRANSPLANTED HEART (H): Primary | ICD-10-CM

## 2024-04-08 NOTE — TELEPHONE ENCOUNTER
Pt due for annual and overdue for labs. Pt now has insurance.     Labs, echo stress test, CXR,and clinic.     Tuesday's are a good day to call him because he is off work.     No concerns expressed at this time.

## 2024-05-13 ENCOUNTER — TELEPHONE (OUTPATIENT)
Dept: TRANSPLANT | Facility: CLINIC | Age: 54
End: 2024-05-13

## 2024-05-13 DIAGNOSIS — Z12.5 PROSTATE CANCER SCREENING: ICD-10-CM

## 2024-05-13 DIAGNOSIS — Z94.1 TRANSPLANTED HEART (H): Primary | ICD-10-CM

## 2024-05-13 DIAGNOSIS — E11.9 DIABETES MELLITUS (H): ICD-10-CM

## 2024-05-13 DIAGNOSIS — Z13.220 LIPID SCREENING: ICD-10-CM

## 2024-05-13 DIAGNOSIS — Z13.29 THYROID DISORDER SCREENING: ICD-10-CM

## 2024-05-13 NOTE — TELEPHONE ENCOUNTER
used to call instructions/directions for 5/15. Tacro 12 hour trough reviewed. No further questions or concerns verbalized.

## 2024-05-23 ENCOUNTER — APPOINTMENT (OUTPATIENT)
Dept: INTERPRETER SERVICES | Facility: CLINIC | Age: 54
End: 2024-05-23

## 2024-05-24 ENCOUNTER — TELEPHONE (OUTPATIENT)
Dept: TRANSPLANT | Facility: CLINIC | Age: 54
End: 2024-05-24

## 2024-05-24 NOTE — TELEPHONE ENCOUNTER
Call to patient with interpretor services and voicemail left for him to call back with update about his health insurance and inquiry about when to reschedule his routine post-heart txp appointments.

## 2024-05-30 ENCOUNTER — APPOINTMENT (OUTPATIENT)
Dept: INTERPRETER SERVICES | Facility: CLINIC | Age: 54
End: 2024-05-30

## 2024-06-12 DIAGNOSIS — Z94.1 HEART REPLACED BY TRANSPLANT (H): ICD-10-CM

## 2024-06-12 RX ORDER — MYCOPHENOLATE MOFETIL 250 MG/1
1500 CAPSULE ORAL 2 TIMES DAILY
Qty: 360 CAPSULE | Refills: 11 | Status: SHIPPED | OUTPATIENT
Start: 2024-06-12 | End: 2024-07-02

## 2024-06-25 ENCOUNTER — APPOINTMENT (OUTPATIENT)
Dept: INTERPRETER SERVICES | Facility: CLINIC | Age: 54
End: 2024-06-25

## 2024-06-26 ENCOUNTER — APPOINTMENT (OUTPATIENT)
Dept: INTERPRETER SERVICES | Facility: CLINIC | Age: 54
End: 2024-06-26

## 2024-07-01 ENCOUNTER — APPOINTMENT (OUTPATIENT)
Dept: INTERPRETER SERVICES | Facility: CLINIC | Age: 54
End: 2024-07-01

## 2024-07-01 DIAGNOSIS — Z94.1 HEART REPLACED BY TRANSPLANT (H): ICD-10-CM

## 2024-07-02 ENCOUNTER — APPOINTMENT (OUTPATIENT)
Dept: INTERPRETER SERVICES | Facility: CLINIC | Age: 54
End: 2024-07-02

## 2024-07-02 DIAGNOSIS — Z94.1 HEART REPLACED BY TRANSPLANT (H): ICD-10-CM

## 2024-07-02 RX ORDER — TACROLIMUS 1 MG/1
1 CAPSULE ORAL 2 TIMES DAILY
Qty: 10 CAPSULE | Refills: 3 | Status: SHIPPED | OUTPATIENT
Start: 2024-07-02

## 2024-07-02 RX ORDER — MYCOPHENOLATE MOFETIL 250 MG/1
1500 CAPSULE ORAL 2 TIMES DAILY
Qty: 60 CAPSULE | Refills: 11 | Status: SHIPPED | OUTPATIENT
Start: 2024-07-02 | End: 2024-08-07

## 2024-07-05 ENCOUNTER — TELEPHONE (OUTPATIENT)
Dept: TRANSPLANT | Facility: CLINIC | Age: 54
End: 2024-07-05

## 2024-07-05 RX ORDER — TACROLIMUS 1 MG/1
1 CAPSULE ORAL 2 TIMES DAILY
Qty: 180 CAPSULE | Refills: 3 | OUTPATIENT
Start: 2024-07-05

## 2024-07-05 NOTE — TELEPHONE ENCOUNTER
[employer] called # [phone] Option #2., they need doctors address  city where they practice and if that is their real signature.

## 2024-07-05 NOTE — TELEPHONE ENCOUNTER
FREE DRUG APPLICATION INITIATED    Medication: MYCOPHENOLATE MOFETIL (CELLCEPT BRAND) 250 MG PO CAPS  Free Drug Program Name:  [employer]  Date Submitted: 7/5/2024 10:56 AM  Phone #: [phone]  Fax #: [phone]  Additional Information:

## 2024-07-08 NOTE — TELEPHONE ENCOUNTER
Messaged lu LAGOS to see if she can get this wet signed/dated [employer] is not accepting the electronic signature

## 2024-07-09 ENCOUNTER — APPOINTMENT (OUTPATIENT)
Dept: INTERPRETER SERVICES | Facility: CLINIC | Age: 54
End: 2024-07-09
Payer: MEDICARE

## 2024-07-09 ENCOUNTER — TELEPHONE (OUTPATIENT)
Dept: TRANSPLANT | Facility: CLINIC | Age: 54
End: 2024-07-09

## 2024-07-09 DIAGNOSIS — Z94.1 TRANSPLANTED HEART (H): Primary | ICD-10-CM

## 2024-07-09 RX ORDER — TACROLIMUS 1 MG/1
1 CAPSULE ORAL 2 TIMES DAILY
Qty: 180 CAPSULE | Refills: 3 | Status: SHIPPED | OUTPATIENT
Start: 2024-07-09

## 2024-07-12 NOTE — TELEPHONE ENCOUNTER
FREE DRUG APPLICATION APPROVED    Medication: MYCOPHENOLATE MOFETIL (CELLCEPT BRAND) 250 MG PO CAPS  Program Name:  [facility]  Effective Date: 7/12/2024  Expiration Date: 7/11/2025  Pharmacy Filling the Rx:    Patient Notified: yes- called patient and left message  Additional Information: patient can call [phone] to setup delivery

## 2024-08-06 ENCOUNTER — APPOINTMENT (OUTPATIENT)
Dept: INTERPRETER SERVICES | Facility: CLINIC | Age: 54
End: 2024-08-06

## 2024-08-06 DIAGNOSIS — Z94.1 TRANSPLANTED HEART (H): ICD-10-CM

## 2024-08-07 DIAGNOSIS — Z94.1 HEART REPLACED BY TRANSPLANT (H): ICD-10-CM

## 2024-08-07 RX ORDER — MYCOPHENOLATE MOFETIL 250 MG/1
1500 CAPSULE ORAL 2 TIMES DAILY
Qty: 60 CAPSULE | Refills: 11 | Status: SHIPPED | OUTPATIENT
Start: 2024-08-07

## 2024-08-08 ENCOUNTER — APPOINTMENT (OUTPATIENT)
Dept: INTERPRETER SERVICES | Facility: CLINIC | Age: 54
End: 2024-08-08

## 2024-08-08 ENCOUNTER — TELEPHONE (OUTPATIENT)
Dept: TRANSPLANT | Facility: CLINIC | Age: 54
End: 2024-08-08

## 2024-08-08 NOTE — TELEPHONE ENCOUNTER
Pt has no insurance. He is setup for free drug assistance with [employer] but nothing available for tacrolimus. Pt cannot afford medication.     After discussing with Dr. Escobar (covering for Dr. Mcclellan) ok to transition him to [employer] which has a free drug assistance program. Shauna Plummer working on this application. After reviewing dosing with pharmacy, pt to start [employer] 50 mg bid.      Pt called to discuss the above. Pt will likely need a temporary prescription for tacro sent to a local pharmacy until he can get approved for [employer]. No answer. VM left requesting call back.     Provider does want labs (including DSAS) and echo 1 month after the transition.

## 2024-08-09 ENCOUNTER — TELEPHONE (OUTPATIENT)
Dept: TRANSPLANT | Facility: CLINIC | Age: 54
End: 2024-08-09

## 2024-08-09 NOTE — TELEPHONE ENCOUNTER
FREE DRUG APPLICATION INITIATED    Medication: CYCLOSPORINE MODIFIED (GENGRAF BRAND) 100 MG PO CAPS  Free Drug Program Name:  [employer]  Date Submitted: 8/9/2024  7:21 AM  Phone #: [phone]  Fax #: [phone]  Additional Information: n/a

## 2024-08-09 NOTE — TELEPHONE ENCOUNTER
Pt called through use of .     Pt reports he is suppose to get 3-month supply of MM[employer] and Tacro today. Paid $117 for 3 month supply of Tacro.     Discussed with pt that we are in the process of an application for him to switch to [employer] and get it free under their program (per pharmacy tech Shauna, typically takes 2 weeks). Pt appreciative.    Requested call back later this afternoon to conform meds have arrived.

## 2024-08-09 NOTE — TELEPHONE ENCOUNTER
LVM through interpretor with phone number to call if pt does not have enough meds through his mail order to cover the weekend.

## 2024-08-14 NOTE — TELEPHONE ENCOUNTER
Per rep from [employer] stated that they need documentation of income from patient.  Called patient and left a message with a  to call me back

## 2024-08-20 ENCOUNTER — APPOINTMENT (OUTPATIENT)
Dept: INTERPRETER SERVICES | Facility: CLINIC | Age: 54
End: 2024-08-20

## 2024-09-05 NOTE — TELEPHONE ENCOUNTER
Patient access again stated they are missing patient signature faxed again to free drug for review

## 2024-09-10 DIAGNOSIS — Z94.1 TRANSPLANTED HEART (H): Primary | ICD-10-CM

## 2024-09-10 RX ORDER — CYCLOSPORINE 25 MG/1
50 CAPSULE ORAL 2 TIMES DAILY
Qty: 360 CAPSULE | Refills: 3 | Status: SHIPPED | OUTPATIENT
Start: 2024-09-10

## 2024-09-12 NOTE — TELEPHONE ENCOUNTER
Per call to [employer] at [phone], spoke with Gisele - She stated that they received the prescription yesterday and she has forwarded this off to a supervisor. She advised to call back in 24 hours for a determination.    Thank you,  Jaimee Whelan Oncology/Transplant Liaison  Phone: [phone]  Fax: [phone]

## 2024-09-13 NOTE — TELEPHONE ENCOUNTER
Per call to [employer] at [phone], spoke with Estella - She stated that she has to update this and provide it to the supervisor. She will update me when this is taken care of and I will also receive a fax.    Thank you,  Jaimee Whelan Oncology/Transplant Liaison  Phone: [phone]  Fax: [phone]

## 2024-09-17 NOTE — TELEPHONE ENCOUNTER
FREE DRUG APPLICATION APPROVED    Medication: CYCLOSPORINE MODIFIED (GENGRAF BRAND) 100 MG PO CAPS  Program Name:  [facility] [facility]  Effective Date: 9/17/2024  Expiration Date: 9/17/2025  Pharmacy Filling the Rx:    Patient Notified: Yes  Additional Information:     Per call to [employer] at [phone], spoke with Ora then a second representative after I was disconnected. Ora noted on the account that Javi was approved for free drug from 09/17/24 - 09/14/25. The patient will receive the medication within 7 to 10 business days.    Thank you,  Jaimee Whelan Oncology/Transplant Liaison  Phone: [phone]  Fax: [phone]

## 2024-09-18 ENCOUNTER — APPOINTMENT (OUTPATIENT)
Dept: INTERPRETER SERVICES | Facility: CLINIC | Age: 54
End: 2024-09-18

## 2024-10-01 ENCOUNTER — TELEPHONE (OUTPATIENT)
Dept: CARE COORDINATION | Facility: CLINIC | Age: 54
End: 2024-10-01
Payer: MEDICAID

## 2024-10-01 ENCOUNTER — APPOINTMENT (OUTPATIENT)
Dept: INTERPRETER SERVICES | Facility: CLINIC | Age: 54
End: 2024-10-01
Payer: MEDICAID

## 2024-10-01 ENCOUNTER — TELEPHONE (OUTPATIENT)
Dept: TRANSPLANT | Facility: CLINIC | Age: 54
End: 2024-10-01
Payer: MEDICAID

## 2024-10-01 NOTE — PROGRESS NOTES
Transplant Social Work Services Phone Call    Data: SW received notification that patient has financial/insurance concerns present. Writer contacted Pt to gather information, using [place] phone  on Friday, 9/27. Patient reports he completed a new application for MA about 3 months ago and has not heard any updates. Indicated that this application was sent in the mail. Pt reports he is working full time, 40 hours per week, however is unable to afford the cost of insurance provided through his employer. Writer collaborated with supervisor and reached out to [employer] navigator for [facility] ([phone]) to support patient in completing MA application. [employer] navigator was able to find patient in their system and to see his health care renewal was received. Discussed patient missing AVS worksheet. Navigator indicated she would mail to patient today. Navigator provided contact information for both patient's [facility]  (Cristina Refugio: [phone]) and [facility] worker supervisor (David Eisenberg: [phone]), as well as case number for the [facility] (Case #: [id number]). Navigator indicated that patient's county worker would be calling him today, 10/1. Indicated if patient did not hear from [facility] worker or received AVS form in the mail by Friday, 10/4/24, to call [facility] worker. Writer called Pt 10/1/24, using [place] phone , to provide information from [employer] navigator. Provided contact information for [facility] worker and her supervisor, as well as informed him document would be arriving in the mail. Writer discussed Pt taking form to [employer] ([phone]) to support completing form.    Intervention: Writer collaborated with supervisor, financial counseling, and [employer] navigator. Provided supportive phone call to patient.    Assessment: Patient reports uncertainty on what continues to impact insurance. Stated that he had previously filled out form for MA 3 months ago, however has not received any  update. Unclear if this is Emergency MA application, as this would not cover regular follow up care. Patient agreeable to checking his mail for form being mailed by navigator and awaiting call from [facility] Worker. Pt indicated he is reachable any day after 3PM and anytime on Tuesdays.  Education provided by SW: Ongoing SW support in outpatient setting, insurance information from navigator  Plan: Patient encouraged to obtain form in mail and go to local [employer] to fill out form to have assistance with interpreting form. Patient to answer phone call from [facility] Worker. Pt encouraged to inform writer if he does not hear anything by Friday. Writer to follow up with patient.    Poly Ramachandran, MSW, LGSW, APSW  Heart Transplant/MCS   [facility]  Ph. [phone]

## 2024-10-01 NOTE — TELEPHONE ENCOUNTER
Pt is switching to Gengraf due to loss of insurance. He will now be on gengraf and MMF.     Gengraf goal ?     Dose: 50 mg bid    Pt needs lab (including DSAS) and echo 1 month after transition.     Pt called to discuss the above. No answer. VM left using a .

## 2024-10-03 ENCOUNTER — TELEPHONE (OUTPATIENT)
Dept: TRANSPLANT | Facility: CLINIC | Age: 54
End: 2024-10-03
Payer: MEDICAID

## 2024-10-03 DIAGNOSIS — Z94.1 TRANSPLANTED HEART (H): Primary | ICD-10-CM

## 2024-10-03 NOTE — TELEPHONE ENCOUNTER
Spoke with pt through use of interpretor.     Pt states he has received the prescription for CSA. He is using up his Tacro until Sunday and then will be starting the new medication on Monday Oct 7.     Requested pt to have labs drawn a week after starting new medication. Pt confirmed understanding of need for 12-hour trough.     Mid November pt will needs labs with DSA check and ECHO.     Enc to call with questions.   Pt verbalized understanding of next steps.

## 2024-10-17 ENCOUNTER — TELEPHONE (OUTPATIENT)
Dept: TRANSPLANT | Facility: CLINIC | Age: 54
End: 2024-10-17
Payer: MEDICAID

## 2024-10-17 NOTE — TELEPHONE ENCOUNTER
Pt overdue to check level after switching from Tacro to CSA. Scheduling LVM; writer LVM.     Had interpretor LVM to have labs drawn tomorrow 10/18 - non fasting with 12-hour level.     Still needs ECHO/DSAs in mid November.

## 2024-10-18 ENCOUNTER — TELEPHONE (OUTPATIENT)
Dept: TRANSPLANT | Facility: CLINIC | Age: 54
End: 2024-10-18

## 2024-10-18 NOTE — TELEPHONE ENCOUNTER
Pt states he has no insurance; too costly to have labs done.   He is still using up his tacrolimus and will start the CSA on Monday.    With the help of the [employer] consultant he competed the forms for MA - he was rejected last time. Although he works he can't afford his company insurance, wife can't work due to her medical status. He LVM for the [facility] - has not heard back. Enc him to try again.     Copy Poly Ramachandran, MSW, LGSW, APSW  Heart Transplant/MCS  and primary TC Yanelis Araiza

## 2024-10-23 ENCOUNTER — HOSPITAL ENCOUNTER (EMERGENCY)
Facility: CLINIC | Age: 54
Discharge: HOME | End: 2024-10-23
Attending: EMERGENCY MEDICINE | Admitting: EMERGENCY MEDICINE
Payer: MEDICAID

## 2024-10-23 ENCOUNTER — APPOINTMENT (OUTPATIENT)
Dept: INTERPRETER SERVICES | Facility: CLINIC | Age: 54
End: 2024-10-23
Payer: MEDICAID

## 2024-10-23 ENCOUNTER — VIRTUAL VISIT (OUTPATIENT)
Dept: INTERPRETER SERVICES | Facility: CLINIC | Age: 54
End: 2024-10-23
Payer: MEDICAID

## 2024-10-23 ENCOUNTER — APPOINTMENT (OUTPATIENT)
Dept: GENERAL RADIOLOGY | Facility: CLINIC | Age: 54
End: 2024-10-23
Attending: EMERGENCY MEDICINE
Payer: MEDICAID

## 2024-10-23 VITALS
RESPIRATION RATE: 18 BRPM | BODY MASS INDEX: 25.41 KG/M2 | SYSTOLIC BLOOD PRESSURE: 104 MMHG | DIASTOLIC BLOOD PRESSURE: 75 MMHG | HEART RATE: 85 BPM | OXYGEN SATURATION: 98 % | WEIGHT: 160 LBS | TEMPERATURE: 98.4 F

## 2024-10-23 DIAGNOSIS — J06.9 VIRAL URI WITH COUGH: ICD-10-CM

## 2024-10-23 LAB
ALBUMIN SERPL BCG-MCNC: 4.3 G/DL (ref 3.5–5.2)
ALP SERPL-CCNC: 96 U/L (ref 40–150)
ALT SERPL W P-5'-P-CCNC: 8 U/L (ref 0–70)
ANION GAP SERPL CALCULATED.3IONS-SCNC: 13 MMOL/L (ref 7–15)
AST SERPL W P-5'-P-CCNC: 27 U/L (ref 0–45)
ATRIAL RATE - MUSE: 79 BPM
B PARAPERT DNA SPEC QL NAA+PROBE: NOT DETECTED
B PERT DNA SPEC QL NAA+PROBE: NOT DETECTED
BASOPHILS # BLD AUTO: 0.1 10E3/UL (ref 0–0.2)
BASOPHILS NFR BLD AUTO: 1 %
BILIRUB SERPL-MCNC: 0.5 MG/DL
BUN SERPL-MCNC: 11.9 MG/DL (ref 6–20)
C PNEUM DNA SPEC QL NAA+PROBE: NOT DETECTED
CALCIUM SERPL-MCNC: 9.6 MG/DL (ref 8.8–10.4)
CHLORIDE SERPL-SCNC: 107 MMOL/L (ref 98–107)
CREAT SERPL-MCNC: 0.89 MG/DL (ref 0.67–1.17)
DIASTOLIC BLOOD PRESSURE - MUSE: NORMAL MMHG
EGFRCR SERPLBLD CKD-EPI 2021: >90 ML/MIN/1.73M2
EOSINOPHIL # BLD AUTO: 1.1 10E3/UL (ref 0–0.7)
EOSINOPHIL NFR BLD AUTO: 12 %
ERYTHROCYTE [DISTWIDTH] IN BLOOD BY AUTOMATED COUNT: 13.1 % (ref 10–15)
FLUAV H1 2009 PAND RNA SPEC QL NAA+PROBE: NOT DETECTED
FLUAV H1 RNA SPEC QL NAA+PROBE: NOT DETECTED
FLUAV H3 RNA SPEC QL NAA+PROBE: NOT DETECTED
FLUAV RNA SPEC QL NAA+PROBE: NEGATIVE
FLUAV RNA SPEC QL NAA+PROBE: NOT DETECTED
FLUBV RNA RESP QL NAA+PROBE: NEGATIVE
FLUBV RNA SPEC QL NAA+PROBE: NOT DETECTED
GLUCOSE SERPL-MCNC: 86 MG/DL (ref 70–99)
HADV DNA SPEC QL NAA+PROBE: NOT DETECTED
HCO3 SERPL-SCNC: 21 MMOL/L (ref 22–29)
HCOV PNL SPEC NAA+PROBE: NOT DETECTED
HCT VFR BLD AUTO: 44.1 % (ref 40–53)
HGB BLD-MCNC: 13.9 G/DL (ref 13.3–17.7)
HMPV RNA SPEC QL NAA+PROBE: NOT DETECTED
HOLD SPECIMEN: NORMAL
HPIV1 RNA SPEC QL NAA+PROBE: NOT DETECTED
HPIV2 RNA SPEC QL NAA+PROBE: NOT DETECTED
HPIV3 RNA SPEC QL NAA+PROBE: NOT DETECTED
HPIV4 RNA SPEC QL NAA+PROBE: NOT DETECTED
IMM GRANULOCYTES # BLD: 0 10E3/UL
IMM GRANULOCYTES NFR BLD: 1 %
INTERPRETATION ECG - MUSE: NORMAL
LYMPHOCYTES # BLD AUTO: 1.7 10E3/UL (ref 0.8–5.3)
LYMPHOCYTES NFR BLD AUTO: 19 %
M PNEUMO DNA SPEC QL NAA+PROBE: NOT DETECTED
MCH RBC QN AUTO: 29.1 PG (ref 26.5–33)
MCHC RBC AUTO-ENTMCNC: 31.5 G/DL (ref 31.5–36.5)
MCV RBC AUTO: 93 FL (ref 78–100)
MONOCYTES # BLD AUTO: 0.7 10E3/UL (ref 0–1.3)
MONOCYTES NFR BLD AUTO: 8 %
NEUTROPHILS # BLD AUTO: 5.1 10E3/UL (ref 1.6–8.3)
NEUTROPHILS NFR BLD AUTO: 59 %
NRBC # BLD AUTO: 0 10E3/UL
NRBC BLD AUTO-RTO: 0 /100
P AXIS - MUSE: 38 DEGREES
PLATELET # BLD AUTO: 236 10E3/UL (ref 150–450)
POTASSIUM SERPL-SCNC: 4.4 MMOL/L (ref 3.4–5.3)
PR INTERVAL - MUSE: 140 MS
PROCALCITONIN SERPL IA-MCNC: 0.02 NG/ML
PROT SERPL-MCNC: 7.7 G/DL (ref 6.4–8.3)
QRS DURATION - MUSE: 118 MS
QT - MUSE: 408 MS
QTC - MUSE: 467 MS
R AXIS - MUSE: 94 DEGREES
RBC # BLD AUTO: 4.77 10E6/UL (ref 4.4–5.9)
RSV RNA SPEC NAA+PROBE: NEGATIVE
RSV RNA SPEC QL NAA+PROBE: NOT DETECTED
RSV RNA SPEC QL NAA+PROBE: NOT DETECTED
RV+EV RNA SPEC QL NAA+PROBE: DETECTED
SARS-COV-2 RNA RESP QL NAA+PROBE: NEGATIVE
SODIUM SERPL-SCNC: 141 MMOL/L (ref 135–145)
SYSTOLIC BLOOD PRESSURE - MUSE: NORMAL MMHG
T AXIS - MUSE: 46 DEGREES
TROPONIN T SERPL HS-MCNC: <6 NG/L
VENTRICULAR RATE- MUSE: 79 BPM
WBC # BLD AUTO: 8.7 10E3/UL (ref 4–11)

## 2024-10-23 PROCEDURE — T1013 SIGN LANG/ORAL INTERPRETER: HCPCS | Mod: U4,TEL,95

## 2024-10-23 PROCEDURE — 85025 COMPLETE CBC W/AUTO DIFF WBC: CPT | Performed by: EMERGENCY MEDICINE

## 2024-10-23 PROCEDURE — 93010 ELECTROCARDIOGRAM REPORT: CPT | Performed by: EMERGENCY MEDICINE

## 2024-10-23 PROCEDURE — 250N000013 HC RX MED GY IP 250 OP 250 PS 637: Performed by: EMERGENCY MEDICINE

## 2024-10-23 PROCEDURE — T1013 SIGN LANG/ORAL INTERPRETER: HCPCS | Mod: GT,TEL,95

## 2024-10-23 PROCEDURE — 36415 COLL VENOUS BLD VENIPUNCTURE: CPT | Performed by: EMERGENCY MEDICINE

## 2024-10-23 PROCEDURE — 80053 COMPREHEN METABOLIC PANEL: CPT | Performed by: EMERGENCY MEDICINE

## 2024-10-23 PROCEDURE — 87798 DETECT AGENT NOS DNA AMP: CPT | Performed by: EMERGENCY MEDICINE

## 2024-10-23 PROCEDURE — 99203 OFFICE O/P NEW LOW 30 MIN: CPT | Performed by: STUDENT IN AN ORGANIZED HEALTH CARE EDUCATION/TRAINING PROGRAM

## 2024-10-23 PROCEDURE — 71046 X-RAY EXAM CHEST 2 VIEWS: CPT

## 2024-10-23 PROCEDURE — 71046 X-RAY EXAM CHEST 2 VIEWS: CPT | Mod: 26 | Performed by: RADIOLOGY

## 2024-10-23 PROCEDURE — 93005 ELECTROCARDIOGRAM TRACING: CPT | Performed by: EMERGENCY MEDICINE

## 2024-10-23 PROCEDURE — 84484 ASSAY OF TROPONIN QUANT: CPT | Performed by: EMERGENCY MEDICINE

## 2024-10-23 PROCEDURE — 99285 EMERGENCY DEPT VISIT HI MDM: CPT | Mod: 25 | Performed by: EMERGENCY MEDICINE

## 2024-10-23 PROCEDURE — 84145 PROCALCITONIN (PCT): CPT | Performed by: EMERGENCY MEDICINE

## 2024-10-23 PROCEDURE — 87637 SARSCOV2&INF A&B&RSV AMP PRB: CPT | Performed by: EMERGENCY MEDICINE

## 2024-10-23 PROCEDURE — 99283 EMERGENCY DEPT VISIT LOW MDM: CPT | Performed by: EMERGENCY MEDICINE

## 2024-10-23 PROCEDURE — 87486 CHLMYD PNEUM DNA AMP PROBE: CPT | Performed by: EMERGENCY MEDICINE

## 2024-10-23 RX ORDER — BENZONATATE 100 MG/1
100 CAPSULE ORAL ONCE
Status: COMPLETED | OUTPATIENT
Start: 2024-10-23 | End: 2024-10-23

## 2024-10-23 RX ADMIN — BENZONATATE 100 MG: 100 CAPSULE ORAL at 09:19

## 2024-10-23 ASSESSMENT — ACTIVITIES OF DAILY LIVING (ADL)
ADLS_ACUITY_SCORE: 0

## 2024-10-23 ASSESSMENT — COLUMBIA-SUICIDE SEVERITY RATING SCALE - C-SSRS
6. HAVE YOU EVER DONE ANYTHING, STARTED TO DO ANYTHING, OR PREPARED TO DO ANYTHING TO END YOUR LIFE?: NO
1. IN THE PAST MONTH, HAVE YOU WISHED YOU WERE DEAD OR WISHED YOU COULD GO TO SLEEP AND NOT WAKE UP?: NO
2. HAVE YOU ACTUALLY HAD ANY THOUGHTS OF KILLING YOURSELF IN THE PAST MONTH?: NO

## 2024-10-23 NOTE — ED TRIAGE NOTES
Pt ambulatory to triage c/o for the last month having flu like symptoms including cough with flem, headache, chest pain last night when coughing.    HX   Heart transplant 2016

## 2024-10-23 NOTE — LETTER
October 23, 2024      To Whom It May Concern:      Javi Bansal was seen in our Emergency Department today, 10/23/24.  I expect his condition to improve over the next 5-7 days.  He may return to work/school when improved.    Sincerely,        Emmanuel Ace MD

## 2024-10-23 NOTE — CONSULTS
Transplant & Immunocompromised   Infectious Disease     New Inpatient Consult   Patient: Javi Bansal, Date of birth 1970, Medical record number [id number].     Assessment and Recommendations     Impression: Viral pneumonitis, no evidence of coinfection    Recommendations  Thank you for ordering the procalcitonin as discussed  I do not think this patient will benefit from antibacterials  Agree with multimodal symptomatic care such as Tessalon, hydration, steam inhalation, cough syrup, Tylenol    ID will sign off,    Signed:  Mani Su MD, 10/23/2024   Staff Physician, Transplant and General Infectious Diseases  Pager [phone] if urgent or Vocera if non time sensitive  For coverage and paging info see [facility]-> Infectious Disease Medicine Adult/[facility]        Patient Summary:   Transplants:  12/12/2016 (Heart); POD  2872.  Coordinator Yanelis Araiza  This is a heart transplant recipient whose postoperative course was complicated by latent TB necessitating treatment as well as Chagas disease.  He has done well from an infectious disease perspective.  His insurance was cut off a few weeks ago but he continues to take his immunosuppression.  This has been extremely stressful for him.  In the midst of this he is here with a viral pneumonitis.        ID Problem List and Discussion:     # Acute rhino/enteroviral infection  # Viral pneumonia  He is here with 2 to 3 weeks of cough and some chills along with a sore throat.  Testing positive for rhino enterovirus and negative for other community viral pathogens as well as pertussis.  Chest x-ray without lobar infiltrate, procalcitonin and WBC are normal.  He has no exudate on the tonsils.    There is no evidence to suggest he has any coinfection.  I do not think he will benefit from antibiotics.    We can continue efforts at symptomatic management.    # H/o Latent TB s/p Treatment 2017  Started INH/ B6 on 9/17/16.  Tolerating medications  well. No active peripheral neuropathy currently.  Completed a course, dc'd therapy, 7/19/17.     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  The CDC contacted me with the following results:    Chagas PCRs:   12/19/16 negative and remains negative since                            12/26/16 1st positive: 32                            1/2/17   +   30                            1/9/17    +   28     The 60 day course of Benznidazole has since completed, which he tolerated well.  Chagas PCR testing has since remained negative.          Other Notable information:  Once infected pts are infected for life w/ about 30% developing end organ disease.  The adrenal glands have been suggested as a reservoir for infection, but this is not fully delineated.  Heart transplantation is the therapy of choice for Chagas heart disease in [place] and the 3rd leading cause for heart transplantation.  In extensive review of the literature, here's the issues in heart transplantation of Chagas disease.                 1. Reactivation is common occurring in 30-50% and mimics rejection, and can present as a febrile illness.                2. In both [place] and [place] guidelines, screening rather than prophylaxis for infection is recommended.  Thus a screening protocol as outlined above will be needed.                3. Published data from [place] report that the incidence of rejection and survival in Chagas related heart transplant is as good and sometimes better than OHT for other reasons.                 4. Differences in immunosuppression needs to be considered and was discussed w/ Dr. Mcclellan.                 5.  [facility] contact:  Division of Parasitic Diseases and Malaria.  [phone].  E-mail:   Parasites@cdc.gov.  Emergency  [phone].                 References:  AJT 2011; 11. 672.    Curr Opin Infect Dis 2012 25, 4 pg 450.     J Heart Lung Transplant 2010; 29; 286        Journal of cardiac     # H/o Single dermatomal zoster 8/2017: Resolved w/ oral course of valtrex.  No residual pain.  As this was his first episode, I am not inclined to use longer secondary prophylaxis.  Should he develop this again, he should call my office for treatment.     Other ID issues:  - prophylaxis: none currently  - serostatus: EBV, CMV, VZV seropositive, Hep C negative  - immunizations:  Up to date.    - isolation:  Good hand hygiene    History of Presenting Illness:     Javi Bansal is a 53 year old patient who presented on 10/23/2024 for cough and fevers.    History taken with the help of a .  He underwent his heart transplant in 2016, he is to follow with the infectious disease clinic for latent TB and Chagas disease, he finished all of these treatments and monitoring.  He has not had major infectious issues.  He is still active and working.  There is some issue with his insurance and it was cut off a few weeks ago, this led him to cancel some cardiac monitoring.  He does remain on his immunosuppression.  He was not on any prophylaxis and as such has not missed any prophylactic antibiotics.  He has had no sick contacts or any recent travel.    He noted at first and upper respiratory coryza type syndrome with sneezing and coughing which progressed to a sore throat and painful swallowing along with some malaise and fatigue.  He was worried about these symptoms as he has previously had pneumonia.  So he presented to the emergency room where he has been vitally stable.  A chest x-ray was done personally interpreted by me without any lobar infiltrates and I reviewed these images with him as well.  His procalcitonin ordered under my direction was negative.  Aside from a positive  rhino enteroviral PCR his testing is negative.    He has no other symptoms aside from these respiratory symptoms      Review of Symptoms:  A comprehensive 14 system review of symptoms was conducted and was otherwise negative (unless mentioned above)       Other Medical History:     An attempt was made to collect past medical surgical, family and social history during this encounter,  this information was reviewed with the patient and updated, documented where most relevant in the HPI       Physical Exam:     Temp: 98.4  F (36.9  C) Temp src: Oral BP: 104/75 Pulse: 85   Resp: 18 SpO2: 98 % O2 Device: None (Room air)       GENERAL APPEARANCE: Not in acute distress    PHYSICAL EXAM:  Eyes:     Extraocular eye movements grossly intact, no ptosis, no discharge, no scleral icterus.  Mouth, Throat:     Mucous membranes moist, mild retropharyngeal erythema but no exudate no significant tonsillar enlargement  Cardiovascular:    S1, S2 normal, regular rate and rhythm.  Respiratory:     Inspection: Not in respiratory distress, chest expansion symmetrical.   Auscultation: 4 point auscultation done clear to auscultation bilaterally  Gastrointestinal:  Soft, non-tender; bowel sounds normal; no palpable masses.  Musculoskeletal:     No major deformity  Neurologic:     Higher Mental Function: Conversant, AOx4   Motor: Moving all 4 limbs  Psychiatric: Appropriate  Skin:  Anicteric    MDM   I reviewed recent lab data, notes from referring physicians, as well as other available imaging and diagnostic testing and incorporated these into my discussion as above.  The specific values when most notable are documented above but are otherwise available in the medical record for review.      Medical Decision Making  I saw and evaluated this patient on todays date as part of an E&M Encounter     1- Number and Complexity of Problems Addressed as part the Encounter Moderate    I addressed 1 or more acute illness with systemic symptoms viral  pneumonitis  2- Amount and/or Complexity of Data to Be Reviewed and Analyzed High   I reviewed the medical records for notes from other providers, and recent lab and imaging results   I ordered the following tests procalcitonin  I independently interpreted the following results chest x-ray  3- Risk of Complications and/or Morbidity or Mortality of Patient Management:  was moderate due to prescription drug management   was moderate due to social determinants of health impacting care

## 2024-10-23 NOTE — DISCHARGE INSTRUCTIONS
You were seen for cough, headache, nasal congestion and sore throat.  You tested positive for human rhino enterovirus.  The transplant ID team does not believe we need to do anything right now aside from symptomatic management because your symptoms are viral.  If you develop shortness of breath or worsening symptoms please return to the ER.  Continue your immunosuppression as prescribed.    Take 1000 mg of Tylenol every 6 hours for pain control.

## 2024-10-23 NOTE — ED PROVIDER NOTES
[place] EMERGENCY DEPARTMENT ([facility])    10/23/24       ED PROVIDER NOTE     History     Chief Complaint   Patient presents with    Headache    Flu Symptoms    Cough     The history is provided by the patient and medical records.     Javi Bansal is a 53 year old male who has a history of Chagas cardiomyopathy (diagnosed in 2010, treated with nifurtimox) and biventricular systolic heart failure (diagnosed 2015, on home milrinone) s/p heart transplant in 2016, on immunosuppression who presents with 3+ weeks of productive cough, sore throat, rhinorrhea, headaches.  He states last night while coughing he had an episode of left-sided chest pain  during a coughing episode that has since resolved.  Otherwise no chest pain, no lower extremity swelling, or edema. He reports he had a fever at the beginning but no recent fevers.  He reports his cough has become more productive and his cough is worse when lying down.  He does report he is compliant on his immunosuppression medications.  He also notes that he was due for a cardiac biopsy and an echocardiogram but he has not done those secondary to insurance issues.    Epic records reviewed. Patient had coronary angiogram 4/11/23 showing MiIdly elevated PCW pressure and mildly decreased cardiac output, otherwise normal coronary angiogram. Last echocardiogram also on 4/11/23 with an EF of 55-60%.    Past Medical History  Past Medical History:   Diagnosis Date    Chagas cardiomyopathy     Chronic systolic heart failure (H)     Diabetes (H)     Steroid induced; no insulin since 02/2017    Dual ICD (implantable cardioverter-defibrillator) in place 10/20/2015    Essential hypertension     Gastroesophageal reflux disease     H/O gastric ulcer     Hypertension     patient denies     Pneumonia due to 2019 novel coronavirus 04/10/2020    Admit 4/10/2020-4/29/2020 for COVID19 pna and bacterial pna; rx 02, azithromycin and ceftriaxone    Premature  ventricular contractions     Presbyopia     Pterygium     ?? suspected , but unclear dx    superinfection, respiratory 04/10/2020    Concurrent bacterial superinfection with COVID19; rx abx, O2.     Past Surgical History:   Procedure Laterality Date    CARDIAC SURGERY      AICD    CV CORONARY ANGIOGRAM N/A 4/11/2023    Procedure: Coronary Angiogram;  Surgeon: Ameya Estes MD;  Location:  HEART CARDIAC CATH LAB    CV RIGHT HEART CATH MEASUREMENTS RECORDED N/A 4/11/2023    Procedure: Right Heart Catheterization;  Surgeon: Ameya Estes MD;  Location:  HEART CARDIAC CATH LAB    TRANSPLANT HEART RECIPIENT N/A 12/12/2016    Procedure: TRANSPLANT HEART RECIPIENT;  Surgeon: Elo Madsen MD;  Location:  OR     aspirin (ASA) 81 MG EC tablet  Calcium Carbonate-Vitamin D (OYSTER SHELL CALCIUM/D) 500-5 MG-MCG TABS  GENGRAF (BRAND) 25 MG CAPSULE  mycophenolate (GENERIC EQUIVALENT) 250 MG capsule  rosuvastatin (CRESTOR) 10 MG tablet  tacrolimus (GENERIC EQUIVALENT) 0.5 MG capsule  tacrolimus (GENERIC EQUIVALENT) 1 MG capsule  tacrolimus (GENERIC EQUIVALENT) 1 MG capsule      Allergies   Allergen Reactions    Rocephin [Ceftriaxone] Other (See Comments)     Interacts w/Tracolimus levels per cardiologist     Zithromax [Azithromycin] Other (See Comments)     Interacts w/Tracolimus levels per cardiologist      Family History  Family History   Problem Relation Age of Onset    Brain Cancer Mother     Melanoma No family hx of     Skin Cancer No family hx of     Glaucoma No family hx of     Macular Degeneration No family hx of      Social History   Social History     Tobacco Use    Smoking status: Never     Passive exposure: Current    Smokeless tobacco: Never   Substance Use Topics    Alcohol use: Yes     Alcohol/week: 0.0 standard drinks of alcohol     Comment: one beer every 20 days    Drug use: No      A medically appropriate review of systems was performed with pertinent positives and negatives noted  in the HPI, and all other systems negative.    Physical Exam   BP: 104/75  Pulse: 85  Temp: 98.4  F (36.9  C)  Resp: 18  Weight: 72.6 kg (160 lb)  SpO2: 98 %  Wt Readings from Last 10 Encounters:   10/23/24 72.6 kg (160 lb)   05/05/23 72.6 kg (160 lb)   04/11/23 71.4 kg (157 lb 8 oz)   04/11/23 72.7 kg (160 lb 4.8 oz)   04/10/20 70.8 kg (156 lb)   05/01/19 72.4 kg (159 lb 9.6 oz)   12/07/18 71 kg (156 lb 8 oz)   12/06/18 71.7 kg (158 lb)   08/17/18 70.9 kg (156 lb 3.2 oz)   04/10/18 71.1 kg (156 lb 11.2 oz)       Physical Exam  General: awake, alert, NAD  Head: normal cephalic  HEENT: pupils equal, conjugate gaze intact  Neck: Supple  CV: regular rate and rhythm without murmur,   Lungs: clear to auscultation, ? crackles in the LLL, no increased work of breathing.   Abd: soft, non-tender, no guarding, no peritoneal signs  EXT: lower extremities without swelling or edema  Neuro: awake, answers questions appropriately. No focal deficits noted       ED Course, Procedures, & Data      Procedures              EKG Interpretation:      Interpreted by Emmanuel Gill MD  Time reviewed: 8:55:00   Symptoms at time of EKG: Cough   Rhythm: Normal sinus   Rate: 79 bpm  Axis: Normal  Ectopy: None  Conduction: Right bundle branch block  ST Segments/ T Waves: No ST-T wave changes and No acute ischemic changes  Q Waves: None  Comparison to prior: Unchanged from 9/10/2023    Clinical Impression: unchanged from baseline with no signs of acute ischemia      Results for orders placed or performed during the hospital encounter of 10/23/24   XR Chest 2 Views     Status: None    Narrative    Exam: XR CHEST 2 VIEWS, 10/23/2024 8:25 AM    Indication: Cough and chest pain    Comparison: Chest PA and lateral 5/5/2023    Findings:   Chest PA and lateral. Sternotomy wires are intact. Cardiomediastinal  contours within normal limits. Mild perihilar and infrahilar  streakiness represents atelectasis. No definite consolidation. No  pleural effusions  or pneumothorax. Upper abdomen unremarkable. No  acute osseous abnormalities.      Impression    Impression: Mild perihilar and infrahilar atelectasis.    JUANCHO WELLS MD         SYSTEM ID:  [id number]   Symptomatic Influenza A/B, RSV, & SARS-CoV2 PCR (COVID-19) Nasopharyngeal     Status: Normal    Specimen: Nasopharyngeal; Swab   Result Value Ref Range    Influenza A PCR Negative Negative    Influenza B PCR Negative Negative    RSV PCR Negative Negative    SARS CoV2 PCR Negative Negative    Narrative    Testing was performed using the Xpert Xpress CoV2/Flu/RSV Assay on the [employer] Private GeneXpert Instrument. This test should be ordered for the detection of SARS-CoV2, influenza, and RSV viruses in individuals with signs and symptoms of respiratory tract infection. This test is for in vitro diagnostic use under the [place] FDA for laboratories certified under CLIA to perform high or moderate complexity testing. This test has been [place] FDA cleared. A negative result does not rule out the presence of PCR inhibitors in the specimen or target RNA in concentration below the limit of detection for the assay. If only one viral target is positive but coinfection with multiple targets is suspected, the sample should be re-tested with another FDA cleared, approved, or authorized test, if coninfection would change clinical management. This test was validated by the [email or web address]. These laboratories are certified under the Clinical Laboratory Improvement Amendments of 1988 (CLIA-88) as qualified to perfom high complexity laboratory testing.   Comprehensive metabolic panel     Status: Abnormal   Result Value Ref Range    Sodium 141 135 - 145 mmol/L    Potassium 4.4 3.4 - 5.3 mmol/L    Carbon Dioxide (CO2) 21 (L) 22 - 29 mmol/L    Anion Gap 13 7 - 15 mmol/L    Urea Nitrogen 11.9 6.0 - 20.0 mg/dL    Creatinine 0.89 0.67 - 1.17 mg/dL    GFR Estimate >90 >60 mL/min/1.73m2    Calcium 9.6 8.8 - 10.4 mg/dL    Chloride 107 98 -  107 mmol/L    Glucose 86 70 - 99 mg/dL    Alkaline Phosphatase 96 40 - 150 U/L    AST 27 0 - 45 U/L    ALT 8 0 - 70 U/L    Protein Total 7.7 6.4 - 8.3 g/dL    Albumin 4.3 3.5 - 5.2 g/dL    Bilirubin Total 0.5 <=1.2 mg/dL   Troponin T, High Sensitivity     Status: Normal   Result Value Ref Range    Troponin T, High Sensitivity <6 <=22 ng/L   CBC with platelets and differential     Status: Abnormal   Result Value Ref Range    WBC Count 8.7 4.0 - 11.0 10e3/uL    RBC Count 4.77 4.40 - 5.90 10e6/uL    Hemoglobin 13.9 13.3 - 17.7 g/dL    Hematocrit 44.1 40.0 - 53.0 %    MCV 93 78 - 100 fL    MCH 29.1 26.5 - 33.0 pg    MCHC 31.5 31.5 - 36.5 g/dL    RDW 13.1 10.0 - 15.0 %    Platelet Count 236 150 - 450 10e3/uL    % Neutrophils 59 %    % Lymphocytes 19 %    % Monocytes 8 %    % Eosinophils 12 %    % Basophils 1 %    % Immature Granulocytes 1 %    NRBCs per 100 WBC 0 <1 /100    Absolute Neutrophils 5.1 1.6 - 8.3 10e3/uL    Absolute Lymphocytes 1.7 0.8 - 5.3 10e3/uL    Absolute Monocytes 0.7 0.0 - 1.3 10e3/uL    Absolute Eosinophils 1.1 (H) 0.0 - 0.7 10e3/uL    Absolute Basophils 0.1 0.0 - 0.2 10e3/uL    Absolute Immature Granulocytes 0.0 <=0.4 10e3/uL    Absolute NRBCs 0.0 10e3/uL   [place] Draw     Status: None    Narrative    The following orders were created for panel order [place] Draw.  Procedure                               Abnormality         Status                     ---------                               -----------         ------                     Extra Blue Top Tube[[id number]]                              Final result                 Please view results for these tests on the individual orders.   Extra Blue Top Tube     Status: None   Result Value Ref Range    Hold Specimen JI    Procalcitonin     Status: Normal   Result Value Ref Range    Procalcitonin 0.02 <0.50 ng/mL   EKG 12-lead, tracing only     Status: None (Preliminary result)   Result Value Ref Range    Systolic Blood Pressure  mmHg    Diastolic  Attending Blood Pressure  mmHg    Ventricular Rate 79 BPM    Atrial Rate 79 BPM    MO Interval 140 ms    QRS Duration 118 ms     ms    QTc 467 ms    P Axis 38 degrees    R AXIS 94 degrees    T Axis 46 degrees    Interpretation ECG       Sinus rhythm  Right bundle branch block  Abnormal ECG     Respiratory Panel PCR     Status: Abnormal    Specimen: Nasopharyngeal; Swab   Result Value Ref Range    Adenovirus Not Detected Not Detected    Coronavirus Not Detected Not Detected    Human Metapneumovirus Not Detected Not Detected    Human Rhin/Enterovirus Detected (A) Not Detected    Influenza A Not Detected Not Detected    Influenza A, H1 Not Detected Not Detected    Influenza A 2009 H1N1 Not Detected Not Detected    Influenza A, H3 Not Detected Not Detected    Influenza B Not Detected Not Detected    Parainfluenza Virus 1 Not Detected Not Detected    Parainfluenza Virus 2 Not Detected Not Detected    Parainfluenza Virus 3 Not Detected Not Detected    Parainfluenza Virus 4 Not Detected Not Detected    Respiratory Syncytial Virus A Not Detected Not Detected    Respiratory Syncytial Virus B Not Detected Not Detected    Chlamydia Pneumoniae Not Detected Not Detected    Mycoplasma Pneumoniae Not Detected Not Detected    Narrative    The ePlex Respiratory Panel is a qualitative nucleic acid, multiplex, in vitro diagnostic test for the simultaneous detection and identification of multiple respiratory viral and bacterial nucleic acids in nasopharyngeal swabs collected in viral transport media from individual exhibiting signs and symptoms of respiratory infection. The assay has received FDA approval for the testing of nasopharyngeal (NP) swabs only. This test is used for clinical purposes and should not be regarded as investigational or for research. This laboratory is certified under the Clinical Laboratory Improvement Amendments of 1988 (CLIA-88) as qualified to perform high complexity clinical laboratory testing.   Bordetella  pertussis parapertussis, PCR     Status: Normal    Specimen: Nasopharyngeal; Swab   Result Value Ref Range    Bordetella pertussis DNA Not Detected Not Detected    Bordetella parapertussis DNA Not Detected Not Detected    Narrative    The In[employer] Molecular Simplexa (TM) Bordetella Direct assay is a FDA-approved, real-time PCR test for the qualitative detection and differentiation of Bordetella pertussis and Bordetella parapertussis in nasopharyngeal swabs. Testing is performed by the [facility]. This test is used for clinical purposes. It should not be regarded as investigational or for research. This laboratory is certified under the Clinical Laboratory Improvement Amendments of 1988 (CLIA-88) as qualified to perform high complexity clinical laboratory testing.   CBC with platelets differential     Status: Abnormal    Narrative    The following orders were created for panel order CBC with platelets differential.  Procedure                               Abnormality         Status                     ---------                               -----------         ------                     CBC with platelets and d...[[id number]]  Abnormal            Final result                 Please view results for these tests on the individual orders.     Medications   benzonatate (TESSALON) capsule 100 mg (100 mg Oral $Given 10/23/24 5107)     Labs Ordered and Resulted from Time of ED Arrival to Time of ED Departure   COMPREHENSIVE METABOLIC PANEL - Abnormal       Result Value    Sodium 141      Potassium 4.4      Carbon Dioxide (CO2) 21 (*)     Anion Gap 13      Urea Nitrogen 11.9      Creatinine 0.89      GFR Estimate >90      Calcium 9.6      Chloride 107      Glucose 86      Alkaline Phosphatase 96      AST 27      ALT 8      Protein Total 7.7      Albumin 4.3      Bilirubin Total 0.5     CBC WITH PLATELETS AND DIFFERENTIAL - Abnormal    WBC Count 8.7      RBC Count 4.77       Hemoglobin 13.9      Hematocrit 44.1      MCV 93      MCH 29.1      MCHC 31.5      RDW 13.1      Platelet Count 236      % Neutrophils 59      % Lymphocytes 19      % Monocytes 8      % Eosinophils 12      % Basophils 1      % Immature Granulocytes 1      NRBCs per 100 WBC 0      Absolute Neutrophils 5.1      Absolute Lymphocytes 1.7      Absolute Monocytes 0.7      Absolute Eosinophils 1.1 (*)     Absolute Basophils 0.1      Absolute Immature Granulocytes 0.0      Absolute NRBCs 0.0     RESPIRATORY PANEL PCR - Abnormal    Adenovirus Not Detected      Coronavirus Not Detected      Human Metapneumovirus Not Detected      Human Rhin/Enterovirus Detected (*)     Influenza A Not Detected      Influenza A, H1 Not Detected      Influenza A 2009 H1N1 Not Detected      Influenza A, H3 Not Detected      Influenza B Not Detected      Parainfluenza Virus 1 Not Detected      Parainfluenza Virus 2 Not Detected      Parainfluenza Virus 3 Not Detected      Parainfluenza Virus 4 Not Detected      Respiratory Syncytial Virus A Not Detected      Respiratory Syncytial Virus B Not Detected      Chlamydia Pneumoniae Not Detected      Mycoplasma Pneumoniae Not Detected     INFLUENZA A/B, RSV, & SARS-COV2 PCR - Normal    Influenza A PCR Negative      Influenza B PCR Negative      RSV PCR Negative      SARS CoV2 PCR Negative     TROPONIN T, HIGH SENSITIVITY - Normal    Troponin T, High Sensitivity <6     PROCALCITONIN - Normal    Procalcitonin 0.02     BORDETELLA PERTUSSIS PARAPERTUSSIS, PCR - Normal    Bordetella pertussis DNA Not Detected      Bordetella parapertussis DNA Not Detected       XR Chest 2 Views   Final Result   Impression: Mild perihilar and infrahilar atelectasis.      JUANCHO WELLS MD            SYSTEM ID:  [id number]             Critical care was not performed.     Medical Decision Making  The patient's presentation was of high complexity (an acute health issue posing potential threat to life or bodily  function).    The patient's evaluation involved:  ordering and/or review of 3+ test(s) in this encounter (see separate area of note for details)  independent interpretation of testing performed by another health professional (CXR reviewed by me)  discussion of management or test interpretation with another health professional (Transplant ID, cardiac Transplant and social work)    The patient's management necessitated moderate risk     Assessment & Plan    Javi Bansal is a 53-year-old male with past medical history significant for cardiac transplant not currently being followed by the transplant service but on immunosuppression who presents with 1 month of URI symptoms, headache, productive cough, and feeling generally unwell.  Differential includes things such as infection particularly bacterial pneumonia, could also consider things like pertussis given current community levels.  On exam he is well-appearing, nontoxic, normal vital signs.  Possible crackles on exam but otherwise normal lung exam without increased work of breathing.  Initial evaluation will include chest x-ray, labs, and a full PCR viral panel given his immunocompromised status.  Will also obtain EKG and troponin given his episode of chest pain yesterday.  Though this does sound relatively reassuring as it was associated with URI symptoms and occurred after a coughing bout.  EKG shows no sign of acute ischemia, troponin is negative.  Chest x-ray without obvious focal pneumonia read as mild perihilar and infrahilar atelectasis.  This was reviewed by me.  Labs notable for negative COVID influenza though positive for human rhino enterovirus.  Given his 3 weeks of worsening symptoms, his immunocompromise status, and he is lost to follow-up with the transplant team I did discuss the case with transplant ID whether or not they thought treating for secondary bacterial infection was warranted.  They will come and evaluated the patient.   Will also have a social work consult to try to get him back into the transplant clinic.  Did also give the transplant team a heads up that he was here.    The transplant ID service came and evaluated the patient.  Please see their note for full details.  In short they did not feel that he needed any additional coverage or empiric antibiotics at this time.  Felt his symptoms were consistent with the human rhino enterovirus. he should continue his immunosuppression and  monitor for worsening symptoms including worsening shortness of breath or hypoxia and social work was consulted to help him reestablish care with the transplant team.        This part of the document was transcribed by Ira Dominguez, Medical Scribe.    I have reviewed the nursing notes. I have reviewed the findings, diagnosis, plan and need for follow up with the patient.    New Prescriptions    No medications on file       Final diagnoses:   Viral URI with cough       Emmanuel Ace MD  [facility] EMERGENCY DEPARTMENT  10/23/2024           Emmanuel Ace MD  10/23/24 [id number]

## 2024-10-23 NOTE — CONSULTS
Care Management Follow Up    Length of Stay (days): 0    Expected Discharge Date:       Concerns to be Addressed:       Patient plan of care discussed at interdisciplinary rounds: No    Anticipated Discharge Disposition:  to home              Anticipated Discharge Services:  Coordinate care and insurance concerns with transplant social work team  Anticipated Discharge DME:      Patient/family educated on Medicare website which has current facility and service quality ratings:    Education Provided on the Discharge Plan:    Patient/Family in Agreement with the Plan:      Referrals Placed by CM/SW:    Private pay costs discussed: Not applicable    Discussed  Partnership in Safe Discharge Planning  document with patient/family: No     Handoff Completed: No, handoff not indicated or clinically appropriate    Additional Information:  SW consult ordered to help pt with insurance coverage issues.   [id number] was utilized for Sami interpretation.  Pt. Was on [employer] [place] [employer] health insurance which has been discontinued.  He submitted paperwork at [facility] 6 months ago and has not heard anything about reinstating his health insurance.  Javi also submitted his proof of income 3 weeks ago via fax to [facility] to get energy assistance and he has not heard back.  His [facility] worker is Cristina - [phone].  BERTHA spoke with Javi's heart transplant SW and she agreed to connect with Javi to follow up about his health insurance coverage.  She agreed to call Cristina with the Replaced by [facility] and leave a vm.  Pt is seeing his cardiologist later today and the transplant SW was going to follow up with him in the ED.    Next Steps: Call Cristina at [facility] ([phone]) and inquire about health insurance coverage.      ALEC Bear, [facility]  ED   [phone]  Care Management Department  [facility]    Securely message me on Vocera             Christina Thomas  LICSW

## 2024-10-23 NOTE — PROGRESS NOTES
Transplant Social Work Services Phone Call    Data: Patient admitted to [facility] ED due to left sided chest pain during a coughing episode last night. He reported having 3+ weeks of productive cough, sore throat, rhinorrhea, and headaches. SW was consulted due to patient's ongoing concern with lack of insurance.    Intervention: Transplant SW called patient's [facility]  (Cristina Huff: [phone]) to inquire about patient's case (Case #: [id number]). CM did not answer and SW left a voicemail and requested call back. SW called (due to isolation precautions) Pt with  services to inform of this update. SW consulted financial counseling to support Pt while he is in hospital.    Assessment: Pt agreeable to talking with SW on the phone and was forthcoming with information.Indicated he had received the form in the mail from [facility] and completed it with [facility] [employer]. He indicated he submitted proof of income and records as requested. Pt and SW had to end call due to provider meeting with Pt. SW to call Pt back after 3PM and to re-attempt to call CM.  Education provided by SW: Ongoing SW support  Plan: Call patient's [facility]  (Cristina Huff: [phone]) and call Pt back (Pt specified after 3PM due to work schedule)    Poly Ramachandran, MSW, LGSW, APSW  Heart Transplant/[facility]   Teams/[place]  Ph. [phone]

## 2024-12-31 ENCOUNTER — TELEPHONE (OUTPATIENT)
Dept: CARE COORDINATION | Facility: CLINIC | Age: 54
End: 2024-12-31
Payer: MEDICAID

## 2024-12-31 NOTE — PROGRESS NOTES
Transplant Social Work Services Phone Call    Data: SW requested to look into whether patient has received updated insurance to schedule 2025 appointments. SW had previously reached out to [facility]  and had not received a call back. Patient only has Emergency MA currently.    Intervention: Transplant SW called patient's [facility]  (Cristina Huff: [phone]) on Monday to inquire about patient's case (Case #: [id number]). Left a VM for CM and requested a call back. SW called [facility] worker supervisor (Alicia Eisenberg: [phone]) on Monday and left a VM requesting a call back. Provided supportive phone call to patient utilizing  services. SW attempted to call [facility] Navigator and spoke with a [facility] representative.     Assessment: Both CM and CM supervisor did not answer. VM left for both. [facility] individual corroborated that patient had Emergency MA activated, however did not have additional insight on other coverage. Representative provided same contact information for CM and CM supervisor. Patient endorsed receiving a letter recently that indicated his family had coverage, however he had not received an update for himself. He is currently working with an  to approve residency and reported having an appointment in January for fingerprints and potential interview. Patient reported that he continues to try to call CM and has not heard anything additionally. Pt continued to inform writer that he is reachable any day after 3PM and anytime on Tuesdays. It does not appear that patient has coverage beyond Emergency MA.  Education provided by BERTHA: Ongoing BERTHA support in outpatient setting  Plan:Pt to follow up with his appointment with [facility] in January. Call patient's [facility]  (Cristina Huff: [phone]) and CM supervisor (Alicia Eisenberg: [phone]) to try to receive updates. Call Pt when received update or Pt to call writer if he hears back first.    Poly  MS MadieW, LGSW, APSW  Heart Transplant/MCS   Teams/Vocera  Ph. [phone]

## 2025-04-07 ENCOUNTER — TELEPHONE (OUTPATIENT)
Dept: TRANSPLANT | Facility: CLINIC | Age: 55
End: 2025-04-07
Payer: MEDICAID

## 2025-04-07 NOTE — TELEPHONE ENCOUNTER
Patient Call: General  Route to LPN    Reason for call: Pt called and asked for , ID#[id number]. Pt states he is now able to schedule a procedure and requests call back with . He states he has the day off work tomorrow 4/8 and will be free for a call back.    Call back needed? Yes    Return Call Needed  Same as documented in contacts section  When to return call?: Same day: Route High Priority

## 2025-04-08 ENCOUNTER — TELEPHONE (OUTPATIENT)
Dept: TRANSPLANT | Facility: CLINIC | Age: 55
End: 2025-04-08
Payer: MEDICAID

## 2025-04-08 DIAGNOSIS — Z94.1 TRANSPLANTED HEART (H): Primary | ICD-10-CM

## 2025-04-08 NOTE — TELEPHONE ENCOUNTER
Pt states he didn't have insurance for 1.5 years.     He now has it and is ready to come see Dr. Mcclellan.     No symptoms. Pt feels good. No concerns verbalized at this time. Pt is overdue for labs and testing.     Orders submitted for lab, cxr, DSE, and Clinic with Dr. Mcclellan or Rosibel Hu for next available.

## 2025-04-09 DIAGNOSIS — Z94.1 HEART REPLACED BY TRANSPLANT (H): ICD-10-CM

## 2025-04-09 DIAGNOSIS — Z94.1 TRANSPLANTED HEART (H): ICD-10-CM

## 2025-04-10 RX ORDER — ROSUVASTATIN CALCIUM 10 MG/1
10 TABLET, COATED ORAL DAILY
Qty: 90 TABLET | Refills: 3 | Status: SHIPPED | OUTPATIENT
Start: 2025-04-10

## 2025-04-10 RX ORDER — B-COMPLEX WITH VITAMIN C
500 TABLET ORAL DAILY
Qty: 90 TABLET | Refills: 3 | Status: SHIPPED | OUTPATIENT
Start: 2025-04-10

## 2025-06-02 ENCOUNTER — TELEPHONE (OUTPATIENT)
Dept: TRANSPLANT | Facility: CLINIC | Age: 55
End: 2025-06-02
Payer: MEDICAID

## 2025-06-02 NOTE — TELEPHONE ENCOUNTER
General  Route to LPN    Reason for call: Radhika would like a call back regarding Cellcept she asked for ledy     Call back needed? Yes    Return Call Needed  Same as documented in contacts section  When to return call?: Greater than one day: Route standard priority

## 2025-07-24 ENCOUNTER — APPOINTMENT (OUTPATIENT)
Dept: INTERPRETER SERVICES | Facility: CLINIC | Age: 55
End: 2025-07-24
Payer: MEDICAID

## 2025-07-24 ENCOUNTER — TELEPHONE (OUTPATIENT)
Dept: TRANSPLANT | Facility: CLINIC | Age: 55
End: 2025-07-24
Payer: MEDICAID

## 2025-07-24 DIAGNOSIS — Z12.5 PROSTATE CANCER SCREENING: ICD-10-CM

## 2025-07-24 DIAGNOSIS — E11.9 DIABETES MELLITUS (H): ICD-10-CM

## 2025-07-24 DIAGNOSIS — Z13.220 LIPID SCREENING: ICD-10-CM

## 2025-07-24 DIAGNOSIS — Z94.1 TRANSPLANTED HEART (H): Primary | ICD-10-CM

## 2025-07-24 DIAGNOSIS — Z13.29 THYROID DISORDER SCREENING: ICD-10-CM

## 2025-07-24 NOTE — TELEPHONE ENCOUNTER
Pt called using a  to discuss prep and appointments for 7/29.     Prep (fasting, no caffeine, tacro trough, time of appointments discussed and location.     No further questions or concerns verbalized at this time.

## 2025-07-29 ENCOUNTER — HOSPITAL ENCOUNTER (OUTPATIENT)
Dept: CARDIOLOGY | Facility: CLINIC | Age: 55
Discharge: HOME | End: 2025-07-29
Attending: INTERNAL MEDICINE
Payer: MEDICAID

## 2025-07-29 ENCOUNTER — OFFICE VISIT (OUTPATIENT)
Dept: CARDIOLOGY | Facility: CLINIC | Age: 55
End: 2025-07-29
Attending: INTERNAL MEDICINE
Payer: MEDICAID

## 2025-07-29 ENCOUNTER — LAB (OUTPATIENT)
Dept: LAB | Facility: CLINIC | Age: 55
End: 2025-07-29
Attending: INTERNAL MEDICINE
Payer: MEDICAID

## 2025-07-29 ENCOUNTER — TELEPHONE (OUTPATIENT)
Dept: TRANSPLANT | Facility: CLINIC | Age: 55
End: 2025-07-29

## 2025-07-29 ENCOUNTER — HOSPITAL ENCOUNTER (OUTPATIENT)
Dept: GENERAL RADIOLOGY | Facility: CLINIC | Age: 55
Discharge: HOME | End: 2025-07-29
Attending: INTERNAL MEDICINE
Payer: MEDICAID

## 2025-07-29 VITALS
BODY MASS INDEX: 24 KG/M2 | DIASTOLIC BLOOD PRESSURE: 83 MMHG | SYSTOLIC BLOOD PRESSURE: 118 MMHG | OXYGEN SATURATION: 100 % | WEIGHT: 151.1 LBS

## 2025-07-29 DIAGNOSIS — Z94.1 TRANSPLANTED HEART (H): ICD-10-CM

## 2025-07-29 DIAGNOSIS — Z13.29 THYROID DISORDER SCREENING: ICD-10-CM

## 2025-07-29 DIAGNOSIS — E11.9 DIABETES MELLITUS (H): ICD-10-CM

## 2025-07-29 DIAGNOSIS — Z12.5 PROSTATE CANCER SCREENING: ICD-10-CM

## 2025-07-29 DIAGNOSIS — Z13.220 LIPID SCREENING: ICD-10-CM

## 2025-07-29 LAB
ALBUMIN SERPL BCG-MCNC: 4.4 G/DL (ref 3.5–5.2)
ALP SERPL-CCNC: 76 U/L (ref 40–150)
ALT SERPL W P-5'-P-CCNC: 16 U/L (ref 0–70)
ANION GAP SERPL CALCULATED.3IONS-SCNC: 11 MMOL/L (ref 7–15)
AST SERPL W P-5'-P-CCNC: 24 U/L (ref 0–45)
BASOPHILS # BLD AUTO: 0 10E3/UL (ref 0–0.2)
BASOPHILS NFR BLD AUTO: 1 %
BILIRUB SERPL-MCNC: 0.6 MG/DL
BUN SERPL-MCNC: 19.2 MG/DL (ref 6–20)
CALCIUM SERPL-MCNC: 9.4 MG/DL (ref 8.8–10.4)
CHLORIDE SERPL-SCNC: 108 MMOL/L (ref 98–107)
CHOLEST SERPL-MCNC: 154 MG/DL
CK SERPL-CCNC: 113 U/L (ref 39–308)
CMV DNA SPEC NAA+PROBE-ACNC: NOT DETECTED IU/ML
CREAT SERPL-MCNC: 0.85 MG/DL (ref 0.67–1.17)
CV STRESS CURRENT BP HE: NORMAL
CV STRESS CURRENT HR HE: 101
CV STRESS CURRENT HR HE: 107
CV STRESS CURRENT HR HE: 108
CV STRESS CURRENT HR HE: 121
CV STRESS CURRENT HR HE: 125
CV STRESS CURRENT HR HE: 125
CV STRESS CURRENT HR HE: 127
CV STRESS CURRENT HR HE: 128
CV STRESS CURRENT HR HE: 128
CV STRESS CURRENT HR HE: 130
CV STRESS CURRENT HR HE: 135
CV STRESS CURRENT HR HE: 136
CV STRESS CURRENT HR HE: 137
CV STRESS CURRENT HR HE: 138
CV STRESS CURRENT HR HE: 138
CV STRESS CURRENT HR HE: 139
CV STRESS CURRENT HR HE: 140
CV STRESS CURRENT HR HE: 140
CV STRESS CURRENT HR HE: 74
CV STRESS CURRENT HR HE: 74
CV STRESS CURRENT HR HE: 77
CV STRESS CURRENT HR HE: 89
CV STRESS CURRENT HR HE: 91
CV STRESS CURRENT HR HE: 96
CV STRESS CURRENT HR HE: 97
CV STRESS CURRENT HR HE: 98
CV STRESS DEVIATION TIME HE: NORMAL
CV STRESS ECHO PERCENT HR HE: NORMAL
CV STRESS EXERCISE STAGE HE: NORMAL
CV STRESS EXERCISE STAGE REACHED HE: NORMAL
CV STRESS FINAL RESTING BP HE: NORMAL
CV STRESS FINAL RESTING HR HE: 89
CV STRESS MAX HR HE: 140
CV STRESS MAX TREADMILL GRADE HE: 0
CV STRESS MAX TREADMILL SPEED HE: 0
CV STRESS PEAK DIA BP HE: NORMAL
CV STRESS PEAK SYS BP HE: NORMAL
CV STRESS PHASE HE: NORMAL
CV STRESS PROTOCOL HE: NORMAL
CV STRESS REASON STOPPED HE: NORMAL
CV STRESS ST DEVIATION AMOUNT HE: NORMAL
CV STRESS ST DEVIATION ELEVATION HE: NORMAL
CV STRESS ST EVELATION AMOUNT HE: NORMAL
CV STRESS SYMPTOMS HE: NORMAL
CV STRESS TEST TYPE HE: NORMAL
CV STRESS TOTAL STAGE TIME MIN 1 HE: NORMAL
EBV DNA SERPL NAA+PROBE-ACNC: NOT DETECTED IU/ML
EGFRCR SERPLBLD CKD-EPI 2021: >90 ML/MIN/1.73M2
EOSINOPHIL # BLD AUTO: 0.1 10E3/UL (ref 0–0.7)
EOSINOPHIL NFR BLD AUTO: 2 %
ERYTHROCYTE [DISTWIDTH] IN BLOOD BY AUTOMATED COUNT: 13.1 % (ref 10–15)
EST. AVERAGE GLUCOSE BLD GHB EST-MCNC: 117 MG/DL
FASTING STATUS PATIENT QL REPORTED: ABNORMAL
FASTING STATUS PATIENT QL REPORTED: NORMAL
GLUCOSE SERPL-MCNC: 92 MG/DL (ref 70–99)
HBA1C MFR BLD: 5.7 %
HCO3 SERPL-SCNC: 21 MMOL/L (ref 22–29)
HCT VFR BLD AUTO: 41.5 % (ref 40–53)
HDLC SERPL-MCNC: 49 MG/DL
HGB BLD-MCNC: 13.6 G/DL (ref 13.3–17.7)
IMM GRANULOCYTES # BLD: 0 10E3/UL
IMM GRANULOCYTES NFR BLD: 0 %
LDLC SERPL CALC-MCNC: 91 MG/DL
LYMPHOCYTES # BLD AUTO: 1.3 10E3/UL (ref 0.8–5.3)
LYMPHOCYTES NFR BLD AUTO: 21 %
MAGNESIUM SERPL-MCNC: 2 MG/DL (ref 1.7–2.3)
MCH RBC QN AUTO: 29.6 PG (ref 26.5–33)
MCHC RBC AUTO-ENTMCNC: 32.8 G/DL (ref 31.5–36.5)
MCV RBC AUTO: 90 FL (ref 78–100)
MONOCYTES # BLD AUTO: 0.6 10E3/UL (ref 0–1.3)
MONOCYTES NFR BLD AUTO: 9 %
NEUTROPHILS # BLD AUTO: 4.3 10E3/UL (ref 1.6–8.3)
NEUTROPHILS NFR BLD AUTO: 68 %
NONHDLC SERPL-MCNC: 105 MG/DL
NRBC # BLD AUTO: 0 10E3/UL
NRBC BLD AUTO-RTO: 0 /100
PHOSPHATE SERPL-MCNC: 2.8 MG/DL (ref 2.5–4.5)
PLATELET # BLD AUTO: 210 10E3/UL (ref 150–450)
POTASSIUM SERPL-SCNC: 4.2 MMOL/L (ref 3.4–5.3)
PROT SERPL-MCNC: 7.4 G/DL (ref 6.4–8.3)
PSA SERPL DL<=0.01 NG/ML-MCNC: 0.69 NG/ML (ref 0–3.5)
RBC # BLD AUTO: 4.6 10E6/UL (ref 4.4–5.9)
SODIUM SERPL-SCNC: 140 MMOL/L (ref 135–145)
SPECIMEN TYPE: NORMAL
STRESS ECHO BASELINE DIASTOLIC HE: 72
STRESS ECHO BASELINE HR: 77
STRESS ECHO BASELINE SYSTOLIC BP: 105
STRESS ECHO LAST STRESS DIASTOLIC BP: 75
STRESS ECHO LAST STRESS HR: 140
STRESS ECHO LAST STRESS SYSTOLIC BP: 120
STRESS ECHO POST ESTIMATED WORKLOAD: 1
STRESS ECHO POST EXERCISE DUR MIN: 10
STRESS ECHO POST EXERCISE DUR SEC: 41
STRESS ECHO TARGET HR: 140
TACROLIMUS BLD-MCNC: <1 UG/L (ref 5–15)
TME LAST DOSE: ABNORMAL H
TME LAST DOSE: ABNORMAL H
TRIGL SERPL-MCNC: 69 MG/DL
TSH SERPL DL<=0.005 MIU/L-ACNC: 0.69 UIU/ML (ref 0.3–4.2)
WBC # BLD AUTO: 6.3 10E3/UL (ref 4–11)

## 2025-07-29 PROCEDURE — 83036 HEMOGLOBIN GLYCOSYLATED A1C: CPT

## 2025-07-29 PROCEDURE — 93321 DOPPLER ECHO F-UP/LMTD STD: CPT | Mod: TC

## 2025-07-29 PROCEDURE — 83718 ASSAY OF LIPOPROTEIN: CPT

## 2025-07-29 PROCEDURE — 71046 X-RAY EXAM CHEST 2 VIEWS: CPT | Mod: 26 | Performed by: RADIOLOGY

## 2025-07-29 PROCEDURE — 255N000002 HC RX 255 OP 636: Performed by: INTERNAL MEDICINE

## 2025-07-29 PROCEDURE — 3074F SYST BP LT 130 MM HG: CPT | Performed by: INTERNAL MEDICINE

## 2025-07-29 PROCEDURE — 84450 TRANSFERASE (AST) (SGOT): CPT

## 2025-07-29 PROCEDURE — 1126F AMNT PAIN NOTED NONE PRSNT: CPT | Performed by: INTERNAL MEDICINE

## 2025-07-29 PROCEDURE — 83735 ASSAY OF MAGNESIUM: CPT

## 2025-07-29 PROCEDURE — 93018 CV STRESS TEST I&R ONLY: CPT | Performed by: INTERNAL MEDICINE

## 2025-07-29 PROCEDURE — 84443 ASSAY THYROID STIM HORMONE: CPT

## 2025-07-29 PROCEDURE — 85004 AUTOMATED DIFF WBC COUNT: CPT

## 2025-07-29 PROCEDURE — 71046 X-RAY EXAM CHEST 2 VIEWS: CPT

## 2025-07-29 PROCEDURE — 36415 COLL VENOUS BLD VENIPUNCTURE: CPT

## 2025-07-29 PROCEDURE — 80197 ASSAY OF TACROLIMUS: CPT

## 2025-07-29 PROCEDURE — 82465 ASSAY BLD/SERUM CHOLESTEROL: CPT

## 2025-07-29 PROCEDURE — 93325 DOPPLER ECHO COLOR FLOW MAPG: CPT | Mod: 26 | Performed by: INTERNAL MEDICINE

## 2025-07-29 PROCEDURE — G2211 COMPLEX E/M VISIT ADD ON: HCPCS | Performed by: INTERNAL MEDICINE

## 2025-07-29 PROCEDURE — 250N000011 HC RX IP 250 OP 636: Performed by: INTERNAL MEDICINE

## 2025-07-29 PROCEDURE — 86833 HLA CLASS II HIGH DEFIN QUAL: CPT

## 2025-07-29 PROCEDURE — G0463 HOSPITAL OUTPT CLINIC VISIT: HCPCS | Performed by: INTERNAL MEDICINE

## 2025-07-29 PROCEDURE — 86352 CELL FUNCTION ASSAY W/STIM: CPT

## 2025-07-29 PROCEDURE — 86828 HLA CLASS I&II ANTIBODY QUAL: CPT

## 2025-07-29 PROCEDURE — 86832 HLA CLASS I HIGH DEFIN QUAL: CPT

## 2025-07-29 PROCEDURE — 87799 DETECT AGENT NOS DNA QUANT: CPT

## 2025-07-29 PROCEDURE — 82550 ASSAY OF CK (CPK): CPT

## 2025-07-29 PROCEDURE — 93350 STRESS TTE ONLY: CPT | Mod: 26 | Performed by: INTERNAL MEDICINE

## 2025-07-29 PROCEDURE — 84100 ASSAY OF PHOSPHORUS: CPT

## 2025-07-29 PROCEDURE — G0103 PSA SCREENING: HCPCS

## 2025-07-29 PROCEDURE — 250N000009 HC RX 250: Performed by: INTERNAL MEDICINE

## 2025-07-29 PROCEDURE — 84155 ASSAY OF PROTEIN SERUM: CPT

## 2025-07-29 PROCEDURE — 258N000003 HC RX IP 258 OP 636: Performed by: INTERNAL MEDICINE

## 2025-07-29 PROCEDURE — 93016 CV STRESS TEST SUPVJ ONLY: CPT | Performed by: INTERNAL MEDICINE

## 2025-07-29 PROCEDURE — 3079F DIAST BP 80-89 MM HG: CPT | Performed by: INTERNAL MEDICINE

## 2025-07-29 PROCEDURE — 99215 OFFICE O/P EST HI 40 MIN: CPT | Mod: 25 | Performed by: INTERNAL MEDICINE

## 2025-07-29 PROCEDURE — 93321 DOPPLER ECHO F-UP/LMTD STD: CPT | Mod: 26 | Performed by: INTERNAL MEDICINE

## 2025-07-29 RX ORDER — METOPROLOL TARTRATE 1 MG/ML
1-5 INJECTION, SOLUTION INTRAVENOUS
Status: DISCONTINUED | OUTPATIENT
Start: 2025-07-29 | End: 2025-07-29 | Stop reason: HOSPADM

## 2025-07-29 RX ORDER — ATROPINE SULFATE 0.4 MG/ML
.2-.4 AMPUL (ML) INJECTION
Status: DISCONTINUED | OUTPATIENT
Start: 2025-07-29 | End: 2025-07-30 | Stop reason: HOSPADM

## 2025-07-29 RX ORDER — LIDOCAINE 40 MG/G
CREAM TOPICAL
Status: DISCONTINUED | OUTPATIENT
Start: 2025-07-29 | End: 2025-07-30 | Stop reason: HOSPADM

## 2025-07-29 RX ORDER — DOBUTAMINE HYDROCHLORIDE 200 MG/100ML
10-50 INJECTION INTRAVENOUS CONTINUOUS
Status: DISCONTINUED | OUTPATIENT
Start: 2025-07-29 | End: 2025-07-29 | Stop reason: HOSPADM

## 2025-07-29 RX ADMIN — METOPROLOL TARTRATE 3 MG: 5 INJECTION INTRAVENOUS at 11:34

## 2025-07-29 RX ADMIN — PERFLUTREN 8 ML (DILUTED): 6.52 INJECTION, SUSPENSION INTRAVENOUS at 11:35

## 2025-07-29 RX ADMIN — DEXTROSE 10 MCG/KG/MIN: 50 INJECTION, SOLUTION INTRAVENOUS at 11:22

## 2025-07-29 ASSESSMENT — PAIN SCALES - GENERAL: PAINLEVEL_OUTOF10: NO PAIN (0)

## 2025-07-29 NOTE — NURSING NOTE
Chief Complaint   Patient presents with    Follow Up     ran out of rx for 2.5 days       Vitals were taken, medications reconciled.    Luz Schuler, EMT    12:59 PM

## 2025-07-29 NOTE — PROGRESS NOTES
Pt here for dobutamine stress test. Test, meds and side effects reviewed with patient. Achieved target HR at 50 mcg Dobutamine. Gave a total of 3 mg IV Metoprolol to bring HR back to baseline. Post monitoring complete and VSS. Pt escorted out to the gold waiting room.

## 2025-07-29 NOTE — NURSING NOTE
Transplant Coordinator Note    Reason for visit: 9th annual w/ DSE, cxr, labs, and clinic.   Coordinator: Present       Health concerns addressed today:  1. Headaches right side of head, follow up with PMD ([facility]).   2.   3.     Last cath '23 no CAV.   No DSAS  Normal graft function on last echo      Immunosuppressants:  MMF 1500 mg Bid.   Tac goal 6-8. Current dose 1 mg bid.     Routine screenings:    Derm: Last 3/2024. Due   Dental: Due  Colonoscopy: None.   Prostate: 0.69  Eye: Due  Flu/Pneumonia: UTD  Covid: Due  RSV: not of age.   Shingrix: Due      DSE and resulted labs reviewed with patient  Medication record reviewed and reconciled  Questions and concerns addressed  Pt verbalized an understanding of plan of care.     Patient Instructions  1. Please see your Primary doctor regarding your headaches.   2. Make sure you are taking your tacrolimus 1 mg twice a day.   3. Please make sure you are taking cellcept 1500 mg twice a day.   4. Will follow up with your lab results from today with a phone call to you.   5. I will reach out to dermatology for you to see him again.   6.  Please see a dentist twice a year.   7. Please see an eye doctor once a year.   8. Keep up the good work!     Next transplant clinic appointment: Next annual with angiogram.   Next lab draw: To be determined, depending on labs today. I will call you.   Coordinator will call with all pending results.     Please call transplant coordinator with any questions:    Yanelis Araiza RN BSN   Post Heart Transplant Nurse Coordinator  [facility]  Questions: [phone]

## 2025-07-29 NOTE — TELEPHONE ENCOUNTER
Tacro level <1. Pt called to discuss using an .     Goal 6-8.     Last dose taken 7/28 at 1030 pm.     Pt states he gets his medication from [place]. And the bottle states take 25 mg twice a day. He states SW gave him access to this medication back in Dec 2024.     Confirmed no missed doses.     Message sent to BERTHA.     Pt called back to inquire if now that he has insurance if he can afford to pay at a local pharmacy. No answer, VM left using an .

## 2025-07-29 NOTE — PROGRESS NOTES
ADULT HEART TRANSPLANT CLINIC      July 28, 2025      HPI:   Mr. Javi Bansal is a 47yr old male with a history of Chagas cardiomyopathy (diagnosed in 2010, treated with nifurtimox for 3 months) and biventricular systolic heart failure (LVEF 15-20% since 2015, on home milrinone since 8/2016), now s/p OHT 12/12/16, who presents to clinic for routine surveillance. He is accompanied by a .     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      He was started on azathioprine postoperatively due to reports of better outcomes.  His initial biopsy on 12/19/16 showed 2R ACR, which was treated with IV steroids.  Repeat biopsy 12/27/16 showed 2R/3A ACR, which was treated with IV steroids and thymoglobulin.  He had another episode of 2R ACR 2/10/17, which was treated with steroids.  His biopsies have since been negative for significant rejection.     He has no CAV or subsequent graft dysfunction. Was a lapse in insurance for a period of time but now has coverage.   Serostatus: CMV: D+/R+. EBV: D+/R+  Intolerance to medications: none      This visit:  - Reports pain on the right side of the head, present upon waking, localized to the inside of the head, not associated with skin changes  - No chest heaviness or pressure reported  - No heart fluttering, fevers, or chills reported  - History of hospitalization for COVID at [place], during which tacrolimus was prescribed once daily  - No primary care physician currently  - No prior evaluation for head pain due to lack of insurance until recently      PAST MEDICAL HISTORY:  Past  Medical History:   Diagnosis Date    Chagas cardiomyopathy     Chronic systolic heart failure (H)     Diabetes (H)     Steroid induced; 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 suspected , but unclear dx    superinfection, respiratory 04/10/2020    Concurrent bacterial superinfection with COVID19; rx abx, O2.       CURRENT MEDICATIONS:  Current Outpatient Medications   Medication Sig Dispense Refill    aspirin (ASA) 81 MG EC tablet Take 1 tablet (81 mg) by mouth daily 90 tablet 3    Calcium Carbonate-Vitamin D (OYSTER SHELL CALCIUM/D) 500-5 MG-MCG TABS Take 1 tablet by mouth daily. 90 tablet 3    CELLCEPT (BRAND) 500 MG tablet Take 3 tablets (1,500 mg) by mouth 2 times daily. 180 tablet 11    rosuvastatin (CRESTOR) 10 MG tablet Take 1 tablet (10 mg) by mouth daily. 90 tablet 3    tacrolimus (GENERIC EQUIVALENT) 1 MG capsule Take 1 capsule (1 mg) by mouth 2 times daily 180 capsule 3     ROS:    All relevant ROS were reviewed in the HPI.     EXAM:  /83 (BP Location: Right arm, Patient Position: Chair, Cuff Size: Adult Large)   Wt 68.5 kg (151 lb 1.6 oz)   SpO2 100%   BMI 24.00 kg/m    Physical Exam  - HEENT: No neurologic deficits on cranial nerve exam.  - CARDIOVASCULAR: Normal S1, S2; no S3, S4.  - LUNGS: Lungs clear to auscultation bilaterally.  - MUSCULOSKELETAL: Muscle strength is intact; gait is normal.  - NEUROLOGIC: No neurologic deficits on cranial nerve exam; muscle strength is intact; gait is normal; speech is clear and coherent.      Results  Lab results  - Creatinine: 0.85 (2025-07-29)  - Sodium:  141 (2025-07-29)  - Potassium: 4.2 (2025-07-29)  - LDL cholesterol: 91 (2025-07-29)  - All values within normal limits.    Imaging results  - stress echocardiogram from today: normal bi v function and no ishmemia        Assessment/Plan:   Mr. Iglesia Shetty is a 54  is s/p orthotopic heart transplant 12/2016 for chagas cardiomyopathy.     1. Heart transplant, normal graft function , no CAV, early rejection only as per HPI   - Heart function is normal based on the heart ultrasound (echo) performed today.  - continue tac 6-8.   - continue Mycophenolate 1250 mg bid    - Check tacrolimus drug level today to ensure appropriate dosing and adjust if necessary based on today's level.  - aspirin and statin for graft vasculopathy prevention.    2. Head pain, right side upon waking  - Referral to a primary care physician for evaluation of this pain,  exam reassuring     3. Preventive health maintenance  - Ensure that colonoscopy, and vaccinations are up-to-date to maintain overall health and prevent future complications.    4. History of systemic Chagas, reactivated post-transplant - now again cleared and off therapy   Surveillance labs per transplant ID-He has no systemic symptoms consistent with this      The longitudinal plan of care for heart failure was addressed during this visit. Due to the the added complexity in care, I will continue to support Javi Bansal in the subsequent management of this this condition and with ongoing continuity to care of this condition.      45 minutes was spent in direct patient contact and and reviewing the patient's medical history including lab, medication, and imaging data and in medical decision making.       Appointments  - Referral to primary care physician for headache evaluation   - Appointment with [facility] to be scheduled by the patient.        DYLON SHINE, BONIFACIO AUSTIN

## 2025-07-29 NOTE — LETTER
7/29/2025      RE: Javi Bansal  [address]  [address]  [place] [address]       Dear Colleague,    Thank you for the opportunity to participate in the care of your patient, Javi Bansal, at the [facility] [facility]. Please see a copy of my visit note below.    ADULT HEART TRANSPLANT CLINIC      July 28, 2025      HPI:   Mr. Javi Bansal is a 47yr old male with a history of Chagas cardiomyopathy (diagnosed in 2010, treated with nifurtimox for 3 months) and biventricular systolic heart failure (LVEF 15-20% since 2015, on home milrinone since 8/2016), now s/p OHT 12/12/16, who presents to clinic for routine surveillance. He is accompanied by a .     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      He was started on azathioprine postoperatively due to reports of better outcomes.  His initial biopsy on 12/19/16 showed 2R ACR, which was treated with IV steroids.  Repeat biopsy 12/27/16 showed 2R/3A ACR, which was treated with IV steroids and thymoglobulin.  He had another episode of 2R ACR 2/10/17, which was treated with steroids.  His biopsies have since been negative for significant rejection.     He has no CAV or subsequent graft dysfunction. Was a lapse in insurance for a period of time but now has coverage.   Serostatus: CMV: D+/R+. EBV: D+/R+  Intolerance to medications: none      This visit:  - Reports pain on the right side of the head, present upon waking, localized to the inside of the  head, not associated with skin changes  - No chest heaviness or pressure reported  - No heart fluttering, fevers, or chills reported  - History of hospitalization for COVID at [place], during which tacrolimus was prescribed once daily  - No primary care physician currently  - No prior evaluation for head pain due to lack of insurance until recently      PAST MEDICAL HISTORY:  Past Medical History:   Diagnosis Date     Chagas cardiomyopathy      Chronic systolic heart failure (H)      Diabetes (H)     Steroid induced; 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 suspected , but unclear dx     superinfection, respiratory 04/10/2020    Concurrent bacterial superinfection with COVID19; rx abx, O2.       CURRENT MEDICATIONS:  Current Outpatient Medications   Medication Sig Dispense Refill     aspirin (ASA) 81 MG EC tablet Take 1 tablet (81 mg) by mouth daily 90 tablet 3     Calcium Carbonate-Vitamin D (OYSTER SHELL CALCIUM/D) 500-5 MG-MCG TABS Take 1 tablet by mouth daily. 90 tablet 3     CELLCEPT (BRAND) 500 MG tablet Take 3 tablets (1,500 mg) by mouth 2 times daily. 180 tablet 11     rosuvastatin (CRESTOR) 10 MG tablet Take 1 tablet (10 mg) by mouth daily. 90 tablet 3     tacrolimus (GENERIC EQUIVALENT) 1 MG capsule Take 1 capsule (1 mg) by mouth 2 times daily 180 capsule 3     ROS:    All relevant ROS were reviewed in the HPI.     EXAM:  /83 (BP Location: Right arm, Patient Position: Chair, Cuff Size: Adult Large)   Wt 68.5 kg (151 lb 1.6 oz)   SpO2 100%   BMI 24.00 kg/m    Physical Exam  - HEENT: No  neurologic deficits on cranial nerve exam.  - CARDIOVASCULAR: Normal S1, S2; no S3, S4.  - LUNGS: Lungs clear to auscultation bilaterally.  - MUSCULOSKELETAL: Muscle strength is intact; gait is normal.  - NEUROLOGIC: No neurologic deficits on cranial nerve exam; muscle strength is intact; gait is normal; speech is clear and coherent.      Results  Lab results  - Creatinine: 0.85 (2025-07-29)  - Sodium: 141 (2025-07-29)  - Potassium: 4.2 (2025-07-29)  - LDL cholesterol: 91 (2025-07-29)  - All values within normal limits.    Imaging results  - stress echocardiogram from today: normal bi v function and no ishmemia        Assessment/Plan:   Mr. Iglesia Shetty is a 54  is s/p orthotopic heart transplant 12/2016 for chagas cardiomyopathy.     1. Heart transplant, normal graft function , no CAV, early rejection only as per HPI   - Heart function is normal based on the heart ultrasound (echo) performed today.  - continue tac 6-8.   - continue Mycophenolate 1250 mg bid    - Check tacrolimus drug level today to ensure appropriate dosing and adjust if necessary based on today's level.  - aspirin and statin for graft vasculopathy prevention.    2. Head pain, right side upon waking  - Referral to a primary care physician for evaluation of this pain,  exam reassuring     3. Preventive health maintenance  - Ensure that colonoscopy, and vaccinations are up-to-date to maintain overall health and prevent future complications.    4. History of systemic Chagas, reactivated post-transplant - now again cleared and off therapy   Surveillance labs per transplant ID-He has no systemic symptoms consistent with this      The longitudinal plan of care for heart failure was addressed during this visit. Due to the the added complexity in care, I will continue to support Javi Bansal in the subsequent management of this this condition and with ongoing continuity to care of this condition.      45 minutes was spent in direct  patient contact and and reviewing the patient's medical history including lab, medication, and imaging data and in medical decision making.       Appointments  - Referral to primary care physician for headache evaluation   - Appointment with [facility] to be scheduled by the patient.        DYLON SHINE, BONIFACIO AUSTIN    Please do not hesitate to contact me if you have any questions/concerns.     Sincerely,     Geena Mcclellan MD

## 2025-07-30 ENCOUNTER — APPOINTMENT (OUTPATIENT)
Dept: INTERPRETER SERVICES | Facility: CLINIC | Age: 55
End: 2025-07-30
Payer: MEDICAID

## 2025-07-30 DIAGNOSIS — Z94.1 TRANSPLANTED HEART (H): ICD-10-CM

## 2025-07-30 RX ORDER — TACROLIMUS 1 MG/1
1 CAPSULE ORAL 2 TIMES DAILY
Qty: 180 CAPSULE | Refills: 3 | Status: SHIPPED | OUTPATIENT
Start: 2025-07-30

## 2025-07-30 NOTE — PROGRESS NOTES
New script sent to local [facility]'s. Pt to start tacrolimus 1 mg bid tonight. Repeat labs on Tuesday. Labs in and appt made. Writer told patient to stop taking CA medicine. Not to take both. Pt verbalized understanding and agrees with the plan.

## 2025-07-31 ENCOUNTER — TELEPHONE (OUTPATIENT)
Dept: TRANSPLANT | Facility: CLINIC | Age: 55
End: 2025-07-31
Payer: MEDICAID

## 2025-07-31 LAB — IMMUKNOW IMMUNE CELL FUNCTION: 296 NG/ML

## 2025-07-31 NOTE — TELEPHONE ENCOUNTER
[phone]    [facility] [phone]    Emergency Medical Assistance.     Care Plan Certification:     [email or web address]    Under provider resources and forms.         Good RX or out of pocket for meds until then.

## 2025-08-01 ENCOUNTER — TELEPHONE (OUTPATIENT)
Dept: CARDIOLOGY | Facility: CLINIC | Age: 55
End: 2025-08-01
Payer: MEDICAID

## 2025-08-04 LAB
DONOR IDENTIFICATION: NORMAL
DSA COMMENTS: NORMAL
DSA PRESENT: NO
DSA TEST METHOD: NORMAL
FLOWPRA1 CELL: NORMAL
FLOWPRA1 COMMENTS: NORMAL
FLOWPRA1 RESULT: NORMAL
FLOWPRA1 TEST METHOD: NORMAL
FLOWPRA2 CELL: NORMAL
FLOWPRA2 COMMENTS: NORMAL
FLOWPRA2 RESULT: NORMAL
FLOWPRA2 TEST METHOD: NORMAL
ORGAN: NORMAL
SA 1  COMMENTS: NORMAL
SA 1 CELL: NORMAL
SA 1 TEST METHOD: NORMAL
SA 2 CELL: NORMAL
SA 2 COMMENTS: NORMAL
SA 2 TEST METHOD: NORMAL
SA1 HI RISK ABY: NORMAL
SA1 MOD RISK ABY: NORMAL
SA2 HI RISK ABY: NORMAL
SA2 MOD RISK ABY: NORMAL
UNACCEPTABLE ANTIGENS: NORMAL
UNOS CPRA: 0

## 2025-08-05 ENCOUNTER — TELEPHONE (OUTPATIENT)
Dept: ONCOLOGY | Facility: CLINIC | Age: 55
End: 2025-08-05

## 2025-08-05 ENCOUNTER — OFFICE VISIT (OUTPATIENT)
Dept: PHARMACY | Facility: CLINIC | Age: 55
End: 2025-08-05
Attending: INTERNAL MEDICINE

## 2025-08-05 ENCOUNTER — LAB (OUTPATIENT)
Dept: LAB | Facility: CLINIC | Age: 55
End: 2025-08-05
Payer: MEDICAID

## 2025-08-05 DIAGNOSIS — Z94.1 HEART REPLACED BY TRANSPLANT (H): Primary | ICD-10-CM

## 2025-08-05 DIAGNOSIS — Z94.1 TRANSPLANTED HEART (H): ICD-10-CM

## 2025-08-05 DIAGNOSIS — E78.5 DYSLIPIDEMIA: ICD-10-CM

## 2025-08-05 DIAGNOSIS — Z78.9 TAKES DIETARY SUPPLEMENTS: ICD-10-CM

## 2025-08-05 LAB
ANION GAP SERPL CALCULATED.3IONS-SCNC: 11 MMOL/L (ref 7–15)
BASOPHILS # BLD AUTO: 0 10E3/UL (ref 0–0.2)
BASOPHILS NFR BLD AUTO: 1 %
BUN SERPL-MCNC: 14.4 MG/DL (ref 6–20)
CALCIUM SERPL-MCNC: 9.6 MG/DL (ref 8.8–10.4)
CHLORIDE SERPL-SCNC: 106 MMOL/L (ref 98–107)
CREAT SERPL-MCNC: 0.87 MG/DL (ref 0.67–1.17)
CYCLOSPORINE BLD LC/MS/MS-MCNC: <25 UG/L (ref 50–400)
EGFRCR SERPLBLD CKD-EPI 2021: >90 ML/MIN/1.73M2
EOSINOPHIL # BLD AUTO: 0.1 10E3/UL (ref 0–0.7)
EOSINOPHIL NFR BLD AUTO: 2 %
ERYTHROCYTE [DISTWIDTH] IN BLOOD BY AUTOMATED COUNT: 13 % (ref 10–15)
GLUCOSE SERPL-MCNC: 98 MG/DL (ref 70–99)
HCO3 SERPL-SCNC: 24 MMOL/L (ref 22–29)
HCT VFR BLD AUTO: 44.1 % (ref 40–53)
HGB BLD-MCNC: 14.6 G/DL (ref 13.3–17.7)
IMM GRANULOCYTES # BLD: 0 10E3/UL
IMM GRANULOCYTES NFR BLD: 0 %
LYMPHOCYTES # BLD AUTO: 1.3 10E3/UL (ref 0.8–5.3)
LYMPHOCYTES NFR BLD AUTO: 23 %
MCH RBC QN AUTO: 30 PG (ref 26.5–33)
MCHC RBC AUTO-ENTMCNC: 33.1 G/DL (ref 31.5–36.5)
MCV RBC AUTO: 91 FL (ref 78–100)
MONOCYTES # BLD AUTO: 0.6 10E3/UL (ref 0–1.3)
MONOCYTES NFR BLD AUTO: 11 %
NEUTROPHILS # BLD AUTO: 3.5 10E3/UL (ref 1.6–8.3)
NEUTROPHILS NFR BLD AUTO: 63 %
NRBC # BLD AUTO: 0 10E3/UL
NRBC BLD AUTO-RTO: 0 /100
PLATELET # BLD AUTO: 208 10E3/UL (ref 150–450)
POTASSIUM SERPL-SCNC: 4.2 MMOL/L (ref 3.4–5.3)
RBC # BLD AUTO: 4.87 10E6/UL (ref 4.4–5.9)
SODIUM SERPL-SCNC: 141 MMOL/L (ref 135–145)
TACROLIMUS BLD-MCNC: 5.1 UG/L (ref 5–15)
TME LAST DOSE: ABNORMAL H
TME LAST DOSE: ABNORMAL H
TME LAST DOSE: NORMAL H
TME LAST DOSE: NORMAL H
WBC # BLD AUTO: 5.5 10E3/UL (ref 4–11)

## 2025-08-05 PROCEDURE — 80048 BASIC METABOLIC PNL TOTAL CA: CPT | Performed by: PATHOLOGY

## 2025-08-05 PROCEDURE — 80158 DRUG ASSAY CYCLOSPORINE: CPT | Performed by: INTERNAL MEDICINE

## 2025-08-05 PROCEDURE — 99000 SPECIMEN HANDLING OFFICE-LAB: CPT | Performed by: PATHOLOGY

## 2025-08-05 PROCEDURE — 80197 ASSAY OF TACROLIMUS: CPT | Performed by: INTERNAL MEDICINE

## 2025-08-05 PROCEDURE — 99207 PR NO CHARGE LOS: CPT | Performed by: PHARMACIST

## 2025-08-05 PROCEDURE — 85025 COMPLETE CBC W/AUTO DIFF WBC: CPT | Performed by: PATHOLOGY

## 2025-08-05 PROCEDURE — 36415 COLL VENOUS BLD VENIPUNCTURE: CPT | Performed by: PATHOLOGY

## 2025-08-05 RX ORDER — B-COMPLEX WITH VITAMIN C
500 TABLET ORAL DAILY
Qty: 90 TABLET | Refills: 3 | Status: SHIPPED | OUTPATIENT
Start: 2025-08-05

## 2025-08-05 RX ORDER — ROSUVASTATIN CALCIUM 10 MG/1
10 TABLET, COATED ORAL DAILY
Qty: 90 TABLET | Refills: 3 | Status: SHIPPED | OUTPATIENT
Start: 2025-08-05

## 2025-08-06 ENCOUNTER — TELEPHONE (OUTPATIENT)
Dept: TRANSPLANT | Facility: CLINIC | Age: 55
End: 2025-08-06
Payer: MEDICAID

## 2025-08-06 ENCOUNTER — APPOINTMENT (OUTPATIENT)
Dept: INTERPRETER SERVICES | Facility: CLINIC | Age: 55
End: 2025-08-06
Payer: MEDICAID

## 2025-08-06 DIAGNOSIS — Z94.1 TRANSPLANTED HEART (H): ICD-10-CM

## 2025-08-06 RX ORDER — TACROLIMUS 1 MG/1
CAPSULE ORAL
Qty: 90 CAPSULE | Refills: 11 | Status: SHIPPED | OUTPATIENT
Start: 2025-08-06

## 2025-08-12 ENCOUNTER — LAB (OUTPATIENT)
Dept: LAB | Facility: CLINIC | Age: 55
End: 2025-08-12
Payer: MEDICAID

## 2025-08-12 DIAGNOSIS — Z94.1 TRANSPLANTED HEART (H): ICD-10-CM

## 2025-08-12 LAB
ANION GAP SERPL CALCULATED.3IONS-SCNC: 12 MMOL/L (ref 7–15)
BUN SERPL-MCNC: 17.7 MG/DL (ref 6–20)
CALCIUM SERPL-MCNC: 9.3 MG/DL (ref 8.8–10.4)
CHLORIDE SERPL-SCNC: 105 MMOL/L (ref 98–107)
CREAT SERPL-MCNC: 0.94 MG/DL (ref 0.67–1.17)
EGFRCR SERPLBLD CKD-EPI 2021: >90 ML/MIN/1.73M2
GLUCOSE SERPL-MCNC: 143 MG/DL (ref 70–99)
HCO3 SERPL-SCNC: 23 MMOL/L (ref 22–29)
POTASSIUM SERPL-SCNC: 4.4 MMOL/L (ref 3.4–5.3)
SODIUM SERPL-SCNC: 140 MMOL/L (ref 135–145)
TACROLIMUS BLD-MCNC: 8.1 UG/L (ref 5–15)
TME LAST DOSE: NORMAL H
TME LAST DOSE: NORMAL H

## 2025-08-12 PROCEDURE — 80197 ASSAY OF TACROLIMUS: CPT | Performed by: INTERNAL MEDICINE

## 2025-08-12 PROCEDURE — 80048 BASIC METABOLIC PNL TOTAL CA: CPT | Performed by: PATHOLOGY

## 2025-08-12 PROCEDURE — 36415 COLL VENOUS BLD VENIPUNCTURE: CPT | Performed by: PATHOLOGY

## 2025-08-12 PROCEDURE — 99000 SPECIMEN HANDLING OFFICE-LAB: CPT | Performed by: PATHOLOGY

## (undated) DEVICE — SLEEVE TR BAND RADIAL COMPRESSION DEVICE 24CM TRB24-REG

## (undated) DEVICE — Device

## (undated) DEVICE — FASTENER CATH BALLOON CLAMPX2 STATLOCK 0684-00-493

## (undated) DEVICE — TUBING PRESSURE 30"

## (undated) DEVICE — KIT HAND CONTROL ACIST 016795

## (undated) DEVICE — INTRO SHEATH 7FRX10CM PINNACLE RSS702

## (undated) DEVICE — EXCHANGE WIRE .035 260 STAR/JFC/035/260/ M001491681

## (undated) DEVICE — CATH ANGIO 6FR JL3.5 100CM ST+

## (undated) DEVICE — SHTH INTRO 0.021IN ID 6FR DIA

## (undated) DEVICE — MANIFOLD KIT ANGIO AUTOMATED 014613

## (undated) DEVICE — PACK HEART LEFT CUSTOM

## (undated) RX ORDER — ASPIRIN 325 MG
TABLET ORAL
Status: DISPENSED
Start: 2017-12-14

## (undated) RX ORDER — REGADENOSON 0.08 MG/ML
INJECTION, SOLUTION INTRAVENOUS
Status: DISPENSED
Start: 2017-12-19

## (undated) RX ORDER — LIDOCAINE 40 MG/G
CREAM TOPICAL
Status: DISPENSED
Start: 2018-04-10

## (undated) RX ORDER — LIDOCAINE 40 MG/G
CREAM TOPICAL
Status: DISPENSED
Start: 2017-08-01

## (undated) RX ORDER — HEPARIN SODIUM 1000 [USP'U]/ML
INJECTION, SOLUTION INTRAVENOUS; SUBCUTANEOUS
Status: DISPENSED
Start: 2018-12-06

## (undated) RX ORDER — FENTANYL CITRATE 50 UG/ML
INJECTION, SOLUTION INTRAMUSCULAR; INTRAVENOUS
Status: DISPENSED
Start: 2023-04-11

## (undated) RX ORDER — HEPARIN SODIUM 1000 [USP'U]/ML
INJECTION, SOLUTION INTRAVENOUS; SUBCUTANEOUS
Status: DISPENSED
Start: 2023-04-11

## (undated) RX ORDER — LIDOCAINE 40 MG/G
CREAM TOPICAL
Status: DISPENSED
Start: 2023-04-11

## (undated) RX ORDER — DOBUTAMINE HYDROCHLORIDE 200 MG/100ML
INJECTION INTRAVENOUS
Status: DISPENSED
Start: 2025-07-29

## (undated) RX ORDER — FENTANYL CITRATE 50 UG/ML
INJECTION, SOLUTION INTRAMUSCULAR; INTRAVENOUS
Status: DISPENSED
Start: 2018-12-06

## (undated) RX ORDER — LIDOCAINE HYDROCHLORIDE 10 MG/ML
INJECTION, SOLUTION EPIDURAL; INFILTRATION; INTRACAUDAL; PERINEURAL
Status: DISPENSED
Start: 2018-04-10

## (undated) RX ORDER — FENTANYL CITRATE 50 UG/ML
INJECTION, SOLUTION INTRAMUSCULAR; INTRAVENOUS
Status: DISPENSED
Start: 2017-12-14

## (undated) RX ORDER — PROTAMINE SULFATE 10 MG/ML
INJECTION, SOLUTION INTRAVENOUS
Status: DISPENSED
Start: 2017-12-14

## (undated) RX ORDER — LIDOCAINE 40 MG/G
CREAM TOPICAL
Status: DISPENSED
Start: 2017-03-15

## (undated) RX ORDER — LIDOCAINE 40 MG/G
CREAM TOPICAL
Status: DISPENSED
Start: 2018-08-17

## (undated) RX ORDER — LIDOCAINE 40 MG/G
CREAM TOPICAL
Status: DISPENSED
Start: 2017-09-22

## (undated) RX ORDER — LIDOCAINE 40 MG/G
CREAM TOPICAL
Status: DISPENSED
Start: 2017-05-05

## (undated) RX ORDER — PROTAMINE SULFATE 10 MG/ML
INJECTION, SOLUTION INTRAVENOUS
Status: DISPENSED
Start: 2023-04-11

## (undated) RX ORDER — LIDOCAINE 40 MG/G
CREAM TOPICAL
Status: DISPENSED
Start: 2017-04-04

## (undated) RX ORDER — NITROGLYCERIN 5 MG/ML
VIAL (ML) INTRAVENOUS
Status: DISPENSED
Start: 2017-12-14

## (undated) RX ORDER — AMINOPHYLLINE 25 MG/ML
INJECTION, SOLUTION INTRAVENOUS
Status: DISPENSED
Start: 2017-12-19

## (undated) RX ORDER — VERAPAMIL HYDROCHLORIDE 2.5 MG/ML
INJECTION, SOLUTION INTRAVENOUS
Status: DISPENSED
Start: 2018-12-06

## (undated) RX ORDER — SODIUM CHLORIDE 9 MG/ML
INJECTION, SOLUTION INTRAVENOUS
Status: DISPENSED
Start: 2018-12-06

## (undated) RX ORDER — METOPROLOL TARTRATE 1 MG/ML
INJECTION, SOLUTION INTRAVENOUS
Status: DISPENSED
Start: 2025-07-29

## (undated) RX ORDER — LIDOCAINE HYDROCHLORIDE 10 MG/ML
INJECTION, SOLUTION EPIDURAL; INFILTRATION; INTRACAUDAL; PERINEURAL
Status: DISPENSED
Start: 2018-08-17

## (undated) RX ORDER — NITROGLYCERIN 5 MG/ML
VIAL (ML) INTRAVENOUS
Status: DISPENSED
Start: 2018-12-06

## (undated) RX ORDER — NICARDIPINE HCL-0.9% SOD CHLOR 1 MG/10 ML
SYRINGE (ML) INTRAVENOUS
Status: DISPENSED
Start: 2023-04-11

## (undated) RX ORDER — LIDOCAINE 40 MG/G
CREAM TOPICAL
Status: DISPENSED
Start: 2017-06-02

## (undated) RX ORDER — SODIUM CHLORIDE 9 MG/ML
INJECTION, SOLUTION INTRAVENOUS
Status: DISPENSED
Start: 2023-04-11

## (undated) RX ORDER — LIDOCAINE HYDROCHLORIDE 10 MG/ML
INJECTION, SOLUTION EPIDURAL; INFILTRATION; INTRACAUDAL; PERINEURAL
Status: DISPENSED
Start: 2023-04-11

## (undated) RX ORDER — HEPARIN SODIUM 1000 [USP'U]/ML
INJECTION, SOLUTION INTRAVENOUS; SUBCUTANEOUS
Status: DISPENSED
Start: 2017-12-14

## (undated) RX ORDER — NITROGLYCERIN 5 MG/ML
VIAL (ML) INTRAVENOUS
Status: DISPENSED
Start: 2023-04-11